# Patient Record
Sex: FEMALE | Race: WHITE | NOT HISPANIC OR LATINO | Employment: FULL TIME | URBAN - METROPOLITAN AREA
[De-identification: names, ages, dates, MRNs, and addresses within clinical notes are randomized per-mention and may not be internally consistent; named-entity substitution may affect disease eponyms.]

---

## 2017-04-10 ENCOUNTER — APPOINTMENT (OUTPATIENT)
Dept: CT IMAGING | Facility: HOSPITAL | Age: 57
End: 2017-04-10

## 2017-04-10 ENCOUNTER — APPOINTMENT (OUTPATIENT)
Dept: GENERAL RADIOLOGY | Facility: HOSPITAL | Age: 57
End: 2017-04-10

## 2017-04-10 ENCOUNTER — HOSPITAL ENCOUNTER (INPATIENT)
Facility: HOSPITAL | Age: 57
LOS: 4 days | Discharge: HOME | End: 2017-04-14
Attending: EMERGENCY MEDICINE | Admitting: FAMILY MEDICINE

## 2017-04-10 DIAGNOSIS — R06.02 SHORTNESS OF BREATH: ICD-10-CM

## 2017-04-10 DIAGNOSIS — J98.4 CAVITARY LESION OF LUNG: Primary | ICD-10-CM

## 2017-04-10 PROBLEM — E03.9 ACQUIRED HYPOTHYROIDISM: Status: ACTIVE | Noted: 2017-04-10

## 2017-04-10 PROBLEM — I10 ESSENTIAL HYPERTENSION: Status: ACTIVE | Noted: 2017-04-10

## 2017-04-10 PROBLEM — D72.829 LEUKOCYTOSIS: Status: ACTIVE | Noted: 2017-04-10

## 2017-04-10 PROBLEM — Z72.0 TOBACCO ABUSE: Status: ACTIVE | Noted: 2017-04-10

## 2017-04-10 LAB
ALBUMIN SERPL-MCNC: 4.4 G/DL (ref 3.2–4.8)
ALBUMIN/GLOB SERPL: 1.4 G/DL (ref 1.5–2.5)
ALP SERPL-CCNC: 108 U/L (ref 25–100)
ALT SERPL W P-5'-P-CCNC: 24 U/L (ref 7–40)
ANION GAP SERPL CALCULATED.3IONS-SCNC: 2 MMOL/L (ref 3–11)
APTT PPP: 24.5 SECONDS (ref 24–31)
AST SERPL-CCNC: 19 U/L (ref 0–33)
BASOPHILS # BLD AUTO: 0.03 10*3/MM3 (ref 0–0.2)
BASOPHILS NFR BLD AUTO: 0.3 % (ref 0–1)
BILIRUB SERPL-MCNC: 0.3 MG/DL (ref 0.3–1.2)
BNP SERPL-MCNC: 97 PG/ML (ref 0–100)
BUN BLD-MCNC: 21 MG/DL (ref 9–23)
BUN/CREAT SERPL: 30 (ref 7–25)
CALCIUM SPEC-SCNC: 9.6 MG/DL (ref 8.7–10.4)
CHLORIDE SERPL-SCNC: 103 MMOL/L (ref 99–109)
CO2 SERPL-SCNC: 33 MMOL/L (ref 20–31)
CREAT BLD-MCNC: 0.7 MG/DL (ref 0.6–1.3)
DEPRECATED RDW RBC AUTO: 46.8 FL (ref 37–54)
EOSINOPHIL # BLD AUTO: 0.04 10*3/MM3 (ref 0.1–0.3)
EOSINOPHIL NFR BLD AUTO: 0.3 % (ref 0–3)
ERYTHROCYTE [DISTWIDTH] IN BLOOD BY AUTOMATED COUNT: 11.9 % (ref 11.3–14.5)
GFR SERPL CREATININE-BSD FRML MDRD: 87 ML/MIN/1.73
GLOBULIN UR ELPH-MCNC: 3.1 GM/DL
GLUCOSE BLD-MCNC: 107 MG/DL (ref 70–100)
HCT VFR BLD AUTO: 47.8 % (ref 34.5–44)
HGB BLD-MCNC: 16.8 G/DL (ref 11.5–15.5)
HOLD SPECIMEN: NORMAL
HOLD SPECIMEN: NORMAL
IMM GRANULOCYTES # BLD: 0.12 10*3/MM3 (ref 0–0.03)
IMM GRANULOCYTES NFR BLD: 1 % (ref 0–0.6)
INR PPP: 0.93
LYMPHOCYTES # BLD AUTO: 1.57 10*3/MM3 (ref 0.6–4.8)
LYMPHOCYTES NFR BLD AUTO: 13.6 % (ref 24–44)
MCH RBC QN AUTO: 37.9 PG (ref 27–31)
MCHC RBC AUTO-ENTMCNC: 35.1 G/DL (ref 32–36)
MCV RBC AUTO: 107.9 FL (ref 80–99)
MONOCYTES # BLD AUTO: 1.05 10*3/MM3 (ref 0–1)
MONOCYTES NFR BLD AUTO: 9.1 % (ref 0–12)
NEUTROPHILS # BLD AUTO: 8.7 10*3/MM3 (ref 1.5–8.3)
NEUTROPHILS NFR BLD AUTO: 75.7 % (ref 41–71)
PLAT MORPH BLD: NORMAL
PLATELET # BLD AUTO: 229 10*3/MM3 (ref 150–450)
PMV BLD AUTO: 10.5 FL (ref 6–12)
POTASSIUM BLD-SCNC: 4.6 MMOL/L (ref 3.5–5.5)
PROT SERPL-MCNC: 7.5 G/DL (ref 5.7–8.2)
PROTHROMBIN TIME: 10.1 SECONDS (ref 9.6–11.5)
RBC # BLD AUTO: 4.43 10*6/MM3 (ref 3.89–5.14)
RBC MORPH BLD: NORMAL
SODIUM BLD-SCNC: 138 MMOL/L (ref 132–146)
TROPONIN I SERPL-MCNC: 0.01 NG/ML (ref 0–0.07)
WBC MORPH BLD: NORMAL
WBC NRBC COR # BLD: 11.51 10*3/MM3 (ref 3.5–10.8)
WHOLE BLOOD HOLD SPECIMEN: NORMAL
WHOLE BLOOD HOLD SPECIMEN: NORMAL

## 2017-04-10 PROCEDURE — 94799 UNLISTED PULMONARY SVC/PX: CPT

## 2017-04-10 PROCEDURE — 94640 AIRWAY INHALATION TREATMENT: CPT

## 2017-04-10 PROCEDURE — 71260 CT THORAX DX C+: CPT

## 2017-04-10 PROCEDURE — 93005 ELECTROCARDIOGRAM TRACING: CPT

## 2017-04-10 PROCEDURE — 99285 EMERGENCY DEPT VISIT HI MDM: CPT

## 2017-04-10 PROCEDURE — 85610 PROTHROMBIN TIME: CPT | Performed by: INTERNAL MEDICINE

## 2017-04-10 PROCEDURE — G0378 HOSPITAL OBSERVATION PER HR: HCPCS

## 2017-04-10 PROCEDURE — 25010000002 METHYLPREDNISOLONE PER 125 MG: Performed by: EMERGENCY MEDICINE

## 2017-04-10 PROCEDURE — 99222 1ST HOSP IP/OBS MODERATE 55: CPT | Performed by: INTERNAL MEDICINE

## 2017-04-10 PROCEDURE — 83880 ASSAY OF NATRIURETIC PEPTIDE: CPT | Performed by: EMERGENCY MEDICINE

## 2017-04-10 PROCEDURE — 25010000002 LEVOFLOXACIN PER 250 MG: Performed by: EMERGENCY MEDICINE

## 2017-04-10 PROCEDURE — 87040 BLOOD CULTURE FOR BACTERIA: CPT | Performed by: EMERGENCY MEDICINE

## 2017-04-10 PROCEDURE — 85730 THROMBOPLASTIN TIME PARTIAL: CPT | Performed by: INTERNAL MEDICINE

## 2017-04-10 PROCEDURE — 85007 BL SMEAR W/DIFF WBC COUNT: CPT | Performed by: EMERGENCY MEDICINE

## 2017-04-10 PROCEDURE — 85025 COMPLETE CBC W/AUTO DIFF WBC: CPT | Performed by: EMERGENCY MEDICINE

## 2017-04-10 PROCEDURE — 0 IOPAMIDOL 61 % SOLUTION: Performed by: EMERGENCY MEDICINE

## 2017-04-10 PROCEDURE — 84484 ASSAY OF TROPONIN QUANT: CPT

## 2017-04-10 PROCEDURE — 71020 HC CHEST PA AND LATERAL: CPT

## 2017-04-10 PROCEDURE — 80053 COMPREHEN METABOLIC PANEL: CPT | Performed by: EMERGENCY MEDICINE

## 2017-04-10 PROCEDURE — 25010000003 CEFTRIAXONE PER 250 MG: Performed by: PHYSICIAN ASSISTANT

## 2017-04-10 RX ORDER — SODIUM CHLORIDE 0.9 % (FLUSH) 0.9 %
1-10 SYRINGE (ML) INJECTION AS NEEDED
Status: DISCONTINUED | OUTPATIENT
Start: 2017-04-10 | End: 2017-04-14 | Stop reason: HOSPADM

## 2017-04-10 RX ORDER — CEFTRIAXONE SODIUM 1 G/50ML
1 INJECTION, SOLUTION INTRAVENOUS EVERY 24 HOURS
Status: DISCONTINUED | OUTPATIENT
Start: 2017-04-10 | End: 2017-04-13

## 2017-04-10 RX ORDER — METOPROLOL SUCCINATE 25 MG/1
25 TABLET, EXTENDED RELEASE ORAL DAILY
Status: DISCONTINUED | OUTPATIENT
Start: 2017-04-11 | End: 2017-04-14 | Stop reason: HOSPADM

## 2017-04-10 RX ORDER — DOCUSATE SODIUM 100 MG/1
100 CAPSULE, LIQUID FILLED ORAL 2 TIMES DAILY
Status: DISCONTINUED | OUTPATIENT
Start: 2017-04-10 | End: 2017-04-14 | Stop reason: HOSPADM

## 2017-04-10 RX ORDER — LEVOTHYROXINE SODIUM 0.15 MG/1
150 TABLET ORAL
Status: DISCONTINUED | OUTPATIENT
Start: 2017-04-11 | End: 2017-04-14 | Stop reason: HOSPADM

## 2017-04-10 RX ORDER — LEVOTHYROXINE SODIUM 175 UG/1
175 TABLET ORAL DAILY
COMMUNITY

## 2017-04-10 RX ORDER — SODIUM CHLORIDE 9 MG/ML
100 INJECTION, SOLUTION INTRAVENOUS CONTINUOUS
Status: DISCONTINUED | OUTPATIENT
Start: 2017-04-10 | End: 2017-04-12

## 2017-04-10 RX ORDER — SODIUM CHLORIDE 9 MG/ML
100 INJECTION, SOLUTION INTRAVENOUS CONTINUOUS
Status: DISCONTINUED | OUTPATIENT
Start: 2017-04-10 | End: 2017-04-10

## 2017-04-10 RX ORDER — ACETAMINOPHEN 325 MG/1
650 TABLET ORAL EVERY 4 HOURS PRN
Status: DISCONTINUED | OUTPATIENT
Start: 2017-04-10 | End: 2017-04-14 | Stop reason: HOSPADM

## 2017-04-10 RX ORDER — FAMOTIDINE 20 MG/1
20 TABLET, FILM COATED ORAL 2 TIMES DAILY
Status: DISCONTINUED | OUTPATIENT
Start: 2017-04-10 | End: 2017-04-14 | Stop reason: HOSPADM

## 2017-04-10 RX ORDER — DOXYCYCLINE HYCLATE 100 MG/1
100 CAPSULE ORAL 2 TIMES DAILY
Status: ON HOLD | COMMUNITY
Start: 2017-04-04 | End: 2017-04-10

## 2017-04-10 RX ORDER — IPRATROPIUM BROMIDE AND ALBUTEROL SULFATE 2.5; .5 MG/3ML; MG/3ML
3 SOLUTION RESPIRATORY (INHALATION) ONCE
Status: COMPLETED | OUTPATIENT
Start: 2017-04-10 | End: 2017-04-10

## 2017-04-10 RX ORDER — LEVOFLOXACIN 5 MG/ML
750 INJECTION, SOLUTION INTRAVENOUS ONCE
Status: COMPLETED | OUTPATIENT
Start: 2017-04-10 | End: 2017-04-10

## 2017-04-10 RX ORDER — METHYLPREDNISOLONE SODIUM SUCCINATE 125 MG/2ML
125 INJECTION, POWDER, LYOPHILIZED, FOR SOLUTION INTRAMUSCULAR; INTRAVENOUS ONCE
Status: COMPLETED | OUTPATIENT
Start: 2017-04-10 | End: 2017-04-10

## 2017-04-10 RX ORDER — ONDANSETRON 4 MG/1
4 TABLET, FILM COATED ORAL EVERY 6 HOURS PRN
Status: DISCONTINUED | OUTPATIENT
Start: 2017-04-10 | End: 2017-04-14 | Stop reason: HOSPADM

## 2017-04-10 RX ORDER — SODIUM CHLORIDE 0.9 % (FLUSH) 0.9 %
10 SYRINGE (ML) INJECTION AS NEEDED
Status: DISCONTINUED | OUTPATIENT
Start: 2017-04-10 | End: 2017-04-14 | Stop reason: HOSPADM

## 2017-04-10 RX ORDER — METOPROLOL SUCCINATE 25 MG/1
25 TABLET, EXTENDED RELEASE ORAL DAILY
COMMUNITY
End: 2018-04-03 | Stop reason: HOSPADM

## 2017-04-10 RX ADMIN — LEVOFLOXACIN 750 MG: 5 INJECTION, SOLUTION INTRAVENOUS at 15:08

## 2017-04-10 RX ADMIN — FAMOTIDINE 20 MG: 20 TABLET ORAL at 18:28

## 2017-04-10 RX ADMIN — DOCUSATE SODIUM 100 MG: 100 CAPSULE, LIQUID FILLED ORAL at 18:28

## 2017-04-10 RX ADMIN — IPRATROPIUM BROMIDE AND ALBUTEROL SULFATE 3 ML: .5; 3 SOLUTION RESPIRATORY (INHALATION) at 12:29

## 2017-04-10 RX ADMIN — SODIUM CHLORIDE 100 ML/HR: 9 INJECTION, SOLUTION INTRAVENOUS at 16:37

## 2017-04-10 RX ADMIN — CEFTRIAXONE SODIUM 1 G: 1 INJECTION, SOLUTION INTRAVENOUS at 18:51

## 2017-04-10 RX ADMIN — IOPAMIDOL 95 ML: 612 INJECTION, SOLUTION INTRAVENOUS at 12:50

## 2017-04-10 RX ADMIN — METHYLPREDNISOLONE SODIUM SUCCINATE 125 MG: 125 INJECTION, POWDER, FOR SOLUTION INTRAMUSCULAR; INTRAVENOUS at 12:26

## 2017-04-10 RX ADMIN — ACETAMINOPHEN 650 MG: 325 TABLET, FILM COATED ORAL at 20:48

## 2017-04-10 NOTE — H&P
"    [facility] Hospital Medicine Services  HISTORY AND PHYSICAL    Primary Care Physician: YANETH Olivera    Subjective     Chief Complaint: SOA    History of Present Illness:   Ms. Miller is a 57yo female with a history of HTN, acquired hypothyroidism s/p thyroidectomy for Graves' disease and ongoing tobacco abuse. She presented to [facility] ED on 4/10/17 with a 10 day history of fatigue/malaise, productive cough, congestion, dyspnea, subjective fevers and diaphoresis. She initially presented to her PCP's office 6 days ago and was prescribed Doxycycline 100mg PO BID (two pills left) and oral steroids. Flu swab was negative at that time. She was instructed to present to the hospital if she did not improve.     Upon further questioning, she does report a several month history of fatigue and diaphoresis, though she has related this to what she thought was menopause. No appetite change, no unintended weight loss.     ED evaluation revealed WBC count of 11.5, 75.7% neutrophils, creatinine 0.7 and BUN/Cr ratio 30.0. BP elevated in the ED. She was not hypoxic. XR revealed a mid-lung nodular density in the L lung and a CT chest showed a 2.5x3.1cm upper lobe cavitary mass and small L hilar lymph node. She was given IV fluids, solumedrol 125mg IV and Levaquin IV in the ED. She will be admitted to the hospitalist service for further evaluation.     Review of Systems   Constitutional: Positive for diaphoresis, fatigue and fever. Negative for activity change, appetite change, chills and unexpected weight change.        Subjective fevers, \"felt warm\"   HENT: Positive for congestion.    Respiratory: Positive for cough and shortness of breath. Negative for wheezing and stridor.    Cardiovascular: Positive for leg swelling. Negative for chest pain and palpitations.        Chronic BLE edema    Gastrointestinal: Negative for abdominal pain, constipation, diarrhea, nausea and vomiting. "   Genitourinary: Negative.    Musculoskeletal: Negative.    Skin: Negative.    Neurological: Negative for tremors, weakness and headaches.   Psychiatric/Behavioral: Negative for confusion.      Otherwise complete ROS performed and negative except as mentioned in the HPI.    Past Medical History:   Diagnosis Date   • Abscess    • Disease of thyroid gland    • Hypertension    • MRSA (methicillin resistant staph aureus) culture positive      Past Surgical History:   Procedure Laterality Date   • THYROID SURGERY       History reviewed. No pertinent family history.    Social History     Social History   • Marital status:      Spouse name: N/A   • Number of children: N/A   • Years of education: N/A     Occupational History   • Not on file.     Social History Main Topics   • Smoking status: Current Every Day Smoker     Packs/day: 0.75     Years: 40.00     Types: Cigarettes   • Smokeless tobacco: Not on file   • Alcohol use No   • Drug use: No   • Sexual activity: Defer     Other Topics Concern   • Not on file     Social History Narrative   • No narrative on file     Medication Sig   • doxycycline (VIBRAMYCIN) 100 MG capsule Take 100 mg by mouth 2 (Two) Times a Day. 7 day course beginning 4/4/17   • levothyroxine (SYNTHROID, LEVOTHROID) 150 MCG tablet Take 150 mcg by mouth Daily.   • metoprolol succinate XL (TOPROL-XL) 25 MG 24 hr tablet Take 25 mg by mouth Daily.     No Known Allergies    Objective     Physical Exam   Temp:  [97.5 °F (36.4 °C)-98.2 °F (36.8 °C)] 97.5 °F (36.4 °C)  Heart Rate:  [62-76] 74  Resp:  [18-20] 18  BP: (128-184)/(61-88) 139/72     Constitutional: no acute distress, awake, alert, sitting upright in bed  Eyes: PERRLA, sclerae anicteric, no conjunctival injection  Neck: supple, no thyromegaly, trachea midline  Respiratory: Non-labored respirations, rhonchi with cough, trace L basilar rales and prolonged L expiratory phase heard best at L upper and middle lobe region  Cardiovascular: RRR, no  murmurs, rubs, or gallops, palpable pedal pulses bilaterally  Gastrointestinal: Positive bowel sounds, soft, nontender, nondistended  Musculoskeletal: 1+ non-pitting edema, no clubbing or cyanosis to bilateral lower extremities  Psychiatric: oriented x 3, appropriate affect, cooperative  Neurologic: Strength symmetric in all extremities, Cranial Nerves grossly intact to confrontation     Results Reviewed:  Lab Results (all)     Procedure Component Value Units Date/Time    POC Troponin, Rapid [[id number]]  (Normal) Collected:  04/10/17 1133    Specimen:  Blood Updated:  04/10/17 1154     Troponin I 0.01 ng/mL       Serial Number: [id number]    : [id number]       Comprehensive Metabolic Panel [[id number]]  (Abnormal) Collected:  04/10/17 1128    Specimen:  Blood Updated:  04/10/17 1209     Glucose 107 (H) mg/dL      BUN 21 mg/dL      Creatinine 0.70 mg/dL      Sodium 138 mmol/L      Potassium 4.6 mmol/L      Chloride 103 mmol/L      CO2 33.0 (H) mmol/L      Calcium 9.6 mg/dL      Total Protein 7.5 g/dL      Albumin 4.40 g/dL      ALT (SGPT) 24 U/L      AST (SGOT) 19 U/L      Alkaline Phosphatase 108 (H) U/L      Total Bilirubin 0.3 mg/dL      eGFR Non African Amer 87 mL/min/1.73      Globulin 3.1 gm/dL      A/G Ratio 1.4 (L) g/dL      BUN/Creatinine Ratio 30.0 (H)     Anion Gap 2.0 (L) mmol/L       BNP [[id number]]  (Normal) Collected:  04/10/17 1128    Specimen:  Blood Updated:  04/10/17 1220     BNP 97.0 pg/mL       CBC Auto Differential [[id number]]  (Abnormal) Collected:  04/10/17 1128    Specimen:  Blood Updated:  04/10/17 1236     WBC 11.51 (H) 10*3/mm3      RBC 4.43 10*6/mm3      Hemoglobin 16.8 (H) g/dL      Hematocrit 47.8 (H) %      .9 (H) fL      MCH 37.9 (H) pg      MCHC 35.1 g/dL      RDW 11.9 %      RDW-SD 46.8 fl      MPV 10.5 fL      Platelets 229 10*3/mm3      Neutrophil % 75.7 (H) %      Lymphocyte % 13.6 (L) %      Monocyte % 9.1 %      Eosinophil % 0.3 %      Basophil % 0.3 %      Immature Grans %  1.0 (H) %      Neutrophils, Absolute 8.70 (H) 10*3/mm3      Lymphocytes, Absolute 1.57 10*3/mm3      Monocytes, Absolute 1.05 (H) 10*3/mm3      Eosinophils, Absolute 0.04 (L) 10*3/mm3      Basophils, Absolute 0.03 10*3/mm3      Immature Grans, Absolute 0.12 (H) 10*3/mm3       Scan Slide [[id number]]  (Normal) Collected:  04/10/17 1128    Specimen:  Blood Updated:  04/10/17 1236     RBC Morphology Normal     WBC Morphology Normal     Platelet Morphology Normal      aPTT [[id number]]  (Normal) Collected:  04/10/17 1128    Specimen:  Blood Updated:  04/10/17 1434     PTT 24.5 seconds     Narrative:       PTT = The equivalent PTT values for the therapeutic range of heparin levels at 0.3 to 0.5 U/ml are 45 to 60 seconds.      Protime-INR [[id number]] Collected:  04/10/17 1128    Specimen:  Blood Updated:  04/10/17 1434     Protime 10.1 Seconds      INR 0.93    Narrative:       Therapeutic Ranges for INR: 2.0-3.0 (PT 20-30)                              2.5-3.5 (PT 25-34)        Imaging Results (all)     Procedure Component Value Units Date/Time    XR Chest 2 View [[id number]] Collected:  04/10/17 1306     Updated:  04/10/17 1601    Narrative:       EXAMINATION: XR CHEST 2 VW-      INDICATION: Shortness of air triage protocol.      COMPARISON: None.     FINDINGS: Two-view chest reveals masslike density seen within the  periphery of the left midlung. CT scan of the chest is recommended for  further evaluation. The right lung is clear. Cardiac and mediastinal  silhouettes are within normal limits. Pulmonary vascularity is within  normal limits. No pleural effusion or pneumothorax.           Impression:       Nodular masslike density seen within the left midlung.  Findings concerning for an underlying process or malignancy in which CT  scan of the chest is recommended for further evaluation.     D:  04/10/2017  E:  04/10/2017     This report was finalized on 4/10/2017 3:59 PM by Dr. Sola Gonzalez MD.       CT Chest With Contrast  [[id number]] Collected:  04/10/17 1334     Updated:  04/10/17 1602    Narrative:       EXAMINATION: CT CHEST WITH CONTRAST-04/10/2017:      INDICATION: Left lung mass, dyspnea, wheezing, cough, difficulty  breathing.     TECHNIQUE: Multiple axial CT imaging was obtained of the chest from the  thoracic inlet through the adrenal glands following the administration  of intravenous contrast.     The radiation dose reduction device was turned on for each scan per the  ALARA (As Low as Reasonably Achievable) protocol.     COMPARISON: Chest x-ray dated 04/10/2017.      FINDINGS: There is a cavitary mass identified within the periphery of  the left upper lobe. Findings concerning for an underlying mass or focal  infiltrate. This area measures 2.5 x 3.1 cm. The remainder of the lung  parenchyma is grossly clear. There is nodular density seen posteriorly  within the right upper lobe. No evidence of bronchiectatic change.  Within the mediastinum there is no evidence of underlying mass. Small  lymph nodes identified in the right hilar region. There is no  pericardial effusion. Mild enlargement of the left atrium. Calcification  seen within the right hilar region. No mediastinal mass. No bulky hilar  or axillary lymphadenopathy. Degenerative changes seen within the spine.  The visualized portions of the upper abdomen are grossly unremarkable in  appearance.       Impression:       Cavitary mass identified correlating to the abnormality seen  on the prior chest x-ray in the left upper lobe. Findings concerning for  an underlying lesion or neoplasm. Focal infectious process as well as in  the differential. There is no mediastinal adenopathy or mass. Follow-up  imaging is recommended as indicated. Small lymph node seen in the right  hilar region with no bulky mediastinal mass or adenopathy identified.  Mild dilatation of the left atrium.     D:  04/10/2017  E:  04/10/2017     This report was finalized on 4/10/2017 3:59 PM by   Sola Gonzalez MD        I have personally reviewed and interpreted available lab data, radiology studies and ECG obtained at time of admission.     Assessment / Plan     Assessment/Problem List:      Patient Active Problem List   Diagnosis   • Leukocytosis   • Essential hypertension   • Acquired hypothyroidism   • Cavitating mass in left upper lung lobe   • Tobacco abuse     Cavitating mass in L upper lung   - With leukocytosis and history of illness, possibly is related to infectious process and will cover with appropriate antibiotics  - Given long smoking history (40 years x 3/4 PPD), high index of suspicion for malignancy and will work up with CT-guided biopsy    Leukocytosis  - As above, will treat for infectious process until pathology returns or has completed full course of therapy  - Rocephin and Azithromycin IV   - O2 PRN  - Duonebs scheduled     Tobacco abuse  - Nicotine patch  - Encourage tobacco cessation throughout hospitalization     HTN  - Resume home meds    Hypothyroidism    DVT prophylaxis: Mechanical for now, may start pharmacological prophylaxis following biopsy   Code Status: FULL   Admission Status: Patient will be admitted to YANETH Rodriguez 04/10/17 4:08 PM    Brief Attending Note   I have seen and examined the patient, performing an independent face-to-face diagnostic evaluation with plan of care reviewed and developed with the advanced practice clinician (APC).  Brief Summary Statement/HPI:   57 yo with several weeks of worsening shortness of breath and cough that has not responded to outpatient treatment.  She was found to have a round infiltrate on her CXR.  She denies any hemoptysis, fever, night sweats or chills.  Attending Physical Exam:  Patient is alert and talkative in no distress at rest lying in bed on RA  Neck is without mass or JVD  Heart is Reg wo murmur  Lungs have good breath sounds and a prolonged expiratory phase  Abd is soft without HSM or  mass  MAEW  Skin is without rash  Neurologic exam in nonfocal   Mood is appropriate  Data:  Labs and films reviewed.  Brief Assessment/Plan :  57 yo with persistent pneumonia and a questionable mass on CT of her chest.  Will cont with IV antibiotics, steroids were given in the ER but not currently continued. Will cont nebs.  Have orderd a CT biopsy in the morning, which maybe optimistic  Will use oxygen as needed.    See above for further detailed assessment and plan developed with APC which I have reviewed and/or edited.    I believe this patient meets obs criteria for now, though that could change.    Lizzy Brunson MD 04/10/17 11:53 PM

## 2017-04-10 NOTE — ED PROVIDER NOTES
Subjective   HPI Comments: Jill Miller is a 56 y.o. female presenting to the ED with c/o shortness of breath. Mrs. Milelr reports that she has been feeling increasingly short of breath over the past week. She also reports having a productive cough, congestion, fevers, and sweats during this time. She presented to her primary care physician six days ago, who prescribed her antibiotics and steroids after a flu swab came back negative. Mrs. Miller states that they have not helped, prompting her presentation to the ED. She is a smoker. She denies being diagnosed with COPD or emphysema in the past. Denies any chest pain. No other complaints at this time.    Patient is a 56 y.o. female presenting with shortness of breath.   History provided by:  Patient  Shortness of Breath   Severity:  Moderate  Onset quality:  Gradual  Duration:  1 week  Timing:  Constant  Progression:  Worsening  Chronicity:  New  Relieved by:  Nothing  Worsened by:  Nothing  Ineffective treatments:  None tried  Associated symptoms: cough, diaphoresis, fever and sputum production    Associated symptoms: no abdominal pain, no chest pain, no neck pain and no vomiting        Review of Systems   Constitutional: Positive for diaphoresis and fever. Negative for chills.   HENT: Positive for congestion. Negative for rhinorrhea.    Respiratory: Positive for cough, sputum production and shortness of breath.    Cardiovascular: Negative for chest pain and leg swelling.   Gastrointestinal: Negative for abdominal pain, diarrhea, nausea and vomiting.   Genitourinary: Negative for dysuria.   Musculoskeletal: Negative for back pain, myalgias and neck pain.   Skin: Negative for color change.   Neurological: Negative for dizziness, syncope, weakness and light-headedness.   All other systems reviewed and are negative.      Past Medical History:   Diagnosis Date   • Abscess    • Disease of thyroid gland    • Hypertension    • MRSA (methicillin resistant staph aureus)  culture positive    • Tobacco abuse 4/10/2017       No Known Allergies    Past Surgical History:   Procedure Laterality Date   • THYROID SURGERY         History reviewed. No pertinent family history.    Social History     Social History   • Marital status:      Spouse name: N/A   • Number of children: N/A   • Years of education: N/A     Social History Main Topics   • Smoking status: Current Every Day Smoker     Packs/day: 0.75     Years: 40.00     Types: Cigarettes   • Smokeless tobacco: None   • Alcohol use No   • Drug use: No   • Sexual activity: Defer     Other Topics Concern   • None     Social History Narrative   • None         Objective   Physical Exam   Constitutional: She is oriented to person, place, and time. She appears well-developed and well-nourished. No distress.   HENT:   Head: Normocephalic and atraumatic.   Eyes: Conjunctivae and EOM are normal.   Neck: Normal range of motion. Neck supple.   Cardiovascular: Normal rate, regular rhythm, normal heart sounds and intact distal pulses.    Pulmonary/Chest: Effort normal. No respiratory distress. She has rhonchi (mild diffuse rhonchi).   Abdominal: Soft. There is no tenderness.   Musculoskeletal: Normal range of motion. She exhibits no edema or tenderness.   Neurological: She is alert and oriented to person, place, and time.   Skin: Skin is warm and dry. No erythema.   Psychiatric: She has a normal mood and affect. Her behavior is normal.   Nursing note and vitals reviewed.      Procedures         ED Course  ED Course     EKG NSR.  CXR shows ALBINA mass.  CT shows cavitary lung lesion, infectious vs neoplastic.  Neb given, pt feels better.  Patient stable on serial rechecks.  Discussed exam findings, test results so far and concerns in detail at the bedside.  Discussed need for admission for further evaluation and treatment.                  MDM  Number of Diagnoses or Management Options  Cavitary lesion of lung:   Shortness of breath:      Amount  and/or Complexity of Data Reviewed  Clinical lab tests: reviewed and ordered  Tests in the radiology section of CPT®: ordered and reviewed  Independent visualization of images, tracings, or specimens: yes        Final diagnoses:   Cavitary lesion of lung   Shortness of breath       Documentation assistance provided by elisabeth Castro.  Information recorded by the scribe was done at my direction and has been verified and validated by me.     Bob Castro  04/10/17 1203       Bob Castro  04/10/17 1214       Bob Castro  04/10/17 1230       Brian Wadsworth MD  04/10/17 2022

## 2017-04-10 NOTE — PLAN OF CARE
Problem: Respiratory Insufficiency (Adult)  Goal: Identify Related Risk Factors and Signs and Symptoms  Outcome: Ongoing (interventions implemented as appropriate)

## 2017-04-10 NOTE — PROGRESS NOTES
Discharge Planning Assessment  [facility]     Patient Name: Jill Miller  MRN: [id number]  Today's Date: 4/10/2017    Admit Date: 4/10/2017          Discharge Needs Assessment       04/10/17 1422    Living Environment    Lives With spouse    Living Arrangements apartment    Provides Primary Care For spouse    Primary Care Provided By spouse/significant other    Quality Of Family Relationships supportive    Able to Return to Prior Living Arrangements yes    Discharge Needs Assessment    Concerns To Be Addressed denies needs/concerns at this time    Readmission Within The Last 30 Days no previous admission in last 30 days    Equipment Currently Used at Home none    Equipment Needed After Discharge none    Transportation Available family or friend will provide    Discharge Disposition still a patient    Discharge Contact Information if Applicable [phone]            Discharge Plan       04/10/17 1423    Case Management/Social Work Plan    Plan Home    Patient/Family In Agreement With Plan yes    Additional Comments Spoke with pt. at . Pt lives with spouse in [facility]. Pt. is independent with ADL's and mobility. Pt has no DME or HH. Her PCP is Marisa Lynch and medications are covered by insurance. Plan for discharge is to return home. Pt to transport home with family, when medically ready. Case Management will follow and assist with any discharge planning needs.        Discharge Placement     No information found                Demographic Summary       04/10/17 1419    Referral Information    Arrived From home or self-care    Reason For Consult discharge planning    Contact Information    Permission Granted to Share Information With family/designee    Comments Zen Miller (spouse) [phone]    Primary Care Physician Information    Name Marisa Lynch            Functional Status       04/10/17 1421    Functional Status Prior    Ambulation 0-->independent    Transferring 0-->independent    Toileting  0-->independent    Bathing 0-->independent    Dressing 0-->independent    Eating 0-->independent    Communication 0-->understands/communicates without difficulty    Swallowing 0-->swallows foods/liquids without difficulty    IADL    Medications independent    Meal Preparation independent    Housekeeping independent    Laundry independent    Shopping independent    Oral Care independent    Activity Tolerance    Current Activity Limitations none    Usual Activity Tolerance good    Current Activity Tolerance fair    Employment/Financial    Employment/Finance Comments Healthcare coverage: [employer]; Medication coverage: [employer]            Psychosocial     None            Abuse/Neglect     None            Legal     None            Substance Abuse     None            Patient Forms     None          Marsha Swanson RN

## 2017-04-11 ENCOUNTER — APPOINTMENT (OUTPATIENT)
Dept: CT IMAGING | Facility: HOSPITAL | Age: 57
End: 2017-04-11

## 2017-04-11 LAB
ANION GAP SERPL CALCULATED.3IONS-SCNC: 4 MMOL/L (ref 3–11)
BASOPHILS # BLD AUTO: 0 10*3/MM3 (ref 0–0.2)
BASOPHILS NFR BLD AUTO: 0 % (ref 0–1)
BUN BLD-MCNC: 13 MG/DL (ref 9–23)
BUN/CREAT SERPL: 21.7 (ref 7–25)
CALCIUM SPEC-SCNC: 9.1 MG/DL (ref 8.7–10.4)
CHLORIDE SERPL-SCNC: 108 MMOL/L (ref 99–109)
CO2 SERPL-SCNC: 25 MMOL/L (ref 20–31)
CREAT BLD-MCNC: 0.6 MG/DL (ref 0.6–1.3)
DEPRECATED RDW RBC AUTO: 46.9 FL (ref 37–54)
EOSINOPHIL # BLD AUTO: 0 10*3/MM3 (ref 0.1–0.3)
EOSINOPHIL NFR BLD AUTO: 0 % (ref 0–3)
ERYTHROCYTE [DISTWIDTH] IN BLOOD BY AUTOMATED COUNT: 11.8 % (ref 11.3–14.5)
GFR SERPL CREATININE-BSD FRML MDRD: 104 ML/MIN/1.73
GLUCOSE BLD-MCNC: 192 MG/DL (ref 70–100)
HCT VFR BLD AUTO: 44.2 % (ref 34.5–44)
HGB BLD-MCNC: 15.5 G/DL (ref 11.5–15.5)
IMM GRANULOCYTES # BLD: 0.1 10*3/MM3 (ref 0–0.03)
IMM GRANULOCYTES NFR BLD: 0.8 % (ref 0–0.6)
LYMPHOCYTES # BLD AUTO: 0.76 10*3/MM3 (ref 0.6–4.8)
LYMPHOCYTES NFR BLD AUTO: 6.3 % (ref 24–44)
MCH RBC QN AUTO: 37.9 PG (ref 27–31)
MCHC RBC AUTO-ENTMCNC: 35.1 G/DL (ref 32–36)
MCV RBC AUTO: 108.1 FL (ref 80–99)
MONOCYTES # BLD AUTO: 0.51 10*3/MM3 (ref 0–1)
MONOCYTES NFR BLD AUTO: 4.2 % (ref 0–12)
NEUTROPHILS # BLD AUTO: 10.64 10*3/MM3 (ref 1.5–8.3)
NEUTROPHILS NFR BLD AUTO: 88.7 % (ref 41–71)
PLATELET # BLD AUTO: 211 10*3/MM3 (ref 150–450)
PMV BLD AUTO: 10.7 FL (ref 6–12)
POTASSIUM BLD-SCNC: 4.3 MMOL/L (ref 3.5–5.5)
RBC # BLD AUTO: 4.09 10*6/MM3 (ref 3.89–5.14)
SODIUM BLD-SCNC: 137 MMOL/L (ref 132–146)
TSH SERPL DL<=0.05 MIU/L-ACNC: 0.55 MIU/ML (ref 0.35–5.35)
WBC NRBC COR # BLD: 12.01 10*3/MM3 (ref 3.5–10.8)

## 2017-04-11 PROCEDURE — 88305 TISSUE EXAM BY PATHOLOGIST: CPT | Performed by: INTERNAL MEDICINE

## 2017-04-11 PROCEDURE — 99232 SBSQ HOSP IP/OBS MODERATE 35: CPT | Performed by: NURSE PRACTITIONER

## 2017-04-11 PROCEDURE — 88173 CYTOPATH EVAL FNA REPORT: CPT | Performed by: INTERNAL MEDICINE

## 2017-04-11 PROCEDURE — 94799 UNLISTED PULMONARY SVC/PX: CPT

## 2017-04-11 PROCEDURE — 80048 BASIC METABOLIC PNL TOTAL CA: CPT | Performed by: PHYSICIAN ASSISTANT

## 2017-04-11 PROCEDURE — 25010000002 AZITHROMYCIN: Performed by: PHYSICIAN ASSISTANT

## 2017-04-11 PROCEDURE — 88172 CYTP DX EVAL FNA 1ST EA SITE: CPT | Performed by: INTERNAL MEDICINE

## 2017-04-11 PROCEDURE — 85025 COMPLETE CBC W/AUTO DIFF WBC: CPT | Performed by: PHYSICIAN ASSISTANT

## 2017-04-11 PROCEDURE — 25010000003 CEFTRIAXONE PER 250 MG: Performed by: PHYSICIAN ASSISTANT

## 2017-04-11 PROCEDURE — 25010000002 MORPHINE SULFATE (PF) 2 MG/ML SOLUTION: Performed by: NURSE PRACTITIONER

## 2017-04-11 PROCEDURE — G0378 HOSPITAL OBSERVATION PER HR: HCPCS

## 2017-04-11 PROCEDURE — 77012 CT SCAN FOR NEEDLE BIOPSY: CPT

## 2017-04-11 PROCEDURE — 0B9G3ZX DRAINAGE OF LEFT UPPER LUNG LOBE, PERCUTANEOUS APPROACH, DIAGNOSTIC: ICD-10-PCS | Performed by: RADIOLOGY

## 2017-04-11 PROCEDURE — 84443 ASSAY THYROID STIM HORMONE: CPT | Performed by: PHYSICIAN ASSISTANT

## 2017-04-11 RX ORDER — OXYCODONE HYDROCHLORIDE AND ACETAMINOPHEN 5; 325 MG/1; MG/1
1 TABLET ORAL EVERY 4 HOURS PRN
Status: DISCONTINUED | OUTPATIENT
Start: 2017-04-11 | End: 2017-04-14 | Stop reason: HOSPADM

## 2017-04-11 RX ORDER — MORPHINE SULFATE 2 MG/ML
2 INJECTION, SOLUTION INTRAMUSCULAR; INTRAVENOUS
Status: DISCONTINUED | OUTPATIENT
Start: 2017-04-11 | End: 2017-04-14 | Stop reason: HOSPADM

## 2017-04-11 RX ORDER — LIDOCAINE HYDROCHLORIDE 10 MG/ML
20 INJECTION, SOLUTION INFILTRATION; PERINEURAL ONCE
Status: COMPLETED | OUTPATIENT
Start: 2017-04-11 | End: 2017-04-11

## 2017-04-11 RX ADMIN — LIDOCAINE HYDROCHLORIDE 20 ML: 10 INJECTION, SOLUTION INFILTRATION; PERINEURAL at 10:15

## 2017-04-11 RX ADMIN — FAMOTIDINE 20 MG: 20 TABLET ORAL at 10:52

## 2017-04-11 RX ADMIN — MORPHINE SULFATE 2 MG: 2 INJECTION, SOLUTION INTRAMUSCULAR; INTRAVENOUS at 19:53

## 2017-04-11 RX ADMIN — METOPROLOL SUCCINATE 25 MG: 25 TABLET, EXTENDED RELEASE ORAL at 10:51

## 2017-04-11 RX ADMIN — LEVOTHYROXINE SODIUM 150 MCG: 150 TABLET ORAL at 06:28

## 2017-04-11 RX ADMIN — ONDANSETRON 4 MG: 4 TABLET, FILM COATED ORAL at 14:56

## 2017-04-11 RX ADMIN — AZITHROMYCIN 500 MG: 500 INJECTION, POWDER, LYOPHILIZED, FOR SOLUTION INTRAVENOUS at 14:32

## 2017-04-11 RX ADMIN — SODIUM CHLORIDE 100 ML/HR: 9 INJECTION, SOLUTION INTRAVENOUS at 12:21

## 2017-04-11 RX ADMIN — FAMOTIDINE 20 MG: 20 TABLET ORAL at 17:51

## 2017-04-11 RX ADMIN — DOCUSATE SODIUM 100 MG: 100 CAPSULE, LIQUID FILLED ORAL at 17:51

## 2017-04-11 RX ADMIN — CEFTRIAXONE SODIUM 1 G: 1 INJECTION, SOLUTION INTRAVENOUS at 17:51

## 2017-04-11 RX ADMIN — MORPHINE SULFATE 2 MG: 2 INJECTION, SOLUTION INTRAMUSCULAR; INTRAVENOUS at 11:40

## 2017-04-11 NOTE — CONSULTS
"Jill Miller  1960  [id number]  4/11/2017      Referring Provider: Hospital medicine service  Reason for Consultation: Cavitary left apical lung mass.      Subjective   History of present illness:  56-year-old  female who resides in [place].  50-pack-year tobacco history.  Chronic obstructive pulmonary disease.  2 years out from diagnosis of Graves' disease with surgical thyroidectomy on replacement therapy.  10 day history of cough, subjective fever, malaise, diaphoresis, and chilling.  Purulent sputum production.  No hemoptysis.  Mild left-sided pleuritic chest discomfort.  Seen by primary care physician and started on oral doxycycline with corticosteroids.  Influenza A and B rapid antigens allegedly negative.  Because of symptomatic worsening, presented to the [facility] emergency Department.  Peripheral leukocytosis of 11,500.  Abnormal chest x-ray with \"round pneumonia\" in left apex.  Chest CT w/ 3.1 cm cavitary mass peripherally in the left apex.  The patient denies international travel.  She was never deployed in active duty  service.  She denies outdoor activities of hunting camping fishing or gardening.  She does have periodontal disease with missing and carious teeth along the mandibular alveolar ridge.  The maxillary ridge is edentulous.  She traveled to [facility] in 2008 which is an area in the [place] which is hyperendemic for cocci.  She is unaware of a pulmonary illness following that trip.  She has recently traveled to [place].  She states that she was PPD positive a number of years ago but never received a 6-9 month course of isoniazid and vitamin B6.  She is unaware first-degree relatives as a child with active pulmonary tuberculosis who [facility] [place] [place]  The patient is status post CT-guided needle biopsy of a peripheral lesion today.  At the present time, I have no grams stain, fungal " "prep, acid-fast smears, or cytology.    Past Medical History:   Diagnosis Date   • Abscess    • Disease of thyroid gland    • Hypertension    • MRSA (methicillin resistant staph aureus) culture positive    • Tobacco abuse 4/10/2017       Past Surgical History:   Procedure Laterality Date   • THYROID SURGERY         Pediatric History   Patient Guardian Status   • Not on file.     Other Topics Concern   • Not on file     Social History Narrative   • No narrative on file       family history is not on file.    No Known Allergies    Medication: MAR reviewed.  Antibiotics: See below.  IV Anti-Infectives     Ordered     Dose/Rate Route Frequency Start Stop    04/10/17 1630  AZITHROMYCIN 500 MG/250 ML 0.9% NS IVPB (MBP)     Ordering Provider:  YANETH Mcnair    500 mg  over 60 Minutes Intravenous Every 24 Hours 04/11/17 1500      04/10/17 1630  cefTRIAXone (ROCEPHIN) IVPB 1 g     Ordering Provider:  YANETH Mcnair    1 g  over 30 Minutes Intravenous Every 24 Hours 04/10/17 1800      04/10/17 1339  levoFLOXacin (LEVAQUIN) 750 mg/150 mL D5W (premix) (LEVAQUIN) 750 mg     Ordering Provider:  Brian Wadsworth MD    750 mg Intravenous Once 04/10/17 1341 04/10/17 1640          Please refer to the medical record for a full medication list    Review of Systems  Pertinent items are noted in HPI, all other systems reviewed and negative    Objective     Physical Exam: See below   Vital Signs   Temp:  [97.3 °F (36.3 °C)-98 °F (36.7 °C)] 97.9 °F (36.6 °C)  Heart Rate:  [63-86] 66  Resp:  [16-20] 16  BP: (119-147)/(59-87) 130/77    Blood pressure 130/77, pulse 66, temperature 97.9 °F (36.6 °C), resp. rate 16, height 62\" (157.5 cm), weight 192 lb (87.1 kg), SpO2 99 %.  GENERAL: Awake and alert, in no acute distress.  Moderately obese  female.  HEENT: Oropharynx without thrush. Sinuses nontender. Dentition in poor repair.  The maxillary alveolar ridge is edentulous.  There are a few remaining carious teeth " in the mandibular midline.  No cervical adenopathy. No carotid bruits/ jugular venous distention.   EYES: PERRL. No conjunctival injection. No icterus. EOMI.  LYMPHATICS: No lymphadenopathy of the neck or axillary or inguinal regions.   HEART: No murmur, gallop, or pericardial friction rub.   LUNGS: Clear to auscultation anteriorly. No percussion dullness.  Diffuse expiratory wheezes.  Expanded AP diameter.   ABDOMEN: Soft, nontender, nondistended. No appreciable HSM.  No peritoneal findings of rebound or guarding.   SKIN: Warm and dry without cutaneous eruptions. No embolic stigmata.   PSYCHIATRIC: Mental status lucid. Cranial nerve function intact.       Results Review:   I reviewed the patient's new clinical results.    Lab Results   Component Value Date    WBC 12.01 (H) 04/11/2017    HGB 15.5 04/11/2017    HCT 44.2 (H) 04/11/2017    .1 (H) 04/11/2017     04/11/2017       Lab Results   Component Value Date    GLUCOSE 192 (H) 04/11/2017    BUN 13 04/11/2017    CREATININE 0.60 04/11/2017    EGFRIFNONA 104 04/11/2017    BCR 21.7 04/11/2017    CO2 25.0 04/11/2017    CALCIUM 9.1 04/11/2017    ALBUMIN 4.40 04/10/2017    LABIL2 1.4 (L) 04/10/2017    AST 19 04/10/2017    ALT 24 04/10/2017       Estimated Creatinine Clearance: 109.8 mL/min (by C-G formula based on Cr of 0.6).      Microbiology: Multiple smears and cultures for aerobic, anaerobic, acid-fast bacilli, and fungi pending, in addition to cytology to rule out neoplastic disease.      Radiology:  Imaging Results (last 72 hours)     Procedure Component Value Units Date/Time    XR Chest 2 View [[id number]] Collected:  04/10/17 1306     Updated:  04/10/17 1601    Narrative:       EXAMINATION: XR CHEST 2 VW-      INDICATION: Shortness of air triage protocol.      COMPARISON: None.     FINDINGS: Two-view chest reveals masslike density seen within the  periphery of the left midlung. CT scan of the chest is recommended for  further evaluation. The right lung  is clear. Cardiac and mediastinal  silhouettes are within normal limits. Pulmonary vascularity is within  normal limits. No pleural effusion or pneumothorax.           Impression:       Nodular masslike density seen within the left midlung.  Findings concerning for an underlying process or malignancy in which CT  scan of the chest is recommended for further evaluation.     D:  04/10/2017  E:  04/10/2017     This report was finalized on 4/10/2017 3:59 PM by Dr. Sola Gonzalez MD.       CT Chest With Contrast [[id number]] Collected:  04/10/17 1334     Updated:  04/10/17 1602    Narrative:       EXAMINATION: CT CHEST WITH CONTRAST-04/10/2017:      INDICATION: Left lung mass, dyspnea, wheezing, cough, difficulty  breathing.     TECHNIQUE: Multiple axial CT imaging was obtained of the chest from the  thoracic inlet through the adrenal glands following the administration  of intravenous contrast.     The radiation dose reduction device was turned on for each scan per the  ALARA (As Low as Reasonably Achievable) protocol.     COMPARISON: Chest x-ray dated 04/10/2017.      FINDINGS: There is a cavitary mass identified within the periphery of  the left upper lobe. Findings concerning for an underlying mass or focal  infiltrate. This area measures 2.5 x 3.1 cm. The remainder of the lung  parenchyma is grossly clear. There is nodular density seen posteriorly  within the right upper lobe. No evidence of bronchiectatic change.  Within the mediastinum there is no evidence of underlying mass. Small  lymph nodes identified in the right hilar region. There is no  pericardial effusion. Mild enlargement of the left atrium. Calcification  seen within the right hilar region. No mediastinal mass. No bulky hilar  or axillary lymphadenopathy. Degenerative changes seen within the spine.  The visualized portions of the upper abdomen are grossly unremarkable in  appearance.       Impression:       Cavitary mass identified correlating to  the abnormality seen  on the prior chest x-ray in the left upper lobe. Findings concerning for  an underlying lesion or neoplasm. Focal infectious process as well as in  the differential. There is no mediastinal adenopathy or mass. Follow-up  imaging is recommended as indicated. Small lymph node seen in the right  hilar region with no bulky mediastinal mass or adenopathy identified.  Mild dilatation of the left atrium.     D:  04/10/2017  E:  04/10/2017           This report was finalized on 4/10/2017 3:59 PM by Dr. Sola Gonzalez MD.       CT Biopsy Lung Mediastinum [[id number]] Collected:  04/11/17 1605     Updated:  04/11/17 1610    Narrative:       EXAMINATION: CT GUIDED BIOPSY, LUNG/MEDIASTINUM-04/11/2017:      INDICATION: Lung mass; J98.4-Other disorders of lung; R06.02-Shortness  of breath, biopsy for diagnosis.     TECHNIQUE:      The patient has no known allergies.     The clotting profile is within normal limits. PTT is 25, PT 10.1, INR  0.93, platelets 211,000. The patient is currently on  no anticoagulants.     The radiation dose reduction device was turned on for each scan per the  ALARA (As Low as Reasonably Achievable) protocol.     COMPARISON: NONE.     FINDINGS:   1. The procedure, risks, complications and side effects of percutaneous  needle aspiration biopsy of the left peripheral mid to upper chest  lesion was discussed and reviewed in detail with the patient including  possible risks and complications which include bleeding, infection,  pneumothorax, air embolization, pleural injury, laceration to the  intercostal artery with massive bleeding, and other possible risks and  complications. The patient understood and consented.  2. With the patient in the supine position, under sterile technique,  local anesthesia and meticulous CT guidance, from the left anterior  axillary line approach, a 25 and 22-gauge needle was placed intrinsic to  the cavitary peripheral left mid lung lesion. Aspiration  biopsy was  performed vigorously and tolerated well.  3. Aspirated material was prepared on touch prep slides and clot was  placed in formalin for permanent section processing.  4. Postbiopsy images reveal no bleeding, pneumothorax or complication.  No immediate lung injury was noted.       Impression:       1.  Needle aspiration biopsy has been performed successfully on the  cavitary peripheral lung lesion in the left mid chest.  2.  Immediate complication, bleeding or pneumothorax was not identified.  3.  The patient tolerated the procedure well. She was taken back to her  room in satisfactory and stable condition.     D:  04/11/2017  E:  04/11/2017           This report was finalized on 4/11/2017 4:07 PM by Dr. Renny Elder MD.             ASSESSMENT AND PLAN: Round pneumonia in left lung apex.  3.1 cm cavitary mass by chest CT.  Long-standing tobacco abuse with neoplastic risk.  Questionable positive PPD in the past/no prior isoniazid prophylaxis.  Periodontal disease.  The maximum temperature over 24 hours 97.9°.  The patient is currently on ceftriaxone, azithromycin and levofloxacin.  She does not appear to be clinically toxic.  She is status post CT-guided lung biopsy earlier today.  Currently, breath sounds are symmetric in both chests.  There is no subcutaneous emphysema in palpation of the left chest wall.  There is a broad differential diagnosis to include neoplastic disease, fungal etiologies such as histoplasma, Blastomyces, cryptococci, Aspergillus, and Coccidioides in light of the patient's travel to [place].  Additional organisms that might be associated with severe periodontal disease and possible aspiration include alpha-streptococci, Fusobacterium and Bacteroides species.  Nocardia and actinomyces may appear in this fashion radiographically.  Certainly tuberculosis, both typical and atypical, are considered in the differential diagnosis.  The patient has a vague history of  "positive PPD a number of years ago but never received isoniazid prophylaxis.  Before embarking on an extensive serologic and urinary antigen assay workup, we will await the primary smears, cultures, and cytology from today's CT-guided biopsy.  I'm not entirely convinced that this is a \"cavitary lung mass\".  This actually appears to me to be a round pneumonia with small air bronchograms.  If the Gram stain of the aspirate reveals a mixed population of organisms that would suggest oropharyngeal mark, it would be worthwhile to add an agent with anaerobic coverage.  Complex medical decision making.       I discussed the patients findings and my recommendations with patient    Thank you for this consult.  Our group would be pleased to follow this patient over the course of their hospitalization and assist with outpatient antimicrobial therapy, as indicated.     Timbo Salazar MD  4/11/2017          "

## 2017-04-11 NOTE — PLAN OF CARE
Problem: Patient Care Overview (Adult)  Goal: Plan of Care Review  Outcome: Ongoing (interventions implemented as appropriate)    04/11/17 1612   Coping/Psychosocial Response Interventions   Plan Of Care Reviewed With patient;spouse   Patient Care Overview   Progress progress towards functional goals is fair         Problem: Respiratory Insufficiency (Adult)  Goal: Identify Related Risk Factors and Signs and Symptoms  Outcome: Outcome(s) achieved Date Met:  04/11/17 04/11/17 1612   Respiratory Insufficiency   Related Risk Factors (Respiratory Insufficiency) pain;second-hand smoke exposure;smoking

## 2017-04-11 NOTE — NURSING NOTE
Patient for CT guided Left Lung Biopsy. Discussed procedure. Verbalized. Patient verbalized ' I'm scared I never had anything done like this before'. Reassured patient, radiologist will tell her everything his going to do prior to doing it. Verbalized understanding. Patient worried about coughing during procedure.  Reassured patient ok to cough.

## 2017-04-11 NOTE — PROGRESS NOTES
[facility] Medicine Services  INPATIENT PROGRESS NOTE    Date of Admission: 4/10/2017  Length of Stay: 0  Primary Care Physician: YANETH Olivera    Subjective     CC: f/u SOA    HPI:  She is seen at 10:40 AM resting upright in bed having just gotten back from procedure.  /significant other at bedside.  She is awake, alert, and oriented ×4.  No acute distress.  Patient just back from having Ace CT-guided biopsy of the left lung.  This was done in interventional radiology.  No increased shortness of air.  Denies nausea, vomiting, chest pain, abdominal pain, headache, dizziness.  Has remained NPO for this procedure and is anxiously awaiting food.  Shortness of air is reported at her baseline and no new issues expressed.      Review of Systems  Constitutional: Positive for diaphoresis, fatigue and subjective fever. Negative for activity change, appetite change, chills and unexpected weight change.   HENT: Positive for congestion.   Respiratory: Positive for cough and shortness of breath. Negative for wheezing and stridor.   Cardiovascular: Negative for chest pain and palpitations.   Chronic BLE edema    Gastrointestinal: Negative for abdominal pain, constipation, diarrhea, nausea and vomiting.   Genitourinary: Negative.   Musculoskeletal: Negative.   Skin: Negative.   Neurological: Negative for tremors, weakness and headaches.   Psychiatric/Behavioral: Negative for confusion. Positive for chronic anxiety/depression issues    Objective      Temp:  [97.3 °F (36.3 °C)-98 °F (36.7 °C)] 97.9 °F (36.6 °C)  Heart Rate:  [63-86] 66  Resp:  [16-20] 16  BP: (119-147)/(59-87) 130/77     Physical Exam  Constitutional: no acute distress, awake, alert, sitting upright in bed just back from procedure as noted.  Significant other at bedside.  Eyes: PERRLA, sclerae anicteric, no conjunctival injection  Neck: supple, trachea midline  Respiratory: Non-labored respirations, rhonchi with cough,  inspiratory and expiratory wheezes heard bilaterally with prolonged inspiratory wheeze noted.  Faint crackles to left lower lobe.  Cardiovascular: RRR, no murmurs, rubs, or gallops, palpable pedal pulses bilaterally  Gastrointestinal: Positive bowel sounds, soft, nontender, nondistended obese abdomen  Musculoskeletal: 1+ non-pitting edema, no clubbing or cyanosis to bilateral lower extremities  Psychiatric: oriented x 3, appropriate affect, cooperative, calm  Neurologic: Strength symmetric in all extremities, no focal neuro deficits    Results Review:    I have reviewed the labs, radiology results and diagnostic studies.      Results from last 7 days  Lab Units 04/11/17  0350   WBC 10*3/mm3 12.01*   HEMOGLOBIN g/dL 15.5   PLATELETS 10*3/mm3 211       Results from last 7 days  Lab Units 04/11/17  0350   SODIUM mmol/L 137   POTASSIUM mmol/L 4.3   CHLORIDE mmol/L 108   TOTAL CO2 mmol/L 25.0   BUN mg/dL 13   CREATININE mg/dL 0.60   GLUCOSE mg/dL 192*   CALCIUM mg/dL 9.1       Culture Data: Cultures:    Blood Culture   Date Value Ref Range Status   04/10/2017 No growth at 24 hours  Preliminary   04/10/2017 No growth at 24 hours  Preliminary       Radiology Data:   Imaging Results (last 24 hours)     Procedure Component Value Units Date/Time    XR Chest 2 View [[id number]] Collected:  04/10/17 1306     Updated:  04/10/17 1601    Narrative:       EXAMINATION: XR CHEST 2 VW-      INDICATION: Shortness of air triage protocol.      COMPARISON: None.     FINDINGS: Two-view chest reveals masslike density seen within the  periphery of the left midlung. CT scan of the chest is recommended for  further evaluation. The right lung is clear. Cardiac and mediastinal  silhouettes are within normal limits. Pulmonary vascularity is within  normal limits. No pleural effusion or pneumothorax.           Impression:       Nodular masslike density seen within the left midlung.  Findings concerning for an underlying process or malignancy in  which CT  scan of the chest is recommended for further evaluation.     D:  04/10/2017  E:  04/10/2017     This report was finalized on 4/10/2017 3:59 PM by Dr. Sola Gonzalez MD.       CT Chest With Contrast [[id number]] Collected:  04/10/17 1334     Updated:  04/10/17 1602    Narrative:       EXAMINATION: CT CHEST WITH CONTRAST-04/10/2017:      INDICATION: Left lung mass, dyspnea, wheezing, cough, difficulty  breathing.     TECHNIQUE: Multiple axial CT imaging was obtained of the chest from the  thoracic inlet through the adrenal glands following the administration  of intravenous contrast.     The radiation dose reduction device was turned on for each scan per the  ALARA (As Low as Reasonably Achievable) protocol.     COMPARISON: Chest x-ray dated 04/10/2017.      FINDINGS: There is a cavitary mass identified within the periphery of  the left upper lobe. Findings concerning for an underlying mass or focal  infiltrate. This area measures 2.5 x 3.1 cm. The remainder of the lung  parenchyma is grossly clear. There is nodular density seen posteriorly  within the right upper lobe. No evidence of bronchiectatic change.  Within the mediastinum there is no evidence of underlying mass. Small  lymph nodes identified in the right hilar region. There is no  pericardial effusion. Mild enlargement of the left atrium. Calcification  seen within the right hilar region. No mediastinal mass. No bulky hilar  or axillary lymphadenopathy. Degenerative changes seen within the spine.  The visualized portions of the upper abdomen are grossly unremarkable in  appearance.       Impression:       Cavitary mass identified correlating to the abnormality seen  on the prior chest x-ray in the left upper lobe. Findings concerning for  an underlying lesion or neoplasm. Focal infectious process as well as in  the differential. There is no mediastinal adenopathy or mass. Follow-up  imaging is recommended as indicated. Small lymph node seen in  the right  hilar region with no bulky mediastinal mass or adenopathy identified.  Mild dilatation of the left atrium.     D:  04/10/2017  E:  04/10/2017           This report was finalized on 4/10/2017 3:59 PM by Dr. Sola Gonzalez MD.       CT Biopsy Lung Mediastinum [[id number]] Resulted:  04/11/17 1552     Updated:  04/11/17 1032            I have reviewed the medications.  Scheduled Meds:  azithromycin 500 mg Intravenous Q24H   ceftriaxone 1 g Intravenous Q24H   docusate sodium 100 mg Oral BID   famotidine 20 mg Oral BID   levothyroxine 150 mcg Oral Q AM   metoprolol succinate XL 25 mg Oral Daily     Continuous Infusions:  sodium chloride 100 mL/hr Last Rate: 100 mL/hr (04/11/17 1426)     PRN Meds:.•  acetaminophen  •  Morphine  •  ondansetron  •  oxyCODONE-acetaminophen  •  sodium chloride  •  sodium chloride      Assessment/Plan     Problem List  Hospital Problem List     * (Principal)Cavitating mass in left upper lung lobe    Leukocytosis    Essential hypertension    Acquired hypothyroidism    Tobacco abuse    Cavitary lesion of lung               Assessment/Plan:  Cavitating mass in Lt upper lung   - With leukocytosis and history of illness, possibly is related to infectious process and will cover with appropriate antibiotics  - Given long smoking history (40 years x 3/4 PPD), high index of suspicion for malignancy   -patient is/P CT-guided lung biopsy today, results pending     Leukocytosis  - As above, will treat for infectious process until pathology returns or has completed full course of therapy  - Continue Rocephin and Azithromycin IV   - O2 PRN  - Duonebs scheduled   -Will consult infectious disease     Tobacco abuse  - Nicotine patch  - Encourage tobacco cessation throughout hospitalization      HTN  - Resume home meds     Hypothyroidism    PLAN:  -consultinfectious disease  -A.m. Labs  -Consider heme/onc result pending results of biopsy       DVT prophylaxis: Mechanical for now, may start  pharmacological prophylaxis following biopsy     Code Status: FULL         DVT prophylaxis:  Discharge Planning: I expect patient to be discharged to home in a couple of days in pending final diagnosis and treatment plan.    Winifred Trevino, APRN   04/11/17   3:45 PM    Please note that portions of this note may have been completed with a voice recognition program. Efforts were made to edit the dictations, but occasionally words are mistranscribed.

## 2017-04-11 NOTE — PLAN OF CARE
Problem: Respiratory Insufficiency (Adult)  Goal: Identify Related Risk Factors and Signs and Symptoms  Outcome: Ongoing (interventions implemented as appropriate)    04/11/17 0411   Respiratory Insufficiency   Related Risk Factors (Respiratory Insufficiency) activity intolerance   Signs and Symptoms (Respiratory Insufficiency) abnormal breath sounds;cough (productive/ineffective);dyspnea       Goal: Acid/Base Balance  Outcome: Ongoing (interventions implemented as appropriate)    04/11/17 0411   Respiratory Insufficiency (Adult)   Acid/Base Balance making progress toward outcome       Goal: Effective Ventilation  Outcome: Ongoing (interventions implemented as appropriate)    04/11/17 0411   Respiratory Insufficiency (Adult)   Effective Ventilation making progress toward outcome

## 2017-04-12 ENCOUNTER — APPOINTMENT (OUTPATIENT)
Dept: GENERAL RADIOLOGY | Facility: HOSPITAL | Age: 57
End: 2017-04-12

## 2017-04-12 DIAGNOSIS — C34.12 CANCER OF UPPER LOBE OF LEFT LUNG (HCC): Primary | ICD-10-CM

## 2017-04-12 PROBLEM — C34.92 MALIGNANT NEOPLASM OF LEFT LUNG (HCC): Status: ACTIVE | Noted: 2017-04-10

## 2017-04-12 LAB
ANION GAP SERPL CALCULATED.3IONS-SCNC: 2 MMOL/L (ref 3–11)
BASOPHILS # BLD AUTO: 0.01 10*3/MM3 (ref 0–0.2)
BASOPHILS NFR BLD AUTO: 0.1 % (ref 0–1)
BUN BLD-MCNC: 18 MG/DL (ref 9–23)
BUN/CREAT SERPL: 20 (ref 7–25)
CALCIUM SPEC-SCNC: 8.9 MG/DL (ref 8.7–10.4)
CHLORIDE SERPL-SCNC: 105 MMOL/L (ref 99–109)
CO2 SERPL-SCNC: 30 MMOL/L (ref 20–31)
CREAT BLD-MCNC: 0.9 MG/DL (ref 0.6–1.3)
DEPRECATED RDW RBC AUTO: 50 FL (ref 37–54)
EOSINOPHIL # BLD AUTO: 0.01 10*3/MM3 (ref 0.1–0.3)
EOSINOPHIL NFR BLD AUTO: 0.1 % (ref 0–3)
ERYTHROCYTE [DISTWIDTH] IN BLOOD BY AUTOMATED COUNT: 12.1 % (ref 11.3–14.5)
GFR SERPL CREATININE-BSD FRML MDRD: 65 ML/MIN/1.73
GLUCOSE BLD-MCNC: 123 MG/DL (ref 70–100)
HCT VFR BLD AUTO: 45.6 % (ref 34.5–44)
HGB BLD-MCNC: 15.1 G/DL (ref 11.5–15.5)
IMM GRANULOCYTES # BLD: 0.09 10*3/MM3 (ref 0–0.03)
IMM GRANULOCYTES NFR BLD: 0.7 % (ref 0–0.6)
LYMPHOCYTES # BLD AUTO: 1.97 10*3/MM3 (ref 0.6–4.8)
LYMPHOCYTES NFR BLD AUTO: 16.1 % (ref 24–44)
MCH RBC QN AUTO: 37.3 PG (ref 27–31)
MCHC RBC AUTO-ENTMCNC: 33.1 G/DL (ref 32–36)
MCV RBC AUTO: 112.6 FL (ref 80–99)
MONOCYTES # BLD AUTO: 0.94 10*3/MM3 (ref 0–1)
MONOCYTES NFR BLD AUTO: 7.7 % (ref 0–12)
NEUTROPHILS # BLD AUTO: 9.21 10*3/MM3 (ref 1.5–8.3)
NEUTROPHILS NFR BLD AUTO: 75.3 % (ref 41–71)
PLATELET # BLD AUTO: 172 10*3/MM3 (ref 150–450)
PMV BLD AUTO: 10.7 FL (ref 6–12)
POTASSIUM BLD-SCNC: 4.3 MMOL/L (ref 3.5–5.5)
RBC # BLD AUTO: 4.05 10*6/MM3 (ref 3.89–5.14)
SODIUM BLD-SCNC: 137 MMOL/L (ref 132–146)
WBC NRBC COR # BLD: 12.23 10*3/MM3 (ref 3.5–10.8)

## 2017-04-12 PROCEDURE — 80048 BASIC METABOLIC PNL TOTAL CA: CPT | Performed by: NURSE PRACTITIONER

## 2017-04-12 PROCEDURE — 99232 SBSQ HOSP IP/OBS MODERATE 35: CPT | Performed by: NURSE PRACTITIONER

## 2017-04-12 PROCEDURE — 25010000003 CEFTRIAXONE PER 250 MG: Performed by: PHYSICIAN ASSISTANT

## 2017-04-12 PROCEDURE — 25010000002 AZITHROMYCIN: Performed by: PHYSICIAN ASSISTANT

## 2017-04-12 PROCEDURE — 25010000002 MORPHINE SULFATE (PF) 2 MG/ML SOLUTION: Performed by: NURSE PRACTITIONER

## 2017-04-12 PROCEDURE — 25010000002 HEPARIN (PORCINE) PER 1000 UNITS: Performed by: NURSE PRACTITIONER

## 2017-04-12 PROCEDURE — 71010 HC CHEST PA OR AP: CPT

## 2017-04-12 PROCEDURE — 99253 IP/OBS CNSLTJ NEW/EST LOW 45: CPT | Performed by: INTERNAL MEDICINE

## 2017-04-12 PROCEDURE — 85025 COMPLETE CBC W/AUTO DIFF WBC: CPT | Performed by: NURSE PRACTITIONER

## 2017-04-12 RX ORDER — HEPARIN SODIUM 5000 [USP'U]/ML
5000 INJECTION, SOLUTION INTRAVENOUS; SUBCUTANEOUS EVERY 8 HOURS SCHEDULED
Status: DISCONTINUED | OUTPATIENT
Start: 2017-04-12 | End: 2017-04-14 | Stop reason: HOSPADM

## 2017-04-12 RX ADMIN — MORPHINE SULFATE 2 MG: 2 INJECTION, SOLUTION INTRAMUSCULAR; INTRAVENOUS at 00:25

## 2017-04-12 RX ADMIN — AZITHROMYCIN 500 MG: 500 INJECTION, POWDER, LYOPHILIZED, FOR SOLUTION INTRAVENOUS at 14:42

## 2017-04-12 RX ADMIN — FAMOTIDINE 20 MG: 20 TABLET ORAL at 18:13

## 2017-04-12 RX ADMIN — FAMOTIDINE 20 MG: 20 TABLET ORAL at 08:40

## 2017-04-12 RX ADMIN — OXYCODONE AND ACETAMINOPHEN 1 TABLET: 5; 325 TABLET ORAL at 00:22

## 2017-04-12 RX ADMIN — SODIUM CHLORIDE 100 ML/HR: 9 INJECTION, SOLUTION INTRAVENOUS at 11:15

## 2017-04-12 RX ADMIN — OXYCODONE AND ACETAMINOPHEN 1 TABLET: 5; 325 TABLET ORAL at 14:46

## 2017-04-12 RX ADMIN — OXYCODONE AND ACETAMINOPHEN 1 TABLET: 5; 325 TABLET ORAL at 08:52

## 2017-04-12 RX ADMIN — CEFTRIAXONE SODIUM 1 G: 1 INJECTION, SOLUTION INTRAVENOUS at 18:13

## 2017-04-12 RX ADMIN — MORPHINE SULFATE 2 MG: 2 INJECTION, SOLUTION INTRAMUSCULAR; INTRAVENOUS at 05:54

## 2017-04-12 RX ADMIN — ONDANSETRON 4 MG: 4 TABLET, FILM COATED ORAL at 00:25

## 2017-04-12 RX ADMIN — METOPROLOL SUCCINATE 25 MG: 25 TABLET, EXTENDED RELEASE ORAL at 08:40

## 2017-04-12 RX ADMIN — MORPHINE SULFATE 2 MG: 2 INJECTION, SOLUTION INTRAMUSCULAR; INTRAVENOUS at 02:37

## 2017-04-12 RX ADMIN — LEVOTHYROXINE SODIUM 150 MCG: 150 TABLET ORAL at 05:53

## 2017-04-12 RX ADMIN — HEPARIN SODIUM 5000 UNITS: 5000 INJECTION, SOLUTION INTRAVENOUS; SUBCUTANEOUS at 23:00

## 2017-04-12 RX ADMIN — OXYCODONE AND ACETAMINOPHEN 1 TABLET: 5; 325 TABLET ORAL at 18:57

## 2017-04-12 RX ADMIN — DOCUSATE SODIUM 100 MG: 100 CAPSULE, LIQUID FILLED ORAL at 08:40

## 2017-04-12 RX ADMIN — DOCUSATE SODIUM 100 MG: 100 CAPSULE, LIQUID FILLED ORAL at 18:13

## 2017-04-12 RX ADMIN — OXYCODONE AND ACETAMINOPHEN 1 TABLET: 5; 325 TABLET ORAL at 23:07

## 2017-04-12 NOTE — CONSULTS
Subjective     PROBLEM LIST:  1. 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  CXR and CT chest on 4/10/17 showed a 2.5 x 3.1 cm ALBINA lesion.  NO evidence of adenopathy.  CT guided biopsy was performed on 4/11/17.  2. Hypertension  3. Hypothyroidism      CHIEF COMPLAINT: newly diagnosed lung cancer    HISTORY OF PRESENT ILLNESS:  The patient is a 56 y.o. year old female, referred  for evaluation of newly diagnosed lung cancer.  She reports 2 weeks of cough and feeling ill without fever.  She had no improvement on antibiotics, and so presented to the ED for further workup.  A CXR showed a lesion in the left lung, and CT chest was done with the results as mentioned above.  She had a cT guided lung biopsy done yesterday.  Since then she has been experiencing pain in her chest which is mainly around the left upper chest/shoulder.  She denies shortness of breath.  She feels fairly close to her baseline other than this discomfort.      REVIEW OF SYSTEMS:  A 14 point review of systems was performed and is negative except as noted above.    Past Medical History:   Diagnosis Date   • Abscess    • Disease of thyroid gland    • Hypertension    • MRSA (methicillin resistant staph aureus) culture positive    • Tobacco abuse 4/10/2017       No current facility-administered medications on file prior to encounter.      No current outpatient prescriptions on file prior to encounter.       No Known Allergies    Past Surgical History:   Procedure Laterality Date   • THYROID SURGERY             Social History     Social History   • Marital status:      Spouse name: N/A   • Number of children: N/A   • Years of education: N/A     Social History Main Topics   • Smoking status: Current Every Day Smoker     Packs/day: 0.75     Years: 40.00     Types: Cigarettes   • Smokeless tobacco: None   • Alcohol use No   • Drug use: No   • Sexual activity: Defer     Other Topics Concern    • None     Social History Narrative   • None   , lives with .  2 grown children.  Works full time.    History reviewed. No pertinent family history.    Objective     Vitals:    04/12/17 0740 04/12/17 0840 04/12/17 1235 04/12/17 1620   BP: 110/60 110/60 117/64 106/57   BP Location: Right arm  Right arm Left arm   Patient Position: Lying  Lying Sitting   Pulse: 74 73 70 64   Resp: 20  20 20   Temp: 98 °F (36.7 °C)  98.2 °F (36.8 °C) 98 °F (36.7 °C)   TempSrc: Oral  Oral Oral   SpO2: 93%  93% 92%   Weight:       Height:           Performance Status: 0  General: well appearing female in no acute distress  Neuro: alert and oriented  HEENT: sclera anicteric, oropharynx clear  Lymphatics: no cervical, supraclavicular, or axillary adenopathy  Cardiovascular: regular rate and rhythm, no murmurs  Lungs: clear to auscultation bilaterally  Abdomen: soft, nontender, nondistended.  No palpable organomegaly  Extremeties: no lower extremity edema  Skin: no rashes, lesions, bruising, or petechiae  Psych: mood and affect appropriate      Labs: wbc 12.23, hgb 15.1, plt 172.   BMP unremarkable.    Imaging: i personally reviewed the ct images of the chest with results as detailed above.  No evidence of pretty involvement or metastatic disease.        Assessment/Plan     Jill Miller is a 56 y.o. year old female with a lung mass with a prelim diagnosis of squamous cell carcinoma.  Based on the available information this may be at stage I or stage II malignancy.  We briefly reviewed the stages of lung cancer, and potential treatments for each stage.  At this point, once she is able to go home, I would recommend an outpatient PET scan for staging workup.  If there is no distant or significant pretty disease she may be a candidate for surgical resection of the tumor.    She was having more discomfort than expected after a lung biopsy so i ordered a CXR.  This does show a small apical pneumothorax.  i have ordered a repeat CXR  for the morning to monitor this.    I will arrange for outpatient PET and follow up with me next week.         Bess Simons MD    4/12/2017

## 2017-04-12 NOTE — PROGRESS NOTES
[facility] Medicine Services  INPATIENT PROGRESS NOTE    Date of Admission: 4/10/2017  Length of Stay: 0  Primary Care Physician: YANETH Olivera    Subjective   CC: f/u SOA    HPI:  Patient is seen at 11:25 AM resting upright in bed in no acute distress.  No family currently at bedside.  She is feeling much better today.  Denies nausea, vomiting, diarrhea, chest pain, headache.  Reports her shortness of air is slightly improved.  Complaining only of slight left chest pressure mostly at biopsy site.  No new complaints.    Review of Systems  Constitutional: Positive for fatigue and subjective.   HENT: Positive for congestion.   Respiratory: Positive for cough and shortness of breath. Negative for wheezing and stridor.   Cardiovascular: Negative for chest pain and palpitations.   Chronic trace BLE edema    Gastrointestinal: Negative for abdominal pain, constipation, diarrhea, nausea and vomiting.   Genitourinary: Negative.   Musculoskeletal: Negative.   Skin: Negative.   Neurological: Negative for tremors, weakness and headaches.   Psychiatric/Behavioral: Negative for confusion. Positive for chronic anxiety/depression issues       Objective      Temp:  [97.2 °F (36.2 °C)-98.2 °F (36.8 °C)] 98.2 °F (36.8 °C)  Heart Rate:  [60-81] 70  Resp:  [18-20] 20  BP: (110-151)/(54-85) 117/64     Physical Exam  Constitutional: no acute distress, awake, alert, sitting upright in bed.   Eyes: PERRLA, sclerae anicteric, no conjunctival injection  Neck: supple, trachea midline  Respiratory: Non-labored respirations, improved inspiratory and expiratory wheezes.  2 yesterday and heard minimally bilaterally.    Cardiovascular: RRR, no murmurs, rubs, or gallops, palpable pedal pulses bilaterally  Gastrointestinal: Positive bowel sounds, soft, nontender, nondistended obese abdomen  Musculoskeletal: Trace non-pitting edema, no clubbing or cyanosis to bilateral lower extremities  Psychiatric: oriented x 4,  appropriate affect, cooperative, calm  Neurologic: Strength symmetric in all extremities, no focal neuro deficits.  Obeys all commands  Skin: Lt lateral CW lung bx site c/d/i with dry 2x2 gauze in place.  No creptius.  No hematoma/bruising    Results Review:    I have reviewed the labs, radiology results and diagnostic studies.      Results from last 7 days  Lab Units 04/12/17  0419   WBC 10*3/mm3 12.23*   HEMOGLOBIN g/dL 15.1   PLATELETS 10*3/mm3 172       Results from last 7 days  Lab Units 04/12/17  0419   SODIUM mmol/L 137   POTASSIUM mmol/L 4.3   CHLORIDE mmol/L 105   TOTAL CO2 mmol/L 30.0   BUN mg/dL 18   CREATININE mg/dL 0.90   GLUCOSE mg/dL 123*   CALCIUM mg/dL 8.9       Culture Data: Cultures:    Blood Culture   Date Value Ref Range Status   04/10/2017 No growth at 2 days  Preliminary   04/10/2017 No growth at 2 days  Preliminary       Radiology Data:   Imaging Results (last 24 hours)     Procedure Component Value Units Date/Time    CT Biopsy Lung Mediastinum [[id number]] Collected:  04/11/17 1605     Updated:  04/11/17 1610    Narrative:       EXAMINATION: CT GUIDED BIOPSY, LUNG/MEDIASTINUM-04/11/2017:      INDICATION: Lung mass; J98.4-Other disorders of lung; R06.02-Shortness  of breath, biopsy for diagnosis.     TECHNIQUE:      The patient has no known allergies.     The clotting profile is within normal limits. PTT is 25, PT 10.1, INR  0.93, platelets 211,000. The patient is currently on  no anticoagulants.     The radiation dose reduction device was turned on for each scan per the  ALARA (As Low as Reasonably Achievable) protocol.     COMPARISON: NONE.     FINDINGS:   1. The procedure, risks, complications and side effects of percutaneous  needle aspiration biopsy of the left peripheral mid to upper chest  lesion was discussed and reviewed in detail with the patient including  possible risks and complications which include bleeding, infection,  pneumothorax, air embolization, pleural injury, laceration  to the  intercostal artery with massive bleeding, and other possible risks and  complications. The patient understood and consented.  2. With the patient in the supine position, under sterile technique,  local anesthesia and meticulous CT guidance, from the left anterior  axillary line approach, a 25 and 22-gauge needle was placed intrinsic to  the cavitary peripheral left mid lung lesion. Aspiration biopsy was  performed vigorously and tolerated well.  3. Aspirated material was prepared on touch prep slides and clot was  placed in formalin for permanent section processing.  4. Postbiopsy images reveal no bleeding, pneumothorax or complication.  No immediate lung injury was noted.       Impression:       1.  Needle aspiration biopsy has been performed successfully on the  cavitary peripheral lung lesion in the left mid chest.  2.  Immediate complication, bleeding or pneumothorax was not identified.  3.  The patient tolerated the procedure well. She was taken back to her  room in satisfactory and stable condition.     D:  04/11/2017  E:  04/11/2017           This report was finalized on 4/11/2017 4:07 PM by Dr. Renny Elder MD.               I have reviewed the medications.  Scheduled Meds:  azithromycin 500 mg Intravenous Q24H   ceftriaxone 1 g Intravenous Q24H   docusate sodium 100 mg Oral BID   famotidine 20 mg Oral BID   levothyroxine 150 mcg Oral Q AM   metoprolol succinate XL 25 mg Oral Daily     Continuous Infusions:   PRN Meds:.•  acetaminophen  •  Morphine  •  ondansetron  •  oxyCODONE-acetaminophen  •  sodium chloride  •  sodium chloride      Assessment/Plan     Problem List  Hospital Problem List     * (Principal)Cavitating mass in left upper lung lobe    Leukocytosis    Essential hypertension    Acquired hypothyroidism    Tobacco abuse    Cavitary lesion of lung        Assessment/Plan:  Cavitating mass in Lt upper lung (infectious versus cancerous)  - With leukocytosis and history of illness, possibly  "is related to infectious process. Cover with appropriate antibiotics.  -Continue Rocephin and azithromycin  - Given long smoking history (40 years x 3/4 PPD), high index of suspicion for malignancy   -patient is/P CT-guided lung biopsy yesterday, computer official results pending, however Dr. Alvarado received call from pathologist with report of biopsy positive for squamous cell lung CA.  Had a long discussion with patient regarding her new diagnosis and answered all posed questions.  I explained that oncology has been consulted and will see her in consult today.  States that his new information called her \"slightly off guard\" however that she really felt like it was a strong possibility due to her history.  Received information obviously well at this time.  Urged her to notify for any further questions or anxieties.  Awaiting oncology consult and recommendations     Leukocytosis  - As above, will treat for infectious process until pathology returns or has completed full course of therapy  - Continue Rocephin and Azithromycin IV   - O2 PRN  - Duonebs scheduled   - Infectious disease Dr Salazar following      Tobacco abuse  - Nicotine patch  - Encourage tobacco cessation throughout hospitalization       HTN  - Resume home meds      Hypothyroidism     PLAN:  -Continue antibiotics as per infectious disease recommendations  -Await oncology consult today  -A.m. Labs         DVT prophylaxis: Was on mechanical prophylaxis only pre lung bx which was yesterday.  Will add in hepairn 5000 u sq vte prophylaxis today due to >24 hours out of procedure and now also with new dx of lung cancer.     Code Status: FULL         DVT prophylaxis:  Discharge Planning: I expect patient to be discharged to home in a couple of days in pending final diagnosis and treatment plan.       Winifred Trevino, APRN   04/12/17   3:57 PM    Please note that portions of this note may have been completed with a voice recognition program. Efforts " were made to edit the dictations, but occasionally words are mistranscribed.

## 2017-04-12 NOTE — PROGRESS NOTES
Jill Miller  1960  [id number]  4/12/2017    CC: Cavitary lung mass left upper lobe.    Jill Miller is a 56 y.o. female here for cough, purulent sputum production, fever, and shortness of breath.  Greater than 50-pack-year tobacco history.  Status post CT-guided lung biopsy.         Past medical history:  Past Medical History:   Diagnosis Date   • Abscess    • Disease of thyroid gland    • Hypertension    • MRSA (methicillin resistant staph aureus) culture positive    • Tobacco abuse 4/10/2017       Medications:   Current Facility-Administered Medications:   •  acetaminophen (TYLENOL) tablet 650 mg, 650 mg, Oral, Q4H PRN, YANETH Mcnair, 650 mg at 04/10/17 2048  •  AZITHROMYCIN 500 MG/250 ML 0.9% NS IVPB (MBP), 500 mg, Intravenous, Q24H, YANETH Mcnair, Stopped at 04/11/17 1540  •  cefTRIAXone (ROCEPHIN) IVPB 1 g, 1 g, Intravenous, Q24H, YANETH Mcnair, Stopped at 04/11/17 2019  •  docusate sodium (COLACE) capsule 100 mg, 100 mg, Oral, BID, YANETH Mcnair, 100 mg at 04/11/17 1751  •  famotidine (PEPCID) tablet 20 mg, 20 mg, Oral, BID, YANETH Mcnair, 20 mg at 04/11/17 1751  •  levothyroxine (SYNTHROID, LEVOTHROID) tablet 150 mcg, 150 mcg, Oral, Q AM, YANETH Mcnair, 150 mcg at 04/12/17 0553  •  metoprolol succinate XL (TOPROL-XL) 24 hr tablet 25 mg, 25 mg, Oral, Daily, YANETH Mcnair, 25 mg at 04/11/17 1051  •  Morphine sulfate (PF) injection 2 mg, 2 mg, Intravenous, Q2H PRN, Winifred Trevino APRN, 2 mg at 04/12/17 0554  •  ondansetron (ZOFRAN) tablet 4 mg, 4 mg, Oral, Q6H PRN, YANETH Mcnair, 4 mg at 04/12/17 0025  •  oxyCODONE-acetaminophen (PERCOCET) 5-325 MG per tablet 1 tablet, 1 tablet, Oral, Q4H PRN, Winifred Trevino, APRN, 1 tablet at 04/12/17 0022  •  sodium chloride 0.9 % flush 1-10 mL, 1-10 mL, Intravenous, PRN, YANETH Mcnair  •  sodium chloride 0.9 % flush 10 mL, 10 mL, Intravenous, PRN,  "Brian Wadsworth MD  •  sodium chloride 0.9 % infusion, 100 mL/hr, Intravenous, Continuous, Lizzy Brunson MD, Last Rate: 100 mL/hr at 04/11/17 1900, 100 mL/hr at 04/11/17 1900  Antibiotics:  IV Anti-Infectives     Ordered     Dose/Rate Route Frequency Start Stop    04/10/17 1630  AZITHROMYCIN 500 MG/250 ML 0.9% NS IVPB (MBP)     Ordering Provider:  YANETH Mcnair    500 mg  over 60 Minutes Intravenous Every 24 Hours 04/11/17 1500      04/10/17 1630  cefTRIAXone (ROCEPHIN) IVPB 1 g     Ordering Provider:  YANETH Mcnair    1 g  over 30 Minutes Intravenous Every 24 Hours 04/10/17 1800      04/10/17 1339  levoFLOXacin (LEVAQUIN) 750 mg/150 mL D5W (premix) (LEVAQUIN) 750 mg     Ordering Provider:  Brian Wadsworth MD    750 mg Intravenous Once 04/10/17 1341 04/10/17 1640          Allergies:  has No Known Allergies.    Review of Systems: All other reviewed and negative except as per HPI    Blood pressure 119/54, pulse 74, temperature 98.1 °F (36.7 °C), temperature source Oral, resp. rate 18, height 62\" (157.5 cm), weight 192 lb (87.1 kg), SpO2 (!) 88 %.  GENERAL: Sleeping soundly.  Male significant other in bedside recliner, also asleep.   HEENT: Oropharynx without thrush. Sinuses nontender. Dentition in poor repair.  Maxillary alveolar ridge is edentulous.  There are a few remaining carious mandibular teeth.  No cervical adenopathy. No carotid bruits/ jugular venous distention.   EYES: PERRL. No conjunctival injection. No icterus. EOMI.  LYMPHATICS: No lymphadenopathy of the neck or axillary or inguinal regions.   HEART: No murmur, gallop, or pericardial friction rub.   LUNGS: Diffuse anterior chest wheezing and rhonchi.  ABDOMEN: Soft, nontender, nondistended. No appreciable HSM.    SKIN: Warm and dry without cutaneous eruptions. No embolic stigmata.   PSYCHIATRIC: Mental status lucid. Cranial nerve function intact.       DIAGNOSTICS:  Lab Results   Component Value Date    WBC 12.23 (H) " 04/12/2017    HGB 15.1 04/12/2017    HCT 45.6 (H) 04/12/2017     04/12/2017     No results found for: CRP  No results found for: SEDRATE  Lab Results   Component Value Date    GLUCOSE 123 (H) 04/12/2017    BUN 18 04/12/2017    CREATININE 0.90 04/12/2017    EGFRIFNONA 65 04/12/2017    BCR 20.0 04/12/2017    CO2 30.0 04/12/2017    CALCIUM 8.9 04/12/2017    ALBUMIN 4.40 04/10/2017    LABIL2 1.4 (L) 04/10/2017    AST 19 04/10/2017    ALT 24 04/10/2017       Microbiology: Blood cultures ×2 are negative.  At the present time there is no microbiologic data on yesterday's CT-guided lung biopsy.      RADIOLOGY:  Imaging Results (last 72 hours)     Procedure Component Value Units Date/Time    XR Chest 2 View [[id number]] Collected:  04/10/17 1306     Updated:  04/10/17 1601    Narrative:       EXAMINATION: XR CHEST 2 VW-      INDICATION: Shortness of air triage protocol.      COMPARISON: None.     FINDINGS: Two-view chest reveals masslike density seen within the  periphery of the left midlung. CT scan of the chest is recommended for  further evaluation. The right lung is clear. Cardiac and mediastinal  silhouettes are within normal limits. Pulmonary vascularity is within  normal limits. No pleural effusion or pneumothorax.           Impression:       Nodular masslike density seen within the left midlung.  Findings concerning for an underlying process or malignancy in which CT  scan of the chest is recommended for further evaluation.     D:  04/10/2017  E:  04/10/2017     This report was finalized on 4/10/2017 3:59 PM by Dr. Sola Gonzalez MD.       CT Chest With Contrast [[id number]] Collected:  04/10/17 1334     Updated:  04/10/17 1602    Narrative:       EXAMINATION: CT CHEST WITH CONTRAST-04/10/2017:      INDICATION: Left lung mass, dyspnea, wheezing, cough, difficulty  breathing.     TECHNIQUE: Multiple axial CT imaging was obtained of the chest from the  thoracic inlet through the adrenal glands following the  administration  of intravenous contrast.     The radiation dose reduction device was turned on for each scan per the  ALARA (As Low as Reasonably Achievable) protocol.     COMPARISON: Chest x-ray dated 04/10/2017.      FINDINGS: There is a cavitary mass identified within the periphery of  the left upper lobe. Findings concerning for an underlying mass or focal  infiltrate. This area measures 2.5 x 3.1 cm. The remainder of the lung  parenchyma is grossly clear. There is nodular density seen posteriorly  within the right upper lobe. No evidence of bronchiectatic change.  Within the mediastinum there is no evidence of underlying mass. Small  lymph nodes identified in the right hilar region. There is no  pericardial effusion. Mild enlargement of the left atrium. Calcification  seen within the right hilar region. No mediastinal mass. No bulky hilar  or axillary lymphadenopathy. Degenerative changes seen within the spine.  The visualized portions of the upper abdomen are grossly unremarkable in  appearance.       Impression:       Cavitary mass identified correlating to the abnormality seen  on the prior chest x-ray in the left upper lobe. Findings concerning for  an underlying lesion or neoplasm. Focal infectious process as well as in  the differential. There is no mediastinal adenopathy or mass. Follow-up  imaging is recommended as indicated. Small lymph node seen in the right  hilar region with no bulky mediastinal mass or adenopathy identified.  Mild dilatation of the left atrium.     D:  04/10/2017  E:  04/10/2017           This report was finalized on 4/10/2017 3:59 PM by Dr. Sola Gonzalez MD.       CT Biopsy Lung Mediastinum [[id number]] Collected:  04/11/17 1605     Updated:  04/11/17 1610    Narrative:       EXAMINATION: CT GUIDED BIOPSY, LUNG/MEDIASTINUM-04/11/2017:      INDICATION: Lung mass; J98.4-Other disorders of lung; R06.02-Shortness  of breath, biopsy for diagnosis.     TECHNIQUE:      The patient  has no known allergies.     The clotting profile is within normal limits. PTT is 25, PT 10.1, INR  0.93, platelets 211,000. The patient is currently on  no anticoagulants.     The radiation dose reduction device was turned on for each scan per the  ALARA (As Low as Reasonably Achievable) protocol.     COMPARISON: NONE.     FINDINGS:   1. The procedure, risks, complications and side effects of percutaneous  needle aspiration biopsy of the left peripheral mid to upper chest  lesion was discussed and reviewed in detail with the patient including  possible risks and complications which include bleeding, infection,  pneumothorax, air embolization, pleural injury, laceration to the  intercostal artery with massive bleeding, and other possible risks and  complications. The patient understood and consented.  2. With the patient in the supine position, under sterile technique,  local anesthesia and meticulous CT guidance, from the left anterior  axillary line approach, a 25 and 22-gauge needle was placed intrinsic to  the cavitary peripheral left mid lung lesion. Aspiration biopsy was  performed vigorously and tolerated well.  3. Aspirated material was prepared on touch prep slides and clot was  placed in formalin for permanent section processing.  4. Postbiopsy images reveal no bleeding, pneumothorax or complication.  No immediate lung injury was noted.       Impression:       1.  Needle aspiration biopsy has been performed successfully on the  cavitary peripheral lung lesion in the left mid chest.  2.  Immediate complication, bleeding or pneumothorax was not identified.  3.  The patient tolerated the procedure well. She was taken back to her  room in satisfactory and stable condition.     D:  04/11/2017  E:  04/11/2017           This report was finalized on 4/11/2017 4:07 PM by Dr. Renny Elder MD.             Assessment and Plan: Fever.  Cough with purulent sputum production.  Leukocytosis.  Round pneumonia in left  upper lobe.  Status post CT-guided lung biopsy.  50-pack-year tobacco history with neoplastic risk.  Travel to [place] endemic area/remote.  Questionable positive PPD historically.  Maximum temperature over 24 hours 98.1°.  Peripheral leukocyte count is 12,200.  Plasma creatinine is 0.9.  Blood cultures ×2 remain negative.  There are no stains or surgical pathology back on yesterday's CT-guided lung biopsy.  We are in somewhat of a holding pattern until these results are available with regards to directing antimicrobial therapy or potential staging for neoplastic disease.  Acceptable to continue ceftriaxone and azithromycin pending histologic studies.      Timbo Salazar MD  4/12/2017

## 2017-04-12 NOTE — PLAN OF CARE
Problem: Respiratory Insufficiency (Adult)  Goal: Acid/Base Balance  Outcome: Ongoing (interventions implemented as appropriate)    04/12/17 0306   Respiratory Insufficiency (Adult)   Acid/Base Balance making progress toward outcome       Goal: Effective Ventilation  Outcome: Ongoing (interventions implemented as appropriate)    04/12/17 0306   Respiratory Insufficiency (Adult)   Effective Ventilation making progress toward outcome

## 2017-04-12 NOTE — PLAN OF CARE
Problem: Patient Care Overview (Adult)  Goal: Adult Individualization and Mutuality  Outcome: Ongoing (interventions implemented as appropriate)    Problem: Respiratory Insufficiency (Adult)  Goal: Acid/Base Balance  Outcome: Ongoing (interventions implemented as appropriate)    04/12/17 1830   Respiratory Insufficiency (Adult)   Acid/Base Balance making progress toward outcome       Goal: Effective Ventilation  Outcome: Ongoing (interventions implemented as appropriate)    04/12/17 1830   Respiratory Insufficiency (Adult)   Effective Ventilation making progress toward outcome

## 2017-04-13 ENCOUNTER — APPOINTMENT (OUTPATIENT)
Dept: GENERAL RADIOLOGY | Facility: HOSPITAL | Age: 57
End: 2017-04-13

## 2017-04-13 ENCOUNTER — DOCUMENTATION (OUTPATIENT)
Dept: ONCOLOGY | Facility: CLINIC | Age: 57
End: 2017-04-13

## 2017-04-13 ENCOUNTER — APPOINTMENT (OUTPATIENT)
Dept: CT IMAGING | Facility: HOSPITAL | Age: 57
End: 2017-04-13

## 2017-04-13 LAB
CYTO UR: NORMAL
LAB AP CASE REPORT: NORMAL
LAB AP CLINICAL INFORMATION: NORMAL
LAB AP DIAGNOSIS COMMENT: NORMAL
Lab: NORMAL
Lab: NORMAL
PATH REPORT.FINAL DX SPEC: NORMAL
PATH REPORT.GROSS SPEC: NORMAL

## 2017-04-13 PROCEDURE — 25010000002 AZITHROMYCIN: Performed by: PHYSICIAN ASSISTANT

## 2017-04-13 PROCEDURE — 94799 UNLISTED PULMONARY SVC/PX: CPT

## 2017-04-13 PROCEDURE — 25010000002 MORPHINE SULFATE (PF) 2 MG/ML SOLUTION: Performed by: NURSE PRACTITIONER

## 2017-04-13 PROCEDURE — 25010000002 HEPARIN (PORCINE) PER 1000 UNITS: Performed by: NURSE PRACTITIONER

## 2017-04-13 PROCEDURE — 71250 CT THORAX DX C-: CPT

## 2017-04-13 PROCEDURE — 71010 HC CHEST PA OR AP: CPT

## 2017-04-13 PROCEDURE — 99232 SBSQ HOSP IP/OBS MODERATE 35: CPT | Performed by: FAMILY MEDICINE

## 2017-04-13 RX ADMIN — DOCUSATE SODIUM 100 MG: 100 CAPSULE, LIQUID FILLED ORAL at 18:07

## 2017-04-13 RX ADMIN — HEPARIN SODIUM 5000 UNITS: 5000 INJECTION, SOLUTION INTRAVENOUS; SUBCUTANEOUS at 21:47

## 2017-04-13 RX ADMIN — AZITHROMYCIN 500 MG: 500 INJECTION, POWDER, LYOPHILIZED, FOR SOLUTION INTRAVENOUS at 15:21

## 2017-04-13 RX ADMIN — OXYCODONE AND ACETAMINOPHEN 1 TABLET: 5; 325 TABLET ORAL at 12:04

## 2017-04-13 RX ADMIN — MORPHINE SULFATE 2 MG: 2 INJECTION, SOLUTION INTRAMUSCULAR; INTRAVENOUS at 04:48

## 2017-04-13 RX ADMIN — DOCUSATE SODIUM 100 MG: 100 CAPSULE, LIQUID FILLED ORAL at 08:02

## 2017-04-13 RX ADMIN — LEVOTHYROXINE SODIUM 150 MCG: 150 TABLET ORAL at 06:37

## 2017-04-13 RX ADMIN — METOPROLOL SUCCINATE 25 MG: 25 TABLET, EXTENDED RELEASE ORAL at 08:02

## 2017-04-13 RX ADMIN — FAMOTIDINE 20 MG: 20 TABLET ORAL at 18:07

## 2017-04-13 RX ADMIN — MORPHINE SULFATE 2 MG: 2 INJECTION, SOLUTION INTRAMUSCULAR; INTRAVENOUS at 20:15

## 2017-04-13 RX ADMIN — HEPARIN SODIUM 5000 UNITS: 5000 INJECTION, SOLUTION INTRAVENOUS; SUBCUTANEOUS at 06:37

## 2017-04-13 RX ADMIN — OXYCODONE AND ACETAMINOPHEN 1 TABLET: 5; 325 TABLET ORAL at 21:51

## 2017-04-13 RX ADMIN — FAMOTIDINE 20 MG: 20 TABLET ORAL at 08:01

## 2017-04-13 RX ADMIN — OXYCODONE AND ACETAMINOPHEN 1 TABLET: 5; 325 TABLET ORAL at 04:48

## 2017-04-13 NOTE — PROGRESS NOTES
Jill Miller  1960  [id number]  4/13/2017    CC: Fever, cough, and purulent sputum production.    Jill Miller is a 56 y.o. female here for round pneumonia versus cavitary lung mass in left upper lobe.         Past medical history:  Past Medical History:   Diagnosis Date   • Abscess    • Disease of thyroid gland    • Hypertension    • MRSA (methicillin resistant staph aureus) culture positive    • Tobacco abuse 4/10/2017       Medications:   Current Facility-Administered Medications:   •  acetaminophen (TYLENOL) tablet 650 mg, 650 mg, Oral, Q4H PRN, YANETH Mcnair, 650 mg at 04/10/17 2048  •  AZITHROMYCIN 500 MG/250 ML 0.9% NS IVPB (MBP), 500 mg, Intravenous, Q24H, YANETH Mcnair, Last Rate: 0 mL/hr at 04/11/17 1540, 500 mg at 04/12/17 1442  •  cefTRIAXone (ROCEPHIN) IVPB 1 g, 1 g, Intravenous, Q24H, YANETH Mcnair, Last Rate: 0 mL/hr at 04/11/17 2019, 1 g at 04/12/17 1813  •  docusate sodium (COLACE) capsule 100 mg, 100 mg, Oral, BID, YANETH Mcnair, 100 mg at 04/12/17 1813  •  famotidine (PEPCID) tablet 20 mg, 20 mg, Oral, BID, YANETH Mcnair, 20 mg at 04/12/17 1813  •  heparin (porcine) 5000 UNIT/ML injection 5,000 Units, 5,000 Units, Subcutaneous, Q8H, Winifred Trevino, APRN, 5,000 Units at 04/13/17 0637  •  levothyroxine (SYNTHROID, LEVOTHROID) tablet 150 mcg, 150 mcg, Oral, Q AM, YANETH Mcnair, 150 mcg at 04/13/17 0637  •  metoprolol succinate XL (TOPROL-XL) 24 hr tablet 25 mg, 25 mg, Oral, Daily, YANETH Mcnair, 25 mg at 04/12/17 0840  •  Morphine sulfate (PF) injection 2 mg, 2 mg, Intravenous, Q2H PRN, Winifred Trevino, GUCCI, 2 mg at 04/13/17 0448  •  ondansetron (ZOFRAN) tablet 4 mg, 4 mg, Oral, Q6H PRN, YANETH Mcnair, 4 mg at 04/12/17 0025  •  oxyCODONE-acetaminophen (PERCOCET) 5-325 MG per tablet 1 tablet, 1 tablet, Oral, Q4H PRN, GUCCI Wooten, 1 tablet at 04/13/17 0448  •  sodium  "chloride 0.9 % flush 1-10 mL, 1-10 mL, Intravenous, PRN, YANETH Mcnair  •  sodium chloride 0.9 % flush 10 mL, 10 mL, Intravenous, PRN, Brian Wadsworth MD  Antibiotics:  IV Anti-Infectives     Ordered     Dose/Rate Route Frequency Start Stop    04/10/17 1630  AZITHROMYCIN 500 MG/250 ML 0.9% NS IVPB (MBP)     Ordering Provider:  YANETH Mcnair    500 mg  over 60 Minutes Intravenous Every 24 Hours 04/11/17 1500      04/10/17 1630  cefTRIAXone (ROCEPHIN) IVPB 1 g     Ordering Provider:  YANETH Mcnair    1 g  over 30 Minutes Intravenous Every 24 Hours 04/10/17 1800      04/10/17 1339  levoFLOXacin (LEVAQUIN) 750 mg/150 mL D5W (premix) (LEVAQUIN) 750 mg     Ordering Provider:  Brian Wadsworth MD    750 mg Intravenous Once 04/10/17 1341 04/10/17 1640          Allergies:  has No Known Allergies.    Review of Systems: All other reviewed and negative except as per HPI    Blood pressure 106/66, pulse 53, temperature 98.9 °F (37.2 °C), temperature source Oral, resp. rate 16, height 62\" (157.5 cm), weight 192 lb (87.1 kg), SpO2 91 %.  GENERAL: Awake and alert, in no acute distress.  Nasal oxygen in place.  Sitting up in bed eating.  Able to craft.  Since.  No accessory muscle use.  HEENT: Oropharynx without thrush. Sinuses nontender. Dentition in good repair. No cervical adenopathy. No carotid bruits/ jugular venous distention.   EYES: PERRL. No conjunctival injection. No icterus. EOMI.  LYMPHATICS: No lymphadenopathy of the neck or axillary or inguinal regions.   HEART: No murmur, gallop, or pericardial friction rub.   LUNGS: Clear to auscultation anteriorly. No percussion dullness.  Some posterior basilar expiratory wheezes.  No dense alveolar consolidation.  ABDOMEN: Soft, nontender, nondistended. No appreciable HSM.    SKIN: Warm and dry without cutaneous eruptions. No embolic stigmata.   PSYCHIATRIC: Mental status lucid. Cranial nerve function intact.       DIAGNOSTICS:  Lab Results "   Component Value Date    WBC 12.23 (H) 04/12/2017    HGB 15.1 04/12/2017    HCT 45.6 (H) 04/12/2017     04/12/2017     No results found for: CRP  No results found for: SEDRATE  Lab Results   Component Value Date    GLUCOSE 123 (H) 04/12/2017    BUN 18 04/12/2017    CREATININE 0.90 04/12/2017    EGFRIFNONA 65 04/12/2017    BCR 20.0 04/12/2017    CO2 30.0 04/12/2017    CALCIUM 8.9 04/12/2017    ALBUMIN 4.40 04/10/2017    LABIL2 1.4 (L) 04/10/2017    AST 19 04/10/2017    ALT 24 04/10/2017       Microbiology: Blood cultures ×2 are negative.  To my dismay, no tissue from the CT-guided lung biopsy was submitted for any microbiologic studies.      RADIOLOGY:  Imaging Results (last 72 hours)     Procedure Component Value Units Date/Time    XR Chest 2 View [[id number]] Collected:  04/10/17 1306     Updated:  04/10/17 1601    Narrative:       EXAMINATION: XR CHEST 2 VW-      INDICATION: Shortness of air triage protocol.      COMPARISON: None.     FINDINGS: Two-view chest reveals masslike density seen within the  periphery of the left midlung. CT scan of the chest is recommended for  further evaluation. The right lung is clear. Cardiac and mediastinal  silhouettes are within normal limits. Pulmonary vascularity is within  normal limits. No pleural effusion or pneumothorax.           Impression:       Nodular masslike density seen within the left midlung.  Findings concerning for an underlying process or malignancy in which CT  scan of the chest is recommended for further evaluation.     D:  04/10/2017  E:  04/10/2017     This report was finalized on 4/10/2017 3:59 PM by Dr. Sola Gonzalez MD.       CT Chest With Contrast [[id number]] Collected:  04/10/17 1334     Updated:  04/10/17 1602    Narrative:       EXAMINATION: CT CHEST WITH CONTRAST-04/10/2017:      INDICATION: Left lung mass, dyspnea, wheezing, cough, difficulty  breathing.     TECHNIQUE: Multiple axial CT imaging was obtained of the chest from the  thoracic  inlet through the adrenal glands following the administration  of intravenous contrast.     The radiation dose reduction device was turned on for each scan per the  ALARA (As Low as Reasonably Achievable) protocol.     COMPARISON: Chest x-ray dated 04/10/2017.      FINDINGS: There is a cavitary mass identified within the periphery of  the left upper lobe. Findings concerning for an underlying mass or focal  infiltrate. This area measures 2.5 x 3.1 cm. The remainder of the lung  parenchyma is grossly clear. There is nodular density seen posteriorly  within the right upper lobe. No evidence of bronchiectatic change.  Within the mediastinum there is no evidence of underlying mass. Small  lymph nodes identified in the right hilar region. There is no  pericardial effusion. Mild enlargement of the left atrium. Calcification  seen within the right hilar region. No mediastinal mass. No bulky hilar  or axillary lymphadenopathy. Degenerative changes seen within the spine.  The visualized portions of the upper abdomen are grossly unremarkable in  appearance.       Impression:       Cavitary mass identified correlating to the abnormality seen  on the prior chest x-ray in the left upper lobe. Findings concerning for  an underlying lesion or neoplasm. Focal infectious process as well as in  the differential. There is no mediastinal adenopathy or mass. Follow-up  imaging is recommended as indicated. Small lymph node seen in the right  hilar region with no bulky mediastinal mass or adenopathy identified.  Mild dilatation of the left atrium.     D:  04/10/2017  E:  04/10/2017           This report was finalized on 4/10/2017 3:59 PM by Dr. Sola Gonzalez MD.       CT Biopsy Lung Mediastinum [[id number]] Collected:  04/11/17 1605     Updated:  04/11/17 1610    Narrative:       EXAMINATION: CT GUIDED BIOPSY, LUNG/MEDIASTINUM-04/11/2017:      INDICATION: Lung mass; J98.4-Other disorders of lung; R06.02-Shortness  of breath, biopsy  for diagnosis.     TECHNIQUE:      The patient has no known allergies.     The clotting profile is within normal limits. PTT is 25, PT 10.1, INR  0.93, platelets 211,000. The patient is currently on  no anticoagulants.     The radiation dose reduction device was turned on for each scan per the  ALARA (As Low as Reasonably Achievable) protocol.     COMPARISON: NONE.     FINDINGS:   1. The procedure, risks, complications and side effects of percutaneous  needle aspiration biopsy of the left peripheral mid to upper chest  lesion was discussed and reviewed in detail with the patient including  possible risks and complications which include bleeding, infection,  pneumothorax, air embolization, pleural injury, laceration to the  intercostal artery with massive bleeding, and other possible risks and  complications. The patient understood and consented.  2. With the patient in the supine position, under sterile technique,  local anesthesia and meticulous CT guidance, from the left anterior  axillary line approach, a 25 and 22-gauge needle was placed intrinsic to  the cavitary peripheral left mid lung lesion. Aspiration biopsy was  performed vigorously and tolerated well.  3. Aspirated material was prepared on touch prep slides and clot was  placed in formalin for permanent section processing.  4. Postbiopsy images reveal no bleeding, pneumothorax or complication.  No immediate lung injury was noted.       Impression:       1.  Needle aspiration biopsy has been performed successfully on the  cavitary peripheral lung lesion in the left mid chest.  2.  Immediate complication, bleeding or pneumothorax was not identified.  3.  The patient tolerated the procedure well. She was taken back to her  room in satisfactory and stable condition.     D:  04/11/2017  E:  04/11/2017           This report was finalized on 4/11/2017 4:07 PM by Dr. Renny Elder MD.       XR Chest 1 View [[id number]]  (Abnormal) Collected:  04/12/17 1827      Updated:  04/12/17 2123    Addenda:        IMPRESSION:       1.  Small left apical pneumothorax.    2.  Small mass again visualized projecting over the left midlung.  3.  Small left pleural effusion.    THIS REPORT CONTAINS FINDINGS THAT MAY BE CRITICAL TO PATIENT CARE. The   findings were verbally communicated via telephone conference with ARIE Brown at 9:23 PM EDT on 4/12/2017. The findings were acknowledged and   understood. She stated she will contact the patient's physician.     THIS DOCUMENT HAS BEEN ELECTRONICALLY SIGNED BY FELIPA OLIVIER MD  Signed:  04/12/17 2123 by Felipa Olivier MD    Narrative:         EXAM:    XR Chest, 1 View    CLINICAL HISTORY:    56 years, female; Other disorders of lung; Shortness of breath; Pain; Chest   pain; Left-sided chest pain; Additional info: Chest pain after needle biopsy    TECHNIQUE:    Frontal view of the chest.    COMPARISON:    CR - XR CHEST 2 VW 4/10/2017 11:46:52 AM    FINDINGS:    Lungs:  A small rounded mass is again visualized projecting over the left   midlung.  This measures approximately 3 cm.  Probable minimal atelectasis in   the lung bases.    Pleural space:  Small left apical pneumothorax.  There is slight blunting of   the left costophrenic angle, suggesting a small pleural effusion.    Heart:  Unremarkable.  No cardiomegaly.    Mediastinum:  Unremarkable.    Bones/joints:  No acute osseous findings.      Impression:       1.  Small left apical pneumothorax.    2.  Small mass again visualized projecting over the left midlung.  3.  Small left pleural effusion.    THIS DOCUMENT HAS BEEN ELECTRONICALLY SIGNED BY FELIPA OLIVIER MD    XR Chest 1 View [[id number]] Updated:  04/13/17 0425          Assessment and Plan: Fever.  Cough.  Purulent sputum production.  Leukocytosis.  3.1 cm pleural-based lesion in left upper lobe.  Status post CT-guided biopsy.  Still no histopathology.  No tissue was submitted for microbiologic studies.  Consequently, we will have no  information regarding acid-fast, fungal, or bacterial cultures.  We will be forced to pursue an extensive serologic workup to include QuantiFERON gold, Histoplasma and Blastomyces urinary antigens, Coccidioides serologies, a serum cryptococcal antigen,  antinuclear antibodies, an ANCA profile, and rheumatoid factor that might be suggestive of a pulmonary vasculitic syndrome.  Difficult to know how to de-escalate the antimicrobial regimen in the absence of any helpful culture data.  I will speak with Dr. Dodd in radiology regarding the mishandling of this biopsy specimen.      Timbo Salazar MD  4/13/2017

## 2017-04-13 NOTE — PLAN OF CARE
Problem: Pain, Acute (Adult)  Goal: Identify Related Risk Factors and Signs and Symptoms  Outcome: Ongoing (interventions implemented as appropriate)    04/13/17 4562   Pain, Acute   Related Risk Factors (Acute Pain) disease process;persistent pain

## 2017-04-13 NOTE — PLAN OF CARE
Problem: Patient Care Overview (Adult)  Goal: Plan of Care Review  Outcome: Ongoing (interventions implemented as appropriate)    04/13/17 1545   Coping/Psychosocial Response Interventions   Plan Of Care Reviewed With patient;spouse   Patient Care Overview   Progress improving         Problem: Respiratory Insufficiency (Adult)  Goal: Acid/Base Balance  Outcome: Ongoing (interventions implemented as appropriate)    04/13/17 1545   Respiratory Insufficiency (Adult)   Acid/Base Balance making progress toward outcome

## 2017-04-13 NOTE — PROGRESS NOTES
Hospitalist Daily Progress Note    Date of Admission: 4/10/2017   LOS: 1 day   PCP: YANETH Olivera    Chief Complaint: SOA    Subjective   History of Present Illness  Pt has had pleuritic CP with deep inspiration on left chest wall    Review of Systems  General: no fevers, chills  Respiratory: no cough  Cardiovascular: no palpitations  Abdomen: No abdominal pain, nausea, vomiting, or diarrhea  Neurologic: No focal weakness  All other systems reviewed and negative.    Objective   Physical Exam:  I have reviewed the vital signs.  Temp:  [98 °F (36.7 °C)-98.9 °F (37.2 °C)] 98.3 °F (36.8 °C)  Heart Rate:  [53-78] 71  Resp:  [16-20] 16  BP: (106-124)/(54-66) 124/57    Objective  General Appearance:    Alert, cooperative, no distress  Head:    Normocephalic, atraumatic  Eyes:    Sclerae anicteric  Neck:   Supple, no mass  Lungs: diminished BS LLL o/w Clear to auscultation bilaterally, respirations unlabored  Heart: Regular rate and rhythm, S1 and S2 normal, no murmur, rub or gallop  Abdomen:  Soft, non-tender, bowel sounds active, nondistended  Extremities: No clubbing, cyanosis, or edema to lower extremities  Pulses:  2+ and symmetric in distal lower extremities  Skin: No rashes   Neurologic: Oriented x3, Normal strength to extremities    Results Review:    I have reviewed the labs, radiology results and diagnostic studies.      Results from last 7 days  Lab Units 04/12/17  0419   WBC 10*3/mm3 12.23*   HEMOGLOBIN g/dL 15.1   PLATELETS 10*3/mm3 172       Results from last 7 days  Lab Units 04/12/17  0419   SODIUM mmol/L 137   POTASSIUM mmol/L 4.3   TOTAL CO2 mmol/L 30.0   CREATININE mg/dL 0.90   GLUCOSE mg/dL 123*       I have reviewed the medications.    ---------------------------------------------------------------------------------------------  Assessment/Plan   Assessment & Plan  Assessment/Problem List    Principal Problem:    Cavitating mass in left upper lung lobe  Active Problems:    Leukocytosis     Essential hypertension    Acquired hypothyroidism    Tobacco abuse    Malignant neoplasm of left lung       Plan  Left apical PTX and pleural effusion  -pt had pleuritic CP after lung bx  -CXR 4/12 showed Small left apical pneumothorax. Small mass again visualized projecting over the left midlung.  Small left pleural effusion.  -CXR 4/13 showed slightly increasing airspace disease in the left lower lobe, worrisome for atelectasis related to hypoventilation secondary to pain and/or possible bleeding related to biopsy.  -check CT Chest and monitor overnight.    Cavitating mass in Lt upper lung c/w SCC  -patient is S/P CT-guided lung biopsy, pathologist with report of biopsy positive for squamous cell lung CA.   -oncology consulted, will need PET scan as outpatient on Monday, if there is no distant or significant pretty disease she may be a candidate for surgical resection of the tumor.  -Blood Cx NGTD, d/c Rocephin and azithromycin    Leukocytosis  - likely reactive  - d/c Rocephin and Azithromycin IV   - I notified Infectious disease, Dr Salazar, of new finding of SCC on lung bx  - agreed to d/c abx, no further f/u.        Tobacco abuse  - Nicotine patch  - Encourage tobacco cessation throughout hospitalization       HTN- Resume home meds      Hypothyroidism-continue levothyroxine      DVT prophylaxis:  heparin  Discharge Planning: I expect patient to be discharged home tomorrow if CT Chest negative.    Garrett Alvarado MD 04/13/17 3:56 PM

## 2017-04-13 NOTE — PROGRESS NOTES
Spoke with Lakeshia on 2G. Patient's primary nurse said she was responsible for scheduling apt for discharge. I told her the patient has an apt with Dr. Simons Tubrendaday 4/18 and needs to have a PET scan done before that apt once she is discharged from the hospital.

## 2017-04-13 NOTE — PROGRESS NOTES
Subjective     PROBLEM LIST:  1. 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 CXR and CT chest on 4/10/17 showed a 2.5 x 3.1 cm ALBINA lesion. NO evidence of adenopathy. CT guided biopsy was performed on 4/11/17.  2. Hypertension  3. Hypothyroidism    INTERVAL HISTORY: cxr last night showed small apical pneumothorax. Per nursing, continues to have pain with cough and deep breathing.    Patient was not in room and staff unable to locate when i came by today.    REVIEW OF SYSTEMS:  Unable to obtain      Objective     Vitals:    04/12/17 1620 04/13/17 0410 04/13/17 0800 04/13/17 1237   BP: 106/57 106/66 111/54 124/57   BP Location: Left arm      Patient Position: Sitting      Pulse: 64 53 78 71   Resp: 20 16     Temp: 98 °F (36.7 °C) 98.9 °F (37.2 °C) 98.3 °F (36.8 °C) 98.3 °F (36.8 °C)   TempSrc: Oral Oral Oral Oral   SpO2: 92% 91% 94% 94%   Weight:       Height:                 CXR 4/13 - ptx is no worse, maybe decreased.  New/wosening left lung base opacity and effusion.      Assessment/Plan     Jill Miller is a 56 y.o. year old female with a lung mass, prelim diagnosis squamous cell carcinoma.      Plan for outpatient PET after discharge.    She continues to have chest pain with inspiration, and cxr shows worsening opacity at the base.  Possibly atelectasis related to hypoventilation secondary to pain.  Effusion is new - ? Possible bleeding related to biopsy.    Discussed with Dr. Alvarado - he will try to locate patient later this afternoon.           Bess Simons MD  4/13/2017

## 2017-04-14 VITALS
BODY MASS INDEX: 35.33 KG/M2 | SYSTOLIC BLOOD PRESSURE: 126 MMHG | TEMPERATURE: 98.2 F | DIASTOLIC BLOOD PRESSURE: 58 MMHG | HEIGHT: 62 IN | RESPIRATION RATE: 18 BRPM | WEIGHT: 192 LBS | HEART RATE: 68 BPM | OXYGEN SATURATION: 93 %

## 2017-04-14 PROCEDURE — 99239 HOSP IP/OBS DSCHRG MGMT >30: CPT | Performed by: FAMILY MEDICINE

## 2017-04-14 PROCEDURE — 25010000002 MORPHINE SULFATE (PF) 2 MG/ML SOLUTION: Performed by: NURSE PRACTITIONER

## 2017-04-14 PROCEDURE — 25010000002 HEPARIN (PORCINE) PER 1000 UNITS: Performed by: NURSE PRACTITIONER

## 2017-04-14 RX ADMIN — OXYCODONE AND ACETAMINOPHEN 1 TABLET: 5; 325 TABLET ORAL at 03:45

## 2017-04-14 RX ADMIN — METOPROLOL SUCCINATE 25 MG: 25 TABLET, EXTENDED RELEASE ORAL at 09:51

## 2017-04-14 RX ADMIN — HEPARIN SODIUM 5000 UNITS: 5000 INJECTION, SOLUTION INTRAVENOUS; SUBCUTANEOUS at 05:14

## 2017-04-14 RX ADMIN — LEVOTHYROXINE SODIUM 150 MCG: 150 TABLET ORAL at 05:14

## 2017-04-14 RX ADMIN — FAMOTIDINE 20 MG: 20 TABLET ORAL at 09:51

## 2017-04-14 RX ADMIN — MORPHINE SULFATE 2 MG: 2 INJECTION, SOLUTION INTRAMUSCULAR; INTRAVENOUS at 03:45

## 2017-04-14 RX ADMIN — DOCUSATE SODIUM 100 MG: 100 CAPSULE, LIQUID FILLED ORAL at 09:51

## 2017-04-14 NOTE — PLAN OF CARE
Problem: Patient Care Overview (Adult)  Goal: Plan of Care Review  Outcome: Ongoing (interventions implemented as appropriate)    04/14/17 1434   Coping/Psychosocial Response Interventions   Plan Of Care Reviewed With patient;spouse   Patient Care Overview   Progress improving         Problem: Respiratory Insufficiency (Adult)  Goal: Acid/Base Balance  Outcome: Ongoing (interventions implemented as appropriate)    04/14/17 1434   Respiratory Insufficiency (Adult)   Acid/Base Balance making progress toward outcome         Problem: Pain, Acute (Adult)  Goal: Identify Related Risk Factors and Signs and Symptoms  Outcome: Ongoing (interventions implemented as appropriate)    04/14/17 1434   Pain, Acute   Related Risk Factors (Acute Pain) persistent pain;disease process;patient perception       Goal: Acceptable Pain Control/Comfort Level  Outcome: Ongoing (interventions implemented as appropriate)    04/14/17 1434   Pain, Acute (Adult)   Acceptable Pain Control/Comfort Level making progress toward outcome

## 2017-04-14 NOTE — PLAN OF CARE
Problem: Patient Care Overview (Adult)  Goal: Plan of Care Review  Outcome: Ongoing (interventions implemented as appropriate)    04/14/17 0322   Coping/Psychosocial Response Interventions   Plan Of Care Reviewed With patient   Patient Care Overview   Progress improving         Problem: Respiratory Insufficiency (Adult)  Goal: Acid/Base Balance  Outcome: Ongoing (interventions implemented as appropriate)    04/14/17 0322   Respiratory Insufficiency (Adult)   Acid/Base Balance making progress toward outcome       Goal: Effective Ventilation  Outcome: Ongoing (interventions implemented as appropriate)    04/14/17 0322   Respiratory Insufficiency (Adult)   Effective Ventilation making progress toward outcome         Problem: Pain, Acute (Adult)  Goal: Identify Related Risk Factors and Signs and Symptoms  Outcome: Ongoing (interventions implemented as appropriate)    04/14/17 0322   Pain, Acute   Related Risk Factors (Acute Pain) patient perception   Signs and Symptoms (Acute Pain) verbalization of pain descriptors       Goal: Acceptable Pain Control/Comfort Level  Outcome: Ongoing (interventions implemented as appropriate)    04/14/17 0322   Pain, Acute (Adult)   Acceptable Pain Control/Comfort Level making progress toward outcome

## 2017-04-14 NOTE — DISCHARGE SUMMARY
[facility] Hospital Medicine Services  DISCHARGE SUMMARY       Date of Admission: 4/10/2017  Date of Discharge:  4/14/2017  Primary Care Physician: YANETH Olivera  Consulting Physician(s)     Provider Relationship    Bess Simons MD Consulting Physician    Timbo Salazar MD Consulting Physician          Discharge Diagnoses:  Active Hospital Problems (** Indicates Principal Problem)    Diagnosis Date Noted   • **Cavitating mass in left upper lung lobe [J98.4] 04/10/2017   • Leukocytosis [D72.829] 04/10/2017   • Essential hypertension [I10] 04/10/2017   • Acquired hypothyroidism [E03.9] 04/10/2017   • Tobacco abuse [Z72.0] 04/10/2017   • Malignant neoplasm of left lung [C34.92] 04/10/2017      Resolved Hospital Problems    Diagnosis Date Noted Date Resolved   No resolved problems to display.       Presenting Problem/History of Present Illness  Cavitary lesion of lung [J98.4]  Cavitary lesion of lung [J98.4]     Hospital Course  Patient is a 56 y.o. female with a history of HTN, acquired hypothyroidism s/p thyroidectomy for Graves' disease and ongoing tobacco abuse, presented to [facility] ED on 4/10/17 with a 10 day history of fatigue/malaise, productive cough, congestion, dyspnea, subjective fevers and diaphoresis. She initially presented to her PCP's office 6 days prior and was prescribed Doxycycline 100mg PO BID (two pills left) and oral steroids. Flu swab was negative at that time. She was instructed to present to the hospital if she did not improve. She reported a several month history of fatigue and diaphoresis, though she has related this to what she thought was menopause. No appetite change, no unintended weight loss.    ED evaluation revealed WBC count of 11.5, 75.7% neutrophils, creatinine 0.7 and BUN/Cr ratio 30.0. BP elevated in the ED. She was not hypoxic. XR revealed a mid-lung nodular density in the L lung and a CT chest showed a 2.5x3.1cm upper lobe cavitary  mass and small L hilar lymph node. She was given IV fluids, solumedrol 125mg IV and Levaquin IV in the ED. She was admitted and problem based hospital course is as follows:    Cavitating mass in Lt upper lung c/w SCC  -patient S/P CT-guided lung biopsy, pathologist with report of biopsy positive for squamous cell lung CA.   -oncology consulted, RN scheduled PET scan as outpatient on Monday 4/17/17, if there is no distant or significant pretty disease she may be a candidate for surgical resection of the tumor.  -I.D. initially consulted as pt was thought to have an infectious process and was on on Rocephin and azithromycin which were later d/c'd with new cancer dx.  Blood Cx NGTD.     Left apical PTX and pleural effusion  -pt had pleuritic CP after lung bx  -CXR 4/12 showed Small left apical pneumothorax. Small mass again visualized projecting over the left midlung. Small left pleural effusion.  -CXR 4/13 showed slightly increasing airspace disease in the left lower lobe, worrisome for atelectasis related to hypoventilation secondary to pain and/or possible bleeding related to biopsy.  -CT Chest 4/13 small pneumothorax in the left hemithorax likely measuring 5% or less of total lung volume. Small left basilar pleural fluid collection.  Mild atelectasis throughout the left lower lobe.  5. 2 mm noncalcified nodule in the right upper lobe, stable.  -pt denied pleuritic CP and was having good sats on RA at time of d/c.     Leukocytosis  - likely reactive  - d/c Rocephin and Azithromycin IV   - I notified Infectious disease, Dr Salazar, of new finding of SCC on lung bx  - agreed to d/c abx, no further f/u.       Tobacco abuse  - Nicotine patch  - Encourage tobacco cessation throughout hospitalization       HTN- Resume home meds      Hypothyroidism-continue levothyroxine    Procedures Performed  Lung Bx       Consults:   Consults     Date and Time Order Name Status Description    4/12/2017 1036 Inpatient Consult to  "Hematology & Oncology Completed     4/11/2017 1148 Inpatient Consult to Infectious Diseases Completed           Pertinent Test Results:  As per above.    Condition on Discharge:  Stable.    Physical Exam on Discharge:/58 (BP Location: Right arm, Patient Position: Lying)  Pulse 68  Temp 98.2 °F (36.8 °C) (Oral)   Resp 18  Ht 62\" (157.5 cm)  Wt 192 lb (87.1 kg)  SpO2 93%  BMI 35.12 kg/m2  Physical Exam  General Appearance:   Alert, cooperative, no distress  Head:   Normocephalic, atraumatic  Eyes:   Sclerae anicteric  Neck:  Supple, no mass  Lungs: Clear to auscultation bilaterally, respirations unlabored  Heart: Regular rate and rhythm, S1 and S2 normal, no murmur, rub or gallop  Abdomen: Soft, non-tender, bowel sounds active, nondistended  Extremities: No clubbing, cyanosis, or edema to lower extremities  Pulses: 2+ and symmetric in distal lower extremities  Skin: No rashes   Neurologic: Oriented x3, Normal strength to extremities    Discharge Disposition  Home or Self Care    Discharge Medications   Jill Miller   Home Medication Instructions PURNIMA:[id number]    Printed on:04/14/17 1428   Medication Information                      levothyroxine (SYNTHROID, LEVOTHROID) 150 MCG tablet  Take 150 mcg by mouth Daily.             metoprolol succinate XL (TOPROL-XL) 25 MG 24 hr tablet  Take 25 mg by mouth Daily.                 Discharge Diet:   Diet Instructions     Diet: Regular, Cardiac; Thin Liquids, No Restrictions       Discharge Diet:   Regular  Cardiac      Fluid Consistency:  Thin Liquids, No Restrictions                 Discharge Care Plan / Instructions:    Activity at Discharge:   Activity Instructions     Activity as Tolerated                     Follow-up Appointments  Future Appointments  Date Time Provider Department Center   4/18/2017 1:00 PM Bess Simons MD [facility]     Additional Instructions for the Follow-ups that You Need to Schedule     Discharge Follow-up with PCP    As " directed    Follow Up Details:  f/u with PCP in 1 week       Discharge Follow-up with Specialty    As directed    Specialty:  Dr. Simons, Oncology   Follow Up:  1 Week                 Test Results Pending at Discharge   Order Current Status    Non-gynecologic Cytology Collected (04/11/17 1016)    Blood Culture Preliminary result    Blood Culture Preliminary result           Garrett Alvarado MD 04/14/17 2:28 PM    Time: Discharge 35 min    Please note that portions of this note may have been completed with a voice recognition program. Efforts were made to edit the dictations, but occasionally words are mistranscribed.

## 2017-04-14 NOTE — DISCHARGE INSTR - APPOINTMENTS
No food after mn until 6am then may have 2 eggs or 2 pieces of pereira  Drink 8-12 oz water at 6am  No caffeine, strenuous activity, chewing gum, or tobacco products 24 hours prior to test.

## 2017-04-15 LAB
BACTERIA SPEC AEROBE CULT: NORMAL
BACTERIA SPEC AEROBE CULT: NORMAL

## 2017-04-17 ENCOUNTER — HOSPITAL ENCOUNTER (OUTPATIENT)
Dept: PET IMAGING | Facility: HOSPITAL | Age: 57
Discharge: HOME | End: 2017-04-17
Attending: INTERNAL MEDICINE

## 2017-04-17 ENCOUNTER — HOSPITAL ENCOUNTER (OUTPATIENT)
Dept: PET IMAGING | Facility: HOSPITAL | Age: 57
Discharge: HOME | End: 2017-04-17
Attending: INTERNAL MEDICINE | Admitting: INTERNAL MEDICINE

## 2017-04-17 DIAGNOSIS — C34.12 CANCER OF UPPER LOBE OF LEFT LUNG (HCC): ICD-10-CM

## 2017-04-17 LAB — GLUCOSE BLDC GLUCOMTR-MCNC: 122 MG/DL (ref 70–130)

## 2017-04-17 PROCEDURE — 78815 PET IMAGE W/CT SKULL-THIGH: CPT

## 2017-04-17 PROCEDURE — 0 FLUDEOXYGLUCOSE F18 SOLUTION: Performed by: INTERNAL MEDICINE

## 2017-04-17 PROCEDURE — 82962 GLUCOSE BLOOD TEST: CPT

## 2017-04-17 PROCEDURE — A9552 F18 FDG: HCPCS | Performed by: INTERNAL MEDICINE

## 2017-04-17 RX ADMIN — FLUDEOXYGLUCOSE F18 1 DOSE: 300 INJECTION INTRAVENOUS at 08:24

## 2017-04-18 ENCOUNTER — CONSULT (OUTPATIENT)
Dept: ONCOLOGY | Facility: CLINIC | Age: 57
End: 2017-04-18

## 2017-04-18 VITALS
HEART RATE: 72 BPM | HEIGHT: 63 IN | SYSTOLIC BLOOD PRESSURE: 148 MMHG | RESPIRATION RATE: 16 BRPM | BODY MASS INDEX: 34.38 KG/M2 | TEMPERATURE: 97.6 F | WEIGHT: 194 LBS | DIASTOLIC BLOOD PRESSURE: 90 MMHG

## 2017-04-18 DIAGNOSIS — C34.12 MALIGNANT NEOPLASM OF UPPER LOBE OF LEFT LUNG (HCC): Primary | ICD-10-CM

## 2017-04-18 PROCEDURE — 99214 OFFICE O/P EST MOD 30 MIN: CPT | Performed by: INTERNAL MEDICINE

## 2017-04-18 RX ORDER — NICOTINE 21 MG/24HR
1 PATCH, TRANSDERMAL 24 HOURS TRANSDERMAL EVERY 24 HOURS
Qty: 28 PATCH | Refills: 5 | Status: SHIPPED | OUTPATIENT
Start: 2017-04-18 | End: 2017-05-11

## 2017-04-18 RX ORDER — POLYETHYLENE GLYCOL 3350 17 G
4 POWDER IN PACKET (EA) ORAL AS NEEDED
Qty: 169 EACH | Refills: 5 | Status: SHIPPED | OUTPATIENT
Start: 2017-04-18 | End: 2017-05-11

## 2017-04-18 NOTE — PROGRESS NOTES
"      PROBLEM LIST:  1. 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 CXR and CT chest on 4/10/17 showed a 2.5 x 3.1 cm ALBINA lesion. No evidence of adenopathy. CT guided biopsy was performed on 4/11/17.  PET/CT on 4/17/17 showed hypermetabolic mass in the left lung and low level activity in the neck bilaterally.  2. Hypertension  3. Hypothyroidism    Subjective     HISTORY OF PRESENT ILLNESS:   Jill Miller returns for follow-up after her recent hospital stay.  She still has some pain in her left chest but she is stable to take a full breath now.  She reports that she is planning on trying to quit smoking and has applied for some nicotine patches through her work.  She has been feeling anxious about her recent diagnosis and PET scan results.    Past Medical History, Past Surgical History, Social History, Family History have been reviewed and are without significant changes except as mentioned.    Review of Systems   A comprehensive 14 point review of systems was performed and was negative except as mentioned.    Medications:  The current medication list was reviewed in the EMR    ALLERGIES:  No Known Allergies    Objective      /90  Pulse 72  Temp 97.6 °F (36.4 °C) (Temporal Artery )   Resp 16  Ht 63\" (160 cm)  Wt 194 lb (88 kg)  BMI 34.37 kg/m2     Performance Status: 0    General: well appearing female in no acute distress  Neuro: alert and oriented  HEENT: sclera anicteric, oropharynx clear  Lymphatics: no cervical, supraclavicular, or axillary adenopathy  Cardiovascular: regular rate and rhythm, no murmurs  Lungs: clear to auscultation bilaterally  Abdomen: soft, nontender, nondistended.  No palpable organomegaly  Extremeties: no lower extremity edema  Skin: no rashes, lesions, bruising, or petechiae  Psych: mood and affect appropriate      Imaging: I personally reviewed the PET/CT images.  She has a hypermetabolic left lung mass.  There is no " mediastinal adenopathy.  She does have some slightly hypermetabolic bilateral cervical nodes.      Assessment/Plan   Jill Miller is a 56 y.o. year old female with a newly diagnosed clinical stage T2 N0 M0 squamous cell carcinoma of the lung.  I reviewed the PET scan results with her today.  We discussed the staging system for lung cancer.  I told her that for stage I or stage II cancers the first line treatment is surgery with or without chemotherapy.  The lymph nodes in the neck are of uncertain significance.  I do not feel anything on exam today that correlates to these findings.  I called and discussed this patient with Dr. Ajay Anaya.  I will send her to see him, and we'll also plan to present her at our lung tumor conference next week.  I will get worse set up for pulmonary function tests as well.  The lymph nodes of the neck will need follow-up.  If there is anything palpable, we could consider an FNA.    We discussed smoking cessation.  I strongly recommend this.  She has tried Chantix previously.  I sent prescriptions for nicotine patch and lozenges to her pharmacy.    I will plan to see her back again in 2 weeks.                  Visit time was 25 minutes, greater than 50% spent in counseling      Bess Simons MD  [facility] Hematology and Oncology    4/18/2017          CC:

## 2017-04-19 ENCOUNTER — DOCUMENTATION (OUTPATIENT)
Dept: ONCOLOGY | Facility: CLINIC | Age: 57
End: 2017-04-19

## 2017-04-19 DIAGNOSIS — J98.4 CAVITATING MASS IN LEFT UPPER LUNG LOBE: ICD-10-CM

## 2017-04-19 DIAGNOSIS — C34.12 MALIGNANT NEOPLASM OF UPPER LOBE OF LEFT LUNG (HCC): Primary | ICD-10-CM

## 2017-04-19 NOTE — PROGRESS NOTES
4/19/2017  Rec'd [facility] Disability pw from [place].    Contacted pt via phone, rec'd verbal JULISSA.  Notified pt that I will need a signed JULISSA.    Pt verbalized understanding.    Mailed JULISSA/$15 fee requesting letter with office JULISSA form and [facility] JULISSA form.    Completed form and put in MD box.    4/20/2017  Rec'd pw with MD signature.  Faxed to [employer] @ fax # [phone]    5/2/2017  Spoke with pt while rooming at  regarding $15 fee.  She asked for $15 fee to be billed to her.  Spoke with Vaishali and bill will be sent.  JULISSA-5/2/2017

## 2017-04-26 ENCOUNTER — HOSPITAL ENCOUNTER (OUTPATIENT)
Dept: PULMONOLOGY | Facility: HOSPITAL | Age: 57
Discharge: HOME | End: 2017-04-26
Attending: INTERNAL MEDICINE | Admitting: INTERNAL MEDICINE

## 2017-04-26 DIAGNOSIS — J98.4 CAVITATING MASS IN LEFT UPPER LUNG LOBE: ICD-10-CM

## 2017-04-26 DIAGNOSIS — C34.12 MALIGNANT NEOPLASM OF UPPER LOBE OF LEFT LUNG (HCC): ICD-10-CM

## 2017-04-26 PROCEDURE — 94729 DIFFUSING CAPACITY: CPT

## 2017-04-26 PROCEDURE — 94010 BREATHING CAPACITY TEST: CPT

## 2017-04-26 PROCEDURE — 94010 BREATHING CAPACITY TEST: CPT | Performed by: INTERNAL MEDICINE

## 2017-04-26 PROCEDURE — 94727 GAS DIL/WSHOT DETER LNG VOL: CPT

## 2017-04-26 PROCEDURE — 94727 GAS DIL/WSHOT DETER LNG VOL: CPT | Performed by: INTERNAL MEDICINE

## 2017-04-26 PROCEDURE — 94729 DIFFUSING CAPACITY: CPT | Performed by: INTERNAL MEDICINE

## 2017-05-02 ENCOUNTER — OFFICE VISIT (OUTPATIENT)
Dept: ONCOLOGY | Facility: CLINIC | Age: 57
End: 2017-05-02

## 2017-05-02 ENCOUNTER — OFFICE VISIT (OUTPATIENT)
Dept: CARDIAC SURGERY | Facility: CLINIC | Age: 57
End: 2017-05-02

## 2017-05-02 VITALS
HEART RATE: 100 BPM | OXYGEN SATURATION: 95 % | DIASTOLIC BLOOD PRESSURE: 90 MMHG | BODY MASS INDEX: 36.44 KG/M2 | WEIGHT: 198 LBS | SYSTOLIC BLOOD PRESSURE: 160 MMHG | HEIGHT: 62 IN

## 2017-05-02 VITALS
RESPIRATION RATE: 20 BRPM | HEART RATE: 86 BPM | WEIGHT: 200 LBS | BODY MASS INDEX: 35.44 KG/M2 | HEIGHT: 63 IN | TEMPERATURE: 97.2 F | SYSTOLIC BLOOD PRESSURE: 162 MMHG | DIASTOLIC BLOOD PRESSURE: 74 MMHG

## 2017-05-02 DIAGNOSIS — C34.12 MALIGNANT NEOPLASM OF UPPER LOBE OF LEFT LUNG (HCC): Primary | ICD-10-CM

## 2017-05-02 PROCEDURE — 99204 OFFICE O/P NEW MOD 45 MIN: CPT | Performed by: THORACIC SURGERY (CARDIOTHORACIC VASCULAR SURGERY)

## 2017-05-02 PROCEDURE — 99213 OFFICE O/P EST LOW 20 MIN: CPT | Performed by: INTERNAL MEDICINE

## 2017-05-02 PROCEDURE — 99406 BEHAV CHNG SMOKING 3-10 MIN: CPT | Performed by: THORACIC SURGERY (CARDIOTHORACIC VASCULAR SURGERY)

## 2017-05-05 ENCOUNTER — PREP FOR SURGERY (OUTPATIENT)
Dept: CARDIAC SURGERY | Facility: CLINIC | Age: 57
End: 2017-05-05

## 2017-05-05 DIAGNOSIS — C34.92 MALIGNANT NEOPLASM OF LEFT LUNG, UNSPECIFIED PART OF LUNG (HCC): Primary | ICD-10-CM

## 2017-05-05 RX ORDER — CHLORHEXIDINE GLUCONATE 500 MG/1
1 CLOTH TOPICAL EVERY 12 HOURS PRN
Status: CANCELLED | OUTPATIENT
Start: 2017-05-05

## 2017-05-08 ENCOUNTER — TELEPHONE (OUTPATIENT)
Dept: ONCOLOGY | Facility: CLINIC | Age: 57
End: 2017-05-08

## 2017-05-10 ENCOUNTER — ANESTHESIA EVENT (OUTPATIENT)
Dept: PERIOP | Facility: HOSPITAL | Age: 57
End: 2017-05-10

## 2017-05-11 ENCOUNTER — APPOINTMENT (OUTPATIENT)
Dept: PREADMISSION TESTING | Facility: HOSPITAL | Age: 57
End: 2017-05-11

## 2017-05-11 ENCOUNTER — HOSPITAL ENCOUNTER (OUTPATIENT)
Dept: GENERAL RADIOLOGY | Facility: HOSPITAL | Age: 57
Discharge: HOME | End: 2017-05-11
Admitting: PHYSICIAN ASSISTANT

## 2017-05-11 VITALS — BODY MASS INDEX: 36.63 KG/M2 | HEIGHT: 62 IN | WEIGHT: 199.08 LBS | OXYGEN SATURATION: 97 %

## 2017-05-11 DIAGNOSIS — C34.92 MALIGNANT NEOPLASM OF LEFT LUNG, UNSPECIFIED PART OF LUNG (HCC): ICD-10-CM

## 2017-05-11 LAB
ABO GROUP BLD: NORMAL
ALBUMIN SERPL-MCNC: 4.4 G/DL (ref 3.2–4.8)
ALP SERPL-CCNC: 84 U/L (ref 25–100)
ALT SERPL W P-5'-P-CCNC: 23 U/L (ref 7–40)
ANION GAP SERPL CALCULATED.3IONS-SCNC: 3 MMOL/L (ref 3–11)
AST SERPL-CCNC: 21 U/L (ref 0–33)
BASOPHILS # BLD AUTO: 0.02 10*3/MM3 (ref 0–0.2)
BASOPHILS NFR BLD AUTO: 0.3 % (ref 0–1)
BILIRUB CONJ SERPL-MCNC: 0.1 MG/DL (ref 0–0.2)
BILIRUB INDIRECT SERPL-MCNC: 0.3 MG/DL (ref 0.1–1.1)
BILIRUB SERPL-MCNC: 0.4 MG/DL (ref 0.3–1.2)
BLD GP AB SCN SERPL QL: NEGATIVE
BUN BLD-MCNC: 13 MG/DL (ref 9–23)
BUN/CREAT SERPL: 18.6 (ref 7–25)
CALCIUM SPEC-SCNC: 9.5 MG/DL (ref 8.7–10.4)
CHLORIDE SERPL-SCNC: 105 MMOL/L (ref 99–109)
CO2 SERPL-SCNC: 30 MMOL/L (ref 20–31)
CREAT BLD-MCNC: 0.7 MG/DL (ref 0.6–1.3)
DEPRECATED RDW RBC AUTO: 49 FL (ref 37–54)
EOSINOPHIL # BLD AUTO: 0.16 10*3/MM3 (ref 0.1–0.3)
EOSINOPHIL NFR BLD AUTO: 2.3 % (ref 0–3)
ERYTHROCYTE [DISTWIDTH] IN BLOOD BY AUTOMATED COUNT: 12.3 % (ref 11.3–14.5)
GFR SERPL CREATININE-BSD FRML MDRD: 87 ML/MIN/1.73
GLUCOSE BLD-MCNC: 111 MG/DL (ref 70–100)
HBA1C MFR BLD: 5.8 % (ref 4.8–5.6)
HCT VFR BLD AUTO: 43.9 % (ref 34.5–44)
HGB BLD-MCNC: 14.8 G/DL (ref 11.5–15.5)
IMM GRANULOCYTES # BLD: 0.02 10*3/MM3 (ref 0–0.03)
IMM GRANULOCYTES NFR BLD: 0.3 % (ref 0–0.6)
INR PPP: 0.95
LYMPHOCYTES # BLD AUTO: 1.31 10*3/MM3 (ref 0.6–4.8)
LYMPHOCYTES NFR BLD AUTO: 18.7 % (ref 24–44)
MCH RBC QN AUTO: 37.1 PG (ref 27–31)
MCHC RBC AUTO-ENTMCNC: 33.7 G/DL (ref 32–36)
MCV RBC AUTO: 110 FL (ref 80–99)
MONOCYTES # BLD AUTO: 0.41 10*3/MM3 (ref 0–1)
MONOCYTES NFR BLD AUTO: 5.9 % (ref 0–12)
NEUTROPHILS # BLD AUTO: 5.07 10*3/MM3 (ref 1.5–8.3)
NEUTROPHILS NFR BLD AUTO: 72.5 % (ref 41–71)
PLATELET # BLD AUTO: 197 10*3/MM3 (ref 150–450)
PMV BLD AUTO: 10.2 FL (ref 6–12)
POTASSIUM BLD-SCNC: 4.4 MMOL/L (ref 3.5–5.5)
PROT SERPL-MCNC: 7.2 G/DL (ref 5.7–8.2)
PROTHROMBIN TIME: 10.3 SECONDS (ref 9.6–11.5)
RBC # BLD AUTO: 3.99 10*6/MM3 (ref 3.89–5.14)
RH BLD: POSITIVE
SODIUM BLD-SCNC: 138 MMOL/L (ref 132–146)
WBC NRBC COR # BLD: 6.99 10*3/MM3 (ref 3.5–10.8)

## 2017-05-11 PROCEDURE — 86901 BLOOD TYPING SEROLOGIC RH(D): CPT | Performed by: PHYSICIAN ASSISTANT

## 2017-05-11 PROCEDURE — 83036 HEMOGLOBIN GLYCOSYLATED A1C: CPT | Performed by: PHYSICIAN ASSISTANT

## 2017-05-11 PROCEDURE — 85610 PROTHROMBIN TIME: CPT | Performed by: PHYSICIAN ASSISTANT

## 2017-05-11 PROCEDURE — 80048 BASIC METABOLIC PNL TOTAL CA: CPT | Performed by: PHYSICIAN ASSISTANT

## 2017-05-11 PROCEDURE — 71020 HC CHEST PA AND LATERAL: CPT

## 2017-05-11 PROCEDURE — 86920 COMPATIBILITY TEST SPIN: CPT

## 2017-05-11 PROCEDURE — 86900 BLOOD TYPING SEROLOGIC ABO: CPT | Performed by: PHYSICIAN ASSISTANT

## 2017-05-11 PROCEDURE — 36415 COLL VENOUS BLD VENIPUNCTURE: CPT

## 2017-05-11 PROCEDURE — 80076 HEPATIC FUNCTION PANEL: CPT | Performed by: PHYSICIAN ASSISTANT

## 2017-05-11 PROCEDURE — 86850 RBC ANTIBODY SCREEN: CPT | Performed by: PHYSICIAN ASSISTANT

## 2017-05-11 PROCEDURE — 85025 COMPLETE CBC W/AUTO DIFF WBC: CPT | Performed by: PHYSICIAN ASSISTANT

## 2017-05-11 RX ORDER — CHLORHEXIDINE GLUCONATE 500 MG/1
1 CLOTH TOPICAL EVERY 12 HOURS PRN
Status: DISCONTINUED | OUTPATIENT
Start: 2017-05-11 | End: 2018-05-17

## 2017-05-11 RX ORDER — VANCOMYCIN HYDROCHLORIDE
15 ONCE
Status: DISCONTINUED | OUTPATIENT
Start: 2017-05-11 | End: 2017-05-11

## 2017-05-11 RX ORDER — VANCOMYCIN HYDROCHLORIDE
15 ONCE
Status: DISCONTINUED | OUTPATIENT
Start: 2017-05-12 | End: 2017-05-11

## 2017-05-11 RX ORDER — VANCOMYCIN HYDROCHLORIDE
15 ONCE
Status: CANCELLED | OUTPATIENT
Start: 2017-05-12

## 2017-05-12 ENCOUNTER — APPOINTMENT (OUTPATIENT)
Dept: GENERAL RADIOLOGY | Facility: HOSPITAL | Age: 57
End: 2017-05-12

## 2017-05-12 ENCOUNTER — ANESTHESIA (OUTPATIENT)
Dept: PERIOP | Facility: HOSPITAL | Age: 57
End: 2017-05-12

## 2017-05-12 ENCOUNTER — HOSPITAL ENCOUNTER (INPATIENT)
Facility: HOSPITAL | Age: 57
LOS: 9 days | Discharge: HOME | End: 2017-05-21
Attending: THORACIC SURGERY (CARDIOTHORACIC VASCULAR SURGERY) | Admitting: THORACIC SURGERY (CARDIOTHORACIC VASCULAR SURGERY)

## 2017-05-12 DIAGNOSIS — I48.19 PERSISTENT ATRIAL FIBRILLATION (HCC): Primary | ICD-10-CM

## 2017-05-12 DIAGNOSIS — C34.92 MALIGNANT NEOPLASM OF LEFT LUNG, UNSPECIFIED PART OF LUNG (HCC): ICD-10-CM

## 2017-05-12 DIAGNOSIS — C34.12 MALIGNANT NEOPLASM OF UPPER LOBE OF LEFT LUNG (HCC): ICD-10-CM

## 2017-05-12 DIAGNOSIS — R05.9 COUGH: ICD-10-CM

## 2017-05-12 PROBLEM — I10 HYPERTENSION: Status: ACTIVE | Noted: 2017-05-12

## 2017-05-12 PROBLEM — E05.00 GRAVES DISEASE: Status: ACTIVE | Noted: 2017-05-12

## 2017-05-12 PROBLEM — C34.90 LUNG CANCER (HCC): Status: ACTIVE | Noted: 2017-05-12

## 2017-05-12 LAB
ABO GROUP BLD: NORMAL
B-HCG UR QL: NEGATIVE
BILIRUB UR QL STRIP: NEGATIVE
CLARITY UR: ABNORMAL
COLOR UR: YELLOW
GLUCOSE UR STRIP-MCNC: ABNORMAL MG/DL
HGB UR QL STRIP.AUTO: ABNORMAL
INTERNAL NEGATIVE CONTROL: NORMAL
INTERNAL POSITIVE CONTROL: REACTIVE
KETONES UR QL STRIP: NEGATIVE
LEUKOCYTE ESTERASE UR QL STRIP.AUTO: NEGATIVE
Lab: NORMAL
NITRITE UR QL STRIP: NEGATIVE
PH UR STRIP.AUTO: <=5 [PH] (ref 5–8)
PROT UR QL STRIP: ABNORMAL
RH BLD: POSITIVE
SP GR UR STRIP: >=1.03 (ref 1–1.03)
UROBILINOGEN UR QL STRIP: ABNORMAL

## 2017-05-12 PROCEDURE — 88305 TISSUE EXAM BY PATHOLOGIST: CPT | Performed by: THORACIC SURGERY (CARDIOTHORACIC VASCULAR SURGERY)

## 2017-05-12 PROCEDURE — 0BJ08ZZ INSPECTION OF TRACHEOBRONCHIAL TREE, VIA NATURAL OR ARTIFICIAL OPENING ENDOSCOPIC: ICD-10-PCS | Performed by: THORACIC SURGERY (CARDIOTHORACIC VASCULAR SURGERY)

## 2017-05-12 PROCEDURE — 88331 PATH CONSLTJ SURG 1 BLK 1SPC: CPT | Performed by: THORACIC SURGERY (CARDIOTHORACIC VASCULAR SURGERY)

## 2017-05-12 PROCEDURE — 88307 TISSUE EXAM BY PATHOLOGIST: CPT | Performed by: THORACIC SURGERY (CARDIOTHORACIC VASCULAR SURGERY)

## 2017-05-12 PROCEDURE — 25010000002 VANCOMYCIN: Performed by: PHYSICIAN ASSISTANT

## 2017-05-12 PROCEDURE — 31622 DX BRONCHOSCOPE/WASH: CPT | Performed by: THORACIC SURGERY (CARDIOTHORACIC VASCULAR SURGERY)

## 2017-05-12 PROCEDURE — 25010000002 MIDAZOLAM PER 1 MG: Performed by: NURSE ANESTHETIST, CERTIFIED REGISTERED

## 2017-05-12 PROCEDURE — 32480 PARTIAL REMOVAL OF LUNG: CPT | Performed by: THORACIC SURGERY (CARDIOTHORACIC VASCULAR SURGERY)

## 2017-05-12 PROCEDURE — 25010000002 BUPRENORPHINE PER 0.1 MG: Performed by: NURSE ANESTHETIST, CERTIFIED REGISTERED

## 2017-05-12 PROCEDURE — 86901 BLOOD TYPING SEROLOGIC RH(D): CPT

## 2017-05-12 PROCEDURE — 07B70ZZ EXCISION OF THORAX LYMPHATIC, OPEN APPROACH: ICD-10-PCS | Performed by: THORACIC SURGERY (CARDIOTHORACIC VASCULAR SURGERY)

## 2017-05-12 PROCEDURE — 25010000002 ONDANSETRON PER 1 MG: Performed by: NURSE ANESTHETIST, CERTIFIED REGISTERED

## 2017-05-12 PROCEDURE — 71010 HC CHEST PA OR AP: CPT

## 2017-05-12 PROCEDURE — 81001 URINALYSIS AUTO W/SCOPE: CPT | Performed by: NURSE PRACTITIONER

## 2017-05-12 PROCEDURE — 86900 BLOOD TYPING SEROLOGIC ABO: CPT

## 2017-05-12 PROCEDURE — 0BTG0ZZ RESECTION OF LEFT UPPER LUNG LOBE, OPEN APPROACH: ICD-10-PCS | Performed by: THORACIC SURGERY (CARDIOTHORACIC VASCULAR SURGERY)

## 2017-05-12 PROCEDURE — 25010000002 MORPHINE PER 10 MG: Performed by: THORACIC SURGERY (CARDIOTHORACIC VASCULAR SURGERY)

## 2017-05-12 PROCEDURE — 25010000002 CEFUROXIME: Performed by: PHYSICIAN ASSISTANT

## 2017-05-12 PROCEDURE — 99233 SBSQ HOSP IP/OBS HIGH 50: CPT | Performed by: INTERNAL MEDICINE

## 2017-05-12 PROCEDURE — 87086 URINE CULTURE/COLONY COUNT: CPT | Performed by: NURSE PRACTITIONER

## 2017-05-12 PROCEDURE — 25010000002 ROPIVACAINE PER 1 MG: Performed by: NURSE ANESTHETIST, CERTIFIED REGISTERED

## 2017-05-12 PROCEDURE — 25010000002 FENTANYL CITRATE (PF) 100 MCG/2ML SOLUTION: Performed by: NURSE ANESTHETIST, CERTIFIED REGISTERED

## 2017-05-12 PROCEDURE — 0BJL4ZZ INSPECTION OF LEFT LUNG, PERCUTANEOUS ENDOSCOPIC APPROACH: ICD-10-PCS | Performed by: THORACIC SURGERY (CARDIOTHORACIC VASCULAR SURGERY)

## 2017-05-12 PROCEDURE — 25010000002 PROPOFOL 10 MG/ML EMULSION: Performed by: NURSE ANESTHETIST, CERTIFIED REGISTERED

## 2017-05-12 PROCEDURE — 25010000002 DEXAMETHASONE PER 1 MG: Performed by: NURSE ANESTHETIST, CERTIFIED REGISTERED

## 2017-05-12 PROCEDURE — 38746 REMOVE THORACIC LYMPH NODES: CPT | Performed by: THORACIC SURGERY (CARDIOTHORACIC VASCULAR SURGERY)

## 2017-05-12 RX ORDER — SODIUM CHLORIDE 0.9 % (FLUSH) 0.9 %
1-10 SYRINGE (ML) INJECTION AS NEEDED
Status: DISCONTINUED | OUTPATIENT
Start: 2017-05-12 | End: 2017-05-12 | Stop reason: HOSPADM

## 2017-05-12 RX ORDER — MIDAZOLAM HYDROCHLORIDE 1 MG/ML
INJECTION INTRAMUSCULAR; INTRAVENOUS AS NEEDED
Status: DISCONTINUED | OUTPATIENT
Start: 2017-05-12 | End: 2017-05-12 | Stop reason: SURG

## 2017-05-12 RX ORDER — FAMOTIDINE 10 MG/ML
20 INJECTION, SOLUTION INTRAVENOUS ONCE
Status: DISCONTINUED | OUTPATIENT
Start: 2017-05-12 | End: 2017-05-12 | Stop reason: HOSPADM

## 2017-05-12 RX ORDER — PROPOFOL 10 MG/ML
VIAL (ML) INTRAVENOUS AS NEEDED
Status: DISCONTINUED | OUTPATIENT
Start: 2017-05-12 | End: 2017-05-12 | Stop reason: SURG

## 2017-05-12 RX ORDER — CELECOXIB 200 MG/1
200 CAPSULE ORAL EVERY 12 HOURS SCHEDULED
Status: COMPLETED | OUTPATIENT
Start: 2017-05-12 | End: 2017-05-15

## 2017-05-12 RX ORDER — HYDROCODONE BITARTRATE AND ACETAMINOPHEN 7.5; 325 MG/1; MG/1
1 TABLET ORAL EVERY 4 HOURS PRN
Status: DISCONTINUED | OUTPATIENT
Start: 2017-05-12 | End: 2017-05-21 | Stop reason: HOSPADM

## 2017-05-12 RX ORDER — ATRACURIUM BESYLATE 10 MG/ML
INJECTION, SOLUTION INTRAVENOUS AS NEEDED
Status: DISCONTINUED | OUTPATIENT
Start: 2017-05-12 | End: 2017-05-12 | Stop reason: SURG

## 2017-05-12 RX ORDER — SODIUM CHLORIDE, SODIUM LACTATE, POTASSIUM CHLORIDE, CALCIUM CHLORIDE 600; 310; 30; 20 MG/100ML; MG/100ML; MG/100ML; MG/100ML
INJECTION, SOLUTION INTRAVENOUS CONTINUOUS PRN
Status: DISCONTINUED | OUTPATIENT
Start: 2017-05-12 | End: 2017-05-12 | Stop reason: SURG

## 2017-05-12 RX ORDER — BUPRENORPHINE HYDROCHLORIDE 0.32 MG/ML
INJECTION INTRAMUSCULAR; INTRAVENOUS AS NEEDED
Status: DISCONTINUED | OUTPATIENT
Start: 2017-05-12 | End: 2017-05-12 | Stop reason: SURG

## 2017-05-12 RX ORDER — ACETAMINOPHEN 500 MG
1000 TABLET ORAL ONCE
Status: COMPLETED | OUTPATIENT
Start: 2017-05-12 | End: 2017-05-12

## 2017-05-12 RX ORDER — SODIUM CHLORIDE, SODIUM LACTATE, POTASSIUM CHLORIDE, CALCIUM CHLORIDE 600; 310; 30; 20 MG/100ML; MG/100ML; MG/100ML; MG/100ML
9 INJECTION, SOLUTION INTRAVENOUS CONTINUOUS
Status: DISCONTINUED | OUTPATIENT
Start: 2017-05-12 | End: 2017-05-12 | Stop reason: SDUPTHER

## 2017-05-12 RX ORDER — LIDOCAINE HYDROCHLORIDE 10 MG/ML
0.5 INJECTION, SOLUTION EPIDURAL; INFILTRATION; INTRACAUDAL; PERINEURAL ONCE AS NEEDED
Status: COMPLETED | OUTPATIENT
Start: 2017-05-12 | End: 2017-05-12

## 2017-05-12 RX ORDER — FENTANYL CITRATE 50 UG/ML
50 INJECTION, SOLUTION INTRAMUSCULAR; INTRAVENOUS
Status: DISCONTINUED | OUTPATIENT
Start: 2017-05-12 | End: 2017-05-12 | Stop reason: HOSPADM

## 2017-05-12 RX ORDER — FAMOTIDINE 20 MG/1
20 TABLET, FILM COATED ORAL ONCE
Status: COMPLETED | OUTPATIENT
Start: 2017-05-12 | End: 2017-05-12

## 2017-05-12 RX ORDER — ONDANSETRON 2 MG/ML
4 INJECTION INTRAMUSCULAR; INTRAVENOUS EVERY 6 HOURS PRN
Status: DISCONTINUED | OUTPATIENT
Start: 2017-05-12 | End: 2017-05-21 | Stop reason: HOSPADM

## 2017-05-12 RX ORDER — MAGNESIUM HYDROXIDE 1200 MG/15ML
LIQUID ORAL AS NEEDED
Status: DISCONTINUED | OUTPATIENT
Start: 2017-05-12 | End: 2017-05-12 | Stop reason: HOSPADM

## 2017-05-12 RX ORDER — MORPHINE SULFATE 4 MG/ML
4 INJECTION, SOLUTION INTRAMUSCULAR; INTRAVENOUS EVERY 4 HOURS PRN
Status: DISCONTINUED | OUTPATIENT
Start: 2017-05-12 | End: 2017-05-18

## 2017-05-12 RX ORDER — SODIUM CHLORIDE 9 MG/ML
30 INJECTION, SOLUTION INTRAVENOUS CONTINUOUS
Status: DISCONTINUED | OUTPATIENT
Start: 2017-05-12 | End: 2017-05-14

## 2017-05-12 RX ORDER — METOPROLOL SUCCINATE 25 MG/1
25 TABLET, EXTENDED RELEASE ORAL DAILY
Status: DISCONTINUED | OUTPATIENT
Start: 2017-05-13 | End: 2017-05-15

## 2017-05-12 RX ORDER — IPRATROPIUM BROMIDE AND ALBUTEROL SULFATE 2.5; .5 MG/3ML; MG/3ML
3 SOLUTION RESPIRATORY (INHALATION) EVERY 4 HOURS PRN
Status: DISCONTINUED | OUTPATIENT
Start: 2017-05-12 | End: 2017-05-21 | Stop reason: HOSPADM

## 2017-05-12 RX ORDER — LIDOCAINE HYDROCHLORIDE 10 MG/ML
INJECTION, SOLUTION INFILTRATION; PERINEURAL AS NEEDED
Status: DISCONTINUED | OUTPATIENT
Start: 2017-05-12 | End: 2017-05-12 | Stop reason: SURG

## 2017-05-12 RX ORDER — HEPARIN SODIUM 5000 [USP'U]/ML
5000 INJECTION, SOLUTION INTRAVENOUS; SUBCUTANEOUS EVERY 8 HOURS SCHEDULED
Status: DISCONTINUED | OUTPATIENT
Start: 2017-05-13 | End: 2017-05-18

## 2017-05-12 RX ORDER — LEVOTHYROXINE SODIUM 0.15 MG/1
150 TABLET ORAL
Status: DISCONTINUED | OUTPATIENT
Start: 2017-05-13 | End: 2017-05-21 | Stop reason: HOSPADM

## 2017-05-12 RX ORDER — PREGABALIN 75 MG/1
75 CAPSULE ORAL ONCE
Status: COMPLETED | OUTPATIENT
Start: 2017-05-12 | End: 2017-05-12

## 2017-05-12 RX ORDER — NALOXONE HCL 0.4 MG/ML
0.4 VIAL (ML) INJECTION
Status: DISCONTINUED | OUTPATIENT
Start: 2017-05-12 | End: 2017-05-18

## 2017-05-12 RX ORDER — ROPIVACAINE HYDROCHLORIDE 2 MG/ML
12 INJECTION, SOLUTION EPIDURAL; INFILTRATION CONTINUOUS
Status: DISPENSED | OUTPATIENT
Start: 2017-05-12 | End: 2017-05-17

## 2017-05-12 RX ORDER — ONDANSETRON 4 MG/1
4 TABLET, FILM COATED ORAL EVERY 6 HOURS PRN
Status: DISCONTINUED | OUTPATIENT
Start: 2017-05-12 | End: 2017-05-21 | Stop reason: HOSPADM

## 2017-05-12 RX ORDER — ONDANSETRON 2 MG/ML
INJECTION INTRAMUSCULAR; INTRAVENOUS AS NEEDED
Status: DISCONTINUED | OUTPATIENT
Start: 2017-05-12 | End: 2017-05-12 | Stop reason: SURG

## 2017-05-12 RX ORDER — LEVOTHYROXINE SODIUM 0.15 MG/1
150 TABLET ORAL DAILY
Status: DISCONTINUED | OUTPATIENT
Start: 2017-05-13 | End: 2017-05-12 | Stop reason: SDUPTHER

## 2017-05-12 RX ORDER — DEXAMETHASONE SODIUM PHOSPHATE 4 MG/ML
INJECTION, SOLUTION INTRA-ARTICULAR; INTRALESIONAL; INTRAMUSCULAR; INTRAVENOUS; SOFT TISSUE AS NEEDED
Status: DISCONTINUED | OUTPATIENT
Start: 2017-05-12 | End: 2017-05-12 | Stop reason: SURG

## 2017-05-12 RX ORDER — CELECOXIB 200 MG/1
200 CAPSULE ORAL ONCE
Status: COMPLETED | OUTPATIENT
Start: 2017-05-12 | End: 2017-05-12

## 2017-05-12 RX ORDER — FENTANYL CITRATE 50 UG/ML
INJECTION, SOLUTION INTRAMUSCULAR; INTRAVENOUS AS NEEDED
Status: DISCONTINUED | OUTPATIENT
Start: 2017-05-12 | End: 2017-05-12 | Stop reason: SURG

## 2017-05-12 RX ORDER — BUPIVACAINE HYDROCHLORIDE 2.5 MG/ML
INJECTION, SOLUTION EPIDURAL; INFILTRATION; INTRACAUDAL AS NEEDED
Status: DISCONTINUED | OUTPATIENT
Start: 2017-05-12 | End: 2017-05-12 | Stop reason: SURG

## 2017-05-12 RX ORDER — ACETAMINOPHEN 325 MG/1
650 TABLET ORAL EVERY 6 HOURS SCHEDULED
Status: DISCONTINUED | OUTPATIENT
Start: 2017-05-13 | End: 2017-05-21 | Stop reason: HOSPADM

## 2017-05-12 RX ADMIN — VANCOMYCIN HYDROCHLORIDE 1250 MG: 1 INJECTION, POWDER, LYOPHILIZED, FOR SOLUTION INTRAVENOUS at 14:52

## 2017-05-12 RX ADMIN — ONDANSETRON 4 MG: 2 INJECTION INTRAMUSCULAR; INTRAVENOUS at 18:18

## 2017-05-12 RX ADMIN — BUPRENORPHINE HYDROCHLORIDE 150 MCG: 0.3 INJECTION INTRAMUSCULAR; INTRAVENOUS at 12:46

## 2017-05-12 RX ADMIN — FENTANYL CITRATE 50 MCG: 50 INJECTION, SOLUTION INTRAMUSCULAR; INTRAVENOUS at 12:40

## 2017-05-12 RX ADMIN — ATRACURIUM BESYLATE 20 MG: 10 INJECTION, SOLUTION INTRAVENOUS at 15:55

## 2017-05-12 RX ADMIN — DEXAMETHASONE SODIUM PHOSPHATE 8 MG: 4 INJECTION, SOLUTION INTRAMUSCULAR; INTRAVENOUS at 15:30

## 2017-05-12 RX ADMIN — FAMOTIDINE 20 MG: 20 TABLET ORAL at 12:26

## 2017-05-12 RX ADMIN — CEFUROXIME 1.5 G: 1.5 INJECTION, POWDER, FOR SOLUTION INTRAVENOUS at 21:55

## 2017-05-12 RX ADMIN — MIDAZOLAM HYDROCHLORIDE 2 MG: 1 INJECTION, SOLUTION INTRAMUSCULAR; INTRAVENOUS at 12:36

## 2017-05-12 RX ADMIN — ATRACURIUM BESYLATE 50 MG: 10 INJECTION, SOLUTION INTRAVENOUS at 15:04

## 2017-05-12 RX ADMIN — FENTANYL CITRATE 50 MCG: 50 INJECTION INTRAMUSCULAR; INTRAVENOUS at 19:04

## 2017-05-12 RX ADMIN — SODIUM CHLORIDE 30 ML/HR: 9 INJECTION, SOLUTION INTRAVENOUS at 20:48

## 2017-05-12 RX ADMIN — FENTANYL CITRATE 50 MCG: 50 INJECTION, SOLUTION INTRAMUSCULAR; INTRAVENOUS at 17:30

## 2017-05-12 RX ADMIN — FENTANYL CITRATE 50 MCG: 50 INJECTION, SOLUTION INTRAMUSCULAR; INTRAVENOUS at 18:10

## 2017-05-12 RX ADMIN — ACETAMINOPHEN 1000 MG: 500 TABLET ORAL at 12:55

## 2017-05-12 RX ADMIN — SODIUM CHLORIDE, POTASSIUM CHLORIDE, SODIUM LACTATE AND CALCIUM CHLORIDE: 600; 310; 30; 20 INJECTION, SOLUTION INTRAVENOUS at 15:02

## 2017-05-12 RX ADMIN — BUPIVACAINE HYDROCHLORIDE 10 ML: 2.5 INJECTION, SOLUTION EPIDURAL; INFILTRATION; INTRACAUDAL; PERINEURAL at 15:06

## 2017-05-12 RX ADMIN — LIDOCAINE HYDROCHLORIDE 50 MG: 10 INJECTION, SOLUTION INFILTRATION; PERINEURAL at 15:04

## 2017-05-12 RX ADMIN — ROPIVACAINE HYDROCHLORIDE 8 ML/HR: 2 INJECTION, SOLUTION EPIDURAL; INFILTRATION at 18:57

## 2017-05-12 RX ADMIN — CELECOXIB 200 MG: 200 CAPSULE ORAL at 12:55

## 2017-05-12 RX ADMIN — BUPIVACAINE HYDROCHLORIDE 15 ML: 2.5 INJECTION, SOLUTION EPIDURAL; INFILTRATION; INTRACAUDAL; PERINEURAL at 12:46

## 2017-05-12 RX ADMIN — ATRACURIUM BESYLATE 10 MG: 10 INJECTION, SOLUTION INTRAVENOUS at 16:55

## 2017-05-12 RX ADMIN — LIDOCAINE HYDROCHLORIDE 0.5 ML: 10 INJECTION, SOLUTION EPIDURAL; INFILTRATION; INTRACAUDAL; PERINEURAL at 12:16

## 2017-05-12 RX ADMIN — MORPHINE SULFATE 4 MG: 4 INJECTION, SOLUTION INTRAMUSCULAR; INTRAVENOUS at 20:55

## 2017-05-12 RX ADMIN — PROPOFOL 200 MG: 10 INJECTION, EMULSION INTRAVENOUS at 15:04

## 2017-05-12 RX ADMIN — SODIUM CHLORIDE, POTASSIUM CHLORIDE, SODIUM LACTATE AND CALCIUM CHLORIDE 9 ML/HR: 600; 310; 30; 20 INJECTION, SOLUTION INTRAVENOUS at 12:17

## 2017-05-12 RX ADMIN — PREGABALIN 75 MG: 75 CAPSULE ORAL at 12:55

## 2017-05-12 RX ADMIN — CELECOXIB 200 MG: 200 CAPSULE ORAL at 20:55

## 2017-05-12 RX ADMIN — FENTANYL CITRATE 50 MCG: 50 INJECTION INTRAMUSCULAR; INTRAVENOUS at 19:15

## 2017-05-13 ENCOUNTER — APPOINTMENT (OUTPATIENT)
Dept: GENERAL RADIOLOGY | Facility: HOSPITAL | Age: 57
End: 2017-05-13

## 2017-05-13 LAB
ANION GAP SERPL CALCULATED.3IONS-SCNC: 6 MMOL/L (ref 3–11)
BACTERIA UR QL AUTO: ABNORMAL /HPF
BASOPHILS # BLD AUTO: 0 10*3/MM3 (ref 0–0.2)
BASOPHILS NFR BLD AUTO: 0 % (ref 0–1)
BUN BLD-MCNC: 18 MG/DL (ref 9–23)
BUN/CREAT SERPL: 22.5 (ref 7–25)
CALCIUM SPEC-SCNC: 9.1 MG/DL (ref 8.7–10.4)
CHLORIDE SERPL-SCNC: 106 MMOL/L (ref 99–109)
CO2 SERPL-SCNC: 24 MMOL/L (ref 20–31)
CREAT BLD-MCNC: 0.8 MG/DL (ref 0.6–1.3)
DEPRECATED RDW RBC AUTO: 50.7 FL (ref 37–54)
EOSINOPHIL # BLD AUTO: 0 10*3/MM3 (ref 0.1–0.3)
EOSINOPHIL NFR BLD AUTO: 0 % (ref 0–3)
ERYTHROCYTE [DISTWIDTH] IN BLOOD BY AUTOMATED COUNT: 12.4 % (ref 11.3–14.5)
GFR SERPL CREATININE-BSD FRML MDRD: 74 ML/MIN/1.73
GLUCOSE BLD-MCNC: 180 MG/DL (ref 70–100)
HCT VFR BLD AUTO: 44 % (ref 34.5–44)
HGB BLD-MCNC: 14.6 G/DL (ref 11.5–15.5)
HYALINE CASTS UR QL AUTO: ABNORMAL /LPF
IMM GRANULOCYTES # BLD: 0.04 10*3/MM3 (ref 0–0.03)
IMM GRANULOCYTES NFR BLD: 0.3 % (ref 0–0.6)
LYMPHOCYTES # BLD AUTO: 0.34 10*3/MM3 (ref 0.6–4.8)
LYMPHOCYTES NFR BLD AUTO: 2.7 % (ref 24–44)
MCH RBC QN AUTO: 37.2 PG (ref 27–31)
MCHC RBC AUTO-ENTMCNC: 33.2 G/DL (ref 32–36)
MCV RBC AUTO: 112.2 FL (ref 80–99)
MONOCYTES # BLD AUTO: 0.65 10*3/MM3 (ref 0–1)
MONOCYTES NFR BLD AUTO: 5.2 % (ref 0–12)
NEUTROPHILS # BLD AUTO: 11.42 10*3/MM3 (ref 1.5–8.3)
NEUTROPHILS NFR BLD AUTO: 91.8 % (ref 41–71)
PLATELET # BLD AUTO: 159 10*3/MM3 (ref 150–450)
PMV BLD AUTO: 10.5 FL (ref 6–12)
POTASSIUM BLD-SCNC: 5.3 MMOL/L (ref 3.5–5.5)
RBC # BLD AUTO: 3.92 10*6/MM3 (ref 3.89–5.14)
RBC # UR: ABNORMAL /HPF
REF LAB TEST METHOD: ABNORMAL
SODIUM BLD-SCNC: 136 MMOL/L (ref 132–146)
SQUAMOUS #/AREA URNS HPF: ABNORMAL /HPF
WBC NRBC COR # BLD: 12.45 10*3/MM3 (ref 3.5–10.8)
WBC UR QL AUTO: ABNORMAL /HPF

## 2017-05-13 PROCEDURE — 25010000002 ROPIVACAINE PER 1 MG: Performed by: ANESTHESIOLOGY

## 2017-05-13 PROCEDURE — 25010000002 MORPHINE PER 10 MG: Performed by: THORACIC SURGERY (CARDIOTHORACIC VASCULAR SURGERY)

## 2017-05-13 PROCEDURE — 99024 POSTOP FOLLOW-UP VISIT: CPT | Performed by: THORACIC SURGERY (CARDIOTHORACIC VASCULAR SURGERY)

## 2017-05-13 PROCEDURE — 80048 BASIC METABOLIC PNL TOTAL CA: CPT | Performed by: PHYSICIAN ASSISTANT

## 2017-05-13 PROCEDURE — 94799 UNLISTED PULMONARY SVC/PX: CPT

## 2017-05-13 PROCEDURE — 25010000002 ONDANSETRON PER 1 MG: Performed by: PHYSICIAN ASSISTANT

## 2017-05-13 PROCEDURE — 71010 HC CHEST PA OR AP: CPT

## 2017-05-13 PROCEDURE — 99232 SBSQ HOSP IP/OBS MODERATE 35: CPT | Performed by: INTERNAL MEDICINE

## 2017-05-13 PROCEDURE — 85025 COMPLETE CBC W/AUTO DIFF WBC: CPT | Performed by: PHYSICIAN ASSISTANT

## 2017-05-13 PROCEDURE — 25010000002 HEPARIN (PORCINE) PER 1000 UNITS: Performed by: PHYSICIAN ASSISTANT

## 2017-05-13 PROCEDURE — 25010000002 CEFUROXIME: Performed by: PHYSICIAN ASSISTANT

## 2017-05-13 RX ORDER — NICOTINE 21 MG/24HR
1 PATCH, TRANSDERMAL 24 HOURS TRANSDERMAL
Status: DISCONTINUED | OUTPATIENT
Start: 2017-05-13 | End: 2017-05-15

## 2017-05-13 RX ADMIN — NICOTINE 1 PATCH: 14 PATCH TRANSDERMAL at 08:12

## 2017-05-13 RX ADMIN — MORPHINE SULFATE 4 MG: 4 INJECTION, SOLUTION INTRAMUSCULAR; INTRAVENOUS at 13:20

## 2017-05-13 RX ADMIN — ONDANSETRON 4 MG: 2 INJECTION INTRAMUSCULAR; INTRAVENOUS at 23:56

## 2017-05-13 RX ADMIN — CELECOXIB 200 MG: 200 CAPSULE ORAL at 21:40

## 2017-05-13 RX ADMIN — HYDROCODONE BITARTRATE AND ACETAMINOPHEN 1 TABLET: 7.5; 325 TABLET ORAL at 23:56

## 2017-05-13 RX ADMIN — ACETAMINOPHEN 650 MG: 325 TABLET, FILM COATED ORAL at 13:20

## 2017-05-13 RX ADMIN — METOPROLOL SUCCINATE 25 MG: 25 TABLET, EXTENDED RELEASE ORAL at 10:15

## 2017-05-13 RX ADMIN — CEFUROXIME 1.5 G: 1.5 INJECTION, POWDER, FOR SOLUTION INTRAVENOUS at 06:01

## 2017-05-13 RX ADMIN — HEPARIN SODIUM 5000 UNITS: 5000 INJECTION, SOLUTION INTRAVENOUS; SUBCUTANEOUS at 05:27

## 2017-05-13 RX ADMIN — ACETAMINOPHEN 650 MG: 325 TABLET, FILM COATED ORAL at 17:01

## 2017-05-13 RX ADMIN — LEVOTHYROXINE SODIUM 150 MCG: 150 TABLET ORAL at 05:28

## 2017-05-13 RX ADMIN — CELECOXIB 200 MG: 200 CAPSULE ORAL at 08:12

## 2017-05-13 RX ADMIN — MORPHINE SULFATE 4 MG: 4 INJECTION, SOLUTION INTRAMUSCULAR; INTRAVENOUS at 08:13

## 2017-05-13 RX ADMIN — MORPHINE SULFATE 4 MG: 4 INJECTION, SOLUTION INTRAMUSCULAR; INTRAVENOUS at 20:12

## 2017-05-13 RX ADMIN — MORPHINE SULFATE 4 MG: 4 INJECTION, SOLUTION INTRAMUSCULAR; INTRAVENOUS at 23:51

## 2017-05-13 RX ADMIN — HEPARIN SODIUM 5000 UNITS: 5000 INJECTION, SOLUTION INTRAVENOUS; SUBCUTANEOUS at 13:21

## 2017-05-13 RX ADMIN — ROPIVACAINE HYDROCHLORIDE 12 ML/HR: 2 INJECTION, SOLUTION EPIDURAL; INFILTRATION at 20:09

## 2017-05-13 RX ADMIN — HYDROCODONE BITARTRATE AND ACETAMINOPHEN 1 TABLET: 7.5; 325 TABLET ORAL at 05:28

## 2017-05-13 RX ADMIN — HEPARIN SODIUM 5000 UNITS: 5000 INJECTION, SOLUTION INTRAVENOUS; SUBCUTANEOUS at 21:40

## 2017-05-13 RX ADMIN — ACETAMINOPHEN 650 MG: 325 TABLET, FILM COATED ORAL at 00:04

## 2017-05-14 ENCOUNTER — APPOINTMENT (OUTPATIENT)
Dept: GENERAL RADIOLOGY | Facility: HOSPITAL | Age: 57
End: 2017-05-14

## 2017-05-14 PROCEDURE — 99232 SBSQ HOSP IP/OBS MODERATE 35: CPT | Performed by: INTERNAL MEDICINE

## 2017-05-14 PROCEDURE — 94640 AIRWAY INHALATION TREATMENT: CPT

## 2017-05-14 PROCEDURE — 94799 UNLISTED PULMONARY SVC/PX: CPT

## 2017-05-14 PROCEDURE — 25010000002 HEPARIN (PORCINE) PER 1000 UNITS: Performed by: PHYSICIAN ASSISTANT

## 2017-05-14 PROCEDURE — 25010000002 KETOROLAC TROMETHAMINE PER 15 MG: Performed by: NURSE PRACTITIONER

## 2017-05-14 PROCEDURE — 25010000002 ROPIVACAINE PER 1 MG: Performed by: ANESTHESIOLOGY

## 2017-05-14 PROCEDURE — 94760 N-INVAS EAR/PLS OXIMETRY 1: CPT

## 2017-05-14 PROCEDURE — 25010000002 MORPHINE PER 10 MG: Performed by: THORACIC SURGERY (CARDIOTHORACIC VASCULAR SURGERY)

## 2017-05-14 PROCEDURE — 71010 HC CHEST PA OR AP: CPT

## 2017-05-14 RX ORDER — KETOROLAC TROMETHAMINE 30 MG/ML
30 INJECTION, SOLUTION INTRAMUSCULAR; INTRAVENOUS ONCE
Status: COMPLETED | OUTPATIENT
Start: 2017-05-14 | End: 2017-05-14

## 2017-05-14 RX ORDER — OXYCODONE HYDROCHLORIDE AND ACETAMINOPHEN 5; 325 MG/1; MG/1
1 TABLET ORAL EVERY 4 HOURS PRN
Status: DISCONTINUED | OUTPATIENT
Start: 2017-05-14 | End: 2017-05-21 | Stop reason: HOSPADM

## 2017-05-14 RX ORDER — ACETYLCYSTEINE 200 MG/ML
600 SOLUTION ORAL; RESPIRATORY (INHALATION) EVERY 4 HOURS
Status: DISCONTINUED | OUTPATIENT
Start: 2017-05-14 | End: 2017-05-15

## 2017-05-14 RX ORDER — IPRATROPIUM BROMIDE AND ALBUTEROL SULFATE 2.5; .5 MG/3ML; MG/3ML
3 SOLUTION RESPIRATORY (INHALATION)
Status: DISCONTINUED | OUTPATIENT
Start: 2017-05-14 | End: 2017-05-16

## 2017-05-14 RX ADMIN — HEPARIN SODIUM 5000 UNITS: 5000 INJECTION, SOLUTION INTRAVENOUS; SUBCUTANEOUS at 05:56

## 2017-05-14 RX ADMIN — CELECOXIB 200 MG: 200 CAPSULE ORAL at 08:39

## 2017-05-14 RX ADMIN — IPRATROPIUM BROMIDE AND ALBUTEROL SULFATE 3 ML: .5; 3 SOLUTION RESPIRATORY (INHALATION) at 21:08

## 2017-05-14 RX ADMIN — OXYCODONE AND ACETAMINOPHEN 1 TABLET: 5; 325 TABLET ORAL at 20:56

## 2017-05-14 RX ADMIN — MORPHINE SULFATE 4 MG: 4 INJECTION, SOLUTION INTRAMUSCULAR; INTRAVENOUS at 17:57

## 2017-05-14 RX ADMIN — KETOROLAC TROMETHAMINE 30 MG: 30 INJECTION, SOLUTION INTRAMUSCULAR at 03:05

## 2017-05-14 RX ADMIN — ACETYLCYSTEINE 600 MG: 200 SOLUTION ORAL; RESPIRATORY (INHALATION) at 21:09

## 2017-05-14 RX ADMIN — OXYCODONE AND ACETAMINOPHEN 1 TABLET: 5; 325 TABLET ORAL at 12:33

## 2017-05-14 RX ADMIN — HEPARIN SODIUM 5000 UNITS: 5000 INJECTION, SOLUTION INTRAVENOUS; SUBCUTANEOUS at 14:07

## 2017-05-14 RX ADMIN — ROPIVACAINE HYDROCHLORIDE 12 ML/HR: 2 INJECTION, SOLUTION EPIDURAL; INFILTRATION at 12:33

## 2017-05-14 RX ADMIN — ACETYLCYSTEINE 600 MG: 200 SOLUTION ORAL; RESPIRATORY (INHALATION) at 17:30

## 2017-05-14 RX ADMIN — OXYCODONE AND ACETAMINOPHEN 1 TABLET: 5; 325 TABLET ORAL at 16:35

## 2017-05-14 RX ADMIN — CELECOXIB 200 MG: 200 CAPSULE ORAL at 20:57

## 2017-05-14 RX ADMIN — LEVOTHYROXINE SODIUM 150 MCG: 150 TABLET ORAL at 05:56

## 2017-05-14 RX ADMIN — OXYCODONE AND ACETAMINOPHEN 1 TABLET: 5; 325 TABLET ORAL at 03:05

## 2017-05-14 RX ADMIN — IPRATROPIUM BROMIDE AND ALBUTEROL SULFATE 3 ML: .5; 3 SOLUTION RESPIRATORY (INHALATION) at 17:29

## 2017-05-14 RX ADMIN — OXYCODONE AND ACETAMINOPHEN 1 TABLET: 5; 325 TABLET ORAL at 08:39

## 2017-05-14 RX ADMIN — HEPARIN SODIUM 5000 UNITS: 5000 INJECTION, SOLUTION INTRAVENOUS; SUBCUTANEOUS at 21:01

## 2017-05-14 RX ADMIN — NICOTINE 1 PATCH: 14 PATCH TRANSDERMAL at 08:39

## 2017-05-15 ENCOUNTER — APPOINTMENT (OUTPATIENT)
Dept: GENERAL RADIOLOGY | Facility: HOSPITAL | Age: 57
End: 2017-05-15

## 2017-05-15 ENCOUNTER — APPOINTMENT (OUTPATIENT)
Dept: CARDIOLOGY | Facility: HOSPITAL | Age: 57
End: 2017-05-15
Attending: INTERNAL MEDICINE

## 2017-05-15 PROBLEM — J98.4 CAVITATING MASS IN LEFT UPPER LUNG LOBE: Status: RESOLVED | Noted: 2017-04-10 | Resolved: 2017-05-15

## 2017-05-15 PROBLEM — I48.19 PERSISTENT ATRIAL FIBRILLATION: Status: ACTIVE | Noted: 2017-05-15

## 2017-05-15 LAB
ABO + RH BLD: NORMAL
ABO + RH BLD: NORMAL
ANION GAP SERPL CALCULATED.3IONS-SCNC: 5 MMOL/L (ref 3–11)
BACTERIA SPEC AEROBE CULT: NORMAL
BH BB BLOOD EXPIRATION DATE: NORMAL
BH BB BLOOD EXPIRATION DATE: NORMAL
BH BB BLOOD TYPE BARCODE: 5100
BH BB BLOOD TYPE BARCODE: 5100
BH BB DISPENSE STATUS: NORMAL
BH BB DISPENSE STATUS: NORMAL
BH BB PRODUCT CODE: NORMAL
BH BB PRODUCT CODE: NORMAL
BH BB UNIT NUMBER: NORMAL
BH BB UNIT NUMBER: NORMAL
BUN BLD-MCNC: 9 MG/DL (ref 9–23)
BUN/CREAT SERPL: 18 (ref 7–25)
CA-I SERPL ISE-MCNC: 1.17 MMOL/L (ref 1.12–1.32)
CALCIUM SPEC-SCNC: 8.5 MG/DL (ref 8.7–10.4)
CHLORIDE SERPL-SCNC: 105 MMOL/L (ref 99–109)
CO2 SERPL-SCNC: 28 MMOL/L (ref 20–31)
CREAT BLD-MCNC: 0.5 MG/DL (ref 0.6–1.3)
CROSSMATCH INTERPRETATION: NORMAL
CROSSMATCH INTERPRETATION: NORMAL
CYTO UR: NORMAL
GFR SERPL CREATININE-BSD FRML MDRD: 128 ML/MIN/1.73
GLUCOSE BLD-MCNC: 182 MG/DL (ref 70–100)
LAB AP CASE REPORT: NORMAL
LAB AP CLINICAL INFORMATION: NORMAL
Lab: NORMAL
MAGNESIUM SERPL-MCNC: 1.8 MG/DL (ref 1.3–2.7)
PATH REPORT.FINAL DX SPEC: NORMAL
PATH REPORT.GROSS SPEC: NORMAL
POTASSIUM BLD-SCNC: 4.5 MMOL/L (ref 3.5–5.5)
SODIUM BLD-SCNC: 138 MMOL/L (ref 132–146)
UNIT  ABO: NORMAL
UNIT  ABO: NORMAL
UNIT  RH: NORMAL
UNIT  RH: NORMAL

## 2017-05-15 PROCEDURE — 99233 SBSQ HOSP IP/OBS HIGH 50: CPT | Performed by: INTERNAL MEDICINE

## 2017-05-15 PROCEDURE — 25010000002 AMIODARONE IN DEXTROSE 5% 150-4.21 MG/100ML-% SOLUTION

## 2017-05-15 PROCEDURE — 25010000002 ROPIVACAINE PER 1 MG: Performed by: ANESTHESIOLOGY

## 2017-05-15 PROCEDURE — 82330 ASSAY OF CALCIUM: CPT | Performed by: THORACIC SURGERY (CARDIOTHORACIC VASCULAR SURGERY)

## 2017-05-15 PROCEDURE — 71010 HC CHEST PA OR AP: CPT

## 2017-05-15 PROCEDURE — 99253 IP/OBS CNSLTJ NEW/EST LOW 45: CPT | Performed by: INTERNAL MEDICINE

## 2017-05-15 PROCEDURE — 94799 UNLISTED PULMONARY SVC/PX: CPT

## 2017-05-15 PROCEDURE — 25010000002 AMIODARONE IN DEXTROSE 5% 360-4.14 MG/200ML-% SOLUTION

## 2017-05-15 PROCEDURE — 93010 ELECTROCARDIOGRAM REPORT: CPT | Performed by: INTERNAL MEDICINE

## 2017-05-15 PROCEDURE — 83735 ASSAY OF MAGNESIUM: CPT | Performed by: THORACIC SURGERY (CARDIOTHORACIC VASCULAR SURGERY)

## 2017-05-15 PROCEDURE — 80048 BASIC METABOLIC PNL TOTAL CA: CPT | Performed by: THORACIC SURGERY (CARDIOTHORACIC VASCULAR SURGERY)

## 2017-05-15 PROCEDURE — 25010000002 AMIODARONE IN DEXTROSE 5% 360-4.14 MG/200ML-% SOLUTION: Performed by: NURSE PRACTITIONER

## 2017-05-15 PROCEDURE — 93005 ELECTROCARDIOGRAM TRACING: CPT | Performed by: NURSE PRACTITIONER

## 2017-05-15 PROCEDURE — 25010000002 HEPARIN (PORCINE) PER 1000 UNITS: Performed by: PHYSICIAN ASSISTANT

## 2017-05-15 PROCEDURE — 25010000002 DIGOXIN PER 500 MCG: Performed by: NURSE PRACTITIONER

## 2017-05-15 PROCEDURE — 94760 N-INVAS EAR/PLS OXIMETRY 1: CPT

## 2017-05-15 PROCEDURE — 25010000002 MORPHINE PER 10 MG: Performed by: THORACIC SURGERY (CARDIOTHORACIC VASCULAR SURGERY)

## 2017-05-15 RX ORDER — PHENYLEPHRINE HCL IN 0.9% NACL 0.5 MG/5ML
.5-3 SYRINGE (ML) INTRAVENOUS
Status: DISCONTINUED | OUTPATIENT
Start: 2017-05-15 | End: 2017-05-18

## 2017-05-15 RX ORDER — DIGOXIN 0.25 MG/ML
125 INJECTION INTRAMUSCULAR; INTRAVENOUS ONCE
Status: COMPLETED | OUTPATIENT
Start: 2017-05-15 | End: 2017-05-15

## 2017-05-15 RX ORDER — DIGOXIN 0.25 MG/ML
500 INJECTION INTRAMUSCULAR; INTRAVENOUS ONCE
Status: COMPLETED | OUTPATIENT
Start: 2017-05-15 | End: 2017-05-15

## 2017-05-15 RX ORDER — DIGOXIN 250 MCG
250 TABLET ORAL
Status: DISCONTINUED | OUTPATIENT
Start: 2017-05-16 | End: 2017-05-19

## 2017-05-15 RX ORDER — MAGNESIUM SULFATE HEPTAHYDRATE 40 MG/ML
2 INJECTION, SOLUTION INTRAVENOUS AS NEEDED
Status: DISCONTINUED | OUTPATIENT
Start: 2017-05-15 | End: 2017-05-21 | Stop reason: HOSPADM

## 2017-05-15 RX ORDER — MAGNESIUM SULFATE HEPTAHYDRATE 40 MG/ML
4 INJECTION, SOLUTION INTRAVENOUS AS NEEDED
Status: DISCONTINUED | OUTPATIENT
Start: 2017-05-15 | End: 2017-05-21 | Stop reason: HOSPADM

## 2017-05-15 RX ORDER — PHENYLEPHRINE HCL IN 0.9% NACL 0.5 MG/5ML
SYRINGE (ML) INTRAVENOUS
Status: COMPLETED
Start: 2017-05-15 | End: 2017-05-15

## 2017-05-15 RX ADMIN — AMIODARONE HYDROCHLORIDE 0.5 MG/MIN: 1.8 INJECTION, SOLUTION INTRAVENOUS at 14:31

## 2017-05-15 RX ADMIN — OXYCODONE AND ACETAMINOPHEN 1 TABLET: 5; 325 TABLET ORAL at 07:53

## 2017-05-15 RX ADMIN — LEVOTHYROXINE SODIUM 150 MCG: 150 TABLET ORAL at 05:52

## 2017-05-15 RX ADMIN — CELECOXIB 200 MG: 200 CAPSULE ORAL at 07:54

## 2017-05-15 RX ADMIN — HEPARIN SODIUM 5000 UNITS: 5000 INJECTION, SOLUTION INTRAVENOUS; SUBCUTANEOUS at 21:17

## 2017-05-15 RX ADMIN — OXYCODONE AND ACETAMINOPHEN 1 TABLET: 5; 325 TABLET ORAL at 16:58

## 2017-05-15 RX ADMIN — DIGOXIN 500 MCG: 0.25 INJECTION INTRAMUSCULAR; INTRAVENOUS at 14:28

## 2017-05-15 RX ADMIN — ROPIVACAINE HYDROCHLORIDE 12 ML/HR: 2 INJECTION, SOLUTION EPIDURAL; INFILTRATION at 03:50

## 2017-05-15 RX ADMIN — HEPARIN SODIUM 5000 UNITS: 5000 INJECTION, SOLUTION INTRAVENOUS; SUBCUTANEOUS at 05:52

## 2017-05-15 RX ADMIN — OXYCODONE AND ACETAMINOPHEN 1 TABLET: 5; 325 TABLET ORAL at 13:15

## 2017-05-15 RX ADMIN — Medication 0.5 MCG/KG/MIN: at 07:20

## 2017-05-15 RX ADMIN — OXYCODONE AND ACETAMINOPHEN 1 TABLET: 5; 325 TABLET ORAL at 02:53

## 2017-05-15 RX ADMIN — HYDROCODONE BITARTRATE AND ACETAMINOPHEN 1 TABLET: 7.5; 325 TABLET ORAL at 20:29

## 2017-05-15 RX ADMIN — NICOTINE 1 PATCH: 14 PATCH TRANSDERMAL at 07:54

## 2017-05-15 RX ADMIN — MAGNESIUM SULFATE HEPTAHYDRATE 4 G: 40 INJECTION, SOLUTION INTRAVENOUS at 11:28

## 2017-05-15 RX ADMIN — ROPIVACAINE HYDROCHLORIDE 12 ML/HR: 2 INJECTION, SOLUTION EPIDURAL; INFILTRATION at 20:29

## 2017-05-15 RX ADMIN — HEPARIN SODIUM 5000 UNITS: 5000 INJECTION, SOLUTION INTRAVENOUS; SUBCUTANEOUS at 13:14

## 2017-05-15 RX ADMIN — IPRATROPIUM BROMIDE AND ALBUTEROL SULFATE 3 ML: .5; 3 SOLUTION RESPIRATORY (INHALATION) at 19:10

## 2017-05-15 RX ADMIN — IPRATROPIUM BROMIDE AND ALBUTEROL SULFATE 3 ML: .5; 3 SOLUTION RESPIRATORY (INHALATION) at 16:13

## 2017-05-15 RX ADMIN — MORPHINE SULFATE 4 MG: 4 INJECTION, SOLUTION INTRAMUSCULAR; INTRAVENOUS at 15:48

## 2017-05-15 RX ADMIN — DIGOXIN 125 MCG: 0.25 INJECTION INTRAMUSCULAR; INTRAVENOUS at 13:14

## 2017-05-15 RX ADMIN — MORPHINE SULFATE 4 MG: 4 INJECTION, SOLUTION INTRAMUSCULAR; INTRAVENOUS at 07:53

## 2017-05-15 RX ADMIN — Medication 1.2 MCG/KG/MIN: at 16:49

## 2017-05-15 RX ADMIN — DIGOXIN 125 MCG: 0.25 INJECTION INTRAMUSCULAR; INTRAVENOUS at 13:22

## 2017-05-15 RX ADMIN — ACETYLCYSTEINE 600 MG: 200 SOLUTION ORAL; RESPIRATORY (INHALATION) at 02:32

## 2017-05-15 RX ADMIN — IPRATROPIUM BROMIDE AND ALBUTEROL SULFATE 3 ML: .5; 3 SOLUTION RESPIRATORY (INHALATION) at 02:32

## 2017-05-15 RX ADMIN — AMIODARONE HYDROCHLORIDE 150 MG: 1.5 INJECTION, SOLUTION INTRAVENOUS at 06:57

## 2017-05-15 RX ADMIN — AMIODARONE HYDROCHLORIDE 1 MG/MIN: 1.8 INJECTION, SOLUTION INTRAVENOUS at 07:06

## 2017-05-15 RX ADMIN — METOPROLOL TARTRATE 12.5 MG: 25 TABLET, FILM COATED ORAL at 17:32

## 2017-05-16 ENCOUNTER — APPOINTMENT (OUTPATIENT)
Dept: GENERAL RADIOLOGY | Facility: HOSPITAL | Age: 57
End: 2017-05-16

## 2017-05-16 ENCOUNTER — APPOINTMENT (OUTPATIENT)
Dept: CARDIOLOGY | Facility: HOSPITAL | Age: 57
End: 2017-05-16
Attending: INTERNAL MEDICINE

## 2017-05-16 LAB
ANION GAP SERPL CALCULATED.3IONS-SCNC: 2 MMOL/L (ref 3–11)
BH CV ECHO MEAS - AO ROOT AREA (BSA CORRECTED): 1.7
BH CV ECHO MEAS - AO ROOT AREA: 7.6 CM^2
BH CV ECHO MEAS - AO ROOT DIAM: 3.1 CM
BH CV ECHO MEAS - BSA(HAYCOCK): 2 M^2
BH CV ECHO MEAS - BSA: 1.9 M^2
BH CV ECHO MEAS - BZI_BMI: 35.1 KILOGRAMS/M^2
BH CV ECHO MEAS - BZI_METRIC_HEIGHT: 157.5 CM
BH CV ECHO MEAS - BZI_METRIC_WEIGHT: 87.1 KG
BH CV ECHO MEAS - CONTRAST EF 4CH: 55.6 ML/M^2
BH CV ECHO MEAS - EDV(CUBED): 84.8 ML
BH CV ECHO MEAS - EDV(MOD-SP4): 99 ML
BH CV ECHO MEAS - EDV(TEICH): 87.4 ML
BH CV ECHO MEAS - EF(CUBED): 80.6 %
BH CV ECHO MEAS - EF(MOD-SP4): 55.6 %
BH CV ECHO MEAS - EF(TEICH): 73.4 %
BH CV ECHO MEAS - ESV(CUBED): 16.4 ML
BH CV ECHO MEAS - ESV(MOD-SP4): 44 ML
BH CV ECHO MEAS - ESV(TEICH): 23.3 ML
BH CV ECHO MEAS - FS: 42.2 %
BH CV ECHO MEAS - IVS/LVPW: 0.98
BH CV ECHO MEAS - IVSD: 1.2 CM
BH CV ECHO MEAS - LA DIMENSION: 4.8 CM
BH CV ECHO MEAS - LA/AO: 1.5
BH CV ECHO MEAS - LAT PEAK E' VEL: 5 CM/SEC
BH CV ECHO MEAS - LV DIASTOLIC VOL/BSA (35-75): 52.7 ML/M^2
BH CV ECHO MEAS - LV MASS(C)D: 197.4 GRAMS
BH CV ECHO MEAS - LV MASS(C)DI: 105.1 GRAMS/M^2
BH CV ECHO MEAS - LV MAX PG: 8.4 MMHG
BH CV ECHO MEAS - LV MEAN PG: 3.6 MMHG
BH CV ECHO MEAS - LV SYSTOLIC VOL/BSA (12-30): 23.4 ML/M^2
BH CV ECHO MEAS - LV V1 MAX: 145.1 CM/SEC
BH CV ECHO MEAS - LV V1 MEAN: 82.8 CM/SEC
BH CV ECHO MEAS - LV V1 VTI: 29.5 CM
BH CV ECHO MEAS - LVIDD: 4.4 CM
BH CV ECHO MEAS - LVIDS: 3 CM
BH CV ECHO MEAS - LVLD AP4: 7 CM
BH CV ECHO MEAS - LVLS AP4: 6.3 CM
BH CV ECHO MEAS - LVOT AREA (M): 3.1 CM^2
BH CV ECHO MEAS - LVOT AREA: 3.2 CM^2
BH CV ECHO MEAS - LVOT DIAM: 2 CM
BH CV ECHO MEAS - LVPWD: 1.2 CM
BH CV ECHO MEAS - MED PEAK E' VEL: 5.26 CM/SEC
BH CV ECHO MEAS - MV A MAX VEL: 118 CM/SEC
BH CV ECHO MEAS - MV DEC SLOPE: 301.8 CM/SEC^2
BH CV ECHO MEAS - MV E MAX VEL: 104.6 CM/SEC
BH CV ECHO MEAS - MV E/A: 0.89
BH CV ECHO MEAS - MV MAX PG: 5.4 MMHG
BH CV ECHO MEAS - MV MEAN PG: 3.2 MMHG
BH CV ECHO MEAS - MV P1/2T MAX VEL: 171.6 CM/SEC
BH CV ECHO MEAS - MV P1/2T: 166.5 MSEC
BH CV ECHO MEAS - MV V2 MAX: 116 CM/SEC
BH CV ECHO MEAS - MV V2 MEAN: 84.2 CM/SEC
BH CV ECHO MEAS - MV V2 VTI: 46.4 CM
BH CV ECHO MEAS - MVA P1/2T LCG: 1.3 CM^2
BH CV ECHO MEAS - MVA(P1/2T): 1.3 CM^2
BH CV ECHO MEAS - MVA(VTI): 2 CM^2
BH CV ECHO MEAS - PA ACC SLOPE: 789.2 CM/SEC^2
BH CV ECHO MEAS - PA ACC TIME: 0.1 SEC
BH CV ECHO MEAS - PA PR(ACCEL): 36.2 MMHG
BH CV ECHO MEAS - PI END-D VEL: 103.2 CM/SEC
BH CV ECHO MEAS - RAP SYSTOLE: 8 MMHG
BH CV ECHO MEAS - RVDD: 2.7 CM
BH CV ECHO MEAS - RVSP: 48.5 MMHG
BH CV ECHO MEAS - SI(CUBED): 36.4 ML/M^2
BH CV ECHO MEAS - SI(LVOT): 49.8 ML/M^2
BH CV ECHO MEAS - SI(MOD-SP4): 29.3 ML/M^2
BH CV ECHO MEAS - SI(TEICH): 34.1 ML/M^2
BH CV ECHO MEAS - SV(CUBED): 68.4 ML
BH CV ECHO MEAS - SV(LVOT): 93.6 ML
BH CV ECHO MEAS - SV(MOD-SP4): 55 ML
BH CV ECHO MEAS - SV(TEICH): 64.1 ML
BH CV ECHO MEAS - TAPSE (>1.6): 1.9 CM2
BH CV ECHO MEAS - TR MAX VEL: 317.2 CM/SEC
BH CV XLRA - RV BASE: 3.7 CM
BH CV XLRA - RV LENGTH: 5.6 CM
BH CV XLRA - RV MID: 3 CM
BH CV XLRA - TDI S': 15.1 CM/SEC
BUN BLD-MCNC: 8 MG/DL (ref 9–23)
BUN/CREAT SERPL: 16 (ref 7–25)
CALCIUM SPEC-SCNC: 8.8 MG/DL (ref 8.7–10.4)
CHLORIDE SERPL-SCNC: 103 MMOL/L (ref 99–109)
CK SERPL-CCNC: 209 U/L (ref 26–174)
CO2 SERPL-SCNC: 30 MMOL/L (ref 20–31)
CREAT BLD-MCNC: 0.5 MG/DL (ref 0.6–1.3)
DEPRECATED RDW RBC AUTO: 56.2 FL (ref 37–54)
E/E' RATIO: 20
ERYTHROCYTE [DISTWIDTH] IN BLOOD BY AUTOMATED COUNT: 13.4 % (ref 11.3–14.5)
GFR SERPL CREATININE-BSD FRML MDRD: 128 ML/MIN/1.73
GLUCOSE BLD-MCNC: 156 MG/DL (ref 70–100)
HCT VFR BLD AUTO: 37.5 % (ref 34.5–44)
HGB BLD-MCNC: 12.4 G/DL (ref 11.5–15.5)
LAB AP CASE REPORT: NORMAL
LV EF 2D ECHO EST: 64 %
Lab: NORMAL
MAGNESIUM SERPL-MCNC: 2.5 MG/DL (ref 1.3–2.7)
MCH RBC QN AUTO: 38.2 PG (ref 27–31)
MCHC RBC AUTO-ENTMCNC: 33.1 G/DL (ref 32–36)
MCV RBC AUTO: 115.4 FL (ref 80–99)
PATH REPORT.FINAL DX SPEC: NORMAL
PHOSPHATE SERPL-MCNC: 2.3 MG/DL (ref 2.4–5.1)
PLATELET # BLD AUTO: 167 10*3/MM3 (ref 150–450)
PMV BLD AUTO: 10.6 FL (ref 6–12)
POTASSIUM BLD-SCNC: 4.9 MMOL/L (ref 3.5–5.5)
RBC # BLD AUTO: 3.25 10*6/MM3 (ref 3.89–5.14)
SODIUM BLD-SCNC: 135 MMOL/L (ref 132–146)
WBC NRBC COR # BLD: 10.42 10*3/MM3 (ref 3.5–10.8)

## 2017-05-16 PROCEDURE — 93306 TTE W/DOPPLER COMPLETE: CPT | Performed by: INTERNAL MEDICINE

## 2017-05-16 PROCEDURE — 85027 COMPLETE CBC AUTOMATED: CPT | Performed by: INTERNAL MEDICINE

## 2017-05-16 PROCEDURE — 99233 SBSQ HOSP IP/OBS HIGH 50: CPT | Performed by: INTERNAL MEDICINE

## 2017-05-16 PROCEDURE — 25010000002 HEPARIN (PORCINE) PER 1000 UNITS: Performed by: PHYSICIAN ASSISTANT

## 2017-05-16 PROCEDURE — 94640 AIRWAY INHALATION TREATMENT: CPT

## 2017-05-16 PROCEDURE — 94799 UNLISTED PULMONARY SVC/PX: CPT

## 2017-05-16 PROCEDURE — 25010000002 MORPHINE PER 10 MG: Performed by: THORACIC SURGERY (CARDIOTHORACIC VASCULAR SURGERY)

## 2017-05-16 PROCEDURE — 71010 HC CHEST PA OR AP: CPT

## 2017-05-16 PROCEDURE — 93306 TTE W/DOPPLER COMPLETE: CPT

## 2017-05-16 PROCEDURE — 82550 ASSAY OF CK (CPK): CPT | Performed by: INTERNAL MEDICINE

## 2017-05-16 PROCEDURE — 99232 SBSQ HOSP IP/OBS MODERATE 35: CPT | Performed by: INTERNAL MEDICINE

## 2017-05-16 PROCEDURE — 80048 BASIC METABOLIC PNL TOTAL CA: CPT | Performed by: INTERNAL MEDICINE

## 2017-05-16 PROCEDURE — 83735 ASSAY OF MAGNESIUM: CPT | Performed by: INTERNAL MEDICINE

## 2017-05-16 PROCEDURE — 84100 ASSAY OF PHOSPHORUS: CPT | Performed by: INTERNAL MEDICINE

## 2017-05-16 RX ORDER — NICOTINE 21 MG/24HR
1 PATCH, TRANSDERMAL 24 HOURS TRANSDERMAL EVERY 24 HOURS
Status: DISCONTINUED | OUTPATIENT
Start: 2017-05-16 | End: 2017-05-21 | Stop reason: HOSPADM

## 2017-05-16 RX ADMIN — OXYCODONE AND ACETAMINOPHEN 1 TABLET: 5; 325 TABLET ORAL at 19:51

## 2017-05-16 RX ADMIN — NICOTINE 1 PATCH: 14 PATCH TRANSDERMAL at 10:10

## 2017-05-16 RX ADMIN — HEPARIN SODIUM 5000 UNITS: 5000 INJECTION, SOLUTION INTRAVENOUS; SUBCUTANEOUS at 05:46

## 2017-05-16 RX ADMIN — ACETAMINOPHEN 650 MG: 325 TABLET, FILM COATED ORAL at 00:14

## 2017-05-16 RX ADMIN — IPRATROPIUM BROMIDE AND ALBUTEROL SULFATE 3 ML: .5; 3 SOLUTION RESPIRATORY (INHALATION) at 11:33

## 2017-05-16 RX ADMIN — ACETAMINOPHEN 650 MG: 325 TABLET, FILM COATED ORAL at 05:46

## 2017-05-16 RX ADMIN — LEVOTHYROXINE SODIUM 150 MCG: 150 TABLET ORAL at 05:46

## 2017-05-16 RX ADMIN — METOPROLOL TARTRATE 12.5 MG: 25 TABLET, FILM COATED ORAL at 12:30

## 2017-05-16 RX ADMIN — OXYCODONE AND ACETAMINOPHEN 1 TABLET: 5; 325 TABLET ORAL at 08:21

## 2017-05-16 RX ADMIN — MORPHINE SULFATE 4 MG: 4 INJECTION, SOLUTION INTRAMUSCULAR; INTRAVENOUS at 16:51

## 2017-05-16 RX ADMIN — OXYCODONE AND ACETAMINOPHEN 1 TABLET: 5; 325 TABLET ORAL at 12:30

## 2017-05-16 RX ADMIN — HEPARIN SODIUM 5000 UNITS: 5000 INJECTION, SOLUTION INTRAVENOUS; SUBCUTANEOUS at 21:46

## 2017-05-16 RX ADMIN — HEPARIN SODIUM 5000 UNITS: 5000 INJECTION, SOLUTION INTRAVENOUS; SUBCUTANEOUS at 15:12

## 2017-05-16 RX ADMIN — Medication 1.6 MCG/KG/MIN: at 00:14

## 2017-05-16 RX ADMIN — MORPHINE SULFATE 4 MG: 4 INJECTION, SOLUTION INTRAMUSCULAR; INTRAVENOUS at 08:50

## 2017-05-16 RX ADMIN — OXYCODONE AND ACETAMINOPHEN 1 TABLET: 5; 325 TABLET ORAL at 16:15

## 2017-05-16 RX ADMIN — IPRATROPIUM BROMIDE AND ALBUTEROL SULFATE 3 ML: .5; 3 SOLUTION RESPIRATORY (INHALATION) at 08:04

## 2017-05-16 RX ADMIN — Medication 0.8 MCG/KG/MIN: at 18:18

## 2017-05-16 RX ADMIN — MAGNESIUM SULFATE HEPTAHYDRATE 4 G: 40 INJECTION, SOLUTION INTRAVENOUS at 05:47

## 2017-05-16 RX ADMIN — DIGOXIN 250 MCG: 250 TABLET ORAL at 12:30

## 2017-05-16 RX ADMIN — HYDROCODONE BITARTRATE AND ACETAMINOPHEN 1 TABLET: 7.5; 325 TABLET ORAL at 03:50

## 2017-05-16 RX ADMIN — Medication 1 MCG/KG/MIN: at 08:22

## 2017-05-16 RX ADMIN — METOPROLOL TARTRATE 12.5 MG: 25 TABLET, FILM COATED ORAL at 18:18

## 2017-05-17 ENCOUNTER — APPOINTMENT (OUTPATIENT)
Dept: GENERAL RADIOLOGY | Facility: HOSPITAL | Age: 57
End: 2017-05-17

## 2017-05-17 LAB
ANION GAP SERPL CALCULATED.3IONS-SCNC: 5 MMOL/L (ref 3–11)
BUN BLD-MCNC: 8 MG/DL (ref 9–23)
BUN/CREAT SERPL: 16 (ref 7–25)
CALCIUM SPEC-SCNC: 9.2 MG/DL (ref 8.7–10.4)
CHLORIDE SERPL-SCNC: 105 MMOL/L (ref 99–109)
CO2 SERPL-SCNC: 28 MMOL/L (ref 20–31)
CREAT BLD-MCNC: 0.5 MG/DL (ref 0.6–1.3)
DEPRECATED RDW RBC AUTO: 56.5 FL (ref 37–54)
ERYTHROCYTE [DISTWIDTH] IN BLOOD BY AUTOMATED COUNT: 13.3 % (ref 11.3–14.5)
GFR SERPL CREATININE-BSD FRML MDRD: 128 ML/MIN/1.73
GLUCOSE BLD-MCNC: 100 MG/DL (ref 70–100)
HCT VFR BLD AUTO: 38.3 % (ref 34.5–44)
HGB BLD-MCNC: 12.2 G/DL (ref 11.5–15.5)
MAGNESIUM SERPL-MCNC: 2.2 MG/DL (ref 1.3–2.7)
MCH RBC QN AUTO: 37.2 PG (ref 27–31)
MCHC RBC AUTO-ENTMCNC: 31.9 G/DL (ref 32–36)
MCV RBC AUTO: 116.8 FL (ref 80–99)
PHOSPHATE SERPL-MCNC: 3.3 MG/DL (ref 2.4–5.1)
PLATELET # BLD AUTO: 151 10*3/MM3 (ref 150–450)
PMV BLD AUTO: 9.9 FL (ref 6–12)
POTASSIUM BLD-SCNC: 4.2 MMOL/L (ref 3.5–5.5)
RBC # BLD AUTO: 3.28 10*6/MM3 (ref 3.89–5.14)
SODIUM BLD-SCNC: 138 MMOL/L (ref 132–146)
WBC NRBC COR # BLD: 6.48 10*3/MM3 (ref 3.5–10.8)

## 2017-05-17 PROCEDURE — 99291 CRITICAL CARE FIRST HOUR: CPT | Performed by: NURSE PRACTITIONER

## 2017-05-17 PROCEDURE — 93010 ELECTROCARDIOGRAM REPORT: CPT | Performed by: INTERNAL MEDICINE

## 2017-05-17 PROCEDURE — 94799 UNLISTED PULMONARY SVC/PX: CPT

## 2017-05-17 PROCEDURE — 25010000002 DIGOXIN PER 500 MCG: Performed by: INTERNAL MEDICINE

## 2017-05-17 PROCEDURE — 83735 ASSAY OF MAGNESIUM: CPT | Performed by: INTERNAL MEDICINE

## 2017-05-17 PROCEDURE — 25010000002 HEPARIN (PORCINE) PER 1000 UNITS: Performed by: PHYSICIAN ASSISTANT

## 2017-05-17 PROCEDURE — 80048 BASIC METABOLIC PNL TOTAL CA: CPT | Performed by: INTERNAL MEDICINE

## 2017-05-17 PROCEDURE — 94669 MECHANICAL CHEST WALL OSCILL: CPT

## 2017-05-17 PROCEDURE — 25010000002 MORPHINE PER 10 MG: Performed by: THORACIC SURGERY (CARDIOTHORACIC VASCULAR SURGERY)

## 2017-05-17 PROCEDURE — 85027 COMPLETE CBC AUTOMATED: CPT | Performed by: INTERNAL MEDICINE

## 2017-05-17 PROCEDURE — 25010000002 AMIODARONE IN DEXTROSE 5% 360-4.14 MG/200ML-% SOLUTION: Performed by: INTERNAL MEDICINE

## 2017-05-17 PROCEDURE — 94667 MNPJ CHEST WALL 1ST: CPT

## 2017-05-17 PROCEDURE — 93005 ELECTROCARDIOGRAM TRACING: CPT | Performed by: NURSE PRACTITIONER

## 2017-05-17 PROCEDURE — 94760 N-INVAS EAR/PLS OXIMETRY 1: CPT

## 2017-05-17 PROCEDURE — 84100 ASSAY OF PHOSPHORUS: CPT | Performed by: INTERNAL MEDICINE

## 2017-05-17 PROCEDURE — 71010 HC CHEST PA OR AP: CPT

## 2017-05-17 RX ORDER — HYDROCODONE BITARTRATE AND ACETAMINOPHEN 7.5; 325 MG/1; MG/1
1 TABLET ORAL EVERY 6 HOURS PRN
Refills: 0
Start: 2017-05-17 | End: 2017-05-17

## 2017-05-17 RX ORDER — HYDROCODONE BITARTRATE AND ACETAMINOPHEN 7.5; 325 MG/1; MG/1
1 TABLET ORAL EVERY 6 HOURS PRN
Qty: 30 TABLET | Refills: 0 | Status: SHIPPED | OUTPATIENT
Start: 2017-05-17 | End: 2017-05-21 | Stop reason: HOSPADM

## 2017-05-17 RX ORDER — POLYETHYLENE GLYCOL 3350 17 G/17G
17 POWDER, FOR SOLUTION ORAL DAILY
Status: DISCONTINUED | OUTPATIENT
Start: 2017-05-17 | End: 2017-05-21 | Stop reason: HOSPADM

## 2017-05-17 RX ORDER — DIGOXIN 0.25 MG/ML
500 INJECTION INTRAMUSCULAR; INTRAVENOUS ONCE
Status: COMPLETED | OUTPATIENT
Start: 2017-05-17 | End: 2017-05-17

## 2017-05-17 RX ORDER — SENNA AND DOCUSATE SODIUM 50; 8.6 MG/1; MG/1
2 TABLET, FILM COATED ORAL 2 TIMES DAILY
Status: DISCONTINUED | OUTPATIENT
Start: 2017-05-17 | End: 2017-05-21 | Stop reason: HOSPADM

## 2017-05-17 RX ORDER — ALBUMIN (HUMAN) 12.5 G/50ML
25 SOLUTION INTRAVENOUS AS NEEDED
Status: DISCONTINUED | OUTPATIENT
Start: 2017-05-17 | End: 2017-05-21 | Stop reason: HOSPADM

## 2017-05-17 RX ORDER — BISACODYL 10 MG
10 SUPPOSITORY, RECTAL RECTAL ONCE
Status: DISCONTINUED | OUTPATIENT
Start: 2017-05-17 | End: 2017-05-21 | Stop reason: HOSPADM

## 2017-05-17 RX ADMIN — DIGOXIN 250 MCG: 250 TABLET ORAL at 11:38

## 2017-05-17 RX ADMIN — AMIODARONE HYDROCHLORIDE 1 MG/MIN: 1.8 INJECTION, SOLUTION INTRAVENOUS at 20:10

## 2017-05-17 RX ADMIN — Medication 2 TABLET: at 20:10

## 2017-05-17 RX ADMIN — Medication 2 TABLET: at 10:10

## 2017-05-17 RX ADMIN — OXYCODONE AND ACETAMINOPHEN 1 TABLET: 5; 325 TABLET ORAL at 14:07

## 2017-05-17 RX ADMIN — OXYCODONE AND ACETAMINOPHEN 1 TABLET: 5; 325 TABLET ORAL at 05:15

## 2017-05-17 RX ADMIN — OXYCODONE AND ACETAMINOPHEN 1 TABLET: 5; 325 TABLET ORAL at 21:44

## 2017-05-17 RX ADMIN — NICOTINE 1 PATCH: 14 PATCH TRANSDERMAL at 09:59

## 2017-05-17 RX ADMIN — MORPHINE SULFATE 2 MG: 4 INJECTION, SOLUTION INTRAMUSCULAR; INTRAVENOUS at 20:48

## 2017-05-17 RX ADMIN — HEPARIN SODIUM 5000 UNITS: 5000 INJECTION, SOLUTION INTRAVENOUS; SUBCUTANEOUS at 05:15

## 2017-05-17 RX ADMIN — METOPROLOL TARTRATE 12.5 MG: 25 TABLET, FILM COATED ORAL at 23:36

## 2017-05-17 RX ADMIN — OXYCODONE AND ACETAMINOPHEN 1 TABLET: 5; 325 TABLET ORAL at 00:04

## 2017-05-17 RX ADMIN — HEPARIN SODIUM 5000 UNITS: 5000 INJECTION, SOLUTION INTRAVENOUS; SUBCUTANEOUS at 14:07

## 2017-05-17 RX ADMIN — DIGOXIN 500 MCG: 0.25 INJECTION INTRAMUSCULAR; INTRAVENOUS at 17:14

## 2017-05-17 RX ADMIN — OXYCODONE AND ACETAMINOPHEN 1 TABLET: 5; 325 TABLET ORAL at 17:49

## 2017-05-17 RX ADMIN — OXYCODONE AND ACETAMINOPHEN 1 TABLET: 5; 325 TABLET ORAL at 10:00

## 2017-05-17 RX ADMIN — ACETAMINOPHEN 650 MG: 325 TABLET, FILM COATED ORAL at 11:41

## 2017-05-17 RX ADMIN — HEPARIN SODIUM 5000 UNITS: 5000 INJECTION, SOLUTION INTRAVENOUS; SUBCUTANEOUS at 21:44

## 2017-05-17 RX ADMIN — LEVOTHYROXINE SODIUM 150 MCG: 150 TABLET ORAL at 05:15

## 2017-05-18 ENCOUNTER — APPOINTMENT (OUTPATIENT)
Dept: GENERAL RADIOLOGY | Facility: HOSPITAL | Age: 57
End: 2017-05-18

## 2017-05-18 LAB
ALBUMIN SERPL-MCNC: 3.4 G/DL (ref 3.2–4.8)
ALBUMIN/GLOB SERPL: 1.3 G/DL (ref 1.5–2.5)
ALP SERPL-CCNC: 65 U/L (ref 25–100)
ALT SERPL W P-5'-P-CCNC: 16 U/L (ref 7–40)
ANION GAP SERPL CALCULATED.3IONS-SCNC: 4 MMOL/L (ref 3–11)
AST SERPL-CCNC: 22 U/L (ref 0–33)
BASOPHILS # BLD AUTO: 0.04 10*3/MM3 (ref 0–0.2)
BASOPHILS NFR BLD AUTO: 0.6 % (ref 0–1)
BILIRUB SERPL-MCNC: 0.3 MG/DL (ref 0.3–1.2)
BUN BLD-MCNC: 12 MG/DL (ref 9–23)
BUN/CREAT SERPL: 24 (ref 7–25)
CA-I SERPL ISE-MCNC: 1.12 MMOL/L (ref 1.12–1.32)
CALCIUM SPEC-SCNC: 9 MG/DL (ref 8.7–10.4)
CHLORIDE SERPL-SCNC: 102 MMOL/L (ref 99–109)
CO2 SERPL-SCNC: 30 MMOL/L (ref 20–31)
CREAT BLD-MCNC: 0.5 MG/DL (ref 0.6–1.3)
DEPRECATED RDW RBC AUTO: 53.1 FL (ref 37–54)
EOSINOPHIL # BLD AUTO: 0.18 10*3/MM3 (ref 0.1–0.3)
EOSINOPHIL NFR BLD AUTO: 2.6 % (ref 0–3)
ERYTHROCYTE [DISTWIDTH] IN BLOOD BY AUTOMATED COUNT: 13 % (ref 11.3–14.5)
GFR SERPL CREATININE-BSD FRML MDRD: 128 ML/MIN/1.73
GLOBULIN UR ELPH-MCNC: 2.6 GM/DL
GLUCOSE BLD-MCNC: 121 MG/DL (ref 70–100)
HCT VFR BLD AUTO: 37.6 % (ref 34.5–44)
HGB BLD-MCNC: 12.5 G/DL (ref 11.5–15.5)
IMM GRANULOCYTES # BLD: 0.09 10*3/MM3 (ref 0–0.03)
IMM GRANULOCYTES NFR BLD: 1.3 % (ref 0–0.6)
LYMPHOCYTES # BLD AUTO: 1.23 10*3/MM3 (ref 0.6–4.8)
LYMPHOCYTES NFR BLD AUTO: 17.5 % (ref 24–44)
MAGNESIUM SERPL-MCNC: 1.8 MG/DL (ref 1.3–2.7)
MCH RBC QN AUTO: 37.5 PG (ref 27–31)
MCHC RBC AUTO-ENTMCNC: 33.2 G/DL (ref 32–36)
MCV RBC AUTO: 112.9 FL (ref 80–99)
MONOCYTES # BLD AUTO: 0.66 10*3/MM3 (ref 0–1)
MONOCYTES NFR BLD AUTO: 9.4 % (ref 0–12)
NEUTROPHILS # BLD AUTO: 4.83 10*3/MM3 (ref 1.5–8.3)
NEUTROPHILS NFR BLD AUTO: 68.6 % (ref 41–71)
PHOSPHATE SERPL-MCNC: 4.2 MG/DL (ref 2.4–5.1)
PLAT MORPH BLD: NORMAL
PLATELET # BLD AUTO: 177 10*3/MM3 (ref 150–450)
PMV BLD AUTO: 10.8 FL (ref 6–12)
POTASSIUM BLD-SCNC: 4 MMOL/L (ref 3.5–5.5)
PROT SERPL-MCNC: 6 G/DL (ref 5.7–8.2)
RBC # BLD AUTO: 3.33 10*6/MM3 (ref 3.89–5.14)
RBC MORPH BLD: NORMAL
SODIUM BLD-SCNC: 136 MMOL/L (ref 132–146)
WBC MORPH BLD: NORMAL
WBC NRBC COR # BLD: 7.03 10*3/MM3 (ref 3.5–10.8)

## 2017-05-18 PROCEDURE — 94799 UNLISTED PULMONARY SVC/PX: CPT

## 2017-05-18 PROCEDURE — 85025 COMPLETE CBC W/AUTO DIFF WBC: CPT | Performed by: NURSE PRACTITIONER

## 2017-05-18 PROCEDURE — 25010000002 HEPARIN (PORCINE) PER 1000 UNITS: Performed by: PHYSICIAN ASSISTANT

## 2017-05-18 PROCEDURE — 25010000002 MIDAZOLAM PER 1 MG

## 2017-05-18 PROCEDURE — 99233 SBSQ HOSP IP/OBS HIGH 50: CPT | Performed by: INTERNAL MEDICINE

## 2017-05-18 PROCEDURE — 71010 HC CHEST PA OR AP: CPT

## 2017-05-18 PROCEDURE — 25010000002 AMIODARONE IN DEXTROSE 5% 360-4.14 MG/200ML-% SOLUTION: Performed by: INTERNAL MEDICINE

## 2017-05-18 PROCEDURE — 25010000002 FENTANYL CITRATE (PF) 100 MCG/2ML SOLUTION

## 2017-05-18 PROCEDURE — 87070 CULTURE OTHR SPECIMN AEROBIC: CPT | Performed by: THORACIC SURGERY (CARDIOTHORACIC VASCULAR SURGERY)

## 2017-05-18 PROCEDURE — 87205 SMEAR GRAM STAIN: CPT | Performed by: THORACIC SURGERY (CARDIOTHORACIC VASCULAR SURGERY)

## 2017-05-18 PROCEDURE — 83735 ASSAY OF MAGNESIUM: CPT | Performed by: NURSE PRACTITIONER

## 2017-05-18 PROCEDURE — 84100 ASSAY OF PHOSPHORUS: CPT | Performed by: NURSE PRACTITIONER

## 2017-05-18 PROCEDURE — 85007 BL SMEAR W/DIFF WBC COUNT: CPT | Performed by: NURSE PRACTITIONER

## 2017-05-18 PROCEDURE — 0B9J8ZX DRAINAGE OF LEFT LOWER LUNG LOBE, VIA NATURAL OR ARTIFICIAL OPENING ENDOSCOPIC, DIAGNOSTIC: ICD-10-PCS | Performed by: THORACIC SURGERY (CARDIOTHORACIC VASCULAR SURGERY)

## 2017-05-18 PROCEDURE — 99232 SBSQ HOSP IP/OBS MODERATE 35: CPT | Performed by: NURSE PRACTITIONER

## 2017-05-18 PROCEDURE — 87102 FUNGUS ISOLATION CULTURE: CPT | Performed by: THORACIC SURGERY (CARDIOTHORACIC VASCULAR SURGERY)

## 2017-05-18 PROCEDURE — 31624 DX BRONCHOSCOPE/LAVAGE: CPT | Performed by: THORACIC SURGERY (CARDIOTHORACIC VASCULAR SURGERY)

## 2017-05-18 PROCEDURE — 80053 COMPREHEN METABOLIC PANEL: CPT | Performed by: NURSE PRACTITIONER

## 2017-05-18 PROCEDURE — 25010000002 MORPHINE PER 10 MG: Performed by: THORACIC SURGERY (CARDIOTHORACIC VASCULAR SURGERY)

## 2017-05-18 PROCEDURE — 94669 MECHANICAL CHEST WALL OSCILL: CPT

## 2017-05-18 PROCEDURE — 82330 ASSAY OF CALCIUM: CPT | Performed by: NURSE PRACTITIONER

## 2017-05-18 RX ORDER — HEPARIN SODIUM 1000 [USP'U]/ML
30 INJECTION, SOLUTION INTRAVENOUS; SUBCUTANEOUS AS NEEDED
Status: DISCONTINUED | OUTPATIENT
Start: 2017-05-18 | End: 2017-05-18

## 2017-05-18 RX ORDER — MIDAZOLAM HYDROCHLORIDE 1 MG/ML
INJECTION INTRAMUSCULAR; INTRAVENOUS
Status: COMPLETED
Start: 2017-05-18 | End: 2017-05-18

## 2017-05-18 RX ORDER — FENTANYL CITRATE 50 UG/ML
INJECTION, SOLUTION INTRAMUSCULAR; INTRAVENOUS
Status: COMPLETED
Start: 2017-05-18 | End: 2017-05-18

## 2017-05-18 RX ORDER — HEPARIN SODIUM 1000 [USP'U]/ML
60 INJECTION, SOLUTION INTRAVENOUS; SUBCUTANEOUS AS NEEDED
Status: DISCONTINUED | OUTPATIENT
Start: 2017-05-18 | End: 2017-05-18

## 2017-05-18 RX ORDER — BUMETANIDE 0.25 MG/ML
2 INJECTION INTRAMUSCULAR; INTRAVENOUS ONCE
Status: COMPLETED | OUTPATIENT
Start: 2017-05-18 | End: 2017-05-18

## 2017-05-18 RX ORDER — HEPARIN SODIUM 1000 [USP'U]/ML
4000 INJECTION, SOLUTION INTRAVENOUS; SUBCUTANEOUS ONCE
Status: DISCONTINUED | OUTPATIENT
Start: 2017-05-18 | End: 2017-05-18

## 2017-05-18 RX ADMIN — OXYCODONE AND ACETAMINOPHEN 1 TABLET: 5; 325 TABLET ORAL at 04:26

## 2017-05-18 RX ADMIN — MIDAZOLAM HYDROCHLORIDE 2 MG: 1 INJECTION, SOLUTION INTRAMUSCULAR; INTRAVENOUS at 11:51

## 2017-05-18 RX ADMIN — METOPROLOL TARTRATE 12.5 MG: 25 TABLET, FILM COATED ORAL at 05:37

## 2017-05-18 RX ADMIN — LEVOTHYROXINE SODIUM 150 MCG: 150 TABLET ORAL at 05:37

## 2017-05-18 RX ADMIN — OXYCODONE AND ACETAMINOPHEN 1 TABLET: 5; 325 TABLET ORAL at 21:18

## 2017-05-18 RX ADMIN — Medication 2 TABLET: at 08:11

## 2017-05-18 RX ADMIN — DIGOXIN 250 MCG: 250 TABLET ORAL at 13:12

## 2017-05-18 RX ADMIN — AMIODARONE HYDROCHLORIDE 1 MG/MIN: 1.8 INJECTION, SOLUTION INTRAVENOUS at 02:03

## 2017-05-18 RX ADMIN — BUMETANIDE 2 MG: 0.25 INJECTION, SOLUTION INTRAMUSCULAR; INTRAVENOUS at 14:12

## 2017-05-18 RX ADMIN — Medication 2 TABLET: at 17:56

## 2017-05-18 RX ADMIN — APIXABAN 5 MG: 5 TABLET, FILM COATED ORAL at 21:18

## 2017-05-18 RX ADMIN — METOPROLOL TARTRATE 12.5 MG: 25 TABLET, FILM COATED ORAL at 23:49

## 2017-05-18 RX ADMIN — OXYCODONE AND ACETAMINOPHEN 1 TABLET: 5; 325 TABLET ORAL at 17:14

## 2017-05-18 RX ADMIN — METOPROLOL TARTRATE 12.5 MG: 25 TABLET, FILM COATED ORAL at 17:55

## 2017-05-18 RX ADMIN — ACETAMINOPHEN 650 MG: 325 TABLET, FILM COATED ORAL at 23:49

## 2017-05-18 RX ADMIN — Medication 10 ML: at 13:13

## 2017-05-18 RX ADMIN — FENTANYL CITRATE 50 MCG: 50 INJECTION INTRAMUSCULAR; INTRAVENOUS at 11:51

## 2017-05-18 RX ADMIN — OXYCODONE AND ACETAMINOPHEN 1 TABLET: 5; 325 TABLET ORAL at 08:12

## 2017-05-18 RX ADMIN — NICOTINE 1 PATCH: 14 PATCH TRANSDERMAL at 11:47

## 2017-05-18 RX ADMIN — HEPARIN SODIUM 5000 UNITS: 5000 INJECTION, SOLUTION INTRAVENOUS; SUBCUTANEOUS at 05:37

## 2017-05-18 RX ADMIN — MAGNESIUM SULFATE HEPTAHYDRATE 4 G: 40 INJECTION, SOLUTION INTRAVENOUS at 05:38

## 2017-05-18 RX ADMIN — AMIODARONE HYDROCHLORIDE 0.5 MG/MIN: 1.8 INJECTION, SOLUTION INTRAVENOUS at 14:18

## 2017-05-18 RX ADMIN — APIXABAN 5 MG: 5 TABLET, FILM COATED ORAL at 13:12

## 2017-05-18 RX ADMIN — MORPHINE SULFATE 2 MG: 4 INJECTION, SOLUTION INTRAMUSCULAR; INTRAVENOUS at 00:41

## 2017-05-18 RX ADMIN — METOPROLOL TARTRATE 12.5 MG: 25 TABLET, FILM COATED ORAL at 13:11

## 2017-05-18 RX ADMIN — OXYCODONE AND ACETAMINOPHEN 1 TABLET: 5; 325 TABLET ORAL at 13:11

## 2017-05-19 ENCOUNTER — APPOINTMENT (OUTPATIENT)
Dept: CARDIOLOGY | Facility: HOSPITAL | Age: 57
End: 2017-05-19
Attending: INTERNAL MEDICINE

## 2017-05-19 LAB — MAGNESIUM SERPL-MCNC: 2.2 MG/DL (ref 1.3–2.7)

## 2017-05-19 PROCEDURE — 25010000002 FENTANYL CITRATE (PF) 100 MCG/2ML SOLUTION: Performed by: INTERNAL MEDICINE

## 2017-05-19 PROCEDURE — 83735 ASSAY OF MAGNESIUM: CPT | Performed by: NURSE PRACTITIONER

## 2017-05-19 PROCEDURE — 99232 SBSQ HOSP IP/OBS MODERATE 35: CPT | Performed by: INTERNAL MEDICINE

## 2017-05-19 PROCEDURE — 5A2204Z RESTORATION OF CARDIAC RHYTHM, SINGLE: ICD-10-PCS | Performed by: INTERNAL MEDICINE

## 2017-05-19 PROCEDURE — 92960 CARDIOVERSION ELECTRIC EXT: CPT

## 2017-05-19 PROCEDURE — 99233 SBSQ HOSP IP/OBS HIGH 50: CPT | Performed by: INTERNAL MEDICINE

## 2017-05-19 PROCEDURE — 92960 CARDIOVERSION ELECTRIC EXT: CPT | Performed by: INTERNAL MEDICINE

## 2017-05-19 PROCEDURE — 93005 ELECTROCARDIOGRAM TRACING: CPT | Performed by: THORACIC SURGERY (CARDIOTHORACIC VASCULAR SURGERY)

## 2017-05-19 PROCEDURE — 25010000002 MIDAZOLAM PER 1 MG: Performed by: INTERNAL MEDICINE

## 2017-05-19 PROCEDURE — 25010000002 AMIODARONE IN DEXTROSE 5% 360-4.14 MG/200ML-% SOLUTION: Performed by: INTERNAL MEDICINE

## 2017-05-19 RX ORDER — NALOXONE HYDROCHLORIDE 0.4 MG/ML
INJECTION, SOLUTION INTRAMUSCULAR; INTRAVENOUS; SUBCUTANEOUS
Status: DISCONTINUED
Start: 2017-05-19 | End: 2017-05-19 | Stop reason: WASHOUT

## 2017-05-19 RX ORDER — MIDAZOLAM HYDROCHLORIDE 1 MG/ML
INJECTION INTRAMUSCULAR; INTRAVENOUS
Status: DISCONTINUED
Start: 2017-05-19 | End: 2017-05-21 | Stop reason: HOSPADM

## 2017-05-19 RX ORDER — FENTANYL CITRATE 50 UG/ML
INJECTION, SOLUTION INTRAMUSCULAR; INTRAVENOUS
Status: COMPLETED | OUTPATIENT
Start: 2017-05-19 | End: 2017-05-19

## 2017-05-19 RX ORDER — MIDAZOLAM HYDROCHLORIDE 1 MG/ML
INJECTION INTRAMUSCULAR; INTRAVENOUS
Status: COMPLETED | OUTPATIENT
Start: 2017-05-19 | End: 2017-05-19

## 2017-05-19 RX ORDER — FENTANYL CITRATE 50 UG/ML
INJECTION, SOLUTION INTRAMUSCULAR; INTRAVENOUS
Status: DISCONTINUED
Start: 2017-05-19 | End: 2017-05-21 | Stop reason: HOSPADM

## 2017-05-19 RX ORDER — FLUMAZENIL 0.1 MG/ML
INJECTION INTRAVENOUS
Status: DISCONTINUED
Start: 2017-05-19 | End: 2017-05-19 | Stop reason: WASHOUT

## 2017-05-19 RX ADMIN — METHOHEXITAL SODIUM 20 MG: 500 INJECTION, POWDER, LYOPHILIZED, FOR SOLUTION INTRAMUSCULAR; INTRAVENOUS; RECTAL at 13:12

## 2017-05-19 RX ADMIN — AMIODARONE HYDROCHLORIDE 0.5 MG/MIN: 1.8 INJECTION, SOLUTION INTRAVENOUS at 02:41

## 2017-05-19 RX ADMIN — METOPROLOL TARTRATE 25 MG: 25 TABLET ORAL at 15:43

## 2017-05-19 RX ADMIN — ACETAMINOPHEN 650 MG: 325 TABLET, FILM COATED ORAL at 05:09

## 2017-05-19 RX ADMIN — Medication 2 TABLET: at 09:26

## 2017-05-19 RX ADMIN — FENTANYL CITRATE 25 MCG: 50 INJECTION, SOLUTION INTRAMUSCULAR; INTRAVENOUS at 13:11

## 2017-05-19 RX ADMIN — APIXABAN 5 MG: 5 TABLET, FILM COATED ORAL at 15:43

## 2017-05-19 RX ADMIN — APIXABAN 5 MG: 5 TABLET, FILM COATED ORAL at 21:17

## 2017-05-19 RX ADMIN — METHOHEXITAL SODIUM 30 MG: 500 INJECTION, POWDER, LYOPHILIZED, FOR SOLUTION INTRAMUSCULAR; INTRAVENOUS; RECTAL at 13:08

## 2017-05-19 RX ADMIN — NICOTINE 1 PATCH: 14 PATCH TRANSDERMAL at 15:43

## 2017-05-19 RX ADMIN — OXYCODONE AND ACETAMINOPHEN 1 TABLET: 5; 325 TABLET ORAL at 00:55

## 2017-05-19 RX ADMIN — OXYCODONE AND ACETAMINOPHEN 1 TABLET: 5; 325 TABLET ORAL at 05:09

## 2017-05-19 RX ADMIN — METHOHEXITAL SODIUM 30 MG: 500 INJECTION, POWDER, LYOPHILIZED, FOR SOLUTION INTRAMUSCULAR; INTRAVENOUS; RECTAL at 13:13

## 2017-05-19 RX ADMIN — OXYCODONE AND ACETAMINOPHEN 1 TABLET: 5; 325 TABLET ORAL at 17:02

## 2017-05-19 RX ADMIN — METOPROLOL TARTRATE 12.5 MG: 25 TABLET, FILM COATED ORAL at 05:09

## 2017-05-19 RX ADMIN — OXYCODONE AND ACETAMINOPHEN 1 TABLET: 5; 325 TABLET ORAL at 21:17

## 2017-05-19 RX ADMIN — HYDROCODONE BITARTRATE AND ACETAMINOPHEN 1 TABLET: 7.5; 325 TABLET ORAL at 12:54

## 2017-05-19 RX ADMIN — LEVOTHYROXINE SODIUM 150 MCG: 150 TABLET ORAL at 05:09

## 2017-05-19 RX ADMIN — Medication 2 TABLET: at 15:45

## 2017-05-19 RX ADMIN — MIDAZOLAM HYDROCHLORIDE 1 MG: 1 INJECTION, SOLUTION INTRAMUSCULAR; INTRAVENOUS at 13:11

## 2017-05-20 LAB
ANION GAP SERPL CALCULATED.3IONS-SCNC: 5 MMOL/L (ref 3–11)
BACTERIA SPEC RESP CULT: NORMAL
BUN BLD-MCNC: 10 MG/DL (ref 9–23)
BUN/CREAT SERPL: 20 (ref 7–25)
CALCIUM SPEC-SCNC: 9.3 MG/DL (ref 8.7–10.4)
CHLORIDE SERPL-SCNC: 98 MMOL/L (ref 99–109)
CO2 SERPL-SCNC: 33 MMOL/L (ref 20–31)
CREAT BLD-MCNC: 0.5 MG/DL (ref 0.6–1.3)
DEPRECATED RDW RBC AUTO: 53 FL (ref 37–54)
ERYTHROCYTE [DISTWIDTH] IN BLOOD BY AUTOMATED COUNT: 13.1 % (ref 11.3–14.5)
GFR SERPL CREATININE-BSD FRML MDRD: 128 ML/MIN/1.73
GLUCOSE BLD-MCNC: 139 MG/DL (ref 70–100)
GRAM STN SPEC: NORMAL
HCT VFR BLD AUTO: 36.1 % (ref 34.5–44)
HGB BLD-MCNC: 11.9 G/DL (ref 11.5–15.5)
MCH RBC QN AUTO: 36.7 PG (ref 27–31)
MCHC RBC AUTO-ENTMCNC: 33 G/DL (ref 32–36)
MCV RBC AUTO: 111.4 FL (ref 80–99)
PLATELET # BLD AUTO: 201 10*3/MM3 (ref 150–450)
PMV BLD AUTO: 10.1 FL (ref 6–12)
POTASSIUM BLD-SCNC: 3.6 MMOL/L (ref 3.5–5.5)
RBC # BLD AUTO: 3.24 10*6/MM3 (ref 3.89–5.14)
SODIUM BLD-SCNC: 136 MMOL/L (ref 132–146)
WBC NRBC COR # BLD: 6.44 10*3/MM3 (ref 3.5–10.8)

## 2017-05-20 PROCEDURE — 99232 SBSQ HOSP IP/OBS MODERATE 35: CPT | Performed by: INTERNAL MEDICINE

## 2017-05-20 PROCEDURE — 85027 COMPLETE CBC AUTOMATED: CPT | Performed by: INTERNAL MEDICINE

## 2017-05-20 PROCEDURE — 80048 BASIC METABOLIC PNL TOTAL CA: CPT | Performed by: INTERNAL MEDICINE

## 2017-05-20 RX ADMIN — METOPROLOL TARTRATE 25 MG: 25 TABLET ORAL at 17:01

## 2017-05-20 RX ADMIN — NICOTINE 1 PATCH: 14 PATCH TRANSDERMAL at 08:31

## 2017-05-20 RX ADMIN — OXYCODONE AND ACETAMINOPHEN 1 TABLET: 5; 325 TABLET ORAL at 17:01

## 2017-05-20 RX ADMIN — APIXABAN 5 MG: 5 TABLET, FILM COATED ORAL at 20:41

## 2017-05-20 RX ADMIN — METOPROLOL TARTRATE 25 MG: 25 TABLET ORAL at 04:32

## 2017-05-20 RX ADMIN — METOPROLOL TARTRATE 25 MG: 25 TABLET ORAL at 12:59

## 2017-05-20 RX ADMIN — OXYCODONE AND ACETAMINOPHEN 1 TABLET: 5; 325 TABLET ORAL at 00:34

## 2017-05-20 RX ADMIN — OXYCODONE AND ACETAMINOPHEN 1 TABLET: 5; 325 TABLET ORAL at 21:04

## 2017-05-20 RX ADMIN — METOPROLOL TARTRATE 25 MG: 25 TABLET ORAL at 00:34

## 2017-05-20 RX ADMIN — OXYCODONE AND ACETAMINOPHEN 1 TABLET: 5; 325 TABLET ORAL at 08:31

## 2017-05-20 RX ADMIN — ACETAMINOPHEN 650 MG: 325 TABLET, FILM COATED ORAL at 13:00

## 2017-05-20 RX ADMIN — ACETAMINOPHEN 650 MG: 325 TABLET, FILM COATED ORAL at 04:32

## 2017-05-20 RX ADMIN — APIXABAN 5 MG: 5 TABLET, FILM COATED ORAL at 08:30

## 2017-05-20 RX ADMIN — LEVOTHYROXINE SODIUM 150 MCG: 150 TABLET ORAL at 04:35

## 2017-05-20 RX ADMIN — OXYCODONE AND ACETAMINOPHEN 1 TABLET: 5; 325 TABLET ORAL at 04:32

## 2017-05-20 RX ADMIN — OXYCODONE AND ACETAMINOPHEN 1 TABLET: 5; 325 TABLET ORAL at 13:00

## 2017-05-20 RX ADMIN — ACETAMINOPHEN 650 MG: 325 TABLET, FILM COATED ORAL at 00:34

## 2017-05-21 ENCOUNTER — APPOINTMENT (OUTPATIENT)
Dept: GENERAL RADIOLOGY | Facility: HOSPITAL | Age: 57
End: 2017-05-21

## 2017-05-21 VITALS
DIASTOLIC BLOOD PRESSURE: 56 MMHG | RESPIRATION RATE: 12 BRPM | SYSTOLIC BLOOD PRESSURE: 104 MMHG | HEART RATE: 60 BPM | BODY MASS INDEX: 36.73 KG/M2 | WEIGHT: 199.6 LBS | HEIGHT: 62 IN | TEMPERATURE: 97.3 F | OXYGEN SATURATION: 97 %

## 2017-05-21 PROCEDURE — 71010 HC CHEST PA OR AP: CPT

## 2017-05-21 PROCEDURE — 99024 POSTOP FOLLOW-UP VISIT: CPT | Performed by: THORACIC SURGERY (CARDIOTHORACIC VASCULAR SURGERY)

## 2017-05-21 PROCEDURE — 99231 SBSQ HOSP IP/OBS SF/LOW 25: CPT | Performed by: INTERNAL MEDICINE

## 2017-05-21 RX ORDER — OXYCODONE HYDROCHLORIDE AND ACETAMINOPHEN 5; 325 MG/1; MG/1
1 TABLET ORAL EVERY 4 HOURS PRN
Qty: 30 TABLET | Refills: 0 | Status: SHIPPED | OUTPATIENT
Start: 2017-05-21 | End: 2017-05-24

## 2017-05-21 RX ORDER — LEVOFLOXACIN 750 MG/1
750 TABLET ORAL DAILY
Qty: 14 TABLET | Refills: 0 | Status: SHIPPED | OUTPATIENT
Start: 2017-05-21 | End: 2017-06-04

## 2017-05-21 RX ADMIN — NICOTINE 1 PATCH: 14 PATCH TRANSDERMAL at 08:21

## 2017-05-21 RX ADMIN — OXYCODONE AND ACETAMINOPHEN 1 TABLET: 5; 325 TABLET ORAL at 01:46

## 2017-05-21 RX ADMIN — Medication 2 TABLET: at 08:21

## 2017-05-21 RX ADMIN — LEVOTHYROXINE SODIUM 150 MCG: 150 TABLET ORAL at 06:03

## 2017-05-21 RX ADMIN — OXYCODONE AND ACETAMINOPHEN 1 TABLET: 5; 325 TABLET ORAL at 14:30

## 2017-05-21 RX ADMIN — OXYCODONE AND ACETAMINOPHEN 1 TABLET: 5; 325 TABLET ORAL at 06:02

## 2017-05-21 RX ADMIN — HYDROCODONE BITARTRATE AND ACETAMINOPHEN 1 TABLET: 7.5; 325 TABLET ORAL at 10:21

## 2017-05-21 RX ADMIN — APIXABAN 5 MG: 5 TABLET, FILM COATED ORAL at 08:21

## 2017-05-21 RX ADMIN — METOPROLOL TARTRATE 25 MG: 25 TABLET ORAL at 06:03

## 2017-05-30 ENCOUNTER — OFFICE VISIT (OUTPATIENT)
Dept: CARDIAC SURGERY | Facility: CLINIC | Age: 57
End: 2017-05-30

## 2017-05-30 VITALS
TEMPERATURE: 97.2 F | SYSTOLIC BLOOD PRESSURE: 110 MMHG | WEIGHT: 199 LBS | BODY MASS INDEX: 36.62 KG/M2 | DIASTOLIC BLOOD PRESSURE: 70 MMHG | HEIGHT: 62 IN | OXYGEN SATURATION: 96 % | HEART RATE: 75 BPM

## 2017-05-30 DIAGNOSIS — C34.92 MALIGNANT NEOPLASM OF LEFT LUNG, UNSPECIFIED PART OF LUNG (HCC): Primary | ICD-10-CM

## 2017-05-30 PROCEDURE — 99024 POSTOP FOLLOW-UP VISIT: CPT | Performed by: THORACIC SURGERY (CARDIOTHORACIC VASCULAR SURGERY)

## 2017-05-30 PROCEDURE — 71020 CHG CHEST X-RAY 2 VW: CPT | Performed by: THORACIC SURGERY (CARDIOTHORACIC VASCULAR SURGERY)

## 2017-05-30 RX ORDER — HYDROCODONE BITARTRATE AND ACETAMINOPHEN 7.5; 325 MG/1; MG/1
1 TABLET ORAL EVERY 6 HOURS PRN
COMMUNITY
End: 2017-06-27

## 2017-06-20 ENCOUNTER — OFFICE VISIT (OUTPATIENT)
Dept: ONCOLOGY | Facility: CLINIC | Age: 57
End: 2017-06-20

## 2017-06-20 VITALS
DIASTOLIC BLOOD PRESSURE: 70 MMHG | OXYGEN SATURATION: 98 % | BODY MASS INDEX: 36.25 KG/M2 | HEIGHT: 62 IN | HEART RATE: 80 BPM | SYSTOLIC BLOOD PRESSURE: 110 MMHG | TEMPERATURE: 97.4 F | RESPIRATION RATE: 24 BRPM | WEIGHT: 197 LBS

## 2017-06-20 DIAGNOSIS — C34.12 MALIGNANT NEOPLASM OF UPPER LOBE OF LEFT LUNG (HCC): Primary | ICD-10-CM

## 2017-06-20 PROCEDURE — 99214 OFFICE O/P EST MOD 30 MIN: CPT | Performed by: INTERNAL MEDICINE

## 2017-06-20 RX ORDER — POTASSIUM CHLORIDE 1500 MG/1
TABLET, FILM COATED, EXTENDED RELEASE ORAL AS NEEDED
COMMUNITY
Start: 2017-05-31 | End: 2017-08-25

## 2017-06-20 RX ORDER — GABAPENTIN 300 MG/1
300 CAPSULE ORAL 3 TIMES DAILY
Qty: 90 CAPSULE | Refills: 3 | Status: ON HOLD | OUTPATIENT
Start: 2017-06-20 | End: 2017-09-26

## 2017-06-20 RX ORDER — FUROSEMIDE 40 MG/1
TABLET ORAL AS NEEDED
COMMUNITY
Start: 2017-05-31 | End: 2017-08-25

## 2017-06-20 RX ORDER — OXYCODONE HYDROCHLORIDE AND ACETAMINOPHEN 5; 325 MG/1; MG/1
TABLET ORAL
COMMUNITY
Start: 2017-05-31 | End: 2017-06-27

## 2017-06-20 NOTE — PROGRESS NOTES
"      PROBLEM LIST:  1. 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 CXR and CT chest on 4/10/17 showed a 2.5 x 3.1 cm ALBINA lesion. No evidence of adenopathy. CT guided biopsy was performed on 4/11/17.  PET/CT on 4/17/17 showed hypermetabolic mass in the left lung and low level activity in the neck bilaterally.  B) patient underwent left upper lobectomy on 5/12/17.  Pathology showed a 2.5 cm moderately to poorly differentiated squamous cell carcinoma, no visceral pleural invasion.  0/7 lymph nodes involved.  pT1N0 (Stage 1A)  2. Hypertension  3. Hypothyroidism  4. Postoperative atrial filbrillation with RVR after lobectomy    Subjective     HISTORY OF PRESENT ILLNESS:   Jill Miller returns for follow-up.   She is about a month out from her surgery.  She says  That she is having a lot of pain and sensitivity in her chest wall.  She is unable to wear a bra.  She has been trying to walk but gets short of breath.  She is out of narcotics and says she has not had relief from ibuprofen.  She is having trouble sleeping at night.    Past Medical History, Past Surgical History, Social History, Family History have been reviewed and are without significant changes except as mentioned.    Review of Systems   A comprehensive 14 point review of systems was performed and was negative except as mentioned.    Medications:  The current medication list was reviewed in the EMR    ALLERGIES:  No Known Allergies    Objective      /70 Comment: LUE  Pulse 80  Temp 97.4 °F (36.3 °C) (Temporal Artery )   Resp 24  Ht 62\" (157.5 cm)  Wt 197 lb (89.4 kg)  BMI 36.03 kg/m2     Performance Status: 0    General: well appearing female in no acute distress  Neuro: alert and oriented  HEENT: sclera anicteric, oropharynx clear  Lymphatics: no cervical, supraclavicular, or axillary adenopathy  Cardiovascular: regular rate and rhythm, no murmurs  Lungs: clear to " "auscultation bilaterally  Chest: left lateral scar, healing, no erythema or induration  Abdomen: soft, nontender, nondistended.  No palpable organomegaly  Extremeties: no lower extremity edema  Skin: no rashes, lesions, bruising, or petechiae  Psych: mood and affect appropriate          Assessment/Plan   Jill Miller is a 57 y.o. year old female with stage IA squamous cell carcinoma of the lung, s/p left upper lobectomy.  Her surgical pathology showed no lymph node involvement.  She is slowly recovering from the procedure.  We discussed that for her lung cancer the standard follow up is a CT scan and office visit every 6 months for up to 5 years, and then yearly afterwards.  She is already set up to see Dr. Cheatham with a scan in 6 months.    We discussed her postoperative pain.  I told her it is quite common to have pain and increased sensitivity after a thoracotomy and that it can take several months to improve. Some patients will have permanent increased sensitivity in this area.  I wrote her an Rx for gabapentin to see if this would help with her pain, and also recommended ibuprofen 800 mg tid.  We discussed the risk of developing narcotic addiction with prolonged use.      We discussed the importance of smoking cessation.  She reports that she has \"slipped up\" a few times and blames this on the pain she is experiencing.  I encouraged her and her partner to make sure that there are not any accessible cigarettes in the home.    I will see her back in 3 months.                    Visit time was 25 minutes, greater than 50% spent in counseling      Bess Simons MD  [facility] Hematology and Oncology    6/20/2017          CC:          "

## 2017-06-27 ENCOUNTER — OFFICE VISIT (OUTPATIENT)
Dept: CARDIOLOGY | Facility: CLINIC | Age: 57
End: 2017-06-27

## 2017-06-27 VITALS
HEART RATE: 85 BPM | WEIGHT: 196 LBS | DIASTOLIC BLOOD PRESSURE: 82 MMHG | SYSTOLIC BLOOD PRESSURE: 130 MMHG | HEIGHT: 62 IN | BODY MASS INDEX: 36.07 KG/M2

## 2017-06-27 DIAGNOSIS — Z72.0 TOBACCO ABUSE: ICD-10-CM

## 2017-06-27 DIAGNOSIS — I48.0 PAROXYSMAL ATRIAL FIBRILLATION (HCC): ICD-10-CM

## 2017-06-27 DIAGNOSIS — I10 ESSENTIAL HYPERTENSION: Primary | ICD-10-CM

## 2017-06-27 DIAGNOSIS — C34.12 MALIGNANT NEOPLASM OF UPPER LOBE OF LEFT LUNG (HCC): ICD-10-CM

## 2017-06-27 LAB — FUNGUS WND CULT: ABNORMAL

## 2017-06-27 PROCEDURE — 99214 OFFICE O/P EST MOD 30 MIN: CPT | Performed by: PHYSICIAN ASSISTANT

## 2017-06-27 PROCEDURE — 99406 BEHAV CHNG SMOKING 3-10 MIN: CPT | Performed by: PHYSICIAN ASSISTANT

## 2017-06-27 PROCEDURE — 93000 ELECTROCARDIOGRAM COMPLETE: CPT | Performed by: PHYSICIAN ASSISTANT

## 2017-06-27 NOTE — PROGRESS NOTES
[facility] - Office Note  Jill Miller         [address]  [address] [place] [address]  1960   [phone] (home)      LOCATION:  [facility] office.  Visit Type: Follow Up.    PCP:  YANETH Olivera    06/27/17   Jill Miller is a 57 y.o.  female  retired.      Chief Complaint: Hospital Follow up on HTN, AFib    PROBLEM LIST:    1.  Essential Hypertension  2.  Paroxysmal Atrial Fibrillation   A.  Post Operative AFib with RVR s/p ROMARIO Lobectomy 5/15/17   B.  CHADS2 Vasc = 2.   C.  Hypotension with IV Amiodarone.     D.  Echo 5/19/17: EF 64%, mod MR/MS, mod TR   E.  S/P ECV 5/19/17 with conversion to NSR  3.  Principal Lung Cancer   A. 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   patient presented with cough and fatigue. CXR and CT chest on 4/10/17 showed a 2.5 x 3.1 cm ALBINA lesion. No evidence of adenopathy. CT guided biopsy was performed on 4/11/17. PET/CT on 4/17/17 showed hypermetabolic mass in the left lung and low                       level activity in the neck bilaterally.   C.  patient underwent left upper lobectomy on 5/12/17. Pathology showed a 2.5 cm moderately to poorly differentiated squamous cell carcinoma, no visceral pleural invasion. 0/7 lymph nodes involved. pT1N0 (Stage 1A)  4.  Acquired Hypothyroidism  5.  Tobacco Abuse  6. H/O MRSA   A.  Under left breast   B.  S/P I&D, wound packing, abx          No Known Allergies      Current Outpatient Prescriptions:   •  apixaban (ELIQUIS) 5 MG tablet tablet, Take 1 tablet by mouth Every 12 (Twelve) Hours for 180 days., Disp: 180 tablet, Rfl: 1  •  furosemide (LASIX) 40 MG tablet, As Needed., Disp: , Rfl:   •  gabapentin (NEURONTIN) 300 MG capsule, Take 1 capsule by mouth 3 (Three) Times a Day., Disp: 90 capsule, Rfl: 3  •  levothyroxine (SYNTHROID, LEVOTHROID) 150 MCG tablet, Take 150 mcg by mouth Daily., Disp: , Rfl:   •  metoprolol succinate XL (TOPROL-XL) 25 MG 24 hr  "tablet, Take 25 mg by mouth Daily., Disp: , Rfl:   •  potassium chloride ER (K-TAB) 20 MEQ tablet controlled-release ER tablet, As Needed., Disp: , Rfl:   No current facility-administered medications for this visit.     Facility-Administered Medications Ordered in Other Visits:   •  Chlorhexidine Gluconate Cloth 2 % pads 1 application, 1 application, Topical, Q12H PRN, YANETH Miller    HPI  Mrs. Miller is here today for her first hospital follow up after being seen in consultation for post op AFib s/p ALBINA Lobectomy for lung cancer.  At that time, she was tried on IV Amiodarone and developed hypotension.  Then she was given Digoxin and lopressor for rate control before undergoing external cardioversion.  She did convert to NSR.  She has done quite well since discharge from a cardiac standpoint.  She has no complaints of chest pain or progressive dyspnea.  She has no mention of palpitations or lightheadedness.  She is taking the Toprol and the Eliquis without any problem.  She did follow up with Dr. Simons and has been given a prescription for Neurontin for pain control.  Her next appointment is on September 12.  She is now down to 5 cigarettes a day from one pack per day at time of hospitalization.  She is interested in smoking cessation aids.    The following portions of the patient's history were reviewed in the chart and updated as appropriate: allergies, current medications, past family history, past medical history, past social history, past surgical history and problem list.    Review of Systems   Constitution: Negative for chills, fever, weakness, malaise/fatigue and weight gain.   Cardiovascular: Positive for irregular heartbeat. Negative for chest pain, dyspnea on exertion and palpitations.   Respiratory: Negative for cough and shortness of breath.    Hematologic/Lymphatic: Does not bruise/bleed easily.             height is 62\" (157.5 cm) and weight is 196 lb (88.9 kg). Her blood pressure is 130/82 " and her pulse is 85.   Physical Exam   Constitutional: Vital signs are normal. She appears well-developed and well-nourished.   Cardiovascular: Normal rate, regular rhythm, S1 normal, S2 normal, normal heart sounds, intact distal pulses and normal pulses.    Pulmonary/Chest: Effort normal and breath sounds normal. She has no wheezes. She has no rhonchi. She has no rales.   Abdominal: Soft. Normal appearance and bowel sounds are normal. There is no hepatosplenomegaly.   Neurological: She is alert.           ECG 12 Lead  Performed by: MARISA RODRIGUEZ  Authorized by: MARISA RODRIGUEZ                Assessment/ Plan   Essential hypertension:  Well controlled.  Continue current medications.      Paroxysmal atrial fibrillation:  Maintaining NSR p ECV.  Rate controlled on Toprol XL.  Continue Eliquis.  Return on September 12 which is the same day as her appointment with Dr. Simons.  If repeat EKG confirms normal sinus rhythm at that time, we could consider stopping the Eliquis and even Toprol if blood pressures controlled.    Tobacco abuse:  I spent 5 minutes in duration discussing smoking cessation.  I advised the patient that effective Thursday, June 29, Chantix will be free for all patients.  She is interested in a prescription.  I will take care of this after Thursday in order to assure affordability.  Her goal is to be tobacco free by time of follow-up visit.    Malignant neoplasm of upper lobe of left lung:  Per Onc, Pulm.  Will continue to follow along.      Marisa Rodriguez PA-C  6/27/2017 9:11 AM      EMR Dragon/Transcription disclaimer:   Much of this encounter note is an electronic transcription/translation of spoken language to printed text. The electronic translation of spoken language may permit erroneous, or at times, nonsensical words or phrases to be inadvertently transcribed; Although I have reviewed the note for such errors, some may still exist.

## 2017-06-29 ENCOUNTER — TELEPHONE (OUTPATIENT)
Dept: ONCOLOGY | Facility: CLINIC | Age: 57
End: 2017-06-29

## 2017-06-29 NOTE — TELEPHONE ENCOUNTER
----- Message from Nora Burnham sent at 6/29/2017 11:34 AM EDT -----  Regarding: JOEY-MEDICATION  Contact: [phone]  Patient called and she said Dr. Simons could adjust the dosage Gabapentin 300 mg she wants to see if this can be increased really not helping that much? Please call.

## 2017-06-29 NOTE — TELEPHONE ENCOUNTER
Returned the phone call to patient. Told her Dr Simons is out today. I will check with her in the morning and give her a call back.

## 2017-06-30 RX ORDER — GABAPENTIN 600 MG/1
600 TABLET ORAL 3 TIMES DAILY
Qty: 90 TABLET | Refills: 3 | OUTPATIENT
Start: 2017-06-30 | End: 2017-12-05 | Stop reason: SDUPTHER

## 2017-06-30 NOTE — TELEPHONE ENCOUNTER
Called to let patient know Dr Simons will increase Rx for gabapentin 600mg TID.    HPI:         Here for follow up of ADHD with weight loss, likely due to medication.  Has gained 3.5 pounds in the last 6 weeks.  Doing well on Ritalin 10 mg BID.  Sometimes he does not want to take his medication, although he knows that it helps his behavior.      Physical Exam:    Visit Vitals   • /70 (BP Location: [facility], Patient Position: Sitting, Cuff Size: Pediatric)   • Ht 4' 0.75\" (1.238 m)   • Wt 24 kg  Comment: 53lbs   • BMI 15.68 kg/m2       General: Well developed, well nourished in no apparent distress      Assessment and Plan:    Follow up ADHD with poor weight gain, improved:  Continue [place] daily, explained to him that it is important for him to take his medication, he agreed to this.  Follow up in 3 months

## 2017-08-25 ENCOUNTER — APPOINTMENT (OUTPATIENT)
Dept: GENERAL RADIOLOGY | Facility: HOSPITAL | Age: 57
End: 2017-08-25

## 2017-08-25 ENCOUNTER — APPOINTMENT (OUTPATIENT)
Dept: CARDIOLOGY | Facility: HOSPITAL | Age: 57
End: 2017-08-25
Attending: EMERGENCY MEDICINE

## 2017-08-25 ENCOUNTER — HOSPITAL ENCOUNTER (EMERGENCY)
Facility: HOSPITAL | Age: 57
Discharge: HOME | End: 2017-08-25
Attending: EMERGENCY MEDICINE | Admitting: EMERGENCY MEDICINE

## 2017-08-25 VITALS
DIASTOLIC BLOOD PRESSURE: 83 MMHG | HEART RATE: 98 BPM | SYSTOLIC BLOOD PRESSURE: 122 MMHG | OXYGEN SATURATION: 98 % | BODY MASS INDEX: 35.88 KG/M2 | HEIGHT: 62 IN | WEIGHT: 195 LBS | RESPIRATION RATE: 20 BRPM | TEMPERATURE: 98.3 F

## 2017-08-25 DIAGNOSIS — I48.91 ATRIAL FIBRILLATION WITH RAPID VENTRICULAR RESPONSE (HCC): ICD-10-CM

## 2017-08-25 DIAGNOSIS — I48.0 PAROXYSMAL ATRIAL FIBRILLATION (HCC): Primary | ICD-10-CM

## 2017-08-25 DIAGNOSIS — I50.9 ACUTE CONGESTIVE HEART FAILURE, UNSPECIFIED CONGESTIVE HEART FAILURE TYPE: ICD-10-CM

## 2017-08-25 LAB
ALBUMIN SERPL-MCNC: 4.3 G/DL (ref 3.2–4.8)
ALBUMIN/GLOB SERPL: 1.5 G/DL (ref 1.5–2.5)
ALP SERPL-CCNC: 92 U/L (ref 25–100)
ALT SERPL W P-5'-P-CCNC: 138 U/L (ref 7–40)
ANION GAP SERPL CALCULATED.3IONS-SCNC: 5 MMOL/L (ref 3–11)
ARTERIAL PATENCY WRIST A: ABNORMAL
AST SERPL-CCNC: 141 U/L (ref 0–33)
ATMOSPHERIC PRESS: ABNORMAL MMHG
BACTERIA UR QL AUTO: ABNORMAL /HPF
BASE EXCESS BLDA CALC-SCNC: -2.1 MMOL/L (ref 0–2)
BASOPHILS # BLD AUTO: 0.02 10*3/MM3 (ref 0–0.2)
BASOPHILS NFR BLD AUTO: 0.2 % (ref 0–1)
BDY SITE: ABNORMAL
BILIRUB SERPL-MCNC: 0.5 MG/DL (ref 0.3–1.2)
BILIRUB UR QL STRIP: NEGATIVE
BNP SERPL-MCNC: 1107 PG/ML (ref 0–100)
BUN BLD-MCNC: 15 MG/DL (ref 9–23)
BUN/CREAT SERPL: 25 (ref 7–25)
CALCIUM SPEC-SCNC: 8.7 MG/DL (ref 8.7–10.4)
CHLORIDE SERPL-SCNC: 109 MMOL/L (ref 99–109)
CLARITY UR: ABNORMAL
CO2 BLDA-SCNC: 26.7 MMOL/L (ref 22–33)
CO2 SERPL-SCNC: 23 MMOL/L (ref 20–31)
COHGB MFR BLD: 1.2 % (ref 0–2)
COLOR UR: YELLOW
CREAT BLD-MCNC: 0.6 MG/DL (ref 0.6–1.3)
DEPRECATED RDW RBC AUTO: 46.2 FL (ref 37–54)
EOSINOPHIL # BLD AUTO: 0.06 10*3/MM3 (ref 0–0.3)
EOSINOPHIL NFR BLD AUTO: 0.6 % (ref 0–3)
ERYTHROCYTE [DISTWIDTH] IN BLOOD BY AUTOMATED COUNT: 11.9 % (ref 11.3–14.5)
GFR SERPL CREATININE-BSD FRML MDRD: 104 ML/MIN/1.73
GLOBULIN UR ELPH-MCNC: 2.9 GM/DL
GLUCOSE BLD-MCNC: 159 MG/DL (ref 70–100)
GLUCOSE UR STRIP-MCNC: NEGATIVE MG/DL
HCO3 BLDA-SCNC: 25.1 MMOL/L (ref 20–26)
HCT VFR BLD AUTO: 41.5 % (ref 34.5–44)
HCT VFR BLD CALC: 46.1 %
HGB BLD-MCNC: 14.5 G/DL (ref 11.5–15.5)
HGB BLDA-MCNC: 15 G/DL (ref 14–18)
HGB UR QL STRIP.AUTO: ABNORMAL
HOROWITZ INDEX BLD+IHG-RTO: 100 %
HYALINE CASTS UR QL AUTO: ABNORMAL /LPF
IMM GRANULOCYTES # BLD: 0.04 10*3/MM3 (ref 0–0.03)
IMM GRANULOCYTES NFR BLD: 0.4 % (ref 0–0.6)
KETONES UR QL STRIP: NEGATIVE
LEUKOCYTE ESTERASE UR QL STRIP.AUTO: NEGATIVE
LYMPHOCYTES # BLD AUTO: 1.71 10*3/MM3 (ref 0.6–4.8)
LYMPHOCYTES NFR BLD AUTO: 17.7 % (ref 24–44)
MCH RBC QN AUTO: 36.8 PG (ref 27–31)
MCHC RBC AUTO-ENTMCNC: 34.9 G/DL (ref 32–36)
MCV RBC AUTO: 105.3 FL (ref 80–99)
METHGB BLD QL: 0.8 % (ref 0–1.5)
MODALITY: ABNORMAL
MONOCYTES # BLD AUTO: 0.71 10*3/MM3 (ref 0–1)
MONOCYTES NFR BLD AUTO: 7.3 % (ref 0–12)
NEUTROPHILS # BLD AUTO: 7.14 10*3/MM3 (ref 1.5–8.3)
NEUTROPHILS NFR BLD AUTO: 73.8 % (ref 41–71)
NITRITE UR QL STRIP: NEGATIVE
OXYHGB MFR BLDV: 97.5 % (ref 94–99)
PCO2 BLDA: 51.6 MM HG (ref 35–45)
PH BLDA: 7.3 PH UNITS (ref 7.35–7.45)
PH UR STRIP.AUTO: <=5 [PH] (ref 5–8)
PLATELET # BLD AUTO: 223 10*3/MM3 (ref 150–450)
PMV BLD AUTO: 10.6 FL (ref 6–12)
PO2 BLDA: 156 MM HG (ref 83–108)
POTASSIUM BLD-SCNC: 4.5 MMOL/L (ref 3.5–5.5)
PROT SERPL-MCNC: 7.2 G/DL (ref 5.7–8.2)
PROT UR QL STRIP: ABNORMAL
RBC # BLD AUTO: 3.94 10*6/MM3 (ref 3.89–5.14)
RBC # UR: ABNORMAL /HPF
REF LAB TEST METHOD: ABNORMAL
SODIUM BLD-SCNC: 137 MMOL/L (ref 132–146)
SP GR UR STRIP: 1.02 (ref 1–1.03)
SQUAMOUS #/AREA URNS HPF: ABNORMAL /HPF
TROPONIN I SERPL-MCNC: 0.02 NG/ML
TROPONIN I SERPL-MCNC: 0.02 NG/ML (ref 0–0.07)
UROBILINOGEN UR QL STRIP: ABNORMAL
WBC NRBC COR # BLD: 9.68 10*3/MM3 (ref 3.5–10.8)
WBC UR QL AUTO: ABNORMAL /HPF

## 2017-08-25 PROCEDURE — 82805 BLOOD GASES W/O2 SATURATION: CPT | Performed by: EMERGENCY MEDICINE

## 2017-08-25 PROCEDURE — 71010 HC CHEST PA OR AP: CPT

## 2017-08-25 PROCEDURE — 84484 ASSAY OF TROPONIN QUANT: CPT

## 2017-08-25 PROCEDURE — 25010000002 FUROSEMIDE PER 20 MG: Performed by: EMERGENCY MEDICINE

## 2017-08-25 PROCEDURE — 36600 WITHDRAWAL OF ARTERIAL BLOOD: CPT | Performed by: EMERGENCY MEDICINE

## 2017-08-25 PROCEDURE — 92960 CARDIOVERSION ELECTRIC EXT: CPT | Performed by: EMERGENCY MEDICINE

## 2017-08-25 PROCEDURE — 25010000002 MIDAZOLAM PER 1 MG: Performed by: EMERGENCY MEDICINE

## 2017-08-25 PROCEDURE — 80053 COMPREHEN METABOLIC PANEL: CPT | Performed by: EMERGENCY MEDICINE

## 2017-08-25 PROCEDURE — 99284 EMERGENCY DEPT VISIT MOD MDM: CPT | Performed by: INTERNAL MEDICINE

## 2017-08-25 PROCEDURE — 93306 TTE W/DOPPLER COMPLETE: CPT

## 2017-08-25 PROCEDURE — 81001 URINALYSIS AUTO W/SCOPE: CPT | Performed by: EMERGENCY MEDICINE

## 2017-08-25 PROCEDURE — 51702 INSERT TEMP BLADDER CATH: CPT

## 2017-08-25 PROCEDURE — 92960 CARDIOVERSION ELECTRIC EXT: CPT

## 2017-08-25 PROCEDURE — 94660 CPAP INITIATION&MGMT: CPT

## 2017-08-25 PROCEDURE — 85025 COMPLETE CBC W/AUTO DIFF WBC: CPT | Performed by: EMERGENCY MEDICINE

## 2017-08-25 PROCEDURE — 93005 ELECTROCARDIOGRAM TRACING: CPT | Performed by: EMERGENCY MEDICINE

## 2017-08-25 PROCEDURE — 83880 ASSAY OF NATRIURETIC PEPTIDE: CPT | Performed by: EMERGENCY MEDICINE

## 2017-08-25 PROCEDURE — 84484 ASSAY OF TROPONIN QUANT: CPT | Performed by: EMERGENCY MEDICINE

## 2017-08-25 PROCEDURE — 99285 EMERGENCY DEPT VISIT HI MDM: CPT

## 2017-08-25 PROCEDURE — 96375 TX/PRO/DX INJ NEW DRUG ADDON: CPT

## 2017-08-25 PROCEDURE — 96374 THER/PROPH/DIAG INJ IV PUSH: CPT

## 2017-08-25 PROCEDURE — 93306 TTE W/DOPPLER COMPLETE: CPT | Performed by: INTERNAL MEDICINE

## 2017-08-25 RX ORDER — SOTALOL HYDROCHLORIDE 80 MG/1
80 TABLET ORAL 2 TIMES DAILY
Qty: 60 TABLET | Refills: 11 | Status: SHIPPED | OUTPATIENT
Start: 2017-08-25 | End: 2018-04-27 | Stop reason: SDUPTHER

## 2017-08-25 RX ORDER — MIDAZOLAM HYDROCHLORIDE 1 MG/ML
2 INJECTION INTRAMUSCULAR; INTRAVENOUS ONCE
Status: COMPLETED | OUTPATIENT
Start: 2017-08-25 | End: 2017-08-25

## 2017-08-25 RX ORDER — FUROSEMIDE 40 MG/1
40 TABLET ORAL AS NEEDED
Qty: 20 TABLET | Refills: 11 | Status: SHIPPED | OUTPATIENT
Start: 2017-08-25 | End: 2017-09-29 | Stop reason: HOSPADM

## 2017-08-25 RX ORDER — DILTIAZEM HYDROCHLORIDE 5 MG/ML
10 INJECTION INTRAVENOUS ONCE
Status: COMPLETED | OUTPATIENT
Start: 2017-08-25 | End: 2017-08-25

## 2017-08-25 RX ORDER — FUROSEMIDE 40 MG/1
40 TABLET ORAL AS NEEDED
Qty: 20 TABLET | Refills: 11 | Status: SHIPPED | OUTPATIENT
Start: 2017-08-25 | End: 2017-08-25

## 2017-08-25 RX ORDER — SOTALOL HYDROCHLORIDE 80 MG/1
80 TABLET ORAL 2 TIMES DAILY
Qty: 60 TABLET | Refills: 11 | Status: SHIPPED | OUTPATIENT
Start: 2017-08-25 | End: 2017-08-25

## 2017-08-25 RX ORDER — FUROSEMIDE 10 MG/ML
40 INJECTION INTRAMUSCULAR; INTRAVENOUS ONCE
Status: COMPLETED | OUTPATIENT
Start: 2017-08-25 | End: 2017-08-25

## 2017-08-25 RX ORDER — SOTALOL HYDROCHLORIDE 80 MG/1
80 TABLET ORAL EVERY 12 HOURS SCHEDULED
Status: DISCONTINUED | OUTPATIENT
Start: 2017-08-25 | End: 2017-08-25 | Stop reason: HOSPADM

## 2017-08-25 RX ORDER — POTASSIUM CHLORIDE 1500 MG/1
20 TABLET, FILM COATED, EXTENDED RELEASE ORAL AS NEEDED
Qty: 20 TABLET | Refills: 11 | Status: SHIPPED | OUTPATIENT
Start: 2017-08-25 | End: 2017-09-29 | Stop reason: HOSPADM

## 2017-08-25 RX ADMIN — MIDAZOLAM HYDROCHLORIDE 1.5 MG: 1 INJECTION, SOLUTION INTRAMUSCULAR; INTRAVENOUS at 13:55

## 2017-08-25 RX ADMIN — DILTIAZEM HYDROCHLORIDE 10 MG: 5 INJECTION INTRAVENOUS at 11:32

## 2017-08-25 RX ADMIN — Medication 50 MG: at 13:55

## 2017-08-25 RX ADMIN — FUROSEMIDE 40 MG: 10 INJECTION, SOLUTION INTRAMUSCULAR; INTRAVENOUS at 13:58

## 2017-08-25 NOTE — CONSULTS
[facility] at [facility]   Consult Note    Referring Provider: Dr. Ren Ordoñez    Reason for Consultation: atrial fibrillation    Patient Care Team:  YANETH Olivera as PCP - General (Family Medicine)     PROBLEM LIST:    1.  Essential Hypertension  2.  Paroxysmal Atrial Fibrillation                        A.  Post Operative AFib with RVR s/p ROMARIO Lobectomy 5/15/17                        B.  CHADS2 Vasc = 2.                        C.  Hypotension with IV Amiodarone.                          D.  Echo 5/19/17: EF 64%, mod MR/MS, mod TR                        E.  S/P ECV 5/19/17 with conversion to NSR    F. August 2017 hospital presentation atrial fibrillation with rapid ventricular response.    Status post cardioversion in the ER by Dr. Ordoñez  3.  Principal Lung Cancer                        A. 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   patient presented with cough and fatigue. CXR and CT chest on 4/10/17 showed a 2.5   x 3.1 cm ALBINA lesion. No evidence of adenopathy. CT guided biopsy was performed on   4/11/17. PET/CT on 4/17/17 showed hypermetabolic mass in the left lung and low                                  level activity in the neck bilaterally.                        C.  patient underwent left upper lobectomy on 5/12/17. Pathology showed a 2.5 cm   moderately to poorly differentiated squamous cell carcinoma, no visceral pleural invasion.   0/7 lymph nodes involved. pT1N0 (Stage 1A)  4.  Acquired Hypothyroidism  5.  Tobacco Abuse resolved one month ago  6. H/O MRSA  7. Diastolic heart failure   A. In setting of atrial fibrillation with RVR.                        A.  Under left breast                        B.  S/P I&D, wound packing, abx    C. Total thyroidectomy      No Known Allergies      No current facility-administered medications for this encounter.     Current Outpatient Prescriptions:   •  apixaban (ELIQUIS) 5 MG tablet tablet,  Take 1 tablet by mouth Every 12 (Twelve) Hours for 180 days., Disp: 180 tablet, Rfl: 1  •  furosemide (LASIX) 40 MG tablet, As Needed., Disp: , Rfl:   •  gabapentin (NEURONTIN) 600 MG tablet, Take 1 tablet by mouth 3 (Three) Times a Day., Disp: 90 tablet, Rfl: 3  •  levothyroxine (SYNTHROID, LEVOTHROID) 150 MCG tablet, Take 150 mcg by mouth Daily., Disp: , Rfl:   •  metoprolol succinate XL (TOPROL-XL) 25 MG 24 hr tablet, Take 25 mg by mouth Daily., Disp: , Rfl:   •  potassium chloride ER (K-TAB) 20 MEQ tablet controlled-release ER tablet, As Needed., Disp: , Rfl:   •  varenicline (CHANTIX STARTING MONTH ASHLEE) 0.5 MG X 11 & 1 MG X 42 tablet, Take 0.5 mg one daily on days 1-2 and 0.5 mg twice daily on days 4-7. Then 1 mg twice daily for a total of 12 weeks., Disp: 52 tablet, Rfl: 0  •  gabapentin (NEURONTIN) 300 MG capsule, Take 1 capsule by mouth 3 (Three) Times a Day., Disp: 90 capsule, Rfl: 3    Facility-Administered Medications Ordered in Other Encounters:   •  Chlorhexidine Gluconate Cloth 2 % pads 1 application, 1 application, Topical, Q12H PRN, YANETH Miller      No current facility-administered medications for this encounter.       (Not in a hospital admission)      Subjective .   History of present illness:      Patient is a 57-year-old  female who we are asked to see today for further evaluation of heart failure and atrial fibrillation.  She underwent surgical intervention for lung cancer earlier this year.  Postoperatively she had atrial fibrillation that required cardioversion.  Since that time she has been maintained on Eliquis therapy.  As well as beta blocker.  She notes that 2 days ago she began to experience tachycardia palpitations.  She then noticed that she became progressively more short of breath and presented to the emergency department for further evaluation.  Upon arrival to the emergency department she was noted to be in atrial fibrillation with rapid ventricular response.   Chest x-ray was performed suggested some heart failure.  She then continued to decline and have progressive heart failure.  She subsequently underwent external cardioversion with the emergency department physician.  She is treated with IV Lasix.  Currently she notes that her dyspnea is much improved and she feels back to her baseline.  She very much wants to go home.  Notes that currently at home she is only been taking Lasix on an as-needed basis.  Echocardiogram that was performed today revealed normal LVEF.      Social History     Social History   • Marital status:      Spouse name: N/A   • Number of children: 3   • Years of education: N/A     Occupational History   • [employer]      Short Term Disabiltiy     Social History Main Topics   • Smoking status: Former Smoker     Packs/day: 0.25     Years: 40.00     Types: Cigarettes   • Smokeless tobacco: Never Used   • Alcohol use No   • Drug use: No   • Sexual activity: Defer     Other Topics Concern   • Not on file     Social History Narrative    Lives with her      Family History   Problem Relation Age of Onset   • Breast cancer Mother    • Diabetes Father          Review of Systems:  Review of Systems   Constitution: Positive for malaise/fatigue. Negative for fever and weakness.   HENT: Negative for headaches and nosebleeds.    Eyes: Negative for redness and visual disturbance.   Cardiovascular: Positive for dyspnea on exertion, leg swelling and orthopnea. Negative for palpitations and paroxysmal nocturnal dyspnea.   Respiratory: Positive for cough, shortness of breath and wheezing. Negative for snoring and sputum production.    Hematologic/Lymphatic: Negative for bleeding problem.   Skin: Negative for flushing, itching and rash.   Musculoskeletal: Negative for falls, joint pain and muscle cramps.   Gastrointestinal: Negative for abdominal pain, diarrhea, heartburn, nausea and vomiting.   Genitourinary: Negative for hematuria.  "  Neurological: Negative for excessive daytime sleepiness, dizziness and tremors.   Psychiatric/Behavioral: Negative for substance abuse. The patient is nervous/anxious.               Objective   Vitals:  Blood pressure 148/88, pulse 92, temperature 98.3 °F (36.8 °C), temperature source Oral, resp. rate 25, height 62\" (157.5 cm), weight 195 lb (88.5 kg), SpO2 100 %.     Intake/Output Summary (Last 24 hours) at 08/25/17 1533  Last data filed at 08/25/17 1528   Gross per 24 hour   Intake                0 ml   Output             1325 ml   Net            -1325 ml       Physical Exam   Constitutional: She is oriented to person, place, and time. She appears well-developed and well-nourished. No distress.   Laying flat.   HENT:   Head: Normocephalic and atraumatic.   Eyes: Right eye exhibits no discharge. Left eye exhibits no discharge.   Neck: No JVD present. No tracheal deviation present.   No bruit auscultated bilaterally   Cardiovascular: Normal rate, regular rhythm, normal heart sounds and intact distal pulses.  Exam reveals no friction rub.    No murmur heard.  Pulmonary/Chest: Effort normal. No respiratory distress.   Mild right basilar rales.  Decreased left lower lobe.   Abdominal: Soft. Bowel sounds are normal. There is no tenderness.   Musculoskeletal: She exhibits no edema or deformity.   Neurological: She is alert and oriented to person, place, and time.   Skin: Skin is warm and dry.   Psychiatric: Her behavior is normal. Thought content normal.            Results Review:  I reviewed the patient's new clinical results.    Results from last 7 days  Lab Units 08/25/17  1130   WBC 10*3/mm3 9.68   HEMOGLOBIN g/dL 14.5   HEMATOCRIT % 41.5   PLATELETS 10*3/mm3 223       Results from last 7 days  Lab Units 08/25/17  1130   SODIUM mmol/L 137   POTASSIUM mmol/L 4.5   CHLORIDE mmol/L 109   CO2 mmol/L 23.0   BUN mg/dL 15   CREATININE mg/dL 0.60   CALCIUM mg/dL 8.7   BILIRUBIN mg/dL 0.5   ALK PHOS U/L 92   ALT (SGPT) U/L " 138*   AST (SGOT) U/L 141*   GLUCOSE mg/dL 159*       Results from last 7 days  Lab Units 08/25/17  1130   SODIUM mmol/L 137   POTASSIUM mmol/L 4.5   CHLORIDE mmol/L 109   CO2 mmol/L 23.0   BUN mg/dL 15   CREATININE mg/dL 0.60   GLUCOSE mg/dL 159*   CALCIUM mg/dL 8.7           Lab Results  Lab Value Date/Time   TROPONINI 0.023 08/25/2017 1130                       Tele:  Sinus rhythm    EKG: Initial EKG revealed atrial fibrillation with rapid ventricular response.      Assessment/Plan     1. Paroxysmal atrial fibrillation.Showed episode, second in 3 months.  Required cardioversion due to symptomatic state and heart failure  2. Heart failure, rapid ventricular response.  Normal left ventricular systolic function (echo reviewed by myself).  3. Hypertension  4. Moderate mitral stenosis by echo.  5. History of lung cancer status post lobectomy May 2017  6. Tobacco abuse with reported cessation.      Plan:    1. Patient does appear to be volume overloaded.  From cardiac standpoint is stable to be discharged home.  Instructed patient to take her Lasix daily for 3 days then as needed.  Given her repeat episode of atrial fibrillation now 3 months post surgical intervention will initiate antiarrhythmic therapy with sotalol 80 mg twice daily.  We'll have her get an EKG on Monday at our office.  We'll have her keep her scheduled follow-up with Dr. Lay on 9/26/17.      GUCCI Mandel obtained past medical, family history, social history, review of systems and functioned as a scribe for the remainder of the dictation for Dr. Blancas.      GUCCI Mandel  08/25/17  3:33 PM  ICem MD, personally performed the services described in this documentation as scribed by the above individual in my presence, and it is both accurate and complete    Dictated utilizing Dragon dictation

## 2017-08-25 NOTE — ED PROVIDER NOTES
Subjective   HPI Comments: Jill Miller is a 57 y.o.female with a hx of lung cancer who presents to the ED with c/o fatigue. For the past two days, she has felt fatigued, and developed constant heart palpitations. She describes them as her heart racing. Since onset, she has been intermittently diaphoretic with a cough. Due to continued symptoms, she presents to the ED.    At the ED she states that her lung cancer has been resolved, and she is not taking chemotherapy. At baseline she does not wear O2. She denies rhinorrhea, sore throat, vomiting, diarrhea, hematochezia or any other acute sx at this time.    She had a similar episode one month ago, which was eventually relieved with no intervention.      Patient is a 57 y.o. female presenting with palpitations.   History provided by:  Patient  Palpitations   Palpitations quality:  Irregular  Onset quality:  Unable to specify  Duration:  2 days  Timing:  Constant  Progression:  Unchanged  Relieved by:  None tried  Worsened by:  Nothing  Ineffective treatments:  None tried  Associated symptoms: cough and diaphoresis    Associated symptoms: no chest pain, no dizziness and no vomiting    Associated symptoms comment:  Fatigue      Review of Systems   Constitutional: Positive for diaphoresis and fatigue.   HENT: Negative for rhinorrhea and sore throat.    Respiratory: Positive for cough.    Cardiovascular: Positive for palpitations. Negative for chest pain.   Gastrointestinal: Negative for blood in stool, diarrhea and vomiting.   Neurological: Negative for dizziness.   All other systems reviewed and are negative.       06/27/17   Jill Miller is a 57 y.o.  female  retired.        Chief Complaint: Hospital Follow up on HTN, AFib     PROBLEM LIST:    1.  Essential Hypertension  2.  Paroxysmal Atrial Fibrillation                        A.  Post Operative AFib with RVR s/p ROMARIO Lobectomy 5/15/17                        B.  CHADS2 Vasc = 2.                        C.   Hypotension with IV Amiodarone.                          D.  Echo 5/19/17: EF 64%, mod MR/MS, mod TR                        E.  S/P ECV 5/19/17 with conversion to NSR  3.  Principal Lung Cancer                        A. 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   patient presented with cough and fatigue. CXR and CT chest on 4/10/17 showed a 2.5 x 3.1 cm ALBINA lesion. No evidence of adenopathy. CT guided biopsy was performed on 4/11/17. PET/CT on 4/17/17 showed hypermetabolic mass in the left lung and low                                  level activity in the neck bilaterally.                        C.  patient underwent left upper lobectomy on 5/12/17. Pathology showed a 2.5 cm moderately to poorly differentiated squamous cell carcinoma, no visceral pleural invasion. 0/7 lymph nodes involved. pT1N0 (Stage 1A)  4.  Acquired Hypothyroidism  5.  Tobacco Abuse  6. H/O MRSA                        A.  Under left breast                        B.  S/P I&D, wound packing, abx     Past Medical History:   Diagnosis Date   • Abscess     under left breast, mrsa    • Atrial fibrillation and flutter    • Disease of thyroid gland    • Graves disease    • Hypertension    • Hyperthyroidism    • Lung cancer    • MRSA (methicillin resistant staph aureus) culture positive 2014    under left breast, incision and debridement, treated with wound packing and po abx    • Tobacco abuse 4/10/2017       No Known Allergies    Past Surgical History:   Procedure Laterality Date   • ABSCESS DRAINAGE      under left breast d/t mrsa    • BRONCHOSCOPY Left 5/12/2017    Procedure: BRONCHOSCOPY;  Surgeon: Ajay Cheatham MD;  Location:  [facility];  Service:    • BRONCHOSCOPY N/A 5/18/2017    Procedure: BRONCHOSCOPY BIOPSY AT BEDSIDE;  Surgeon: Ajay Cheatham MD;  Location:  [facility];  Service:    • LUNG BIOPSY  04/2017   • LYMPH NODE DISSECTION Left 5/12/2017    Procedure: MEDIASTINAL LYMPH NODE  DISSECTION;  Surgeon: Ajay Cheatham MD;  Location:  [facility];  Service:    • THORACOSCOPY VIDEO ASSISTED WITH LOBECTOMY Left 5/12/2017    Procedure: THORACOSCOPY VIDEO ASSISTED WITH OPEN LOBECTOMY;  Surgeon: Ajay Cheatham MD;  Location:  [facility];  Service:    • TOTAL THYROIDECTOMY  approx 2012       Family History   Problem Relation Age of Onset   • Breast cancer Mother    • Diabetes Father        Social History     Social History   • Marital status:      Spouse name: N/A   • Number of children: 3   • Years of education: N/A     Occupational History   • [employer]      Short Term Disabiltiy     Social History Main Topics   • Smoking status: Former Smoker     Packs/day: 0.25     Years: 40.00     Types: Cigarettes   • Smokeless tobacco: Never Used   • Alcohol use No   • Drug use: No   • Sexual activity: Defer     Other Topics Concern   • None     Social History Narrative    Lives with her            Objective   Physical Exam   Constitutional: She is oriented to person, place, and time. She appears well-developed and well-nourished. No distress.   HENT:   Head: Normocephalic and atraumatic.   Mouth/Throat: No oropharyngeal exudate.   Poor dentition.   Eyes: Conjunctivae are normal. No scleral icterus.   Neck: Normal range of motion. Neck supple. No JVD present.   Cardiovascular: Normal rate.  An irregular rhythm present. Exam reveals no gallop and no friction rub.    No murmur heard.  Heart sounds irregular and distant.   Pulmonary/Chest: Effort normal and breath sounds normal. No respiratory distress. She has no wheezes. She has no rales.   Abdominal: Soft. Bowel sounds are normal. She exhibits no distension. There is no tenderness. There is no rebound and no guarding.   Mildly obese.   Musculoskeletal: Normal range of motion. She exhibits no edema.   Neurological: She is alert and oriented to person, place, and time. She has normal reflexes. She displays normal reflexes. No cranial nerve  "deficit.   Skin: Skin is warm and dry. She is not diaphoretic.   Psychiatric: She has a normal mood and affect. Her behavior is normal.   Nursing note and vitals reviewed.      Electrical Cardioversion  Date/Time: 8/25/2017 1:52 PM  Performed by: GONZALEZ SPEAR  Authorized by: GONZALEZ SPEAR   Consent: Verbal consent obtained.  Risks and benefits: risks, benefits and alternatives were discussed  Consent given by: patient  Patient understanding: patient states understanding of the procedure being performed  Patient consent: the patient's understanding of the procedure matches consent given  Procedure consent: procedure consent matches procedure scheduled  Imaging studies: imaging studies available  Patient identity confirmed: verbally with patient and arm band  Time out: Immediately prior to procedure a \"time out\" was called to verify the correct patient, procedure, equipment, support staff and site/side marked as required.    Sedation:  Patient sedated: yes  Sedation type: anxiolysis  Sedatives: methohexital and midazolam (1.5 mg midazolam, 50 mg methohexital)  Sedation start date/time: 8/25/2017 1:53 PM  Sedation end date/time: 8/25/2017 1:55 PM  Vitals: Vital signs were monitored during sedation.  Cardioversion basis: elective  Indications: failure of anti-arrhythmic medications  Pre-procedure rhythm: atrial fibrillation  Patient position: patient was placed in a supine position  Electrodes: pads  Electrodes placed: anterior-posterior  Number of attempts: 1  Attempt 1 mode: synchronous  Attempt 1 waveform: biphasic  Attempt 1 shock (in Joules): 200  Attempt 1 outcome: conversion to normal sinus rhythm  Post-procedure rhythm: normal sinus rhythm    Critical Care  Performed by: GONZALEZ SPEAR  Authorized by: GONZALEZ SPEAR   Total critical care time: 40 minutes  Critical care was necessary to treat or prevent imminent or life-threatening deterioration of the following conditions: cardiac failure.  Critical " care was time spent personally by me on the following activities: obtaining history from patient or surrogate, pulse oximetry, development of treatment plan with patient or surrogate, ordering and performing treatments and interventions, re-evaluation of patient's condition, review of old charts, ordering and review of laboratory studies, evaluation of patient's response to treatment, examination of patient, ordering and review of radiographic studies and discussions with consultants.               ED Course  ED Course   Comment By Time   Dr. Ordoñez spoke with the pt, and discussed all findings. She states that she has been SoA recently, which appears to be secondary to CHF seen on her CXR. She is more severely SoA that she was upon original presentation. [facility] 08/25 1352   Following elective electro cardioversion, the pt had flash pulmonary edema. She was closely watched, and O2 sats gradually increased. Upon waking up she states that she needed to urinate, and does not c/o any other symptoms. At this point she is back to baseline and in a normal rhythm. [facility] 08/25 1429      Recent Results (from the past 24 hour(s))   Comprehensive Metabolic Panel    Collection Time: 08/25/17 11:30 AM   Result Value Ref Range    Glucose 159 (H) 70 - 100 mg/dL    BUN 15 9 - 23 mg/dL    Creatinine 0.60 0.60 - 1.30 mg/dL    Sodium 137 132 - 146 mmol/L    Potassium 4.5 3.5 - 5.5 mmol/L    Chloride 109 99 - 109 mmol/L    CO2 23.0 20.0 - 31.0 mmol/L    Calcium 8.7 8.7 - 10.4 mg/dL    Total Protein 7.2 5.7 - 8.2 g/dL    Albumin 4.30 3.20 - 4.80 g/dL    ALT (SGPT) 138 (H) 7 - 40 U/L    AST (SGOT) 141 (H) 0 - 33 U/L    Alkaline Phosphatase 92 25 - 100 U/L    Total Bilirubin 0.5 0.3 - 1.2 mg/dL    eGFR Non African Amer 104 >60 mL/min/1.73    Globulin 2.9 gm/dL    A/G Ratio 1.5 1.5 - 2.5 g/dL    BUN/Creatinine Ratio 25.0 7.0 - 25.0    Anion Gap 5.0 3.0 - 11.0 mmol/L   BNP    Collection Time: 08/25/17 11:30 AM    Result Value Ref Range    BNP 1107.0 (H) 0.0 - 100.0 pg/mL   CBC Auto Differential    Collection Time: 08/25/17 11:30 AM   Result Value Ref Range    WBC 9.68 3.50 - 10.80 10*3/mm3    RBC 3.94 3.89 - 5.14 10*6/mm3    Hemoglobin 14.5 11.5 - 15.5 g/dL    Hematocrit 41.5 34.5 - 44.0 %    .3 (H) 80.0 - 99.0 fL    MCH 36.8 (H) 27.0 - 31.0 pg    MCHC 34.9 32.0 - 36.0 g/dL    RDW 11.9 11.3 - 14.5 %    RDW-SD 46.2 37.0 - 54.0 fl    MPV 10.6 6.0 - 12.0 fL    Platelets 223 150 - 450 10*3/mm3    Neutrophil % 73.8 (H) 41.0 - 71.0 %    Lymphocyte % 17.7 (L) 24.0 - 44.0 %    Monocyte % 7.3 0.0 - 12.0 %    Eosinophil % 0.6 0.0 - 3.0 %    Basophil % 0.2 0.0 - 1.0 %    Immature Grans % 0.4 0.0 - 0.6 %    Neutrophils, Absolute 7.14 1.50 - 8.30 10*3/mm3    Lymphocytes, Absolute 1.71 0.60 - 4.80 10*3/mm3    Monocytes, Absolute 0.71 0.00 - 1.00 10*3/mm3    Eosinophils, Absolute 0.06 0.00 - 0.30 10*3/mm3    Basophils, Absolute 0.02 0.00 - 0.20 10*3/mm3    Immature Grans, Absolute 0.04 (H) 0.00 - 0.03 10*3/mm3   Troponin    Collection Time: 08/25/17 11:30 AM   Result Value Ref Range    Troponin I 0.023 <=0.039 ng/mL   Urinalysis With / Culture If Indicated    Collection Time: 08/25/17 11:43 AM   Result Value Ref Range    Color, UA Yellow Yellow, Straw    Appearance, UA Cloudy (A) Clear    pH, UA <=5.0 5.0 - 8.0    Specific Gravity, UA 1.020 1.001 - 1.030    Glucose, UA Negative Negative    Ketones, UA Negative Negative    Bilirubin, UA Negative Negative    Blood, UA Large (3+) (A) Negative    Protein, UA Trace (A) Negative    Leuk Esterase, UA Negative Negative    Nitrite, UA Negative Negative    Urobilinogen, UA 0.2 E.U./dL 0.2 - 1.0 E.U./dL   Urinalysis, Microscopic Only    Collection Time: 08/25/17 11:43 AM   Result Value Ref Range    RBC, UA 3-6 (A) None Seen, 0-2 /HPF    WBC, UA 3-5 (A) None Seen /HPF    Bacteria, UA None Seen None Seen, Trace /HPF    Squamous Epithelial Cells, UA 13-20 (A) None Seen, 0-2 /HPF    Hyaline  Casts, UA 0-6 0 - 6 /LPF    Methodology Automated Microscopy    Blood Gas, Arterial    Collection Time: 08/25/17  2:33 PM   Result Value Ref Range    Site Arterial: left radial     Zen's Test N/A     pH, Arterial 7.296 (L) 7.350 - 7.450 pH units    pCO2, Arterial 51.6 (H) 35.0 - 45.0 mm Hg    pO2, Arterial 156.0 (H) 83.0 - 108.0 mm Hg    HCO3, Arterial 25.1 20.0 - 26.0 mmol/L    Base Excess, Arterial -2.1 (L) 0.0 - 2.0 mmol/L    Hemoglobin, Blood Gas 15.0 14 - 18 g/dL    Hematocrit, Blood Gas 46.1 %    Oxyhemoglobin 97.5 94 - 99 %    Methemoglobin 0.80 0.00 - 1.50 %    Carboxyhemoglobin 1.2 0 - 2 %    CO2 Content 26.7 22 - 33    Barometric Pressure for Blood Gas  mmHg    Modality non re-breather     FIO2 100 %   POC Troponin, Rapid    Collection Time: 08/25/17  2:37 PM   Result Value Ref Range    Troponin I 0.02 0.00 - 0.07 ng/mL   Adult Transthoracic Echo Complete    Collection Time: 08/25/17  3:32 PM   Result Value Ref Range    BSA 1.9 m^2    BH CV ECHO ANKUR - RVDD 2.7 cm    IVSd 1.2 cm    LVIDd 4.0 cm    LVIDs 2.8 cm    LVPWd 0.92 cm    IVS/LVPW 1.3     FS 29.0 %    EDV(Teich) 70.1 ml    ESV(Teich) 30.7 ml    EF(Teich) 56.3 %    EDV(cubed) 64.1 ml    ESV(cubed) 23.0 ml    EF(cubed) 64.2 %    LV mass(C)d 135.9 grams    LV mass(C)dI 71.9 grams/m^2    SV(Teich) 39.5 ml    SI(Teich) 20.9 ml/m^2    SV(cubed) 41.2 ml    SI(cubed) 21.8 ml/m^2    Ao root diam 3.0 cm    Ao root area 7.3 cm^2    LA dimension 4.5 cm    LA/Ao 1.5     LVOT diam 1.9 cm    LVOT area 2.8 cm^2    LVOT area(traced) 2.8 cm^2    LVLd ap4 6.1 cm    EDV(MOD-sp4) 83.0 ml    LVLs ap4 5.4 cm    ESV(MOD-sp4) 30.0 ml    EF(MOD-sp4) 63.9 %    LVLd ap2 7.0 cm    EDV(MOD-sp2) 78.0 ml    LVLs ap2 6.3 cm    ESV(MOD-sp2) 41.0 ml    EF(MOD-sp2) 47.4 %    SV(MOD-sp4) 53.0 ml    SI(MOD-sp4) 28.0 ml/m^2    SV(MOD-sp2) 37.0 ml    SI(MOD-sp2) 19.6 ml/m^2    Ao root area (BSA corrected) 1.6     Ao root area (BSA corrected) 47.4 ml/m^2    CONTRAST EF 4CH 63.9 ml/m^2     LV Diastolic corrected for BSA 43.9 ml/m^2    LV Systolic corrected for BSA 15.9 ml/m^2    MV E max tony 169.2 cm/sec    MV A max tony 141.0 cm/sec    MV E/A 1.2     MV V2 max 230.2 cm/sec    MV max PG 21.2 mmHg    MV V2 mean 142.6 cm/sec    MV mean PG 9.3 mmHg    MV V2 VTI 58.7 cm    MVA(VTI) 0.89 cm^2    LV V1 max PG 6.0 mmHg    LV V1 mean PG 2.8 mmHg    LV V1 max 122.5 cm/sec    LV V1 mean 76.4 cm/sec    LV V1 VTI 18.4 cm    SV(LVOT) 52.2 ml    SI(LVOT) 27.6 ml/m^2    PA acc slope 208.5 cm/sec^2    PA acc time 0.21 sec    TR max tony 299.9 cm/sec    RVSP(TR) 44.2 mmHg    RAP systole 8.0 mmHg    PA pr(Accel) -13.7 mmHg     CV ECHO ANKUR - BZI_BMI 35.7 kilograms/m^2     CV ECHO ANKUR - BSA(HAYCOCK) 2.0 m^2     CV ECHO ANKUR - BZI_METRIC_WEIGHT 88.5 kg     CV ECHO ANKUR - BZI_METRIC_HEIGHT 157.5 cm     CV VAS BP RIGHT /84 mmHg    TDI S' 13.10 cm/sec    RV Base 3.91 cm    RV Length 5.50 cm    RV Mid 2.90 cm    E/E' ratio 39.0     Lat Peak E' Tony 4.3 cm/sec    Med Peak E' Tony 3.91 cm/sec    TAPSE (>1.6) 1.70 cm2     Note: In addition to lab results from this visit, the labs listed above may include labs taken at another facility or during a different encounter within the last 24 hours. Please correlate lab times with ED admission and discharge times for further clarification of the services performed during this visit.    XR Chest 1 View   Final Result   No significant interval change in pulmonary findings with   increased central pulmonary vascularity and near total opacification of   the left hemithorax.        D:  08/25/2017   E:  08/25/2017       This report was finalized on 8/25/2017 5:32 PM by Dr. Zachary Meek.          XR Chest 1 View   Final Result   Redemonstration of opacifications involving the left   hemithorax with decreased confluency and density compared to prior,   however, may represent layering effusion as left costophrenic sulcus has   blunting.        D:  08/25/2017   E:  08/25/2017        This report was finalized on 8/25/2017 1:54 PM by Dr. Zachary Meek.            Vitals:    08/25/17 1600 08/25/17 1615 08/25/17 1630 08/25/17 1719   BP: 122/74 122/71 124/86 122/83   BP Location:       Patient Position:       Pulse: 97 99 95 98   Resp:    20   Temp:       TempSrc:       SpO2: 91% 92% 94% 98%   Weight:       Height:         Medications   Pharmacy Meds to Bed Consult (not administered)   sotalol (BETAPACE) tablet 80 mg (not administered)   diltiaZEM (CARDIZEM) injection 10 mg (10 mg Intravenous Given 8/25/17 1132)   methohexital (BREVITAL) injection solution prefilled syringe 100 mg (50 mg Intravenous Given by Other 8/25/17 1355)   midazolam (VERSED) injection 2 mg (1.5 mg Intravenous Given by Other 8/25/17 1355)   furosemide (LASIX) injection 40 mg (40 mg Intravenous Given 8/25/17 1358)     ECG/EMG Results (last 24 hours)     Procedure Component Value Units Date/Time    ECG 12 Lead [[id number]] Collected:  08/25/17 1039     Updated:  08/25/17 1041                    MDM  Number of Diagnoses or Management Options  Acute congestive heart failure, unspecified congestive heart failure type:   Atrial fibrillation with rapid ventricular response:   Diagnosis management comments:       I reviewed all available studies the bedside with the patient and her .    It appears the patient has atrial fibrillation with rapid ventricular response.    I talked her about her options at that the most reasonable was to proceed with electrical cardioversion with procedural sedation.  She is had some water approximately 2 hour prior to arrival felt we needed to wait a little bit he has status.    I went back to his reexamination the patient prior to procedure she had increased work of breathing and was looking in somewhat distress.  Thought she probably had some acute pulmonary edema is review of her labs showed her BNP was elevated.    At this point the cardioversion became an emergent procedure and she was placed  on 100% closed face mask and was given 1.5 mg of Versed IV and 50 mg of Brevital IV with a good level of sedation had been achieved she was given 200 J synchronized cardioversion which put her back into sinus rhythm.    Maintaining her airway jaw thrust she did desaturate to 88% for a about 60 seconds then her oxygen saturation came back up into the 90s.  She was given 40 mg of IV Lasix and a Erwin catheter was placed temporarily in order so that patient can urinate and sedated state.  She had good urine output BiPAP was temporarily started.  On reexamination the patient was feeling 100% better.    I ordered a stat bedside echo and chest x-ray area    I consult to Dr. Blancas, cardiology, and he is seen and examined the patient and reviewed her echo.    On reexamination the patient felt backed her baseline and wanted to go home.  Dr. Blancas discharge to home on sotalol and Lasix the patient will follow-up with him in his office on Monday and will return to the ER if worse in any way.    All are agreeable with plan       Amount and/or Complexity of Data Reviewed  Clinical lab tests: reviewed  Tests in the radiology section of CPT®: reviewed  Tests in the medicine section of CPT®: reviewed  Decide to obtain previous medical records or to obtain history from someone other than the patient: yes        Final diagnoses:   Atrial fibrillation with rapid ventricular response   Acute congestive heart failure, unspecified congestive heart failure type            Manjinder Jorge  08/25/17 1134       Manjinder Jorge  08/25/17 1411       Ren Ordoñez MD  08/25/17 3391

## 2017-08-28 ENCOUNTER — CLINICAL SUPPORT (OUTPATIENT)
Dept: CARDIOLOGY | Facility: CLINIC | Age: 57
End: 2017-08-28

## 2017-08-28 DIAGNOSIS — I48.0 PAROXYSMAL ATRIAL FIBRILLATION (HCC): Primary | ICD-10-CM

## 2017-08-28 LAB
BH CV ECHO MEAS - AO ROOT AREA (BSA CORRECTED): 1.6
BH CV ECHO MEAS - AO ROOT AREA: 7.3 CM^2
BH CV ECHO MEAS - AO ROOT DIAM: 3 CM
BH CV ECHO MEAS - BSA(HAYCOCK): 2 M^2
BH CV ECHO MEAS - BSA: 1.9 M^2
BH CV ECHO MEAS - BZI_BMI: 35.7 KILOGRAMS/M^2
BH CV ECHO MEAS - BZI_METRIC_HEIGHT: 157.5 CM
BH CV ECHO MEAS - BZI_METRIC_WEIGHT: 88.5 KG
BH CV ECHO MEAS - CONTRAST EF (2CH): 47.4 ML/M^2
BH CV ECHO MEAS - CONTRAST EF 4CH: 63.9 ML/M^2
BH CV ECHO MEAS - EDV(CUBED): 64.1 ML
BH CV ECHO MEAS - EDV(MOD-SP2): 78 ML
BH CV ECHO MEAS - EDV(MOD-SP4): 83 ML
BH CV ECHO MEAS - EDV(TEICH): 70.1 ML
BH CV ECHO MEAS - EF(CUBED): 64.2 %
BH CV ECHO MEAS - EF(MOD-SP2): 47.4 %
BH CV ECHO MEAS - EF(MOD-SP4): 63.9 %
BH CV ECHO MEAS - EF(TEICH): 56.3 %
BH CV ECHO MEAS - ESV(CUBED): 23 ML
BH CV ECHO MEAS - ESV(MOD-SP2): 41 ML
BH CV ECHO MEAS - ESV(MOD-SP4): 30 ML
BH CV ECHO MEAS - ESV(TEICH): 30.7 ML
BH CV ECHO MEAS - FS: 29 %
BH CV ECHO MEAS - IVS/LVPW: 1.3
BH CV ECHO MEAS - IVSD: 1.2 CM
BH CV ECHO MEAS - LA DIMENSION: 4.5 CM
BH CV ECHO MEAS - LA/AO: 1.5
BH CV ECHO MEAS - LAT PEAK E' VEL: 4.3 CM/SEC
BH CV ECHO MEAS - LV DIASTOLIC VOL/BSA (35-75): 43.9 ML/M^2
BH CV ECHO MEAS - LV MASS(C)D: 135.9 GRAMS
BH CV ECHO MEAS - LV MASS(C)DI: 71.9 GRAMS/M^2
BH CV ECHO MEAS - LV MAX PG: 6 MMHG
BH CV ECHO MEAS - LV MEAN PG: 2.8 MMHG
BH CV ECHO MEAS - LV SYSTOLIC VOL/BSA (12-30): 15.9 ML/M^2
BH CV ECHO MEAS - LV V1 MAX: 122.5 CM/SEC
BH CV ECHO MEAS - LV V1 MEAN: 76.4 CM/SEC
BH CV ECHO MEAS - LV V1 VTI: 18.4 CM
BH CV ECHO MEAS - LVIDD: 4 CM
BH CV ECHO MEAS - LVIDS: 2.8 CM
BH CV ECHO MEAS - LVLD AP2: 7 CM
BH CV ECHO MEAS - LVLD AP4: 6.1 CM
BH CV ECHO MEAS - LVLS AP2: 6.3 CM
BH CV ECHO MEAS - LVLS AP4: 5.4 CM
BH CV ECHO MEAS - LVOT AREA (M): 2.8 CM^2
BH CV ECHO MEAS - LVOT AREA: 2.8 CM^2
BH CV ECHO MEAS - LVOT DIAM: 1.9 CM
BH CV ECHO MEAS - LVPWD: 0.92 CM
BH CV ECHO MEAS - MED PEAK E' VEL: 3.91 CM/SEC
BH CV ECHO MEAS - MV A MAX VEL: 141 CM/SEC
BH CV ECHO MEAS - MV E MAX VEL: 169.2 CM/SEC
BH CV ECHO MEAS - MV E/A: 1.2
BH CV ECHO MEAS - MV MAX PG: 21.2 MMHG
BH CV ECHO MEAS - MV MEAN PG: 9.3 MMHG
BH CV ECHO MEAS - MV V2 MAX: 230.2 CM/SEC
BH CV ECHO MEAS - MV V2 MEAN: 142.6 CM/SEC
BH CV ECHO MEAS - MV V2 VTI: 58.7 CM
BH CV ECHO MEAS - MVA(VTI): 0.89 CM^2
BH CV ECHO MEAS - PA ACC SLOPE: 208.5 CM/SEC^2
BH CV ECHO MEAS - PA ACC TIME: 0.21 SEC
BH CV ECHO MEAS - PA PR(ACCEL): -13.7 MMHG
BH CV ECHO MEAS - RAP SYSTOLE: 8 MMHG
BH CV ECHO MEAS - RVDD: 2.7 CM
BH CV ECHO MEAS - RVSP: 50 MMHG
BH CV ECHO MEAS - SI(CUBED): 21.8 ML/M^2
BH CV ECHO MEAS - SI(LVOT): 27.6 ML/M^2
BH CV ECHO MEAS - SI(MOD-SP2): 19.6 ML/M^2
BH CV ECHO MEAS - SI(MOD-SP4): 28 ML/M^2
BH CV ECHO MEAS - SI(TEICH): 20.9 ML/M^2
BH CV ECHO MEAS - SV(CUBED): 41.2 ML
BH CV ECHO MEAS - SV(LVOT): 52.2 ML
BH CV ECHO MEAS - SV(MOD-SP2): 37 ML
BH CV ECHO MEAS - SV(MOD-SP4): 53 ML
BH CV ECHO MEAS - SV(TEICH): 39.5 ML
BH CV ECHO MEAS - TAPSE (>1.6): 1.7 CM2
BH CV ECHO MEAS - TR MAX VEL: 299.9 CM/SEC
BH CV VAS BP RIGHT ARM: NORMAL MMHG
BH CV XLRA - RV BASE: 3.91 CM
BH CV XLRA - RV LENGTH: 5.5 CM
BH CV XLRA - RV MID: 2.9 CM
BH CV XLRA - TDI S': 13.1 CM/SEC
E/E' RATIO: 39
LV EF 2D ECHO EST: 60 %

## 2017-08-28 PROCEDURE — 93000 ELECTROCARDIOGRAM COMPLETE: CPT | Performed by: INTERNAL MEDICINE

## 2017-09-24 ENCOUNTER — APPOINTMENT (OUTPATIENT)
Dept: GENERAL RADIOLOGY | Facility: HOSPITAL | Age: 57
End: 2017-09-24

## 2017-09-24 ENCOUNTER — HOSPITAL ENCOUNTER (EMERGENCY)
Facility: HOSPITAL | Age: 57
Discharge: HOME | End: 2017-09-24
Attending: EMERGENCY MEDICINE | Admitting: EMERGENCY MEDICINE

## 2017-09-24 VITALS
DIASTOLIC BLOOD PRESSURE: 73 MMHG | RESPIRATION RATE: 24 BRPM | TEMPERATURE: 97.8 F | HEART RATE: 70 BPM | OXYGEN SATURATION: 94 % | WEIGHT: 194 LBS | BODY MASS INDEX: 35.7 KG/M2 | HEIGHT: 62 IN | SYSTOLIC BLOOD PRESSURE: 104 MMHG

## 2017-09-24 DIAGNOSIS — I27.20 PULMONARY HYPERTENSION (HCC): ICD-10-CM

## 2017-09-24 DIAGNOSIS — I48.0 PAROXYSMAL ATRIAL FIBRILLATION (HCC): Primary | ICD-10-CM

## 2017-09-24 LAB
ALBUMIN SERPL-MCNC: 4 G/DL (ref 3.2–4.8)
ALBUMIN/GLOB SERPL: 1.3 G/DL (ref 1.5–2.5)
ALP SERPL-CCNC: 94 U/L (ref 25–100)
ALT SERPL W P-5'-P-CCNC: 33 U/L (ref 7–40)
ANION GAP SERPL CALCULATED.3IONS-SCNC: 5 MMOL/L (ref 3–11)
AST SERPL-CCNC: 35 U/L (ref 0–33)
BASOPHILS # BLD AUTO: 0.03 10*3/MM3 (ref 0–0.2)
BASOPHILS NFR BLD AUTO: 0.5 % (ref 0–1)
BILIRUB SERPL-MCNC: 0.4 MG/DL (ref 0.3–1.2)
BNP SERPL-MCNC: 700 PG/ML (ref 0–100)
BUN BLD-MCNC: 19 MG/DL (ref 9–23)
BUN/CREAT SERPL: 27.1 (ref 7–25)
CALCIUM SPEC-SCNC: 9.3 MG/DL (ref 8.7–10.4)
CHLORIDE SERPL-SCNC: 107 MMOL/L (ref 99–109)
CO2 SERPL-SCNC: 25 MMOL/L (ref 20–31)
CREAT BLD-MCNC: 0.7 MG/DL (ref 0.6–1.3)
DEPRECATED RDW RBC AUTO: 46.6 FL (ref 37–54)
EOSINOPHIL # BLD AUTO: 0.07 10*3/MM3 (ref 0–0.3)
EOSINOPHIL NFR BLD AUTO: 1.1 % (ref 0–3)
ERYTHROCYTE [DISTWIDTH] IN BLOOD BY AUTOMATED COUNT: 12.2 % (ref 11.3–14.5)
GFR SERPL CREATININE-BSD FRML MDRD: 87 ML/MIN/1.73
GLOBULIN UR ELPH-MCNC: 3.1 GM/DL
GLUCOSE BLD-MCNC: 130 MG/DL (ref 70–100)
HCT VFR BLD AUTO: 42.2 % (ref 34.5–44)
HGB BLD-MCNC: 14.4 G/DL (ref 11.5–15.5)
HOLD SPECIMEN: NORMAL
HOLD SPECIMEN: NORMAL
IMM GRANULOCYTES # BLD: 0.01 10*3/MM3 (ref 0–0.03)
IMM GRANULOCYTES NFR BLD: 0.2 % (ref 0–0.6)
LIPASE SERPL-CCNC: 71 U/L (ref 6–51)
LYMPHOCYTES # BLD AUTO: 1.57 10*3/MM3 (ref 0.6–4.8)
LYMPHOCYTES NFR BLD AUTO: 24 % (ref 24–44)
MCH RBC QN AUTO: 36.1 PG (ref 27–31)
MCHC RBC AUTO-ENTMCNC: 34.1 G/DL (ref 32–36)
MCV RBC AUTO: 105.8 FL (ref 80–99)
MONOCYTES # BLD AUTO: 0.44 10*3/MM3 (ref 0–1)
MONOCYTES NFR BLD AUTO: 6.7 % (ref 0–12)
NEUTROPHILS # BLD AUTO: 4.42 10*3/MM3 (ref 1.5–8.3)
NEUTROPHILS NFR BLD AUTO: 67.5 % (ref 41–71)
PLATELET # BLD AUTO: 202 10*3/MM3 (ref 150–450)
PMV BLD AUTO: 10.6 FL (ref 6–12)
POTASSIUM BLD-SCNC: 4.3 MMOL/L (ref 3.5–5.5)
PROT SERPL-MCNC: 7.1 G/DL (ref 5.7–8.2)
RBC # BLD AUTO: 3.99 10*6/MM3 (ref 3.89–5.14)
SODIUM BLD-SCNC: 137 MMOL/L (ref 132–146)
TROPONIN I SERPL-MCNC: 0.01 NG/ML (ref 0–0.07)
WBC NRBC COR # BLD: 6.54 10*3/MM3 (ref 3.5–10.8)
WHOLE BLOOD HOLD SPECIMEN: NORMAL

## 2017-09-24 PROCEDURE — 83880 ASSAY OF NATRIURETIC PEPTIDE: CPT | Performed by: EMERGENCY MEDICINE

## 2017-09-24 PROCEDURE — 99285 EMERGENCY DEPT VISIT HI MDM: CPT

## 2017-09-24 PROCEDURE — 71010 HC CHEST PA OR AP: CPT

## 2017-09-24 PROCEDURE — 92960 CARDIOVERSION ELECTRIC EXT: CPT | Performed by: EMERGENCY MEDICINE

## 2017-09-24 PROCEDURE — 25010000002 MIDAZOLAM PER 1 MG: Performed by: EMERGENCY MEDICINE

## 2017-09-24 PROCEDURE — 80053 COMPREHEN METABOLIC PANEL: CPT | Performed by: EMERGENCY MEDICINE

## 2017-09-24 PROCEDURE — 85025 COMPLETE CBC W/AUTO DIFF WBC: CPT | Performed by: EMERGENCY MEDICINE

## 2017-09-24 PROCEDURE — 83690 ASSAY OF LIPASE: CPT | Performed by: EMERGENCY MEDICINE

## 2017-09-24 PROCEDURE — 93005 ELECTROCARDIOGRAM TRACING: CPT

## 2017-09-24 PROCEDURE — 84484 ASSAY OF TROPONIN QUANT: CPT

## 2017-09-24 RX ORDER — MIDAZOLAM HYDROCHLORIDE 1 MG/ML
2 INJECTION INTRAMUSCULAR; INTRAVENOUS ONCE
Status: COMPLETED | OUTPATIENT
Start: 2017-09-24 | End: 2017-09-24

## 2017-09-24 RX ORDER — ASPIRIN 81 MG/1
324 TABLET, CHEWABLE ORAL ONCE
Status: DISCONTINUED | OUTPATIENT
Start: 2017-09-24 | End: 2017-09-24

## 2017-09-24 RX ORDER — SODIUM CHLORIDE 0.9 % (FLUSH) 0.9 %
10 SYRINGE (ML) INJECTION AS NEEDED
Status: DISCONTINUED | OUTPATIENT
Start: 2017-09-24 | End: 2017-09-24 | Stop reason: HOSPADM

## 2017-09-24 RX ADMIN — MIDAZOLAM HYDROCHLORIDE 1 MG: 1 INJECTION, SOLUTION INTRAMUSCULAR; INTRAVENOUS at 14:07

## 2017-09-24 RX ADMIN — Medication 50 MG: at 14:08

## 2017-09-24 NOTE — ED PROVIDER NOTES
Subjective   HPI Comments: Jill Miller is a 57 y.o. female with a fib who presents to the ED with c/o palpitations. The patient reports that she has been in a fib for about a week now and also notes of associated chest pain, intermittent episodes of diaphoresis, and swelling in her lower extremities. She denies any fever, rhinorrhea, cough, problems with her bowel movements, urinary sx, or any other complaints at this time. The patient is currently taking Eliquis.     Patient is a 57 y.o. female presenting with palpitations.   History provided by:  Patient  Palpitations   Palpitations quality:  Irregular  Onset quality:  Gradual  Duration:  1 week  Timing:  Constant  Progression:  Worsening  Chronicity:  New  Relieved by:  None tried  Worsened by:  Nothing  Ineffective treatments:  None tried  Associated symptoms: chest pain, diaphoresis and lower extremity edema    Associated symptoms: no cough    Risk factors: hx of atrial fibrillation        Review of Systems   Constitutional: Positive for diaphoresis. Negative for chills and fever.   HENT: Negative for rhinorrhea.    Respiratory: Negative for cough.    Cardiovascular: Positive for chest pain and palpitations.   All other systems reviewed and are negative.      Past Medical History:   Diagnosis Date   • Abscess     under left breast, mrsa    • Atrial fibrillation and flutter    • Disease of thyroid gland    • Graves disease    • Hypertension    • Hyperthyroidism    • Lung cancer    • MRSA (methicillin resistant staph aureus) culture positive 2014    under left breast, incision and debridement, treated with wound packing and po abx    • Tobacco abuse 4/10/2017       No Known Allergies    Past Surgical History:   Procedure Laterality Date   • ABSCESS DRAINAGE      under left breast d/t mrsa    • BRONCHOSCOPY Left 5/12/2017    Procedure: BRONCHOSCOPY;  Surgeon: Ajay Cheatham MD;  Location: [facility];  Service:    • BRONCHOSCOPY N/A 5/18/2017    Procedure:  BRONCHOSCOPY BIOPSY AT BEDSIDE;  Surgeon: Ajay Cheatham MD;  Location:  [facility];  Service:    • LUNG BIOPSY  04/2017   • LYMPH NODE DISSECTION Left 5/12/2017    Procedure: MEDIASTINAL LYMPH NODE DISSECTION;  Surgeon: Ajay Cheatham MD;  Location:  [facility];  Service:    • THORACOSCOPY VIDEO ASSISTED WITH LOBECTOMY Left 5/12/2017    Procedure: THORACOSCOPY VIDEO ASSISTED WITH OPEN LOBECTOMY;  Surgeon: Ajay Cheatham MD;  Location:  [facility];  Service:    • TOTAL THYROIDECTOMY  approx 2012       Family History   Problem Relation Age of Onset   • Breast cancer Mother    • Diabetes Father        Social History     Social History   • Marital status:      Spouse name: N/A   • Number of children: 3   • Years of education: N/A     Occupational History   • [employer]      Short Term Disabiltiy     Social History Main Topics   • Smoking status: Former Smoker     Packs/day: 0.25     Years: 40.00     Types: Cigarettes   • Smokeless tobacco: Never Used   • Alcohol use No   • Drug use: No   • Sexual activity: Defer     Other Topics Concern   • None     Social History Narrative    Lives with her      Consults  Date of Service: 8/25/2017 3:33 PM  Cem Blancas MD   Cardiology       PROBLEM LIST:    1.  Essential Hypertension  2.  Paroxysmal Atrial Fibrillation                        A.  Post Operative AFib with RVR s/p ROMARIO Lobectomy 5/15/17                        B.  CHADS2 Vasc = 2.                        C.  Hypotension with IV Amiodarone.                          D.  Echo 5/19/17: EF 64%, mod MR/MS, mod TR                        E.  S/P ECV 5/19/17 with conversion to NSR                                              F. August 2017 hospital presentation atrial fibrillation with rapid ventricular response.                                       Status post cardioversion in the ER by Dr. Ordoñez  3.  Principal Lung Cancer                        A. 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   patient presented with cough and fatigue. CXR and CT chest on 4/10/17 showed a 2.5                                      x 3.1 cm ALBINA lesion. No evidence of adenopathy. CT guided biopsy was performed on                                  4/11/17. PET/CT on 4/17/17 showed hypermetabolic mass in the left lung and low                                  level activity in the neck bilaterally.                        C.  patient underwent left upper lobectomy on 5/12/17. Pathology showed a 2.5 cm                                           moderately to poorly differentiated squamous cell carcinoma, no visceral pleural invasion.                                      0/7 lymph nodes involved. pT1N0 (Stage 1A)  4.  Acquired Hypothyroidism  5.  Tobacco Abuse resolved one month ago  6. H/O MRSA  7. Diastolic heart failure                        A. In setting of atrial fibrillation with RVR.                        A.  Under left breast                        B.  S/P I&D, wound packing, abx                                              C. Total thyroidectomy               Prescriptions Prior to Admission       (Not in a hospital admission)      Echocardiogram  Reading physician: Marco Lay MD  Ordering physician: Ren Ordoñez MD Study date: 8/25/17     Interpretation Summary   · Left ventricular systolic function is normal. Estimated EF = 60%.  · Moderate aortic valve regurgitation is present.  · The mitral valve leaflets exhibit rheumatic deformity. There is moderate to severe mitral stenosis and moderate mitral regurgitation.  · Calculated right ventricular systolic pressure from tricuspid regurgitation is 50 mmHg.         Objective   Physical Exam   Constitutional: She is oriented to person, place, and time. She appears well-developed and well-nourished. No distress.   HENT:   Head: Normocephalic and atraumatic.   Nose: Nose normal.   Eyes: Conjunctivae are normal. No  scleral icterus.   Neck: Normal range of motion. Neck supple.   Cardiovascular: Normal heart sounds and intact distal pulses.  An irregular rhythm present. Tachycardia present.    No murmur heard.  Pulmonary/Chest: Effort normal. No respiratory distress. She has decreased breath sounds (On the left side).   Abdominal: Soft. There is no tenderness.   Mildly obese   Musculoskeletal: Normal range of motion. She exhibits edema.   Traces of edema on extremities    Lymphadenopathy:     She has no cervical adenopathy.   Neurological: She is alert and oriented to person, place, and time. She has normal strength and normal reflexes. No sensory deficit.   Face symmetric, voice strong, tongue midline; Vision, hearing and speech all preserved.   Skin: Skin is warm and dry.   Psychiatric: She has a normal mood and affect. Her behavior is normal.   Nursing note and vitals reviewed.      Electrical Cardioversion  Date/Time: 9/24/2017 2:05 PM  Performed by: GONZALEZ SPEAR  Authorized by: GONZALEZ SPEAR   Consent: Verbal consent obtained.  Risks and benefits: risks, benefits and alternatives were discussed  Consent given by: patient  Patient understanding: patient states understanding of the procedure being performed  Patient consent: the patient's understanding of the procedure matches consent given  Procedure consent: procedure consent matches procedure scheduled  Relevant documents: relevant documents present and verified  Test results: test results available and properly labeled  Patient identity confirmed: verbally with patient    Sedation:  Patient sedated: yes  Sedation type: moderate (conscious) sedation  Sedatives: methohexital and midazolam  Sedation start date/time: 9/24/2017 2:08 PM  Sedation end date/time: 9/24/2017 2:13 PM  Vitals: Vital signs were monitored during sedation.  Cardioversion basis: emergent  Pre-procedure rhythm: atrial fibrillation  Patient position: patient was placed in a supine  position  Electrodes: pads  Electrodes placed: anterior-lateral  Number of attempts: 1  Attempt 1 mode: synchronous  Attempt 1 waveform: biphasic  Attempt 1 shock (in Joules): 200  Attempt 1 outcome: conversion to normal sinus rhythm  Post-procedure rhythm: normal sinus rhythm  Complications: no complications  Patient tolerance: Patient tolerated the procedure well with no immediate complications               ED Course  ED Course     Recent Results (from the past 24 hour(s))   POC Troponin, Rapid    Collection Time: 09/25/17  7:45 PM   Result Value Ref Range    Troponin I 0.02 0.00 - 0.07 ng/mL   Comprehensive Metabolic Panel    Collection Time: 09/25/17  7:46 PM   Result Value Ref Range    Glucose 228 (H) 70 - 100 mg/dL    BUN 14 9 - 23 mg/dL    Creatinine 1.10 0.60 - 1.30 mg/dL    Sodium 136 132 - 146 mmol/L    Potassium 4.9 3.5 - 5.5 mmol/L    Chloride 108 99 - 109 mmol/L    CO2 22.0 20.0 - 31.0 mmol/L    Calcium 9.1 8.7 - 10.4 mg/dL    Total Protein 7.6 5.7 - 8.2 g/dL    Albumin 4.20 3.20 - 4.80 g/dL    ALT (SGPT) 64 (H) 7 - 40 U/L    AST (SGOT) 85 (H) 0 - 33 U/L    Alkaline Phosphatase 122 (H) 25 - 100 U/L    Total Bilirubin 0.5 0.3 - 1.2 mg/dL    eGFR Non African Amer 52 (L) >60 mL/min/1.73    Globulin 3.4 gm/dL    A/G Ratio 1.2 (L) 1.5 - 2.5 g/dL    BUN/Creatinine Ratio 12.7 7.0 - 25.0    Anion Gap 6.0 3.0 - 11.0 mmol/L   BNP    Collection Time: 09/25/17  7:46 PM   Result Value Ref Range    .0 (H) 0.0 - 100.0 pg/mL   Light Blue Top    Collection Time: 09/25/17  7:46 PM   Result Value Ref Range    Extra Tube hold for add-on    Green Top (Gel)    Collection Time: 09/25/17  7:46 PM   Result Value Ref Range    Extra Tube Hold for add-ons.    Lavender Top    Collection Time: 09/25/17  7:46 PM   Result Value Ref Range    Extra Tube hold for add-on    Gold Top - SST    Collection Time: 09/25/17  7:46 PM   Result Value Ref Range    Extra Tube Hold for add-ons.    CBC Auto Differential    Collection Time:  09/25/17  7:46 PM   Result Value Ref Range    WBC 12.14 (H) 3.50 - 10.80 10*3/mm3    RBC 3.95 3.89 - 5.14 10*6/mm3    Hemoglobin 14.5 11.5 - 15.5 g/dL    Hematocrit 42.4 34.5 - 44.0 %    .3 (H) 80.0 - 99.0 fL    MCH 36.7 (H) 27.0 - 31.0 pg    MCHC 34.2 32.0 - 36.0 g/dL    RDW 12.3 11.3 - 14.5 %    RDW-SD 47.2 37.0 - 54.0 fl    MPV 10.6 6.0 - 12.0 fL    Platelets 218 150 - 450 10*3/mm3    Neutrophil % 71.7 (H) 41.0 - 71.0 %    Lymphocyte % 19.2 (L) 24.0 - 44.0 %    Monocyte % 7.0 0.0 - 12.0 %    Eosinophil % 1.2 0.0 - 3.0 %    Basophil % 0.4 0.0 - 1.0 %    Immature Grans % 0.5 0.0 - 0.6 %    Neutrophils, Absolute 8.70 (H) 1.50 - 8.30 10*3/mm3    Lymphocytes, Absolute 2.33 0.60 - 4.80 10*3/mm3    Monocytes, Absolute 0.85 0.00 - 1.00 10*3/mm3    Eosinophils, Absolute 0.15 0.00 - 0.30 10*3/mm3    Basophils, Absolute 0.05 0.00 - 0.20 10*3/mm3    Immature Grans, Absolute 0.06 (H) 0.00 - 0.03 10*3/mm3   Blood Gas, Arterial    Collection Time: 09/25/17  8:05 PM   Result Value Ref Range    Site Arterial: left radial     Zen's Test N/A     pH, Arterial 7.276 (L) 7.350 - 7.450 pH units    pCO2, Arterial 56.3 (H) 35.0 - 45.0 mm Hg    pO2, Arterial 47.3 (L) 83.0 - 108.0 mm Hg    HCO3, Arterial 26.2 (H) 20.0 - 26.0 mmol/L    Base Excess, Arterial -1.7 (L) 0.0 - 2.0 mmol/L    Hemoglobin, Blood Gas 14.6 14 - 18 g/dL    Hematocrit, Blood Gas 44.6 %    Oxyhemoglobin 75.4 (L) 94 - 99 %    Methemoglobin 0.40 0.00 - 1.50 %    Carboxyhemoglobin 2.0 0 - 2 %    CO2 Content 28.0 22 - 33    Barometric Pressure for Blood Gas  mmHg    Modality Mask - Nonbreather     FIO2 60 %   Blood Culture    Collection Time: 09/25/17  9:15 PM   Result Value Ref Range    Blood Culture No growth at less than 24 hours    Blood Culture    Collection Time: 09/25/17  9:20 PM   Result Value Ref Range    Blood Culture No growth at less than 24 hours    POC Troponin, Rapid    Collection Time: 09/25/17  9:56 PM   Result Value Ref Range    Troponin I 0.14 (H)  0.00 - 0.07 ng/mL   Urine Drug Screen    Collection Time: 09/25/17 10:27 PM   Result Value Ref Range    THC, Screen, Urine Negative Negative    Phencyclidine (PCP), Urine Negative Negative    Cocaine Screen, Urine Negative Negative    Methamphetamine, Urine Negative Negative    Opiate Screen Negative Negative    Amphetamine Screen, Urine Negative Negative    Benzodiazepine Screen, Urine Negative Negative    Tricyclic Antidepressants Screen Negative Negative    Methadone Screen, Urine Negative Negative    Barbiturates Screen, Urine Negative Negative    Oxycodone Screen, Urine Negative Negative    Propoxyphene Screen Negative Negative    Buprenorphine, Screen, Urine Negative Negative   POC Glucose Fingerstick    Collection Time: 09/25/17 11:04 PM   Result Value Ref Range    Glucose 194 (H) 70 - 130 mg/dL   Potassium    Collection Time: 09/26/17  1:47 AM   Result Value Ref Range    Potassium 4.3 3.5 - 5.5 mmol/L   Calcium, Ionized    Collection Time: 09/26/17  1:47 AM   Result Value Ref Range    Ionized Calcium 1.11 (L) 1.12 - 1.32 mmol/L   Calcium, Ionized    Collection Time: 09/26/17  3:27 AM   Result Value Ref Range    Ionized Calcium 1.12 1.12 - 1.32 mmol/L   Comprehensive Metabolic Panel    Collection Time: 09/26/17  3:27 AM   Result Value Ref Range    Glucose 148 (H) 70 - 100 mg/dL    BUN 20 9 - 23 mg/dL    Creatinine 0.90 0.60 - 1.30 mg/dL    Sodium 137 132 - 146 mmol/L    Potassium 3.7 3.5 - 5.5 mmol/L    Chloride 102 99 - 109 mmol/L    CO2 28.0 20.0 - 31.0 mmol/L    Calcium 8.6 (L) 8.7 - 10.4 mg/dL    Total Protein 7.0 5.7 - 8.2 g/dL    Albumin 4.10 3.20 - 4.80 g/dL    ALT (SGPT) 73 (H) 7 - 40 U/L    AST (SGOT) 71 (H) 0 - 33 U/L    Alkaline Phosphatase 117 (H) 25 - 100 U/L    Total Bilirubin 0.6 0.3 - 1.2 mg/dL    eGFR Non African Amer 65 >60 mL/min/1.73    Globulin 2.9 gm/dL    A/G Ratio 1.4 (L) 1.5 - 2.5 g/dL    BUN/Creatinine Ratio 22.2 7.0 - 25.0    Anion Gap 7.0 3.0 - 11.0 mmol/L   Magnesium    Collection  Time: 09/26/17  3:27 AM   Result Value Ref Range    Magnesium 1.9 1.3 - 2.7 mg/dL   Phosphorus    Collection Time: 09/26/17  3:27 AM   Result Value Ref Range    Phosphorus 3.1 2.4 - 5.1 mg/dL   CBC (No Diff)    Collection Time: 09/26/17  3:38 AM   Result Value Ref Range    WBC 11.04 (H) 3.50 - 10.80 10*3/mm3    RBC 3.77 (L) 3.89 - 5.14 10*6/mm3    Hemoglobin 13.5 11.5 - 15.5 g/dL    Hematocrit 40.6 34.5 - 44.0 %    .7 (H) 80.0 - 99.0 fL    MCH 35.8 (H) 27.0 - 31.0 pg    MCHC 33.3 32.0 - 36.0 g/dL    RDW 12.5 11.3 - 14.5 %    RDW-SD 48.3 37.0 - 54.0 fl    MPV 10.8 6.0 - 12.0 fL    Platelets 175 150 - 450 10*3/mm3   Blood Gas, Arterial    Collection Time: 09/26/17  3:51 AM   Result Value Ref Range    Site Arterial: left brachial     Zen's Test N/A     pH, Arterial 7.421 7.350 - 7.450 pH units    pCO2, Arterial 45.3 (H) 35.0 - 45.0 mm Hg    pO2, Arterial 119.0 (H) 83.0 - 108.0 mm Hg    HCO3, Arterial 29.4 (H) 20.0 - 26.0 mmol/L    Base Excess, Arterial 4.2 (H) 0.0 - 2.0 mmol/L    O2 Saturation, Arterial 96.7 %    Hemoglobin, Blood Gas 13.8 (L) 14 - 18 g/dL    Hematocrit, Blood Gas 42.2 %    Oxyhemoglobin 96.7 94 - 99 %    Methemoglobin 0.70 0.00 - 1.50 %    Carboxyhemoglobin 2.1 (H) 0 - 2 %    CO2 Content 30.8 22 - 33    Barometric Pressure for Blood Gas  mmHg    Modality BiPap     FIO2 30 %   POC Glucose Fingerstick    Collection Time: 09/26/17  5:02 AM   Result Value Ref Range    Glucose 153 (H) 70 - 130 mg/dL   POC Glucose Fingerstick    Collection Time: 09/26/17 10:27 AM   Result Value Ref Range    Glucose 133 (H) 70 - 130 mg/dL     Note: In addition to lab results from this visit, the labs listed above may include labs taken at another facility or during a different encounter within the last 24 hours. Please correlate lab times with ED admission and discharge times for further clarification of the services performed during this visit.    XR Chest 1 View   Final Result   Persistent hazy opacity of the left  chest, actually slightly   improved from prior exam. No clearly new chest disease is seen.       DICTATED:     09/24/2017   EDITED:         09/24/2017       This report was finalized on 9/24/2017 11:55 PM by DR. Brayden Dodd MD.            Vitals:    09/24/17 1515 09/24/17 1520 09/24/17 1525 09/24/17 1530   BP: 100/68   104/73   BP Location:    Left arm   Patient Position:    Sitting   Pulse: 70 70 69 70   Resp:    24   Temp:       TempSrc:       SpO2: 94% 93% 93% 94%   Weight:       Height:         Medications   midazolam (VERSED) injection 2 mg (1 mg Intravenous Given 9/24/17 1407)   methohexital (BREVITAL) injection solution prefilled syringe 100 mg (50 mg Intravenous Given 9/24/17 1408)     ECG/EMG Results (last 24 hours)     Procedure Component Value Units Date/Time    ECG 12 Lead [[id number]] Collected:  09/24/17 1131     Updated:  09/24/17 1131                        MDM  Number of Diagnoses or Management Options  Paroxysmal atrial fibrillation:   Pulmonary hypertension:   Diagnosis management comments:   I viewed all available studies at the bedside with the patient and her .  They are reassuring.  Her BNP is improved compared with previous.    I talked to her about the risk and benefit of procedural sedation with electrical cardioversion of her paroxysmal atrial fibrillation.  I informed her that the fact that she was back in A. fib while on sotalol, but probably for a week, that her success would be less than optimal but thankfully with 1 200 J jolt of electricity we placed her back in sinus rhythm and she has been persistently in sinus rhythm.    I reviewed her previous echo and she certainly has a component of pulmonary artery hypertension and I think the would benefit from evaluation for possible sleep apnea as it would perhaps decrease her risk of future episodes.    I'm also referring her back to the A. fib center. If she has persistent issues she may need a change in her antiarrhythmic dose or  she might want to consider ablation of the A. fib.    I discussed all this in detail the patient and her  and she'll return to the ER if worse in any way.  She'll continue her current sotalol and Eliquis.    All are agreeable with the plan       Amount and/or Complexity of Data Reviewed  Clinical lab tests: reviewed  Tests in the radiology section of CPT®: reviewed  Tests in the medicine section of CPT®: reviewed  Decide to obtain previous medical records or to obtain history from someone other than the patient: yes        Final diagnoses:   Paroxysmal atrial fibrillation   Pulmonary hypertension     EMR Dragon/Transcription disclaimer:  Much of this encounter note is an electronic transcription/translation of spoken language to printed text. The electronic translation of spoken language may permit erroneous, or at times, nonsensical words or phrases to be inadvertently transcribed; Although I have reviewed the note for such errors, some may still exist.    Documentation assistance provided by elisabeth Cortez.  Information recorded by the scribe was done at my direction and has been verified and validated by me.     Zach Cortez  09/24/17 1322       Zach Cortez  09/24/17 1414       Zach Cortez  09/24/17 1418       Zach Cortez  09/24/17 1455       Zach Cortez  09/24/17 1516       Ren Ordoñez MD  09/26/17 1058

## 2017-09-25 ENCOUNTER — HOSPITAL ENCOUNTER (INPATIENT)
Facility: HOSPITAL | Age: 57
LOS: 4 days | Discharge: HOME | End: 2017-09-29
Attending: EMERGENCY MEDICINE | Admitting: INTERNAL MEDICINE

## 2017-09-25 ENCOUNTER — APPOINTMENT (OUTPATIENT)
Dept: GENERAL RADIOLOGY | Facility: HOSPITAL | Age: 57
End: 2017-09-25

## 2017-09-25 DIAGNOSIS — J96.01 ACUTE RESPIRATORY FAILURE WITH HYPOXIA AND HYPERCAPNIA (HCC): Primary | ICD-10-CM

## 2017-09-25 DIAGNOSIS — J96.02 ACUTE RESPIRATORY FAILURE WITH HYPOXIA AND HYPERCAPNIA (HCC): Primary | ICD-10-CM

## 2017-09-25 DIAGNOSIS — I08.0 RHEUMATIC MITRAL VALVE STENOSIS AND AORTIC VALVE INSUFFICIENCY: ICD-10-CM

## 2017-09-25 DIAGNOSIS — J98.01 ACUTE BRONCHOSPASM: ICD-10-CM

## 2017-09-25 DIAGNOSIS — I50.21 ACUTE SYSTOLIC CONGESTIVE HEART FAILURE (HCC): ICD-10-CM

## 2017-09-25 DIAGNOSIS — R91.8 PULMONARY INFILTRATE: ICD-10-CM

## 2017-09-25 PROBLEM — R93.89 ABNORMAL CXR: Status: ACTIVE | Noted: 2017-09-25

## 2017-09-25 LAB
ALBUMIN SERPL-MCNC: 4.2 G/DL (ref 3.2–4.8)
ALBUMIN/GLOB SERPL: 1.2 G/DL (ref 1.5–2.5)
ALP SERPL-CCNC: 122 U/L (ref 25–100)
ALT SERPL W P-5'-P-CCNC: 64 U/L (ref 7–40)
AMPHET+METHAMPHET UR QL: NEGATIVE
AMPHETAMINES UR QL: NEGATIVE
ANION GAP SERPL CALCULATED.3IONS-SCNC: 6 MMOL/L (ref 3–11)
ARTERIAL PATENCY WRIST A: ABNORMAL
AST SERPL-CCNC: 85 U/L (ref 0–33)
ATMOSPHERIC PRESS: ABNORMAL MMHG
BARBITURATES UR QL SCN: NEGATIVE
BASE EXCESS BLDA CALC-SCNC: -1.7 MMOL/L (ref 0–2)
BASOPHILS # BLD AUTO: 0.05 10*3/MM3 (ref 0–0.2)
BASOPHILS NFR BLD AUTO: 0.4 % (ref 0–1)
BDY SITE: ABNORMAL
BENZODIAZ UR QL SCN: NEGATIVE
BILIRUB SERPL-MCNC: 0.5 MG/DL (ref 0.3–1.2)
BNP SERPL-MCNC: 501 PG/ML (ref 0–100)
BUN BLD-MCNC: 14 MG/DL (ref 9–23)
BUN/CREAT SERPL: 12.7 (ref 7–25)
BUPRENORPHINE SERPL-MCNC: NEGATIVE NG/ML
CALCIUM SPEC-SCNC: 9.1 MG/DL (ref 8.7–10.4)
CANNABINOIDS SERPL QL: NEGATIVE
CHLORIDE SERPL-SCNC: 108 MMOL/L (ref 99–109)
CO2 BLDA-SCNC: 28 MMOL/L (ref 22–33)
CO2 SERPL-SCNC: 22 MMOL/L (ref 20–31)
COCAINE UR QL: NEGATIVE
COHGB MFR BLD: 2 % (ref 0–2)
CREAT BLD-MCNC: 1.1 MG/DL (ref 0.6–1.3)
DEPRECATED RDW RBC AUTO: 47.2 FL (ref 37–54)
EOSINOPHIL # BLD AUTO: 0.15 10*3/MM3 (ref 0–0.3)
EOSINOPHIL NFR BLD AUTO: 1.2 % (ref 0–3)
ERYTHROCYTE [DISTWIDTH] IN BLOOD BY AUTOMATED COUNT: 12.3 % (ref 11.3–14.5)
GFR SERPL CREATININE-BSD FRML MDRD: 52 ML/MIN/1.73
GLOBULIN UR ELPH-MCNC: 3.4 GM/DL
GLUCOSE BLD-MCNC: 228 MG/DL (ref 70–100)
GLUCOSE BLDC GLUCOMTR-MCNC: 194 MG/DL (ref 70–130)
HCO3 BLDA-SCNC: 26.2 MMOL/L (ref 20–26)
HCT VFR BLD AUTO: 42.4 % (ref 34.5–44)
HCT VFR BLD CALC: 44.6 %
HGB BLD-MCNC: 14.5 G/DL (ref 11.5–15.5)
HGB BLDA-MCNC: 14.6 G/DL (ref 14–18)
HOLD SPECIMEN: NORMAL
HOLD SPECIMEN: NORMAL
HOROWITZ INDEX BLD+IHG-RTO: 60 %
IMM GRANULOCYTES # BLD: 0.06 10*3/MM3 (ref 0–0.03)
IMM GRANULOCYTES NFR BLD: 0.5 % (ref 0–0.6)
LYMPHOCYTES # BLD AUTO: 2.33 10*3/MM3 (ref 0.6–4.8)
LYMPHOCYTES NFR BLD AUTO: 19.2 % (ref 24–44)
MCH RBC QN AUTO: 36.7 PG (ref 27–31)
MCHC RBC AUTO-ENTMCNC: 34.2 G/DL (ref 32–36)
MCV RBC AUTO: 107.3 FL (ref 80–99)
METHADONE UR QL SCN: NEGATIVE
METHGB BLD QL: 0.4 % (ref 0–1.5)
MODALITY: ABNORMAL
MONOCYTES # BLD AUTO: 0.85 10*3/MM3 (ref 0–1)
MONOCYTES NFR BLD AUTO: 7 % (ref 0–12)
NEUTROPHILS # BLD AUTO: 8.7 10*3/MM3 (ref 1.5–8.3)
NEUTROPHILS NFR BLD AUTO: 71.7 % (ref 41–71)
OPIATES UR QL: NEGATIVE
OXYCODONE UR QL SCN: NEGATIVE
OXYHGB MFR BLDV: 75.4 % (ref 94–99)
PCO2 BLDA: 56.3 MM HG (ref 35–45)
PCP UR QL SCN: NEGATIVE
PH BLDA: 7.28 PH UNITS (ref 7.35–7.45)
PLATELET # BLD AUTO: 218 10*3/MM3 (ref 150–450)
PMV BLD AUTO: 10.6 FL (ref 6–12)
PO2 BLDA: 47.3 MM HG (ref 83–108)
POTASSIUM BLD-SCNC: 4.9 MMOL/L (ref 3.5–5.5)
PROPOXYPH UR QL: NEGATIVE
PROT SERPL-MCNC: 7.6 G/DL (ref 5.7–8.2)
RBC # BLD AUTO: 3.95 10*6/MM3 (ref 3.89–5.14)
SODIUM BLD-SCNC: 136 MMOL/L (ref 132–146)
TRICYCLICS UR QL SCN: NEGATIVE
TROPONIN I SERPL-MCNC: 0.02 NG/ML (ref 0–0.07)
TROPONIN I SERPL-MCNC: 0.14 NG/ML (ref 0–0.07)
WBC NRBC COR # BLD: 12.14 10*3/MM3 (ref 3.5–10.8)
WHOLE BLOOD HOLD SPECIMEN: NORMAL
WHOLE BLOOD HOLD SPECIMEN: NORMAL

## 2017-09-25 PROCEDURE — 71010 HC CHEST PA OR AP: CPT

## 2017-09-25 PROCEDURE — 93005 ELECTROCARDIOGRAM TRACING: CPT

## 2017-09-25 PROCEDURE — 80053 COMPREHEN METABOLIC PANEL: CPT | Performed by: EMERGENCY MEDICINE

## 2017-09-25 PROCEDURE — 84484 ASSAY OF TROPONIN QUANT: CPT

## 2017-09-25 PROCEDURE — 82962 GLUCOSE BLOOD TEST: CPT

## 2017-09-25 PROCEDURE — 94760 N-INVAS EAR/PLS OXIMETRY 1: CPT

## 2017-09-25 PROCEDURE — 99291 CRITICAL CARE FIRST HOUR: CPT | Performed by: INTERNAL MEDICINE

## 2017-09-25 PROCEDURE — 85025 COMPLETE CBC W/AUTO DIFF WBC: CPT | Performed by: EMERGENCY MEDICINE

## 2017-09-25 PROCEDURE — 25010000002 FUROSEMIDE PER 20 MG: Performed by: EMERGENCY MEDICINE

## 2017-09-25 PROCEDURE — 94799 UNLISTED PULMONARY SVC/PX: CPT

## 2017-09-25 PROCEDURE — 93005 ELECTROCARDIOGRAM TRACING: CPT | Performed by: EMERGENCY MEDICINE

## 2017-09-25 PROCEDURE — 36600 WITHDRAWAL OF ARTERIAL BLOOD: CPT | Performed by: EMERGENCY MEDICINE

## 2017-09-25 PROCEDURE — 82805 BLOOD GASES W/O2 SATURATION: CPT | Performed by: EMERGENCY MEDICINE

## 2017-09-25 PROCEDURE — 25010000002 CEFTRIAXONE PER 250 MG: Performed by: EMERGENCY MEDICINE

## 2017-09-25 PROCEDURE — 25010000002 AZITHROMYCIN: Performed by: EMERGENCY MEDICINE

## 2017-09-25 PROCEDURE — 63710000001 INSULIN LISPRO (HUMAN) PER 5 UNITS: Performed by: INTERNAL MEDICINE

## 2017-09-25 PROCEDURE — 99285 EMERGENCY DEPT VISIT HI MDM: CPT

## 2017-09-25 PROCEDURE — 94640 AIRWAY INHALATION TREATMENT: CPT

## 2017-09-25 PROCEDURE — 87040 BLOOD CULTURE FOR BACTERIA: CPT | Performed by: EMERGENCY MEDICINE

## 2017-09-25 PROCEDURE — 80306 DRUG TEST PRSMV INSTRMNT: CPT | Performed by: INTERNAL MEDICINE

## 2017-09-25 PROCEDURE — 83880 ASSAY OF NATRIURETIC PEPTIDE: CPT | Performed by: EMERGENCY MEDICINE

## 2017-09-25 RX ORDER — MAGNESIUM SULFATE HEPTAHYDRATE 40 MG/ML
4 INJECTION, SOLUTION INTRAVENOUS AS NEEDED
Status: DISCONTINUED | OUTPATIENT
Start: 2017-09-25 | End: 2017-09-29 | Stop reason: HOSPADM

## 2017-09-25 RX ORDER — POTASSIUM CHLORIDE 7.45 MG/ML
10 INJECTION INTRAVENOUS
Status: DISCONTINUED | OUTPATIENT
Start: 2017-09-25 | End: 2017-09-29 | Stop reason: HOSPADM

## 2017-09-25 RX ORDER — NICOTINE POLACRILEX 4 MG
15 LOZENGE BUCCAL
Status: DISCONTINUED | OUTPATIENT
Start: 2017-09-25 | End: 2017-09-27

## 2017-09-25 RX ORDER — SOTALOL HYDROCHLORIDE 80 MG/1
80 TABLET ORAL 2 TIMES DAILY
Status: DISCONTINUED | OUTPATIENT
Start: 2017-09-25 | End: 2017-09-27

## 2017-09-25 RX ORDER — FUROSEMIDE 10 MG/ML
40 INJECTION INTRAMUSCULAR; INTRAVENOUS EVERY 12 HOURS SCHEDULED
Status: DISPENSED | OUTPATIENT
Start: 2017-09-25 | End: 2017-09-27

## 2017-09-25 RX ORDER — LEVOTHYROXINE SODIUM 0.15 MG/1
150 TABLET ORAL DAILY
Status: DISCONTINUED | OUTPATIENT
Start: 2017-09-26 | End: 2017-09-29 | Stop reason: HOSPADM

## 2017-09-25 RX ORDER — ALBUTEROL SULFATE 2.5 MG/3ML
2.5 SOLUTION RESPIRATORY (INHALATION) ONCE
Status: COMPLETED | OUTPATIENT
Start: 2017-09-25 | End: 2017-09-25

## 2017-09-25 RX ORDER — CEFTRIAXONE SODIUM 1 G/50ML
1 INJECTION, SOLUTION INTRAVENOUS ONCE
Status: COMPLETED | OUTPATIENT
Start: 2017-09-25 | End: 2017-09-25

## 2017-09-25 RX ORDER — METOPROLOL SUCCINATE 25 MG/1
25 TABLET, EXTENDED RELEASE ORAL DAILY
Status: DISCONTINUED | OUTPATIENT
Start: 2017-09-26 | End: 2017-09-26

## 2017-09-25 RX ORDER — MAGNESIUM SULFATE HEPTAHYDRATE 40 MG/ML
2 INJECTION, SOLUTION INTRAVENOUS AS NEEDED
Status: DISCONTINUED | OUTPATIENT
Start: 2017-09-25 | End: 2017-09-29 | Stop reason: HOSPADM

## 2017-09-25 RX ORDER — POTASSIUM CHLORIDE 1.5 G/1.77G
40 POWDER, FOR SOLUTION ORAL AS NEEDED
Status: DISCONTINUED | OUTPATIENT
Start: 2017-09-25 | End: 2017-09-27

## 2017-09-25 RX ORDER — GABAPENTIN 300 MG/1
300 CAPSULE ORAL EVERY 8 HOURS SCHEDULED
Status: DISCONTINUED | OUTPATIENT
Start: 2017-09-25 | End: 2017-09-29 | Stop reason: HOSPADM

## 2017-09-25 RX ORDER — POTASSIUM CHLORIDE 750 MG/1
40 CAPSULE, EXTENDED RELEASE ORAL AS NEEDED
Status: DISCONTINUED | OUTPATIENT
Start: 2017-09-25 | End: 2017-09-29 | Stop reason: HOSPADM

## 2017-09-25 RX ORDER — SODIUM CHLORIDE 0.9 % (FLUSH) 0.9 %
10 SYRINGE (ML) INJECTION AS NEEDED
Status: DISCONTINUED | OUTPATIENT
Start: 2017-09-25 | End: 2017-09-29 | Stop reason: HOSPADM

## 2017-09-25 RX ORDER — FUROSEMIDE 10 MG/ML
80 INJECTION INTRAMUSCULAR; INTRAVENOUS ONCE
Status: COMPLETED | OUTPATIENT
Start: 2017-09-25 | End: 2017-09-25

## 2017-09-25 RX ORDER — CEFTRIAXONE SODIUM 1 G/50ML
1 INJECTION, SOLUTION INTRAVENOUS EVERY 24 HOURS
Status: DISCONTINUED | OUTPATIENT
Start: 2017-09-26 | End: 2017-09-29 | Stop reason: HOSPADM

## 2017-09-25 RX ORDER — DEXTROSE MONOHYDRATE 25 G/50ML
25 INJECTION, SOLUTION INTRAVENOUS
Status: DISCONTINUED | OUTPATIENT
Start: 2017-09-25 | End: 2017-09-29 | Stop reason: HOSPADM

## 2017-09-25 RX ORDER — FAMOTIDINE 20 MG/1
20 TABLET, FILM COATED ORAL 2 TIMES DAILY
Status: DISCONTINUED | OUTPATIENT
Start: 2017-09-25 | End: 2017-09-29 | Stop reason: HOSPADM

## 2017-09-25 RX ADMIN — CEFTRIAXONE SODIUM 1 G: 1 INJECTION, SOLUTION INTRAVENOUS at 21:35

## 2017-09-25 RX ADMIN — INSULIN LISPRO 2 UNITS: 100 INJECTION, SOLUTION INTRAVENOUS; SUBCUTANEOUS at 23:11

## 2017-09-25 RX ADMIN — SOTALOL HYDROCHLORIDE 80 MG: 80 TABLET ORAL at 23:10

## 2017-09-25 RX ADMIN — ALBUTEROL SULFATE 2.5 MG: 2.5 SOLUTION RESPIRATORY (INHALATION) at 21:46

## 2017-09-25 RX ADMIN — AZITHROMYCIN 500 MG: 500 INJECTION, POWDER, LYOPHILIZED, FOR SOLUTION INTRAVENOUS at 22:24

## 2017-09-25 RX ADMIN — APIXABAN 5 MG: 5 TABLET, FILM COATED ORAL at 23:11

## 2017-09-25 RX ADMIN — GABAPENTIN 300 MG: 300 CAPSULE ORAL at 23:10

## 2017-09-25 RX ADMIN — FUROSEMIDE 80 MG: 10 INJECTION, SOLUTION INTRAMUSCULAR; INTRAVENOUS at 21:30

## 2017-09-25 RX ADMIN — FAMOTIDINE 20 MG: 20 TABLET ORAL at 23:10

## 2017-09-25 RX ADMIN — Medication 10 ML: at 22:10

## 2017-09-26 ENCOUNTER — APPOINTMENT (OUTPATIENT)
Dept: GENERAL RADIOLOGY | Facility: HOSPITAL | Age: 57
End: 2017-09-26

## 2017-09-26 LAB
ALBUMIN SERPL-MCNC: 4.1 G/DL (ref 3.2–4.8)
ALBUMIN/GLOB SERPL: 1.4 G/DL (ref 1.5–2.5)
ALP SERPL-CCNC: 117 U/L (ref 25–100)
ALT SERPL W P-5'-P-CCNC: 73 U/L (ref 7–40)
ANION GAP SERPL CALCULATED.3IONS-SCNC: 7 MMOL/L (ref 3–11)
ARTERIAL PATENCY WRIST A: ABNORMAL
AST SERPL-CCNC: 71 U/L (ref 0–33)
ATMOSPHERIC PRESS: ABNORMAL MMHG
BASE EXCESS BLDA CALC-SCNC: 4.2 MMOL/L (ref 0–2)
BDY SITE: ABNORMAL
BILIRUB SERPL-MCNC: 0.6 MG/DL (ref 0.3–1.2)
BUN BLD-MCNC: 20 MG/DL (ref 9–23)
BUN/CREAT SERPL: 22.2 (ref 7–25)
CA-I SERPL ISE-MCNC: 1.11 MMOL/L (ref 1.12–1.32)
CA-I SERPL ISE-MCNC: 1.12 MMOL/L (ref 1.12–1.32)
CALCIUM SPEC-SCNC: 8.6 MG/DL (ref 8.7–10.4)
CHLORIDE SERPL-SCNC: 102 MMOL/L (ref 99–109)
CO2 BLDA-SCNC: 30.8 MMOL/L (ref 22–33)
CO2 SERPL-SCNC: 28 MMOL/L (ref 20–31)
COHGB MFR BLD: 2.1 % (ref 0–2)
CREAT BLD-MCNC: 0.9 MG/DL (ref 0.6–1.3)
DEPRECATED RDW RBC AUTO: 48.3 FL (ref 37–54)
ERYTHROCYTE [DISTWIDTH] IN BLOOD BY AUTOMATED COUNT: 12.5 % (ref 11.3–14.5)
GFR SERPL CREATININE-BSD FRML MDRD: 65 ML/MIN/1.73
GLOBULIN UR ELPH-MCNC: 2.9 GM/DL
GLUCOSE BLD-MCNC: 148 MG/DL (ref 70–100)
GLUCOSE BLDC GLUCOMTR-MCNC: 120 MG/DL (ref 70–130)
GLUCOSE BLDC GLUCOMTR-MCNC: 133 MG/DL (ref 70–130)
GLUCOSE BLDC GLUCOMTR-MCNC: 148 MG/DL (ref 70–130)
GLUCOSE BLDC GLUCOMTR-MCNC: 153 MG/DL (ref 70–130)
GLUCOSE BLDC GLUCOMTR-MCNC: 179 MG/DL (ref 70–130)
HCO3 BLDA-SCNC: 29.4 MMOL/L (ref 20–26)
HCT VFR BLD AUTO: 40.6 % (ref 34.5–44)
HCT VFR BLD CALC: 42.2 %
HGB BLD-MCNC: 13.5 G/DL (ref 11.5–15.5)
HGB BLDA-MCNC: 13.8 G/DL (ref 14–18)
HOROWITZ INDEX BLD+IHG-RTO: 30 %
MAGNESIUM SERPL-MCNC: 1.9 MG/DL (ref 1.3–2.7)
MCH RBC QN AUTO: 35.8 PG (ref 27–31)
MCHC RBC AUTO-ENTMCNC: 33.3 G/DL (ref 32–36)
MCV RBC AUTO: 107.7 FL (ref 80–99)
METHGB BLD QL: 0.7 % (ref 0–1.5)
MODALITY: ABNORMAL
OXYHGB MFR BLDV: 96.7 % (ref 94–99)
PCO2 BLDA: 45.3 MM HG (ref 35–45)
PH BLDA: 7.42 PH UNITS (ref 7.35–7.45)
PHOSPHATE SERPL-MCNC: 3.1 MG/DL (ref 2.4–5.1)
PLATELET # BLD AUTO: 175 10*3/MM3 (ref 150–450)
PMV BLD AUTO: 10.8 FL (ref 6–12)
PO2 BLDA: 119 MM HG (ref 83–108)
POTASSIUM BLD-SCNC: 3.7 MMOL/L (ref 3.5–5.5)
POTASSIUM BLD-SCNC: 4.3 MMOL/L (ref 3.5–5.5)
PROCALCITONIN SERPL-MCNC: 0.3 NG/ML
PROT SERPL-MCNC: 7 G/DL (ref 5.7–8.2)
RBC # BLD AUTO: 3.77 10*6/MM3 (ref 3.89–5.14)
SAO2 % BLDCOA: 96.7 %
SODIUM BLD-SCNC: 137 MMOL/L (ref 132–146)
WBC NRBC COR # BLD: 11.04 10*3/MM3 (ref 3.5–10.8)

## 2017-09-26 PROCEDURE — 84132 ASSAY OF SERUM POTASSIUM: CPT | Performed by: NURSE PRACTITIONER

## 2017-09-26 PROCEDURE — 83735 ASSAY OF MAGNESIUM: CPT | Performed by: INTERNAL MEDICINE

## 2017-09-26 PROCEDURE — 71010 HC CHEST PA OR AP: CPT

## 2017-09-26 PROCEDURE — 99254 IP/OBS CNSLTJ NEW/EST MOD 60: CPT | Performed by: INTERNAL MEDICINE

## 2017-09-26 PROCEDURE — 94799 UNLISTED PULMONARY SVC/PX: CPT

## 2017-09-26 PROCEDURE — 85027 COMPLETE CBC AUTOMATED: CPT | Performed by: INTERNAL MEDICINE

## 2017-09-26 PROCEDURE — 36600 WITHDRAWAL OF ARTERIAL BLOOD: CPT | Performed by: INTERNAL MEDICINE

## 2017-09-26 PROCEDURE — 82805 BLOOD GASES W/O2 SATURATION: CPT | Performed by: INTERNAL MEDICINE

## 2017-09-26 PROCEDURE — 25010000002 FUROSEMIDE PER 20 MG: Performed by: INTERNAL MEDICINE

## 2017-09-26 PROCEDURE — 82330 ASSAY OF CALCIUM: CPT | Performed by: INTERNAL MEDICINE

## 2017-09-26 PROCEDURE — 82962 GLUCOSE BLOOD TEST: CPT

## 2017-09-26 PROCEDURE — 80053 COMPREHEN METABOLIC PANEL: CPT | Performed by: INTERNAL MEDICINE

## 2017-09-26 PROCEDURE — 25010000002 CEFTRIAXONE PER 250 MG: Performed by: INTERNAL MEDICINE

## 2017-09-26 PROCEDURE — 25010000002 AZITHROMYCIN: Performed by: INTERNAL MEDICINE

## 2017-09-26 PROCEDURE — 99233 SBSQ HOSP IP/OBS HIGH 50: CPT | Performed by: INTERNAL MEDICINE

## 2017-09-26 PROCEDURE — 87040 BLOOD CULTURE FOR BACTERIA: CPT | Performed by: INTERNAL MEDICINE

## 2017-09-26 PROCEDURE — 84100 ASSAY OF PHOSPHORUS: CPT | Performed by: INTERNAL MEDICINE

## 2017-09-26 PROCEDURE — 82330 ASSAY OF CALCIUM: CPT | Performed by: NURSE PRACTITIONER

## 2017-09-26 PROCEDURE — 84145 PROCALCITONIN (PCT): CPT | Performed by: INTERNAL MEDICINE

## 2017-09-26 RX ORDER — CYCLOBENZAPRINE HCL 10 MG
10 TABLET ORAL 3 TIMES DAILY PRN
Status: DISCONTINUED | OUTPATIENT
Start: 2017-09-26 | End: 2017-09-29 | Stop reason: HOSPADM

## 2017-09-26 RX ORDER — POTASSIUM CHLORIDE 750 MG/1
40 CAPSULE, EXTENDED RELEASE ORAL ONCE
Status: COMPLETED | OUTPATIENT
Start: 2017-09-26 | End: 2017-09-26

## 2017-09-26 RX ORDER — ACETAMINOPHEN 325 MG/1
650 TABLET ORAL EVERY 6 HOURS PRN
Status: DISCONTINUED | OUTPATIENT
Start: 2017-09-26 | End: 2017-09-29 | Stop reason: HOSPADM

## 2017-09-26 RX ORDER — ACETAMINOPHEN 325 MG/1
325 TABLET ORAL DAILY PRN
COMMUNITY
End: 2020-11-10

## 2017-09-26 RX ORDER — IBUPROFEN 200 MG
800 TABLET ORAL DAILY PRN
COMMUNITY
End: 2018-05-17

## 2017-09-26 RX ADMIN — FUROSEMIDE 40 MG: 10 INJECTION, SOLUTION INTRAMUSCULAR; INTRAVENOUS at 10:26

## 2017-09-26 RX ADMIN — MAGNESIUM SULFATE HEPTAHYDRATE 4 G: 40 INJECTION, SOLUTION INTRAVENOUS at 07:12

## 2017-09-26 RX ADMIN — FAMOTIDINE 20 MG: 20 TABLET ORAL at 20:04

## 2017-09-26 RX ADMIN — ACETAMINOPHEN 650 MG: 325 TABLET, FILM COATED ORAL at 22:13

## 2017-09-26 RX ADMIN — SOTALOL HYDROCHLORIDE 80 MG: 80 TABLET ORAL at 12:40

## 2017-09-26 RX ADMIN — APIXABAN 5 MG: 5 TABLET, FILM COATED ORAL at 20:04

## 2017-09-26 RX ADMIN — ACETAMINOPHEN 650 MG: 325 TABLET, FILM COATED ORAL at 01:19

## 2017-09-26 RX ADMIN — CYCLOBENZAPRINE HYDROCHLORIDE 10 MG: 10 TABLET, FILM COATED ORAL at 01:56

## 2017-09-26 RX ADMIN — GABAPENTIN 300 MG: 300 CAPSULE ORAL at 22:13

## 2017-09-26 RX ADMIN — FUROSEMIDE 40 MG: 10 INJECTION, SOLUTION INTRAMUSCULAR; INTRAVENOUS at 20:08

## 2017-09-26 RX ADMIN — CYCLOBENZAPRINE HYDROCHLORIDE 10 MG: 10 TABLET, FILM COATED ORAL at 20:04

## 2017-09-26 RX ADMIN — APIXABAN 5 MG: 5 TABLET, FILM COATED ORAL at 08:19

## 2017-09-26 RX ADMIN — FAMOTIDINE 20 MG: 20 TABLET ORAL at 08:19

## 2017-09-26 RX ADMIN — POTASSIUM CHLORIDE 40 MEQ: 750 CAPSULE, EXTENDED RELEASE ORAL at 10:26

## 2017-09-26 RX ADMIN — GABAPENTIN 300 MG: 300 CAPSULE ORAL at 05:50

## 2017-09-26 RX ADMIN — AZITHROMYCIN 500 MG: 500 INJECTION, POWDER, LYOPHILIZED, FOR SOLUTION INTRAVENOUS at 20:04

## 2017-09-26 RX ADMIN — CEFTRIAXONE SODIUM 1 G: 1 INJECTION, SOLUTION INTRAVENOUS at 20:04

## 2017-09-26 RX ADMIN — LEVOTHYROXINE SODIUM 150 MCG: 150 TABLET ORAL at 05:50

## 2017-09-27 PROBLEM — I08.0 RHEUMATIC MITRAL VALVE STENOSIS AND AORTIC VALVE INSUFFICIENCY: Status: ACTIVE | Noted: 2017-09-27

## 2017-09-27 LAB
GLUCOSE BLDC GLUCOMTR-MCNC: 108 MG/DL (ref 70–130)
GLUCOSE BLDC GLUCOMTR-MCNC: 109 MG/DL (ref 70–130)
GLUCOSE BLDC GLUCOMTR-MCNC: 135 MG/DL (ref 70–130)
GLUCOSE BLDC GLUCOMTR-MCNC: 136 MG/DL (ref 70–130)
MAGNESIUM SERPL-MCNC: 2.8 MG/DL (ref 1.3–2.7)
POTASSIUM BLD-SCNC: 3.5 MMOL/L (ref 3.5–5.5)
POTASSIUM BLD-SCNC: 4.1 MMOL/L (ref 3.5–5.5)

## 2017-09-27 PROCEDURE — 99232 SBSQ HOSP IP/OBS MODERATE 35: CPT | Performed by: INTERNAL MEDICINE

## 2017-09-27 PROCEDURE — 25010000002 AZITHROMYCIN: Performed by: INTERNAL MEDICINE

## 2017-09-27 PROCEDURE — 25010000002 FUROSEMIDE PER 20 MG: Performed by: INTERNAL MEDICINE

## 2017-09-27 PROCEDURE — 84132 ASSAY OF SERUM POTASSIUM: CPT | Performed by: INTERNAL MEDICINE

## 2017-09-27 PROCEDURE — 82962 GLUCOSE BLOOD TEST: CPT

## 2017-09-27 PROCEDURE — 83735 ASSAY OF MAGNESIUM: CPT | Performed by: INTERNAL MEDICINE

## 2017-09-27 PROCEDURE — 99233 SBSQ HOSP IP/OBS HIGH 50: CPT | Performed by: INTERNAL MEDICINE

## 2017-09-27 PROCEDURE — 25010000002 CEFTRIAXONE PER 250 MG: Performed by: INTERNAL MEDICINE

## 2017-09-27 PROCEDURE — 25010000002 ENOXAPARIN PER 10 MG: Performed by: INTERNAL MEDICINE

## 2017-09-27 RX ORDER — SOTALOL HYDROCHLORIDE 80 MG/1
120 TABLET ORAL 2 TIMES DAILY
Status: DISCONTINUED | OUTPATIENT
Start: 2017-09-27 | End: 2017-09-27

## 2017-09-27 RX ORDER — POTASSIUM CHLORIDE 750 MG/1
40 CAPSULE, EXTENDED RELEASE ORAL ONCE
Status: COMPLETED | OUTPATIENT
Start: 2017-09-27 | End: 2017-09-27

## 2017-09-27 RX ORDER — SOTALOL HYDROCHLORIDE 80 MG/1
120 TABLET ORAL EVERY 12 HOURS SCHEDULED
Status: DISCONTINUED | OUTPATIENT
Start: 2017-09-27 | End: 2017-09-28

## 2017-09-27 RX ORDER — SOTALOL HYDROCHLORIDE 80 MG/1
40 TABLET ORAL ONCE
Status: COMPLETED | OUTPATIENT
Start: 2017-09-27 | End: 2017-09-27

## 2017-09-27 RX ORDER — SOTALOL HYDROCHLORIDE 80 MG/1
80 TABLET ORAL 2 TIMES DAILY
Status: DISCONTINUED | OUTPATIENT
Start: 2017-09-27 | End: 2017-09-27

## 2017-09-27 RX ADMIN — SOTALOL HYDROCHLORIDE 80 MG: 80 TABLET ORAL at 08:04

## 2017-09-27 RX ADMIN — ENOXAPARIN SODIUM 90 MG: 100 INJECTION SUBCUTANEOUS at 20:04

## 2017-09-27 RX ADMIN — LEVOTHYROXINE SODIUM 150 MCG: 150 TABLET ORAL at 06:26

## 2017-09-27 RX ADMIN — GABAPENTIN 300 MG: 300 CAPSULE ORAL at 13:20

## 2017-09-27 RX ADMIN — SOTALOL HYDROCHLORIDE 40 MG: 80 TABLET ORAL at 09:48

## 2017-09-27 RX ADMIN — POTASSIUM CHLORIDE 40 MEQ: 750 CAPSULE, EXTENDED RELEASE ORAL at 06:27

## 2017-09-27 RX ADMIN — FAMOTIDINE 20 MG: 20 TABLET ORAL at 20:04

## 2017-09-27 RX ADMIN — AZITHROMYCIN 500 MG: 500 INJECTION, POWDER, LYOPHILIZED, FOR SOLUTION INTRAVENOUS at 20:04

## 2017-09-27 RX ADMIN — FUROSEMIDE 40 MG: 10 INJECTION, SOLUTION INTRAMUSCULAR; INTRAVENOUS at 08:01

## 2017-09-27 RX ADMIN — POTASSIUM CHLORIDE 40 MEQ: 750 CAPSULE, EXTENDED RELEASE ORAL at 09:57

## 2017-09-27 RX ADMIN — CEFTRIAXONE SODIUM 1 G: 1 INJECTION, SOLUTION INTRAVENOUS at 20:04

## 2017-09-27 RX ADMIN — FAMOTIDINE 20 MG: 20 TABLET ORAL at 08:01

## 2017-09-27 RX ADMIN — SOTALOL HYDROCHLORIDE 120 MG: 80 TABLET ORAL at 20:03

## 2017-09-27 RX ADMIN — CYCLOBENZAPRINE HYDROCHLORIDE 10 MG: 10 TABLET, FILM COATED ORAL at 20:04

## 2017-09-27 RX ADMIN — GABAPENTIN 300 MG: 300 CAPSULE ORAL at 08:01

## 2017-09-27 RX ADMIN — APIXABAN 5 MG: 5 TABLET, FILM COATED ORAL at 08:01

## 2017-09-27 RX ADMIN — POTASSIUM CHLORIDE 40 MEQ: 750 CAPSULE, EXTENDED RELEASE ORAL at 15:00

## 2017-09-27 RX ADMIN — GABAPENTIN 300 MG: 300 CAPSULE ORAL at 20:04

## 2017-09-28 ENCOUNTER — APPOINTMENT (OUTPATIENT)
Dept: CARDIOLOGY | Facility: HOSPITAL | Age: 57
End: 2017-09-28
Attending: INTERNAL MEDICINE

## 2017-09-28 LAB
ANION GAP SERPL CALCULATED.3IONS-SCNC: 6 MMOL/L (ref 3–11)
BH CV ECHO MEAS - BSA(HAYCOCK): 2 M^2
BH CV ECHO MEAS - BSA: 1.9 M^2
BH CV ECHO MEAS - BZI_BMI: 35.3 KILOGRAMS/M^2
BH CV ECHO MEAS - BZI_METRIC_HEIGHT: 157.5 CM
BH CV ECHO MEAS - BZI_METRIC_WEIGHT: 87.5 KG
BH CV ECHO MEAS - LA DIMENSION: 6.3 CM
BH CV ECHO MEAS - MV MAX PG: 19.3 MMHG
BH CV ECHO MEAS - MV MEAN PG: 7.1 MMHG
BH CV ECHO MEAS - MV V2 MAX: 219.2 CM/SEC
BH CV ECHO MEAS - MV V2 MEAN: 120.7 CM/SEC
BH CV ECHO MEAS - MV V2 VTI: 65.4 CM
BH CV ECHO MEAS - TR MAX VEL: 307.6 CM/SEC
BH CV VAS BP RIGHT ARM: NORMAL MMHG
BUN BLD-MCNC: 21 MG/DL (ref 9–23)
BUN/CREAT SERPL: 26.3 (ref 7–25)
CALCIUM SPEC-SCNC: 9.1 MG/DL (ref 8.7–10.4)
CHLORIDE SERPL-SCNC: 104 MMOL/L (ref 99–109)
CO2 SERPL-SCNC: 28 MMOL/L (ref 20–31)
CREAT BLD-MCNC: 0.8 MG/DL (ref 0.6–1.3)
GFR SERPL CREATININE-BSD FRML MDRD: 74 ML/MIN/1.73
GLUCOSE BLD-MCNC: 112 MG/DL (ref 70–100)
GLUCOSE BLDC GLUCOMTR-MCNC: 100 MG/DL (ref 70–130)
GLUCOSE BLDC GLUCOMTR-MCNC: 131 MG/DL (ref 70–130)
GLUCOSE BLDC GLUCOMTR-MCNC: 158 MG/DL (ref 70–130)
GLUCOSE BLDC GLUCOMTR-MCNC: 78 MG/DL (ref 70–130)
POTASSIUM BLD-SCNC: 4.5 MMOL/L (ref 3.5–5.5)
SODIUM BLD-SCNC: 138 MMOL/L (ref 132–146)

## 2017-09-28 PROCEDURE — B2151ZZ FLUOROSCOPY OF LEFT HEART USING LOW OSMOLAR CONTRAST: ICD-10-PCS | Performed by: INTERNAL MEDICINE

## 2017-09-28 PROCEDURE — 25010000002 CEFTRIAXONE PER 250 MG: Performed by: INTERNAL MEDICINE

## 2017-09-28 PROCEDURE — 93460 R&L HRT ART/VENTRICLE ANGIO: CPT | Performed by: INTERNAL MEDICINE

## 2017-09-28 PROCEDURE — 93325 DOPPLER ECHO COLOR FLOW MAPG: CPT

## 2017-09-28 PROCEDURE — 93312 ECHO TRANSESOPHAGEAL: CPT

## 2017-09-28 PROCEDURE — 82962 GLUCOSE BLOOD TEST: CPT

## 2017-09-28 PROCEDURE — 25010000002 FENTANYL CITRATE (PF) 100 MCG/2ML SOLUTION: Performed by: INTERNAL MEDICINE

## 2017-09-28 PROCEDURE — 93325 DOPPLER ECHO COLOR FLOW MAPG: CPT | Performed by: INTERNAL MEDICINE

## 2017-09-28 PROCEDURE — C1769 GUIDE WIRE: HCPCS | Performed by: INTERNAL MEDICINE

## 2017-09-28 PROCEDURE — 93005 ELECTROCARDIOGRAM TRACING: CPT | Performed by: INTERNAL MEDICINE

## 2017-09-28 PROCEDURE — 99233 SBSQ HOSP IP/OBS HIGH 50: CPT | Performed by: INTERNAL MEDICINE

## 2017-09-28 PROCEDURE — B2111ZZ FLUOROSCOPY OF MULTIPLE CORONARY ARTERIES USING LOW OSMOLAR CONTRAST: ICD-10-PCS | Performed by: INTERNAL MEDICINE

## 2017-09-28 PROCEDURE — 93321 DOPPLER ECHO F-UP/LMTD STD: CPT | Performed by: INTERNAL MEDICINE

## 2017-09-28 PROCEDURE — B24BZZ4 ULTRASONOGRAPHY OF HEART WITH AORTA, TRANSESOPHAGEAL: ICD-10-PCS | Performed by: INTERNAL MEDICINE

## 2017-09-28 PROCEDURE — 99232 SBSQ HOSP IP/OBS MODERATE 35: CPT | Performed by: INTERNAL MEDICINE

## 2017-09-28 PROCEDURE — 80048 BASIC METABOLIC PNL TOTAL CA: CPT | Performed by: INTERNAL MEDICINE

## 2017-09-28 PROCEDURE — C1894 INTRO/SHEATH, NON-LASER: HCPCS | Performed by: INTERNAL MEDICINE

## 2017-09-28 PROCEDURE — 25010000002 PHENYLEPHRINE PER 1 ML

## 2017-09-28 PROCEDURE — 4A023N8 MEASUREMENT OF CARDIAC SAMPLING AND PRESSURE, BILATERAL, PERCUTANEOUS APPROACH: ICD-10-PCS | Performed by: INTERNAL MEDICINE

## 2017-09-28 PROCEDURE — 93321 DOPPLER ECHO F-UP/LMTD STD: CPT

## 2017-09-28 PROCEDURE — 0 IOPAMIDOL PER 1 ML: Performed by: INTERNAL MEDICINE

## 2017-09-28 PROCEDURE — 93312 ECHO TRANSESOPHAGEAL: CPT | Performed by: INTERNAL MEDICINE

## 2017-09-28 PROCEDURE — 25010000002 PROPOFOL 10 MG/ML EMULSION

## 2017-09-28 RX ORDER — DOXYCYCLINE HYCLATE 100 MG/1
100 CAPSULE ORAL EVERY 12 HOURS SCHEDULED
Status: DISCONTINUED | OUTPATIENT
Start: 2017-09-28 | End: 2017-09-29 | Stop reason: HOSPADM

## 2017-09-28 RX ORDER — SODIUM CHLORIDE 9 MG/ML
1-3 INJECTION, SOLUTION INTRAVENOUS CONTINUOUS
Status: CANCELLED | OUTPATIENT
Start: 2017-09-28

## 2017-09-28 RX ORDER — FENTANYL CITRATE 50 UG/ML
INJECTION, SOLUTION INTRAMUSCULAR; INTRAVENOUS AS NEEDED
Status: DISCONTINUED | OUTPATIENT
Start: 2017-09-28 | End: 2017-09-28 | Stop reason: HOSPADM

## 2017-09-28 RX ORDER — LIDOCAINE HYDROCHLORIDE 10 MG/ML
INJECTION, SOLUTION EPIDURAL; INFILTRATION; INTRACAUDAL; PERINEURAL AS NEEDED
Status: DISCONTINUED | OUTPATIENT
Start: 2017-09-28 | End: 2017-09-28 | Stop reason: HOSPADM

## 2017-09-28 RX ORDER — SOTALOL HYDROCHLORIDE 80 MG/1
80 TABLET ORAL EVERY 12 HOURS SCHEDULED
Status: DISCONTINUED | OUTPATIENT
Start: 2017-09-28 | End: 2017-09-29 | Stop reason: HOSPADM

## 2017-09-28 RX ADMIN — FAMOTIDINE 20 MG: 20 TABLET ORAL at 08:51

## 2017-09-28 RX ADMIN — DOXYCYCLINE HYCLATE 100 MG: 100 CAPSULE ORAL at 20:25

## 2017-09-28 RX ADMIN — SOTALOL HYDROCHLORIDE 120 MG: 80 TABLET ORAL at 08:51

## 2017-09-28 RX ADMIN — ACETAMINOPHEN 650 MG: 325 TABLET, FILM COATED ORAL at 17:34

## 2017-09-28 RX ADMIN — GABAPENTIN 300 MG: 300 CAPSULE ORAL at 13:07

## 2017-09-28 RX ADMIN — CEFTRIAXONE SODIUM 1 G: 1 INJECTION, SOLUTION INTRAVENOUS at 20:02

## 2017-09-28 RX ADMIN — FAMOTIDINE 20 MG: 20 TABLET ORAL at 20:00

## 2017-09-28 RX ADMIN — CYCLOBENZAPRINE HYDROCHLORIDE 10 MG: 10 TABLET, FILM COATED ORAL at 20:00

## 2017-09-28 RX ADMIN — GABAPENTIN 300 MG: 300 CAPSULE ORAL at 20:02

## 2017-09-28 RX ADMIN — GABAPENTIN 300 MG: 300 CAPSULE ORAL at 05:01

## 2017-09-28 RX ADMIN — LEVOTHYROXINE SODIUM 150 MCG: 150 TABLET ORAL at 05:02

## 2017-09-29 VITALS
SYSTOLIC BLOOD PRESSURE: 108 MMHG | BODY MASS INDEX: 35.61 KG/M2 | WEIGHT: 193.5 LBS | OXYGEN SATURATION: 96 % | HEIGHT: 62 IN | RESPIRATION RATE: 18 BRPM | TEMPERATURE: 98 F | HEART RATE: 55 BPM | DIASTOLIC BLOOD PRESSURE: 68 MMHG

## 2017-09-29 PROBLEM — J96.02 ACUTE RESPIRATORY FAILURE WITH HYPOXIA AND HYPERCAPNIA (HCC): Status: RESOLVED | Noted: 2017-09-25 | Resolved: 2017-09-29

## 2017-09-29 PROBLEM — E78.5 HYPERLIPIDEMIA LDL GOAL <70: Status: ACTIVE | Noted: 2017-09-29

## 2017-09-29 PROBLEM — C34.92 MALIGNANT NEOPLASM OF LEFT LUNG: Status: RESOLVED | Noted: 2017-04-10 | Resolved: 2017-09-29

## 2017-09-29 PROBLEM — Z72.0 TOBACCO ABUSE: Status: RESOLVED | Noted: 2017-04-10 | Resolved: 2017-09-29

## 2017-09-29 PROBLEM — J96.01 ACUTE RESPIRATORY FAILURE WITH HYPOXIA AND HYPERCAPNIA: Status: RESOLVED | Noted: 2017-09-25 | Resolved: 2017-09-29

## 2017-09-29 LAB
ALBUMIN SERPL-MCNC: 3.8 G/DL (ref 3.2–4.8)
ALBUMIN/GLOB SERPL: 1.3 G/DL (ref 1.5–2.5)
ALP SERPL-CCNC: 92 U/L (ref 25–100)
ALT SERPL W P-5'-P-CCNC: 34 U/L (ref 7–40)
ANION GAP SERPL CALCULATED.3IONS-SCNC: 10 MMOL/L (ref 3–11)
AST SERPL-CCNC: 28 U/L (ref 0–33)
BASOPHILS # BLD AUTO: 0.02 10*3/MM3 (ref 0–0.2)
BASOPHILS NFR BLD AUTO: 0.4 % (ref 0–1)
BILIRUB SERPL-MCNC: 0.3 MG/DL (ref 0.3–1.2)
BUN BLD-MCNC: 19 MG/DL (ref 9–23)
BUN/CREAT SERPL: 23.8 (ref 7–25)
CALCIUM SPEC-SCNC: 9.1 MG/DL (ref 8.7–10.4)
CHLORIDE SERPL-SCNC: 105 MMOL/L (ref 99–109)
CO2 SERPL-SCNC: 27 MMOL/L (ref 20–31)
CREAT BLD-MCNC: 0.8 MG/DL (ref 0.6–1.3)
DEPRECATED RDW RBC AUTO: 49.6 FL (ref 37–54)
EOSINOPHIL # BLD AUTO: 0.12 10*3/MM3 (ref 0–0.3)
EOSINOPHIL NFR BLD AUTO: 2.3 % (ref 0–3)
ERYTHROCYTE [DISTWIDTH] IN BLOOD BY AUTOMATED COUNT: 12.8 % (ref 11.3–14.5)
GFR SERPL CREATININE-BSD FRML MDRD: 74 ML/MIN/1.73
GLOBULIN UR ELPH-MCNC: 3 GM/DL
GLUCOSE BLD-MCNC: 111 MG/DL (ref 70–100)
GLUCOSE BLDC GLUCOMTR-MCNC: 145 MG/DL (ref 70–130)
HCT VFR BLD AUTO: 40.7 % (ref 34.5–44)
HGB BLD-MCNC: 13.3 G/DL (ref 11.5–15.5)
IMM GRANULOCYTES # BLD: 0.01 10*3/MM3 (ref 0–0.03)
IMM GRANULOCYTES NFR BLD: 0.2 % (ref 0–0.6)
LYMPHOCYTES # BLD AUTO: 1.43 10*3/MM3 (ref 0.6–4.8)
LYMPHOCYTES NFR BLD AUTO: 27.6 % (ref 24–44)
MCH RBC QN AUTO: 35.8 PG (ref 27–31)
MCHC RBC AUTO-ENTMCNC: 32.7 G/DL (ref 32–36)
MCV RBC AUTO: 109.4 FL (ref 80–99)
MONOCYTES # BLD AUTO: 0.5 10*3/MM3 (ref 0–1)
MONOCYTES NFR BLD AUTO: 9.7 % (ref 0–12)
NEUTROPHILS # BLD AUTO: 3.1 10*3/MM3 (ref 1.5–8.3)
NEUTROPHILS NFR BLD AUTO: 59.8 % (ref 41–71)
PLATELET # BLD AUTO: 180 10*3/MM3 (ref 150–450)
PMV BLD AUTO: 10.5 FL (ref 6–12)
POTASSIUM BLD-SCNC: 4.3 MMOL/L (ref 3.5–5.5)
PROT SERPL-MCNC: 6.8 G/DL (ref 5.7–8.2)
RBC # BLD AUTO: 3.72 10*6/MM3 (ref 3.89–5.14)
SODIUM BLD-SCNC: 142 MMOL/L (ref 132–146)
WBC NRBC COR # BLD: 5.18 10*3/MM3 (ref 3.5–10.8)

## 2017-09-29 PROCEDURE — 85025 COMPLETE CBC W/AUTO DIFF WBC: CPT | Performed by: INTERNAL MEDICINE

## 2017-09-29 PROCEDURE — 99255 IP/OBS CONSLTJ NEW/EST HI 80: CPT | Performed by: THORACIC SURGERY (CARDIOTHORACIC VASCULAR SURGERY)

## 2017-09-29 PROCEDURE — 99232 SBSQ HOSP IP/OBS MODERATE 35: CPT | Performed by: INTERNAL MEDICINE

## 2017-09-29 PROCEDURE — 80053 COMPREHEN METABOLIC PANEL: CPT | Performed by: INTERNAL MEDICINE

## 2017-09-29 PROCEDURE — 93010 ELECTROCARDIOGRAM REPORT: CPT | Performed by: INTERNAL MEDICINE

## 2017-09-29 PROCEDURE — 82962 GLUCOSE BLOOD TEST: CPT

## 2017-09-29 PROCEDURE — 93005 ELECTROCARDIOGRAM TRACING: CPT | Performed by: INTERNAL MEDICINE

## 2017-09-29 RX ORDER — FUROSEMIDE 20 MG/1
20 TABLET ORAL DAILY
Status: DISCONTINUED | OUTPATIENT
Start: 2017-09-29 | End: 2017-09-29 | Stop reason: HOSPADM

## 2017-09-29 RX ORDER — ATORVASTATIN CALCIUM 40 MG/1
40 TABLET, FILM COATED ORAL NIGHTLY
Qty: 30 TABLET | Refills: 2 | Status: SHIPPED | OUTPATIENT
Start: 2017-09-29 | End: 2017-12-28

## 2017-09-29 RX ORDER — FUROSEMIDE 20 MG/1
20 TABLET ORAL DAILY
Qty: 30 TABLET | Refills: 2 | OUTPATIENT
Start: 2017-09-29 | End: 2022-04-20 | Stop reason: HOSPADM

## 2017-09-29 RX ORDER — DOXYCYCLINE HYCLATE 100 MG/1
100 CAPSULE ORAL EVERY 12 HOURS SCHEDULED
Qty: 12 CAPSULE | Refills: 0 | Status: SHIPPED | OUTPATIENT
Start: 2017-09-29 | End: 2017-10-05

## 2017-09-29 RX ORDER — ATORVASTATIN CALCIUM 40 MG/1
40 TABLET, FILM COATED ORAL NIGHTLY
Status: DISCONTINUED | OUTPATIENT
Start: 2017-09-29 | End: 2017-09-29 | Stop reason: HOSPADM

## 2017-09-29 RX ORDER — CEFUROXIME AXETIL 500 MG/1
500 TABLET ORAL 2 TIMES DAILY
Qty: 6 TABLET | Refills: 0 | Status: SHIPPED | OUTPATIENT
Start: 2017-09-29 | End: 2018-04-03 | Stop reason: HOSPADM

## 2017-09-29 RX ORDER — POTASSIUM CHLORIDE 750 MG/1
10 CAPSULE, EXTENDED RELEASE ORAL DAILY
Status: DISCONTINUED | OUTPATIENT
Start: 2017-09-29 | End: 2017-09-29 | Stop reason: HOSPADM

## 2017-09-29 RX ORDER — POTASSIUM CHLORIDE 750 MG/1
10 CAPSULE, EXTENDED RELEASE ORAL DAILY
Qty: 30 CAPSULE | Refills: 2 | Status: SHIPPED | OUTPATIENT
Start: 2017-09-29 | End: 2017-12-28

## 2017-09-29 RX ADMIN — GABAPENTIN 300 MG: 300 CAPSULE ORAL at 13:57

## 2017-09-29 RX ADMIN — SOTALOL HYDROCHLORIDE 80 MG: 80 TABLET ORAL at 09:15

## 2017-09-29 RX ADMIN — FUROSEMIDE 20 MG: 20 TABLET ORAL at 09:16

## 2017-09-29 RX ADMIN — APIXABAN 5 MG: 5 TABLET, FILM COATED ORAL at 09:15

## 2017-09-29 RX ADMIN — POTASSIUM CHLORIDE 10 MEQ: 750 CAPSULE, EXTENDED RELEASE ORAL at 09:16

## 2017-09-29 RX ADMIN — DOXYCYCLINE HYCLATE 100 MG: 100 CAPSULE ORAL at 09:15

## 2017-09-29 RX ADMIN — GABAPENTIN 300 MG: 300 CAPSULE ORAL at 05:17

## 2017-09-29 RX ADMIN — FAMOTIDINE 20 MG: 20 TABLET ORAL at 09:16

## 2017-09-29 RX ADMIN — LEVOTHYROXINE SODIUM 150 MCG: 150 TABLET ORAL at 05:17

## 2017-09-30 LAB
BACTERIA SPEC AEROBE CULT: NORMAL
BACTERIA SPEC AEROBE CULT: NORMAL

## 2017-10-01 LAB — BACTERIA SPEC AEROBE CULT: NORMAL

## 2017-10-02 NOTE — PAYOR COMM NOTE
"Jill Miller (57 y.o. Female)   Auth # [id number]  Rani Keller RN, BSN  Phone # [phone]  Fax # [phone]    Date of Birth Social Security Number Address Home Phone MRN    1960  [address]  [place] [address] [phone] [id number]    Jewish Marital Status          None        Admission Date Admission Type Admitting Provider Attending Provider Department, Room/Bed    9/25/17 Emergency Brayan Jacobsen MD  [facility] 6B, N638/1    Discharge Date Discharge Disposition Discharge Destination        9/29/2017 Home or Self Care Home            Attending Provider: (none)    Allergies:  No Known Allergies    Isolation:  None   Infection:  None   Code Status:  Prior    Ht:  62\" (157.5 cm)   Wt:  193 lb 8 oz (87.8 kg)    Admission Cmt:  None   Principal Problem:  Acute mixed respiratory failure [J96.01,J96.02]                 Active Insurance as of 9/25/2017     Primary Coverage     Payor Plan Insurance Group Employer/Plan Group    [id number] [id number]     Payor Plan Address Payor Plan Phone Number Effective From Effective To    [address] [phone] 1/1/2017     [place] [address]       Subscriber Name Subscriber Birth Date Member ID       JILL MILLER 1960 [id number]                 Emergency Contacts      (Rel.) Home Phone Work Phone Mobile Phone    Zen Miller (Spouse) [phone] -- [phone]            Physician Progress Notes (last 72 hours) (Notes from 9/29/2017 11:02 AM through 10/2/2017 11:02 AM)     No notes of this type exist for this encounter.        Consult Notes (last 72 hours) (Notes from 9/29/2017 11:02 AM through 10/2/2017 11:02 AM)     No notes of this type exist for this encounter.           Discharge Summary      Brayan Jacobsen MD at 9/29/2017  4:09 PM              [facility] Medicine Services  DISCHARGE SUMMARY       Date of Admission: " 9/25/2017  Date of Discharge:  9/29/2017  Primary Care Physician: YANETH Olivera  Consulting Physician(s)     Provider Relationship Specialty    Marco Lay MD Consulting Physician Cardiology    Ajay Cheatham MD Consulting Physician Cardiothoracic Surgery          Discharge Diagnoses:  Active Hospital Problems (** Indicates Principal Problem)    Diagnosis Date Noted   • Hyperlipidemia LDL goal <70 [E78.5] 09/29/2017     · High intensity statin indicated given the presence of diabetes and mild CAD     • 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  Moderate to severe mitral stenosis (mean gradient 9 mmHg) due to rheumatic disease.  Moderate aortic insufficiency.  RVSP 50 mmHg  · TIFFANIE (9/27/2017):  Moderate-to-severe MS (mean gradient 9 mmHg).  Moderate-to-severe MR.  Moderate AI.  · R/LHC (9/29/2017):  Mild CAD.  Normal LVEF.  Severe MS (mean 11 mmHg).  PA 50 mmhg     • Chronically Abnormal CXR (Left pleural disease post op) [R93.8] 09/25/2017   • Pulmonary infiltrate (R/O CHF > Pneumonia) [R91.8] 09/25/2017   • Paroxysmal atrial fibrillation [I48.0] 05/15/2017     · Postoperative atrial fibrillation with RVR in the setting of left upper lobectomy, 5/15/2017  · Echo (8/25/17): LVEF 60%.  Moderate to severe mitral stenosis (mean gradient 9 mmHg).  Moderate aortic insufficiency.  RVSP 50 mmHg  · Chads Vasc= 2     • Graves disease [E05.00] 05/12/2017      Resolved Hospital Problems    Diagnosis Date Noted Date Resolved   • **Acute mixed respiratory failure [J96.01, J96.02] 09/25/2017 09/29/2017   • Lung cancer; S/P Bronch/Thor w/ ALBINA lobectomy/mediastinal lymph node resec (5/12/17, Dr. Cheatham) [C34.92] 04/10/2017 09/29/2017   • Tobacco abuse, resolved [Z72.0] 04/10/2017 09/29/2017   • H/O MRSA (methicillin resistant staph aureus) culture positive  [Z22.322] 01/01/2014 09/29/2017     under left breast, incision and debridement,  "treated with wound packing and po abx          Presenting Problem/History of Present Illness  Acute respiratory failure with hypoxia and hypercapnia [J96.01, J96.02]     Chief Complaint on Day of Discharge: MV stenosis    History of Present Illness on Day of Discharge: NO current dyspnea, denies chest pain, eager to go home.    Hospital Course  58 yo with h/o nsclc s/p ALBINA lobectomey in May of this year and atrial fibrillation on eliquis presented with dyspnea. The patient had been seen in the ED for Afib RVR and was successfully cardioverted and discharge home. On return to the ED she was noted to be in acute mixed hypoxic and hypercapnic respiratory failure with admission to the ICU.    Mitral valve stenosis and aortic insufficiency  - patient seen by CT surgery Dr Cheatham and MV replacement recommended. The patient will need tooth extractions prior to surgery and referral has been made to [facility] Dr Rucker.    PAF  - lovenox/coumadin recommended in setting of valvular afib, but patient does not think she can comply with home injections, therefore she will be maintained on eliquis post discharge  - sotalol  - continue low dose lasix/potassium post discharge.    Probable community acquired pneumonia  - CXR difficult to interpret due to prior lung resection, but clinical improvement noted during hospitalization on empiric antibiotics  - Will continue cephalosporin to complete 7 days, doxy to complete 10 days total    Procedures Performed  Procedure(s):  Left Heart Cath  Right Heart Cath       Consults:   Consults     Date and Time Order Name Status Description    9/28/2017 1202 Inpatient Consult to Cardiothoracic Surgery      9/25/2017 2158 Inpatient Consult to Cardiology Completed           Pertinent Test Results:    Condition on Discharge:  fair    Physical Exam on Discharge:/68  Pulse 55  Temp 98 °F (36.7 °C) (Oral)   Resp 18  Ht 62\" (157.5 cm)  Wt 193 lb 8 oz (87.8 kg)  SpO2 96%  BMI 35.39 " kg/m2  Physical Exam  Constitutional -no acute distress, non toxic, up in chair  HEENT-NCAT, mucous membranes moist  CV-RRR, S1 S2 normal, no m/r/g  Resp-diminished bilaterally, L>R, no wheezes or rhonchi  Abd-soft, non-tender, non-distended, normo active bowel sounds; overweight  Ext-No lower extremity cyanosis, clubbing or edema bilaterally  Neuro-alert and oriented x 3, speech clear, moves all extremities   Psych-normal affect   Skin- No rash on exposed UE or LE bilaterally      Discharge Disposition  Home or Self Care    Discharge Medications   Jill Miller   Home Medication Instructions PURNIMA:[id number]    Printed on:09/29/17 9367   Medication Information                      acetaminophen (TYLENOL) 325 MG tablet  Take 325 mg by mouth Daily As Needed for Mild Pain  or Headache.             apixaban (ELIQUIS) 5 MG tablet tablet  Take 1 tablet by mouth Every 12 (Twelve) Hours for 180 days.             atorvastatin (LIPITOR) 40 MG tablet  Take 1 tablet by mouth Every Night for 90 days.             cefuroxime (CEFTIN) 500 MG tablet  Take 1 tablet by mouth 2 (Two) Times a Day.             doxycycline (VIBRAMYCIN) 100 MG capsule  Take 1 capsule by mouth Every 12 (Twelve) Hours for 12 doses. Indications: Upper Respiratory Tract Infection             furosemide (LASIX) 20 MG tablet  Take 1 tablet by mouth Daily for 90 days.             gabapentin (NEURONTIN) 600 MG tablet  Take 1 tablet by mouth 3 (Three) Times a Day.             ibuprofen (ADVIL,MOTRIN) 200 MG tablet  Take 800 mg by mouth Daily As Needed for Mild Pain  or Headache.             levothyroxine (SYNTHROID, LEVOTHROID) 150 MCG tablet  Take 150 mcg by mouth Daily.             metoprolol succinate XL (TOPROL-XL) 25 MG 24 hr tablet  Take 25 mg by mouth Daily.             potassium chloride (MICRO-K) 10 MEQ CR capsule  Take 1 capsule by mouth Daily for 90 days.             sotalol (BETAPACE) 80 MG tablet  Take 1 tablet by mouth 2 (Two) Times a Day.                  Discharge Diet:     Discharge Care Plan / Instructions:    Activity at Discharge:     Follow-up Appointments  No future appointments.  Additional Instructions for the Follow-ups that You Need to Schedule     Discharge Follow-up with PCP    As directed    Follow Up Details:  follow up PCP one week       Discharge Follow-up with Specialty    As directed    Specialty:  Lore Suarez   Follow Up:  3 Weeks   Follow Up Details:  Hosptial followup for atrial fibrillation.       Discharge Follow-up with Specialty    As directed    Specialty:  [facility] (Dr Rucker?) for tooth extractions in anticipation of mitral valve replacement   Follow Up:  1 Week       Discharge Follow-up with Specialty    As directed    Specialty:  CT surgery Dr Cheatham follow up for planned mitral valve replacement   Follow Up:  2 Weeks                 Test Results Pending at Discharge   Order Current Status    Blood Culture Preliminary result    Blood Culture Preliminary result    Blood Culture Preliminary result           Brayan Jacobsen MD 09/29/17 4:09 PM    Time: Discharge 40 min    Please note that portions of this note may have been completed with a voice recognition program. Efforts were made to edit the dictations, but occasionally words are mistranscribed.       Electronically signed by Brayan Jacobsen MD at 9/29/2017  4:20 PM        Discharge Order     Start     Ordered    09/29/17 1608  Discharge patient  Once     Expected Discharge Date:  09/29/17    Discharge Disposition:  Home or Self Care        09/29/17 1608

## 2017-10-11 ENCOUNTER — TELEPHONE (OUTPATIENT)
Dept: CARDIOLOGY | Facility: CLINIC | Age: 57
End: 2017-10-11

## 2017-10-11 NOTE — TELEPHONE ENCOUNTER
Jackelin with Dr. Weaver's office called to let us know know that the patient no showed to her first scheduled appt and cancelled her second.

## 2017-10-19 ENCOUNTER — DOCUMENTATION (OUTPATIENT)
Dept: OTHER | Facility: HOSPITAL | Age: 57
End: 2017-10-19

## 2017-10-19 NOTE — PROGRESS NOTES
Survivorship care plan created in [facility] and mailed to patient. SCP sent to PCP per [facility].

## 2017-11-06 ENCOUNTER — TELEPHONE (OUTPATIENT)
Dept: CARDIAC SURGERY | Facility: CLINIC | Age: 57
End: 2017-11-06

## 2017-11-11 DIAGNOSIS — I48.19 PERSISTENT ATRIAL FIBRILLATION (HCC): ICD-10-CM

## 2017-11-13 RX ORDER — APIXABAN 5 MG/1
TABLET, FILM COATED ORAL
Qty: 180 TABLET | Refills: 1 | Status: SHIPPED | OUTPATIENT
Start: 2017-11-13 | End: 2018-05-17 | Stop reason: SDUPTHER

## 2017-12-04 ENCOUNTER — TELEPHONE (OUTPATIENT)
Dept: ONCOLOGY | Facility: CLINIC | Age: 57
End: 2017-12-04

## 2017-12-04 NOTE — TELEPHONE ENCOUNTER
Patient called and reported that due to job she will not be able to schedule follow up visit until after the new year.  Will talk to Dr Simons about getting gabapentin refilled and I will call patient back.

## 2017-12-04 NOTE — TELEPHONE ENCOUNTER
----- Message from Vaishali Delatorre sent at 12/4/2017 11:51 AM EST -----  Regarding: JOEY - RETURNED PHONE CALL    Contact: [phone]  PATIENT WAS RETURNING A CALL. CALL AFTER 3:30PM

## 2017-12-05 RX ORDER — GABAPENTIN 600 MG/1
600 TABLET ORAL 3 TIMES DAILY
Qty: 90 TABLET | Refills: 1 | Status: ON HOLD | OUTPATIENT
Start: 2017-12-05 | End: 2018-07-19

## 2017-12-05 NOTE — TELEPHONE ENCOUNTER
Dr Simons ok with patient getting a refill gabapentin with 12 refill, patient called and educated that a follow up visit will need to be a follow up visit to have gabapentin refilled.  Patient verbalized understanding.

## 2017-12-28 DIAGNOSIS — C34.92 PRIMARY LUNG CANCER, LEFT (HCC): Primary | ICD-10-CM

## 2018-04-02 ENCOUNTER — HOSPITAL ENCOUNTER (INPATIENT)
Facility: HOSPITAL | Age: 58
LOS: 1 days | Discharge: HOME | End: 2018-04-03
Attending: EMERGENCY MEDICINE | Admitting: HOSPITALIST

## 2018-04-02 ENCOUNTER — APPOINTMENT (OUTPATIENT)
Dept: GENERAL RADIOLOGY | Facility: HOSPITAL | Age: 58
End: 2018-04-02

## 2018-04-02 DIAGNOSIS — R09.02 HYPOXIA: ICD-10-CM

## 2018-04-02 DIAGNOSIS — I48.92 ATRIAL FLUTTER, UNSPECIFIED TYPE (HCC): Primary | ICD-10-CM

## 2018-04-02 DIAGNOSIS — R06.00 DYSPNEA, UNSPECIFIED TYPE: ICD-10-CM

## 2018-04-02 DIAGNOSIS — I50.33 ACUTE ON CHRONIC DIASTOLIC CHF (CONGESTIVE HEART FAILURE) (HCC): ICD-10-CM

## 2018-04-02 PROBLEM — J96.01 ACUTE HYPOXEMIC RESPIRATORY FAILURE (HCC): Status: ACTIVE | Noted: 2018-04-02

## 2018-04-02 PROBLEM — I50.31 ACUTE DIASTOLIC CONGESTIVE HEART FAILURE: Status: ACTIVE | Noted: 2018-04-02

## 2018-04-02 PROBLEM — R91.8 PULMONARY INFILTRATE: Status: RESOLVED | Noted: 2017-09-25 | Resolved: 2018-04-02

## 2018-04-02 LAB
ALBUMIN SERPL-MCNC: 4.3 G/DL (ref 3.2–4.8)
ALBUMIN/GLOB SERPL: 1.5 G/DL (ref 1.5–2.5)
ALP SERPL-CCNC: 114 U/L (ref 25–100)
ALT SERPL W P-5'-P-CCNC: 29 U/L (ref 7–40)
ANION GAP SERPL CALCULATED.3IONS-SCNC: 13 MMOL/L (ref 3–11)
ANION GAP SERPL CALCULATED.3IONS-SCNC: 8 MMOL/L (ref 3–11)
AST SERPL-CCNC: 25 U/L (ref 0–33)
BASOPHILS # BLD AUTO: 0.03 10*3/MM3 (ref 0–0.2)
BASOPHILS NFR BLD AUTO: 0.4 % (ref 0–1)
BILIRUB SERPL-MCNC: 1.1 MG/DL (ref 0.3–1.2)
BNP SERPL-MCNC: 654 PG/ML (ref 0–100)
BUN BLD-MCNC: 19 MG/DL (ref 9–23)
BUN BLD-MCNC: 19 MG/DL (ref 9–23)
BUN/CREAT SERPL: 21.1 (ref 7–25)
BUN/CREAT SERPL: 23.8 (ref 7–25)
CALCIUM SPEC-SCNC: 8.9 MG/DL (ref 8.7–10.4)
CALCIUM SPEC-SCNC: 9.4 MG/DL (ref 8.7–10.4)
CHLORIDE SERPL-SCNC: 106 MMOL/L (ref 99–109)
CHLORIDE SERPL-SCNC: 99 MMOL/L (ref 99–109)
CO2 SERPL-SCNC: 28 MMOL/L (ref 20–31)
CO2 SERPL-SCNC: 28 MMOL/L (ref 20–31)
CREAT BLD-MCNC: 0.8 MG/DL (ref 0.6–1.3)
CREAT BLD-MCNC: 0.9 MG/DL (ref 0.6–1.3)
DEPRECATED RDW RBC AUTO: 48.3 FL (ref 37–54)
EOSINOPHIL # BLD AUTO: 0.04 10*3/MM3 (ref 0–0.3)
EOSINOPHIL NFR BLD AUTO: 0.5 % (ref 0–3)
ERYTHROCYTE [DISTWIDTH] IN BLOOD BY AUTOMATED COUNT: 12.3 % (ref 11.3–14.5)
GFR SERPL CREATININE-BSD FRML MDRD: 65 ML/MIN/1.73
GFR SERPL CREATININE-BSD FRML MDRD: 74 ML/MIN/1.73
GLOBULIN UR ELPH-MCNC: 2.9 GM/DL
GLUCOSE BLD-MCNC: 166 MG/DL (ref 70–100)
GLUCOSE BLD-MCNC: 203 MG/DL (ref 70–100)
HCT VFR BLD AUTO: 41.6 % (ref 34.5–44)
HGB BLD-MCNC: 14.5 G/DL (ref 11.5–15.5)
IMM GRANULOCYTES # BLD: 0.02 10*3/MM3 (ref 0–0.03)
IMM GRANULOCYTES NFR BLD: 0.2 % (ref 0–0.6)
LYMPHOCYTES # BLD AUTO: 1.98 10*3/MM3 (ref 0.6–4.8)
LYMPHOCYTES NFR BLD AUTO: 23.9 % (ref 24–44)
MCH RBC QN AUTO: 38 PG (ref 27–31)
MCHC RBC AUTO-ENTMCNC: 34.9 G/DL (ref 32–36)
MCV RBC AUTO: 108.9 FL (ref 80–99)
MONOCYTES # BLD AUTO: 0.52 10*3/MM3 (ref 0–1)
MONOCYTES NFR BLD AUTO: 6.3 % (ref 0–12)
NEUTROPHILS # BLD AUTO: 5.7 10*3/MM3 (ref 1.5–8.3)
NEUTROPHILS NFR BLD AUTO: 68.7 % (ref 41–71)
PLAT MORPH BLD: NORMAL
PLATELET # BLD AUTO: 222 10*3/MM3 (ref 150–450)
PMV BLD AUTO: 11 FL (ref 6–12)
POTASSIUM BLD-SCNC: 4.3 MMOL/L (ref 3.5–5.5)
POTASSIUM BLD-SCNC: 4.5 MMOL/L (ref 3.5–5.5)
PROT SERPL-MCNC: 7.2 G/DL (ref 5.7–8.2)
RBC # BLD AUTO: 3.82 10*6/MM3 (ref 3.89–5.14)
RBC MORPH BLD: NORMAL
SODIUM BLD-SCNC: 140 MMOL/L (ref 132–146)
SODIUM BLD-SCNC: 142 MMOL/L (ref 132–146)
TROPONIN I SERPL-MCNC: <0.006 NG/ML
WBC MORPH BLD: NORMAL
WBC NRBC COR # BLD: 8.29 10*3/MM3 (ref 3.5–10.8)

## 2018-04-02 PROCEDURE — 99152 MOD SED SAME PHYS/QHP 5/>YRS: CPT

## 2018-04-02 PROCEDURE — 99285 EMERGENCY DEPT VISIT HI MDM: CPT

## 2018-04-02 PROCEDURE — 93005 ELECTROCARDIOGRAM TRACING: CPT | Performed by: EMERGENCY MEDICINE

## 2018-04-02 PROCEDURE — 94640 AIRWAY INHALATION TREATMENT: CPT

## 2018-04-02 PROCEDURE — 94799 UNLISTED PULMONARY SVC/PX: CPT

## 2018-04-02 PROCEDURE — 25010000002 FUROSEMIDE PER 20 MG: Performed by: EMERGENCY MEDICINE

## 2018-04-02 PROCEDURE — 25010000002 METHYLPREDNISOLONE PER 125 MG: Performed by: EMERGENCY MEDICINE

## 2018-04-02 PROCEDURE — 25010000002 FUROSEMIDE PER 20 MG: Performed by: INTERNAL MEDICINE

## 2018-04-02 PROCEDURE — 84484 ASSAY OF TROPONIN QUANT: CPT | Performed by: EMERGENCY MEDICINE

## 2018-04-02 PROCEDURE — 5A09357 ASSISTANCE WITH RESPIRATORY VENTILATION, LESS THAN 24 CONSECUTIVE HOURS, CONTINUOUS POSITIVE AIRWAY PRESSURE: ICD-10-PCS | Performed by: INTERNAL MEDICINE

## 2018-04-02 PROCEDURE — 99253 IP/OBS CNSLTJ NEW/EST LOW 45: CPT | Performed by: INTERNAL MEDICINE

## 2018-04-02 PROCEDURE — 99220 PR INITIAL OBSERVATION CARE/DAY 70 MINUTES: CPT | Performed by: INTERNAL MEDICINE

## 2018-04-02 PROCEDURE — 25010000002 PROPOFOL 10 MG/ML EMULSION: Performed by: EMERGENCY MEDICINE

## 2018-04-02 PROCEDURE — 94660 CPAP INITIATION&MGMT: CPT

## 2018-04-02 PROCEDURE — 83880 ASSAY OF NATRIURETIC PEPTIDE: CPT | Performed by: EMERGENCY MEDICINE

## 2018-04-02 PROCEDURE — 80053 COMPREHEN METABOLIC PANEL: CPT | Performed by: EMERGENCY MEDICINE

## 2018-04-02 PROCEDURE — 5A2204Z RESTORATION OF CARDIAC RHYTHM, SINGLE: ICD-10-PCS | Performed by: EMERGENCY MEDICINE

## 2018-04-02 PROCEDURE — 85007 BL SMEAR W/DIFF WBC COUNT: CPT | Performed by: EMERGENCY MEDICINE

## 2018-04-02 PROCEDURE — 71045 X-RAY EXAM CHEST 1 VIEW: CPT

## 2018-04-02 PROCEDURE — 85025 COMPLETE CBC W/AUTO DIFF WBC: CPT | Performed by: EMERGENCY MEDICINE

## 2018-04-02 PROCEDURE — 80048 BASIC METABOLIC PNL TOTAL CA: CPT | Performed by: INTERNAL MEDICINE

## 2018-04-02 PROCEDURE — 92960 CARDIOVERSION ELECTRIC EXT: CPT

## 2018-04-02 RX ORDER — FUROSEMIDE 10 MG/ML
40 INJECTION INTRAMUSCULAR; INTRAVENOUS ONCE
Status: COMPLETED | OUTPATIENT
Start: 2018-04-02 | End: 2018-04-02

## 2018-04-02 RX ORDER — GABAPENTIN 300 MG/1
600 CAPSULE ORAL EVERY MORNING
Status: DISCONTINUED | OUTPATIENT
Start: 2018-04-03 | End: 2018-04-03 | Stop reason: HOSPADM

## 2018-04-02 RX ORDER — PROPOFOL 10 MG/ML
VIAL (ML) INTRAVENOUS
Status: COMPLETED | OUTPATIENT
Start: 2018-04-02 | End: 2018-04-02

## 2018-04-02 RX ORDER — SODIUM CHLORIDE 0.9 % (FLUSH) 0.9 %
10 SYRINGE (ML) INJECTION AS NEEDED
Status: DISCONTINUED | OUTPATIENT
Start: 2018-04-02 | End: 2018-04-03 | Stop reason: HOSPADM

## 2018-04-02 RX ORDER — NITROGLYCERIN 20 MG/100ML
5-200 INJECTION INTRAVENOUS
Status: DISCONTINUED | OUTPATIENT
Start: 2018-04-02 | End: 2018-04-03 | Stop reason: HOSPADM

## 2018-04-02 RX ORDER — POTASSIUM CHLORIDE 750 MG/1
40 CAPSULE, EXTENDED RELEASE ORAL AS NEEDED
Status: DISCONTINUED | OUTPATIENT
Start: 2018-04-02 | End: 2018-04-03 | Stop reason: HOSPADM

## 2018-04-02 RX ORDER — MAGNESIUM SULFATE HEPTAHYDRATE 40 MG/ML
4 INJECTION, SOLUTION INTRAVENOUS AS NEEDED
Status: DISCONTINUED | OUTPATIENT
Start: 2018-04-02 | End: 2018-04-03 | Stop reason: HOSPADM

## 2018-04-02 RX ORDER — MAGNESIUM SULFATE HEPTAHYDRATE 40 MG/ML
2 INJECTION, SOLUTION INTRAVENOUS AS NEEDED
Status: DISCONTINUED | OUTPATIENT
Start: 2018-04-02 | End: 2018-04-03 | Stop reason: HOSPADM

## 2018-04-02 RX ORDER — POTASSIUM CHLORIDE 7.45 MG/ML
10 INJECTION INTRAVENOUS
Status: DISCONTINUED | OUTPATIENT
Start: 2018-04-02 | End: 2018-04-03 | Stop reason: HOSPADM

## 2018-04-02 RX ORDER — SODIUM CHLORIDE 0.9 % (FLUSH) 0.9 %
1-10 SYRINGE (ML) INJECTION AS NEEDED
Status: DISCONTINUED | OUTPATIENT
Start: 2018-04-02 | End: 2018-04-03 | Stop reason: HOSPADM

## 2018-04-02 RX ORDER — DILTIAZEM HYDROCHLORIDE 5 MG/ML
20 INJECTION INTRAVENOUS ONCE
Status: DISCONTINUED | OUTPATIENT
Start: 2018-04-02 | End: 2018-04-02 | Stop reason: RX

## 2018-04-02 RX ORDER — ACETAMINOPHEN 325 MG/1
325 TABLET ORAL DAILY PRN
Status: DISCONTINUED | OUTPATIENT
Start: 2018-04-02 | End: 2018-04-03 | Stop reason: HOSPADM

## 2018-04-02 RX ORDER — POTASSIUM CHLORIDE 1.5 G/1.77G
40 POWDER, FOR SOLUTION ORAL AS NEEDED
Status: DISCONTINUED | OUTPATIENT
Start: 2018-04-02 | End: 2018-04-03 | Stop reason: HOSPADM

## 2018-04-02 RX ORDER — PROPOFOL 10 MG/ML
200 VIAL (ML) INTRAVENOUS ONCE
Status: DISCONTINUED | OUTPATIENT
Start: 2018-04-02 | End: 2018-04-02

## 2018-04-02 RX ORDER — SOTALOL HYDROCHLORIDE 80 MG/1
80 TABLET ORAL EVERY 12 HOURS SCHEDULED
Status: DISCONTINUED | OUTPATIENT
Start: 2018-04-02 | End: 2018-04-03 | Stop reason: HOSPADM

## 2018-04-02 RX ORDER — ATORVASTATIN CALCIUM 40 MG/1
40 TABLET, FILM COATED ORAL NIGHTLY
Status: DISCONTINUED | OUTPATIENT
Start: 2018-04-02 | End: 2018-04-03 | Stop reason: HOSPADM

## 2018-04-02 RX ORDER — ACETAMINOPHEN 325 MG/1
650 TABLET ORAL EVERY 6 HOURS PRN
Status: DISCONTINUED | OUTPATIENT
Start: 2018-04-02 | End: 2018-04-03 | Stop reason: HOSPADM

## 2018-04-02 RX ORDER — LEVOTHYROXINE SODIUM 0.15 MG/1
150 TABLET ORAL
Status: DISCONTINUED | OUTPATIENT
Start: 2018-04-03 | End: 2018-04-03 | Stop reason: HOSPADM

## 2018-04-02 RX ORDER — METHYLPREDNISOLONE SODIUM SUCCINATE 125 MG/2ML
125 INJECTION, POWDER, LYOPHILIZED, FOR SOLUTION INTRAMUSCULAR; INTRAVENOUS ONCE
Status: COMPLETED | OUTPATIENT
Start: 2018-04-02 | End: 2018-04-02

## 2018-04-02 RX ORDER — IPRATROPIUM BROMIDE AND ALBUTEROL SULFATE 2.5; .5 MG/3ML; MG/3ML
3 SOLUTION RESPIRATORY (INHALATION) ONCE
Status: COMPLETED | OUTPATIENT
Start: 2018-04-02 | End: 2018-04-02

## 2018-04-02 RX ADMIN — SODIUM CHLORIDE 1000 ML: 9 INJECTION, SOLUTION INTRAVENOUS at 10:36

## 2018-04-02 RX ADMIN — ATORVASTATIN CALCIUM 40 MG: 40 TABLET, FILM COATED ORAL at 20:11

## 2018-04-02 RX ADMIN — IPRATROPIUM BROMIDE AND ALBUTEROL SULFATE 3 ML: 2.5; .5 SOLUTION RESPIRATORY (INHALATION) at 13:28

## 2018-04-02 RX ADMIN — PROPOFOL 100 MG: 10 INJECTION, EMULSION INTRAVENOUS at 13:06

## 2018-04-02 RX ADMIN — METOPROLOL TARTRATE 25 MG: 25 TABLET ORAL at 16:31

## 2018-04-02 RX ADMIN — SOTALOL HYDROCHLORIDE 80 MG: 80 TABLET ORAL at 20:11

## 2018-04-02 RX ADMIN — FUROSEMIDE 40 MG: 10 INJECTION, SOLUTION INTRAMUSCULAR; INTRAVENOUS at 15:49

## 2018-04-02 RX ADMIN — APIXABAN 5 MG: 5 TABLET, FILM COATED ORAL at 20:11

## 2018-04-02 RX ADMIN — SODIUM CHLORIDE 1000 ML: 9 INJECTION, SOLUTION INTRAVENOUS at 12:40

## 2018-04-02 RX ADMIN — FUROSEMIDE 40 MG: 10 INJECTION, SOLUTION INTRAMUSCULAR; INTRAVENOUS at 16:06

## 2018-04-02 RX ADMIN — NITROGLYCERIN 5 MCG/MIN: 20 INJECTION INTRAVENOUS at 15:49

## 2018-04-02 RX ADMIN — METHYLPREDNISOLONE SODIUM SUCCINATE 125 MG: 125 INJECTION, POWDER, FOR SOLUTION INTRAMUSCULAR; INTRAVENOUS at 14:13

## 2018-04-02 NOTE — ED PROVIDER NOTES
Subjective   Jill Miller is a 57 y.o.female who presents to the emergency department with complaints of an irregular heart beat. The patient states that for the last eight days she has felt a persistently irregular heart beat that feels exactly like previous episodes of atrial fibrillation. She has been monitoring her heart rate using an barbara on her phone and states that is has been elevated. Additionally, the patient reports that she has felt increasingly tired doing normal daily activities. Her fatigue has not improved with lying down or resting. She is anticoagulated on Eliquis but denies melena or hematochezia. There are no other acute complaints at this time.        History provided by:  Patient  Palpitations   Palpitations quality:  Irregular  Onset quality:  Unable to specify  Duration:  8 days  Timing:  Constant  Progression:  Unchanged  Chronicity:  Recurrent  Relieved by:  None tried  Worsened by:  Nothing  Ineffective treatments:  None tried  Associated symptoms: malaise/fatigue    Associated symptoms: no back pain, no chest pain, no cough, no dizziness, no nausea, no shortness of breath, no vomiting and no weakness    Risk factors: hx of atrial fibrillation, hx of thyroid disease and hyperthyroidism        Review of Systems   Constitutional: Positive for malaise/fatigue. Negative for chills and fever.   HENT: Negative for congestion, rhinorrhea, sore throat and trouble swallowing.    Respiratory: Negative for cough and shortness of breath.    Cardiovascular: Positive for palpitations. Negative for chest pain.   Gastrointestinal: Negative for abdominal pain, diarrhea, nausea and vomiting.   Genitourinary: Negative for difficulty urinating, dysuria, frequency, hematuria and urgency.   Musculoskeletal: Negative for back pain and neck pain.   Neurological: Negative for dizziness, weakness and headaches.   All other systems reviewed and are negative.      Past Medical History:   Diagnosis Date   • Abscess      under left breast, mrsa    • Atrial fibrillation and flutter    • Disease of thyroid gland    • Graves disease    • Hypertension    • Hyperthyroidism    • Lung cancer    • MRSA (methicillin resistant staph aureus) culture positive 2014    under left breast, incision and debridement, treated with wound packing and po abx    • Tobacco abuse 4/10/2017       No Known Allergies    Past Surgical History:   Procedure Laterality Date   • ABSCESS DRAINAGE      under left breast d/t mrsa    • BRONCHOSCOPY Left 5/12/2017    Procedure: BRONCHOSCOPY;  Surgeon: Ajay Cheatham MD;  Location:  [facility];  Service:    • BRONCHOSCOPY N/A 5/18/2017    Procedure: BRONCHOSCOPY BIOPSY AT BEDSIDE;  Surgeon: Ajay Cheatham MD;  Location:  [facility];  Service:    • CARDIAC CATHETERIZATION N/A 9/28/2017    Procedure: Left Heart Cath;  Surgeon: Marco Lay MD;  Location:  [facility];  Service:    • CARDIAC CATHETERIZATION N/A 9/28/2017    Procedure: Right Heart Cath;  Surgeon: Marco Lay MD;  Location:  [facility];  Service:    • LUNG BIOPSY  04/2017   • LYMPH NODE DISSECTION Left 5/12/2017    Procedure: MEDIASTINAL LYMPH NODE DISSECTION;  Surgeon: Ajay Cheatham MD;  Location:  [facility];  Service:    • THORACOSCOPY VIDEO ASSISTED WITH LOBECTOMY Left 5/12/2017    Procedure: THORACOSCOPY VIDEO ASSISTED WITH OPEN LOBECTOMY;  Surgeon: Ajay Cheatham MD;  Location:  [facility];  Service:    • TOTAL THYROIDECTOMY  approx 2012       Family History   Problem Relation Age of Onset   • Breast cancer Mother    • Diabetes Father        Social History     Social History   • Marital status:    • Number of children: 3     Occupational History   • [employer]      Short Term Disabiltiy     Social History Main Topics   • Smoking status: Former Smoker     Packs/day: 0.25     Years: 40.00     Types: Cigarettes   • Smokeless tobacco: Never Used   • Alcohol use No   • Drug  use: No   • Sexual activity: Defer     Other Topics Concern   • Not on file     Social History Narrative    Lives with her          Objective   Physical Exam   Constitutional: She is oriented to person, place, and time. She appears well-developed and well-nourished. No distress.   HENT:   Head: Normocephalic and atraumatic.   Nose: Nose normal.   Mouth/Throat: Oropharynx is clear and moist.   Eyes: Conjunctivae are normal. No scleral icterus.   Neck: Normal range of motion. Neck supple.   Cardiovascular: Normal rate and normal heart sounds.  An irregularly irregular rhythm present.   No murmur heard.  Pulmonary/Chest: Effort normal and breath sounds normal. No respiratory distress.   Abdominal: Soft. Bowel sounds are normal. She exhibits no mass. There is no tenderness. There is no rebound and no guarding.   Musculoskeletal: She exhibits no edema.   Neurological: She is alert and oriented to person, place, and time.   Skin: Skin is warm and dry. No erythema.   Psychiatric: She has a normal mood and affect. Her behavior is normal.   Nursing note and vitals reviewed.      Electrical Cardioversion  Date/Time: 4/2/2018 12:24 PM  Performed by: JADEN CLEVELAND  Authorized by: JADEN CLEVELAND     Consent:     Consent obtained:  Verbal    Consent given by:  Patient    Risks discussed:  Death  Pre-procedure details:     Cardioversion basis:  Elective    Rhythm:  Atrial flutter    Electrode placement:  Anterior-posterior  Attempt one:     Cardioversion mode:  Synchronous    Waveform:  Biphasic    Shock (Joules):  200    Shock outcome:  Conversion to normal sinus rhythm  Post-procedure details:     Patient status:  Alert    Patient tolerance of procedure:  Tolerated well, no immediate complications  Procedural Sedation  Date/Time: 4/2/2018 12:25 PM  Performed by: JADEN CLEVELAND  Authorized by: JADEN CLEVELAND     Consent:     Consent obtained:  Verbal and written    Consent given by:  Patient     Risks discussed:  Respiratory compromise necessitating ventilatory assistance and intubation  Indications:     Procedure performed:  Cardioversion    Procedure necessitating sedation performed by:  Physician performing sedation    Intended level of sedation:  Deep  Pre-sedation assessment:     Pre-sedation assessments completed and reviewed: airway patency, cardiovascular function, mental status and respiratory function      Pre-sedation assessment completed:  4/2/2018 1:07 PM  Immediate pre-procedure details:     Reassessment: Patient reassessed immediately prior to procedure      Reviewed: vital signs      Verified: bag valve mask available, emergency equipment available, intubation equipment available, IV patency confirmed and oxygen available    Procedure details (see MAR for exact dosages):     Sedation start time:  4/2/2018 1:07 PM    Preoxygenation:  Nasal cannula and nonrebreather mask    Sedation:  Propofol (100 mg)    Intra-procedure monitoring:  Blood pressure monitoring, cardiac monitor, continuous pulse oximetry, frequent LOC assessments and frequent vital sign checks    Intra-procedure events: none      Intra-procedure management: jaw thrust maneuver.    Sedation end time:  4/2/2018 1:22 PM    Total sedation time (minutes):  15  Post-procedure details:     Post-sedation assessment completed:  4/2/2018 1:25 PM    Attendance: Constant attendance by certified staff until patient recovered      Recovery: Patient returned to pre-procedure baseline      Complications:  Oxygen saturation dropped to 87% but recovered    Post-sedation assessments completed and reviewed: airway patency, cardiovascular function, mental status and respiratory function      Patient is stable for discharge or admission: yes      Patient tolerance:  Tolerated well, no immediate complications             ED Course  ED Course   Comment By Time   Dr. Scanlon is at the bedside reevaluating the patient. She says she was a candidate for  ablation for a leaking mitral valve but her cardiologist apparently wanted to perform open heart surgery. She got scared and did not follow up for this. She was told she could see Dr. Borrero for an ablation. Apparently she was also recommended to start Coumadin and Lovenox. She states she does not want to give herself an injection and has continued Eliquis.   Discussed risks and benefits of electrical cardioversion. All are agreeable. Propofol ordered. Eve Cooper 04/02 1214   Dr. Scanlon spoke with Dr. Sandra.  Eve Cooper 04/02 1231   Dr. Scanlon is at the bedside reevaluating the patient. Given low oxygen saturations currently and history of hospitalization for respiratory distress following a previous cardioversion, will page the hospitalist for admission. Eve Cooper 04/02 1518   Dr. Scanlon spoke with Dr. Kenny who will admit the patient. Eve Cooper 04/02 5517     Recent Results (from the past 24 hour(s))   Comprehensive Metabolic Panel    Collection Time: 04/02/18 10:37 AM   Result Value Ref Range    Glucose 166 (H) 70 - 100 mg/dL    BUN 19 9 - 23 mg/dL    Creatinine 0.80 0.60 - 1.30 mg/dL    Sodium 142 132 - 146 mmol/L    Potassium 4.5 3.5 - 5.5 mmol/L    Chloride 106 99 - 109 mmol/L    CO2 28.0 20.0 - 31.0 mmol/L    Calcium 9.4 8.7 - 10.4 mg/dL    Total Protein 7.2 5.7 - 8.2 g/dL    Albumin 4.30 3.20 - 4.80 g/dL    ALT (SGPT) 29 7 - 40 U/L    AST (SGOT) 25 0 - 33 U/L    Alkaline Phosphatase 114 (H) 25 - 100 U/L    Total Bilirubin 1.1 0.3 - 1.2 mg/dL    eGFR Non African Amer 74 >60 mL/min/1.73    Globulin 2.9 gm/dL    A/G Ratio 1.5 1.5 - 2.5 g/dL    BUN/Creatinine Ratio 23.8 7.0 - 25.0    Anion Gap 8.0 3.0 - 11.0 mmol/L   BNP    Collection Time: 04/02/18 10:37 AM   Result Value Ref Range    .0 (H) 0.0 - 100.0 pg/mL   CBC Auto Differential    Collection Time: 04/02/18 10:37 AM   Result Value Ref Range    WBC 8.29 3.50 - 10.80 10*3/mm3    RBC 3.82 (L) 3.89 - 5.14 10*6/mm3    Hemoglobin  14.5 11.5 - 15.5 g/dL    Hematocrit 41.6 34.5 - 44.0 %    .9 (H) 80.0 - 99.0 fL    MCH 38.0 (H) 27.0 - 31.0 pg    MCHC 34.9 32.0 - 36.0 g/dL    RDW 12.3 11.3 - 14.5 %    RDW-SD 48.3 37.0 - 54.0 fl    MPV 11.0 6.0 - 12.0 fL    Platelets 222 150 - 450 10*3/mm3    Neutrophil % 68.7 41.0 - 71.0 %    Lymphocyte % 23.9 (L) 24.0 - 44.0 %    Monocyte % 6.3 0.0 - 12.0 %    Eosinophil % 0.5 0.0 - 3.0 %    Basophil % 0.4 0.0 - 1.0 %    Immature Grans % 0.2 0.0 - 0.6 %    Neutrophils, Absolute 5.70 1.50 - 8.30 10*3/mm3    Lymphocytes, Absolute 1.98 0.60 - 4.80 10*3/mm3    Monocytes, Absolute 0.52 0.00 - 1.00 10*3/mm3    Eosinophils, Absolute 0.04 0.00 - 0.30 10*3/mm3    Basophils, Absolute 0.03 0.00 - 0.20 10*3/mm3    Immature Grans, Absolute 0.02 0.00 - 0.03 10*3/mm3   Troponin    Collection Time: 04/02/18 10:37 AM   Result Value Ref Range    Troponin I <0.006 <=0.039 ng/mL   Scan Slide    Collection Time: 04/02/18 10:37 AM   Result Value Ref Range    RBC Morphology Normal Normal    WBC Morphology Normal Normal    Platelet Morphology Normal Normal     Note: In addition to lab results from this visit, the labs listed above may include labs taken at another facility or during a different encounter within the last 24 hours. Please correlate lab times with ED admission and discharge times for further clarification of the services performed during this visit.    XR Chest 1 View   Final Result   Small left pleural effusion unchanged in the interval. No   new focal parenchymal opacification is present.       D:  04/02/2018   E:  04/02/2018       This report was finalized on 4/2/2018 11:19 AM by Dr. Sola Gonzalez MD.            Vitals:    04/02/18 1504 04/02/18 1505 04/02/18 1516 04/02/18 1520   BP:  103/58  125/83   BP Location:       Patient Position:       Pulse:       Resp:       Temp:       TempSrc:       SpO2: 91%  (!) 88%    Weight:       Height:         Medications   sodium chloride 0.9 % flush 10 mL (not  administered)   Propofol (DIPRIVAN) injection 200 mg (not administered)   sodium chloride 0.9 % bolus 1,000 mL (0 mL Intravenous Stopped 4/2/18 1115)   sodium chloride 0.9 % bolus 1,000 mL (0 mL Intravenous Stopped 4/2/18 1350)   Propofol (DIPRIVAN) injection (100 mg Intravenous Given 4/2/18 1306)   ipratropium-albuterol (DUO-NEB) nebulizer solution 3 mL (3 mL Nebulization Given 4/2/18 1328)   methylPREDNISolone sodium succinate (SOLU-Medrol) injection 125 mg (125 mg Intravenous Given 4/2/18 1413)     ECG/EMG Results (last 24 hours)     ** No results found for the last 24 hours. **                       MDM  Number of Diagnoses or Management Options  Atrial flutter, unspecified type: new and requires workup  Dyspnea, unspecified type: new and requires workup  Hypoxia: new and requires workup  Diagnosis management comments: Patient was initially identified be in a flutter.    No significantly different or new lab abnormalities.  No acute chest x-ray abnormalities.    I discussed the patient with the current illness, Dr. blank, who suggest electrical cardioversion.    Electrocardioversion was accomplished successfully the patient's rhythm returned to sinus rhythm at a rate of approximately 80.    Despite this the patient progressively became more short of breath, was requiring 4 L nasal cannula to maintain 88% oxygen saturations.  She typically does not wear any oxygen at home.    This is consistent with previous respiratory failure that she developed secondary to her previous a fib cardioversion.     I discussed the patient with the hospitalist, Dr. Kenny, who will admit.       Amount and/or Complexity of Data Reviewed  Clinical lab tests: ordered and reviewed  Tests in the radiology section of CPT®: ordered and reviewed  Decide to obtain previous medical records or to obtain history from someone other than the patient: yes  Obtain history from someone other than the patient: yes  Review and summarize past medical  records: yes  Discuss the patient with other providers: yes  Independent visualization of images, tracings, or specimens: yes    Patient Progress  Patient progress: stable      Final diagnoses:   Atrial flutter, unspecified type   Hypoxia   Dyspnea, unspecified type       Documentation assistance provided by elisabeth Cooper.  Information recorded by the scribe was done at my direction and has been verified and validated by me.     Eve Cooper  04/02/18 [id number]       Allen Scanlon MD  04/02/18 3784

## 2018-04-02 NOTE — PLAN OF CARE
Problem: Respiratory Distress Syndrome (,NICU)  Goal: Signs and Symptoms of Listed Potential Problems Will be Absent, Minimized or Managed (Respiratory Distress Syndrome)  Outcome: Ongoing (interventions implemented as appropriate)

## 2018-04-02 NOTE — H&P
[facility] Medicine Services  HISTORY AND PHYSICAL    Patient Name: Jill Miller  : 1960  MRN: [id number]  Primary Care Physician: YANETH Olivera    Subjective   Subjective     Chief Complaint:  Palpitations    HPI:  Jill Miller is a 57 y.o. female with mitral stenosis, history of non small cell lung cancer status post left upper lobectomy, atrial fibrillation who presented to the ED today due to palpitations.  She reports that she began having palpitations approximately one week ago that she noticed upon awaking from sleep in the morning and have been persistent since then, associated with dyspnea and orthopnea.  She was found to be in atrial flutter with variable AV block and rates in the 130s in the ED and elective cardioversion was performed with restoration of sinus rhythm.  She received 3L IV in the ED and following cardioversion became hypoxemic requiring BIPAP.   Repeat chest X ray showed worsening pulmonary vascular congestion.  She was started on nitroglycerin drip and given IV lasix.  Hospitalist service consulted for admission.    Review of Systems   Gen- No fevers, chills  CV- No chest pain, + palpitations as above  Resp- No cough, + dyspnea  GI- No N/V/D, abd pain    Otherwise 10-system ROS reviewed and is negative except as mentioned in the HPI.    Personal History     Past Medical History:   Diagnosis Date   • Abscess     under left breast, mrsa    • Atrial fibrillation and flutter    • Disease of thyroid gland    • Graves disease    • Hypertension    • Hyperthyroidism    • Lung cancer    • MRSA (methicillin resistant staph aureus) culture positive     under left breast, incision and debridement, treated with wound packing and po abx    • Tobacco abuse 4/10/2017       Past Surgical History:   Procedure Laterality Date   • ABSCESS DRAINAGE      under left breast d/t mrsa    • BRONCHOSCOPY Left 2017    Procedure: BRONCHOSCOPY;  Surgeon: Ajay DODSON  MD Chaparrita;  Location:  [facility];  Service:    • BRONCHOSCOPY N/A 5/18/2017    Procedure: BRONCHOSCOPY BIOPSY AT BEDSIDE;  Surgeon: Ajay Cheatham MD;  Location:  [facility];  Service:    • CARDIAC CATHETERIZATION N/A 9/28/2017    Procedure: Left Heart Cath;  Surgeon: Marco Lay MD;  Location:  [facility];  Service:    • CARDIAC CATHETERIZATION N/A 9/28/2017    Procedure: Right Heart Cath;  Surgeon: Marco Lay MD;  Location:  [facility];  Service:    • LUNG BIOPSY  04/2017   • LYMPH NODE DISSECTION Left 5/12/2017    Procedure: MEDIASTINAL LYMPH NODE DISSECTION;  Surgeon: Ajay Cheatham MD;  Location:  [facility];  Service:    • THORACOSCOPY VIDEO ASSISTED WITH LOBECTOMY Left 5/12/2017    Procedure: THORACOSCOPY VIDEO ASSISTED WITH OPEN LOBECTOMY;  Surgeon: Ajay Cheatham MD;  Location:  [facility];  Service:    • TOTAL THYROIDECTOMY  approx 2012       Family History: family history includes Breast cancer in her mother; Diabetes in her father.     Social History:  reports that she has quit smoking. Her smoking use included Cigarettes. She has a 10.00 pack-year smoking history. She has never used smokeless tobacco. She reports that she does not drink alcohol or use drugs.  Social History     Social History Narrative    Lives with her        Medications:  Prescriptions Prior to Admission   Medication Sig Dispense Refill Last Dose   • acetaminophen (TYLENOL) 325 MG tablet Take 325 mg by mouth Daily As Needed for Mild Pain  or Headache.      • cefuroxime (CEFTIN) 500 MG tablet Take 1 tablet by mouth 2 (Two) Times a Day. 6 tablet 0    • ELIQUIS 5 MG tablet tablet TAKE 1 TABLET EVERY 12 HOURS 180 tablet 1    • gabapentin (NEURONTIN) 600 MG tablet Take 1 tablet (600 mg total) by mouth 3 (Three) Times a Day 90 tablet 1    • ibuprofen (ADVIL,MOTRIN) 200 MG tablet Take 800 mg by mouth Daily As Needed for Mild Pain  or Headache.      • levothyroxine  (SYNTHROID, LEVOTHROID) 150 MCG tablet Take 150 mcg by mouth Daily.   Taking   • metoprolol succinate XL (TOPROL-XL) 25 MG 24 hr tablet Take 25 mg by mouth Daily.   Taking   • sotalol (BETAPACE) 80 MG tablet Take 1 tablet by mouth 2 (Two) Times a Day. 60 tablet 11        No Known Allergies    Objective   Objective     Vital Signs:   Temp:  [96.9 °F (36.1 °C)-98.4 °F (36.9 °C)] 96.9 °F (36.1 °C)  Heart Rate:  [] 78  Resp:  [18-28] 22  BP: ()/(40-99) 98/68  FiO2 (%):  [60 %-65 %] 60 %        Physical Exam   Constitutional: No acute distress, awake, alert, sitting up in bed on BIPAP  Eyes: PERRLA, sclerae anicteric, no conjunctival injection  HENT: NCAT, mucous membranes moist  Neck: Supple, trachea midline  Respiratory: Clear to auscultation bilaterally, decreased breath sounds at left lung base, nonlabored respirations   Cardiovascular: RRR, no murmurs, rubs, or gallops  Gastrointestinal: Positive bowel sounds, soft, nontender, nondistended  Musculoskeletal: No bilateral ankle edema, no clubbing or cyanosis to extremities  Psychiatric: Appropriate affect, cooperative  Neurologic: Cranial Nerves grossly intact to confrontation, speech clear  Skin: No rashes    Results Reviewed:  I have personally reviewed current lab, radiology, and data and agree.    CXR personally reviewed and agree with official interpretation.      Results from last 7 days  Lab Units 04/02/18  1037   WBC 10*3/mm3 8.29   HEMOGLOBIN g/dL 14.5   HEMATOCRIT % 41.6   PLATELETS 10*3/mm3 222       Results from last 7 days  Lab Units 04/02/18  1037   SODIUM mmol/L 142   POTASSIUM mmol/L 4.5   CHLORIDE mmol/L 106   CO2 mmol/L 28.0   BUN mg/dL 19   CREATININE mg/dL 0.80   GLUCOSE mg/dL 166*   CALCIUM mg/dL 9.4   ALT (SGPT) U/L 29   AST (SGOT) U/L 25   TROPONIN I ng/mL <0.006     Estimated Creatinine Clearance: 80.9 mL/min (by C-G formula based on SCr of 0.8 mg/dL).  Brief Urine Lab Results  (Last result in the past 365 days)      Color   Clarity    Blood   Leuk Est   Nitrite   Protein   CREAT   Urine HCG        08/25/17 1143 Yellow Cloudy(A) Large (3+)(A) Negative Negative Trace(A)             BNP   Date Value Ref Range Status   04/02/2018 654.0 (H) 0.0 - 100.0 pg/mL Final     Comment:     Results may be falsely decreased if patient taking Biotin.     No results found for: PHART  Imaging Results (last 24 hours)     Procedure Component Value Units Date/Time    XR Chest 1 View [[id number]] Collected:  04/02/18 1632     Updated:  04/02/18 1632    Narrative:       EXAMINATION: XR CHEST 1 VW-04/02/2018:      INDICATION: Diff breathing; I48.92-Unspecified atrial flutter;  R09.02-Hypoxemia; R06.00-Dyspnea, unspecified.      COMPARISON: 04/02/2018.     FINDINGS: Portable chest reveals increased markings seen within the left  lung base with small-to-moderate sized left pleural effusion. Increased  pulmonary vascularity seen within the lung fields slightly worsened in  the interval.           Impression:       Slight worsening of the pulmonary vascularity in the  interval. Pleural effusion again seen on the left unchanged with  increased markings at the left lung base.     D:  04/02/2018  E:  04/02/2018             XR Chest 1 View [[id number]] Collected:  04/02/18 1114     Updated:  04/02/18 1121    Narrative:       EXAMINATION: XR CHEST 1 VW-      INDICATION: Palpitations.      COMPARISON: 09/26/2017.     FINDINGS: Portable chest reveals small left pleural effusion. Heart is  borderline enlarged. Right lung is grossly clear. Bony structures are  unremarkable.           Impression:       Small left pleural effusion unchanged in the interval. No  new focal parenchymal opacification is present.     D:  04/02/2018  E:  04/02/2018     This report was finalized on 4/2/2018 11:19 AM by Dr. Sola Gonzalez MD.           Results for orders placed during the hospital encounter of 09/25/17   Adult Transesophageal Echo (TIFFANIE) W/ Cont if Necessary Per Protocol    Narrative ·  Left ventricular systolic function is normal.  · Left atrial cavity size is severely dilated. There is spontaneous   echocontrast present. No LA or ANGELA thrombus identified.  · The mitral valve leaflets are rheumatic. There is moderate-severe mitral   stenosis (mean gradient 9 mmHg) and moderate-severe mitral regurgitation.   Aretha score = 9  · Moderate aortic valve regurgitation is present.          Assessment/Plan   Assessment / Plan     Hospital Problem List     * (Principal)Acute on chronic diastolic CHF (congestive heart failure)    Acquired hypothyroidism    Graves disease    Paroxysmal atrial fibrillation    Overview Addendum 4/2/2018  4:46 PM by Marco Lay IV, MD     · Postoperative atrial fibrillation with RVR in the setting of left upper lobectomy, 5/15/2017  · Echo (8/25/17): LVEF 60%.  Moderate to severe mitral stenosis (mean gradient 9 mmHg).  Moderate aortic insufficiency.  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  Moderate to severe mitral stenosis (mean gradient 9 mmHg) due to rheumatic disease.  Moderate aortic insufficiency.  RVSP 50 mmHg  · TIFFANIE (9/27/2017):  Moderate-to-severe MS (mean gradient 9 mmHg).  Moderate-to-severe MR.  Moderate AI.  · R/LHC (9/29/2017):  Mild CAD.  Normal LVEF.  Severe MS (mean 11 mmHg).  PA 50 mmhg         Hyperlipidemia LDL goal <70    Overview Signed 9/29/2017  8:55 AM by Marco Lay IV, MD     · High intensity statin indicated given the presence of diabetes and mild CAD         Acute hypoxemic respiratory failure            Assessment & Plan:    Acute on chronic diastolic CHF  Acute hypoxemic respiratory failure  -S/P IV lasix x 2 in ED, re-evaluate in am.  Will need to verify home lasix dose with pharmacy  -Continue NTG drip but will decrease rate due to  hypotension  -Continue BIPAP  -Daily weights, strict I/O, 1000mL daily fluid restriction    Atrial flutter with RVR  Mitral stenosis and aortic valve insufficiency  -S/P Cardioversion with restoration of NSR  -Continue metoprolol 25mg TID  -Continue home sotalol for now but defer to cardiology tomorrow if this should be continued  -Repeat echocardiogram  -Continue Eliquis; may need to be transitioned to warfarin as this was previously recommended     Acquired hypothyroidism/Grave's disease  -Check TSH in am  -Continue levothyroxine    HLD  -Continue atorvastatin    DVT prophylaxis: Eliquis    CODE STATUS:  Prior    Admission Status:  I believe this patient meets INPATIENT status due to the need for care which can only be reasonably provided in an hospital setting such as aggressive/expedited ancillary services and/or consultation services, the necessity for IV medications, close physician monitoring and/or the possible need for procedures.  In such, I feel patient’s risk for adverse outcomes and need for care warrant INPATIENT evaluation and predict the patient’s care encounter to likely last beyond 2 midnights.      Electronically signed by Laura Fraser MD, 04/02/18, 6:04 PM.

## 2018-04-02 NOTE — CONSULTS
[facility] at [facility]  Cardiology Progress Note      Chief Complaint/Reason for service:    · Atrial flutter  · Mitral stenosis  · Acute CHF         57-year-old female with a history of rheumatic heart disease including mitral stenosis and moderate aortic insufficiency as well as a history of atrial fibrillation/flutter. The patient evidently has been experiencing heart palpitations for the last several days. She presented to [facility] emergency room and was found to be in atrial flutter. She underwent cardioversion in the ER with successful return of sinus rhythm. Shortly after cardioversion, however, the patient developed acute respiratory failure and is presently requiring BiPAP ventilation.    Last year, the patient underwent right left heart catheterization and preparation for surgical valve replacement. Prior to the operation, the patient needed to have full dental extraction. The patient did not follow through with this and has not had her valve replaced of yet.    Past medical, surgical, social and family history reviewed in the patient's electronic medical record.    Review of Systems:   Review of systems could not be obtained due to  poor respiratory status       Vital Sign Min/Max for last 24 hours  Temp  Min: 98.4 °F (36.9 °C)  Max: 98.4 °F (36.9 °C)   BP  Min: 88/48  Max: 153/96   Pulse  Min: 80  Max: 129   Resp  Min: 18  Max: 22   SpO2  Min: 85 %  Max: 100 %   Flow (L/min)  Min: 3  Max: 15      Intake/Output Summary (Last 24 hours) at 04/02/18 1640  Last data filed at 04/02/18 1350   Gross per 24 hour   Intake             3000 ml   Output                0 ml   Net             3000 ml           Physical Exam   Constitutional: She is oriented to person, place, and time. She appears well-developed and well-nourished.   HENT:   Head: Normocephalic and atraumatic.   Neck: JVD present.   Cardiovascular: Normal rate and regular rhythm.    Murmur heard.   Diastolic murmur is  present with a grade of 2/6   Pulmonary/Chest: Tachypnea noted. She has rales.   On BiPAP ventilation   Abdominal: Soft.   Neurological: She is alert and oriented to person, place, and time.   Psychiatric: She has a normal mood and affect.       Results Review:   I reviewed the patient's recent labs in the electronic medical record.      Chest x-ray (4/2/18): Increased pulmonary vascularity      Results from last 7 days  Lab Units 04/02/18  1037   SODIUM mmol/L 142   POTASSIUM mmol/L 4.5   CHLORIDE mmol/L 106   BUN mg/dL 19   CREATININE mg/dL 0.80       Results from last 7 days  Lab Units 04/02/18  1037   TROPONIN I ng/mL <0.006       Results from last 7 days  Lab Units 04/02/18  1037   WBC 10*3/mm3 8.29   HEMOGLOBIN g/dL 14.5   HEMATOCRIT % 41.6   PLATELETS 10*3/mm3 222       Lab Results   Component Value Date    HGBA1C 5.80 (H) 05/11/2017       Lab Results   Component Value Date    AST 25 04/02/2018    ALT 29 04/02/2018       Tele:  [facility] Problem List     Acute diastolic congestive heart failure    Paroxysmal atrial fibrillation    Overview Addendum 9/27/2017  8:24 AM by Marco Lay IV, MD     · Postoperative atrial fibrillation with RVR in the setting of left upper lobectomy, 5/15/2017  · Echo (8/25/17): LVEF 60%.  Moderate to severe mitral stenosis (mean gradient 9 mmHg).  Moderate aortic insufficiency.  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  Moderate to severe mitral stenosis (mean gradient 9 mmHg) due to rheumatic disease.  Moderate aortic insufficiency.  RVSP 50 mmHg  · TIFFANIE (9/27/2017):  Moderate-to-severe MS (mean gradient 9 mmHg).  Moderate-to-severe MR.  Moderate AI.  · R/LHC (9/29/2017):  Mild CAD.  Normal LVEF.  Severe MS (mean 11 mmHg).  PA 50 mmhg              Patient clearly in heart failure present  and may require ICU admission. I suspect heart failure exacerbation related to 2 L normal saline bolus in the emergency room. She's gotten one dose of IV Lasix.  She needs more diuresis, more rate control, and afterload reducing agents.         · Give another Lasix 40 mg IV   · Start metoprolol tartrate 25 mg tid  · Continue IV nitroglycerin and titrate for SBP <150 mmHg  · Patient will require admission--either ICU or telemetry  · Will follow      Marco Lay IV, MD  4/2/2018

## 2018-04-03 ENCOUNTER — APPOINTMENT (OUTPATIENT)
Dept: CARDIOLOGY | Facility: HOSPITAL | Age: 58
End: 2018-04-03
Attending: INTERNAL MEDICINE

## 2018-04-03 VITALS
WEIGHT: 198.8 LBS | RESPIRATION RATE: 20 BRPM | HEART RATE: 72 BPM | DIASTOLIC BLOOD PRESSURE: 47 MMHG | SYSTOLIC BLOOD PRESSURE: 92 MMHG | TEMPERATURE: 97.5 F | BODY MASS INDEX: 36.58 KG/M2 | HEIGHT: 62 IN | OXYGEN SATURATION: 90 %

## 2018-04-03 PROBLEM — I50.31 ACUTE DIASTOLIC CONGESTIVE HEART FAILURE (HCC): Status: RESOLVED | Noted: 2018-04-02 | Resolved: 2018-04-03

## 2018-04-03 PROBLEM — J96.02 ACUTE RESPIRATORY FAILURE WITH HYPOXIA AND HYPERCAPNIA: Status: RESOLVED | Noted: 2017-09-25 | Resolved: 2018-04-03

## 2018-04-03 PROBLEM — I10 HYPERTENSION: Status: RESOLVED | Noted: 2017-05-12 | Resolved: 2018-04-03

## 2018-04-03 PROBLEM — J96.01 ACUTE RESPIRATORY FAILURE WITH HYPOXIA AND HYPERCAPNIA: Status: RESOLVED | Noted: 2017-09-25 | Resolved: 2018-04-03

## 2018-04-03 PROBLEM — I05.0 RHEUMATIC MITRAL STENOSIS: Status: ACTIVE | Noted: 2017-09-27

## 2018-04-03 PROBLEM — C34.90 LUNG CANCER (HCC): Status: RESOLVED | Noted: 2017-05-12 | Resolved: 2018-04-03

## 2018-04-03 PROBLEM — J96.01 ACUTE HYPOXEMIC RESPIRATORY FAILURE (HCC): Status: RESOLVED | Noted: 2018-04-02 | Resolved: 2018-04-03

## 2018-04-03 LAB
ANION GAP SERPL CALCULATED.3IONS-SCNC: 13 MMOL/L (ref 3–11)
BH CV ECHO MEAS - AI DEC SLOPE: 281.9 CM/SEC^2
BH CV ECHO MEAS - AI MAX PG: 37.1 MMHG
BH CV ECHO MEAS - AI MAX VEL: 304.5 CM/SEC
BH CV ECHO MEAS - AI P1/2T: 316.3 MSEC
BH CV ECHO MEAS - AO ROOT AREA (BSA CORRECTED): 1.6
BH CV ECHO MEAS - AO ROOT AREA: 7 CM^2
BH CV ECHO MEAS - AO ROOT DIAM: 3 CM
BH CV ECHO MEAS - BSA(HAYCOCK): 2 M^2
BH CV ECHO MEAS - BSA: 1.9 M^2
BH CV ECHO MEAS - BZI_BMI: 36.2 KILOGRAMS/M^2
BH CV ECHO MEAS - BZI_METRIC_HEIGHT: 157.5 CM
BH CV ECHO MEAS - BZI_METRIC_WEIGHT: 89.8 KG
BH CV ECHO MEAS - EDV(CUBED): 114.4 ML
BH CV ECHO MEAS - EDV(TEICH): 110.4 ML
BH CV ECHO MEAS - EF(CUBED): 68.5 %
BH CV ECHO MEAS - EF(TEICH): 60 %
BH CV ECHO MEAS - ESV(CUBED): 36 ML
BH CV ECHO MEAS - ESV(TEICH): 44.2 ML
BH CV ECHO MEAS - FS: 32 %
BH CV ECHO MEAS - IVS/LVPW: 1
BH CV ECHO MEAS - IVSD: 1.1 CM
BH CV ECHO MEAS - LA DIMENSION: 4.8 CM
BH CV ECHO MEAS - LA/AO: 1.6
BH CV ECHO MEAS - LV MASS(C)D: 205.7 GRAMS
BH CV ECHO MEAS - LV MASS(C)DI: 108.1 GRAMS/M^2
BH CV ECHO MEAS - LV MAX PG: 5 MMHG
BH CV ECHO MEAS - LV MEAN PG: 2.3 MMHG
BH CV ECHO MEAS - LV V1 MAX: 111.7 CM/SEC
BH CV ECHO MEAS - LV V1 MEAN: 71.8 CM/SEC
BH CV ECHO MEAS - LV V1 VTI: 23.8 CM
BH CV ECHO MEAS - LVIDD: 4.9 CM
BH CV ECHO MEAS - LVIDS: 3.3 CM
BH CV ECHO MEAS - LVOT AREA (M): 3.1 CM^2
BH CV ECHO MEAS - LVOT AREA: 3.3 CM^2
BH CV ECHO MEAS - LVOT DIAM: 2 CM
BH CV ECHO MEAS - LVPWD: 1.1 CM
BH CV ECHO MEAS - MR ALIAS VEL: 40.8 CM/SEC
BH CV ECHO MEAS - MR ERO: 0.46 CM^2
BH CV ECHO MEAS - MR FLOW RATE: 202.5 CM^3/SEC
BH CV ECHO MEAS - MR MAX PG: 80.1 MMHG
BH CV ECHO MEAS - MR MAX VEL: 444.2 CM/SEC
BH CV ECHO MEAS - MR MEAN PG: 58.4 MMHG
BH CV ECHO MEAS - MR MEAN VEL: 366.4 CM/SEC
BH CV ECHO MEAS - MR PISA RADIUS: 0.89 CM
BH CV ECHO MEAS - MR PISA: 5 CM^2
BH CV ECHO MEAS - MR VOLUME: 68.7 ML
BH CV ECHO MEAS - MR VTI: 150.8 CM
BH CV ECHO MEAS - MV A MAX VEL: 137 CM/SEC
BH CV ECHO MEAS - MV AREA (1 DIAM): 12.4 CM^2
BH CV ECHO MEAS - MV DEC SLOPE: 802 CM/SEC^2
BH CV ECHO MEAS - MV DEC TIME: 0.56 SEC
BH CV ECHO MEAS - MV DIAM: 4 CM
BH CV ECHO MEAS - MV E MAX VEL: 218.4 CM/SEC
BH CV ECHO MEAS - MV E/A: 1.6
BH CV ECHO MEAS - MV FLOW AREA(1DIAM): 12.4 CM^2
BH CV ECHO MEAS - MV MAX PG: 39.7 MMHG
BH CV ECHO MEAS - MV MEAN PG: 18.1 MMHG
BH CV ECHO MEAS - MV P1/2T MAX VEL: 296.2 CM/SEC
BH CV ECHO MEAS - MV P1/2T: 108.2 MSEC
BH CV ECHO MEAS - MV V2 MAX: 314.9 CM/SEC
BH CV ECHO MEAS - MV V2 MEAN: 200.8 CM/SEC
BH CV ECHO MEAS - MV V2 VTI: 86.1 CM
BH CV ECHO MEAS - MVA P1/2T LCG: 0.74 CM^2
BH CV ECHO MEAS - MVA(P1/2T): 2 CM^2
BH CV ECHO MEAS - MVA(VTI): 0.91 CM^2
BH CV ECHO MEAS - PA ACC SLOPE: 685.3 CM/SEC^2
BH CV ECHO MEAS - PA ACC TIME: 0.1 SEC
BH CV ECHO MEAS - PA PR(ACCEL): 36.2 MMHG
BH CV ECHO MEAS - PI END-D VEL: 137.2 CM/SEC
BH CV ECHO MEAS - RAP SYSTOLE: 3 MMHG
BH CV ECHO MEAS - RF(MV,LVOT)(1DIAM): 0.93
BH CV ECHO MEAS - RV MAX PG: 0.74 MMHG
BH CV ECHO MEAS - RV V1 MAX: 42.9 CM/SEC
BH CV ECHO MEAS - RVSP: 56 MMHG
BH CV ECHO MEAS - SI(CUBED): 41.2 ML/M^2
BH CV ECHO MEAS - SI(LVOT): 41.1 ML/M^2
BH CV ECHO MEAS - SI(MV 1 DIAM): 559.3 ML/M^2
BH CV ECHO MEAS - SI(TEICH): 34.8 ML/M^2
BH CV ECHO MEAS - SV(CUBED): 78.4 ML
BH CV ECHO MEAS - SV(LVOT): 78.3 ML
BH CV ECHO MEAS - SV(MV 1 DIAM): 1065 ML
BH CV ECHO MEAS - SV(TEICH): 66.2 ML
BH CV ECHO MEAS - TAPSE (>1.6): 2.2 CM2
BH CV ECHO MEAS - TR MAX VEL: 349 CM/SEC
BH CV XLRA - RV BASE: 4 CM
BH CV XLRA - RV LENGTH: 6.3 CM
BH CV XLRA - RV MID: 3.1 CM
BH CV XLRA - TDI S': 12.9 CM/SEC
BUN BLD-MCNC: 22 MG/DL (ref 9–23)
BUN/CREAT SERPL: 24.4 (ref 7–25)
CALCIUM SPEC-SCNC: 8.9 MG/DL (ref 8.7–10.4)
CHLORIDE SERPL-SCNC: 99 MMOL/L (ref 99–109)
CO2 SERPL-SCNC: 26 MMOL/L (ref 20–31)
CREAT BLD-MCNC: 0.9 MG/DL (ref 0.6–1.3)
DEPRECATED RDW RBC AUTO: 49.1 FL (ref 37–54)
ERYTHROCYTE [DISTWIDTH] IN BLOOD BY AUTOMATED COUNT: 12.3 % (ref 11.3–14.5)
GFR SERPL CREATININE-BSD FRML MDRD: 65 ML/MIN/1.73
GLUCOSE BLD-MCNC: 251 MG/DL (ref 70–100)
HCT VFR BLD AUTO: 40.1 % (ref 34.5–44)
HGB BLD-MCNC: 13.6 G/DL (ref 11.5–15.5)
LEFT ATRIUM VOLUME INDEX: 52.6 ML/M2
LEFT ATRIUM VOLUME: 100 CM3
LV EF 2D ECHO EST: 70 %
MAXIMAL PREDICTED HEART RATE: 165 BPM
MCH RBC QN AUTO: 37.3 PG (ref 27–31)
MCHC RBC AUTO-ENTMCNC: 33.9 G/DL (ref 32–36)
MCV RBC AUTO: 109.9 FL (ref 80–99)
PLATELET # BLD AUTO: 183 10*3/MM3 (ref 150–450)
PMV BLD AUTO: 11.6 FL (ref 6–12)
POTASSIUM BLD-SCNC: 4.2 MMOL/L (ref 3.5–5.5)
RBC # BLD AUTO: 3.65 10*6/MM3 (ref 3.89–5.14)
SODIUM BLD-SCNC: 138 MMOL/L (ref 132–146)
STRESS TARGET HR: 140 BPM
TSH SERPL DL<=0.05 MIU/L-ACNC: 0.42 MIU/ML (ref 0.35–5.35)
WBC NRBC COR # BLD: 9.68 10*3/MM3 (ref 3.5–10.8)

## 2018-04-03 PROCEDURE — 93306 TTE W/DOPPLER COMPLETE: CPT

## 2018-04-03 PROCEDURE — 85027 COMPLETE CBC AUTOMATED: CPT | Performed by: INTERNAL MEDICINE

## 2018-04-03 PROCEDURE — 93306 TTE W/DOPPLER COMPLETE: CPT | Performed by: INTERNAL MEDICINE

## 2018-04-03 PROCEDURE — 84443 ASSAY THYROID STIM HORMONE: CPT | Performed by: INTERNAL MEDICINE

## 2018-04-03 PROCEDURE — 80048 BASIC METABOLIC PNL TOTAL CA: CPT | Performed by: INTERNAL MEDICINE

## 2018-04-03 PROCEDURE — 99232 SBSQ HOSP IP/OBS MODERATE 35: CPT | Performed by: INTERNAL MEDICINE

## 2018-04-03 PROCEDURE — 99217 PR OBSERVATION CARE DISCHARGE MANAGEMENT: CPT | Performed by: HOSPITALIST

## 2018-04-03 RX ORDER — FUROSEMIDE 20 MG/1
20 TABLET ORAL DAILY
COMMUNITY
Start: 2017-09-01 | End: 2018-07-26 | Stop reason: HOSPADM

## 2018-04-03 RX ORDER — ATORVASTATIN CALCIUM 10 MG/1
10 TABLET, FILM COATED ORAL DAILY
Qty: 90 TABLET | Refills: 3 | Status: ON HOLD | OUTPATIENT
Start: 2018-04-03 | End: 2018-07-18

## 2018-04-03 RX ORDER — POTASSIUM CHLORIDE 750 MG/1
10 CAPSULE, EXTENDED RELEASE ORAL DAILY
Status: DISCONTINUED | OUTPATIENT
Start: 2018-04-03 | End: 2018-04-03 | Stop reason: HOSPADM

## 2018-04-03 RX ORDER — POTASSIUM CHLORIDE 750 MG/1
10 TABLET, EXTENDED RELEASE ORAL DAILY
COMMUNITY
Start: 2017-09-01 | End: 2018-07-26 | Stop reason: HOSPADM

## 2018-04-03 RX ORDER — FUROSEMIDE 20 MG/1
20 TABLET ORAL DAILY
Status: DISCONTINUED | OUTPATIENT
Start: 2018-04-03 | End: 2018-04-03 | Stop reason: HOSPADM

## 2018-04-03 RX ADMIN — METOPROLOL TARTRATE 25 MG: 25 TABLET ORAL at 08:18

## 2018-04-03 RX ADMIN — LEVOTHYROXINE SODIUM 150 MCG: 150 TABLET ORAL at 05:51

## 2018-04-03 RX ADMIN — GABAPENTIN 600 MG: 300 CAPSULE ORAL at 05:50

## 2018-04-03 RX ADMIN — FUROSEMIDE 20 MG: 20 TABLET ORAL at 08:11

## 2018-04-03 RX ADMIN — APIXABAN 5 MG: 5 TABLET, FILM COATED ORAL at 08:11

## 2018-04-03 RX ADMIN — SOTALOL HYDROCHLORIDE 80 MG: 80 TABLET ORAL at 08:18

## 2018-04-03 RX ADMIN — METOPROLOL TARTRATE 25 MG: 25 TABLET ORAL at 00:16

## 2018-04-03 RX ADMIN — ACETAMINOPHEN 650 MG: 325 TABLET ORAL at 05:51

## 2018-04-03 RX ADMIN — POTASSIUM CHLORIDE 10 MEQ: 750 CAPSULE, EXTENDED RELEASE ORAL at 08:13

## 2018-04-03 NOTE — PROGRESS NOTES
"[facility]  Cardiology Progress Note      Chief Complaint/Reason for service:    · Mitral stenosis  · Acute diastolic heart failure  · Atrial flutter         Patient is doing much better this morning. She is maintaining sinus rhythm. Her breathing is unlabored. She states that she has not been taking metoprolol as indicated on the home med reconciliation. She has just been taking sotalol. She admits that she \"ran away\"after she was told she needed surgery but now understands the need to proceed.    Past medical, surgical, social and family history reviewed in the patient's electronic medical record.       Vital Sign Min/Max for last 24 hours  Temp  Min: 96.9 °F (36.1 °C)  Max: 98.4 °F (36.9 °C)   BP  Min: 88/55  Max: 153/96   Pulse  Min: 68  Max: 129   Resp  Min: 18  Max: 28   SpO2  Min: 85 %  Max: 100 %   Flow (L/min)  Min: 3  Max: 15      Intake/Output Summary (Last 24 hours) at 04/03/18 0736  Last data filed at 04/03/18 0553   Gross per 24 hour   Intake             3537 ml   Output             1001 ml   Net             2536 ml           Physical Exam   Constitutional: She is oriented to person, place, and time. She appears well-developed and well-nourished.   HENT:   Head: Normocephalic and atraumatic.   Cardiovascular: Normal rate and regular rhythm.    Murmur heard.   Diastolic murmur is present with a grade of 2/6   Abdominal: Soft.   Neurological: She is alert and oriented to person, place, and time.   Skin: Skin is warm.       Results Review:   I reviewed the patient's recent labs in the electronic medical record.        Results from last 7 days  Lab Units 04/02/18  1927 04/02/18  1037   SODIUM mmol/L 140 142   POTASSIUM mmol/L 4.3 4.5   CHLORIDE mmol/L 99 106   BUN mg/dL 19 19   CREATININE mg/dL 0.90 0.80       Results from last 7 days  Lab Units 04/02/18  1037   TROPONIN I ng/mL <0.006       Results from last 7 days  Lab Units 04/03/18  0451 04/02/18  1037   WBC " 10*3/mm3 9.68 8.29   HEMOGLOBIN g/dL 13.6 14.5   HEMATOCRIT % 40.1 41.6   PLATELETS 10*3/mm3 183 222       Lab Results   Component Value Date    HGBA1C 5.80 (H) 05/11/2017       Lab Results   Component Value Date    AST 25 04/02/2018    ALT 29 04/02/2018         Tele:  [facility] Problem List     * (Principal)Acute on chronic diastolic CHF (congestive heart failure)    Acute hypoxemic respiratory failure    Paroxysmal atrial fibrillation    Overview Addendum 4/2/2018  4:46 PM by Marco Lay IV, MD     · Postoperative atrial fibrillation with RVR in the setting of left upper lobectomy, 5/15/2017  · Echo (8/25/17): LVEF 60%.  Moderate to severe mitral stenosis (mean gradient 9 mmHg).  Moderate aortic insufficiency.  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  Moderate to severe mitral stenosis (mean gradient 9 mmHg) due to rheumatic disease.  Moderate aortic insufficiency.  RVSP 50 mmHg  · TIFFANIE (9/27/2017):  Moderate-to-severe MS (mean gradient 9 mmHg).  Moderate-to-severe MR.  Moderate AI.  · R/LHC (9/29/2017):  Mild CAD.  Normal LVEF.  Severe MS (mean 11 mmHg).  PA 50 mmhg         Acquired hypothyroidism    Hyperlipidemia LDL goal <70    Overview Signed 9/29/2017  8:55 AM by Marco Lay IV, MD     · High intensity statin indicated given the presence of diabetes and mild CAD                      · Okay for discharge home on home medications  · Patient instructed to take an extra sotalol if atrial fibrillation recurs  · Patient encouraged to obtain dental care to have full mouth extraction so she can move forward with valve surgery  · No ACE inhibitor/ARB due to hypotension  · Follow-up with Lore Suarez in 4 weeks    Marco Lay IV, MD  4/3/2018

## 2018-04-03 NOTE — DISCHARGE PLACEMENT REQUEST
"Argenis Brady RN  Case Management  P: [phone]    Orders for home oxygen. Patient is being discharged today. Please deliver portable tank to room. Thank you.       Jill Miller (57 y.o. Female)     Date of Birth Social Security Number Address Home Phone MRN    1960  [address]  [place] [address] [phone] [id number]    Druze Marital Status          None        Admission Date Admission Type Admitting Provider Attending Provider Department, Room/Bed    18 Emergency Xenia Pederson MD Krill, Kaleigh, MD [facility] 3E, S331/1    Discharge Date Discharge Disposition Discharge Destination         Home or Self Care              Attending Provider:  Xenia Pederson MD    Allergies:  No Known Allergies    Isolation:  None   Infection:  None   Code Status:  FULL    Ht:  157.5 cm (62\")   Wt:  90.2 kg (198 lb 12.8 oz)    Admission Cmt:  None   Principal Problem:  Acute on chronic diastolic CHF (congestive heart failure) [I50.33]                 Active Insurance as of 2018     Primary Coverage     Payor Plan Insurance Group Employer/Plan Group    [employer] BLUE [employer]OSS AN[employer] BLUE [employer]OSS BLUE [employer]IELD PPO [id number]     Payor Plan Address Payor Plan Phone Number Effective From Effective To    [address] [phone] 2017     [place] [address]       Subscriber Name Subscriber Birth Date Member ID       JILL MILLER 1960 [id number]                 Emergency Contacts      (Rel.) Home Phone Work Phone Mobile Phone    Zen Miller (Spouse) [phone] -- [phone]          18 1330  --  --  --  --  room air  at rest   85% oxygen saturation              History & Physical      Laura Fraser MD at 2018  5:45 PM              [facility] Medicine Services  HISTORY AND PHYSICAL    Patient Name: Jill Miller  : 1960  MRN: [id number]  Primary Care Physician: YANETH Olivera    Subjective "   Subjective     Chief Complaint:  Palpitations    HPI:  Jill Miller is a 57 y.o. female with mitral stenosis, history of non small cell lung cancer status post left upper lobectomy, atrial fibrillation who presented to the ED today due to palpitations.  She reports that she began having palpitations approximately one week ago that she noticed upon awaking from sleep in the morning and have been persistent since then, associated with dyspnea and orthopnea.  She was found to be in atrial flutter with variable AV block and rates in the 130s in the ED and elective cardioversion was performed with restoration of sinus rhythm.  She received 3L IV in the ED and following cardioversion became hypoxemic requiring BIPAP.   Repeat chest X ray showed worsening pulmonary vascular congestion.  She was started on nitroglycerin drip and given IV lasix.  Hospitalist service consulted for admission.    Review of Systems   Gen- No fevers, chills  CV- No chest pain, + palpitations as above  Resp- No cough, + dyspnea  GI- No N/V/D, abd pain    Otherwise 10-system ROS reviewed and is negative except as mentioned in the HPI.    Personal History     Past Medical History:   Diagnosis Date   • Abscess     under left breast, mrsa    • Atrial fibrillation and flutter    • Disease of thyroid gland    • Graves disease    • Hypertension    • Hyperthyroidism    • Lung cancer    • MRSA (methicillin resistant staph aureus) culture positive 2014    under left breast, incision and debridement, treated with wound packing and po abx    • Tobacco abuse 4/10/2017       Past Surgical History:   Procedure Laterality Date   • ABSCESS DRAINAGE      under left breast d/t mrsa    • BRONCHOSCOPY Left 5/12/2017    Procedure: BRONCHOSCOPY;  Surgeon: Ajay Cheatham MD;  Location:  [facility] OR;  Service:    • BRONCHOSCOPY N/A 5/18/2017    Procedure: BRONCHOSCOPY BIOPSY AT BEDSIDE;  Surgeon: Ajay Cheatham MD;  Location:  [facility] ENDOSCOPY;  Service:    • CARDIAC  CATHETERIZATION N/A 9/28/2017    Procedure: Left Heart Cath;  Surgeon: Marco Lay MD;  Location:  [facility];  Service:    • CARDIAC CATHETERIZATION N/A 9/28/2017    Procedure: Right Heart Cath;  Surgeon: Marco Lay MD;  Location:  [facility];  Service:    • LUNG BIOPSY  04/2017   • LYMPH NODE DISSECTION Left 5/12/2017    Procedure: MEDIASTINAL LYMPH NODE DISSECTION;  Surgeon: Ajay Cheatham MD;  Location:  [facility] OR;  Service:    • THORACOSCOPY VIDEO ASSISTED WITH LOBECTOMY Left 5/12/2017    Procedure: THORACOSCOPY VIDEO ASSISTED WITH OPEN LOBECTOMY;  Surgeon: Ajay Cheatham MD;  Location:  [facility] OR;  Service:    • TOTAL THYROIDECTOMY  approx 2012       Family History: family history includes Breast cancer in her mother; Diabetes in her father.     Social History:  reports that she has quit smoking. Her smoking use included Cigarettes. She has a 10.00 pack-year smoking history. She has never used smokeless tobacco. She reports that she does not drink alcohol or use drugs.  Social History     Social History Narrative    Lives with her        Medications:  Prescriptions Prior to Admission   Medication Sig Dispense Refill Last Dose   • acetaminophen (TYLENOL) 325 MG tablet Take 325 mg by mouth Daily As Needed for Mild Pain  or Headache.      • cefuroxime (CEFTIN) 500 MG tablet Take 1 tablet by mouth 2 (Two) Times a Day. 6 tablet 0    • ELIQUIS 5 MG tablet tablet TAKE 1 TABLET EVERY 12 HOURS 180 tablet 1    • gabapentin (NEURONTIN) 600 MG tablet Take 1 tablet (600 mg total) by mouth 3 (Three) Times a Day 90 tablet 1    • ibuprofen (ADVIL,MOTRIN) 200 MG tablet Take 800 mg by mouth Daily As Needed for Mild Pain  or Headache.      • levothyroxine (SYNTHROID, LEVOTHROID) 150 MCG tablet Take 150 mcg by mouth Daily.   Taking   • metoprolol succinate XL (TOPROL-XL) 25 MG 24 hr tablet Take 25 mg by mouth Daily.   Taking   • sotalol (BETAPACE) 80 MG tablet  Take 1 tablet by mouth 2 (Two) Times a Day. 60 tablet 11        No Known Allergies    Objective   Objective     Vital Signs:   Temp:  [96.9 °F (36.1 °C)-98.4 °F (36.9 °C)] 96.9 °F (36.1 °C)  Heart Rate:  [] 78  Resp:  [18-28] 22  BP: ()/(40-99) 98/68  FiO2 (%):  [60 %-65 %] 60 %        Physical Exam   Constitutional: No acute distress, awake, alert, sitting up in bed on BIPAP  Eyes: PERRLA, sclerae anicteric, no conjunctival injection  HENT: NCAT, mucous membranes moist  Neck: Supple, trachea midline  Respiratory: Clear to auscultation bilaterally, decreased breath sounds at left lung base, nonlabored respirations   Cardiovascular: RRR, no murmurs, rubs, or gallops  Gastrointestinal: Positive bowel sounds, soft, nontender, nondistended  Musculoskeletal: No bilateral ankle edema, no clubbing or cyanosis to extremities  Psychiatric: Appropriate affect, cooperative  Neurologic: Cranial Nerves grossly intact to confrontation, speech clear  Skin: No rashes    Results Reviewed:  I have personally reviewed current lab, radiology, and data and agree.    CXR personally reviewed and agree with official interpretation.      Results from last 7 days  Lab Units 04/02/18  1037   WBC 10*3/mm3 8.29   HEMOGLOBIN g/dL 14.5   HEMATOCRIT % 41.6   PLATELETS 10*3/mm3 222       Results from last 7 days  Lab Units 04/02/18  1037   SODIUM mmol/L 142   POTASSIUM mmol/L 4.5   CHLORIDE mmol/L 106   CO2 mmol/L 28.0   BUN mg/dL 19   CREATININE mg/dL 0.80   GLUCOSE mg/dL 166*   CALCIUM mg/dL 9.4   ALT (SGPT) U/L 29   AST (SGOT) U/L 25   TROPONIN I ng/mL <0.006     Estimated Creatinine Clearance: 80.9 mL/min (by C-G formula based on SCr of 0.8 mg/dL).  Brief Urine Lab Results  (Last result in the past 365 days)      Color   Clarity   Blood   Leuk Est   Nitrite   Protein   CREAT   Urine HCG        08/25/17 1143 Yellow Cloudy(A) Large (3+)(A) Negative Negative Trace(A)             BNP   Date Value Ref Range Status   04/02/2018 654.0 (H)  0.0 - 100.0 pg/mL Final     Comment:     Results may be falsely decreased if patient taking Biotin.     No results found for: PHART  Imaging Results (last 24 hours)     Procedure Component Value Units Date/Time    XR Chest 1 View [[id number]] Collected:  04/02/18 1632     Updated:  04/02/18 1632    Narrative:       EXAMINATION: XR CHEST 1 VW-04/02/2018:      INDICATION: Diff breathing; I48.92-Unspecified atrial flutter;  R09.02-Hypoxemia; R06.00-Dyspnea, unspecified.      COMPARISON: 04/02/2018.     FINDINGS: Portable chest reveals increased markings seen within the left  lung base with small-to-moderate sized left pleural effusion. Increased  pulmonary vascularity seen within the lung fields slightly worsened in  the interval.           Impression:       Slight worsening of the pulmonary vascularity in the  interval. Pleural effusion again seen on the left unchanged with  increased markings at the left lung base.     D:  04/02/2018  E:  04/02/2018             XR Chest 1 View [[id number]] Collected:  04/02/18 1114     Updated:  04/02/18 1121    Narrative:       EXAMINATION: XR CHEST 1 VW-      INDICATION: Palpitations.      COMPARISON: 09/26/2017.     FINDINGS: Portable chest reveals small left pleural effusion. Heart is  borderline enlarged. Right lung is grossly clear. Bony structures are  unremarkable.           Impression:       Small left pleural effusion unchanged in the interval. No  new focal parenchymal opacification is present.     D:  04/02/2018  E:  04/02/2018     This report was finalized on 4/2/2018 11:19 AM by Dr. Sola Gonzalez MD.           Results for orders placed during the hospital encounter of 09/25/17   Adult Transesophageal Echo (TIFFANIE) W/ Cont if Necessary Per Protocol    Narrative · Left ventricular systolic function is normal.  · Left atrial cavity size is severely dilated. There is spontaneous   echocontrast present. No LA or ANGELA thrombus identified.  · The mitral valve leaflets are  rheumatic. There is moderate-severe mitral   stenosis (mean gradient 9 mmHg) and moderate-severe mitral regurgitation.   Aretha score = 9  · Moderate aortic valve regurgitation is present.          Assessment/Plan   Assessment / Plan     Hospital Problem List     * (Principal)Acute on chronic diastolic CHF (congestive heart failure)    Acquired hypothyroidism    Graves disease    Paroxysmal atrial fibrillation    Overview Addendum 4/2/2018  4:46 PM by Marco Lay IV, MD     · Postoperative atrial fibrillation with RVR in the setting of left upper lobectomy, 5/15/2017  · Echo (8/25/17): LVEF 60%.  Moderate to severe mitral stenosis (mean gradient 9 mmHg).  Moderate aortic insufficiency.  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  Moderate to severe mitral stenosis (mean gradient 9 mmHg) due to rheumatic disease.  Moderate aortic insufficiency.  RVSP 50 mmHg  · TIFFANIE (9/27/2017):  Moderate-to-severe MS (mean gradient 9 mmHg).  Moderate-to-severe MR.  Moderate AI.  · R/LHC (9/29/2017):  Mild CAD.  Normal LVEF.  Severe MS (mean 11 mmHg).  PA 50 mmhg         Hyperlipidemia LDL goal <70    Overview Signed 9/29/2017  8:55 AM by Marco Lay IV, MD     · High intensity statin indicated given the presence of diabetes and mild CAD         Acute hypoxemic respiratory failure            Assessment & Plan:    Acute on chronic diastolic CHF  Acute hypoxemic respiratory failure  -S/P IV lasix x 2 in ED, re-evaluate in am.  Will need to verify home lasix dose with pharmacy  -Continue NTG drip but will decrease rate due to hypotension  -Continue BIPAP  -Daily weights, strict I/O, 1000mL daily fluid restriction    Atrial flutter with RVR  Mitral stenosis and aortic valve insufficiency  -S/P Cardioversion with restoration of  "NSR  -Continue metoprolol 25mg TID  -Continue home sotalol for now but defer to cardiology tomorrow if this should be continued  -Repeat echocardiogram  -Continue Eliquis; may need to be transitioned to warfarin as this was previously recommended     Acquired hypothyroidism/Grave's disease  -Check TSH in am  -Continue levothyroxine    HLD  -Continue atorvastatin    DVT prophylaxis: Eliquis    CODE STATUS:  Prior    Admission Status:  I believe this patient meets INPATIENT status due to the need for care which can only be reasonably provided in an hospital setting such as aggressive/expedited ancillary services and/or consultation services, the necessity for IV medications, close physician monitoring and/or the possible need for procedures.  In such, I feel patient’s risk for adverse outcomes and need for care warrant INPATIENT evaluation and predict the patient’s care encounter to likely last beyond 2 midnights.      Electronically signed by Laura Fraser MD, 18, 6:04 PM.        Electronically signed by Laura Fraser MD at 2018  6:24 PM         [place] [id number]  Phone:  [phone]  Fax:  [phone] Date Ordered: Apr 3, 2018         Patient:  Jill Miller MRN:  [id number]   [address]  [place] [address] :  1960  SSN:    Phone: [phone] Sex:  F     Weight: 90.2 kg (198 lb 12.8 oz)         Ht Readings from Last 1 Encounters:   18 157.5 cm (62\")         Oxygen Therapy         (Order ID: [id number])    Diagnosis:  Dyspnea, unspecified type (R06.00 [ICD-10-CM] 786.09 [ICD-9-CM])  Acute on chronic diastolic CHF (congestive heart failure) (I50.33 [ICD-10-CM] 428.33,428.0 [ICD-9-CM])   Quantity:  1     Delivery Modality: Nasal Cannula  Liters Per Minute: 3  Duration: Continuous  Equipment:  Oxygen Concentrator &  &  Portable Gaseous Oxygen System & Portable Oxygen Contents Gaseous & "  Conserving Regulator  Length of Need (99 Months = Lifetime): 99 Months = Lifetime            Verbal Order Mode: Telephone with readback   Authorizing Provider: Xenia Pederson MD  Authorizing Provider's NPI: [id number]     Order Entered By: Argenis Brady RN 4/3/2018  2:36 PM

## 2018-04-03 NOTE — PLAN OF CARE
Problem: Patient Care Overview  Goal: Plan of Care Review  Outcome: Ongoing (interventions implemented as appropriate)   04/03/18 1235   Coping/Psychosocial   Plan of Care Reviewed With patient   Plan of Care Review   Progress improving   OTHER   Outcome Summary Patient without complaints at this time. NSR on telemetry and 3L NC at this time. Patient able to ambulate without difficulty.     Goal: Individualization and Mutuality  Outcome: Ongoing (interventions implemented as appropriate)   04/03/18 1235   Individualization   Patient Specific Goals (Include Timeframe) Patient would like to go home this afternoon.     Goal: Discharge Needs Assessment  Outcome: Ongoing (interventions implemented as appropriate)   04/02/18 1818 04/02/18 1823 04/03/18 1235   Discharge Needs Assessment   Readmission Within the Last 30 Days --  --  no previous admission in last 30 days   Concerns to be Addressed --  --  no discharge needs identified   Patient/Family Anticipates Transition to --  home --    Patient/Family Anticipated Services at Transition --  none --    Transportation Anticipated --  family or friend will provide --    Disability   Equipment Currently Used at Home none --  --

## 2018-04-03 NOTE — PLAN OF CARE
Problem: Cardiac: Heart Failure (Adult)  Goal: Signs and Symptoms of Listed Potential Problems Will be Absent, Minimized or Managed (Cardiac: Heart Failure)   04/03/18 1309   Goal/Outcome Evaluation   Problems Assessed (Heart Failure) all   Problems Present (Heart Failure) dysrhythmia/arrhythmia;respiratory compromise

## 2018-04-03 NOTE — PROGRESS NOTES
Continued Stay Note  [facility]     Patient Name: Jill Miller  MRN: [id number]  Today's Date: 4/3/2018    Admit Date: 4/2/2018          Discharge Plan     Row Name 04/03/18 1442       Plan    Plan Home oxygen    Patient/Family in Agreement with Plan yes    Plan Comments Received call from bedside RN requesting I set up home oxygen for patient. Confirmed with Dr. Pederson and she authorized home oxygen order. Discussed with patient and she does not have a preference for oxygen company. Referral called/faxed to Mary with [facility] (P: [phone]; F: [phone]). Portable oxygen tank will be delivered to patient's room prior to discharge today. Argenis Brady RN [phone]    Row Name 04/03/18 1326       Plan    Final Discharge Disposition Code 01 - home or self-care              Discharge Codes    No documentation.       Expected Discharge Date and Time     Expected Discharge Date Expected Discharge Time    Apr 3, 2018             Argenis Brady RN

## 2018-04-03 NOTE — PLAN OF CARE
Problem: Patient Care Overview  Goal: Plan of Care Review   18 531   Coping/Psychosocial   Plan of Care Reviewed With patient   Plan of Care Review   Progress improving   OTHER   Outcome Summary pt has worn bipap mask since  last pm. started at 60% and weaned to 40%, alternating with 4L NC. NTG gtt off, tolerating Lopressor po, remains in NSR. Bibas crackles heard last pm, improved and only in right base now.        Problem: Respiratory Distress Syndrome (,NICU)  Goal: Signs and Symptoms of Listed Potential Problems Will be Absent, Minimized or Managed (Respiratory Distress Syndrome)   18 3848   Goal/Outcome Evaluation   Problems Assessed (Respiratory Distress Syndrome) all   Problems Present (RDS) other (see comments)  (bi basilar crackles)

## 2018-04-04 NOTE — DISCHARGE SUMMARY
[facility] Medicine Services  DISCHARGE SUMMARY    Patient Name: Jill Miller  : 1960  MRN: [id number]    Date of Admission: 2018  Date of Discharge:  4/3/2018  Length of Stay: 1  Primary Care Physician: YANETH Olivera    Consults     Date and Time Order Name Status Description    2018 1615 Inpatient Cardiology Consult Completed         Hospital Course     Presenting Problem:   Acute on chronic diastolic CHF (congestive heart failure) [I50.33]    Active Hospital Problems (** Indicates Principal Problem)    Diagnosis Date Noted   • **Acute on chronic diastolic CHF (congestive heart failure) [I50.33] 2018   • Hyperlipidemia LDL goal <70 [E78.5] 2017   • Rheumatic mitral stenosis [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 female with mitral stenosis, history of non small cell lung cancer status post left upper lobectomy, atrial fibrillation who presented to the ED today due to palpitations. She was found to be in atrial flutter with variable AV block and rates in the 130s in the ED and elective cardioversion was performed with restoration of sinus rhythm.  She received 3L IV in the ED and following cardioversion became hypoxemic requiring BIPAP. Pt was diuresed. Cardiology was consulted and recommended for patient to take an extra sotalol if a. Fib recurs. She is also to have full mouth extraction prior to mitral valve surgery. FU with Lore Suarez in 4 weeks. Discharged home on O2 with instructions to take an extra lasix for dyspnea and weight gain of >3lbs in 2 days.       Day of Discharge     HPI:   Reports  feeling well. No dyspnea, no CP. No complaints. Family at bedside.    Review of Systems  Gen- No fevers or chills  CV- No chest pain or palpitations  Resp- No cough or dyspnea  GI- No N/V/D or abd pain    Otherwise ROS is negative except as mentioned in the HPI.    Vital Signs:   Temp:  [97.5 °F (36.4 °C)] 97.5 °F (36.4 °C)  Heart Rate:  [72] 72  Resp:  [20] 20  BP: (92)/(47) 92/47     Physical Exam:  Constitutional: No acute distress, awake, alert   Eyes: PERRLA, sclerae anicteric, no conjunctival injection  HENT: NCAT, mucous membranes moist  Neck: Supple, no thyromegaly, no lymphadenopathy, trachea midline  Respiratory: Clear to auscultation bilaterally, nonlabored respirations   Cardiovascular: RRR, no murmurs, rubs, or gallops, palpable pedal pulses bilaterally  Gastrointestinal: Positive bowel sounds, soft, nontender, nondistended  Musculoskeletal: No bilateral ankle edema, no clubbing or cyanosis to extremities  Psychiatric: Appropriate affect, cooperative  Neurologic: Oriented x 3, strength symmetric in all extremities, Cranial Nerves grossly intact to confrontation, speech clear  Skin: No rashes    Pertinent  and/or Most Recent Results         Results from last 7 days  Lab Units 04/03/18  0803 04/03/18  0451 04/02/18  1927 04/02/18  1037   WBC 10*3/mm3  --  9.68  --  8.29   HEMOGLOBIN g/dL  --  13.6  --  14.5   HEMATOCRIT %  --  40.1  --  41.6   PLATELETS 10*3/mm3  --  183  --  222   SODIUM mmol/L 138  --  140 142   POTASSIUM mmol/L 4.2  --  4.3 4.5   CHLORIDE mmol/L 99  --  99 106   CO2 mmol/L 26.0  --  28.0 28.0   BUN mg/dL 22  --  19 19   CREATININE mg/dL 0.90  --  0.90 0.80   GLUCOSE mg/dL 251*  --  203* 166*   CALCIUM mg/dL 8.9  --  8.9 9.4       Results from last 7 days  Lab Units 04/02/18  1037   BILIRUBIN mg/dL 1.1   ALK PHOS U/L 114*   ALT (SGPT) U/L 29   AST (SGOT) U/L 25           Invalid input(s): TG, LDLCALC, LDLREALC    Results from last 7 days  Lab Units 04/03/18  0451 04/02/18  1037   TSH  mIU/mL 0.421  --    BNP pg/mL  --  654.0*   TROPONIN I ng/mL  --  <0.006     Brief Urine Lab Results  (Last result in the past 365 days)      Color   Clarity   Blood   Leuk Est   Nitrite   Protein   CREAT   Urine HCG        08/25/17 1143 Yellow Cloudy(A) Large (3+)(A) Negative Negative Trace(A)               Microbiology Results Abnormal     None          Imaging Results (all)     Procedure Component Value Units Date/Time    XR Chest 1 View [[id number]] Collected:  04/02/18 1632     Updated:  04/03/18 1003    Narrative:       EXAMINATION: XR CHEST 1 VW-04/02/2018:      INDICATION: Diff breathing; I48.92-Unspecified atrial flutter;  R09.02-Hypoxemia; R06.00-Dyspnea, unspecified.      COMPARISON: 04/02/2018.     FINDINGS: Portable chest reveals increased markings seen within the left  lung base with small-to-moderate sized left pleural effusion. Increased  pulmonary vascularity seen within the lung fields slightly worsened in  the interval.           Impression:       Slight worsening of the pulmonary vascularity in the  interval. Pleural effusion again seen on the left unchanged with  increased markings at the left lung base.     D:  04/02/2018  E:  04/02/2018     This report was finalized on 4/3/2018 10:01 AM by Dr. Sola Gonzalez MD.       XR Chest 1 View [[id number]] Collected:  04/02/18 1114     Updated:  04/02/18 1121    Narrative:       EXAMINATION: XR CHEST 1 VW-      INDICATION: Palpitations.      COMPARISON: 09/26/2017.     FINDINGS: Portable chest reveals small left pleural effusion. Heart is  borderline enlarged. Right lung is grossly clear. Bony structures are  unremarkable.           Impression:       Small left pleural effusion unchanged in the interval. No  new focal parenchymal opacification is present.     D:  04/02/2018  E:  04/02/2018     This report was finalized on 4/2/2018 11:19 AM by Dr. Sola Gonzalez MD.             Results for orders placed during the hospital encounter of 04/02/18    Adult Transthoracic Echo Complete W/ Cont if Necessary Per Protocol    Narrative · Left ventricular systolic function is normal. Estimated EF = 70%.  · Left atrial cavity size is severely dilated.  · Moderate aortic valve regurgitation is present.  · The mitral valve exhibits rheumatic thickening. There is severe mitral   stenosis (mean gradient 18 mmHg @ HR 75). There is severe mitral   regurgitation.  · Calculated right ventricular systolic pressure from tricuspid   regurgitation is 56 mmHg.  · Estimated right ventricular systolic pressure from tricuspid   regurgitation is markedly elevated (>55 mmHg            Discharge Details      Jill Miller   Home Medication Instructions PURNIMA:[id number]    Printed on:04/04/18 113   Medication Information                      acetaminophen (TYLENOL) 325 MG tablet  Take 325 mg by mouth Daily As Needed for Mild Pain  or Headache.             atorvastatin (LIPITOR) 10 MG tablet  Take 1 tablet by mouth Daily.             ELIQUIS 5 MG tablet tablet  TAKE 1 TABLET EVERY 12 HOURS             furosemide (LASIX) 20 MG tablet  Take 20 mg by mouth Daily.             gabapentin (NEURONTIN) 600 MG tablet  Take 1 tablet (600 mg total) by mouth 3 (Three) Times a Day             ibuprofen (ADVIL,MOTRIN) 200 MG tablet  Take 800 mg by mouth Daily As Needed for Mild Pain  or Headache.             levothyroxine (SYNTHROID, LEVOTHROID) 150 MCG tablet  Take 150 mcg by mouth Daily.             potassium chloride (K-DUR,KLOR-CON) 10 MEQ CR tablet  Take 10 mEq by mouth Daily.             sotalol (BETAPACE) 80 MG tablet  Take 1 tablet by mouth 2 (Two) Times a Day.                   Discharge Disposition:  Home or Self Care    Discharge Diet:  Diet Instructions     Please resume diet as tolerated.                 Discharge Activity:  Activity Instructions     Please resume activity as tolerated.                 Future Appointments  Date Time Provider Department Center   5/15/2018 1:15 PM  [facility] None       Additional Instructions for the Follow-ups that You Need to Schedule     Discharge Follow-up with PCP    As directed      Follow Up Details:  1-2 weeks         Discharge Follow-up with Specialty: Lore Suarez; 1 Month    As directed      Specialty:  Lore Suarez    Follow Up:  1 Month    Follow Up Details:  Hospital FU for atrial flutter, mitral stenosis               Time Spent on Discharge:  >35min    Xenia Pederson MD  04/04/18  11:30 AM

## 2018-04-12 ENCOUNTER — TELEPHONE (OUTPATIENT)
Dept: CARDIAC SURGERY | Facility: CLINIC | Age: 58
End: 2018-04-12

## 2018-04-12 NOTE — TELEPHONE ENCOUNTER
"S/w pt who said \"she was tired of running\" and wanted to go forward with her surgery. Told her I would set her up with an oral surgeon and see if Dr. Cheatham wants to see her back in office before scheduling   "

## 2018-04-25 ENCOUNTER — TELEPHONE (OUTPATIENT)
Dept: CARDIAC SURGERY | Facility: CLINIC | Age: 58
End: 2018-04-25

## 2018-04-25 ENCOUNTER — TELEPHONE (OUTPATIENT)
Dept: CARDIOLOGY | Facility: CLINIC | Age: 58
End: 2018-04-25

## 2018-04-25 NOTE — TELEPHONE ENCOUNTER
The patient called regarding holding apixaban for full mouth extraction.  Clearance for this was given yesterday. She states she has been atrial fibrillation for 3 days now, and wanted to make sure that either it was still ok for her to hold anticoagulation, or see if any changes need to be made prior.  She states she is unable to come in for ECG until Friday.  Currently extraction is not scheduled yet.  Please advise.

## 2018-04-25 NOTE — TELEPHONE ENCOUNTER
Advice of GUCCI reviewed with the patient.  She states she would still like to come in for an ECG tomorrow, but also verbalized agreement and understanding at this time.

## 2018-04-25 NOTE — TELEPHONE ENCOUNTER
S/w pt and she is to see the Dentist tomorrow , told her to contact me when its done. She fears she in AFIB I told her to contact Dr. Lay, pt said she has contacted his nurse.

## 2018-04-25 NOTE — TELEPHONE ENCOUNTER
Well being in afib places her at risk of stroke but we cant cardiovert because she would not be able to stop her Eliquis for at least 3 months so no extraction for 3 months.  Tell her to proceed she is at moderate risk.  Stop Eliquis 2 days prior to extraction

## 2018-04-26 ENCOUNTER — CLINICAL SUPPORT (OUTPATIENT)
Dept: CARDIOLOGY | Facility: CLINIC | Age: 58
End: 2018-04-26

## 2018-04-26 DIAGNOSIS — I48.0 PAROXYSMAL ATRIAL FIBRILLATION (HCC): Primary | ICD-10-CM

## 2018-04-26 PROCEDURE — 93000 ELECTROCARDIOGRAM COMPLETE: CPT | Performed by: INTERNAL MEDICINE

## 2018-04-27 ENCOUNTER — TELEPHONE (OUTPATIENT)
Dept: CARDIAC SURGERY | Facility: CLINIC | Age: 58
End: 2018-04-27

## 2018-04-27 ENCOUNTER — TELEPHONE (OUTPATIENT)
Dept: CARDIOLOGY | Facility: CLINIC | Age: 58
End: 2018-04-27

## 2018-04-27 RX ORDER — SOTALOL HYDROCHLORIDE 120 MG/1
120 TABLET ORAL 2 TIMES DAILY
Qty: 180 TABLET | Refills: 1 | Status: SHIPPED | OUTPATIENT
Start: 2018-04-27 | End: 2018-05-17

## 2018-04-27 RX ORDER — SOTALOL HYDROCHLORIDE 80 MG/1
TABLET ORAL
Qty: 60 TABLET | Refills: 11 | Status: SHIPPED | OUTPATIENT
Start: 2018-04-27 | End: 2018-04-27 | Stop reason: SDUPTHER

## 2018-04-27 NOTE — TELEPHONE ENCOUNTER
Patient in office for EKG prior to dental appt requesting information regarding having extractions performed prior to heart surgery, reviewed note from 4/26/18 and advised patient of those recommendations in regards to holding Eliquis, she verbalized understanding.

## 2018-04-27 NOTE — TELEPHONE ENCOUNTER
Patient came in for an EKG yesterday, afib with RVR, . /75 today. Per Lore Suarez, GUCCI and HTR increase sotalol to 120 mg BID. Repeat EKG on Monday. The patient verbalized understanding.

## 2018-04-30 ENCOUNTER — CLINICAL SUPPORT (OUTPATIENT)
Dept: CARDIOLOGY | Facility: CLINIC | Age: 58
End: 2018-04-30

## 2018-04-30 ENCOUNTER — TELEPHONE (OUTPATIENT)
Dept: CARDIOLOGY | Facility: CLINIC | Age: 58
End: 2018-04-30

## 2018-04-30 DIAGNOSIS — I48.19 PERSISTENT ATRIAL FIBRILLATION (HCC): Primary | ICD-10-CM

## 2018-04-30 PROCEDURE — 93000 ELECTROCARDIOGRAM COMPLETE: CPT | Performed by: INTERNAL MEDICINE

## 2018-04-30 NOTE — TELEPHONE ENCOUNTER
Patient came in the office today for an EKG. Still afib with RVR. . Per HTR, start metoprolol 25 mg BID. The patient will call me later in the week with her HR and how she is feeling.

## 2018-05-03 ENCOUNTER — TELEPHONE (OUTPATIENT)
Dept: CARDIOLOGY | Facility: CLINIC | Age: 58
End: 2018-05-03

## 2018-05-03 DIAGNOSIS — I48.0 PAROXYSMAL ATRIAL FIBRILLATION (HCC): Primary | ICD-10-CM

## 2018-05-03 NOTE — TELEPHONE ENCOUNTER
"Patient called c/o fatigue, shortness of breath and chest \"heaviness\".  She knows she is still in afib. She reports that she is feeling worse than she has before. /79, HR's have been ranging . On sotalol 120 mg BID and metoprolol 25 mg BID, started 4/30/2018.   "

## 2018-05-04 ENCOUNTER — TELEPHONE (OUTPATIENT)
Dept: CARDIOLOGY | Facility: CLINIC | Age: 58
End: 2018-05-04

## 2018-05-04 ENCOUNTER — CLINICAL SUPPORT (OUTPATIENT)
Dept: CARDIOLOGY | Facility: CLINIC | Age: 58
End: 2018-05-04

## 2018-05-04 DIAGNOSIS — I48.0 PAROXYSMAL ATRIAL FIBRILLATION (HCC): Primary | ICD-10-CM

## 2018-05-04 PROCEDURE — 93000 ELECTROCARDIOGRAM COMPLETE: CPT | Performed by: INTERNAL MEDICINE

## 2018-05-04 NOTE — TELEPHONE ENCOUNTER
Patient called reporting that she believes she is back in rhythm. HR 78. She will come in for an EKG.

## 2018-05-07 ENCOUNTER — TELEPHONE (OUTPATIENT)
Dept: CARDIOLOGY | Facility: CLINIC | Age: 58
End: 2018-05-07

## 2018-05-07 NOTE — TELEPHONE ENCOUNTER
PT calls to follow up to see if we have spoke with Dr. Torres (her dentist) regarding her being back in NSR-   I called their office ([phone]) and spoke with Lore. Relayed that at the moment PT is back in NSR and is good to go for the teeth extraction- they are currently waiting on approval from her medical insurance- they are aware that she is cleared and may hold Eliquis 2 days prior to procedure-

## 2018-05-14 ENCOUNTER — TELEPHONE (OUTPATIENT)
Dept: CARDIOLOGY | Facility: CLINIC | Age: 58
End: 2018-05-14

## 2018-05-14 NOTE — TELEPHONE ENCOUNTER
Patient called to let us know that her dental extraction has not been scheduled yet d/t her insurance denial. She believes she is back in afib after a coughing spell but does not feel bad. She can not make it to her appt tomorrow. I gave her the number to the [facility] as well. She will all us to reschedule once she gets her extraction date.

## 2018-05-17 ENCOUNTER — OFFICE VISIT (OUTPATIENT)
Dept: CARDIOLOGY | Facility: HOSPITAL | Age: 58
End: 2018-05-17

## 2018-05-17 ENCOUNTER — HOSPITAL ENCOUNTER (OUTPATIENT)
Dept: CARDIOLOGY | Facility: HOSPITAL | Age: 58
Discharge: HOME | End: 2018-05-17
Admitting: NURSE PRACTITIONER

## 2018-05-17 ENCOUNTER — TELEPHONE (OUTPATIENT)
Dept: CARDIOLOGY | Facility: HOSPITAL | Age: 58
End: 2018-05-17

## 2018-05-17 VITALS
OXYGEN SATURATION: 96 % | RESPIRATION RATE: 20 BRPM | SYSTOLIC BLOOD PRESSURE: 104 MMHG | WEIGHT: 199.6 LBS | TEMPERATURE: 97.8 F | HEART RATE: 94 BPM | DIASTOLIC BLOOD PRESSURE: 63 MMHG | HEIGHT: 62 IN | BODY MASS INDEX: 36.73 KG/M2

## 2018-05-17 DIAGNOSIS — Z72.0 TOBACCO ABUSE: ICD-10-CM

## 2018-05-17 DIAGNOSIS — I05.0 RHEUMATIC MITRAL STENOSIS: ICD-10-CM

## 2018-05-17 DIAGNOSIS — I48.0 PAROXYSMAL ATRIAL FIBRILLATION (HCC): ICD-10-CM

## 2018-05-17 DIAGNOSIS — I48.4 ATYPICAL ATRIAL FLUTTER (HCC): ICD-10-CM

## 2018-05-17 DIAGNOSIS — I48.19 PERSISTENT ATRIAL FIBRILLATION (HCC): Primary | ICD-10-CM

## 2018-05-17 DIAGNOSIS — I50.32 CHRONIC DIASTOLIC CHF (CONGESTIVE HEART FAILURE) (HCC): ICD-10-CM

## 2018-05-17 DIAGNOSIS — I10 ESSENTIAL HYPERTENSION: ICD-10-CM

## 2018-05-17 PROCEDURE — 93010 ELECTROCARDIOGRAM REPORT: CPT | Performed by: INTERNAL MEDICINE

## 2018-05-17 PROCEDURE — 99406 BEHAV CHNG SMOKING 3-10 MIN: CPT | Performed by: NURSE PRACTITIONER

## 2018-05-17 PROCEDURE — 93005 ELECTROCARDIOGRAM TRACING: CPT | Performed by: NURSE PRACTITIONER

## 2018-05-17 PROCEDURE — 99214 OFFICE O/P EST MOD 30 MIN: CPT | Performed by: NURSE PRACTITIONER

## 2018-05-17 RX ORDER — VARENICLINE TARTRATE 0.5 MG/1
0.5 TABLET, FILM COATED ORAL DAILY
Qty: 3 TABLET | Refills: 0 | Status: SHIPPED | OUTPATIENT
Start: 2018-05-18 | End: 2018-05-21

## 2018-05-17 RX ORDER — VARENICLINE TARTRATE 1 MG/1
1 TABLET, FILM COATED ORAL 2 TIMES DAILY
Qty: 60 TABLET | Refills: 3 | Status: SHIPPED | OUTPATIENT
Start: 2018-05-17 | End: 2018-08-14

## 2018-05-17 RX ORDER — METOPROLOL TARTRATE 50 MG/1
50 TABLET, FILM COATED ORAL 2 TIMES DAILY
Qty: 60 TABLET | Refills: 3 | Status: SHIPPED | OUTPATIENT
Start: 2018-05-17 | End: 2018-08-14 | Stop reason: ALTCHOICE

## 2018-05-17 RX ORDER — VARENICLINE TARTRATE 0.5 MG/1
0.5 TABLET, FILM COATED ORAL 2 TIMES DAILY
Qty: 8 TABLET | Refills: 0 | Status: SHIPPED | OUTPATIENT
Start: 2018-05-21 | End: 2018-05-25

## 2018-05-17 NOTE — PROGRESS NOTES
[facility]  Heart and Valve [facility]      Encounter Date:05/17/2018     Jill Miller  [address] [facility] [address]  [phone]    1960    YANETH Olivera    Jill Miller is a 58 y.o. female.      Subjective:     Chief Complaint:  Establish Care (Afib/flutter and DHF)       HPI     Pt admitted to [facility] 04/02/18 with a/c DHF.  57-year-old female with history of mitral stenosis, history of non-small cell CA status post left upper lobectomy, paroxysmal atrial fibrillation.  She was found to be in atrial flutter with variable A-V rates during hospitalization.  Cardioversion performed with restoration of sinus rhythm.  She was diuresed.  Cardiology evaluated during hospital stay and was told to take an extra sotalol for A. fib a flutter recurrence.  Patient also plans to have full mouth extraction prior to mitral valve surgery.  Discharged home on O2 and to take extra Lasix for dyspnea and weight gain of 3 pounds in 2 days.  Post discharge she is found to be in A. fib on 4/30/18.  The Toprol was started 25 mg twice a day along with sotalol.  Symptoms did not improve cardioversion was recommended.  Patient then called back stating her heart rate was in the 70s with improved symptoms felt that she was back in rhythm.  Patient still awaiting dental extraction.  She's been cleared to hold Eliquis 2 days prior to procedure.  Patient called on 5/14/18 reporting felt she was back in A. fib.  Refer to the heart and valve Center for evaluation    Pt reports being in Afib for 5 days.  Reports mild chest discomfort.  Denies worsening SOB.  Pt has not needed extra lasix this week.  Denies Edema, dizziness, syncope.  Denies blood in urine or stool or unusual bruising.  HR has been 's.        Patient Active Problem List    Diagnosis   • Acute on chronic diastolic CHF (congestive heart failure) [I50.33]   • Hyperlipidemia LDL goal <70 [E78.5]     Overview Note:     · High intensity  statin indicated given the presence of diabetes and mild CAD     • Rheumatic mitral stenosis [I05.0]     Overview Note:     · Echo (8/25/17): LVEF normal.  Moderate to severe mitral stenosis (mean gradient 9 mmHg) due to rheumatic disease.  Moderate aortic insufficiency.  RVSP 50 mmHg  · TIFFANIE (9/27/2017):  Moderate-to-severe MS (mean gradient 9 mmHg).  Moderate-to-severe MR.  Moderate AI.  · R/LHC (9/29/2017):  Mild CAD.  Normal LVEF.  Severe MS (mean 11 mmHg).  PA 50 mmhg  · Echo (4/3/2018): LVEF 70%. Rheumatic mitral valve. Severe mitral stenosis menses mean gradient 18 mmHg). Severe mitral regurgitation. Moderate aortic insufficiency.     • Chronically Abnormal CXR (Left pleural disease post op) [R93.8]   • Paroxysmal atrial fibrillation [I48.0]     Overview Note:     · Postoperative atrial fibrillation with RVR in the setting of left upper lobectomy, 5/15/2017  · Echo (8/25/17): LVEF 60%.  Moderate to severe mitral stenosis (mean gradient 9 mmHg).  Moderate aortic insufficiency.  RVSP 50 mmHg  · Chads Vasc = 2 (female, HTN)     • Graves disease [E05.00]   • Leukocytosis [D72.829]   • Essential hypertension [I10]   • Acquired hypothyroidism [E03.9]           Past Surgical History:   Procedure Laterality Date   • ABSCESS DRAINAGE      under left breast d/t mrsa    • BRONCHOSCOPY Left 5/12/2017    Procedure: BRONCHOSCOPY;  Surgeon: Ajay Cheatham MD;  Location:  [facility];  Service:    • BRONCHOSCOPY N/A 5/18/2017    Procedure: BRONCHOSCOPY BIOPSY AT BEDSIDE;  Surgeon: Ajay Cheatham MD;  Location:  [facility];  Service:    • CARDIAC CATHETERIZATION N/A 9/28/2017    Procedure: Left Heart Cath;  Surgeon: Marco Lay MD;  Location:  [facility];  Service:    • CARDIAC CATHETERIZATION N/A 9/28/2017    Procedure: Right Heart Cath;  Surgeon: Marco Lay MD;  Location:  [facility];  Service:    • LUNG BIOPSY  04/2017   • LYMPH NODE DISSECTION Left  5/12/2017    Procedure: MEDIASTINAL LYMPH NODE DISSECTION;  Surgeon: Ajay Cheatham MD;  Location:  [facility];  Service:    • THORACOSCOPY VIDEO ASSISTED WITH LOBECTOMY Left 5/12/2017    Procedure: THORACOSCOPY VIDEO ASSISTED WITH OPEN LOBECTOMY;  Surgeon: Ajay Cheatham MD;  Location:  [facility];  Service:    • TOTAL THYROIDECTOMY  approx 2012       No Known Allergies      Current Outpatient Prescriptions:   •  acetaminophen (TYLENOL) 325 MG tablet, Take 325 mg by mouth Daily As Needed for Mild Pain  or Headache., Disp: , Rfl:   •  apixaban (ELIQUIS) 5 MG tablet tablet, Take 1 tablet by mouth Every 12 (Twelve) Hours., Disp: 180 tablet, Rfl: 1  •  atorvastatin (LIPITOR) 10 MG tablet, Take 1 tablet by mouth Daily., Disp: 90 tablet, Rfl: 3  •  furosemide (LASIX) 20 MG tablet, Take 20 mg by mouth Daily., Disp: , Rfl:   •  gabapentin (NEURONTIN) 600 MG tablet, Take 1 tablet (600 mg total) by mouth 3 (Three) Times a Day, Disp: 90 tablet, Rfl: 1  •  levothyroxine (SYNTHROID, LEVOTHROID) 150 MCG tablet, Take 150 mcg by mouth Daily., Disp: , Rfl:   •  metoprolol tartrate (LOPRESSOR) 25 MG tablet, Take 1 tablet by mouth 2 (Two) Times a Day., Disp: 60 tablet, Rfl: 1  •  potassium chloride (K-DUR,KLOR-CON) 10 MEQ CR tablet, Take 10 mEq by mouth Daily., Disp: , Rfl:   •  sotalol (BETAPACE) 120 MG tablet, Take 1 tablet by mouth 2 (Two) Times a Day., Disp: 180 tablet, Rfl: 1  •  varenicline (CHANTIX CONTINUING MONTH ASHLEE) 1 MG tablet, Take 1 tablet by mouth 2 (Two) Times a Day., Disp: 60 tablet, Rfl: 3  •  [START ON 5/18/2018] varenicline (CHANTIX) 0.5 MG tablet, Take 1 tablet by mouth Daily for 3 days., Disp: 3 tablet, Rfl: 0  •  [START ON 5/21/2018] varenicline (CHANTIX) 0.5 MG tablet, Take 1 tablet by mouth 2 (Two) Times a Day for 4 days., Disp: 8 tablet, Rfl: 0  No current facility-administered medications for this visit.     The following portions of the patient's history were reviewed and updated as appropriate: allergies,  current medications, past family history, past medical history, past social history, past surgical history and problem list.    Review of Systems   Constitution: Positive for malaise/fatigue. Negative for chills, decreased appetite, diaphoresis, fever, weakness, night sweats, weight gain and weight loss.   HENT: Negative for congestion and nosebleeds.    Eyes: Negative for blurred vision, visual disturbance and visual halos.   Cardiovascular: Positive for dyspnea on exertion and palpitations. Negative for chest pain, claudication, cyanosis, irregular heartbeat, leg swelling, near-syncope, orthopnea, paroxysmal nocturnal dyspnea and syncope.   Respiratory: Positive for shortness of breath. Negative for cough, hemoptysis, sleep disturbances due to breathing, snoring, sputum production and wheezing.    Endocrine: Negative for cold intolerance, heat intolerance, polydipsia, polyphagia and polyuria.   Hematologic/Lymphatic: Does not bruise/bleed easily.   Skin: Negative for dry skin, itching and rash.   Musculoskeletal: Negative for falls, joint pain, joint swelling, muscle weakness and myalgias.   Gastrointestinal: Negative for bloating, abdominal pain, constipation, diarrhea, dysphagia, heartburn, melena, nausea and vomiting.   Genitourinary: Negative for dysuria, flank pain, hematuria and nocturia.   Neurological: Negative for difficulty with concentration, excessive daytime sleepiness, dizziness, headaches and loss of balance.   Psychiatric/Behavioral: Negative for altered mental status and depression. The patient is not nervous/anxious.    Allergic/Immunologic: Negative for environmental allergies.       Objective:     Vitals:    05/17/18 0920 05/17/18 0923 05/17/18 0924   BP: 113/88 (!) 85/62 104/63   BP Location: Right arm Left arm Left arm   Patient Position: Sitting Sitting Standing   Pulse: 116 94 94   Resp: 20     Temp: 97.8 °F (36.6 °C)     TempSrc: Temporal Artery      SpO2: 96%     Weight: 90.5 kg (199 lb  "9.6 oz)     Height: 157.5 cm (62\")           Physical Exam   Constitutional: She is oriented to person, place, and time. She appears well-developed and well-nourished. No distress.   HENT:   Head: Normocephalic and atraumatic.   Mouth/Throat: Oropharynx is clear and moist.   Eyes: Conjunctivae are normal. Pupils are equal, round, and reactive to light. No scleral icterus.   Neck: No hepatojugular reflux and no JVD present. Carotid bruit is not present. No tracheal deviation present. No thyromegaly present.   Cardiovascular: Intact distal pulses.  An irregularly irregular rhythm present. Tachycardia present.  Exam reveals no friction rub.    Murmur heard.  Pulmonary/Chest: Effort normal and breath sounds normal.   Abdominal: Soft. Bowel sounds are normal. She exhibits no distension. There is no tenderness.   Musculoskeletal: She exhibits no edema.   Lymphadenopathy:     She has no cervical adenopathy.   Neurological: She is alert and oriented to person, place, and time.   Skin: Skin is warm, dry and intact. No rash noted. No cyanosis or erythema. No pallor.   Psychiatric: She has a normal mood and affect. Her behavior is normal. Thought content normal.   Vitals reviewed.      Lab and Diagnostic Review:  Admission on 04/02/2018, Discharged on 04/03/2018   Component Date Value Ref Range Status   • Glucose 04/02/2018 166* 70 - 100 mg/dL Final   • BUN 04/02/2018 19  9 - 23 mg/dL Final   • Creatinine 04/02/2018 0.80  0.60 - 1.30 mg/dL Final   • Sodium 04/02/2018 142  132 - 146 mmol/L Final   • Potassium 04/02/2018 4.5  3.5 - 5.5 mmol/L Final   • Chloride 04/02/2018 106  99 - 109 mmol/L Final   • CO2 04/02/2018 28.0  20.0 - 31.0 mmol/L Final   • Calcium 04/02/2018 9.4  8.7 - 10.4 mg/dL Final   • Total Protein 04/02/2018 7.2  5.7 - 8.2 g/dL Final   • Albumin 04/02/2018 4.30  3.20 - 4.80 g/dL Final   • ALT (SGPT) 04/02/2018 29  7 - 40 U/L Final   • AST (SGOT) 04/02/2018 25  0 - 33 U/L Final   • Alkaline Phosphatase " 04/02/2018 114* 25 - 100 U/L Final   • Total Bilirubin 04/02/2018 1.1  0.3 - 1.2 mg/dL Final   • eGFR Non African Amer 04/02/2018 74  >60 mL/min/1.73 Final   • Globulin 04/02/2018 2.9  gm/dL Final   • A/G Ratio 04/02/2018 1.5  1.5 - 2.5 g/dL Final   • BUN/Creatinine Ratio 04/02/2018 23.8  7.0 - 25.0 Final   • Anion Gap 04/02/2018 8.0  3.0 - 11.0 mmol/L Final   • BNP 04/02/2018 654.0* 0.0 - 100.0 pg/mL Final    Results may be falsely decreased if patient taking Biotin.   • WBC 04/02/2018 8.29  3.50 - 10.80 10*3/mm3 Final   • RBC 04/02/2018 3.82* 3.89 - 5.14 10*6/mm3 Final   • Hemoglobin 04/02/2018 14.5  11.5 - 15.5 g/dL Final   • Hematocrit 04/02/2018 41.6  34.5 - 44.0 % Final   • MCV 04/02/2018 108.9* 80.0 - 99.0 fL Final   • MCH 04/02/2018 38.0* 27.0 - 31.0 pg Final   • MCHC 04/02/2018 34.9  32.0 - 36.0 g/dL Final   • RDW 04/02/2018 12.3  11.3 - 14.5 % Final   • RDW-SD 04/02/2018 48.3  37.0 - 54.0 fl Final   • MPV 04/02/2018 11.0  6.0 - 12.0 fL Final   • Platelets 04/02/2018 222  150 - 450 10*3/mm3 Final   • Neutrophil % 04/02/2018 68.7  41.0 - 71.0 % Final   • Lymphocyte % 04/02/2018 23.9* 24.0 - 44.0 % Final   • Monocyte % 04/02/2018 6.3  0.0 - 12.0 % Final   • Eosinophil % 04/02/2018 0.5  0.0 - 3.0 % Final   • Basophil % 04/02/2018 0.4  0.0 - 1.0 % Final   • Immature Grans % 04/02/2018 0.2  0.0 - 0.6 % Final   • Neutrophils, Absolute 04/02/2018 5.70  1.50 - 8.30 10*3/mm3 Final   • Lymphocytes, Absolute 04/02/2018 1.98  0.60 - 4.80 10*3/mm3 Final   • Monocytes, Absolute 04/02/2018 0.52  0.00 - 1.00 10*3/mm3 Final   • Eosinophils, Absolute 04/02/2018 0.04  0.00 - 0.30 10*3/mm3 Final   • Basophils, Absolute 04/02/2018 0.03  0.00 - 0.20 10*3/mm3 Final   • Immature Grans, Absolute 04/02/2018 0.02  0.00 - 0.03 10*3/mm3 Final   • Troponin I 04/02/2018 <0.006  <=0.039 ng/mL Final    Results may be falsely decreased if patient taking Biotin.   • RBC Morphology 04/02/2018 Normal  Normal Final   • WBC Morphology 04/02/2018  Normal  Normal Final   • Platelet Morphology 04/02/2018 Normal  Normal Final   • Glucose 04/02/2018 203* 70 - 100 mg/dL Final   • BUN 04/02/2018 19  9 - 23 mg/dL Final   • Creatinine 04/02/2018 0.90  0.60 - 1.30 mg/dL Final   • Sodium 04/02/2018 140  132 - 146 mmol/L Final   • Potassium 04/02/2018 4.3  3.5 - 5.5 mmol/L Final   • Chloride 04/02/2018 99  99 - 109 mmol/L Final   • CO2 04/02/2018 28.0  20.0 - 31.0 mmol/L Final   • Calcium 04/02/2018 8.9  8.7 - 10.4 mg/dL Final   • eGFR Non African Amer 04/02/2018 65  >60 mL/min/1.73 Final   • BUN/Creatinine Ratio 04/02/2018 21.1  7.0 - 25.0 Final   • Anion Gap 04/02/2018 13.0* 3.0 - 11.0 mmol/L Final   • BSA 04/03/2018 1.9  m^2 Final   • IVSd 04/03/2018 1.1  cm Final   • LVIDd 04/03/2018 4.9  cm Final   • LVIDs 04/03/2018 3.3  cm Final   • LVPWd 04/03/2018 1.1  cm Final   • IVS/LVPW 04/03/2018 1.0   Final   • FS 04/03/2018 32.0  % Final   • EDV(Teich) 04/03/2018 110.4  ml Final   • ESV(Teich) 04/03/2018 44.2  ml Final   • EF(Teich) 04/03/2018 60.0  % Final   • EDV(cubed) 04/03/2018 114.4  ml Final   • ESV(cubed) 04/03/2018 36.0  ml Final   • EF(cubed) 04/03/2018 68.5  % Final   • LV mass(C)d 04/03/2018 205.7  grams Final   • LV mass(C)dI 04/03/2018 108.1  grams/m^2 Final   • SV(Teich) 04/03/2018 66.2  ml Final   • SI(Teich) 04/03/2018 34.8  ml/m^2 Final   • SV(cubed) 04/03/2018 78.4  ml Final   • SI(cubed) 04/03/2018 41.2  ml/m^2 Final   • MV Diam 04/03/2018 4.0  cm Final   • Ao root diam 04/03/2018 3.0  cm Final   • Ao root area 04/03/2018 7.0  cm^2 Final   • LA dimension 04/03/2018 4.8  cm Final   • LA/Ao 04/03/2018 1.6   Final   • LVOT diam 04/03/2018 2.0  cm Final   • LVOT area 04/03/2018 3.3  cm^2 Final   • LVOT area(traced) 04/03/2018 3.1  cm^2 Final   • Ao root area (BSA corrected) 04/03/2018 1.6   Final   • MV E max marian 04/03/2018 218.4  cm/sec Final   • MV A max marian 04/03/2018 137.0  cm/sec Final   • MV E/A 04/03/2018 1.6   Final   • MV V2 max 04/03/2018 314.9   cm/sec Final   • MV max PG 04/03/2018 39.7  mmHg Final   • MV V2 mean 04/03/2018 200.8  cm/sec Final   • MV mean PG 04/03/2018 18.1  mmHg Final   • MV V2 VTI 04/03/2018 86.1  cm Final   • MV area (1 diam) 04/03/2018 12.4  cm^2 Final   • MVA(VTI) 04/03/2018 0.91  cm^2 Final   • MV Flow area(1diam) 04/03/2018 12.4  cm^2 Final   • MV P1/2t max marian 04/03/2018 296.2  cm/sec Final   • MV P1/2t 04/03/2018 108.2  msec Final   • MVA(P1/2t) 04/03/2018 2.0  cm^2 Final   • MV dec slope 04/03/2018 802.0  cm/sec^2 Final   • MV dec time 04/03/2018 0.56  sec Final   • AI max marian 04/03/2018 304.5  cm/sec Final   • AI max PG 04/03/2018 37.1  mmHg Final   • AI dec slope 04/03/2018 281.9  cm/sec^2 Final   • AI P1/2t 04/03/2018 316.3  msec Final   • LV V1 max PG 04/03/2018 5.0  mmHg Final   • LV V1 mean PG 04/03/2018 2.3  mmHg Final   • LV V1 max 04/03/2018 111.7  cm/sec Final   • LV V1 mean 04/03/2018 71.8  cm/sec Final   • LV V1 VTI 04/03/2018 23.8  cm Final   • MR max marian 04/03/2018 444.2  cm/sec Final   • MR max PG 04/03/2018 80.1  mmHg Final   • MR mean marian 04/03/2018 366.4  cm/sec Final   • MR mean PG 04/03/2018 58.4  mmHg Final   • MR VTI 04/03/2018 150.8  cm Final   • MR PISA 04/03/2018 5.0  cm^2 Final   • MR flow rate 04/03/2018 202.5  cm^3/sec Final   • MR ERO 04/03/2018 0.46  cm^2 Final   • MR volume 04/03/2018 68.7  ml Final   • MR PISA radius 04/03/2018 0.89  cm Final   • MR alias marian 04/03/2018 40.8  cm/sec Final   • SV(MV 1 diam) 04/03/2018 1065  ml Final   • SI(MV 1 diam) 04/03/2018 559.3  ml/m^2 Final   • SV(LVOT) 04/03/2018 78.3  ml Final   • SI(LVOT) 04/03/2018 41.1  ml/m^2 Final   • PA acc slope 04/03/2018 685.3  cm/sec^2 Final   • PA acc time 04/03/2018 0.1  sec Final   • PI end-d marian 04/03/2018 137.2  cm/sec Final   • RV V1 max PG 04/03/2018 0.74  mmHg Final   • RV V1 max 04/03/2018 42.9  cm/sec Final   • TR max marian 04/03/2018 349  cm/sec Final   • RVSP(TR) 04/03/2018 56  mmHg Final   • RAP systole 04/03/2018 3.0   mmHg Final   • PA pr(Accel) 04/03/2018 36.2  mmHg Final   • RF(MV,LVOT)(1diam) 04/03/2018 0.93   Final   • MVA P1/2T LCG 04/03/2018 0.74  cm^2 Final   • BH CV ECHO ANKUR - BZI_BMI 04/03/2018 36.2  kilograms/m^2 Final   •  CV ECHO ANKUR - BSA(HAYCOCK) 04/03/2018 2.0  m^2 Final   •  CV ECHO ANKUR - BZI_METRIC_WEIGHT 04/03/2018 89.8  kg Final   •  CV ECHO ANKUR - BZI_METRIC_HEIGHT 04/03/2018 157.5  cm Final   • Target HR (85%) 04/03/2018 140  bpm Final   • Max. Pred. HR (100%) 04/03/2018 165  bpm Final   • TDI S' 04/03/2018 12.90  cm/sec Final   • RV Base 04/03/2018 4.00  cm Final   • RV Length 04/03/2018 6.30  cm Final   • RV Mid 04/03/2018 3.10  cm Final   • LA volume 04/03/2018 100.0  cm3 Final   • LA Volume Index 04/03/2018 52.6  mL/m2 Final   • TAPSE (>1.6) 04/03/2018 2.20  cm2 Final   • Echo EF Estimated 04/03/2018 70  % Final   • Glucose 04/03/2018 251* 70 - 100 mg/dL Final   • BUN 04/03/2018 22  9 - 23 mg/dL Final   • Creatinine 04/03/2018 0.90  0.60 - 1.30 mg/dL Final   • Sodium 04/03/2018 138  132 - 146 mmol/L Final   • Potassium 04/03/2018 4.2  3.5 - 5.5 mmol/L Final   • Chloride 04/03/2018 99  99 - 109 mmol/L Final   • CO2 04/03/2018 26.0  20.0 - 31.0 mmol/L Final   • Calcium 04/03/2018 8.9  8.7 - 10.4 mg/dL Final   • eGFR Non African Amer 04/03/2018 65  >60 mL/min/1.73 Final   • BUN/Creatinine Ratio 04/03/2018 24.4  7.0 - 25.0 Final   • Anion Gap 04/03/2018 13.0* 3.0 - 11.0 mmol/L Final   • WBC 04/03/2018 9.68  3.50 - 10.80 10*3/mm3 Final   • RBC 04/03/2018 3.65* 3.89 - 5.14 10*6/mm3 Final   • Hemoglobin 04/03/2018 13.6  11.5 - 15.5 g/dL Final   • Hematocrit 04/03/2018 40.1  34.5 - 44.0 % Final   • MCV 04/03/2018 109.9* 80.0 - 99.0 fL Final   • MCH 04/03/2018 37.3* 27.0 - 31.0 pg Final   • MCHC 04/03/2018 33.9  32.0 - 36.0 g/dL Final   • RDW 04/03/2018 12.3  11.3 - 14.5 % Final   • RDW-SD 04/03/2018 49.1  37.0 - 54.0 fl Final   • MPV 04/03/2018 11.6  6.0 - 12.0 fL Final   • Platelets 04/03/2018 183  150  - 450 10*3/mm3 Final   • TSH 04/03/2018 0.421  0.350 - 5.350 mIU/mL Final     04/03/18 echo  · Left ventricular systolic function is normal. Estimated EF = 70%.  · Left atrial cavity size is severely dilated.  · Moderate aortic valve regurgitation is present.  · The mitral valve exhibits rheumatic thickening. There is severe mitral stenosis (mean gradient 18 mmHg @ HR 75). There is severe mitral regurgitation.  · Calculated right ventricular systolic pressure from tricuspid regurgitation is 56 mmHg.  · Estimated right ventricular systolic pressure from tricuspid regurgitation is markedly elevated (>55 mmHg  Assessment and Plan:         1. Persistent atrial fibrillation/atrial flutter  - ECG 12 Lead; atrial flutter 103 bpm     Reviewed POC with Dr. Lay.  We will stop Sotalol at this time and rate controll.  Pt will continue with Plan for dental extraction for valve surgery with possible maze and atrial clip with Dr. Cheatham.    Pt will continue Eliquis, but may hold 2 days prior to extraction.    Pt will call back with HR >110 and will titrate meds for rate controll  Increase metoprolol 50 mg BID    refill  - apixaban (ELIQUIS) 5 MG tablet tablet; Take 1 tablet by mouth Every 12 (Twelve) Hours.  Dispense: 180 tablet; Refill: 1    A. fib education completed: What is atrial fibrillation, causes, triggers, signs and symptoms, medication management (rate control versus rhythm control) and stroke prevention, procedural management and indications, and the role of the atrial fibrillation center and when to call.  2. Rheumatic mitral stenosis  Plans for surgery    3. Chronic diastolic CHF (congestive heart failure)  Reviewed s/s HF worsening and when to call  Continue Lasix 20 mg daily with extra PRN for dyspnea, edema, or 3lb weight gain.  S/s do not improve after 3 doses then call to be evaluated.    Heart failure education discussed: What is heart failure, causes, signs and symptoms, medication management, daily  weight monitoring, low-sodium diet of less than 2 g per day, daily exercise, role the heart failure center.  4. Essential hypertension  Continue to monitor    5. Tobacco abuse  Tobacco cessation discussed.  Risks factors.  Cessation aids.  Importance of cessation.  Patient has done well in the past on Chantix.  We'll restart Chantix.  Patient understands that she should pick a stop date after starting Chantix. (Education >3 min)      F/u 3 weeks.      *Please note that portions of this note were completed with a voice recognition program. Efforts were made to edit the dictations, but occasionally words are mistranscribed.

## 2018-05-17 NOTE — TELEPHONE ENCOUNTER
----- Message from Marco Lay IV, MD sent at 5/17/2018 12:40 PM EDT -----  Stop sotalol, rate control with diltiazem and digoxin (if needed), get teeth extracted, and hopefully get her surgery done with MAZE and ANGELA ligation.    TR    ----- Message -----  From: GUCCI Rodriguez  Sent: 5/17/2018  10:07 AM  To: Marco Lay IV, MD    Pt Afib/flutter 's.  On Sotalol 120 BID and Metoprolol 25 mg BID  She is waiting to have too extraction completed for MV sx.    Should we stop sotalol and rate control her until surgery instead of repeating ECV?  Or would amio be an option before surgery.      She is stable no HF s/s at present.

## 2018-05-21 ENCOUNTER — TELEPHONE (OUTPATIENT)
Dept: CARDIAC SURGERY | Facility: CLINIC | Age: 58
End: 2018-05-21

## 2018-05-21 NOTE — TELEPHONE ENCOUNTER
S/w pt to get an update on her teeth extraction, she is having a hard time with insurance getting it approved. Told patient I will keep Dr Cheatham in the loop .

## 2018-05-29 ENCOUNTER — TELEPHONE (OUTPATIENT)
Dept: CARDIOLOGY | Facility: CLINIC | Age: 58
End: 2018-05-29

## 2018-05-29 RX ORDER — DIGOXIN 125 MCG
125 TABLET ORAL DAILY
Qty: 30 TABLET | Refills: 5 | Status: SHIPPED | OUTPATIENT
Start: 2018-05-29 | End: 2018-10-31 | Stop reason: SDUPTHER

## 2018-05-29 NOTE — TELEPHONE ENCOUNTER
Is she on Digoxin?  If not start 125 mcg daily.  Ok for tooth extraction.  Needs to get it done asap so she can have valve surgery.  We are just rying to rate control for now.  With surgery they will do a MAZE/ left atrial appendage ligation

## 2018-05-29 NOTE — TELEPHONE ENCOUNTER
"Patient states she has been approved for tooth extraction, but date has not been set.  States her HR ranges from  since stopping sotalol and changing to Metorpolol 50mg BID for \"rate control\".  States BP \"normally\" in the 109/70 range.  Occaisionally higher.  Denies CP, states SOA \"a little worse\".     Would you like to make any changes before she has extractions?  Please advise.  "

## 2018-05-29 NOTE — TELEPHONE ENCOUNTER
Patient not taking digoxin.  RX sent in and patient notified.  She is currently waiting to hear from her dentist.

## 2018-06-05 ENCOUNTER — TELEPHONE (OUTPATIENT)
Dept: CARDIOLOGY | Facility: CLINIC | Age: 58
End: 2018-06-05

## 2018-06-05 NOTE — TELEPHONE ENCOUNTER
Lore with Dr. Eduard Torres's office called to let us know that her teeth extraction surgery was moved up to 6/8/2018.

## 2018-07-03 ENCOUNTER — OFFICE VISIT (OUTPATIENT)
Dept: CARDIAC SURGERY | Facility: CLINIC | Age: 58
End: 2018-07-03

## 2018-07-03 VITALS
SYSTOLIC BLOOD PRESSURE: 146 MMHG | HEIGHT: 62 IN | HEART RATE: 96 BPM | OXYGEN SATURATION: 96 % | DIASTOLIC BLOOD PRESSURE: 80 MMHG | TEMPERATURE: 96.9 F | BODY MASS INDEX: 35.74 KG/M2 | WEIGHT: 194.2 LBS

## 2018-07-03 DIAGNOSIS — I05.0 RHEUMATIC MITRAL STENOSIS: Primary | ICD-10-CM

## 2018-07-03 PROCEDURE — 99214 OFFICE O/P EST MOD 30 MIN: CPT | Performed by: PHYSICIAN ASSISTANT

## 2018-07-03 NOTE — PROGRESS NOTES
07/03/2018  Patient Information  Jill Miller                                                                                          [address]   [address]  [facility] [address]   1960  'PCP/Referring Physician'  YANETH Olivera  [phone]  No ref. provider found    Chief Complaint   Patient presents with   • Shortness of Breath     Follow-up to discuss possible AVR/MVR and ANGELA ligaton   • Atrial Fibrillation       History of Present Illness:  Patient is a 58-year-old  female with history of hypertension, hypothyroidism, atrial fibrillation on Eliquis, hyperlipidemia, congestive heart failure and rheumatic mitral valve stenosis who presents to office today to discuss possible mitral valve surgery.  The patient was last seen on 9/15/17 and we discussed possible mitral valve replacement at that time, however, the patient was apprehensive and required her teeth to be extracted to prevent possible endocarditis.  Since the patient was last seen, she complains of interval shortness of breath with palpitations, dizziness and chest heaviness with and without activity.  She has since had her teeth removed and is currently on Chantix to help with her tobacco cessation.  The patient was seen by Dr. Lay, with cardiology, on 4/12/18 and he had ordered  an echocardiogram on 4/3/18, which shows severe mitral valve regurgitation and moderate aortic valve regurgitation.  The patient's most recent heart catheterization on 9/29/17 shows mild coronary artery disease.  The patient is otherwise doing well.    Patient Active Problem List   Diagnosis   • Leukocytosis   • Essential hypertension   • Acquired hypothyroidism   • Graves disease   • Paroxysmal atrial fibrillation (CMS/HCC)   • Chronically Abnormal CXR (Left pleural disease post op)   • Rheumatic mitral stenosis   • Hyperlipidemia LDL goal <70   • Acute on chronic diastolic CHF (congestive heart failure) (CMS/HCC)     Past Medical History:    Diagnosis Date   • Abscess     under left breast, mrsa    • Atrial fibrillation and flutter (CMS/HCC)    • Disease of thyroid gland    • Graves disease    • Hypertension    • Hyperthyroidism    • Lung cancer (CMS/HCC)    • MRSA (methicillin resistant staph aureus) culture positive 2014    under left breast, incision and debridement, treated with wound packing and po abx    • Tobacco abuse 4/10/2017     Past Surgical History:   Procedure Laterality Date   • ABSCESS DRAINAGE      under left breast d/t mrsa    • BRONCHOSCOPY Left 5/12/2017    Procedure: BRONCHOSCOPY;  Surgeon: Ajay Cheatham MD;  Location:  [facility];  Service:    • BRONCHOSCOPY N/A 5/18/2017    Procedure: BRONCHOSCOPY BIOPSY AT BEDSIDE;  Surgeon: Ajay Cheatham MD;  Location:  [facility];  Service:    • CARDIAC CATHETERIZATION N/A 9/28/2017    Procedure: Left Heart Cath;  Surgeon: Marco Lay MD;  Location:  [facility];  Service:    • CARDIAC CATHETERIZATION N/A 9/28/2017    Procedure: Right Heart Cath;  Surgeon: Marco Lay MD;  [facility]:  [facility];  Service:    • LUNG BIOPSY  04/2017   • LYMPH NODE DISSECTION Left 5/12/2017    Procedure: MEDIASTINAL LYMPH NODE DISSECTION;  Surgeon: Ajay Cheatham MD;  Location:  [facility];  Service:    • THORACOSCOPY VIDEO ASSISTED WITH LOBECTOMY Left 5/12/2017    Procedure: THORACOSCOPY VIDEO ASSISTED WITH OPEN LOBECTOMY;  Surgeon: Ajay Cheatham MD;  Location:  [facility];  Service:    • TOTAL THYROIDECTOMY  approx 2012       Current Outpatient Prescriptions:   •  acetaminophen (TYLENOL) 325 MG tablet, Take 325 mg by mouth Daily As Needed for Mild Pain  or Headache., Disp: , Rfl:   •  apixaban (ELIQUIS) 5 MG tablet tablet, Take 1 tablet by mouth Every 12 (Twelve) Hours., Disp: 180 tablet, Rfl: 1  •  atorvastatin (LIPITOR) 10 MG tablet, Take 1 tablet by mouth Daily., Disp: 90 tablet, Rfl: 3  •  digoxin (LANOXIN) 125 MCG tablet, Take 1 tablet by  mouth Daily., Disp: 30 tablet, Rfl: 5  •  furosemide (LASIX) 20 MG tablet, Take 20 mg by mouth Daily., Disp: , Rfl:   •  gabapentin (NEURONTIN) 600 MG tablet, Take 1 tablet (600 mg total) by mouth 3 (Three) Times a Day, Disp: 90 tablet, Rfl: 1  •  levothyroxine (SYNTHROID, LEVOTHROID) 150 MCG tablet, Take 150 mcg by mouth Daily., Disp: , Rfl:   •  metoprolol tartrate (LOPRESSOR) 50 MG tablet, Take 1 tablet by mouth 2 (Two) Times a Day., Disp: 60 tablet, Rfl: 3  •  potassium chloride (K-DUR,KLOR-CON) 10 MEQ CR tablet, Take 10 mEq by mouth Daily., Disp: , Rfl:   •  varenicline (CHANTIX CONTINUING MONTH ASHLEE) 1 MG tablet, Take 1 tablet by mouth 2 (Two) Times a Day., Disp: 60 tablet, Rfl: 3  No Known Allergies  Social History     Social History   • Marital status:      Spouse name: N/A   • Number of children: 3   • Years of education: N/A     Occupational History   • [employer]      Short Term Disabilt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 Caffeine: 0-1 serving per day.         Family History   Problem Relation Age of Onset   • Breast cancer Mother    • Diabetes Father      Review of Systems   Constitution: Positive for diaphoresis and malaise/fatigue. Negative for chills, fever, night sweats and weight loss.   HENT: Negative for hearing loss, odynophagia and sore throat.    Cardiovascular: Positive for chest pain, dyspnea on exertion and palpitations. Negative for leg swelling and orthopnea.   Respiratory: Positive for shortness of breath. Negative for hemoptysis.    Endocrine: Negative for cold intolerance, heat intolerance, polydipsia, polyphagia and polyuria.   Hematologic/Lymphatic:  "Bruises/bleeds easily.   Skin: Negative for itching and rash.   Musculoskeletal: Positive for back pain. Negative for joint pain, joint swelling and myalgias.   Gastrointestinal: Negative for abdominal pain, constipation, diarrhea, hematemesis, hematochezia, melena, nausea and vomiting.   Genitourinary: Negative for dysuria, frequency and hematuria.   Neurological: Positive for dizziness. Negative for focal weakness, headaches, numbness and seizures.   Psychiatric/Behavioral: Negative for depression and suicidal ideas. The patient is not nervous/anxious.    All other systems reviewed and are negative.    Vitals:    18 1303   BP: 146/80   BP Location: Right arm   Patient Position: Sitting   Pulse: 96   Temp: 96.9 °F (36.1 °C)   SpO2: 96%   Weight: 88.1 kg (194 lb 3.2 oz)   Height: 157.5 cm (62\")      Physical Exam:  Gen - NAD, pleasant, cooperative  CV - Irregular/irregular, no murmur gallop or rub  Pulm - Lungs clear to auscultation without wheeze or rhonchi   GI - Soft, normoactive bowel sounds, non-tender  Ext - Without edema     Labs/Imagin/3/18 Echocardiogram  · Left ventricular systolic function is normal. Estimated EF = 70%.  · Left atrial cavity size is severely dilated.  · Moderate aortic valve regurgitation is present.  · The mitral valve exhibits rheumatic thickening. There is severe mitral stenosis (mean gradient 18 mmHg @ HR 75). There is severe mitral regurgitation.  · Calculated right ventricular systolic pressure from tricuspid regurgitation is 56 mmHg.  · Estimated right ventricular systolic pressure from tricuspid regurgitation is markedly elevated (>55 mmHg    Assessment/Plan:  Patient is a 58-year-old  female with history of hypertension, hypothyroidism, atrial fibrillation, hyperlipidemia, congestive heart failure and rheumatic mitral valve stenosis who presents to office today to discuss possible mitral valve surgery.  The patient's recent 4/3/18 echocardiogram not only shows " severe mitral stenosis, but also moderate aortic valve regurgitation.  The patient is serious about having this procedure performed, and has had all of her teeth removed for this potential valve surgery.  I discussed the operation as well as the postoperative expectations.  I discussed the risks of the procedure including pain, bleeding, infection, stroke, worsening kidney function, heart block, myocardial infarction and possibly death.  The patient appeared to understand and agrees to proceed.  We also discussed mechanical versus tissue valve, and the patient wishes to move forward with tissue valve.  I have answered all the patient's questions to her satisfaction.  The patient does have a history of cancer, for which I would like to get a CT scan of her chest prior to the operation.  Our office will schedule the patient for a mitral valve/aortic tissue valve replacement with maze procedure.  Her Eliquis will need to be stopped several days before the day of surgery.  If the patient has any questions or concerns prior to the day of operation, she may call our office at any time.      Patient Active Problem List   Diagnosis   • Leukocytosis   • Essential hypertension   • Acquired hypothyroidism   • Graves disease   • Paroxysmal atrial fibrillation (CMS/HCC)   • Chronically Abnormal CXR (Left pleural disease post op)   • Rheumatic mitral stenosis   • Hyperlipidemia LDL goal <70   • Acute on chronic diastolic CHF (congestive heart failure) (CMS/HCC)     Signed by:

## 2018-07-09 DIAGNOSIS — I48.19 PERSISTENT ATRIAL FIBRILLATION (HCC): Primary | ICD-10-CM

## 2018-07-09 DIAGNOSIS — R06.09 DYSPNEA ON EXERTION: ICD-10-CM

## 2018-07-09 DIAGNOSIS — Z01.810 PREOPERATIVE VASCULAR EXAMINATION: ICD-10-CM

## 2018-07-10 ENCOUNTER — HOSPITAL ENCOUNTER (OUTPATIENT)
Dept: CT IMAGING | Facility: HOSPITAL | Age: 58
Discharge: HOME | End: 2018-07-10
Admitting: PHYSICIAN ASSISTANT

## 2018-07-10 DIAGNOSIS — Z01.810 PREOPERATIVE VASCULAR EXAMINATION: ICD-10-CM

## 2018-07-10 DIAGNOSIS — I48.19 PERSISTENT ATRIAL FIBRILLATION (HCC): ICD-10-CM

## 2018-07-10 DIAGNOSIS — R06.09 DYSPNEA ON EXERTION: ICD-10-CM

## 2018-07-10 PROCEDURE — 71270 CT THORAX DX C-/C+: CPT

## 2018-07-10 PROCEDURE — 25010000002 IOPAMIDOL 61 % SOLUTION: Performed by: PHYSICIAN ASSISTANT

## 2018-07-10 RX ADMIN — IOPAMIDOL 95 ML: 612 INJECTION, SOLUTION INTRAVENOUS at 10:30

## 2018-07-11 ENCOUNTER — PREP FOR SURGERY (OUTPATIENT)
Dept: OTHER | Facility: HOSPITAL | Age: 58
End: 2018-07-11

## 2018-07-11 DIAGNOSIS — I08.0: Primary | ICD-10-CM

## 2018-07-11 DIAGNOSIS — I35.9 AORTIC VALVE DISORDER: Primary | ICD-10-CM

## 2018-07-11 DIAGNOSIS — I08.0 RHEUMATIC MITRAL VALVE AND AORTIC VALVE STENOSIS: Primary | ICD-10-CM

## 2018-07-11 RX ORDER — ASPIRIN 325 MG
325 TABLET ORAL NIGHTLY
Status: CANCELLED | OUTPATIENT
Start: 2018-07-11 | End: 2018-07-12

## 2018-07-11 RX ORDER — CHLORHEXIDINE GLUCONATE 500 MG/1
1 CLOTH TOPICAL EVERY 12 HOURS PRN
Status: CANCELLED | OUTPATIENT
Start: 2018-07-11

## 2018-07-11 RX ORDER — NITROGLYCERIN 0.4 MG/1
0.4 TABLET SUBLINGUAL
Status: CANCELLED | OUTPATIENT
Start: 2018-07-11

## 2018-07-11 RX ORDER — ACETAMINOPHEN 325 MG/1
650 TABLET ORAL EVERY 4 HOURS PRN
Status: CANCELLED | OUTPATIENT
Start: 2018-07-11

## 2018-07-11 RX ORDER — CHLORHEXIDINE GLUCONATE 0.12 MG/ML
15 RINSE ORAL ONCE
Status: CANCELLED | OUTPATIENT
Start: 2018-07-11 | End: 2018-07-11

## 2018-07-15 ENCOUNTER — ANESTHESIA EVENT (OUTPATIENT)
Dept: PERIOP | Facility: HOSPITAL | Age: 58
End: 2018-07-15

## 2018-07-15 ENCOUNTER — APPOINTMENT (OUTPATIENT)
Dept: PREADMISSION TESTING | Facility: HOSPITAL | Age: 58
End: 2018-07-15

## 2018-07-15 ENCOUNTER — HOSPITAL ENCOUNTER (OUTPATIENT)
Dept: PULMONOLOGY | Facility: HOSPITAL | Age: 58
Discharge: HOME | End: 2018-07-15

## 2018-07-15 ENCOUNTER — HOSPITAL ENCOUNTER (OUTPATIENT)
Dept: GENERAL RADIOLOGY | Facility: HOSPITAL | Age: 58
Discharge: HOME | End: 2018-07-15
Admitting: PHYSICIAN ASSISTANT

## 2018-07-15 VITALS — HEIGHT: 62 IN | OXYGEN SATURATION: 98 % | BODY MASS INDEX: 35.33 KG/M2 | WEIGHT: 192 LBS

## 2018-07-15 DIAGNOSIS — I35.9 AORTIC VALVE DISORDER: ICD-10-CM

## 2018-07-15 LAB
ABO GROUP BLD: NORMAL
ALBUMIN SERPL-MCNC: 4.08 G/DL (ref 3.2–4.8)
ALBUMIN/GLOB SERPL: 1.7 G/DL (ref 1.5–2.5)
ALP SERPL-CCNC: 102 U/L (ref 25–100)
ALT SERPL W P-5'-P-CCNC: 14 U/L (ref 7–40)
AMPHET+METHAMPHET UR QL: NEGATIVE
AMPHETAMINES UR QL: NEGATIVE
ANION GAP SERPL CALCULATED.3IONS-SCNC: 9 MMOL/L (ref 3–11)
APTT PPP: 29.9 SECONDS (ref 24–31)
AST SERPL-CCNC: 18 U/L (ref 0–33)
BARBITURATES UR QL SCN: NEGATIVE
BASOPHILS # BLD MANUAL: 0 10*3/MM3 (ref 0–0.2)
BASOPHILS NFR BLD AUTO: 0 % (ref 0–1)
BENZODIAZ UR QL SCN: NEGATIVE
BILIRUB SERPL-MCNC: 1.2 MG/DL (ref 0.3–1.2)
BLD GP AB SCN SERPL QL: NEGATIVE
BUN BLD-MCNC: 17 MG/DL (ref 9–23)
BUN/CREAT SERPL: 20.7 (ref 7–25)
BUPRENORPHINE SERPL-MCNC: NEGATIVE NG/ML
CALCIUM SPEC-SCNC: 8.6 MG/DL (ref 8.7–10.4)
CANNABINOIDS SERPL QL: NEGATIVE
CHLORIDE SERPL-SCNC: 106 MMOL/L (ref 99–109)
CO2 SERPL-SCNC: 25 MMOL/L (ref 20–31)
COCAINE UR QL: NEGATIVE
CREAT BLD-MCNC: 0.82 MG/DL (ref 0.6–1.3)
DEPRECATED RDW RBC AUTO: 52.6 FL (ref 37–54)
EOSINOPHIL # BLD MANUAL: 0 10*3/MM3 (ref 0.1–0.3)
EOSINOPHIL NFR BLD MANUAL: 0 % (ref 0–3)
ERYTHROCYTE [DISTWIDTH] IN BLOOD BY AUTOMATED COUNT: 13.5 % (ref 11.3–14.5)
GFR SERPL CREATININE-BSD FRML MDRD: 72 ML/MIN/1.73
GLOBULIN UR ELPH-MCNC: 2.4 GM/DL
GLUCOSE BLD-MCNC: 264 MG/DL (ref 70–100)
HBA1C MFR BLD: 7.8 % (ref 4.8–5.6)
HCT VFR BLD AUTO: 41.8 % (ref 34.5–44)
HGB BLD-MCNC: 14.4 G/DL (ref 11.5–15.5)
INR PPP: 1.02 (ref 0.91–1.09)
LYMPHOCYTES # BLD MANUAL: 1.78 10*3/MM3 (ref 0.6–4.8)
LYMPHOCYTES NFR BLD MANUAL: 24 % (ref 24–44)
LYMPHOCYTES NFR BLD MANUAL: 5 % (ref 0–12)
MACROCYTES BLD QL SMEAR: ABNORMAL
MAGNESIUM SERPL-MCNC: 1.8 MG/DL (ref 1.3–2.7)
MCH RBC QN AUTO: 37.1 PG (ref 27–31)
MCHC RBC AUTO-ENTMCNC: 34.4 G/DL (ref 32–36)
MCV RBC AUTO: 107.7 FL (ref 80–99)
METHADONE UR QL SCN: NEGATIVE
MONOCYTES # BLD AUTO: 0.37 10*3/MM3 (ref 0–1)
NEUTROPHILS # BLD AUTO: 5.26 10*3/MM3 (ref 1.5–8.3)
NEUTROPHILS NFR BLD MANUAL: 71 % (ref 41–71)
NRBC SPEC MANUAL: 3 /100 WBC (ref 0–0)
OPIATES UR QL: NEGATIVE
OXYCODONE UR QL SCN: NEGATIVE
PA ADP PRP-ACNC: 205 PRU
PCP UR QL SCN: NEGATIVE
PLAT MORPH BLD: NORMAL
PLATELET # BLD AUTO: 166 10*3/MM3 (ref 150–450)
PMV BLD AUTO: 11.3 FL (ref 6–12)
POTASSIUM BLD-SCNC: 4.2 MMOL/L (ref 3.5–5.5)
PROPOXYPH UR QL: NEGATIVE
PROT SERPL-MCNC: 6.5 G/DL (ref 5.7–8.2)
PROTHROMBIN TIME: 10.7 SECONDS (ref 9.6–11.5)
RBC # BLD AUTO: 3.88 10*6/MM3 (ref 3.89–5.14)
RH BLD: POSITIVE
SODIUM BLD-SCNC: 140 MMOL/L (ref 132–146)
T&S EXPIRATION DATE: NORMAL
TRICYCLICS UR QL SCN: NEGATIVE
WBC MORPH BLD: NORMAL
WBC NRBC COR # BLD: 7.41 10*3/MM3 (ref 3.5–10.8)

## 2018-07-15 PROCEDURE — 86923 COMPATIBILITY TEST ELECTRIC: CPT

## 2018-07-15 PROCEDURE — 86901 BLOOD TYPING SEROLOGIC RH(D): CPT | Performed by: PHYSICIAN ASSISTANT

## 2018-07-15 PROCEDURE — 85007 BL SMEAR W/DIFF WBC COUNT: CPT | Performed by: PHYSICIAN ASSISTANT

## 2018-07-15 PROCEDURE — 86900 BLOOD TYPING SEROLOGIC ABO: CPT | Performed by: PHYSICIAN ASSISTANT

## 2018-07-15 PROCEDURE — 85576 BLOOD PLATELET AGGREGATION: CPT | Performed by: PHYSICIAN ASSISTANT

## 2018-07-15 PROCEDURE — 36415 COLL VENOUS BLD VENIPUNCTURE: CPT

## 2018-07-15 PROCEDURE — 80053 COMPREHEN METABOLIC PANEL: CPT | Performed by: PHYSICIAN ASSISTANT

## 2018-07-15 PROCEDURE — 85730 THROMBOPLASTIN TIME PARTIAL: CPT | Performed by: PHYSICIAN ASSISTANT

## 2018-07-15 PROCEDURE — 83735 ASSAY OF MAGNESIUM: CPT | Performed by: PHYSICIAN ASSISTANT

## 2018-07-15 PROCEDURE — 86850 RBC ANTIBODY SCREEN: CPT | Performed by: PHYSICIAN ASSISTANT

## 2018-07-15 PROCEDURE — 71046 X-RAY EXAM CHEST 2 VIEWS: CPT

## 2018-07-15 PROCEDURE — 80306 DRUG TEST PRSMV INSTRMNT: CPT | Performed by: PHYSICIAN ASSISTANT

## 2018-07-15 PROCEDURE — 85610 PROTHROMBIN TIME: CPT | Performed by: PHYSICIAN ASSISTANT

## 2018-07-15 PROCEDURE — 83036 HEMOGLOBIN GLYCOSYLATED A1C: CPT | Performed by: PHYSICIAN ASSISTANT

## 2018-07-15 PROCEDURE — 94010 BREATHING CAPACITY TEST: CPT

## 2018-07-15 PROCEDURE — 94010 BREATHING CAPACITY TEST: CPT | Performed by: INTERNAL MEDICINE

## 2018-07-15 PROCEDURE — 85025 COMPLETE CBC W/AUTO DIFF WBC: CPT | Performed by: PHYSICIAN ASSISTANT

## 2018-07-15 RX ORDER — ASPIRIN 325 MG
325 TABLET ORAL NIGHTLY
Status: SHIPPED | OUTPATIENT
Start: 2018-07-15 | End: 2018-07-16

## 2018-07-15 RX ORDER — FAMOTIDINE 10 MG/ML
20 INJECTION, SOLUTION INTRAVENOUS ONCE
Status: CANCELLED | OUTPATIENT
Start: 2018-07-15 | End: 2018-07-15

## 2018-07-15 RX ORDER — CHLORHEXIDINE GLUCONATE 500 MG/1
1 CLOTH TOPICAL EVERY 12 HOURS PRN
Status: DISCONTINUED | OUTPATIENT
Start: 2018-07-15 | End: 2018-08-01

## 2018-07-16 ENCOUNTER — HOSPITAL ENCOUNTER (INPATIENT)
Facility: HOSPITAL | Age: 58
LOS: 10 days | Discharge: HOME | End: 2018-07-26
Attending: THORACIC SURGERY (CARDIOTHORACIC VASCULAR SURGERY) | Admitting: THORACIC SURGERY (CARDIOTHORACIC VASCULAR SURGERY)

## 2018-07-16 ENCOUNTER — ANCILLARY PROCEDURE (OUTPATIENT)
Dept: PERIOP | Facility: HOSPITAL | Age: 58
End: 2018-07-16

## 2018-07-16 ENCOUNTER — APPOINTMENT (OUTPATIENT)
Dept: GENERAL RADIOLOGY | Facility: HOSPITAL | Age: 58
End: 2018-07-16

## 2018-07-16 ENCOUNTER — ANESTHESIA (OUTPATIENT)
Dept: PERIOP | Facility: HOSPITAL | Age: 58
End: 2018-07-16

## 2018-07-16 DIAGNOSIS — R74.01 ELEVATED TRANSAMINASE LEVEL: ICD-10-CM

## 2018-07-16 DIAGNOSIS — I48.0 PAROXYSMAL ATRIAL FIBRILLATION (HCC): ICD-10-CM

## 2018-07-16 DIAGNOSIS — E80.6 HYPERBILIRUBINEMIA: ICD-10-CM

## 2018-07-16 DIAGNOSIS — I08.0 RHEUMATIC MITRAL VALVE AND AORTIC VALVE STENOSIS: ICD-10-CM

## 2018-07-16 DIAGNOSIS — I50.32 CHRONIC DIASTOLIC CONGESTIVE HEART FAILURE (HCC): ICD-10-CM

## 2018-07-16 DIAGNOSIS — Z74.09 IMPAIRED FUNCTIONAL MOBILITY, BALANCE, GAIT, AND ENDURANCE: Primary | ICD-10-CM

## 2018-07-16 DIAGNOSIS — I35.9 AORTIC VALVE DISORDER: ICD-10-CM

## 2018-07-16 LAB
ALBUMIN SERPL-MCNC: 2.82 G/DL (ref 3.2–4.8)
ALBUMIN SERPL-MCNC: 3.43 G/DL (ref 3.2–4.8)
ANION GAP SERPL CALCULATED.3IONS-SCNC: 11 MMOL/L (ref 3–11)
ANION GAP SERPL CALCULATED.3IONS-SCNC: 16 MMOL/L (ref 3–11)
APTT PPP: 27.4 SECONDS (ref 24–31)
ARTERIAL PATENCY WRIST A: ABNORMAL
ATMOSPHERIC PRESS: ABNORMAL MMHG
BASE EXCESS BLDA CALC-SCNC: -2.2 MMOL/L (ref 0–2)
BASE EXCESS BLDA CALC-SCNC: -4.1 MMOL/L (ref 0–2)
BASE EXCESS BLDA CALC-SCNC: -4.4 MMOL/L (ref 0–2)
BASE EXCESS BLDA CALC-SCNC: -4.8 MMOL/L (ref 0–2)
BASE EXCESS BLDA CALC-SCNC: -5.9 MMOL/L (ref 0–2)
BDY SITE: ABNORMAL
BUN BLD-MCNC: 15 MG/DL (ref 9–23)
BUN BLD-MCNC: 15 MG/DL (ref 9–23)
BUN/CREAT SERPL: 16.7 (ref 7–25)
BUN/CREAT SERPL: 21.1 (ref 7–25)
CA-I BLDA-SCNC: 0.95 MMOL/L (ref 1.12–1.3)
CA-I SERPL ISE-MCNC: 1.33 MMOL/L (ref 1.12–1.32)
CALCIUM SPEC-SCNC: 8.5 MG/DL (ref 8.7–10.4)
CALCIUM SPEC-SCNC: 9.2 MG/DL (ref 8.7–10.4)
CHLORIDE BLDA-SCNC: 108 MMOL/L (ref 98–105)
CHLORIDE SERPL-SCNC: 110 MMOL/L (ref 99–109)
CHLORIDE SERPL-SCNC: 111 MMOL/L (ref 99–109)
CO2 BLDA-SCNC: 22.3 MMOL/L (ref 22–33)
CO2 BLDA-SCNC: 23.3 MMOL/L (ref 22–33)
CO2 BLDA-SCNC: 23.6 MMOL/L (ref 22–33)
CO2 BLDA-SCNC: 23.6 MMOL/L (ref 22–33)
CO2 BLDA-SCNC: 31.9 MMOL/L (ref 23–27)
CO2 SERPL-SCNC: 21 MMOL/L (ref 20–31)
CO2 SERPL-SCNC: 24 MMOL/L (ref 20–31)
COHGB MFR BLD: 1.2 % (ref 0–2)
COHGB MFR BLD: 1.2 % (ref 0–2)
COHGB MFR BLD: 1.3 % (ref 0–2)
COHGB MFR BLD: 1.4 % (ref 0–2)
CREAT BLD-MCNC: 0.71 MG/DL (ref 0.6–1.3)
CREAT BLD-MCNC: 0.9 MG/DL (ref 0.6–1.3)
DEPRECATED RDW RBC AUTO: 65.8 FL (ref 37–54)
DEPRECATED RDW RBC AUTO: 67.6 FL (ref 37–54)
ERYTHROCYTE [DISTWIDTH] IN BLOOD BY AUTOMATED COUNT: 17.1 % (ref 11.3–14.5)
ERYTHROCYTE [DISTWIDTH] IN BLOOD BY AUTOMATED COUNT: 17.9 % (ref 11.3–14.5)
FIBRINOGEN PPP-MCNC: 331 MG/DL (ref 198–466)
FSP PPP LA-ACNC: NORMAL
GFR SERPL CREATININE-BSD FRML MDRD: 65 ML/MIN/1.73
GFR SERPL CREATININE-BSD FRML MDRD: 85 ML/MIN/1.73
GLUCOSE BLD-MCNC: 179 MG/DL (ref 70–100)
GLUCOSE BLD-MCNC: 185 MG/DL (ref 70–100)
GLUCOSE BLDA-MCNC: 173 MG/DL (ref 67–93)
GLUCOSE BLDC GLUCOMTR-MCNC: 183 MG/DL (ref 70–130)
GLUCOSE BLDC GLUCOMTR-MCNC: 185 MG/DL (ref 70–130)
GLUCOSE BLDC GLUCOMTR-MCNC: 187 MG/DL (ref 70–130)
GLUCOSE BLDC GLUCOMTR-MCNC: 191 MG/DL (ref 70–130)
GLUCOSE BLDC GLUCOMTR-MCNC: 193 MG/DL (ref 70–130)
GLUCOSE BLDC GLUCOMTR-MCNC: 194 MG/DL (ref 70–130)
GLUCOSE BLDC GLUCOMTR-MCNC: 194 MG/DL (ref 70–130)
GLUCOSE BLDC GLUCOMTR-MCNC: 197 MG/DL (ref 70–130)
GLUCOSE BLDC GLUCOMTR-MCNC: 201 MG/DL (ref 70–130)
HCO3 BLDA-SCNC: 21.1 MMOL/L (ref 20–26)
HCO3 BLDA-SCNC: 21.8 MMOL/L (ref 20–26)
HCO3 BLDA-SCNC: 22.1 MMOL/L (ref 20–26)
HCO3 BLDA-SCNC: 22.2 MMOL/L (ref 20–26)
HCO3 BLDA-SCNC: 28.9 MMOL/L (ref 20–26)
HCT VFR BLD AUTO: 27.1 % (ref 34.5–44)
HCT VFR BLD AUTO: 33.2 % (ref 34.5–44)
HCT VFR BLD CALC: 27.4 %
HCT VFR BLD CALC: 29.2 %
HCT VFR BLD CALC: 31.4 %
HCT VFR BLD CALC: 33.7 %
HCT VFR BLDA CALC: 29 % (ref 39–51)
HGB BLD-MCNC: 11.2 G/DL (ref 11.5–15.5)
HGB BLD-MCNC: 9.2 G/DL (ref 11.5–15.5)
HGB BLDA-MCNC: 10.3 G/DL (ref 14–18)
HGB BLDA-MCNC: 11 G/DL (ref 14–18)
HGB BLDA-MCNC: 8.9 G/DL (ref 14–18)
HGB BLDA-MCNC: 9.5 G/DL (ref 14–18)
HGB BLDA-MCNC: 9.8 G/DL (ref 10–16)
HOROWITZ INDEX BLD+IHG-RTO: 100 %
HOROWITZ INDEX BLD+IHG-RTO: 40 %
HOROWITZ INDEX BLD+IHG-RTO: 60 %
HOROWITZ INDEX BLD+IHG-RTO: 60 %
INR PPP: 1.09 (ref 0.91–1.09)
MAGNESIUM SERPL-MCNC: 3 MG/DL (ref 1.3–2.7)
MAGNESIUM SERPL-MCNC: 3.7 MG/DL (ref 1.3–2.7)
MCH RBC QN AUTO: 35.7 PG (ref 27–31)
MCH RBC QN AUTO: 35.8 PG (ref 27–31)
MCHC RBC AUTO-ENTMCNC: 33.7 G/DL (ref 32–36)
MCHC RBC AUTO-ENTMCNC: 33.9 G/DL (ref 32–36)
MCV RBC AUTO: 105 FL (ref 80–99)
MCV RBC AUTO: 106.1 FL (ref 80–99)
METHGB BLD QL: 1.3 % (ref 0–1.5)
METHGB BLD QL: 1.4 % (ref 0–1.5)
MODALITY: ABNORMAL
OXYHGB MFR BLDV: 93.8 % (ref 94–99)
OXYHGB MFR BLDV: 95.5 % (ref 94–99)
OXYHGB MFR BLDV: 95.8 % (ref 94–99)
OXYHGB MFR BLDV: 97.1 % (ref 94–99)
PCO2 BLDA: 38.1 MM HG (ref 35–45)
PCO2 BLDA: 46.9 MM HG (ref 35–45)
PCO2 BLDA: 48.2 MM HG (ref 35–45)
PCO2 BLDA: 51.7 MM HG (ref 35–45)
PCO2 BLDA: 96.7 MM HG (ref 34–46)
PH BLDA: 7.09 PH UNITS (ref 7.34–7.46)
PH BLDA: 7.23 PH UNITS (ref 7.35–7.45)
PH BLDA: 7.27 PH UNITS (ref 7.35–7.45)
PH BLDA: 7.28 PH UNITS (ref 7.35–7.45)
PH BLDA: 7.35 PH UNITS (ref 7.35–7.45)
PHOSPHATE SERPL-MCNC: 0.7 MG/DL (ref 2.4–5.1)
PHOSPHATE SERPL-MCNC: 2.1 MG/DL (ref 2.4–5.1)
PLATELET # BLD AUTO: 130 10*3/MM3 (ref 150–450)
PLATELET # BLD AUTO: 142 10*3/MM3 (ref 150–450)
PMV BLD AUTO: 9.4 FL (ref 6–12)
PMV BLD AUTO: 9.5 FL (ref 6–12)
PO2 BLDA: 115 MM HG (ref 83–108)
PO2 BLDA: 122 MM HG (ref 83–108)
PO2 BLDA: 236 MM HG (ref 83–108)
PO2 BLDA: 296.2 MMHG (ref 79–99)
PO2 BLDA: 81.3 MM HG (ref 83–108)
POTASSIUM BLD-SCNC: 3 MMOL/L (ref 3.5–5.5)
POTASSIUM BLD-SCNC: 3.4 MMOL/L (ref 3.5–5.5)
POTASSIUM BLDA-SCNC: 5.38 MMOL/L (ref 3.5–5.3)
PROTHROMBIN TIME: 11.4 SECONDS (ref 9.6–11.5)
RBC # BLD AUTO: 2.58 10*6/MM3 (ref 3.89–5.14)
RBC # BLD AUTO: 3.13 10*6/MM3 (ref 3.89–5.14)
SAO2 % BLD: 99.2 % (ref 92–98)
SODIUM BLD-SCNC: 145 MMOL/L (ref 132–146)
SODIUM BLD-SCNC: 148 MMOL/L (ref 132–146)
SODIUM BLDA-SCNC: 138.1 MMOL/L (ref 134–146)
WBC NRBC COR # BLD: 11.15 10*3/MM3 (ref 3.5–10.8)
WBC NRBC COR # BLD: 11.9 10*3/MM3 (ref 3.5–10.8)

## 2018-07-16 PROCEDURE — 84295 ASSAY OF SERUM SODIUM: CPT | Performed by: THORACIC SURGERY (CARDIOTHORACIC VASCULAR SURGERY)

## 2018-07-16 PROCEDURE — 25010000002 FENTANYL CITRATE (PF) 100 MCG/2ML SOLUTION: Performed by: THORACIC SURGERY (CARDIOTHORACIC VASCULAR SURGERY)

## 2018-07-16 PROCEDURE — 82330 ASSAY OF CALCIUM: CPT

## 2018-07-16 PROCEDURE — P9041 ALBUMIN (HUMAN),5%, 50ML: HCPCS | Performed by: PHYSICIAN ASSISTANT

## 2018-07-16 PROCEDURE — 88311 DECALCIFY TISSUE: CPT | Performed by: THORACIC SURGERY (CARDIOTHORACIC VASCULAR SURGERY)

## 2018-07-16 PROCEDURE — 25010000002 VANCOMYCIN 10 G RECONSTITUTED SOLUTION

## 2018-07-16 PROCEDURE — 02UJ08Z SUPPLEMENT TRICUSPID VALVE WITH ZOOPLASTIC TISSUE, OPEN APPROACH: ICD-10-PCS | Performed by: THORACIC SURGERY (CARDIOTHORACIC VASCULAR SURGERY)

## 2018-07-16 PROCEDURE — 82947 ASSAY GLUCOSE BLOOD QUANT: CPT

## 2018-07-16 PROCEDURE — 25010000002 VANCOMYCIN: Performed by: PHYSICIAN ASSISTANT

## 2018-07-16 PROCEDURE — 85362 FIBRIN DEGRADATION PRODUCTS: CPT | Performed by: THORACIC SURGERY (CARDIOTHORACIC VASCULAR SURGERY)

## 2018-07-16 PROCEDURE — 99252 IP/OBS CONSLTJ NEW/EST SF 35: CPT | Performed by: NURSE PRACTITIONER

## 2018-07-16 PROCEDURE — 02RF08Z REPLACEMENT OF AORTIC VALVE WITH ZOOPLASTIC TISSUE, OPEN APPROACH: ICD-10-PCS | Performed by: THORACIC SURGERY (CARDIOTHORACIC VASCULAR SURGERY)

## 2018-07-16 PROCEDURE — 94799 UNLISTED PULMONARY SVC/PX: CPT

## 2018-07-16 PROCEDURE — 33464 VALVULOPLASTY TRICUSPID: CPT | Performed by: THORACIC SURGERY (CARDIOTHORACIC VASCULAR SURGERY)

## 2018-07-16 PROCEDURE — 99291 CRITICAL CARE FIRST HOUR: CPT | Performed by: INTERNAL MEDICINE

## 2018-07-16 PROCEDURE — 85610 PROTHROMBIN TIME: CPT | Performed by: PHYSICIAN ASSISTANT

## 2018-07-16 PROCEDURE — 02580ZZ DESTRUCTION OF CONDUCTION MECHANISM, OPEN APPROACH: ICD-10-PCS | Performed by: THORACIC SURGERY (CARDIOTHORACIC VASCULAR SURGERY)

## 2018-07-16 PROCEDURE — C2618 PROBE/NEEDLE, CRYO: HCPCS | Performed by: THORACIC SURGERY (CARDIOTHORACIC VASCULAR SURGERY)

## 2018-07-16 PROCEDURE — 33257 ABLATE ATRIA LMTD ADD-ON: CPT | Performed by: THORACIC SURGERY (CARDIOTHORACIC VASCULAR SURGERY)

## 2018-07-16 PROCEDURE — 25010000002 MIDAZOLAM PER 1 MG: Performed by: ANESTHESIOLOGY

## 2018-07-16 PROCEDURE — 93010 ELECTROCARDIOGRAM REPORT: CPT | Performed by: INTERNAL MEDICINE

## 2018-07-16 PROCEDURE — 82805 BLOOD GASES W/O2 SATURATION: CPT | Performed by: PHYSICIAN ASSISTANT

## 2018-07-16 PROCEDURE — 33430 REPLACEMENT OF MITRAL VALVE: CPT | Performed by: THORACIC SURGERY (CARDIOTHORACIC VASCULAR SURGERY)

## 2018-07-16 PROCEDURE — 82330 ASSAY OF CALCIUM: CPT | Performed by: THORACIC SURGERY (CARDIOTHORACIC VASCULAR SURGERY)

## 2018-07-16 PROCEDURE — 86900 BLOOD TYPING SEROLOGIC ABO: CPT

## 2018-07-16 PROCEDURE — 82435 ASSAY OF BLOOD CHLORIDE: CPT | Performed by: THORACIC SURGERY (CARDIOTHORACIC VASCULAR SURGERY)

## 2018-07-16 PROCEDURE — 88305 TISSUE EXAM BY PATHOLOGIST: CPT | Performed by: THORACIC SURGERY (CARDIOTHORACIC VASCULAR SURGERY)

## 2018-07-16 PROCEDURE — 83735 ASSAY OF MAGNESIUM: CPT | Performed by: PHYSICIAN ASSISTANT

## 2018-07-16 PROCEDURE — 85027 COMPLETE CBC AUTOMATED: CPT | Performed by: PHYSICIAN ASSISTANT

## 2018-07-16 PROCEDURE — B24BZZ4 ULTRASONOGRAPHY OF HEART WITH AORTA, TRANSESOPHAGEAL: ICD-10-PCS | Performed by: ANESTHESIOLOGY

## 2018-07-16 PROCEDURE — 25010000002 PROTAMINE SULFATE PER 10 MG: Performed by: PHYSICIAN ASSISTANT

## 2018-07-16 PROCEDURE — 25810000003 DEXTROSE 5 % WITH KCL 20 MEQ 20-5 MEQ/L-% SOLUTION: Performed by: PHYSICIAN ASSISTANT

## 2018-07-16 PROCEDURE — 93318 ECHO TRANSESOPHAGEAL INTRAOP: CPT | Performed by: ANESTHESIOLOGY

## 2018-07-16 PROCEDURE — 25010000002 ALBUMIN HUMAN 5% PER 50 ML: Performed by: PHYSICIAN ASSISTANT

## 2018-07-16 PROCEDURE — 82803 BLOOD GASES ANY COMBINATION: CPT

## 2018-07-16 PROCEDURE — 93005 ELECTROCARDIOGRAM TRACING: CPT | Performed by: PHYSICIAN ASSISTANT

## 2018-07-16 PROCEDURE — 33405 REPLACEMENT AORTIC VALVE OPN: CPT | Performed by: THORACIC SURGERY (CARDIOTHORACIC VASCULAR SURGERY)

## 2018-07-16 PROCEDURE — 82962 GLUCOSE BLOOD TEST: CPT

## 2018-07-16 PROCEDURE — 5A1221Z PERFORMANCE OF CARDIAC OUTPUT, CONTINUOUS: ICD-10-PCS | Performed by: THORACIC SURGERY (CARDIOTHORACIC VASCULAR SURGERY)

## 2018-07-16 PROCEDURE — 25010000002 PROPOFOL 10 MG/ML EMULSION: Performed by: ANESTHESIOLOGY

## 2018-07-16 PROCEDURE — 71045 X-RAY EXAM CHEST 1 VIEW: CPT

## 2018-07-16 PROCEDURE — 25010000003 POTASSIUM CHLORIDE PER 2 MEQ: Performed by: PHYSICIAN ASSISTANT

## 2018-07-16 PROCEDURE — 85018 HEMOGLOBIN: CPT | Performed by: THORACIC SURGERY (CARDIOTHORACIC VASCULAR SURGERY)

## 2018-07-16 PROCEDURE — 63710000001 INSULIN REGULAR HUMAN PER 5 UNITS: Performed by: ANESTHESIOLOGY

## 2018-07-16 PROCEDURE — 84295 ASSAY OF SERUM SODIUM: CPT

## 2018-07-16 PROCEDURE — 84132 ASSAY OF SERUM POTASSIUM: CPT

## 2018-07-16 PROCEDURE — P9012 CRYOPRECIPITATE EACH UNIT: HCPCS

## 2018-07-16 PROCEDURE — 85384 FIBRINOGEN ACTIVITY: CPT | Performed by: THORACIC SURGERY (CARDIOTHORACIC VASCULAR SURGERY)

## 2018-07-16 PROCEDURE — 86927 PLASMA FRESH FROZEN: CPT

## 2018-07-16 PROCEDURE — 25010000002 PROTAMINE SULFATE PER 10 MG: Performed by: ANESTHESIOLOGY

## 2018-07-16 PROCEDURE — 82805 BLOOD GASES W/O2 SATURATION: CPT | Performed by: THORACIC SURGERY (CARDIOTHORACIC VASCULAR SURGERY)

## 2018-07-16 PROCEDURE — 02RG08Z REPLACEMENT OF MITRAL VALVE WITH ZOOPLASTIC TISSUE, OPEN APPROACH: ICD-10-PCS | Performed by: THORACIC SURGERY (CARDIOTHORACIC VASCULAR SURGERY)

## 2018-07-16 PROCEDURE — 25010000002 EPINEPHRINE PER 0.1 MG: Performed by: ANESTHESIOLOGY

## 2018-07-16 PROCEDURE — 85730 THROMBOPLASTIN TIME PARTIAL: CPT | Performed by: PHYSICIAN ASSISTANT

## 2018-07-16 PROCEDURE — 85347 COAGULATION TIME ACTIVATED: CPT

## 2018-07-16 PROCEDURE — P9016 RBC LEUKOCYTES REDUCED: HCPCS

## 2018-07-16 PROCEDURE — 82330 ASSAY OF CALCIUM: CPT | Performed by: PHYSICIAN ASSISTANT

## 2018-07-16 PROCEDURE — 94002 VENT MGMT INPAT INIT DAY: CPT

## 2018-07-16 PROCEDURE — 84132 ASSAY OF SERUM POTASSIUM: CPT | Performed by: THORACIC SURGERY (CARDIOTHORACIC VASCULAR SURGERY)

## 2018-07-16 PROCEDURE — C1751 CATH, INF, PER/CENT/MIDLINE: HCPCS | Performed by: ANESTHESIOLOGY

## 2018-07-16 PROCEDURE — P9037 PLATE PHERES LEUKOREDU IRRAD: HCPCS

## 2018-07-16 PROCEDURE — 85014 HEMATOCRIT: CPT

## 2018-07-16 PROCEDURE — 02L70CK OCCLUSION OF LEFT ATRIAL APPENDAGE WITH EXTRALUMINAL DEVICE, OPEN APPROACH: ICD-10-PCS | Performed by: THORACIC SURGERY (CARDIOTHORACIC VASCULAR SURGERY)

## 2018-07-16 PROCEDURE — 80069 RENAL FUNCTION PANEL: CPT | Performed by: PHYSICIAN ASSISTANT

## 2018-07-16 PROCEDURE — P9017 PLASMA 1 DONOR FRZ W/IN 8 HR: HCPCS

## 2018-07-16 PROCEDURE — 36430 TRANSFUSION BLD/BLD COMPNT: CPT

## 2018-07-16 DEVICE — VLV PERICARD PERIMOUNT MAGNA EASE 21: Type: IMPLANTABLE DEVICE | Site: HEART | Status: FUNCTIONAL

## 2018-07-16 DEVICE — IMPLANTABLE DEVICE: Type: IMPLANTABLE DEVICE | Site: HEART | Status: FUNCTIONAL

## 2018-07-16 DEVICE — VLV MITRAL PERICARD PERIMOUNT MAGNA 27: Type: IMPLANTABLE DEVICE | Site: HEART | Status: FUNCTIONAL

## 2018-07-16 DEVICE — APPL CLIP LAA ATRICLIP FLX 35MM: Type: IMPLANTABLE DEVICE | Site: HEART | Status: FUNCTIONAL

## 2018-07-16 RX ORDER — NITROGLYCERIN 20 MG/100ML
5-200 INJECTION INTRAVENOUS CONTINUOUS PRN
Status: DISCONTINUED | OUTPATIENT
Start: 2018-07-16 | End: 2018-07-20

## 2018-07-16 RX ORDER — MORPHINE SULFATE 1 MG/ML
2 INJECTION, SOLUTION EPIDURAL; INTRATHECAL; INTRAVENOUS
Status: DISCONTINUED | OUTPATIENT
Start: 2018-07-16 | End: 2018-07-18

## 2018-07-16 RX ORDER — FUROSEMIDE 40 MG/1
20 TABLET ORAL DAILY
Status: DISCONTINUED | OUTPATIENT
Start: 2018-07-17 | End: 2018-07-17

## 2018-07-16 RX ORDER — MIDAZOLAM HYDROCHLORIDE 1 MG/ML
INJECTION INTRAMUSCULAR; INTRAVENOUS AS NEEDED
Status: DISCONTINUED | OUTPATIENT
Start: 2018-07-16 | End: 2018-07-16 | Stop reason: SURG

## 2018-07-16 RX ORDER — CHLORHEXIDINE GLUCONATE 0.12 MG/ML
15 RINSE ORAL ONCE
Status: COMPLETED | OUTPATIENT
Start: 2018-07-16 | End: 2018-07-16

## 2018-07-16 RX ORDER — DOBUTAMINE HYDROCHLORIDE 100 MG/100ML
2-20 INJECTION INTRAVENOUS CONTINUOUS PRN
Status: DISCONTINUED | OUTPATIENT
Start: 2018-07-16 | End: 2018-07-19

## 2018-07-16 RX ORDER — METOPROLOL TARTRATE 5 MG/5ML
2.5 INJECTION INTRAVENOUS EVERY 6 HOURS SCHEDULED
Status: DISCONTINUED | OUTPATIENT
Start: 2018-07-16 | End: 2018-07-17 | Stop reason: ALTCHOICE

## 2018-07-16 RX ORDER — GLYCOPYRROLATE 0.2 MG/ML
INJECTION INTRAMUSCULAR; INTRAVENOUS AS NEEDED
Status: DISCONTINUED | OUTPATIENT
Start: 2018-07-16 | End: 2018-07-16 | Stop reason: SURG

## 2018-07-16 RX ORDER — MAGNESIUM HYDROXIDE 1200 MG/15ML
LIQUID ORAL AS NEEDED
Status: DISCONTINUED | OUTPATIENT
Start: 2018-07-16 | End: 2018-07-16 | Stop reason: HOSPADM

## 2018-07-16 RX ORDER — FENTANYL CITRATE 50 UG/ML
25 INJECTION, SOLUTION INTRAMUSCULAR; INTRAVENOUS
Status: DISCONTINUED | OUTPATIENT
Start: 2018-07-16 | End: 2018-07-18

## 2018-07-16 RX ORDER — DIGOXIN 125 MCG
125 TABLET ORAL
Status: DISCONTINUED | OUTPATIENT
Start: 2018-07-17 | End: 2018-07-26 | Stop reason: HOSPADM

## 2018-07-16 RX ORDER — SODIUM CHLORIDE 0.9 % (FLUSH) 0.9 %
1-10 SYRINGE (ML) INJECTION AS NEEDED
Status: DISCONTINUED | OUTPATIENT
Start: 2018-07-16 | End: 2018-07-16 | Stop reason: HOSPADM

## 2018-07-16 RX ORDER — POTASSIUM CHLORIDE 29.8 MG/ML
20 INJECTION INTRAVENOUS
Status: DISCONTINUED | OUTPATIENT
Start: 2018-07-16 | End: 2018-07-18

## 2018-07-16 RX ORDER — PROPOFOL 10 MG/ML
VIAL (ML) INTRAVENOUS CONTINUOUS PRN
Status: DISCONTINUED | OUTPATIENT
Start: 2018-07-16 | End: 2018-07-16 | Stop reason: SURG

## 2018-07-16 RX ORDER — CHLORHEXIDINE GLUCONATE 500 MG/1
1 CLOTH TOPICAL EVERY 12 HOURS PRN
Status: DISCONTINUED | OUTPATIENT
Start: 2018-07-16 | End: 2018-07-16 | Stop reason: HOSPADM

## 2018-07-16 RX ORDER — ATORVASTATIN CALCIUM 40 MG/1
40 TABLET, FILM COATED ORAL NIGHTLY
Status: DISCONTINUED | OUTPATIENT
Start: 2018-07-16 | End: 2018-07-20

## 2018-07-16 RX ORDER — ROCURONIUM BROMIDE 10 MG/ML
INJECTION, SOLUTION INTRAVENOUS AS NEEDED
Status: DISCONTINUED | OUTPATIENT
Start: 2018-07-16 | End: 2018-07-16 | Stop reason: SURG

## 2018-07-16 RX ORDER — SODIUM CHLORIDE 9 MG/ML
30 INJECTION, SOLUTION INTRAVENOUS CONTINUOUS PRN
Status: DISCONTINUED | OUTPATIENT
Start: 2018-07-16 | End: 2018-07-19

## 2018-07-16 RX ORDER — VARENICLINE TARTRATE 0.5 MG/1
1 TABLET, FILM COATED ORAL 2 TIMES DAILY
Status: DISCONTINUED | OUTPATIENT
Start: 2018-07-16 | End: 2018-07-17

## 2018-07-16 RX ORDER — POTASSIUM CHLORIDE 1.5 G/1.77G
40 POWDER, FOR SOLUTION ORAL AS NEEDED
Status: DISCONTINUED | OUTPATIENT
Start: 2018-07-16 | End: 2018-07-20

## 2018-07-16 RX ORDER — SODIUM CHLORIDE, SODIUM LACTATE, POTASSIUM CHLORIDE, CALCIUM CHLORIDE 600; 310; 30; 20 MG/100ML; MG/100ML; MG/100ML; MG/100ML
9 INJECTION, SOLUTION INTRAVENOUS CONTINUOUS
Status: DISCONTINUED | OUTPATIENT
Start: 2018-07-16 | End: 2018-07-16

## 2018-07-16 RX ORDER — CHLORHEXIDINE GLUCONATE 0.12 MG/ML
15 RINSE ORAL EVERY 12 HOURS SCHEDULED
Status: DISCONTINUED | OUTPATIENT
Start: 2018-07-16 | End: 2018-07-18

## 2018-07-16 RX ORDER — MILRINONE LACTATE 0.2 MG/ML
.25-.75 INJECTION, SOLUTION INTRAVENOUS
Status: DISCONTINUED | OUTPATIENT
Start: 2018-07-16 | End: 2018-07-18

## 2018-07-16 RX ORDER — DOCUSATE SODIUM 100 MG/1
100 CAPSULE, LIQUID FILLED ORAL 2 TIMES DAILY PRN
Status: DISCONTINUED | OUTPATIENT
Start: 2018-07-16 | End: 2018-07-26 | Stop reason: HOSPADM

## 2018-07-16 RX ORDER — POTASSIUM CHLORIDE 750 MG/1
40 CAPSULE, EXTENDED RELEASE ORAL AS NEEDED
Status: DISCONTINUED | OUTPATIENT
Start: 2018-07-16 | End: 2018-07-20

## 2018-07-16 RX ORDER — LIDOCAINE HYDROCHLORIDE 10 MG/ML
0.5 INJECTION, SOLUTION EPIDURAL; INFILTRATION; INTRACAUDAL; PERINEURAL ONCE AS NEEDED
Status: COMPLETED | OUTPATIENT
Start: 2018-07-16 | End: 2018-07-16

## 2018-07-16 RX ORDER — MILRINONE LACTATE 0.2 MG/ML
.25-.75 INJECTION, SOLUTION INTRAVENOUS ONCE
Status: DISCONTINUED | OUTPATIENT
Start: 2018-07-16 | End: 2018-07-18

## 2018-07-16 RX ORDER — MAGNESIUM SULFATE HEPTAHYDRATE 40 MG/ML
2 INJECTION, SOLUTION INTRAVENOUS AS NEEDED
Status: DISCONTINUED | OUTPATIENT
Start: 2018-07-16 | End: 2018-07-20

## 2018-07-16 RX ORDER — SENNA AND DOCUSATE SODIUM 50; 8.6 MG/1; MG/1
2 TABLET, FILM COATED ORAL 2 TIMES DAILY
Status: DISCONTINUED | OUTPATIENT
Start: 2018-07-16 | End: 2018-07-26 | Stop reason: HOSPADM

## 2018-07-16 RX ORDER — SUFENTANIL CITRATE 50 UG/ML
INJECTION EPIDURAL; INTRAVENOUS AS NEEDED
Status: DISCONTINUED | OUTPATIENT
Start: 2018-07-16 | End: 2018-07-16 | Stop reason: SURG

## 2018-07-16 RX ORDER — ETOMIDATE 2 MG/ML
INJECTION INTRAVENOUS AS NEEDED
Status: DISCONTINUED | OUTPATIENT
Start: 2018-07-16 | End: 2018-07-16 | Stop reason: SURG

## 2018-07-16 RX ORDER — DOPAMINE HYDROCHLORIDE 160 MG/100ML
2-20 INJECTION, SOLUTION INTRAVENOUS CONTINUOUS PRN
Status: DISCONTINUED | OUTPATIENT
Start: 2018-07-16 | End: 2018-07-18

## 2018-07-16 RX ORDER — PROTAMINE SULFATE 10 MG/ML
50 INJECTION, SOLUTION INTRAVENOUS ONCE
Status: COMPLETED | OUTPATIENT
Start: 2018-07-16 | End: 2018-07-16

## 2018-07-16 RX ORDER — FAMOTIDINE 20 MG/1
20 TABLET, FILM COATED ORAL ONCE
Status: COMPLETED | OUTPATIENT
Start: 2018-07-16 | End: 2018-07-16

## 2018-07-16 RX ORDER — NALOXONE HCL 0.4 MG/ML
0.4 VIAL (ML) INJECTION
Status: DISCONTINUED | OUTPATIENT
Start: 2018-07-16 | End: 2018-07-18

## 2018-07-16 RX ORDER — BISACODYL 10 MG
10 SUPPOSITORY, RECTAL RECTAL DAILY PRN
Status: DISCONTINUED | OUTPATIENT
Start: 2018-07-17 | End: 2018-07-20

## 2018-07-16 RX ORDER — MAGNESIUM SULFATE HEPTAHYDRATE 40 MG/ML
4 INJECTION, SOLUTION INTRAVENOUS AS NEEDED
Status: DISCONTINUED | OUTPATIENT
Start: 2018-07-16 | End: 2018-07-20

## 2018-07-16 RX ORDER — ALBUTEROL SULFATE 2.5 MG/3ML
2.5 SOLUTION RESPIRATORY (INHALATION) EVERY 4 HOURS PRN
Status: ACTIVE | OUTPATIENT
Start: 2018-07-16 | End: 2018-07-17

## 2018-07-16 RX ORDER — ASPIRIN 325 MG
325 TABLET, DELAYED RELEASE (ENTERIC COATED) ORAL DAILY
Status: DISCONTINUED | OUTPATIENT
Start: 2018-07-17 | End: 2018-07-22

## 2018-07-16 RX ORDER — ASPIRIN 325 MG
325 TABLET ORAL ONCE
Status: DISCONTINUED | OUTPATIENT
Start: 2018-07-16 | End: 2018-07-18

## 2018-07-16 RX ORDER — OXYCODONE HYDROCHLORIDE AND ACETAMINOPHEN 5; 325 MG/1; MG/1
2 TABLET ORAL EVERY 4 HOURS PRN
Status: DISPENSED | OUTPATIENT
Start: 2018-07-16 | End: 2018-07-26

## 2018-07-16 RX ORDER — NITROGLYCERIN 0.4 MG/1
0.4 TABLET SUBLINGUAL
Status: DISCONTINUED | OUTPATIENT
Start: 2018-07-16 | End: 2018-07-16 | Stop reason: HOSPADM

## 2018-07-16 RX ORDER — LEVOTHYROXINE SODIUM 0.15 MG/1
150 TABLET ORAL
Status: DISCONTINUED | OUTPATIENT
Start: 2018-07-17 | End: 2018-07-26 | Stop reason: HOSPADM

## 2018-07-16 RX ORDER — PHENYLEPHRINE HCL IN 0.9% NACL 0.5 MG/5ML
.5-3 SYRINGE (ML) INTRAVENOUS CONTINUOUS PRN
Status: DISCONTINUED | OUTPATIENT
Start: 2018-07-16 | End: 2018-07-20

## 2018-07-16 RX ORDER — ACETAMINOPHEN 325 MG/1
650 TABLET ORAL EVERY 4 HOURS PRN
Status: DISCONTINUED | OUTPATIENT
Start: 2018-07-16 | End: 2018-07-26 | Stop reason: HOSPADM

## 2018-07-16 RX ORDER — MEPERIDINE HYDROCHLORIDE 25 MG/ML
25 INJECTION INTRAMUSCULAR; INTRAVENOUS; SUBCUTANEOUS EVERY 4 HOURS PRN
Status: ACTIVE | OUTPATIENT
Start: 2018-07-16 | End: 2018-07-17

## 2018-07-16 RX ORDER — CALCIUM CHLORIDE 100 MG/ML
INJECTION INTRAVENOUS; INTRAVENTRICULAR AS NEEDED
Status: DISCONTINUED | OUTPATIENT
Start: 2018-07-16 | End: 2018-07-16 | Stop reason: SURG

## 2018-07-16 RX ORDER — NOREPINEPHRINE BITARTRATE 1 MG/ML
INJECTION, SOLUTION INTRAVENOUS CONTINUOUS PRN
Status: DISCONTINUED | OUTPATIENT
Start: 2018-07-16 | End: 2018-07-16 | Stop reason: SURG

## 2018-07-16 RX ORDER — POTASSIUM CHLORIDE, DEXTROSE MONOHYDRATE 150; 5 MG/100ML; G/100ML
30 INJECTION, SOLUTION INTRAVENOUS CONTINUOUS
Status: DISCONTINUED | OUTPATIENT
Start: 2018-07-16 | End: 2018-07-18

## 2018-07-16 RX ORDER — ACETAMINOPHEN 325 MG/1
650 TABLET ORAL EVERY 4 HOURS PRN
Status: DISCONTINUED | OUTPATIENT
Start: 2018-07-16 | End: 2018-07-16 | Stop reason: HOSPADM

## 2018-07-16 RX ORDER — ONDANSETRON 2 MG/ML
4 INJECTION INTRAMUSCULAR; INTRAVENOUS EVERY 6 HOURS PRN
Status: DISCONTINUED | OUTPATIENT
Start: 2018-07-16 | End: 2018-07-26 | Stop reason: HOSPADM

## 2018-07-16 RX ORDER — ALBUMIN, HUMAN INJ 5% 5 %
500 SOLUTION INTRAVENOUS AS NEEDED
Status: COMPLETED | OUTPATIENT
Start: 2018-07-16 | End: 2018-07-16

## 2018-07-16 RX ORDER — DEXMEDETOMIDINE HYDROCHLORIDE 4 UG/ML
.2-1.5 INJECTION, SOLUTION INTRAVENOUS CONTINUOUS PRN
Status: DISCONTINUED | OUTPATIENT
Start: 2018-07-16 | End: 2018-07-18

## 2018-07-16 RX ORDER — SODIUM CHLORIDE 0.9 % (FLUSH) 0.9 %
30 SYRINGE (ML) INJECTION ONCE AS NEEDED
Status: DISCONTINUED | OUTPATIENT
Start: 2018-07-16 | End: 2018-07-19

## 2018-07-16 RX ORDER — METOPROLOL TARTRATE 50 MG/1
50 TABLET, FILM COATED ORAL 2 TIMES DAILY
Status: DISCONTINUED | OUTPATIENT
Start: 2018-07-16 | End: 2018-07-17

## 2018-07-16 RX ORDER — SODIUM CHLORIDE 9 MG/ML
INJECTION, SOLUTION INTRAVENOUS AS NEEDED
Status: DISCONTINUED | OUTPATIENT
Start: 2018-07-16 | End: 2018-07-16 | Stop reason: HOSPADM

## 2018-07-16 RX ORDER — BISACODYL 5 MG/1
10 TABLET, DELAYED RELEASE ORAL DAILY PRN
Status: DISCONTINUED | OUTPATIENT
Start: 2018-07-16 | End: 2018-07-26 | Stop reason: HOSPADM

## 2018-07-16 RX ORDER — PROTAMINE SULFATE 10 MG/ML
INJECTION, SOLUTION INTRAVENOUS AS NEEDED
Status: DISCONTINUED | OUTPATIENT
Start: 2018-07-16 | End: 2018-07-16 | Stop reason: SURG

## 2018-07-16 RX ADMIN — NOREPINEPHRINE BITARTRATE 0.1 MCG/KG/MIN: 1 INJECTION, SOLUTION INTRAVENOUS at 13:20

## 2018-07-16 RX ADMIN — POTASSIUM CHLORIDE 20 MEQ: 400 INJECTION, SOLUTION INTRAVENOUS at 19:45

## 2018-07-16 RX ADMIN — MILRINONE LACTATE 0.5 MCG/KG/MIN: 1 INJECTION, SOLUTION INTRAVENOUS at 13:13

## 2018-07-16 RX ADMIN — SODIUM CHLORIDE, POTASSIUM CHLORIDE, SODIUM LACTATE AND CALCIUM CHLORIDE 9 ML/HR: 600; 310; 30; 20 INJECTION, SOLUTION INTRAVENOUS at 06:11

## 2018-07-16 RX ADMIN — VANCOMYCIN HYDROCHLORIDE 1250 MG: 10 INJECTION, POWDER, LYOPHILIZED, FOR SOLUTION INTRAVENOUS at 06:53

## 2018-07-16 RX ADMIN — NOREPINEPHRINE BITARTRATE 0.02 MCG/KG/MIN: 1 INJECTION, SOLUTION, CONCENTRATE INTRAVENOUS at 07:32

## 2018-07-16 RX ADMIN — VANCOMYCIN HYDROCHLORIDE 1250 MG: 10 INJECTION, POWDER, LYOPHILIZED, FOR SOLUTION INTRAVENOUS at 18:45

## 2018-07-16 RX ADMIN — ALBUMIN HUMAN 500 ML: 0.05 INJECTION, SOLUTION INTRAVENOUS at 17:58

## 2018-07-16 RX ADMIN — SODIUM CHLORIDE 2 UNITS/HR: 9 INJECTION, SOLUTION INTRAVENOUS at 07:32

## 2018-07-16 RX ADMIN — ROCURONIUM BROMIDE 30 MG: 10 SOLUTION INTRAVENOUS at 12:35

## 2018-07-16 RX ADMIN — FENTANYL CITRATE 25 MCG: 50 INJECTION, SOLUTION INTRAMUSCULAR; INTRAVENOUS at 20:46

## 2018-07-16 RX ADMIN — SUFENTANIL CITRATE 100 MCG: 50 INJECTION, SOLUTION EPIDURAL; INTRAVENOUS at 13:10

## 2018-07-16 RX ADMIN — POTASSIUM CHLORIDE 20 MEQ: 400 INJECTION, SOLUTION INTRAVENOUS at 18:25

## 2018-07-16 RX ADMIN — PROPOFOL 25 MCG/KG/MIN: 10 INJECTION, EMULSION INTRAVENOUS at 14:40

## 2018-07-16 RX ADMIN — CHLORHEXIDINE GLUCONATE 15 ML: 1.2 RINSE ORAL at 06:12

## 2018-07-16 RX ADMIN — MIDAZOLAM HYDROCHLORIDE 5 MG: 1 INJECTION, SOLUTION INTRAMUSCULAR; INTRAVENOUS at 12:35

## 2018-07-16 RX ADMIN — CALCIUM CHLORIDE 1 G: 100 INJECTION INTRAVENOUS; INTRAVENTRICULAR at 13:30

## 2018-07-16 RX ADMIN — SODIUM BICARBONATE 50 MEQ: 84 INJECTION INTRAVENOUS at 16:52

## 2018-07-16 RX ADMIN — MUPIROCIN 1 APPLICATION: 20 OINTMENT TOPICAL at 06:12

## 2018-07-16 RX ADMIN — POTASSIUM CHLORIDE 20 MEQ: 400 INJECTION, SOLUTION INTRAVENOUS at 17:05

## 2018-07-16 RX ADMIN — ALBUMIN HUMAN 500 ML: 0.05 INJECTION, SOLUTION INTRAVENOUS at 16:14

## 2018-07-16 RX ADMIN — SODIUM CHLORIDE, POTASSIUM CHLORIDE, SODIUM LACTATE AND CALCIUM CHLORIDE: 600; 310; 30; 20 INJECTION, SOLUTION INTRAVENOUS at 07:32

## 2018-07-16 RX ADMIN — SODIUM BICARBONATE 50 MEQ: 84 INJECTION INTRAVENOUS at 21:39

## 2018-07-16 RX ADMIN — OXYCODONE HYDROCHLORIDE AND ACETAMINOPHEN 2 TABLET: 5; 325 TABLET ORAL at 20:47

## 2018-07-16 RX ADMIN — SODIUM BICARBONATE 50 MEQ: 84 INJECTION INTRAVENOUS at 18:25

## 2018-07-16 RX ADMIN — ATORVASTATIN CALCIUM 40 MG: 40 TABLET, FILM COATED ORAL at 20:47

## 2018-07-16 RX ADMIN — EPINEPHRINE 0.05 MCG/KG/MIN: 1 INJECTION, SOLUTION INTRAMUSCULAR; SUBCUTANEOUS at 13:35

## 2018-07-16 RX ADMIN — PROTAMINE SULFATE 50 MG: 10 INJECTION, SOLUTION INTRAVENOUS at 16:22

## 2018-07-16 RX ADMIN — POTASSIUM PHOSPHATE, MONOBASIC AND POTASSIUM PHOSPHATE, DIBASIC 45 MMOL: 224; 236 INJECTION, SOLUTION INTRAVENOUS at 20:52

## 2018-07-16 RX ADMIN — FAMOTIDINE 20 MG: 20 TABLET ORAL at 06:12

## 2018-07-16 RX ADMIN — Medication 2 TABLET: at 20:47

## 2018-07-16 RX ADMIN — ROCURONIUM BROMIDE 70 MG: 10 SOLUTION INTRAVENOUS at 07:32

## 2018-07-16 RX ADMIN — PROTAMINE SULFATE 350 MG: 10 INJECTION, SOLUTION INTRAVENOUS at 13:30

## 2018-07-16 RX ADMIN — POTASSIUM CHLORIDE AND DEXTROSE MONOHYDRATE 30 ML/HR: 150; 5 INJECTION, SOLUTION INTRAVENOUS at 16:55

## 2018-07-16 RX ADMIN — CHLORHEXIDINE GLUCONATE 15 ML: 1.2 RINSE ORAL at 20:47

## 2018-07-16 RX ADMIN — MIDAZOLAM HYDROCHLORIDE 5 MG: 1 INJECTION, SOLUTION INTRAMUSCULAR; INTRAVENOUS at 07:32

## 2018-07-16 RX ADMIN — SUFENTANIL CITRATE 75 MCG: 50 INJECTION, SOLUTION EPIDURAL; INTRAVENOUS at 08:05

## 2018-07-16 RX ADMIN — GLYCOPYRROLATE 0.4 MG: 0.2 INJECTION, SOLUTION INTRAMUSCULAR; INTRAVENOUS at 13:20

## 2018-07-16 RX ADMIN — ETOMIDATE 17.42 MG: 2 INJECTION, SOLUTION INTRAVENOUS at 07:32

## 2018-07-16 RX ADMIN — CALCIUM CHLORIDE 1 G: 100 INJECTION INTRAVENOUS; INTRAVENTRICULAR at 15:01

## 2018-07-16 RX ADMIN — VARENICLINE TARTRATE 1 MG: 0.5 TABLET, FILM COATED ORAL at 20:47

## 2018-07-16 RX ADMIN — LIDOCAINE HYDROCHLORIDE 0.5 ML: 10 INJECTION, SOLUTION EPIDURAL; INFILTRATION; INTRACAUDAL; PERINEURAL at 06:12

## 2018-07-16 RX ADMIN — SUFENTANIL CITRATE 75 MCG: 50 INJECTION, SOLUTION EPIDURAL; INTRAVENOUS at 07:32

## 2018-07-16 NOTE — ANESTHESIA PREPROCEDURE EVALUATION
Anesthesia Evaluation     Patient summary reviewed and Nursing notes reviewed   NPO Solid Status: > 8 hours  NPO Liquid Status: > 8 hours           Airway   Mallampati: I  TM distance: >3 FB  Neck ROM: full  No difficulty expected  Dental    (+) edentulous    Pulmonary    (+) lung cancer, decreased breath sounds,   Cardiovascular     ECG reviewed  Rhythm: irregular  Rate: normal    (+) hypertension, valvular problems/murmurs AI, MR and MS, dysrhythmias Atrial Fib, CHF,       Neuro/Psych  GI/Hepatic/Renal/Endo    (+)   hypothyroidism,     Musculoskeletal     Abdominal    Substance History      OB/GYN          Other      history of cancer                    Anesthesia Plan    ASA 4     general   ([place], pac,ria)  intravenous induction   Anesthetic plan and risks discussed with patient and spouse/significant other.

## 2018-07-16 NOTE — ANESTHESIA PROCEDURE NOTES
Procedure Performed: Emergent/Open-Heart Anesthesia TIFFANIE     Start Time:        End Time:      Preanesthesia Checklist:  Patient identified, IV assessed, risks and benefits discussed, monitors and equipment assessed, procedure being performed at surgeon's request and anesthesia consent obtained.    General Procedure Information  Diagnostic Indications for Echo:  assessment of ascending aorta, assessment of surgical repair and hemodynamic monitoring  Physician Requesting Echo: WILBERTO DANG  Location performed:  OR  Intubated  Bite block not placed  Heart visualized  Probe Insertion:  Easy  Probe Type:  Multiplane  Modalities:  Color flow mapping, continuous wave Doppler and pulse wave Doppler    Echocardiographic and Doppler Measurements    Ventricles    Right Ventricle:  Cavity size normal.  Hypertrophy not present.  Thrombus not present.  Global function moderately impaired.    Left Ventricle:  Cavity size normal.  Thrombus not present.  Global Function moderately impaired.  Ejection Fraction 40%.          Valves    Aortic Valve:  Annulus normal.  Stenosis not present.  Area: 2.1 cm².  Regurgitation moderate.  Leaflets normal.  Leaflet motions normal.      Mitral Valve:  Annulus normal and calcified.  Stenosis moderate.  Area: 1.2 cm².  Mean Gradient: 19 mean 7 mmHg.  Vena Contracta Width: 0.7 cm.  Regurgitation severe.  Leaflets calcified and thickened.  Leaflet motions restricted.      Tricuspid Valve:  Annulus normal.  Regurgitation severe.  Leaflets normal.  Leaflet motions normal.    Pulmonic Valve:  Annulus normal.  Regurgitation trace.          Aorta    Ascending Aorta:  Size normal.  Dissection not present.  Plaque thickness less than 3 mm.  Mobile plaque not present.    Aortic Arch:  Size normal.  Dissection not present.  Plaque thickness less than 3 mm.  Mobile plaque not present.    Descending Aorta:  Size normal.  Dissection not present.  Plaque thickness less than 3 mm.  Mobile plaque not present.           Atria    Right Atrium:  Size dilated.  Spontaneous echo contrast not present.  Thrombus not present.  Tumor not present.  Device present.    Left Atrium:  Size dilated.  Spontaneous echo contrast not present.  Thrombus not present.  Tumor not present.  Device not present.    Left atrial appendage normal.  Other Atria Findings:       LA 7.9 X 4.7    Septa    Atrial Septum:  Intra-atrial septal morphology normal.      Other atrial septal defect findings:       Tenting R to L; negative bubble study    Ventricular Septum:  Intra-ventricular septum morphology normal.          Other Findings  Pericardium:  pericardial effusion  Pleural Effusion:  none  Pulmonary Arteries:  normal  Pulmonary Venous Flow:  normal    Anesthesia Information  Performed Personally  Anesthesiologist:  SONI MCLEOD      Echocardiogram Comments:       Informed consent was obtained preoperatively.  Pradeep probe passed without difficulty. S/p AVR, MVR, TVV, MAZE, ligation ANGELA:  Tissue prost valve MV: leaflets open and coapt well, trace central MR, no perivalvular leak seen, MV mean 2. Tissue prosthetic AV with no perivalvular leak, leaflets open and coapt well, unable to obtain AVG. TR downgraded to moderate aftwer annuloplasty.  NO flow seed prior ANGELA.

## 2018-07-16 NOTE — ANESTHESIA POSTPROCEDURE EVALUATION
Patient: Jill Miller    Procedure Summary     Date:  07/16/18 [facility]    Anesthesia Start:  0726 Anesthesia Stop:  1549    Procedures:       median sternotomy,AORTIC VALVE REPLACEMENT WITH TIFFANIE AND MAZE,tricuspid ring, left atrial occlusion (N/A Chest)      MITRAL VALVE REPLACEMENT (N/A Chest) Diagnosis:       Rheumatic mitral valve and aortic valve stenosis      (Rheumatic mitral valve and aortic valve stenosis [I08.0])    Surgeon:  Ajay Cheatham MD Provider:  Justino Sanders MD    Anesthesia Type:  general ASA Status:  4          Anesthesia Type: general  Last vitals  BP   107/88 (07/16/18 0606)   Temp   97.6 °F (36.4 °C) (07/16/18 0606)   Pulse   (!) 135 (07/16/18 0606)   Resp   18 (07/16/18 0606)     SpO2   95 % (07/16/18 0606)     Post Anesthesia Care and Evaluation    Patient location during evaluation: ICU  Patient participation: complete - patient cannot participate  Level of consciousness: obtunded/minimal responses  Airway patency: patent  Anesthetic complications: No anesthetic complications    Cardiovascular status: hemodynamically unstable  Respiratory status: ETT, intubated and ventilator  Hydration status: acceptable

## 2018-07-16 NOTE — ANESTHESIA PROCEDURE NOTES
Arterial Line    Patient location during procedure: pre-op   Line placed for hemodynamic monitoring.  Preanesthetic Checklist  Completed: patient identified, site marked, surgical consent, pre-op evaluation, timeout performed, IV checked, risks and benefits discussed and monitors and equipment checked  Arterial Line Prep   Sterile Tech: cap, gloves and sterile barriers  Prep: ChloraPrep  Patient monitoring: blood pressure monitoring, continuous pulse oximetry and EKG  Arterial Line Procedure   Laterality:right  Location:  brachial artery  Catheter size: 20 G   Guidance: ultrasound guided  PROCEDURE NOTE/ULTRASOUND INTERPRETATION.  Using ultrasound guidance the potential vascular sites for insertion of the catheter were visualized to determine the patency of the vessel to be used for vascular access.  After selecting the appropriate site for insertion, the needle was visualized under ultrasound being inserted into the brachial artery, followed by ultrasound confirmation of wire and catheter placement. There were no abnormalities seen on ultrasound; an image was taken; and the patient tolerated the procedure with no complications.   Number of attempts: 1  Successful placement: yes          Post Assessment   Dressing Type: line sutured, occlusive dressing applied, secured with tape and wrist guard applied.   Complications no  Circ/Move/Sens Assessment: normal and unchanged.   Patient Tolerance: patient tolerated the procedure well with no apparent complications

## 2018-07-16 NOTE — ANESTHESIA PROCEDURE NOTES
Central Line    Patient location during procedure: OR  Indications: vascular access  Preanesthetic Checklist  Completed: patient identified, site marked, surgical consent, pre-op evaluation, timeout performed, IV checked, risks and benefits discussed and monitors and equipment checked  Central Line Prep  Sterile Tech:cap, gloves, gown, mask and sterile barriers  Prep: chloraprep  Patient monitoring: blood pressure monitoring, continuous pulse oximetry and EKG  Central Line Procedure  Laterality:right  Location:internal jugular  Catheter Type:Cordis and Logan-Jesu  Catheter Size:9 Fr  Guidance:ultrasound guided  PROCEDURE NOTE/ULTRASOUND INTERPRETATION.  Using ultrasound guidance the potential vascular sites for insertion of the catheter were visualized to determine the patency of the vessel to be used for vascular access.  After selecting the appropriate site for insertion, the needle was visualized under ultrasound being inserted into the internal jugular vein, followed by ultrasound confirmation of wire and catheter placement. There were no abnormalities seen on ultrasound; an image was taken; and the patient tolerated the procedure with no complications.   Assessment  Post procedure:biopatch applied, line sutured, occlusive dressing applied and secured with tape  Assessement:blood return through all ports, free fluid flow and chest x-ray ordered  Complications:no  Patient Tolerance:patient tolerated the procedure well with no apparent complications

## 2018-07-16 NOTE — ANESTHESIA PROCEDURE NOTES
Airway  Urgency: elective    Airway not difficult    General Information and Staff    Patient location during procedure: OR    Indications and Patient Condition  Indications for airway management: airway protection    Preoxygenated: yes  MILS not maintained throughout  Mask difficulty assessment: 0 - not attempted    Final Airway Details  Final airway type: endotracheal airway      Successful airway: ETT  Cuffed: yes   Successful intubation technique: direct laryngoscopy  Endotracheal tube insertion site: oral  Blade: Filomena  Blade size: #3  ETT size: 7.5 mm  Cormack-Lehane Classification: grade I - full view of glottis  Placement verified by: chest auscultation and capnometry   Measured from: lips  ETT to lips (cm): 20  Number of attempts at approach: 1    Additional Comments  Negative epigastric sounds, Breath sound equal bilaterally with symmetric chest rise and fall

## 2018-07-17 ENCOUNTER — APPOINTMENT (OUTPATIENT)
Dept: GENERAL RADIOLOGY | Facility: HOSPITAL | Age: 58
End: 2018-07-17

## 2018-07-17 PROBLEM — I50.32 CHRONIC DIASTOLIC CONGESTIVE HEART FAILURE (HCC): Status: ACTIVE | Noted: 2018-04-02

## 2018-07-17 PROBLEM — D72.829 LEUKOCYTOSIS: Status: RESOLVED | Noted: 2017-04-10 | Resolved: 2018-07-17

## 2018-07-17 PROBLEM — I35.9 AORTIC VALVE DISORDER: Status: RESOLVED | Noted: 2018-07-16 | Resolved: 2018-07-17

## 2018-07-17 PROBLEM — I05.0 RHEUMATIC MITRAL STENOSIS: Status: RESOLVED | Noted: 2017-09-27 | Resolved: 2018-07-17

## 2018-07-17 PROBLEM — E11.9 TYPE 2 DIABETES MELLITUS: Status: ACTIVE | Noted: 2018-07-17

## 2018-07-17 LAB
ABO + RH BLD: NORMAL
ACT BLD: 120 SECONDS (ref 82–152)
ALBUMIN SERPL-MCNC: 3.62 G/DL (ref 3.2–4.8)
ANION GAP SERPL CALCULATED.3IONS-SCNC: 13 MMOL/L (ref 3–11)
ARTERIAL PATENCY WRIST A: ABNORMAL
ATMOSPHERIC PRESS: ABNORMAL MMHG
BASE EXCESS BLDA CALC-SCNC: -1 MMOL/L (ref -5–5)
BASE EXCESS BLDA CALC-SCNC: -2.1 MMOL/L (ref 0–2)
BASE EXCESS BLDA CALC-SCNC: -2.9 MMOL/L (ref 0–2)
BASE EXCESS BLDA CALC-SCNC: -3 MMOL/L (ref -5–5)
BASE EXCESS BLDA CALC-SCNC: -4.9 MMOL/L (ref 0–2)
BASE EXCESS BLDA CALC-SCNC: 0 MMOL/L (ref -5–5)
BASE EXCESS BLDA CALC-SCNC: 1 MMOL/L (ref -5–5)
BASE EXCESS BLDA CALC-SCNC: 18 MMOL/L (ref -5–5)
BASE EXCESS BLDA CALC-SCNC: 2 MMOL/L (ref -5–5)
BASE EXCESS BLDA CALC-SCNC: 3 MMOL/L (ref -5–5)
BASE EXCESS BLDA CALC-SCNC: 3 MMOL/L (ref -5–5)
BASE EXCESS BLDA CALC-SCNC: 4 MMOL/L (ref -5–5)
BASOPHILS # BLD AUTO: 0.01 10*3/MM3 (ref 0–0.2)
BASOPHILS NFR BLD AUTO: 0.1 % (ref 0–1)
BDY SITE: ABNORMAL
BH BB BLOOD EXPIRATION DATE: NORMAL
BH BB BLOOD TYPE BARCODE: 5100
BH BB BLOOD TYPE BARCODE: 6200
BH BB DISPENSE STATUS: NORMAL
BH BB PRODUCT CODE: NORMAL
BH BB UNIT NUMBER: NORMAL
BUN BLD-MCNC: 18 MG/DL (ref 9–23)
BUN/CREAT SERPL: 18.6 (ref 7–25)
CA-I BLDA-SCNC: 0.8 MMOL/L (ref 1.2–1.32)
CA-I BLDA-SCNC: 0.85 MMOL/L (ref 1.2–1.32)
CA-I BLDA-SCNC: 0.89 MMOL/L (ref 1.2–1.32)
CA-I BLDA-SCNC: 0.93 MMOL/L (ref 1.2–1.32)
CA-I BLDA-SCNC: 0.93 MMOL/L (ref 1.2–1.32)
CA-I BLDA-SCNC: 0.94 MMOL/L (ref 1.2–1.32)
CA-I BLDA-SCNC: 1 MMOL/L (ref 1.2–1.32)
CA-I BLDA-SCNC: 1.13 MMOL/L (ref 1.2–1.32)
CA-I BLDA-SCNC: 1.21 MMOL/L (ref 1.2–1.32)
CALCIUM SPEC-SCNC: 8.3 MG/DL (ref 8.7–10.4)
CHLORIDE SERPL-SCNC: 114 MMOL/L (ref 99–109)
CO2 BLDA-SCNC: 23 MMOL/L (ref 24–29)
CO2 BLDA-SCNC: 24.2 MMOL/L (ref 22–33)
CO2 BLDA-SCNC: 24.6 MMOL/L (ref 22–33)
CO2 BLDA-SCNC: 25.2 MMOL/L (ref 22–33)
CO2 BLDA-SCNC: 26 MMOL/L (ref 24–29)
CO2 BLDA-SCNC: 29 MMOL/L (ref 24–29)
CO2 BLDA-SCNC: 29 MMOL/L (ref 24–29)
CO2 BLDA-SCNC: 30 MMOL/L (ref 24–29)
CO2 BLDA-SCNC: 31 MMOL/L (ref 24–29)
CO2 BLDA-SCNC: 33 MMOL/L (ref 24–29)
CO2 BLDA-SCNC: 44 MMOL/L (ref 24–29)
CO2 SERPL-SCNC: 23 MMOL/L (ref 20–31)
COHGB MFR BLD: 1.2 % (ref 0–2)
COHGB MFR BLD: 1.2 % (ref 0–2)
COHGB MFR BLD: 1.5 % (ref 0–2)
CREAT BLD-MCNC: 0.97 MG/DL (ref 0.6–1.3)
DEPRECATED RDW RBC AUTO: 71 FL (ref 37–54)
EOSINOPHIL # BLD AUTO: 0 10*3/MM3 (ref 0–0.3)
EOSINOPHIL NFR BLD AUTO: 0 % (ref 0–3)
ERYTHROCYTE [DISTWIDTH] IN BLOOD BY AUTOMATED COUNT: 18.8 % (ref 11.3–14.5)
GFR SERPL CREATININE-BSD FRML MDRD: 59 ML/MIN/1.73
GLUCOSE BLD-MCNC: 169 MG/DL (ref 70–100)
GLUCOSE BLDC GLUCOMTR-MCNC: 102 MG/DL (ref 70–130)
GLUCOSE BLDC GLUCOMTR-MCNC: 110 MG/DL (ref 70–130)
GLUCOSE BLDC GLUCOMTR-MCNC: 115 MG/DL (ref 70–130)
GLUCOSE BLDC GLUCOMTR-MCNC: 116 MG/DL (ref 70–130)
GLUCOSE BLDC GLUCOMTR-MCNC: 122 MG/DL (ref 70–130)
GLUCOSE BLDC GLUCOMTR-MCNC: 133 MG/DL (ref 70–130)
GLUCOSE BLDC GLUCOMTR-MCNC: 135 MG/DL (ref 70–130)
GLUCOSE BLDC GLUCOMTR-MCNC: 136 MG/DL (ref 70–130)
GLUCOSE BLDC GLUCOMTR-MCNC: 138 MG/DL (ref 70–130)
GLUCOSE BLDC GLUCOMTR-MCNC: 139 MG/DL (ref 70–130)
GLUCOSE BLDC GLUCOMTR-MCNC: 145 MG/DL (ref 70–130)
GLUCOSE BLDC GLUCOMTR-MCNC: 145 MG/DL (ref 70–130)
GLUCOSE BLDC GLUCOMTR-MCNC: 151 MG/DL (ref 70–130)
GLUCOSE BLDC GLUCOMTR-MCNC: 163 MG/DL (ref 70–130)
GLUCOSE BLDC GLUCOMTR-MCNC: 165 MG/DL (ref 70–130)
GLUCOSE BLDC GLUCOMTR-MCNC: 174 MG/DL (ref 70–130)
GLUCOSE BLDC GLUCOMTR-MCNC: 177 MG/DL (ref 70–130)
GLUCOSE BLDC GLUCOMTR-MCNC: 178 MG/DL (ref 70–130)
GLUCOSE BLDC GLUCOMTR-MCNC: 180 MG/DL (ref 70–130)
HCO3 BLDA-SCNC: 21.8 MMOL/L (ref 22–26)
HCO3 BLDA-SCNC: 22.6 MMOL/L (ref 20–26)
HCO3 BLDA-SCNC: 23.2 MMOL/L (ref 20–26)
HCO3 BLDA-SCNC: 23.8 MMOL/L (ref 20–26)
HCO3 BLDA-SCNC: 24.4 MMOL/L (ref 22–26)
HCO3 BLDA-SCNC: 27.6 MMOL/L (ref 22–26)
HCO3 BLDA-SCNC: 27.6 MMOL/L (ref 22–26)
HCO3 BLDA-SCNC: 28.5 MMOL/L (ref 22–26)
HCO3 BLDA-SCNC: 29.1 MMOL/L (ref 22–26)
HCO3 BLDA-SCNC: 29.6 MMOL/L (ref 22–26)
HCO3 BLDA-SCNC: 29.7 MMOL/L (ref 22–26)
HCO3 BLDA-SCNC: 42.1 MMOL/L (ref 22–26)
HCT VFR BLD AUTO: 25.6 % (ref 34.5–44)
HCT VFR BLD AUTO: 25.8 % (ref 34.5–44)
HCT VFR BLD CALC: 25.9 %
HCT VFR BLD CALC: 27.4 %
HCT VFR BLD CALC: 28.2 %
HCT VFR BLDA CALC: 20 % (ref 38–51)
HCT VFR BLDA CALC: 21 % (ref 38–51)
HCT VFR BLDA CALC: 22 % (ref 38–51)
HCT VFR BLDA CALC: 22 % (ref 38–51)
HCT VFR BLDA CALC: 23 % (ref 38–51)
HCT VFR BLDA CALC: 24 % (ref 38–51)
HCT VFR BLDA CALC: 26 % (ref 38–51)
HCT VFR BLDA CALC: 33 % (ref 38–51)
HCT VFR BLDA CALC: 39 % (ref 38–51)
HGB BLD-MCNC: 8.6 G/DL (ref 11.5–15.5)
HGB BLD-MCNC: 8.9 G/DL (ref 11.5–15.5)
HGB BLDA-MCNC: 11.2 G/DL (ref 12–17)
HGB BLDA-MCNC: 13.3 G/DL (ref 12–17)
HGB BLDA-MCNC: 6.8 G/DL (ref 12–17)
HGB BLDA-MCNC: 7.1 G/DL (ref 12–17)
HGB BLDA-MCNC: 7.5 G/DL (ref 12–17)
HGB BLDA-MCNC: 7.5 G/DL (ref 12–17)
HGB BLDA-MCNC: 7.8 G/DL (ref 12–17)
HGB BLDA-MCNC: 8.2 G/DL (ref 12–17)
HGB BLDA-MCNC: 8.4 G/DL (ref 14–18)
HGB BLDA-MCNC: 8.8 G/DL (ref 12–17)
HGB BLDA-MCNC: 8.9 G/DL (ref 14–18)
HGB BLDA-MCNC: 9.2 G/DL (ref 14–18)
HOROWITZ INDEX BLD+IHG-RTO: 40 %
IMM GRANULOCYTES # BLD: 0.06 10*3/MM3 (ref 0–0.03)
IMM GRANULOCYTES NFR BLD: 0.5 % (ref 0–0.6)
INR PPP: 1.12 (ref 0.91–1.09)
LYMPHOCYTES # BLD AUTO: 1 10*3/MM3 (ref 0.6–4.8)
LYMPHOCYTES NFR BLD AUTO: 8.8 % (ref 24–44)
MAGNESIUM SERPL-MCNC: 2.4 MG/DL (ref 1.3–2.7)
MCH RBC QN AUTO: 35.5 PG (ref 27–31)
MCHC RBC AUTO-ENTMCNC: 33.6 G/DL (ref 32–36)
MCV RBC AUTO: 105.8 FL (ref 80–99)
METHGB BLD QL: 1.2 % (ref 0–1.5)
METHGB BLD QL: 1.3 % (ref 0–1.5)
METHGB BLD QL: 1.3 % (ref 0–1.5)
MODALITY: ABNORMAL
MONOCYTES # BLD AUTO: 1.11 10*3/MM3 (ref 0–1)
MONOCYTES NFR BLD AUTO: 9.8 % (ref 0–12)
NEUTROPHILS # BLD AUTO: 9.2 10*3/MM3 (ref 1.5–8.3)
NEUTROPHILS NFR BLD AUTO: 81.3 % (ref 41–71)
OXYHGB MFR BLDV: 90 % (ref 94–99)
OXYHGB MFR BLDV: 92.4 % (ref 94–99)
OXYHGB MFR BLDV: 92.7 % (ref 94–99)
PCO2 BLDA: 106.1 MM HG (ref 35–45)
PCO2 BLDA: 34.6 MM HG (ref 35–45)
PCO2 BLDA: 39.5 MM HG (ref 35–45)
PCO2 BLDA: 45.1 MM HG (ref 35–45)
PCO2 BLDA: 45.4 MM HG (ref 35–45)
PCO2 BLDA: 47.6 MM HG (ref 35–45)
PCO2 BLDA: 48.9 MM HG (ref 35–45)
PCO2 BLDA: 53.1 MM HG (ref 35–45)
PCO2 BLDA: 54.3 MM HG (ref 35–45)
PCO2 BLDA: 58 MM HG (ref 35–45)
PCO2 BLDA: 60.1 MM HG (ref 35–45)
PCO2 BLDA: 66.2 MM HG (ref 35–45)
PCO2 BLDA: 66.3 MM HG (ref 35–45)
PCO2 BLDA: 78.2 MM HG (ref 35–45)
PH BLDA: 7.06 PH UNITS (ref 7.35–7.6)
PH BLDA: 7.18 PH UNITS (ref 7.35–7.6)
PH BLDA: 7.24 PH UNITS (ref 7.35–7.45)
PH BLDA: 7.24 PH UNITS (ref 7.35–7.6)
PH BLDA: 7.3 PH UNITS (ref 7.35–7.6)
PH BLDA: 7.31 PH UNITS (ref 7.35–7.6)
PH BLDA: 7.32 PH UNITS (ref 7.35–7.45)
PH BLDA: 7.32 PH UNITS (ref 7.35–7.6)
PH BLDA: 7.33 PH UNITS (ref 7.35–7.45)
PH BLDA: 7.37 PH UNITS (ref 7.35–7.6)
PH BLDA: 7.38 PH UNITS (ref 7.35–7.6)
PH BLDA: 7.4 PH UNITS (ref 7.35–7.6)
PH BLDA: 7.41 PH UNITS (ref 7.35–7.6)
PH BLDA: 7.41 PH UNITS (ref 7.35–7.6)
PHOSPHATE SERPL-MCNC: 4.3 MG/DL (ref 2.4–5.1)
PLAT MORPH BLD: NORMAL
PLATELET # BLD AUTO: 117 10*3/MM3 (ref 150–450)
PMV BLD AUTO: 10.8 FL (ref 6–12)
PO2 BLDA: 171 MMHG (ref 80–105)
PO2 BLDA: 189 MMHG (ref 80–105)
PO2 BLDA: 195 MMHG (ref 80–105)
PO2 BLDA: 198 MMHG (ref 80–105)
PO2 BLDA: 206 MMHG (ref 80–105)
PO2 BLDA: 238 MMHG (ref 80–105)
PO2 BLDA: 278 MMHG (ref 80–105)
PO2 BLDA: 348 MMHG (ref 80–105)
PO2 BLDA: 452 MMHG (ref 80–105)
PO2 BLDA: 47 MMHG (ref 80–105)
PO2 BLDA: 55 MMHG (ref 80–105)
PO2 BLDA: 70.9 MM HG (ref 83–108)
PO2 BLDA: 71.1 MM HG (ref 83–108)
PO2 BLDA: 74.2 MM HG (ref 83–108)
POTASSIUM BLD-SCNC: 3.5 MMOL/L (ref 3.5–5.5)
POTASSIUM BLD-SCNC: 4.1 MMOL/L (ref 3.5–5.5)
POTASSIUM BLDA-SCNC: 4.1 MMOL/L (ref 3.5–4.9)
POTASSIUM BLDA-SCNC: 4.3 MMOL/L (ref 3.5–4.9)
POTASSIUM BLDA-SCNC: 5.4 MMOL/L (ref 3.5–4.9)
POTASSIUM BLDA-SCNC: 5.5 MMOL/L (ref 3.5–4.9)
POTASSIUM BLDA-SCNC: 5.5 MMOL/L (ref 3.5–4.9)
POTASSIUM BLDA-SCNC: 5.7 MMOL/L (ref 3.5–4.9)
POTASSIUM BLDA-SCNC: 5.8 MMOL/L (ref 3.5–4.9)
POTASSIUM BLDA-SCNC: 5.9 MMOL/L (ref 3.5–4.9)
POTASSIUM BLDA-SCNC: 5.9 MMOL/L (ref 3.5–4.9)
POTASSIUM BLDA-SCNC: 6.1 MMOL/L (ref 3.5–4.9)
POTASSIUM BLDA-SCNC: 6.4 MMOL/L (ref 3.5–4.9)
PROTHROMBIN TIME: 11.8 SECONDS (ref 9.6–11.5)
RBC # BLD AUTO: 2.42 10*6/MM3 (ref 3.89–5.14)
RBC MORPH BLD: NORMAL
SAO2 % BLDA: 100 % (ref 95–98)
SAO2 % BLDA: 81 % (ref 95–98)
SAO2 % BLDA: 89 % (ref 95–98)
SAO2 % BLDA: 99 % (ref 95–98)
SODIUM BLD-SCNC: 150 MMOL/L (ref 132–146)
SODIUM BLDA-SCNC: 137 MMOL/L (ref 138–146)
SODIUM BLDA-SCNC: 138 MMOL/L (ref 138–146)
SODIUM BLDA-SCNC: 140 MMOL/L (ref 138–146)
SODIUM BLDA-SCNC: 141 MMOL/L (ref 138–146)
SODIUM BLDA-SCNC: 141 MMOL/L (ref 138–146)
SODIUM BLDA-SCNC: 142 MMOL/L (ref 138–146)
SODIUM BLDA-SCNC: 143 MMOL/L (ref 138–146)
SODIUM BLDA-SCNC: 148 MMOL/L (ref 138–146)
UNIT  ABO: NORMAL
UNIT  RH: NORMAL
WBC MORPH BLD: NORMAL
WBC NRBC COR # BLD: 11.32 10*3/MM3 (ref 3.5–10.8)

## 2018-07-17 PROCEDURE — 85018 HEMOGLOBIN: CPT | Performed by: THORACIC SURGERY (CARDIOTHORACIC VASCULAR SURGERY)

## 2018-07-17 PROCEDURE — 94760 N-INVAS EAR/PLS OXIMETRY 1: CPT

## 2018-07-17 PROCEDURE — 85007 BL SMEAR W/DIFF WBC COUNT: CPT | Performed by: PHYSICIAN ASSISTANT

## 2018-07-17 PROCEDURE — 94003 VENT MGMT INPAT SUBQ DAY: CPT

## 2018-07-17 PROCEDURE — 85610 PROTHROMBIN TIME: CPT | Performed by: PHYSICIAN ASSISTANT

## 2018-07-17 PROCEDURE — 82805 BLOOD GASES W/O2 SATURATION: CPT | Performed by: THORACIC SURGERY (CARDIOTHORACIC VASCULAR SURGERY)

## 2018-07-17 PROCEDURE — 94799 UNLISTED PULMONARY SVC/PX: CPT

## 2018-07-17 PROCEDURE — 25010000002 MORPHINE PER 10 MG: Performed by: THORACIC SURGERY (CARDIOTHORACIC VASCULAR SURGERY)

## 2018-07-17 PROCEDURE — 93010 ELECTROCARDIOGRAM REPORT: CPT | Performed by: INTERNAL MEDICINE

## 2018-07-17 PROCEDURE — 94640 AIRWAY INHALATION TREATMENT: CPT

## 2018-07-17 PROCEDURE — 99291 CRITICAL CARE FIRST HOUR: CPT | Performed by: INTERNAL MEDICINE

## 2018-07-17 PROCEDURE — 99232 SBSQ HOSP IP/OBS MODERATE 35: CPT | Performed by: INTERNAL MEDICINE

## 2018-07-17 PROCEDURE — 83735 ASSAY OF MAGNESIUM: CPT | Performed by: PHYSICIAN ASSISTANT

## 2018-07-17 PROCEDURE — 82962 GLUCOSE BLOOD TEST: CPT

## 2018-07-17 PROCEDURE — 93005 ELECTROCARDIOGRAM TRACING: CPT | Performed by: PHYSICIAN ASSISTANT

## 2018-07-17 PROCEDURE — 25010000003 POTASSIUM CHLORIDE PER 2 MEQ: Performed by: PHYSICIAN ASSISTANT

## 2018-07-17 PROCEDURE — 99024 POSTOP FOLLOW-UP VISIT: CPT | Performed by: PHYSICIAN ASSISTANT

## 2018-07-17 PROCEDURE — 25010000002 VANCOMYCIN 10 G RECONSTITUTED SOLUTION

## 2018-07-17 PROCEDURE — 71045 X-RAY EXAM CHEST 1 VIEW: CPT

## 2018-07-17 PROCEDURE — 25010000002 ONDANSETRON PER 1 MG: Performed by: PHYSICIAN ASSISTANT

## 2018-07-17 PROCEDURE — 85014 HEMATOCRIT: CPT | Performed by: THORACIC SURGERY (CARDIOTHORACIC VASCULAR SURGERY)

## 2018-07-17 PROCEDURE — 25010000002 FENTANYL CITRATE (PF) 100 MCG/2ML SOLUTION: Performed by: THORACIC SURGERY (CARDIOTHORACIC VASCULAR SURGERY)

## 2018-07-17 PROCEDURE — 80069 RENAL FUNCTION PANEL: CPT | Performed by: PHYSICIAN ASSISTANT

## 2018-07-17 PROCEDURE — 84132 ASSAY OF SERUM POTASSIUM: CPT | Performed by: THORACIC SURGERY (CARDIOTHORACIC VASCULAR SURGERY)

## 2018-07-17 PROCEDURE — 25010000002 KETOROLAC TROMETHAMINE PER 15 MG: Performed by: INTERNAL MEDICINE

## 2018-07-17 PROCEDURE — 85025 COMPLETE CBC W/AUTO DIFF WBC: CPT | Performed by: PHYSICIAN ASSISTANT

## 2018-07-17 RX ORDER — IPRATROPIUM BROMIDE AND ALBUTEROL SULFATE 2.5; .5 MG/3ML; MG/3ML
3 SOLUTION RESPIRATORY (INHALATION)
Status: DISCONTINUED | OUTPATIENT
Start: 2018-07-17 | End: 2018-07-18

## 2018-07-17 RX ORDER — KETOROLAC TROMETHAMINE 30 MG/ML
30 INJECTION, SOLUTION INTRAMUSCULAR; INTRAVENOUS ONCE
Status: COMPLETED | OUTPATIENT
Start: 2018-07-17 | End: 2018-07-17

## 2018-07-17 RX ORDER — ATORVASTATIN CALCIUM 40 MG/1
40 TABLET, FILM COATED ORAL NIGHTLY
COMMUNITY
End: 2020-11-10

## 2018-07-17 RX ADMIN — IPRATROPIUM BROMIDE AND ALBUTEROL SULFATE 3 ML: 2.5; .5 SOLUTION RESPIRATORY (INHALATION) at 19:18

## 2018-07-17 RX ADMIN — CHLORHEXIDINE GLUCONATE 15 ML: 1.2 RINSE ORAL at 10:11

## 2018-07-17 RX ADMIN — NOREPINEPHRINE BITARTRATE 0.13 MCG/KG/MIN: 8 SOLUTION at 18:10

## 2018-07-17 RX ADMIN — NOREPINEPHRINE BITARTRATE 0.13 MCG/KG/MIN: 8 SOLUTION at 05:55

## 2018-07-17 RX ADMIN — MILRINONE LACTATE 0.2 MCG/KG/MIN: 200 INJECTION, SOLUTION INTRAVENOUS at 04:38

## 2018-07-17 RX ADMIN — OXYCODONE HYDROCHLORIDE AND ACETAMINOPHEN 2 TABLET: 5; 325 TABLET ORAL at 15:04

## 2018-07-17 RX ADMIN — IPRATROPIUM BROMIDE AND ALBUTEROL SULFATE 3 ML: 2.5; .5 SOLUTION RESPIRATORY (INHALATION) at 12:58

## 2018-07-17 RX ADMIN — OXYCODONE HYDROCHLORIDE AND ACETAMINOPHEN 2 TABLET: 5; 325 TABLET ORAL at 02:17

## 2018-07-17 RX ADMIN — VARENICLINE TARTRATE 1 MG: 0.5 TABLET, FILM COATED ORAL at 10:10

## 2018-07-17 RX ADMIN — SODIUM CHLORIDE 7.2 UNITS/HR: 9 INJECTION, SOLUTION INTRAVENOUS at 09:43

## 2018-07-17 RX ADMIN — ASPIRIN 325 MG: 325 TABLET, DELAYED RELEASE ORAL at 10:07

## 2018-07-17 RX ADMIN — KETOROLAC TROMETHAMINE 30 MG: 30 INJECTION, SOLUTION INTRAMUSCULAR at 10:05

## 2018-07-17 RX ADMIN — FENTANYL CITRATE 25 MCG: 50 INJECTION, SOLUTION INTRAMUSCULAR; INTRAVENOUS at 02:07

## 2018-07-17 RX ADMIN — DIGOXIN 125 MCG: 125 TABLET ORAL at 12:34

## 2018-07-17 RX ADMIN — Medication 2 MG: at 20:32

## 2018-07-17 RX ADMIN — ATORVASTATIN CALCIUM 40 MG: 40 TABLET, FILM COATED ORAL at 20:32

## 2018-07-17 RX ADMIN — OXYCODONE HYDROCHLORIDE AND ACETAMINOPHEN 2 TABLET: 5; 325 TABLET ORAL at 10:09

## 2018-07-17 RX ADMIN — VANCOMYCIN HYDROCHLORIDE 1250 MG: 10 INJECTION, POWDER, LYOPHILIZED, FOR SOLUTION INTRAVENOUS at 05:11

## 2018-07-17 RX ADMIN — Medication 2 TABLET: at 20:32

## 2018-07-17 RX ADMIN — VANCOMYCIN HYDROCHLORIDE 1250 MG: 10 INJECTION, POWDER, LYOPHILIZED, FOR SOLUTION INTRAVENOUS at 18:38

## 2018-07-17 RX ADMIN — FENTANYL CITRATE 25 MCG: 50 INJECTION, SOLUTION INTRAMUSCULAR; INTRAVENOUS at 05:21

## 2018-07-17 RX ADMIN — POTASSIUM CHLORIDE 20 MEQ: 400 INJECTION, SOLUTION INTRAVENOUS at 02:38

## 2018-07-17 RX ADMIN — LEVOTHYROXINE SODIUM 150 MCG: 100 TABLET ORAL at 06:55

## 2018-07-17 RX ADMIN — ONDANSETRON 4 MG: 2 INJECTION INTRAMUSCULAR; INTRAVENOUS at 10:03

## 2018-07-17 RX ADMIN — SODIUM CHLORIDE 12.6 UNITS/HR: 9 INJECTION, SOLUTION INTRAVENOUS at 00:15

## 2018-07-18 ENCOUNTER — APPOINTMENT (OUTPATIENT)
Dept: GENERAL RADIOLOGY | Facility: HOSPITAL | Age: 58
End: 2018-07-18

## 2018-07-18 LAB
ACT BLD: 109 SECONDS (ref 82–152)
ACT BLD: 114 SECONDS (ref 82–152)
ACT BLD: 373 SECONDS (ref 82–152)
ACT BLD: 527 SECONDS (ref 82–152)
ACT BLD: 532 SECONDS (ref 82–152)
ACT BLD: 560 SECONDS (ref 82–152)
ACT BLD: 593 SECONDS (ref 82–152)
ACT BLD: 599 SECONDS (ref 82–152)
ACT BLD: 654 SECONDS (ref 82–152)
ACT BLD: 665 SECONDS (ref 82–152)
ACT BLD: 703 SECONDS (ref 82–152)
ACT BLD: 852 SECONDS (ref 82–152)
ANION GAP SERPL CALCULATED.3IONS-SCNC: 6 MMOL/L (ref 3–11)
ANION GAP SERPL CALCULATED.3IONS-SCNC: 7 MMOL/L (ref 3–11)
BASE EXCESS BLDA CALC-SCNC: -1 MMOL/L (ref -5–5)
BASE EXCESS BLDA CALC-SCNC: -2 MMOL/L (ref -5–5)
BASE EXCESS BLDA CALC-SCNC: -2 MMOL/L (ref -5–5)
BASE EXCESS BLDA CALC-SCNC: 0 MMOL/L (ref -5–5)
BASE EXCESS BLDA CALC-SCNC: 1 MMOL/L (ref -5–5)
BUN BLD-MCNC: 19 MG/DL (ref 9–23)
BUN BLD-MCNC: 20 MG/DL (ref 9–23)
BUN/CREAT SERPL: 14.9 (ref 7–25)
BUN/CREAT SERPL: 16 (ref 7–25)
CA-I BLDA-SCNC: 0.86 MMOL/L (ref 1.2–1.32)
CA-I BLDA-SCNC: 0.93 MMOL/L (ref 1.2–1.32)
CA-I BLDA-SCNC: 0.96 MMOL/L (ref 1.2–1.32)
CA-I BLDA-SCNC: 1.09 MMOL/L (ref 1.2–1.32)
CA-I BLDA-SCNC: 1.18 MMOL/L (ref 1.2–1.32)
CALCIUM SPEC-SCNC: 7.4 MG/DL (ref 8.7–10.4)
CALCIUM SPEC-SCNC: 7.5 MG/DL (ref 8.7–10.4)
CHLORIDE SERPL-SCNC: 105 MMOL/L (ref 99–109)
CHLORIDE SERPL-SCNC: 106 MMOL/L (ref 99–109)
CO2 BLDA-SCNC: 26 MMOL/L (ref 24–29)
CO2 BLDA-SCNC: 26 MMOL/L (ref 24–29)
CO2 BLDA-SCNC: 27 MMOL/L (ref 24–29)
CO2 BLDA-SCNC: 31 MMOL/L (ref 24–29)
CO2 BLDA-SCNC: 33 MMOL/L (ref 24–29)
CO2 SERPL-SCNC: 24 MMOL/L (ref 20–31)
CO2 SERPL-SCNC: 25 MMOL/L (ref 20–31)
CREAT BLD-MCNC: 1.19 MG/DL (ref 0.6–1.3)
CREAT BLD-MCNC: 1.34 MG/DL (ref 0.6–1.3)
CYTO UR: NORMAL
DEPRECATED RDW RBC AUTO: 78.3 FL (ref 37–54)
ERYTHROCYTE [DISTWIDTH] IN BLOOD BY AUTOMATED COUNT: 19.5 % (ref 11.3–14.5)
GFR SERPL CREATININE-BSD FRML MDRD: 41 ML/MIN/1.73
GFR SERPL CREATININE-BSD FRML MDRD: 47 ML/MIN/1.73
GLUCOSE BLD-MCNC: 117 MG/DL (ref 70–100)
GLUCOSE BLD-MCNC: 124 MG/DL (ref 70–100)
GLUCOSE BLDC GLUCOMTR-MCNC: 119 MG/DL (ref 70–130)
GLUCOSE BLDC GLUCOMTR-MCNC: 121 MG/DL (ref 70–130)
GLUCOSE BLDC GLUCOMTR-MCNC: 124 MG/DL (ref 70–130)
GLUCOSE BLDC GLUCOMTR-MCNC: 125 MG/DL (ref 70–130)
GLUCOSE BLDC GLUCOMTR-MCNC: 128 MG/DL (ref 70–130)
GLUCOSE BLDC GLUCOMTR-MCNC: 140 MG/DL (ref 70–130)
GLUCOSE BLDC GLUCOMTR-MCNC: 141 MG/DL (ref 70–130)
GLUCOSE BLDC GLUCOMTR-MCNC: 174 MG/DL (ref 70–130)
GLUCOSE BLDC GLUCOMTR-MCNC: 176 MG/DL (ref 70–130)
GLUCOSE BLDC GLUCOMTR-MCNC: 183 MG/DL (ref 70–130)
GLUCOSE BLDC GLUCOMTR-MCNC: 192 MG/DL (ref 70–130)
GLUCOSE BLDC GLUCOMTR-MCNC: 199 MG/DL (ref 70–130)
GLUCOSE BLDC GLUCOMTR-MCNC: 218 MG/DL (ref 70–130)
GLUCOSE BLDC GLUCOMTR-MCNC: 81 MG/DL (ref 70–130)
GLUCOSE BLDC GLUCOMTR-MCNC: 92 MG/DL (ref 70–130)
HCO3 BLDA-SCNC: 24.3 MMOL/L (ref 22–26)
HCO3 BLDA-SCNC: 24.6 MMOL/L (ref 22–26)
HCO3 BLDA-SCNC: 25.4 MMOL/L (ref 22–26)
HCO3 BLDA-SCNC: 28 MMOL/L (ref 22–26)
HCO3 BLDA-SCNC: 30.1 MMOL/L (ref 22–26)
HCT VFR BLD AUTO: 26.3 % (ref 34.5–44)
HCT VFR BLDA CALC: 20 % (ref 38–51)
HCT VFR BLDA CALC: 20 % (ref 38–51)
HCT VFR BLDA CALC: 22 % (ref 38–51)
HCT VFR BLDA CALC: 24 % (ref 38–51)
HCT VFR BLDA CALC: 24 % (ref 38–51)
HGB BLD-MCNC: 8.5 G/DL (ref 11.5–15.5)
HGB BLDA-MCNC: 6.8 G/DL (ref 12–17)
HGB BLDA-MCNC: 6.8 G/DL (ref 12–17)
HGB BLDA-MCNC: 7.5 G/DL (ref 12–17)
HGB BLDA-MCNC: 8.2 G/DL (ref 12–17)
HGB BLDA-MCNC: 8.2 G/DL (ref 12–17)
LAB AP CASE REPORT: NORMAL
LAB AP CLINICAL INFORMATION: NORMAL
MCH RBC QN AUTO: 35.9 PG (ref 27–31)
MCHC RBC AUTO-ENTMCNC: 32.3 G/DL (ref 32–36)
MCV RBC AUTO: 111 FL (ref 80–99)
PATH REPORT.FINAL DX SPEC: NORMAL
PATH REPORT.GROSS SPEC: NORMAL
PCO2 BLDA: 106.6 MM HG (ref 35–45)
PCO2 BLDA: 37.8 MM HG (ref 35–45)
PCO2 BLDA: 45.7 MM HG (ref 35–45)
PCO2 BLDA: 47.3 MM HG (ref 35–45)
PCO2 BLDA: 89.3 MM HG (ref 35–45)
PH BLDA: 7.06 PH UNITS (ref 7.35–7.6)
PH BLDA: 7.1 PH UNITS (ref 7.35–7.6)
PH BLDA: 7.32 PH UNITS (ref 7.35–7.6)
PH BLDA: 7.34 PH UNITS (ref 7.35–7.6)
PH BLDA: 7.44 PH UNITS (ref 7.35–7.6)
PLATELET # BLD AUTO: 79 10*3/MM3 (ref 150–450)
PMV BLD AUTO: 11.1 FL (ref 6–12)
PO2 BLDA: 329 MMHG (ref 80–105)
PO2 BLDA: 360 MMHG (ref 80–105)
PO2 BLDA: 383 MMHG (ref 80–105)
PO2 BLDA: 390 MMHG (ref 80–105)
PO2 BLDA: 58 MMHG (ref 80–105)
POTASSIUM BLD-SCNC: 5.5 MMOL/L (ref 3.5–5.5)
POTASSIUM BLD-SCNC: 5.5 MMOL/L (ref 3.5–5.5)
POTASSIUM BLD-SCNC: 6.4 MMOL/L (ref 3.5–5.5)
POTASSIUM BLDA-SCNC: 3.2 MMOL/L (ref 3.5–4.9)
POTASSIUM BLDA-SCNC: 3.7 MMOL/L (ref 3.5–4.9)
POTASSIUM BLDA-SCNC: 5.2 MMOL/L (ref 3.5–4.9)
POTASSIUM BLDA-SCNC: 5.4 MMOL/L (ref 3.5–4.9)
POTASSIUM BLDA-SCNC: 5.5 MMOL/L (ref 3.5–4.9)
RBC # BLD AUTO: 2.37 10*6/MM3 (ref 3.89–5.14)
SAO2 % BLDA: 100 % (ref 95–98)
SAO2 % BLDA: 77 % (ref 95–98)
SODIUM BLD-SCNC: 136 MMOL/L (ref 132–146)
SODIUM BLD-SCNC: 137 MMOL/L (ref 132–146)
SODIUM BLDA-SCNC: 140 MMOL/L (ref 138–146)
SODIUM BLDA-SCNC: 142 MMOL/L (ref 138–146)
SODIUM BLDA-SCNC: 142 MMOL/L (ref 138–146)
SODIUM BLDA-SCNC: 143 MMOL/L (ref 138–146)
SODIUM BLDA-SCNC: 144 MMOL/L (ref 138–146)
WBC NRBC COR # BLD: 13.18 10*3/MM3 (ref 3.5–10.8)

## 2018-07-18 PROCEDURE — 99291 CRITICAL CARE FIRST HOUR: CPT | Performed by: INTERNAL MEDICINE

## 2018-07-18 PROCEDURE — 84132 ASSAY OF SERUM POTASSIUM: CPT | Performed by: THORACIC SURGERY (CARDIOTHORACIC VASCULAR SURGERY)

## 2018-07-18 PROCEDURE — 63710000001 INSULIN REGULAR HUMAN PER 5 UNITS: Performed by: NURSE PRACTITIONER

## 2018-07-18 PROCEDURE — 93010 ELECTROCARDIOGRAM REPORT: CPT | Performed by: INTERNAL MEDICINE

## 2018-07-18 PROCEDURE — 94799 UNLISTED PULMONARY SVC/PX: CPT

## 2018-07-18 PROCEDURE — 93005 ELECTROCARDIOGRAM TRACING: CPT | Performed by: NURSE PRACTITIONER

## 2018-07-18 PROCEDURE — 85027 COMPLETE CBC AUTOMATED: CPT | Performed by: PHYSICIAN ASSISTANT

## 2018-07-18 PROCEDURE — 71045 X-RAY EXAM CHEST 1 VIEW: CPT

## 2018-07-18 PROCEDURE — 99232 SBSQ HOSP IP/OBS MODERATE 35: CPT | Performed by: INTERNAL MEDICINE

## 2018-07-18 PROCEDURE — 82962 GLUCOSE BLOOD TEST: CPT

## 2018-07-18 PROCEDURE — 25010000002 MORPHINE PER 10 MG: Performed by: THORACIC SURGERY (CARDIOTHORACIC VASCULAR SURGERY)

## 2018-07-18 PROCEDURE — 25010000002 FUROSEMIDE PER 20 MG: Performed by: INTERNAL MEDICINE

## 2018-07-18 PROCEDURE — 80048 BASIC METABOLIC PNL TOTAL CA: CPT | Performed by: NURSE PRACTITIONER

## 2018-07-18 PROCEDURE — 93005 ELECTROCARDIOGRAM TRACING: CPT | Performed by: PHYSICIAN ASSISTANT

## 2018-07-18 PROCEDURE — 97162 PT EVAL MOD COMPLEX 30 MIN: CPT

## 2018-07-18 PROCEDURE — 94760 N-INVAS EAR/PLS OXIMETRY 1: CPT

## 2018-07-18 PROCEDURE — 80048 BASIC METABOLIC PNL TOTAL CA: CPT | Performed by: PHYSICIAN ASSISTANT

## 2018-07-18 PROCEDURE — 25010000002 CALCIUM GLUCONATE PER 10 ML: Performed by: NURSE PRACTITIONER

## 2018-07-18 PROCEDURE — 99024 POSTOP FOLLOW-UP VISIT: CPT | Performed by: PHYSICIAN ASSISTANT

## 2018-07-18 PROCEDURE — 94640 AIRWAY INHALATION TREATMENT: CPT

## 2018-07-18 RX ORDER — MORPHINE SULFATE 1 MG/ML
2 INJECTION, SOLUTION EPIDURAL; INTRATHECAL; INTRAVENOUS
Status: DISCONTINUED | OUTPATIENT
Start: 2018-07-18 | End: 2018-07-26 | Stop reason: HOSPADM

## 2018-07-18 RX ORDER — IPRATROPIUM BROMIDE AND ALBUTEROL SULFATE 2.5; .5 MG/3ML; MG/3ML
3 SOLUTION RESPIRATORY (INHALATION)
Status: DISCONTINUED | OUTPATIENT
Start: 2018-07-18 | End: 2018-07-26 | Stop reason: HOSPADM

## 2018-07-18 RX ORDER — ACETYLCYSTEINE 200 MG/ML
4 SOLUTION ORAL; RESPIRATORY (INHALATION)
Status: COMPLETED | OUTPATIENT
Start: 2018-07-18 | End: 2018-07-19

## 2018-07-18 RX ORDER — MIDODRINE HYDROCHLORIDE 5 MG/1
10 TABLET ORAL EVERY 8 HOURS
Status: DISCONTINUED | OUTPATIENT
Start: 2018-07-18 | End: 2018-07-20

## 2018-07-18 RX ORDER — FUROSEMIDE 10 MG/ML
80 INJECTION INTRAMUSCULAR; INTRAVENOUS ONCE
Status: COMPLETED | OUTPATIENT
Start: 2018-07-18 | End: 2018-07-18

## 2018-07-18 RX ORDER — SODIUM POLYSTYRENE SULFONATE 15 G/60ML
30 SUSPENSION ORAL; RECTAL ONCE
Status: COMPLETED | OUTPATIENT
Start: 2018-07-18 | End: 2018-07-18

## 2018-07-18 RX ORDER — DEXTROSE MONOHYDRATE 25 G/50ML
50 INJECTION, SOLUTION INTRAVENOUS ONCE
Status: COMPLETED | OUTPATIENT
Start: 2018-07-18 | End: 2018-07-18

## 2018-07-18 RX ORDER — MILRINONE LACTATE 0.2 MG/ML
.25-.75 INJECTION, SOLUTION INTRAVENOUS
Status: DISCONTINUED | OUTPATIENT
Start: 2018-07-18 | End: 2018-07-19

## 2018-07-18 RX ORDER — CALCIUM GLUCONATE 94 MG/ML
INJECTION, SOLUTION INTRAVENOUS
Status: DISCONTINUED
Start: 2018-07-18 | End: 2018-07-18 | Stop reason: WASHOUT

## 2018-07-18 RX ADMIN — ACETYLCYSTEINE 4 ML: 200 SOLUTION ORAL; RESPIRATORY (INHALATION) at 14:48

## 2018-07-18 RX ADMIN — INSULIN HUMAN 10 UNITS: 100 INJECTION, SOLUTION PARENTERAL at 06:47

## 2018-07-18 RX ADMIN — LEVOTHYROXINE SODIUM 150 MCG: 100 TABLET ORAL at 06:15

## 2018-07-18 RX ADMIN — IPRATROPIUM BROMIDE AND ALBUTEROL SULFATE 3 ML: 2.5; .5 SOLUTION RESPIRATORY (INHALATION) at 20:04

## 2018-07-18 RX ADMIN — MIDODRINE HYDROCHLORIDE 10 MG: 5 TABLET ORAL at 12:13

## 2018-07-18 RX ADMIN — IPRATROPIUM BROMIDE AND ALBUTEROL SULFATE 3 ML: 2.5; .5 SOLUTION RESPIRATORY (INHALATION) at 12:10

## 2018-07-18 RX ADMIN — Medication 2 MG: at 03:00

## 2018-07-18 RX ADMIN — MILRINONE LACTATE 0.15 MCG/KG/MIN: 200 INJECTION, SOLUTION INTRAVENOUS at 05:07

## 2018-07-18 RX ADMIN — NOREPINEPHRINE BITARTRATE 0.2 MCG/KG/MIN: 8 SOLUTION at 20:35

## 2018-07-18 RX ADMIN — NOREPINEPHRINE BITARTRATE 0.2 MCG/KG/MIN: 8 SOLUTION at 03:00

## 2018-07-18 RX ADMIN — DEXTROSE MONOHYDRATE 50 ML: 25 INJECTION, SOLUTION INTRAVENOUS at 06:47

## 2018-07-18 RX ADMIN — SODIUM CHLORIDE 5.6 UNITS/HR: 9 INJECTION, SOLUTION INTRAVENOUS at 01:30

## 2018-07-18 RX ADMIN — CALCIUM GLUCONATE 1 G: 98 INJECTION, SOLUTION INTRAVENOUS at 06:50

## 2018-07-18 RX ADMIN — ACETYLCYSTEINE 4 ML: 200 SOLUTION ORAL; RESPIRATORY (INHALATION) at 12:11

## 2018-07-18 RX ADMIN — ACETYLCYSTEINE 4 ML: 200 SOLUTION ORAL; RESPIRATORY (INHALATION) at 20:03

## 2018-07-18 RX ADMIN — Medication 2 TABLET: at 09:23

## 2018-07-18 RX ADMIN — SODIUM CHLORIDE 3.4 UNITS/HR: 9 INJECTION, SOLUTION INTRAVENOUS at 21:06

## 2018-07-18 RX ADMIN — OXYCODONE HYDROCHLORIDE AND ACETAMINOPHEN 2 TABLET: 5; 325 TABLET ORAL at 09:23

## 2018-07-18 RX ADMIN — FUROSEMIDE 80 MG: 10 INJECTION, SOLUTION INTRAMUSCULAR; INTRAVENOUS at 10:23

## 2018-07-18 RX ADMIN — IPRATROPIUM BROMIDE AND ALBUTEROL SULFATE 3 ML: 2.5; .5 SOLUTION RESPIRATORY (INHALATION) at 14:49

## 2018-07-18 RX ADMIN — OXYCODONE HYDROCHLORIDE AND ACETAMINOPHEN 2 TABLET: 5; 325 TABLET ORAL at 00:21

## 2018-07-18 RX ADMIN — OXYCODONE HYDROCHLORIDE AND ACETAMINOPHEN 2 TABLET: 5; 325 TABLET ORAL at 18:22

## 2018-07-18 RX ADMIN — CHLORHEXIDINE GLUCONATE 15 ML: 1.2 RINSE ORAL at 09:23

## 2018-07-18 RX ADMIN — ACETYLCYSTEINE 4 ML: 200 SOLUTION ORAL; RESPIRATORY (INHALATION) at 23:39

## 2018-07-18 RX ADMIN — SODIUM BICARBONATE 50 MEQ: 84 INJECTION, SOLUTION INTRAVENOUS at 06:47

## 2018-07-18 RX ADMIN — SODIUM POLYSTYRENE SULFONATE 30 G: 15 SUSPENSION ORAL; RECTAL at 06:50

## 2018-07-18 RX ADMIN — IPRATROPIUM BROMIDE AND ALBUTEROL SULFATE 3 ML: 2.5; .5 SOLUTION RESPIRATORY (INHALATION) at 07:00

## 2018-07-18 RX ADMIN — MIDODRINE HYDROCHLORIDE 10 MG: 5 TABLET ORAL at 20:35

## 2018-07-18 RX ADMIN — Medication 2 TABLET: at 20:34

## 2018-07-18 RX ADMIN — ATORVASTATIN CALCIUM 40 MG: 40 TABLET, FILM COATED ORAL at 20:35

## 2018-07-18 RX ADMIN — MILRINONE LACTATE IN DEXTROSE 0.15 MCG/KG/MIN: 200 INJECTION, SOLUTION INTRAVENOUS at 14:13

## 2018-07-18 RX ADMIN — ASPIRIN 325 MG: 325 TABLET, DELAYED RELEASE ORAL at 09:23

## 2018-07-18 RX ADMIN — IPRATROPIUM BROMIDE AND ALBUTEROL SULFATE 3 ML: 2.5; .5 SOLUTION RESPIRATORY (INHALATION) at 23:39

## 2018-07-18 RX ADMIN — OXYCODONE HYDROCHLORIDE AND ACETAMINOPHEN 2 TABLET: 5; 325 TABLET ORAL at 14:17

## 2018-07-19 ENCOUNTER — APPOINTMENT (OUTPATIENT)
Dept: ULTRASOUND IMAGING | Facility: HOSPITAL | Age: 58
End: 2018-07-19

## 2018-07-19 ENCOUNTER — APPOINTMENT (OUTPATIENT)
Dept: GENERAL RADIOLOGY | Facility: HOSPITAL | Age: 58
End: 2018-07-19

## 2018-07-19 LAB
ABO + RH BLD: NORMAL
ABO + RH BLD: NORMAL
ALBUMIN SERPL-MCNC: 3.47 G/DL (ref 3.2–4.8)
ALBUMIN/GLOB SERPL: 1.6 G/DL (ref 1.5–2.5)
ALP SERPL-CCNC: 70 U/L (ref 25–100)
ALT SERPL W P-5'-P-CCNC: 42 U/L (ref 7–40)
ANION GAP SERPL CALCULATED.3IONS-SCNC: 7 MMOL/L (ref 3–11)
AST SERPL-CCNC: 134 U/L (ref 0–33)
BASOPHILS # BLD AUTO: 0.01 10*3/MM3 (ref 0–0.2)
BASOPHILS NFR BLD AUTO: 0.1 % (ref 0–1)
BH BB BLOOD EXPIRATION DATE: NORMAL
BH BB BLOOD EXPIRATION DATE: NORMAL
BH BB BLOOD TYPE BARCODE: 5100
BH BB BLOOD TYPE BARCODE: 5100
BH BB DISPENSE STATUS: NORMAL
BH BB DISPENSE STATUS: NORMAL
BH BB PRODUCT CODE: NORMAL
BH BB PRODUCT CODE: NORMAL
BH BB UNIT NUMBER: NORMAL
BH BB UNIT NUMBER: NORMAL
BILIRUB SERPL-MCNC: 5.2 MG/DL (ref 0.3–1.2)
BUN BLD-MCNC: 22 MG/DL (ref 9–23)
BUN/CREAT SERPL: 19 (ref 7–25)
CALCIUM SPEC-SCNC: 7.7 MG/DL (ref 8.7–10.4)
CHLORIDE SERPL-SCNC: 103 MMOL/L (ref 99–109)
CO2 SERPL-SCNC: 26 MMOL/L (ref 20–31)
CREAT BLD-MCNC: 1.16 MG/DL (ref 0.6–1.3)
DEPRECATED RDW RBC AUTO: 73.2 FL (ref 37–54)
EOSINOPHIL # BLD AUTO: 0.01 10*3/MM3 (ref 0–0.3)
EOSINOPHIL NFR BLD AUTO: 0.1 % (ref 0–3)
ERYTHROCYTE [DISTWIDTH] IN BLOOD BY AUTOMATED COUNT: 18.5 % (ref 11.3–14.5)
GFR SERPL CREATININE-BSD FRML MDRD: 48 ML/MIN/1.73
GLOBULIN UR ELPH-MCNC: 2.2 GM/DL
GLUCOSE BLD-MCNC: 141 MG/DL (ref 70–100)
GLUCOSE BLDC GLUCOMTR-MCNC: 120 MG/DL (ref 70–130)
GLUCOSE BLDC GLUCOMTR-MCNC: 126 MG/DL (ref 70–130)
GLUCOSE BLDC GLUCOMTR-MCNC: 130 MG/DL (ref 70–130)
GLUCOSE BLDC GLUCOMTR-MCNC: 134 MG/DL (ref 70–130)
GLUCOSE BLDC GLUCOMTR-MCNC: 136 MG/DL (ref 70–130)
GLUCOSE BLDC GLUCOMTR-MCNC: 136 MG/DL (ref 70–130)
GLUCOSE BLDC GLUCOMTR-MCNC: 140 MG/DL (ref 70–130)
GLUCOSE BLDC GLUCOMTR-MCNC: 141 MG/DL (ref 70–130)
GLUCOSE BLDC GLUCOMTR-MCNC: 142 MG/DL (ref 70–130)
GLUCOSE BLDC GLUCOMTR-MCNC: 143 MG/DL (ref 70–130)
GLUCOSE BLDC GLUCOMTR-MCNC: 144 MG/DL (ref 70–130)
GLUCOSE BLDC GLUCOMTR-MCNC: 148 MG/DL (ref 70–130)
GLUCOSE BLDC GLUCOMTR-MCNC: 155 MG/DL (ref 70–130)
HCT VFR BLD AUTO: 26.6 % (ref 34.5–44)
HGB BLD-MCNC: 8.5 G/DL (ref 11.5–15.5)
IMM GRANULOCYTES # BLD: 0.06 10*3/MM3 (ref 0–0.03)
IMM GRANULOCYTES NFR BLD: 0.4 % (ref 0–0.6)
LYMPHOCYTES # BLD AUTO: 1.57 10*3/MM3 (ref 0.6–4.8)
LYMPHOCYTES NFR BLD AUTO: 11.7 % (ref 24–44)
MACROCYTES BLD QL SMEAR: NORMAL
MAGNESIUM SERPL-MCNC: 2.3 MG/DL (ref 1.3–2.7)
MCH RBC QN AUTO: 35.4 PG (ref 27–31)
MCHC RBC AUTO-ENTMCNC: 32 G/DL (ref 32–36)
MCV RBC AUTO: 110.8 FL (ref 80–99)
MONOCYTES # BLD AUTO: 1.16 10*3/MM3 (ref 0–1)
MONOCYTES NFR BLD AUTO: 8.6 % (ref 0–12)
NEUTROPHILS # BLD AUTO: 10.7 10*3/MM3 (ref 1.5–8.3)
NEUTROPHILS NFR BLD AUTO: 79.5 % (ref 41–71)
PHOSPHATE SERPL-MCNC: 4.6 MG/DL (ref 2.4–5.1)
PLATELET # BLD AUTO: 76 10*3/MM3 (ref 150–450)
PMV BLD AUTO: 11.7 FL (ref 6–12)
POTASSIUM BLD-SCNC: 4.7 MMOL/L (ref 3.5–5.5)
PROT SERPL-MCNC: 5.7 G/DL (ref 5.7–8.2)
RBC # BLD AUTO: 2.4 10*6/MM3 (ref 3.89–5.14)
SMALL PLATELETS BLD QL SMEAR: ADEQUATE
SODIUM BLD-SCNC: 136 MMOL/L (ref 132–146)
TSH SERPL DL<=0.05 MIU/L-ACNC: 2.79 MIU/ML (ref 0.35–5.35)
UNIT  ABO: NORMAL
UNIT  ABO: NORMAL
UNIT  RH: NORMAL
UNIT  RH: NORMAL
WBC MORPH BLD: NORMAL
WBC NRBC COR # BLD: 13.45 10*3/MM3 (ref 3.5–10.8)

## 2018-07-19 PROCEDURE — 94760 N-INVAS EAR/PLS OXIMETRY 1: CPT

## 2018-07-19 PROCEDURE — 76705 ECHO EXAM OF ABDOMEN: CPT

## 2018-07-19 PROCEDURE — 85025 COMPLETE CBC W/AUTO DIFF WBC: CPT | Performed by: INTERNAL MEDICINE

## 2018-07-19 PROCEDURE — 83735 ASSAY OF MAGNESIUM: CPT | Performed by: INTERNAL MEDICINE

## 2018-07-19 PROCEDURE — 94799 UNLISTED PULMONARY SVC/PX: CPT

## 2018-07-19 PROCEDURE — 84100 ASSAY OF PHOSPHORUS: CPT | Performed by: INTERNAL MEDICINE

## 2018-07-19 PROCEDURE — 94640 AIRWAY INHALATION TREATMENT: CPT

## 2018-07-19 PROCEDURE — 99024 POSTOP FOLLOW-UP VISIT: CPT | Performed by: PHYSICIAN ASSISTANT

## 2018-07-19 PROCEDURE — 85007 BL SMEAR W/DIFF WBC COUNT: CPT | Performed by: INTERNAL MEDICINE

## 2018-07-19 PROCEDURE — 97530 THERAPEUTIC ACTIVITIES: CPT

## 2018-07-19 PROCEDURE — 84443 ASSAY THYROID STIM HORMONE: CPT | Performed by: INTERNAL MEDICINE

## 2018-07-19 PROCEDURE — 99291 CRITICAL CARE FIRST HOUR: CPT | Performed by: INTERNAL MEDICINE

## 2018-07-19 PROCEDURE — 71045 X-RAY EXAM CHEST 1 VIEW: CPT

## 2018-07-19 PROCEDURE — 82962 GLUCOSE BLOOD TEST: CPT

## 2018-07-19 PROCEDURE — 99231 SBSQ HOSP IP/OBS SF/LOW 25: CPT | Performed by: NURSE PRACTITIONER

## 2018-07-19 PROCEDURE — 63710000001 INSULIN DETEMIR PER 5 UNITS: Performed by: INTERNAL MEDICINE

## 2018-07-19 PROCEDURE — 80053 COMPREHEN METABOLIC PANEL: CPT | Performed by: INTERNAL MEDICINE

## 2018-07-19 RX ORDER — SODIUM CHLORIDE, SODIUM LACTATE, POTASSIUM CHLORIDE, CALCIUM CHLORIDE 600; 310; 30; 20 MG/100ML; MG/100ML; MG/100ML; MG/100ML
200 INJECTION, SOLUTION INTRAVENOUS CONTINUOUS
Status: DISCONTINUED | OUTPATIENT
Start: 2018-07-19 | End: 2018-07-19

## 2018-07-19 RX ADMIN — IPRATROPIUM BROMIDE AND ALBUTEROL SULFATE 3 ML: 2.5; .5 SOLUTION RESPIRATORY (INHALATION) at 19:55

## 2018-07-19 RX ADMIN — Medication 2 TABLET: at 08:55

## 2018-07-19 RX ADMIN — ASPIRIN 325 MG: 325 TABLET, DELAYED RELEASE ORAL at 08:55

## 2018-07-19 RX ADMIN — ACETYLCYSTEINE 4 ML: 200 SOLUTION ORAL; RESPIRATORY (INHALATION) at 03:44

## 2018-07-19 RX ADMIN — OXYCODONE HYDROCHLORIDE AND ACETAMINOPHEN 2 TABLET: 5; 325 TABLET ORAL at 20:09

## 2018-07-19 RX ADMIN — LEVOTHYROXINE SODIUM 150 MCG: 100 TABLET ORAL at 05:25

## 2018-07-19 RX ADMIN — MIDODRINE HYDROCHLORIDE 10 MG: 5 TABLET ORAL at 12:57

## 2018-07-19 RX ADMIN — IPRATROPIUM BROMIDE AND ALBUTEROL SULFATE 3 ML: 2.5; .5 SOLUTION RESPIRATORY (INHALATION) at 07:25

## 2018-07-19 RX ADMIN — Medication 2 TABLET: at 20:09

## 2018-07-19 RX ADMIN — NOREPINEPHRINE BITARTRATE 0.14 MCG/KG/MIN: 8 SOLUTION at 06:04

## 2018-07-19 RX ADMIN — IPRATROPIUM BROMIDE AND ALBUTEROL SULFATE 3 ML: 2.5; .5 SOLUTION RESPIRATORY (INHALATION) at 11:58

## 2018-07-19 RX ADMIN — ATORVASTATIN CALCIUM 40 MG: 40 TABLET, FILM COATED ORAL at 20:08

## 2018-07-19 RX ADMIN — IPRATROPIUM BROMIDE AND ALBUTEROL SULFATE 3 ML: 2.5; .5 SOLUTION RESPIRATORY (INHALATION) at 03:44

## 2018-07-19 RX ADMIN — IPRATROPIUM BROMIDE AND ALBUTEROL SULFATE 3 ML: 2.5; .5 SOLUTION RESPIRATORY (INHALATION) at 16:09

## 2018-07-19 RX ADMIN — ACETYLCYSTEINE 4 ML: 200 SOLUTION ORAL; RESPIRATORY (INHALATION) at 07:25

## 2018-07-19 RX ADMIN — INSULIN DETEMIR 10 UNITS: 100 INJECTION, SOLUTION SUBCUTANEOUS at 12:58

## 2018-07-19 RX ADMIN — OXYCODONE HYDROCHLORIDE AND ACETAMINOPHEN 2 TABLET: 5; 325 TABLET ORAL at 04:05

## 2018-07-19 RX ADMIN — IPRATROPIUM BROMIDE AND ALBUTEROL SULFATE 3 ML: 2.5; .5 SOLUTION RESPIRATORY (INHALATION) at 23:03

## 2018-07-19 RX ADMIN — SODIUM CHLORIDE, POTASSIUM CHLORIDE, SODIUM LACTATE AND CALCIUM CHLORIDE 200 ML/HR: 600; 310; 30; 20 INJECTION, SOLUTION INTRAVENOUS at 09:43

## 2018-07-19 RX ADMIN — MIDODRINE HYDROCHLORIDE 10 MG: 5 TABLET ORAL at 04:04

## 2018-07-19 RX ADMIN — OXYCODONE HYDROCHLORIDE AND ACETAMINOPHEN 2 TABLET: 5; 325 TABLET ORAL at 08:55

## 2018-07-19 RX ADMIN — DIGOXIN 125 MCG: 125 TABLET ORAL at 12:57

## 2018-07-19 RX ADMIN — MIDODRINE HYDROCHLORIDE 10 MG: 5 TABLET ORAL at 20:08

## 2018-07-20 ENCOUNTER — APPOINTMENT (OUTPATIENT)
Dept: GENERAL RADIOLOGY | Facility: HOSPITAL | Age: 58
End: 2018-07-20

## 2018-07-20 LAB
ALBUMIN SERPL-MCNC: 3.22 G/DL (ref 3.2–4.8)
ALBUMIN/GLOB SERPL: 1.6 G/DL (ref 1.5–2.5)
ALP SERPL-CCNC: 88 U/L (ref 25–100)
ALT SERPL W P-5'-P-CCNC: 50 U/L (ref 7–40)
ANION GAP SERPL CALCULATED.3IONS-SCNC: 6 MMOL/L (ref 3–11)
AST SERPL-CCNC: 102 U/L (ref 0–33)
BASOPHILS # BLD MANUAL: 0 10*3/MM3 (ref 0–0.2)
BASOPHILS NFR BLD AUTO: 0 % (ref 0–1)
BILIRUB SERPL-MCNC: 6.3 MG/DL (ref 0.3–1.2)
BUN BLD-MCNC: 22 MG/DL (ref 9–23)
BUN/CREAT SERPL: 25.6 (ref 7–25)
CALCIUM SPEC-SCNC: 7.4 MG/DL (ref 8.7–10.4)
CHLORIDE SERPL-SCNC: 100 MMOL/L (ref 99–109)
CO2 SERPL-SCNC: 30 MMOL/L (ref 20–31)
CREAT BLD-MCNC: 0.86 MG/DL (ref 0.6–1.3)
DEPRECATED RDW RBC AUTO: 74.8 FL (ref 37–54)
EOSINOPHIL # BLD MANUAL: 0.19 10*3/MM3 (ref 0.1–0.3)
EOSINOPHIL NFR BLD MANUAL: 2 % (ref 0–3)
ERYTHROCYTE [DISTWIDTH] IN BLOOD BY AUTOMATED COUNT: 18.5 % (ref 11.3–14.5)
GFR SERPL CREATININE-BSD FRML MDRD: 68 ML/MIN/1.73
GLOBULIN UR ELPH-MCNC: 2 GM/DL
GLUCOSE BLD-MCNC: 130 MG/DL (ref 70–100)
GLUCOSE BLDC GLUCOMTR-MCNC: 106 MG/DL (ref 70–130)
GLUCOSE BLDC GLUCOMTR-MCNC: 128 MG/DL (ref 70–130)
GLUCOSE BLDC GLUCOMTR-MCNC: 132 MG/DL (ref 70–130)
GLUCOSE BLDC GLUCOMTR-MCNC: 144 MG/DL (ref 70–130)
HCT VFR BLD AUTO: 27.5 % (ref 34.5–44)
HGB BLD-MCNC: 8.7 G/DL (ref 11.5–15.5)
INR PPP: 1.16 (ref 0.91–1.09)
LYMPHOCYTES # BLD MANUAL: 0.47 10*3/MM3 (ref 0.6–4.8)
LYMPHOCYTES NFR BLD MANUAL: 11 % (ref 0–12)
LYMPHOCYTES NFR BLD MANUAL: 5 % (ref 24–44)
MACROCYTES BLD QL SMEAR: ABNORMAL
MAGNESIUM SERPL-MCNC: 2.4 MG/DL (ref 1.3–2.7)
MCH RBC QN AUTO: 35.8 PG (ref 27–31)
MCHC RBC AUTO-ENTMCNC: 31.6 G/DL (ref 32–36)
MCV RBC AUTO: 113.2 FL (ref 80–99)
MONOCYTES # BLD AUTO: 1.04 10*3/MM3 (ref 0–1)
NEUTROPHILS # BLD AUTO: 7.74 10*3/MM3 (ref 1.5–8.3)
NEUTROPHILS NFR BLD MANUAL: 81 % (ref 41–71)
NEUTS BAND NFR BLD MANUAL: 1 % (ref 0–5)
NRBC SPEC MANUAL: 2 /100 WBC (ref 0–0)
PHOSPHATE SERPL-MCNC: 3 MG/DL (ref 2.4–5.1)
PLAT MORPH BLD: NORMAL
PLATELET # BLD AUTO: 69 10*3/MM3 (ref 150–450)
PMV BLD AUTO: 11.7 FL (ref 6–12)
POLYCHROMASIA BLD QL SMEAR: ABNORMAL
POTASSIUM BLD-SCNC: 5 MMOL/L (ref 3.5–5.5)
PROT SERPL-MCNC: 5.2 G/DL (ref 5.7–8.2)
PROTHROMBIN TIME: 12.2 SECONDS (ref 9.6–11.5)
RBC # BLD AUTO: 2.43 10*6/MM3 (ref 3.89–5.14)
SODIUM BLD-SCNC: 136 MMOL/L (ref 132–146)
VIT B12 BLD-MCNC: >2000 PG/ML (ref 211–911)
WBC MORPH BLD: NORMAL
WBC NRBC COR # BLD: 9.44 10*3/MM3 (ref 3.5–10.8)

## 2018-07-20 PROCEDURE — 82747 ASSAY OF FOLIC ACID RBC: CPT | Performed by: INTERNAL MEDICINE

## 2018-07-20 PROCEDURE — 63710000001 INSULIN DETEMIR PER 5 UNITS: Performed by: INTERNAL MEDICINE

## 2018-07-20 PROCEDURE — 97530 THERAPEUTIC ACTIVITIES: CPT

## 2018-07-20 PROCEDURE — 85610 PROTHROMBIN TIME: CPT | Performed by: PHYSICIAN ASSISTANT

## 2018-07-20 PROCEDURE — 85007 BL SMEAR W/DIFF WBC COUNT: CPT | Performed by: INTERNAL MEDICINE

## 2018-07-20 PROCEDURE — 83735 ASSAY OF MAGNESIUM: CPT | Performed by: INTERNAL MEDICINE

## 2018-07-20 PROCEDURE — 99231 SBSQ HOSP IP/OBS SF/LOW 25: CPT | Performed by: NURSE PRACTITIONER

## 2018-07-20 PROCEDURE — 82607 VITAMIN B-12: CPT | Performed by: INTERNAL MEDICINE

## 2018-07-20 PROCEDURE — G0108 DIAB MANAGE TRN  PER INDIV: HCPCS

## 2018-07-20 PROCEDURE — 85014 HEMATOCRIT: CPT | Performed by: INTERNAL MEDICINE

## 2018-07-20 PROCEDURE — 84100 ASSAY OF PHOSPHORUS: CPT | Performed by: INTERNAL MEDICINE

## 2018-07-20 PROCEDURE — 71045 X-RAY EXAM CHEST 1 VIEW: CPT

## 2018-07-20 PROCEDURE — 94640 AIRWAY INHALATION TREATMENT: CPT

## 2018-07-20 PROCEDURE — 82962 GLUCOSE BLOOD TEST: CPT

## 2018-07-20 PROCEDURE — 94799 UNLISTED PULMONARY SVC/PX: CPT

## 2018-07-20 PROCEDURE — 97116 GAIT TRAINING THERAPY: CPT

## 2018-07-20 PROCEDURE — 99233 SBSQ HOSP IP/OBS HIGH 50: CPT | Performed by: INTERNAL MEDICINE

## 2018-07-20 PROCEDURE — 63710000001 INSULIN LISPRO (HUMAN) PER 5 UNITS: Performed by: INTERNAL MEDICINE

## 2018-07-20 PROCEDURE — 85025 COMPLETE CBC W/AUTO DIFF WBC: CPT | Performed by: INTERNAL MEDICINE

## 2018-07-20 PROCEDURE — 94760 N-INVAS EAR/PLS OXIMETRY 1: CPT

## 2018-07-20 PROCEDURE — 80053 COMPREHEN METABOLIC PANEL: CPT | Performed by: INTERNAL MEDICINE

## 2018-07-20 RX ORDER — WARFARIN SODIUM 5 MG/1
5 TABLET ORAL
Status: COMPLETED | OUTPATIENT
Start: 2018-07-20 | End: 2018-07-20

## 2018-07-20 RX ORDER — MIDODRINE HYDROCHLORIDE 5 MG/1
5 TABLET ORAL EVERY 8 HOURS
Status: COMPLETED | OUTPATIENT
Start: 2018-07-20 | End: 2018-07-21

## 2018-07-20 RX ORDER — OXYCODONE HYDROCHLORIDE AND ACETAMINOPHEN 5; 325 MG/1; MG/1
2 TABLET ORAL EVERY 4 HOURS PRN
Qty: 30 TABLET | Refills: 0 | Status: SHIPPED | OUTPATIENT
Start: 2018-07-20 | End: 2018-08-01

## 2018-07-20 RX ORDER — BUMETANIDE 0.25 MG/ML
1 INJECTION INTRAMUSCULAR; INTRAVENOUS ONCE
Status: COMPLETED | OUTPATIENT
Start: 2018-07-20 | End: 2018-07-20

## 2018-07-20 RX ORDER — WARFARIN SODIUM 5 MG/1
5 TABLET ORAL
Status: DISCONTINUED | OUTPATIENT
Start: 2018-07-21 | End: 2018-07-22

## 2018-07-20 RX ORDER — SODIUM CHLORIDE 0.9 % (FLUSH) 0.9 %
1-10 SYRINGE (ML) INJECTION EVERY 8 HOURS
Status: DISCONTINUED | OUTPATIENT
Start: 2018-07-20 | End: 2018-07-26 | Stop reason: HOSPADM

## 2018-07-20 RX ADMIN — DIGOXIN 125 MCG: 125 TABLET ORAL at 12:01

## 2018-07-20 RX ADMIN — Medication 2 TABLET: at 08:39

## 2018-07-20 RX ADMIN — IPRATROPIUM BROMIDE AND ALBUTEROL SULFATE 3 ML: 2.5; .5 SOLUTION RESPIRATORY (INHALATION) at 15:59

## 2018-07-20 RX ADMIN — IPRATROPIUM BROMIDE AND ALBUTEROL SULFATE 3 ML: 2.5; .5 SOLUTION RESPIRATORY (INHALATION) at 03:01

## 2018-07-20 RX ADMIN — WARFARIN SODIUM 5 MG: 5 TABLET ORAL at 17:25

## 2018-07-20 RX ADMIN — ASPIRIN 325 MG: 325 TABLET, DELAYED RELEASE ORAL at 08:39

## 2018-07-20 RX ADMIN — BUMETANIDE 1 MG: 0.25 INJECTION INTRAMUSCULAR; INTRAVENOUS at 08:45

## 2018-07-20 RX ADMIN — MIDODRINE HYDROCHLORIDE 5 MG: 5 TABLET ORAL at 12:31

## 2018-07-20 RX ADMIN — MIDODRINE HYDROCHLORIDE 10 MG: 5 TABLET ORAL at 05:48

## 2018-07-20 RX ADMIN — Medication 10 ML: at 22:14

## 2018-07-20 RX ADMIN — MIDODRINE HYDROCHLORIDE 5 MG: 5 TABLET ORAL at 22:10

## 2018-07-20 RX ADMIN — IPRATROPIUM BROMIDE AND ALBUTEROL SULFATE 3 ML: 2.5; .5 SOLUTION RESPIRATORY (INHALATION) at 23:17

## 2018-07-20 RX ADMIN — INSULIN DETEMIR 10 UNITS: 100 INJECTION, SOLUTION SUBCUTANEOUS at 08:39

## 2018-07-20 RX ADMIN — IPRATROPIUM BROMIDE AND ALBUTEROL SULFATE 3 ML: 2.5; .5 SOLUTION RESPIRATORY (INHALATION) at 13:13

## 2018-07-20 RX ADMIN — LEVOTHYROXINE SODIUM 150 MCG: 100 TABLET ORAL at 05:48

## 2018-07-20 RX ADMIN — Medication 2 TABLET: at 22:09

## 2018-07-20 RX ADMIN — OXYCODONE HYDROCHLORIDE AND ACETAMINOPHEN 2 TABLET: 5; 325 TABLET ORAL at 22:09

## 2018-07-20 RX ADMIN — IPRATROPIUM BROMIDE AND ALBUTEROL SULFATE 3 ML: 2.5; .5 SOLUTION RESPIRATORY (INHALATION) at 07:18

## 2018-07-20 RX ADMIN — OXYCODONE HYDROCHLORIDE AND ACETAMINOPHEN 2 TABLET: 5; 325 TABLET ORAL at 08:39

## 2018-07-20 RX ADMIN — IPRATROPIUM BROMIDE AND ALBUTEROL SULFATE 3 ML: 2.5; .5 SOLUTION RESPIRATORY (INHALATION) at 18:37

## 2018-07-20 RX ADMIN — OXYCODONE HYDROCHLORIDE AND ACETAMINOPHEN 2 TABLET: 5; 325 TABLET ORAL at 17:28

## 2018-07-21 ENCOUNTER — APPOINTMENT (OUTPATIENT)
Dept: GENERAL RADIOLOGY | Facility: HOSPITAL | Age: 58
End: 2018-07-21

## 2018-07-21 LAB
ANION GAP SERPL CALCULATED.3IONS-SCNC: 8 MMOL/L (ref 3–11)
BUN BLD-MCNC: 21 MG/DL (ref 9–23)
BUN/CREAT SERPL: 28.4 (ref 7–25)
CALCIUM SPEC-SCNC: 7.6 MG/DL (ref 8.7–10.4)
CHLORIDE SERPL-SCNC: 101 MMOL/L (ref 99–109)
CO2 SERPL-SCNC: 28 MMOL/L (ref 20–31)
CREAT BLD-MCNC: 0.74 MG/DL (ref 0.6–1.3)
GFR SERPL CREATININE-BSD FRML MDRD: 81 ML/MIN/1.73
GLUCOSE BLD-MCNC: 76 MG/DL (ref 70–100)
GLUCOSE BLDC GLUCOMTR-MCNC: 175 MG/DL (ref 70–130)
GLUCOSE BLDC GLUCOMTR-MCNC: 68 MG/DL (ref 70–130)
GLUCOSE BLDC GLUCOMTR-MCNC: 75 MG/DL (ref 70–130)
GLUCOSE BLDC GLUCOMTR-MCNC: 77 MG/DL (ref 70–130)
HCT VFR BLD AUTO: 28.4 % (ref 34.5–44)
HGB BLD-MCNC: 8.9 G/DL (ref 11.5–15.5)
INR PPP: 1.28 (ref 0.91–1.09)
POTASSIUM BLD-SCNC: 4.1 MMOL/L (ref 3.5–5.5)
PROTHROMBIN TIME: 13.4 SECONDS (ref 9.6–11.5)
SODIUM BLD-SCNC: 137 MMOL/L (ref 132–146)

## 2018-07-21 PROCEDURE — 94799 UNLISTED PULMONARY SVC/PX: CPT

## 2018-07-21 PROCEDURE — 99232 SBSQ HOSP IP/OBS MODERATE 35: CPT | Performed by: INTERNAL MEDICINE

## 2018-07-21 PROCEDURE — 99024 POSTOP FOLLOW-UP VISIT: CPT | Performed by: THORACIC SURGERY (CARDIOTHORACIC VASCULAR SURGERY)

## 2018-07-21 PROCEDURE — 85014 HEMATOCRIT: CPT | Performed by: PHYSICIAN ASSISTANT

## 2018-07-21 PROCEDURE — 85610 PROTHROMBIN TIME: CPT | Performed by: PHYSICIAN ASSISTANT

## 2018-07-21 PROCEDURE — 71045 X-RAY EXAM CHEST 1 VIEW: CPT

## 2018-07-21 PROCEDURE — 85018 HEMOGLOBIN: CPT | Performed by: PHYSICIAN ASSISTANT

## 2018-07-21 PROCEDURE — 99253 IP/OBS CNSLTJ NEW/EST LOW 45: CPT | Performed by: NURSE PRACTITIONER

## 2018-07-21 PROCEDURE — 82962 GLUCOSE BLOOD TEST: CPT

## 2018-07-21 PROCEDURE — 94760 N-INVAS EAR/PLS OXIMETRY 1: CPT

## 2018-07-21 PROCEDURE — 80048 BASIC METABOLIC PNL TOTAL CA: CPT | Performed by: PHYSICIAN ASSISTANT

## 2018-07-21 PROCEDURE — 94640 AIRWAY INHALATION TREATMENT: CPT

## 2018-07-21 PROCEDURE — 63710000001 INSULIN DETEMIR PER 5 UNITS: Performed by: INTERNAL MEDICINE

## 2018-07-21 RX ORDER — SPIRONOLACTONE 25 MG/1
25 TABLET ORAL DAILY
Status: DISCONTINUED | OUTPATIENT
Start: 2018-07-21 | End: 2018-07-26 | Stop reason: HOSPADM

## 2018-07-21 RX ORDER — BUMETANIDE 1 MG/1
0.5 TABLET ORAL DAILY
Status: DISCONTINUED | OUTPATIENT
Start: 2018-07-21 | End: 2018-07-25

## 2018-07-21 RX ORDER — GUAIFENESIN 600 MG/1
1200 TABLET, EXTENDED RELEASE ORAL EVERY 12 HOURS SCHEDULED
Status: DISCONTINUED | OUTPATIENT
Start: 2018-07-21 | End: 2018-07-26 | Stop reason: HOSPADM

## 2018-07-21 RX ADMIN — SPIRONOLACTONE 25 MG: 25 TABLET, FILM COATED ORAL at 13:29

## 2018-07-21 RX ADMIN — INSULIN DETEMIR 10 UNITS: 100 INJECTION, SOLUTION SUBCUTANEOUS at 08:29

## 2018-07-21 RX ADMIN — DOCUSATE SODIUM 100 MG: 100 CAPSULE, LIQUID FILLED ORAL at 21:14

## 2018-07-21 RX ADMIN — OXYCODONE HYDROCHLORIDE AND ACETAMINOPHEN 2 TABLET: 5; 325 TABLET ORAL at 17:22

## 2018-07-21 RX ADMIN — IPRATROPIUM BROMIDE AND ALBUTEROL SULFATE 3 ML: 2.5; .5 SOLUTION RESPIRATORY (INHALATION) at 19:35

## 2018-07-21 RX ADMIN — LEVOTHYROXINE SODIUM 150 MCG: 100 TABLET ORAL at 05:59

## 2018-07-21 RX ADMIN — IPRATROPIUM BROMIDE AND ALBUTEROL SULFATE 3 ML: 2.5; .5 SOLUTION RESPIRATORY (INHALATION) at 12:04

## 2018-07-21 RX ADMIN — Medication 10 ML: at 17:19

## 2018-07-21 RX ADMIN — GUAIFENESIN 1200 MG: 600 TABLET, EXTENDED RELEASE ORAL at 21:14

## 2018-07-21 RX ADMIN — IPRATROPIUM BROMIDE AND ALBUTEROL SULFATE 3 ML: 2.5; .5 SOLUTION RESPIRATORY (INHALATION) at 23:55

## 2018-07-21 RX ADMIN — OXYCODONE HYDROCHLORIDE AND ACETAMINOPHEN 2 TABLET: 5; 325 TABLET ORAL at 21:14

## 2018-07-21 RX ADMIN — Medication 2 TABLET: at 08:30

## 2018-07-21 RX ADMIN — WARFARIN SODIUM 5 MG: 5 TABLET ORAL at 17:20

## 2018-07-21 RX ADMIN — OXYCODONE HYDROCHLORIDE AND ACETAMINOPHEN 2 TABLET: 5; 325 TABLET ORAL at 05:59

## 2018-07-21 RX ADMIN — BUMETANIDE 0.5 MG: 1 TABLET ORAL at 13:29

## 2018-07-21 RX ADMIN — IPRATROPIUM BROMIDE AND ALBUTEROL SULFATE 3 ML: 2.5; .5 SOLUTION RESPIRATORY (INHALATION) at 16:46

## 2018-07-21 RX ADMIN — IPRATROPIUM BROMIDE AND ALBUTEROL SULFATE 3 ML: 2.5; .5 SOLUTION RESPIRATORY (INHALATION) at 07:26

## 2018-07-21 RX ADMIN — DIGOXIN 125 MCG: 125 TABLET ORAL at 13:29

## 2018-07-21 RX ADMIN — Medication 2 TABLET: at 21:14

## 2018-07-21 RX ADMIN — ASPIRIN 325 MG: 325 TABLET, DELAYED RELEASE ORAL at 08:30

## 2018-07-21 RX ADMIN — MIDODRINE HYDROCHLORIDE 5 MG: 5 TABLET ORAL at 05:58

## 2018-07-22 ENCOUNTER — APPOINTMENT (OUTPATIENT)
Dept: GENERAL RADIOLOGY | Facility: HOSPITAL | Age: 58
End: 2018-07-22

## 2018-07-22 LAB
ALT SERPL W P-5'-P-CCNC: 50 U/L (ref 7–40)
ANION GAP SERPL CALCULATED.3IONS-SCNC: 11 MMOL/L (ref 3–11)
AST SERPL-CCNC: 95 U/L (ref 0–33)
BILIRUB CONJ SERPL-MCNC: 4.7 MG/DL (ref 0–0.2)
BILIRUB SERPL-MCNC: 7.8 MG/DL (ref 0.3–1.2)
BUN BLD-MCNC: 18 MG/DL (ref 9–23)
BUN/CREAT SERPL: 29 (ref 7–25)
CALCIUM SPEC-SCNC: 7.5 MG/DL (ref 8.7–10.4)
CHLORIDE SERPL-SCNC: 98 MMOL/L (ref 99–109)
CO2 SERPL-SCNC: 26 MMOL/L (ref 20–31)
CREAT BLD-MCNC: 0.62 MG/DL (ref 0.6–1.3)
GFR SERPL CREATININE-BSD FRML MDRD: 100 ML/MIN/1.73
GLUCOSE BLD-MCNC: 55 MG/DL (ref 70–100)
GLUCOSE BLDC GLUCOMTR-MCNC: 120 MG/DL (ref 70–130)
GLUCOSE BLDC GLUCOMTR-MCNC: 65 MG/DL (ref 70–130)
GLUCOSE BLDC GLUCOMTR-MCNC: 75 MG/DL (ref 70–130)
GLUCOSE BLDC GLUCOMTR-MCNC: 92 MG/DL (ref 70–130)
HCT VFR BLD AUTO: 30.9 % (ref 34.5–44)
HGB BLD-MCNC: 9.7 G/DL (ref 11.5–15.5)
INR PPP: 3.44 (ref 0.91–1.09)
INR PPP: 3.9 (ref 0.91–1.09)
POTASSIUM BLD-SCNC: 3.5 MMOL/L (ref 3.5–5.5)
PROTHROMBIN TIME: 36.1 SECONDS (ref 9.6–11.5)
PROTHROMBIN TIME: 40.9 SECONDS (ref 9.6–11.5)
SODIUM BLD-SCNC: 135 MMOL/L (ref 132–146)

## 2018-07-22 PROCEDURE — 71045 X-RAY EXAM CHEST 1 VIEW: CPT

## 2018-07-22 PROCEDURE — 85610 PROTHROMBIN TIME: CPT | Performed by: PHYSICIAN ASSISTANT

## 2018-07-22 PROCEDURE — 94640 AIRWAY INHALATION TREATMENT: CPT

## 2018-07-22 PROCEDURE — 99232 SBSQ HOSP IP/OBS MODERATE 35: CPT | Performed by: INTERNAL MEDICINE

## 2018-07-22 PROCEDURE — 85014 HEMATOCRIT: CPT | Performed by: PHYSICIAN ASSISTANT

## 2018-07-22 PROCEDURE — 99024 POSTOP FOLLOW-UP VISIT: CPT | Performed by: THORACIC SURGERY (CARDIOTHORACIC VASCULAR SURGERY)

## 2018-07-22 PROCEDURE — 84450 TRANSFERASE (AST) (SGOT): CPT | Performed by: PHYSICIAN ASSISTANT

## 2018-07-22 PROCEDURE — 82248 BILIRUBIN DIRECT: CPT | Performed by: PHYSICIAN ASSISTANT

## 2018-07-22 PROCEDURE — 94799 UNLISTED PULMONARY SVC/PX: CPT

## 2018-07-22 PROCEDURE — 84460 ALANINE AMINO (ALT) (SGPT): CPT | Performed by: PHYSICIAN ASSISTANT

## 2018-07-22 PROCEDURE — 63710000001 INSULIN DETEMIR PER 5 UNITS: Performed by: INTERNAL MEDICINE

## 2018-07-22 PROCEDURE — 94760 N-INVAS EAR/PLS OXIMETRY 1: CPT

## 2018-07-22 PROCEDURE — 80048 BASIC METABOLIC PNL TOTAL CA: CPT | Performed by: PHYSICIAN ASSISTANT

## 2018-07-22 PROCEDURE — 82247 BILIRUBIN TOTAL: CPT | Performed by: PHYSICIAN ASSISTANT

## 2018-07-22 PROCEDURE — 82962 GLUCOSE BLOOD TEST: CPT

## 2018-07-22 PROCEDURE — 99232 SBSQ HOSP IP/OBS MODERATE 35: CPT | Performed by: PHYSICIAN ASSISTANT

## 2018-07-22 PROCEDURE — 85018 HEMOGLOBIN: CPT | Performed by: PHYSICIAN ASSISTANT

## 2018-07-22 PROCEDURE — 85610 PROTHROMBIN TIME: CPT

## 2018-07-22 RX ORDER — LACTULOSE 10 G/15ML
20 SOLUTION ORAL 3 TIMES DAILY PRN
Status: DISCONTINUED | OUTPATIENT
Start: 2018-07-22 | End: 2018-07-26 | Stop reason: HOSPADM

## 2018-07-22 RX ORDER — ASPIRIN 81 MG/1
81 TABLET ORAL DAILY
Status: DISCONTINUED | OUTPATIENT
Start: 2018-07-22 | End: 2018-07-22

## 2018-07-22 RX ORDER — ASPIRIN 81 MG/1
81 TABLET ORAL DAILY
Status: DISCONTINUED | OUTPATIENT
Start: 2018-07-23 | End: 2018-07-26 | Stop reason: HOSPADM

## 2018-07-22 RX ORDER — BUMETANIDE 0.25 MG/ML
1 INJECTION INTRAMUSCULAR; INTRAVENOUS ONCE
Status: COMPLETED | OUTPATIENT
Start: 2018-07-22 | End: 2018-07-22

## 2018-07-22 RX ADMIN — Medication 2 TABLET: at 08:55

## 2018-07-22 RX ADMIN — Medication 10 ML: at 16:30

## 2018-07-22 RX ADMIN — IPRATROPIUM BROMIDE AND ALBUTEROL SULFATE 3 ML: 2.5; .5 SOLUTION RESPIRATORY (INHALATION) at 19:08

## 2018-07-22 RX ADMIN — Medication 10 ML: at 08:57

## 2018-07-22 RX ADMIN — IPRATROPIUM BROMIDE AND ALBUTEROL SULFATE 3 ML: 2.5; .5 SOLUTION RESPIRATORY (INHALATION) at 16:06

## 2018-07-22 RX ADMIN — SPIRONOLACTONE 25 MG: 25 TABLET, FILM COATED ORAL at 08:57

## 2018-07-22 RX ADMIN — GUAIFENESIN 1200 MG: 600 TABLET, EXTENDED RELEASE ORAL at 08:56

## 2018-07-22 RX ADMIN — ASPIRIN 325 MG: 325 TABLET, DELAYED RELEASE ORAL at 08:56

## 2018-07-22 RX ADMIN — IPRATROPIUM BROMIDE AND ALBUTEROL SULFATE 3 ML: 2.5; .5 SOLUTION RESPIRATORY (INHALATION) at 22:32

## 2018-07-22 RX ADMIN — BUMETANIDE 1 MG: 0.25 INJECTION, SOLUTION INTRAMUSCULAR; INTRAVENOUS at 16:31

## 2018-07-22 RX ADMIN — BISACODYL 10 MG: 5 TABLET, COATED ORAL at 08:55

## 2018-07-22 RX ADMIN — OXYCODONE HYDROCHLORIDE AND ACETAMINOPHEN 2 TABLET: 5; 325 TABLET ORAL at 11:58

## 2018-07-22 RX ADMIN — IPRATROPIUM BROMIDE AND ALBUTEROL SULFATE 3 ML: 2.5; .5 SOLUTION RESPIRATORY (INHALATION) at 11:59

## 2018-07-22 RX ADMIN — IPRATROPIUM BROMIDE AND ALBUTEROL SULFATE 3 ML: 2.5; .5 SOLUTION RESPIRATORY (INHALATION) at 06:43

## 2018-07-22 RX ADMIN — DOCUSATE SODIUM 100 MG: 100 CAPSULE, LIQUID FILLED ORAL at 08:56

## 2018-07-22 RX ADMIN — POLYETHYLENE GLYCOL 3350 17 G: 17 POWDER, FOR SOLUTION ORAL at 17:27

## 2018-07-22 RX ADMIN — Medication 2 TABLET: at 21:48

## 2018-07-22 RX ADMIN — OXYCODONE HYDROCHLORIDE AND ACETAMINOPHEN 2 TABLET: 5; 325 TABLET ORAL at 21:49

## 2018-07-22 RX ADMIN — OXYCODONE HYDROCHLORIDE AND ACETAMINOPHEN 2 TABLET: 5; 325 TABLET ORAL at 02:31

## 2018-07-22 RX ADMIN — IPRATROPIUM BROMIDE AND ALBUTEROL SULFATE 3 ML: 2.5; .5 SOLUTION RESPIRATORY (INHALATION) at 03:06

## 2018-07-22 RX ADMIN — LEVOTHYROXINE SODIUM 150 MCG: 100 TABLET ORAL at 06:22

## 2018-07-22 RX ADMIN — BUMETANIDE 0.5 MG: 1 TABLET ORAL at 08:56

## 2018-07-22 RX ADMIN — MAGNESIUM HYDROXIDE 30 ML: 2400 SUSPENSION ORAL at 11:58

## 2018-07-22 RX ADMIN — METFORMIN HYDROCHLORIDE 500 MG: 500 TABLET, FILM COATED ORAL at 17:24

## 2018-07-22 RX ADMIN — DIGOXIN 125 MCG: 125 TABLET ORAL at 11:58

## 2018-07-22 RX ADMIN — OXYCODONE HYDROCHLORIDE AND ACETAMINOPHEN 2 TABLET: 5; 325 TABLET ORAL at 06:22

## 2018-07-22 RX ADMIN — INSULIN DETEMIR 10 UNITS: 100 INJECTION, SOLUTION SUBCUTANEOUS at 08:57

## 2018-07-22 RX ADMIN — GUAIFENESIN 1200 MG: 600 TABLET, EXTENDED RELEASE ORAL at 21:48

## 2018-07-23 ENCOUNTER — APPOINTMENT (OUTPATIENT)
Dept: GENERAL RADIOLOGY | Facility: HOSPITAL | Age: 58
End: 2018-07-23

## 2018-07-23 ENCOUNTER — APPOINTMENT (OUTPATIENT)
Dept: CT IMAGING | Facility: HOSPITAL | Age: 58
End: 2018-07-23

## 2018-07-23 PROBLEM — R74.01 ELEVATED TRANSAMINASE LEVEL: Status: ACTIVE | Noted: 2018-07-23

## 2018-07-23 PROBLEM — D69.6 THROMBOCYTOPENIA (HCC): Status: ACTIVE | Noted: 2018-07-23

## 2018-07-23 PROBLEM — E80.6 HYPERBILIRUBINEMIA: Status: ACTIVE | Noted: 2018-07-23

## 2018-07-23 LAB
ALBUMIN SERPL-MCNC: 3.17 G/DL (ref 3.2–4.8)
ALBUMIN/GLOB SERPL: 1.3 G/DL (ref 1.5–2.5)
ALP SERPL-CCNC: 111 U/L (ref 25–100)
ALT SERPL W P-5'-P-CCNC: 45 U/L (ref 7–40)
ANION GAP SERPL CALCULATED.3IONS-SCNC: 10 MMOL/L (ref 3–11)
AST SERPL-CCNC: 71 U/L (ref 0–33)
BILIRUB SERPL-MCNC: 7.2 MG/DL (ref 0.3–1.2)
BUN BLD-MCNC: 14 MG/DL (ref 9–23)
BUN/CREAT SERPL: 23.3 (ref 7–25)
CALCIUM SPEC-SCNC: 8.1 MG/DL (ref 8.7–10.4)
CHLORIDE SERPL-SCNC: 96 MMOL/L (ref 99–109)
CO2 SERPL-SCNC: 33 MMOL/L (ref 20–31)
CREAT BLD-MCNC: 0.6 MG/DL (ref 0.6–1.3)
DEPRECATED RDW RBC AUTO: 76.1 FL (ref 37–54)
ERYTHROCYTE [DISTWIDTH] IN BLOOD BY AUTOMATED COUNT: 19.5 % (ref 11.3–14.5)
FOLATE BLD-MCNC: 416.1 NG/ML
FOLATE RBC-MCNC: 1594 NG/ML
GFR SERPL CREATININE-BSD FRML MDRD: 103 ML/MIN/1.73
GLOBULIN UR ELPH-MCNC: 2.4 GM/DL
GLUCOSE BLD-MCNC: 69 MG/DL (ref 70–100)
GLUCOSE BLDC GLUCOMTR-MCNC: 117 MG/DL (ref 70–130)
GLUCOSE BLDC GLUCOMTR-MCNC: 75 MG/DL (ref 70–130)
GLUCOSE BLDC GLUCOMTR-MCNC: 85 MG/DL (ref 70–130)
HAV IGM SERPL QL IA: NORMAL
HBV CORE IGM SERPL QL IA: NORMAL
HBV SURFACE AG SERPL QL IA: NORMAL
HCT VFR BLD AUTO: 26.1 % (ref 34–46.6)
HCT VFR BLD AUTO: 28.3 % (ref 34.5–44)
HCV AB SER DONR QL: NORMAL
HGB BLD-MCNC: 9 G/DL (ref 11.5–15.5)
INR PPP: 5.65 (ref 0.91–1.09)
MCH RBC QN AUTO: 36.1 PG (ref 27–31)
MCHC RBC AUTO-ENTMCNC: 31.8 G/DL (ref 32–36)
MCV RBC AUTO: 113.7 FL (ref 80–99)
PLATELET # BLD AUTO: 107 10*3/MM3 (ref 150–450)
PMV BLD AUTO: 10.9 FL (ref 6–12)
POTASSIUM BLD-SCNC: 3.2 MMOL/L (ref 3.5–5.5)
PROT SERPL-MCNC: 5.6 G/DL (ref 5.7–8.2)
PROTHROMBIN TIME: 59.3 SECONDS (ref 9.6–11.5)
RBC # BLD AUTO: 2.49 10*6/MM3 (ref 3.89–5.14)
RETICS/RBC NFR AUTO: 8.96 % (ref 0.5–1.5)
SODIUM BLD-SCNC: 139 MMOL/L (ref 132–146)
WBC NRBC COR # BLD: 5.92 10*3/MM3 (ref 3.5–10.8)

## 2018-07-23 PROCEDURE — 94640 AIRWAY INHALATION TREATMENT: CPT

## 2018-07-23 PROCEDURE — 92610 EVALUATE SWALLOWING FUNCTION: CPT

## 2018-07-23 PROCEDURE — 97110 THERAPEUTIC EXERCISES: CPT

## 2018-07-23 PROCEDURE — 94799 UNLISTED PULMONARY SVC/PX: CPT

## 2018-07-23 PROCEDURE — 71045 X-RAY EXAM CHEST 1 VIEW: CPT

## 2018-07-23 PROCEDURE — 85045 AUTOMATED RETICULOCYTE COUNT: CPT | Performed by: PHYSICIAN ASSISTANT

## 2018-07-23 PROCEDURE — 80053 COMPREHEN METABOLIC PANEL: CPT | Performed by: PHYSICIAN ASSISTANT

## 2018-07-23 PROCEDURE — 74177 CT ABD & PELVIS W/CONTRAST: CPT

## 2018-07-23 PROCEDURE — 99231 SBSQ HOSP IP/OBS SF/LOW 25: CPT | Performed by: NURSE PRACTITIONER

## 2018-07-23 PROCEDURE — 82962 GLUCOSE BLOOD TEST: CPT

## 2018-07-23 PROCEDURE — 80074 ACUTE HEPATITIS PANEL: CPT | Performed by: PHYSICIAN ASSISTANT

## 2018-07-23 PROCEDURE — 0 DIATRIZOATE MEGLUMINE & SODIUM PER 1 ML

## 2018-07-23 PROCEDURE — 99253 IP/OBS CNSLTJ NEW/EST LOW 45: CPT | Performed by: PHYSICIAN ASSISTANT

## 2018-07-23 PROCEDURE — 85027 COMPLETE CBC AUTOMATED: CPT | Performed by: PHYSICIAN ASSISTANT

## 2018-07-23 PROCEDURE — 85610 PROTHROMBIN TIME: CPT | Performed by: PHYSICIAN ASSISTANT

## 2018-07-23 PROCEDURE — 99232 SBSQ HOSP IP/OBS MODERATE 35: CPT | Performed by: PHYSICIAN ASSISTANT

## 2018-07-23 PROCEDURE — 94760 N-INVAS EAR/PLS OXIMETRY 1: CPT

## 2018-07-23 PROCEDURE — 25010000002 IOPAMIDOL 61 % SOLUTION: Performed by: THORACIC SURGERY (CARDIOTHORACIC VASCULAR SURGERY)

## 2018-07-23 PROCEDURE — 97116 GAIT TRAINING THERAPY: CPT

## 2018-07-23 RX ORDER — POTASSIUM CHLORIDE 7.45 MG/ML
10 INJECTION INTRAVENOUS
Status: DISCONTINUED | OUTPATIENT
Start: 2018-07-23 | End: 2018-07-26 | Stop reason: HOSPADM

## 2018-07-23 RX ORDER — POTASSIUM CHLORIDE 1.5 G/1.77G
40 POWDER, FOR SOLUTION ORAL AS NEEDED
Status: DISCONTINUED | OUTPATIENT
Start: 2018-07-23 | End: 2018-07-26 | Stop reason: HOSPADM

## 2018-07-23 RX ORDER — POTASSIUM CHLORIDE 750 MG/1
40 CAPSULE, EXTENDED RELEASE ORAL AS NEEDED
Status: DISCONTINUED | OUTPATIENT
Start: 2018-07-23 | End: 2018-07-26 | Stop reason: HOSPADM

## 2018-07-23 RX ORDER — POTASSIUM CHLORIDE 750 MG/1
40 CAPSULE, EXTENDED RELEASE ORAL ONCE
Status: COMPLETED | OUTPATIENT
Start: 2018-07-23 | End: 2018-07-23

## 2018-07-23 RX ORDER — BUMETANIDE 0.25 MG/ML
2 INJECTION INTRAMUSCULAR; INTRAVENOUS ONCE
Status: COMPLETED | OUTPATIENT
Start: 2018-07-23 | End: 2018-07-23

## 2018-07-23 RX ADMIN — ASPIRIN 81 MG: 81 TABLET, COATED ORAL at 08:27

## 2018-07-23 RX ADMIN — IPRATROPIUM BROMIDE AND ALBUTEROL SULFATE 3 ML: 2.5; .5 SOLUTION RESPIRATORY (INHALATION) at 02:40

## 2018-07-23 RX ADMIN — POTASSIUM CHLORIDE 40 MEQ: 750 CAPSULE, EXTENDED RELEASE ORAL at 08:27

## 2018-07-23 RX ADMIN — IPRATROPIUM BROMIDE AND ALBUTEROL SULFATE 3 ML: 2.5; .5 SOLUTION RESPIRATORY (INHALATION) at 07:52

## 2018-07-23 RX ADMIN — OXYCODONE HYDROCHLORIDE AND ACETAMINOPHEN 2 TABLET: 5; 325 TABLET ORAL at 18:19

## 2018-07-23 RX ADMIN — Medication: at 15:11

## 2018-07-23 RX ADMIN — IPRATROPIUM BROMIDE AND ALBUTEROL SULFATE 3 ML: 2.5; .5 SOLUTION RESPIRATORY (INHALATION) at 15:35

## 2018-07-23 RX ADMIN — OXYCODONE HYDROCHLORIDE AND ACETAMINOPHEN 2 TABLET: 5; 325 TABLET ORAL at 05:51

## 2018-07-23 RX ADMIN — GUAIFENESIN 1200 MG: 600 TABLET, EXTENDED RELEASE ORAL at 20:17

## 2018-07-23 RX ADMIN — SPIRONOLACTONE 25 MG: 25 TABLET, FILM COATED ORAL at 08:27

## 2018-07-23 RX ADMIN — Medication 10 ML: at 02:11

## 2018-07-23 RX ADMIN — LEVOTHYROXINE SODIUM 150 MCG: 100 TABLET ORAL at 05:51

## 2018-07-23 RX ADMIN — GUAIFENESIN 1200 MG: 600 TABLET, EXTENDED RELEASE ORAL at 08:27

## 2018-07-23 RX ADMIN — IPRATROPIUM BROMIDE AND ALBUTEROL SULFATE 3 ML: 2.5; .5 SOLUTION RESPIRATORY (INHALATION) at 20:04

## 2018-07-23 RX ADMIN — BUMETANIDE 0.5 MG: 1 TABLET ORAL at 08:27

## 2018-07-23 RX ADMIN — BUMETANIDE 2 MG: 0.25 INJECTION INTRAMUSCULAR; INTRAVENOUS at 09:18

## 2018-07-23 RX ADMIN — IOPAMIDOL 95 ML: 612 INJECTION, SOLUTION INTRAVENOUS at 16:46

## 2018-07-23 RX ADMIN — DIGOXIN 125 MCG: 125 TABLET ORAL at 11:28

## 2018-07-23 RX ADMIN — Medication: at 15:12

## 2018-07-23 RX ADMIN — IPRATROPIUM BROMIDE AND ALBUTEROL SULFATE 3 ML: 2.5; .5 SOLUTION RESPIRATORY (INHALATION) at 12:10

## 2018-07-24 ENCOUNTER — APPOINTMENT (OUTPATIENT)
Dept: GENERAL RADIOLOGY | Facility: HOSPITAL | Age: 58
End: 2018-07-24

## 2018-07-24 ENCOUNTER — TRANSCRIBE ORDERS (OUTPATIENT)
Dept: CARDIAC REHAB | Facility: HOSPITAL | Age: 58
End: 2018-07-24

## 2018-07-24 DIAGNOSIS — Z95.2 S/P AVR: Primary | ICD-10-CM

## 2018-07-24 LAB
ALBUMIN SERPL-MCNC: 3.36 G/DL (ref 3.2–4.8)
ALP SERPL-CCNC: 124 U/L (ref 25–100)
ALT SERPL W P-5'-P-CCNC: 38 U/L (ref 7–40)
ANION GAP SERPL CALCULATED.3IONS-SCNC: 9 MMOL/L (ref 3–11)
AST SERPL-CCNC: 55 U/L (ref 0–33)
BILIRUB CONJ SERPL-MCNC: 3 MG/DL (ref 0–0.2)
BILIRUB INDIRECT SERPL-MCNC: 1.4 MG/DL (ref 0.1–1.1)
BILIRUB SERPL-MCNC: 4.4 MG/DL (ref 0.3–1.2)
BUN BLD-MCNC: 11 MG/DL (ref 9–23)
BUN/CREAT SERPL: 19.3 (ref 7–25)
CALCIUM SPEC-SCNC: 8.6 MG/DL (ref 8.7–10.4)
CHLORIDE SERPL-SCNC: 93 MMOL/L (ref 99–109)
CO2 SERPL-SCNC: 34 MMOL/L (ref 20–31)
CREAT BLD-MCNC: 0.57 MG/DL (ref 0.6–1.3)
GFR SERPL CREATININE-BSD FRML MDRD: 110 ML/MIN/1.73
GLUCOSE BLD-MCNC: 129 MG/DL (ref 70–100)
GLUCOSE BLDC GLUCOMTR-MCNC: 135 MG/DL (ref 70–130)
GLUCOSE BLDC GLUCOMTR-MCNC: 140 MG/DL (ref 70–130)
GLUCOSE BLDC GLUCOMTR-MCNC: 141 MG/DL (ref 70–130)
GLUCOSE BLDC GLUCOMTR-MCNC: 156 MG/DL (ref 70–130)
INR PPP: 2.29 (ref 0.91–1.09)
MAGNESIUM SERPL-MCNC: 2.2 MG/DL (ref 1.3–2.7)
POTASSIUM BLD-SCNC: 3.1 MMOL/L (ref 3.5–5.5)
POTASSIUM BLD-SCNC: 4 MMOL/L (ref 3.5–5.5)
PROT SERPL-MCNC: 6.1 G/DL (ref 5.7–8.2)
PROTHROMBIN TIME: 24 SECONDS (ref 9.6–11.5)
SODIUM BLD-SCNC: 136 MMOL/L (ref 132–146)

## 2018-07-24 PROCEDURE — 83735 ASSAY OF MAGNESIUM: CPT | Performed by: HOSPITALIST

## 2018-07-24 PROCEDURE — 94760 N-INVAS EAR/PLS OXIMETRY 1: CPT

## 2018-07-24 PROCEDURE — 63710000001 INSULIN LISPRO (HUMAN) PER 5 UNITS: Performed by: HOSPITALIST

## 2018-07-24 PROCEDURE — 71045 X-RAY EXAM CHEST 1 VIEW: CPT

## 2018-07-24 PROCEDURE — 99231 SBSQ HOSP IP/OBS SF/LOW 25: CPT | Performed by: NURSE PRACTITIONER

## 2018-07-24 PROCEDURE — 99233 SBSQ HOSP IP/OBS HIGH 50: CPT | Performed by: HOSPITALIST

## 2018-07-24 PROCEDURE — 85610 PROTHROMBIN TIME: CPT | Performed by: PHYSICIAN ASSISTANT

## 2018-07-24 PROCEDURE — 99232 SBSQ HOSP IP/OBS MODERATE 35: CPT | Performed by: INTERNAL MEDICINE

## 2018-07-24 PROCEDURE — 94799 UNLISTED PULMONARY SVC/PX: CPT

## 2018-07-24 PROCEDURE — 84132 ASSAY OF SERUM POTASSIUM: CPT | Performed by: HOSPITALIST

## 2018-07-24 PROCEDURE — 80076 HEPATIC FUNCTION PANEL: CPT | Performed by: HOSPITALIST

## 2018-07-24 PROCEDURE — 80048 BASIC METABOLIC PNL TOTAL CA: CPT | Performed by: PHYSICIAN ASSISTANT

## 2018-07-24 PROCEDURE — 82962 GLUCOSE BLOOD TEST: CPT

## 2018-07-24 PROCEDURE — G0108 DIAB MANAGE TRN  PER INDIV: HCPCS

## 2018-07-24 RX ORDER — NICOTINE POLACRILEX 4 MG
15 LOZENGE BUCCAL
Status: DISCONTINUED | OUTPATIENT
Start: 2018-07-24 | End: 2018-07-26 | Stop reason: HOSPADM

## 2018-07-24 RX ORDER — DEXTROSE MONOHYDRATE 25 G/50ML
25 INJECTION, SOLUTION INTRAVENOUS
Status: DISCONTINUED | OUTPATIENT
Start: 2018-07-24 | End: 2018-07-26 | Stop reason: HOSPADM

## 2018-07-24 RX ORDER — BUMETANIDE 0.25 MG/ML
2 INJECTION INTRAMUSCULAR; INTRAVENOUS ONCE
Status: COMPLETED | OUTPATIENT
Start: 2018-07-24 | End: 2018-07-24

## 2018-07-24 RX ORDER — WARFARIN SODIUM 2.5 MG/1
2.5 TABLET ORAL
Status: DISCONTINUED | OUTPATIENT
Start: 2018-07-24 | End: 2018-07-25

## 2018-07-24 RX ADMIN — IPRATROPIUM BROMIDE AND ALBUTEROL SULFATE 3 ML: 2.5; .5 SOLUTION RESPIRATORY (INHALATION) at 12:30

## 2018-07-24 RX ADMIN — POTASSIUM CHLORIDE 40 MEQ: 750 CAPSULE, EXTENDED RELEASE ORAL at 08:38

## 2018-07-24 RX ADMIN — Medication 2 TABLET: at 21:10

## 2018-07-24 RX ADMIN — SPIRONOLACTONE 25 MG: 25 TABLET, FILM COATED ORAL at 08:42

## 2018-07-24 RX ADMIN — POTASSIUM CHLORIDE 40 MEQ: 750 CAPSULE, EXTENDED RELEASE ORAL at 16:43

## 2018-07-24 RX ADMIN — BUMETANIDE 2 MG: 0.25 INJECTION INTRAMUSCULAR; INTRAVENOUS at 10:54

## 2018-07-24 RX ADMIN — OXYCODONE HYDROCHLORIDE AND ACETAMINOPHEN 2 TABLET: 5; 325 TABLET ORAL at 02:59

## 2018-07-24 RX ADMIN — INSULIN LISPRO 2 UNITS: 100 INJECTION, SOLUTION INTRAVENOUS; SUBCUTANEOUS at 21:55

## 2018-07-24 RX ADMIN — GUAIFENESIN 1200 MG: 600 TABLET, EXTENDED RELEASE ORAL at 21:11

## 2018-07-24 RX ADMIN — POTASSIUM CHLORIDE 40 MEQ: 750 CAPSULE, EXTENDED RELEASE ORAL at 13:24

## 2018-07-24 RX ADMIN — OXYCODONE HYDROCHLORIDE AND ACETAMINOPHEN 2 TABLET: 5; 325 TABLET ORAL at 21:51

## 2018-07-24 RX ADMIN — WARFARIN SODIUM 2.5 MG: 2.5 TABLET ORAL at 18:56

## 2018-07-24 RX ADMIN — OXYCODONE HYDROCHLORIDE AND ACETAMINOPHEN 2 TABLET: 5; 325 TABLET ORAL at 08:40

## 2018-07-24 RX ADMIN — Medication 10 ML: at 08:46

## 2018-07-24 RX ADMIN — ASPIRIN 81 MG: 81 TABLET, COATED ORAL at 08:38

## 2018-07-24 RX ADMIN — IPRATROPIUM BROMIDE AND ALBUTEROL SULFATE 3 ML: 2.5; .5 SOLUTION RESPIRATORY (INHALATION) at 23:11

## 2018-07-24 RX ADMIN — IPRATROPIUM BROMIDE AND ALBUTEROL SULFATE 3 ML: 2.5; .5 SOLUTION RESPIRATORY (INHALATION) at 07:25

## 2018-07-24 RX ADMIN — DIGOXIN 125 MCG: 125 TABLET ORAL at 11:33

## 2018-07-24 RX ADMIN — BUMETANIDE 0.5 MG: 1 TABLET ORAL at 08:41

## 2018-07-24 RX ADMIN — IPRATROPIUM BROMIDE AND ALBUTEROL SULFATE 3 ML: 2.5; .5 SOLUTION RESPIRATORY (INHALATION) at 15:44

## 2018-07-24 RX ADMIN — OXYCODONE HYDROCHLORIDE AND ACETAMINOPHEN 2 TABLET: 5; 325 TABLET ORAL at 13:23

## 2018-07-24 RX ADMIN — GUAIFENESIN 1200 MG: 600 TABLET, EXTENDED RELEASE ORAL at 08:40

## 2018-07-24 RX ADMIN — Medication 10 ML: at 21:57

## 2018-07-24 RX ADMIN — IPRATROPIUM BROMIDE AND ALBUTEROL SULFATE 3 ML: 2.5; .5 SOLUTION RESPIRATORY (INHALATION) at 00:10

## 2018-07-24 RX ADMIN — IPRATROPIUM BROMIDE AND ALBUTEROL SULFATE 3 ML: 2.5; .5 SOLUTION RESPIRATORY (INHALATION) at 19:28

## 2018-07-24 RX ADMIN — LEVOTHYROXINE SODIUM 150 MCG: 100 TABLET ORAL at 05:19

## 2018-07-25 ENCOUNTER — APPOINTMENT (OUTPATIENT)
Dept: GENERAL RADIOLOGY | Facility: HOSPITAL | Age: 58
End: 2018-07-25

## 2018-07-25 LAB
ALBUMIN SERPL-MCNC: 3.31 G/DL (ref 3.2–4.8)
ALP SERPL-CCNC: 105 U/L (ref 25–100)
ALT SERPL W P-5'-P-CCNC: 30 U/L (ref 7–40)
AST SERPL-CCNC: 45 U/L (ref 0–33)
BILIRUB CONJ SERPL-MCNC: 1.7 MG/DL (ref 0–0.2)
BILIRUB INDIRECT SERPL-MCNC: 1 MG/DL (ref 0.1–1.1)
BILIRUB SERPL-MCNC: 2.7 MG/DL (ref 0.3–1.2)
GLUCOSE BLDC GLUCOMTR-MCNC: 121 MG/DL (ref 70–130)
GLUCOSE BLDC GLUCOMTR-MCNC: 126 MG/DL (ref 70–130)
GLUCOSE BLDC GLUCOMTR-MCNC: 141 MG/DL (ref 70–130)
GLUCOSE BLDC GLUCOMTR-MCNC: 169 MG/DL (ref 70–130)
INR PPP: 2.36 (ref 0.91–1.09)
POTASSIUM BLD-SCNC: 4.2 MMOL/L (ref 3.5–5.5)
PROT SERPL-MCNC: 6.2 G/DL (ref 5.7–8.2)
PROTHROMBIN TIME: 24.8 SECONDS (ref 9.6–11.5)

## 2018-07-25 PROCEDURE — 94799 UNLISTED PULMONARY SVC/PX: CPT

## 2018-07-25 PROCEDURE — 97116 GAIT TRAINING THERAPY: CPT

## 2018-07-25 PROCEDURE — 71045 X-RAY EXAM CHEST 1 VIEW: CPT

## 2018-07-25 PROCEDURE — 99233 SBSQ HOSP IP/OBS HIGH 50: CPT | Performed by: HOSPITALIST

## 2018-07-25 PROCEDURE — 94760 N-INVAS EAR/PLS OXIMETRY 1: CPT

## 2018-07-25 PROCEDURE — 99232 SBSQ HOSP IP/OBS MODERATE 35: CPT | Performed by: INTERNAL MEDICINE

## 2018-07-25 PROCEDURE — 82962 GLUCOSE BLOOD TEST: CPT

## 2018-07-25 PROCEDURE — 85610 PROTHROMBIN TIME: CPT | Performed by: PHYSICIAN ASSISTANT

## 2018-07-25 PROCEDURE — 99024 POSTOP FOLLOW-UP VISIT: CPT | Performed by: PHYSICIAN ASSISTANT

## 2018-07-25 PROCEDURE — 97110 THERAPEUTIC EXERCISES: CPT

## 2018-07-25 PROCEDURE — G0108 DIAB MANAGE TRN  PER INDIV: HCPCS

## 2018-07-25 PROCEDURE — 84132 ASSAY OF SERUM POTASSIUM: CPT | Performed by: THORACIC SURGERY (CARDIOTHORACIC VASCULAR SURGERY)

## 2018-07-25 PROCEDURE — 80076 HEPATIC FUNCTION PANEL: CPT | Performed by: PHYSICIAN ASSISTANT

## 2018-07-25 PROCEDURE — 99231 SBSQ HOSP IP/OBS SF/LOW 25: CPT | Performed by: PHYSICIAN ASSISTANT

## 2018-07-25 PROCEDURE — 94640 AIRWAY INHALATION TREATMENT: CPT

## 2018-07-25 RX ORDER — POTASSIUM CHLORIDE 750 MG/1
20 CAPSULE, EXTENDED RELEASE ORAL DAILY
Qty: 601 CAPSULE | Refills: 2 | Status: CANCELLED | OUTPATIENT
Start: 2018-07-25

## 2018-07-25 RX ORDER — POTASSIUM CHLORIDE 750 MG/1
20 CAPSULE, EXTENDED RELEASE ORAL DAILY
Status: DISCONTINUED | OUTPATIENT
Start: 2018-07-25 | End: 2018-07-26 | Stop reason: HOSPADM

## 2018-07-25 RX ORDER — FUROSEMIDE 20 MG/1
20 TABLET ORAL DAILY
Status: DISCONTINUED | OUTPATIENT
Start: 2018-07-25 | End: 2018-07-25

## 2018-07-25 RX ORDER — FUROSEMIDE 80 MG
80 TABLET ORAL DAILY
Qty: 3 TABLET | Refills: 0 | Status: SHIPPED | OUTPATIENT
Start: 2018-07-25 | End: 2018-07-26 | Stop reason: HOSPADM

## 2018-07-25 RX ORDER — POTASSIUM CHLORIDE 750 MG/1
20 CAPSULE, EXTENDED RELEASE ORAL DAILY
Qty: 60 CAPSULE | Refills: 0 | Status: SHIPPED | OUTPATIENT
Start: 2018-07-25 | End: 2018-07-27

## 2018-07-25 RX ORDER — POTASSIUM CHLORIDE 750 MG/1
20 CAPSULE, EXTENDED RELEASE ORAL DAILY
Status: DISCONTINUED | OUTPATIENT
Start: 2018-07-25 | End: 2018-07-25

## 2018-07-25 RX ORDER — FUROSEMIDE 40 MG/1
80 TABLET ORAL DAILY
Status: DISCONTINUED | OUTPATIENT
Start: 2018-07-25 | End: 2018-07-26

## 2018-07-25 RX ORDER — WARFARIN SODIUM 2 MG/1
2 TABLET ORAL TAKE AS DIRECTED
Qty: 45 TABLET | Refills: 2 | Status: SHIPPED | OUTPATIENT
Start: 2018-07-25 | End: 2018-07-27

## 2018-07-25 RX ORDER — WARFARIN SODIUM 2 MG/1
2 TABLET ORAL
Status: DISCONTINUED | OUTPATIENT
Start: 2018-07-25 | End: 2018-07-26 | Stop reason: HOSPADM

## 2018-07-25 RX ORDER — SPIRONOLACTONE 25 MG/1
25 TABLET ORAL DAILY
Qty: 30 TABLET | Refills: 2 | Status: SHIPPED | OUTPATIENT
Start: 2018-07-25 | End: 2018-07-27

## 2018-07-25 RX ORDER — ASPIRIN 81 MG/1
81 TABLET ORAL DAILY
Qty: 30 TABLET | Refills: 11 | Status: SHIPPED | OUTPATIENT
Start: 2018-07-25 | End: 2018-07-27

## 2018-07-25 RX ADMIN — Medication 2 TABLET: at 21:22

## 2018-07-25 RX ADMIN — OXYCODONE HYDROCHLORIDE AND ACETAMINOPHEN 2 TABLET: 5; 325 TABLET ORAL at 21:26

## 2018-07-25 RX ADMIN — DIGOXIN 125 MCG: 125 TABLET ORAL at 12:05

## 2018-07-25 RX ADMIN — OXYCODONE HYDROCHLORIDE AND ACETAMINOPHEN 2 TABLET: 5; 325 TABLET ORAL at 16:51

## 2018-07-25 RX ADMIN — OXYCODONE HYDROCHLORIDE AND ACETAMINOPHEN 2 TABLET: 5; 325 TABLET ORAL at 04:49

## 2018-07-25 RX ADMIN — FUROSEMIDE 20 MG: 20 TABLET ORAL at 08:34

## 2018-07-25 RX ADMIN — WARFARIN SODIUM 2 MG: 2 TABLET ORAL at 16:51

## 2018-07-25 RX ADMIN — ASPIRIN 81 MG: 81 TABLET, COATED ORAL at 08:35

## 2018-07-25 RX ADMIN — SPIRONOLACTONE 25 MG: 25 TABLET, FILM COATED ORAL at 08:36

## 2018-07-25 RX ADMIN — GUAIFENESIN 1200 MG: 600 TABLET, EXTENDED RELEASE ORAL at 21:22

## 2018-07-25 RX ADMIN — FUROSEMIDE 60 MG: 40 TABLET ORAL at 11:28

## 2018-07-25 RX ADMIN — IPRATROPIUM BROMIDE AND ALBUTEROL SULFATE 3 ML: 2.5; .5 SOLUTION RESPIRATORY (INHALATION) at 06:52

## 2018-07-25 RX ADMIN — Medication 10 ML: at 04:49

## 2018-07-25 RX ADMIN — IPRATROPIUM BROMIDE AND ALBUTEROL SULFATE 3 ML: 2.5; .5 SOLUTION RESPIRATORY (INHALATION) at 15:44

## 2018-07-25 RX ADMIN — GUAIFENESIN 1200 MG: 600 TABLET, EXTENDED RELEASE ORAL at 08:35

## 2018-07-25 RX ADMIN — LEVOTHYROXINE SODIUM 150 MCG: 100 TABLET ORAL at 04:49

## 2018-07-25 RX ADMIN — IPRATROPIUM BROMIDE AND ALBUTEROL SULFATE 3 ML: 2.5; .5 SOLUTION RESPIRATORY (INHALATION) at 23:12

## 2018-07-25 RX ADMIN — IPRATROPIUM BROMIDE AND ALBUTEROL SULFATE 3 ML: 2.5; .5 SOLUTION RESPIRATORY (INHALATION) at 19:33

## 2018-07-25 RX ADMIN — IPRATROPIUM BROMIDE AND ALBUTEROL SULFATE 3 ML: 2.5; .5 SOLUTION RESPIRATORY (INHALATION) at 12:03

## 2018-07-25 RX ADMIN — INSULIN LISPRO 2 UNITS: 100 INJECTION, SOLUTION INTRAVENOUS; SUBCUTANEOUS at 21:22

## 2018-07-25 RX ADMIN — OXYCODONE HYDROCHLORIDE AND ACETAMINOPHEN 2 TABLET: 5; 325 TABLET ORAL at 08:36

## 2018-07-25 RX ADMIN — POTASSIUM CHLORIDE 20 MEQ: 750 CAPSULE, EXTENDED RELEASE ORAL at 11:28

## 2018-07-26 ENCOUNTER — APPOINTMENT (OUTPATIENT)
Dept: GENERAL RADIOLOGY | Facility: HOSPITAL | Age: 58
End: 2018-07-26

## 2018-07-26 VITALS
RESPIRATION RATE: 18 BRPM | HEIGHT: 62 IN | SYSTOLIC BLOOD PRESSURE: 123 MMHG | BODY MASS INDEX: 37.25 KG/M2 | OXYGEN SATURATION: 93 % | HEART RATE: 90 BPM | WEIGHT: 202.4 LBS | DIASTOLIC BLOOD PRESSURE: 64 MMHG | TEMPERATURE: 98 F

## 2018-07-26 LAB
ANION GAP SERPL CALCULATED.3IONS-SCNC: 10 MMOL/L (ref 3–11)
BUN BLD-MCNC: 12 MG/DL (ref 9–23)
BUN/CREAT SERPL: 19.7 (ref 7–25)
CALCIUM SPEC-SCNC: 8.4 MG/DL (ref 8.7–10.4)
CHLORIDE SERPL-SCNC: 92 MMOL/L (ref 99–109)
CO2 SERPL-SCNC: 35 MMOL/L (ref 20–31)
CREAT BLD-MCNC: 0.61 MG/DL (ref 0.6–1.3)
DEPRECATED RDW RBC AUTO: 89.3 FL (ref 37–54)
ERYTHROCYTE [DISTWIDTH] IN BLOOD BY AUTOMATED COUNT: 21.4 % (ref 11.3–14.5)
GFR SERPL CREATININE-BSD FRML MDRD: 101 ML/MIN/1.73
GLUCOSE BLD-MCNC: 104 MG/DL (ref 70–100)
GLUCOSE BLDC GLUCOMTR-MCNC: 132 MG/DL (ref 70–130)
GLUCOSE BLDC GLUCOMTR-MCNC: 132 MG/DL (ref 70–130)
HCT VFR BLD AUTO: 29.5 % (ref 34.5–44)
HGB BLD-MCNC: 9.2 G/DL (ref 11.5–15.5)
INR PPP: 2.16 (ref 0.91–1.09)
MCH RBC QN AUTO: 35.9 PG (ref 27–31)
MCHC RBC AUTO-ENTMCNC: 31.2 G/DL (ref 32–36)
MCV RBC AUTO: 115.2 FL (ref 80–99)
PLATELET # BLD AUTO: 197 10*3/MM3 (ref 150–450)
PMV BLD AUTO: 10.3 FL (ref 6–12)
POTASSIUM BLD-SCNC: 3.9 MMOL/L (ref 3.5–5.5)
PROTHROMBIN TIME: 22.7 SECONDS (ref 9.6–11.5)
RBC # BLD AUTO: 2.56 10*6/MM3 (ref 3.89–5.14)
SODIUM BLD-SCNC: 137 MMOL/L (ref 132–146)
WBC NRBC COR # BLD: 8.31 10*3/MM3 (ref 3.5–10.8)

## 2018-07-26 PROCEDURE — 80048 BASIC METABOLIC PNL TOTAL CA: CPT | Performed by: PHYSICIAN ASSISTANT

## 2018-07-26 PROCEDURE — 82962 GLUCOSE BLOOD TEST: CPT

## 2018-07-26 PROCEDURE — 99232 SBSQ HOSP IP/OBS MODERATE 35: CPT | Performed by: NURSE PRACTITIONER

## 2018-07-26 PROCEDURE — 94760 N-INVAS EAR/PLS OXIMETRY 1: CPT

## 2018-07-26 PROCEDURE — 94799 UNLISTED PULMONARY SVC/PX: CPT

## 2018-07-26 PROCEDURE — 94640 AIRWAY INHALATION TREATMENT: CPT

## 2018-07-26 PROCEDURE — 71045 X-RAY EXAM CHEST 1 VIEW: CPT

## 2018-07-26 PROCEDURE — 85610 PROTHROMBIN TIME: CPT | Performed by: PHYSICIAN ASSISTANT

## 2018-07-26 PROCEDURE — 85027 COMPLETE CBC AUTOMATED: CPT | Performed by: PHYSICIAN ASSISTANT

## 2018-07-26 RX ORDER — FUROSEMIDE 40 MG/1
40 TABLET ORAL DAILY
Status: DISCONTINUED | OUTPATIENT
Start: 2018-07-26 | End: 2018-07-26 | Stop reason: HOSPADM

## 2018-07-26 RX ORDER — FUROSEMIDE 40 MG/1
40 TABLET ORAL DAILY
Qty: 60 TABLET | Refills: 0 | Status: SHIPPED | OUTPATIENT
Start: 2018-07-26 | End: 2018-09-06 | Stop reason: SDUPTHER

## 2018-07-26 RX ADMIN — SPIRONOLACTONE 25 MG: 25 TABLET, FILM COATED ORAL at 09:38

## 2018-07-26 RX ADMIN — GUAIFENESIN 1200 MG: 600 TABLET, EXTENDED RELEASE ORAL at 09:38

## 2018-07-26 RX ADMIN — OXYCODONE HYDROCHLORIDE AND ACETAMINOPHEN 2 TABLET: 5; 325 TABLET ORAL at 13:35

## 2018-07-26 RX ADMIN — IPRATROPIUM BROMIDE AND ALBUTEROL SULFATE 3 ML: 2.5; .5 SOLUTION RESPIRATORY (INHALATION) at 08:20

## 2018-07-26 RX ADMIN — ASPIRIN 81 MG: 81 TABLET, COATED ORAL at 09:38

## 2018-07-26 RX ADMIN — POTASSIUM CHLORIDE 20 MEQ: 750 CAPSULE, EXTENDED RELEASE ORAL at 09:37

## 2018-07-26 RX ADMIN — LEVOTHYROXINE SODIUM 150 MCG: 100 TABLET ORAL at 06:26

## 2018-07-26 RX ADMIN — DIGOXIN 125 MCG: 125 TABLET ORAL at 13:34

## 2018-07-26 RX ADMIN — FUROSEMIDE 40 MG: 40 TABLET ORAL at 09:39

## 2018-07-26 RX ADMIN — Medication 2 TABLET: at 09:37

## 2018-07-26 RX ADMIN — IPRATROPIUM BROMIDE AND ALBUTEROL SULFATE 3 ML: 2.5; .5 SOLUTION RESPIRATORY (INHALATION) at 15:16

## 2018-07-26 RX ADMIN — POLYETHYLENE GLYCOL 3350 17 G: 17 POWDER, FOR SOLUTION ORAL at 09:37

## 2018-07-26 RX ADMIN — IPRATROPIUM BROMIDE AND ALBUTEROL SULFATE 3 ML: 2.5; .5 SOLUTION RESPIRATORY (INHALATION) at 11:54

## 2018-07-27 RX ORDER — WARFARIN SODIUM 2 MG/1
2 TABLET ORAL TAKE AS DIRECTED
Qty: 45 TABLET | Refills: 2 | Status: SHIPPED | OUTPATIENT
Start: 2018-07-27 | End: 2018-09-17 | Stop reason: HOSPADM

## 2018-07-27 RX ORDER — SPIRONOLACTONE 25 MG/1
25 TABLET ORAL DAILY
Qty: 30 TABLET | Refills: 2 | Status: SHIPPED | OUTPATIENT
Start: 2018-07-27 | End: 2018-08-02 | Stop reason: DRUGHIGH

## 2018-07-27 RX ORDER — ASPIRIN 81 MG/1
81 TABLET ORAL DAILY
Qty: 30 TABLET | Refills: 11 | Status: SHIPPED | OUTPATIENT
Start: 2018-07-27 | End: 2018-11-02 | Stop reason: SDUPTHER

## 2018-07-27 RX ORDER — POTASSIUM CHLORIDE 750 MG/1
20 CAPSULE, EXTENDED RELEASE ORAL DAILY
Qty: 60 CAPSULE | Refills: 0 | Status: SHIPPED | OUTPATIENT
Start: 2018-07-27 | End: 2018-08-02 | Stop reason: ALTCHOICE

## 2018-08-01 ENCOUNTER — APPOINTMENT (OUTPATIENT)
Dept: LAB | Facility: HOSPITAL | Age: 58
End: 2018-08-01

## 2018-08-01 ENCOUNTER — OFFICE VISIT (OUTPATIENT)
Dept: CARDIOLOGY | Facility: HOSPITAL | Age: 58
End: 2018-08-01

## 2018-08-01 ENCOUNTER — ANTICOAGULATION VISIT (OUTPATIENT)
Dept: PHARMACY | Facility: HOSPITAL | Age: 58
End: 2018-08-01

## 2018-08-01 VITALS
SYSTOLIC BLOOD PRESSURE: 113 MMHG | RESPIRATION RATE: 18 BRPM | OXYGEN SATURATION: 96 % | DIASTOLIC BLOOD PRESSURE: 55 MMHG | HEART RATE: 58 BPM | HEIGHT: 62 IN | TEMPERATURE: 97.7 F | WEIGHT: 200.4 LBS | BODY MASS INDEX: 36.88 KG/M2

## 2018-08-01 DIAGNOSIS — I50.32 CHRONIC DIASTOLIC CONGESTIVE HEART FAILURE (HCC): Primary | ICD-10-CM

## 2018-08-01 DIAGNOSIS — I08.0 RHEUMATIC MITRAL VALVE AND AORTIC VALVE STENOSIS: ICD-10-CM

## 2018-08-01 DIAGNOSIS — I48.0 PAROXYSMAL ATRIAL FIBRILLATION (HCC): ICD-10-CM

## 2018-08-01 LAB
ANION GAP SERPL CALCULATED.3IONS-SCNC: 7 MMOL/L (ref 3–11)
BUN BLD-MCNC: 10 MG/DL (ref 9–23)
BUN/CREAT SERPL: 15.9 (ref 7–25)
CALCIUM SPEC-SCNC: 9 MG/DL (ref 8.7–10.4)
CHLORIDE SERPL-SCNC: 100 MMOL/L (ref 99–109)
CO2 SERPL-SCNC: 33 MMOL/L (ref 20–31)
CREAT BLD-MCNC: 0.63 MG/DL (ref 0.6–1.3)
GFR SERPL CREATININE-BSD FRML MDRD: 98 ML/MIN/1.73
GLUCOSE BLD-MCNC: 129 MG/DL (ref 70–100)
INR PPP: 1.2 (ref 0.91–1.09)
POTASSIUM BLD-SCNC: 4.3 MMOL/L (ref 3.5–5.5)
PROTHROMBIN TIME: 14.6 SECONDS (ref 10–13.8)
SODIUM BLD-SCNC: 140 MMOL/L (ref 132–146)

## 2018-08-01 PROCEDURE — 36415 COLL VENOUS BLD VENIPUNCTURE: CPT | Performed by: NURSE PRACTITIONER

## 2018-08-01 PROCEDURE — 99213 OFFICE O/P EST LOW 20 MIN: CPT | Performed by: NURSE PRACTITIONER

## 2018-08-01 PROCEDURE — G0463 HOSPITAL OUTPT CLINIC VISIT: HCPCS

## 2018-08-01 PROCEDURE — 85610 PROTHROMBIN TIME: CPT

## 2018-08-01 PROCEDURE — 36416 COLLJ CAPILLARY BLOOD SPEC: CPT

## 2018-08-01 PROCEDURE — 80048 BASIC METABOLIC PNL TOTAL CA: CPT | Performed by: NURSE PRACTITIONER

## 2018-08-01 NOTE — PROGRESS NOTES
Anticoagulation Clinic Progress Note  Indication: bioprosthetic AVR, MVR; paroxysmal afib (ANGELA ligation with Atricure clip + biatrial MAZE) -- 7/16/18  Referring Provider: Alireza  Initial Warfarin Start Date: 7/20/18  Planned Duration of Therapy: 3 months  Goal INR: 2-3  Current Drug Interactions: aspirin, levothyroxine, spironolactone  Other: Eliquis pre-CTS  CHADS-VASc: 3-4 (gender, HTN, DM?, CHF)  Bleed Risk: no history of bleeding    Diet: reviewed -- pt eats broccoli, brussels sprouts, salads, tuna in oil  Alcohol: none  Tobacco: none as of 3 months ago  OTC Pain Medication: advised to use APAP    Anticoagulation Clinic INR History:  Date 8/1           Total Weekly Dose 14mg           INR 1.2           Notes  [facility]            Clinic Interview:  Verbal Release Authorization signed on 8/1/18 -- may speak with Graham ()  Tablet Strength: 2mg tablets  Current Maintenance Dose: 2mg daily since discharge    Patient Findings:  Positives:   Other complaints   Negatives:   Signs/symptoms of thrombosis, Signs/symptoms of bleeding, Laboratory test error suspected, Change in health, Change in alcohol use, Change in activity, Upcoming invasive procedure, Emergency department visit, Upcoming dental procedure, Missed doses, Extra doses, Change in medications, Change in diet/appetite, Hospital admission, Bruising   Comments:   Mrs. Miller notes bilateral LE edema today with ongoing SOA -- will discuss further with Jessi Rushing after this appt.      Jill Miller is a new start to warfarin therapy. She was by herself for counseling. Discussed warfarin's indication, mechanism, and dosing. Also enforced the importance of taking warfarin as instructed and at the same time every day, preferably in the evening so that we can make dose adjustments more easily following subsequent clinic visits. She expressed understanding of this fact. We also discussed what she should do about a missed dose; pts can take missed doses  within about 12 hours of their usual scheduled dose, but she was instructed on the importance of not doubling up on doses unless told to do so by the warfarin clinic. Explained possible side effects of warfarin therapy, including increased risk of bleeding, s/sx of bleeding and s/sx of any additional clots/PE/CVA. Also discussed monitoring of warfarin, the INR, goal INR range 2-3, and the frequency of monitoring. Explained that she would be coming into the clinic more frequently in these first few weeks of therapy as we try to adjust her dose and achieve a therapeutic INR x 2 consecutive readings. Once that is achieved, pt will follow up in clinic every 4 weeks, on average. She stated no problems with transportation or scheduling clinic appts in this manner. Reviewed drug/food/tobacco/EtOH interactions and provided written information covering these topics in more detail, explaining that green, leafy vegetables interact most heavily with warfarin. Instructed the pt not to take or discontinue any medications without informing her physician/pharmacist and reminded her to inform us of any dietary changes, as well. She expressed understanding of the information provided and has no additional questions at this time.    Plan:  1. INR is subtherapeutic today. Spoke with Michelle Charlton PA-C (Dr. Cheatham) to review plan (bridge vs not). Instructed Mrs. Miller to increase her dose to warfarin 4mg daily + resume Lovenox bridge (as pre-op, Lovenox 80mg (0.89mg/kg) subQ q12h). #10 syringes (2 refills) called in to [facility] to start tonight -- take through Monday AM, at least.  2. RTC on Friday to ensure INR is trending appropriately (previously supratherapeutic after first two warfarin doses (5mg) inpt).  3. Medications reviewed, including DDIs (aspirin, levothyroxine, spironolactone).  4. Verbal and written information provided. Jill Miller expresses understanding by teach back and has no further questions at this  time.    Blanka Garcia [facility]  8/1/2018  9:32 AM

## 2018-08-01 NOTE — PROGRESS NOTES
Subjective:     Encounter Date:08/01/2018      Patient ID: Jill Miller is a 58 y.o. female.    Chief Complaint: post op multi valve replacement 7/16/18    History of Present Illness:  Ms. Miller is s/p AVR, MVR and Tricuspid ring with ANGELA occlusion/MAZE per Dr. Cheatham on 7/16/18.  Post operatively, she was affected by fluid overload.  She stayed an extra day and diuresed 3000 ml.  Dr. Lay has requested she follow up today with BMP.    Patient states she overall is feeling better.  Mild SOA only with walking. Sleeping fairly well.  She has just come from the Anticoagulation Clinic and warfarin has been adjusted.      Past Medical History:   Diagnosis Date   • Abscess     under left breast, mrsa    • Atrial fibrillation and flutter (CMS/HCC)    • Disease of thyroid gland    • Graves disease    • Hypertension    • Hyperthyroidism    • Lung cancer (CMS/HCC)    • MRSA (methicillin resistant staph aureus) culture positive 2014    under left breast, incision and debridement, treated with wound packing and po abx    • On home oxygen therapy     2L O2 at home per [place]    • Tobacco abuse 4/10/2017       Past Surgical History:   Procedure Laterality Date   • ABSCESS DRAINAGE      under left breast d/t mrsa    • AORTIC VALVE REPAIR/REPLACEMENT N/A 7/16/2018    Procedure: MEDIAN STERNOTOMY, AORTIC VALVE REPLACEMENT WITH TIFFANIE AND MAZE, TRICUSPID RING, LEFT ATRIAL OCCLUSION;  Surgeon: Ajay Chaetham MD;  Location:  [facility];  Service: Cardiothoracic   • BRONCHOSCOPY Left 5/12/2017    Procedure: BRONCHOSCOPY;  Surgeon: Ajay Cheatham MD;  Location:  [facility];  Service:    • BRONCHOSCOPY N/A 5/18/2017    Procedure: BRONCHOSCOPY BIOPSY AT BEDSIDE;  Surgeon: Ajay Cheatham MD;  Location:  [facility];  Service:    • CARDIAC CATHETERIZATION N/A 9/28/2017    Procedure: Left Heart Cath;  Surgeon: Marco Lay MD;  Location:  [facility];  Service:    • CARDIAC CATHETERIZATION N/A 9/28/2017     Procedure: Right Heart Cath;  Surgeon: Marco Lay MD;  Location:  [facility];  Service:    • LUNG BIOPSY  04/2017   • LYMPH NODE DISSECTION Left 5/12/2017    Procedure: MEDIASTINAL LYMPH NODE DISSECTION;  Surgeon: Ajay Cheatham MD;  Location:  [facility];  Service:    • MITRAL VALVE REPAIR/REPLACEMENT N/A 7/16/2018    Procedure: MITRAL VALVE REPLACEMENT;  Surgeon: Ajay Cheatham MD;  Location:  [facility];  Service: Cardiothoracic   • TEETH EXTRACTION     • THORACOSCOPY VIDEO ASSISTED WITH LOBECTOMY Left 5/12/2017    Procedure: THORACOSCOPY VIDEO ASSISTED WITH OPEN LOBECTOMY;  Surgeon: Ajay Cheatham MD;  Location:  [facility];  Service:    • TOTAL THYROIDECTOMY  approx 2012       Social History     Social History   • Marital status:      Spouse name: N/A   • Number of children: 3   • Years of education: N/A     Occupational History   • [employer]      Short Term Disab[employer]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 Caffeine: 0-1 serving per day.           Family History   Problem Relation Age of Onset   • Breast cancer Mother    • Diabetes Father        Review of Systems   Constitution: Negative for chills, decreased appetite, diaphoresis, fever, weakness, malaise/fatigue, night sweats, weight gain and weight loss.   HENT: Negative for congestion, hearing loss, hoarse voice and nosebleeds.    Eyes: Negative for blurred vision, visual disturbance and visual halos.   Cardiovascular: Positive for leg swelling. Negative for chest pain, claudication, cyanosis, dyspnea on exertion, irregular heartbeat, near-syncope, orthopnea, palpitations, paroxysmal nocturnal dyspnea and  "syncope.   Respiratory: Negative for cough, hemoptysis, shortness of breath, sleep disturbances due to breathing, snoring, sputum production and wheezing.    Hematologic/Lymphatic: Negative for bleeding problem. Does not bruise/bleed easily.   Skin: Negative for dry skin, itching and rash.   Musculoskeletal: Negative for arthritis, joint pain, joint swelling and myalgias.   Gastrointestinal: Negative for bloating, abdominal pain, constipation, diarrhea, flatus, heartburn, hematemesis, hematochezia, melena, nausea and vomiting.   Genitourinary: Negative for dysuria, frequency, hematuria, nocturia and urgency.   Neurological: Negative for excessive daytime sleepiness, dizziness, headaches, light-headedness and loss of balance.   Psychiatric/Behavioral: Negative for depression. The patient does not have insomnia and is not nervous/anxious.      Vitals:    08/01/18 1045 08/01/18 1047 08/01/18 1049   BP: 114/54 107/55 113/55   BP Location: Right arm Left arm Left arm   Patient Position: Sitting Sitting Standing   Pulse: 53  58   Resp: 18     Temp: 97.7 °F (36.5 °C)     TempSrc: Temporal Artery      SpO2: 96%     Weight: 90.9 kg (200 lb 6.4 oz)     Height: 157.5 cm (62\")         Objective:     Physical Exam   Constitutional: She is oriented to person, place, and time. She appears well-developed and well-nourished. No distress.   HENT:   Head: Normocephalic and atraumatic.   Eyes: Pupils are equal, round, and reactive to light. Conjunctivae are normal. No scleral icterus.   Neck: Normal range of motion. Neck supple. No JVD present.   Cardiovascular: Normal rate, regular rhythm and intact distal pulses.  Exam reveals no gallop and no friction rub.    Murmur heard.  Systolic murmur II/VI   Pulmonary/Chest: Effort normal. No respiratory distress. She has no wheezes. She has rales. She exhibits no tenderness.   LLL few rales.  Otherwise clear   Musculoskeletal: Normal range of motion. She exhibits edema.   +1 bilateral pedal, " ankle, and lower leg edema   Neurological: She is alert and oriented to person, place, and time. No cranial nerve deficit.   Skin: Skin is warm and dry.   Psychiatric: She has a normal mood and affect. Her behavior is normal.       Lab Review: Basic Metabolic Panel    Ref Range & Units 1d ago   Glucose 70 - 100 mg/dL 129     BUN 9 - 23 mg/dL 10    Creatinine 0.60 - 1.30 mg/dL 0.63    Sodium 132 - 146 mmol/L 140    Potassium 3.5 - 5.5 mmol/L 4.3    Chloride 99 - 109 mmol/L 100    CO2 20.0 - 31.0 mmol/L 33.0     Calcium 8.7 - 10.4 mg/dL 9.0    eGFR Non African Amer >60 mL/min/1.73 98    BUN/Creatinine Ratio 7.0 - 25.0 15.9    Anion Gap 3.0 - 11.0 mmol/L 7.0                 Assessment/ Plan:          Diagnosis Plan   1. Chronic diastolic congestive heart failure (CMS/HCC)  Fluid overload markedly improved compared to inpatient status.  Adjust Aldactone to 50 mg daily as today's BMP showed no issues with potassium or renal function.  DC potassium supplement.  Re-check BMP in 2 weeks.  See back in HF clinic 2 weeks on a day she comes to Coumadin clinic.  Continue lasix @ present dose.       2. Paroxysmal atrial fibrillation (CMS/HCC)  S/p MAZE and ANGELA occlusion.  CHADS Vasc = 2.  HRR today.  Deferred EKG. Metoprolol/ digoxin.       3. Rheumatic mitral valve and aortic valve stenosis  S/p AVR/MVR/ tricuspid ring.  Overall making good recovery progress from a surgical standpoint.  Coumadin clinic managing anticoagulation.  Increase ambulation.  She is not smoking.  CTS follow up   9/4/18.  Cardiac Rehab orientation 8/14/18.

## 2018-08-02 RX ORDER — SPIRONOLACTONE 50 MG/1
50 TABLET, FILM COATED ORAL DAILY
Qty: 30 TABLET | Refills: 3 | Status: SHIPPED | OUTPATIENT
Start: 2018-08-02 | End: 2018-11-01 | Stop reason: SDUPTHER

## 2018-08-02 NOTE — PAYOR COMM NOTE
"Jill Miller (58 y.o. Female)   Auth # [id number]  Rani Keller RN, BSN  Phone # [phone]  Fax # [phone]    Date of Birth Social Security Number Address Home Phone MRN    1960  [address]  [place] [address] [phone] [id number]    Restorationism Marital Status          None        Admission Date Admission Type Admitting Provider Attending Provider Department, Room/Bed    7/16/18 Elective Ajay Cheatham MD  [facility] 4G, S449/1    Discharge Date Discharge Disposition Discharge Destination        7/26/2018 Home or Self Care              Attending Provider:  (none)   Allergies:  Morphine And Related, Lortab [Hydrocodone-acetaminophen]    Isolation:  None   Infection:  None   Code Status:  Prior    Ht:  157.5 cm (62\")   Wt:  91.8 kg (202 lb 6.4 oz)    Admission Cmt:  None   Principal Problem:  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                 Active Insurance as of 7/16/2018     Primary Coverage     Payor Plan Insurance Group Employer/Plan Group    [employer] CROSS BLUE [employer] PPO [id number]     Payor Plan Address Payor Plan Phone Number Effective From Effective To    [id number] [phone] 1/1/2017     [facility] [id number]       Subscriber Name Subscriber Birth Date Member ID       JILL MILLER 1960 [id number]                 Emergency Contacts      (Rel.) Home Phone Work Phone Mobile Phone    Zen Miller (Spouse) -- -- [phone]               Discharge Summary      Michelle Charlton PA-C at 7/26/2018  5:14 PM          CTS Discharge Summary    Patient Care Team:  YANETH Olivera as PCP - General (Family Medicine)  Ajay Cheatham MD as Surgeon (Cardiothoracic Surgery)  Bess Simons MD as Consulting Physician (Hematology and Oncology)  Consults:   Consults     Date and Time Order Name Status Description    7/23/2018 0721 Inpatient Gastroenterology Consult Completed     " 7/20/2018 1506 Inpatient Consult to Hospitalist GUCCI for Medical Management Completed     7/16/2018 1600 Inpatient Consult to Cardiology Completed           Date of Admission: 7/16/2018  5:48 AM  Date of Discharge:  7/26/2018    Discharge Diagnosis  Past Medical History:   Diagnosis Date   • Abscess     under left breast, mrsa    • Atrial fibrillation and flutter (CMS/HCC)    • Disease of thyroid gland    • Graves disease    • Hypertension    • Hyperthyroidism    • Lung cancer (CMS/HCC)    • MRSA (methicillin resistant staph aureus) culture positive 2014    under left breast, incision and debridement, treated with wound packing and po abx    • On home oxygen therapy     2L O2 at home per NC    • Tobacco abuse 4/10/2017     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 female with a history of hypertension, chronic atrial fibrillation, tobacco abuse and lung cancer who presented with shortness of breath and fatigue.  She was found to have severe mitral stenosis/regurgitation and moderate aortic regurgitation.  Intraoperative transesophageal echocardiogram revealed new severe tricuspid regurgitation.  The patient was felt to be a reasonable candidate for mitral replacement, aortic valve replacement, tricuspid repair and MAZE procedure.      Hospital Course  Patient was taken to the operating room on 7/16/18 for Mitral valve replacement with a 27 mm Magna Ease tissue valve, Aortic valve replacement with a 21 mm Magna Ease tissue valve, Tricuspid valve repair with a 32 mm MC3 annuloplasty ring, Castillo IV biatrial MAZE procedure and left atrial appendage ligation.  Patient was transported to cardiac ICU intubated and in stable condition. Patient was extubated per  ICU protocol.  POD 1:  Epi being weaned.  On Levophed for support.  POD 2:  Fellows Jesu, arterial line and melvin removed. On high flow NC.  Hyperkalemic.  Ambulating with PT.  POD 3:  Primacor dc/d.  Chest tubes and pacing wires removed.    POD 4:  Transferred to telemetry. Coumadin started. RUQ ultrasound for elevated LFT's.  Ultrasound negative.  POD 5:  Aeration of left lung improved on CXR.    POD 6:  Diuresed for LE edema.    POD 7:  Potassium replaced.  Emesis.  Coumadin held for INR being supratherapeutic  POD 8:  Potassium replaced.  Coumadin resumed.  POD 9:  Diuresed.  Patient met discharge criteria and was discharged home.      Discharge Medications     Discharge Medications      New Medications      Instructions Start Date   aspirin 81 MG EC tablet   81 mg, Oral, Daily      oxyCODONE-acetaminophen 5-325 MG per tablet  Commonly known as:  PERCOCET   2 tablets, Oral, Every 4 Hours PRN      potassium chloride 10 MEQ CR capsule  Commonly known as:  MICRO-K   20 mEq, Oral, Daily      spironolactone 25 MG tablet  Commonly known as:  ALDACTONE   25 mg, Oral, Daily      warfarin 2 MG tablet  Commonly known as:  COUMADIN   2 mg, Oral, Take As Directed         Changes to Medications      Instructions Start Date   furosemide 40 MG tablet  Commonly known as:  LASIX  What changed:  · medication strength  · how much to take   40 mg, Oral, Daily         Continue These Medications      Instructions Start Date   acetaminophen 325 MG tablet  Commonly known as:  TYLENOL   325 mg, Oral, Daily PRN      atorvastatin 40 MG tablet  Commonly known as:  LIPITOR   40 mg, Oral, Nightly      digoxin 125 MCG tablet  Commonly known as:  LANOXIN   125 mcg, Oral, Daily      levothyroxine 150 MCG tablet  Commonly known as:  SYNTHROID, LEVOTHROID   150 mcg, Oral, Daily      metoprolol tartrate 50 MG tablet  Commonly known as:  LOPRESSOR   50 mg, Oral, 2 Times Daily      varenicline 1 MG tablet  Commonly known as:  CHANTIX CONTINUING MONTH  ASHLEE   1 mg, Oral, 2 Times Daily         Stop These Medications    apixaban 5 MG tablet tablet  Commonly known as:  ELIQUIS     LOVENOX SC     potassium chloride 10 MEQ CR tablet  Commonly known as:  K-DUR,KLOR-CON            Discharge Diet:   Diet Instructions     Diet: Cardiac, Consistent Carbohydrate       Discharge Diet:   Cardiac  Consistent Carbohydrate             Activity at Discharge:   Activity Instructions     Discharge Activity Restrictions       1) No driving for 6 weeks and no longer taking narcotics.   2) Do not lift / push / pull more than 10 lbs.          Follow-up Appointments  Future Appointments  Date Time Provider Department Center   8/1/2018 9:15 AM ANTI COAG CLINIC  [facility]ELIC[facility]COAG None   8/1/2018 10:15 AM GUCCI Guerrero [facility] FELICITY   8/14/2018 9:00 AM ORIENTATION -  [facility]EL[facility] CARD REHAB  [facility] FELIC[facility]   8/28/2018 2:30 PM GUCCI Novak [facility] FELICITY None   9/4/2018 9:00 AM Wilberto Cheatham MD [facility] CTS FELICITY None   9/11/2018 1:30 PM GUCCI Euceda [facility] L[facility] FELICITY None           Michelle Charlton PA-C  07/31/18  12:09 PM              Electronically signed by Wilberto Cheatham MD at 7/31/2018  6:57 PM       Discharge Order     Start     Ordered    07/26/18 0804  Discharge patient  Once     Expected Discharge Date:  07/26/18    Discharge Disposition:  Home or Self Care    Physician of Record for Attribution - Please select from Treatment Team:  WILBERTO CHEATHAM [[id number]]    Review needed by CMO to determine Physician of Record:  No       Question Answer Comment   Physician of Record for Attribution - Please select from Treatment Team WILBERTO CHEATHAM    Review needed by CMO to determine Physician of Record No        07/26/18 0804    07/25/18 0711  Discharge patient  Once,   Status:  Canceled     Expected Discharge Date:  07/25/18    Discharge Disposition:  Home or Self Care    Physician of Record for Attribution - Please select from Treatment Team:  WILBERTO CHEATHAM [[id number]]    Review needed by  CMO to determine Physician of Record:  No       Question Answer Comment   Physician of Record for Attribution - Please select from Treatment Team WILBERTO DANG    Review needed by CMO to determine Physician of Record No        07/25/18 0712        Marija Gallagher  Signed Case Management  Progress Notes Date of Service: 7/26/2018  3:00 PM         []Manual[]Template  []Copied  Case Management Discharge Note     Final Note: Met with pt and family at bedside to f/u DCP.  Goal remains to return home upon DC.  Discussed need for Cont home O2.  Pt relates she is current with [employer] (nocturnal O2).  Notified Mary of new orders.  Portable O2 tank will be delivered to pt's bedside prior to DC today.              Destination               No service has been selected for the patient.                         Durable Medical Equipment - Selection Complete      Service Request Status Selected Specialties Address Phone Number Fax Number     [facility] Selected DME Services [address] [facility] [phone]               Dialysis/Infusion      No service has been selected for the patient.               Home Medical Care      No service has been selected for the patient.               Social Care      No service has been selected for the patient.             Final Discharge Disposition Code: 01 - home or self-care        Marisa Suarez APRN Nurse Practitioner Signed Cardiology  Progress Notes Date of Service: 7/26/2018  9:24 AM      Expand All Collapse All    []Manual[]Template  []Copied  [facility]  Cardiology Progress Note        Chief Complaint/Reason for service:    · Satus post AVR/MVR/tricuspid annuloplasty/ANGELA ligation  · Chronic Heart Failure        Subjective         I was contacted by the nurse yesterday afternoon who had concerns about patient being discharged home.  She felt the patient was in volume overload  with lower extremity edema and crackles in lung bases.  I decided to keep the patient overnight for diuresis as I was not physically present at the hospital to assess and gave increased dose of po Lasix with good response of greater than 3000 mL's of output. The patient is a long time smoker with chronic pulmonary issues and uses home oxygen as needed. She has been up ambulating in the halls this morning without her oxygen with acceptable O2 sats and overall tells me she feels well and would like to go home.       Past medical, surgical, social and family history reviewed in the patient's electronic medical record.              Objective      Vital Sign Min/Max for last 24 hours  Temp  Min: 98 °F (36.7 °C)  Max: 98.4 °F (36.9 °C)   BP  Min: 105/53  Max: 123/64   Pulse  Min: 82  Max: 99   Resp  Min: 16  Max: 20   SpO2  Min: 84 %  Max: 97 %   Flow (L/min)  Min: 2  Max: 2       Intake/Output Summary (Last 24 hours) at 07/26/18 0924  Last data filed at 07/26/18 0500    Gross per 24 hour   Intake              360 ml   Output             3830 ml   Net            -3470 ml             Physical Exam   Constitutional: She is oriented to person, place, and time. She appears well-developed and well-nourished.   HENT:   Head: Normocephalic.   Neck: No JVD present. Carotid bruit is not present.   Cardiovascular: Normal rate, regular rhythm, normal heart sounds and intact distal pulses.  Exam reveals no gallop and no friction rub.    No murmur heard.  Pulmonary/Chest: Effort normal. She has rhonchi.   Abdominal: Soft.   Musculoskeletal: She exhibits edema.   1+ pitting edema in bilateral lower extremities   Neurological: She is alert and oriented to person, place, and time.   Skin: Skin is warm and dry. No cyanosis. Nails show no clubbing.   Psychiatric: She has a normal mood and affect. Her behavior is normal.         Results Review:   I reviewed the patient's recent labs in the electronic medical record.          Results from  last 7 days  Lab Units 07/26/18  0731 07/25/18  1606 07/24/18  2036 07/24/18  0539 07/23/18  0442 07/22/18  0544 07/21/18  0658 07/20/18  0347   SODIUM mmol/L 137  --   --  136 139 135 137 136   POTASSIUM mmol/L 3.9 4.2 4.0 3.1* 3.2* 3.5 4.1 5.0   CHLORIDE mmol/L 92*  --   --  93* 96* 98* 101 100   BUN mg/dL 12  --   --  11 14 18 21 22   CREATININE mg/dL 0.61  --   --  0.57* 0.60 0.62 0.74 0.86   MAGNESIUM mg/dL  --   --   --  2.2  --   --   --  2.4             Results from last 7 days  Lab Units 07/26/18  0731 07/23/18 0442 07/22/18  0544   07/20/18  0347   WBC 10*3/mm3 8.31 5.92  --   --  9.44   HEMOGLOBIN g/dL 9.2* 9.0* 9.7*  < > 8.7*   HEMATOCRIT % 29.5* 28.3* 30.9*  < > 27.5*   PLATELETS 10*3/mm3 197 107*  --   --  69*   < > = values in this interval not displayed.           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 Moderate pulmonary hypertension.  · Echo (4/3/18): LVEF 70%. Severe LA dilatation. Moderate AI. Severe MS and severe MR. RVSP approximately 56 mmHg.  · AVR/MVR/tricuspid annuloplasty and left atrial appendage ligation performed by Ajay Cheatham on 7/16/18           Paroxysmal atrial fibrillation (CMS/HCC)     Overview Addendum 7/17/2018  8:07 AM by Marco Lay IV, MD       · Postoperative atrial fibrillation with RVR in the setting of left upper lobectomy, 5/15/2017  · Echo (8/25/17): LVEF 60%.  Moderate to severe mitral stenosis (mean gradient 9 mmHg).  Moderate aortic insufficiency.  RVSP 50 mmHg  · Chads Vasc = 2 (female, HTN)  · Left atrial appendage ligation by Ajay Cheatham, 7/16/18           Thrombocytopenia (CMS/HCC)      Elevated transaminase level     Hyperbilirubinemia     Chronic diastolic congestive heart failure (CMS/HCC)     Type 2 diabetes mellitus (CMS/HCC)                   Plan         · Ok for discharge  · Follow up with heart & valve clinic on Tuesday and BMP same day  · F/U with Dr Nelson Lay in 6 weeks     Lore Suarez, GUCCI  7/26/2018             Ajay Cheatahm MD Physician Signed Cardiothoracic Surgery  Progress Notes Date of Service: 7/26/2018  7:55 AM         []Manual[]Template  []Copied  CTS Progress Note        POD 10 s/p AVR,MVR,TV repair,MAZE,ANGELA ligation         LOS: 10 days   Patient Care Team:  YANETH Olivera as PCP - General (Family Medicine)  Ajay Cheatham MD as Surgeon (Cardiothoracic Surgery)  Bess Simons MD as Consulting Physician (Hematology and Oncology)     Subjective  No acute events overnight.  Ambulating in hallway.  On 2L NC.     Objective     Vital Signs  Temp:  [98.4 °F (36.9 °C)] 98.4 °F (36.9 °C)  Heart Rate:  [82-99] 99  Resp:  [16-18] 16  BP: (105-116)/(53-63) 116/63     Physical Exam:              General Appearance: alert, appears stated age and cooperative              Lungs: clear to auscultation, respirations regular, respirations even and respirations unlabored              Heart: regular rhythm & normal rate, normal S1, S2, no murmur, no tatum, no rub and no click              Skin:  Incision c/d/i                Results     Results from last 7 days  Lab Units 07/23/18  0442   WBC 10*3/mm3 5.92   HEMOGLOBIN g/dL 9.0*   HEMATOCRIT % 28.3*   PLATELETS 10*3/mm3 107*         Results from last 7 days  Lab Units 07/25/18  1606   07/24/18  0539   SODIUM mmol/L  --   --  136   POTASSIUM mmol/L 4.2  < > 3.1*   CHLORIDE mmol/L  --   --  93*   CO2 mmol/L  --   --  34.0*   BUN mg/dL  --   --  11   CREATININE mg/dL  --   --  0.57*   GLUCOSE mg/dL  --   --  129*   CALCIUM mg/dL  --   --  8.6*   < > = values in this interval not displayed.                   Imaging Results (last  "24 hours)      Procedure Component Value Units Date/Time     XR Chest 1 View [[id number]] Updated:  07/26/18 0540     XR Chest 1 View [[id number]] Collected:  07/25/18 0911       Updated:  07/25/18 1007     Narrative:        EXAMINATION: XR CHEST 1 VW-      INDICATION: Status post CABG; Z74.09-Other reduced mobility;  I35.9-Nonrheumatic aortic valve disorder, unspecified; I08.0-Rheumatic  disorders of both mitral and aortic valves.     TECHNIQUE:  Single view frontal chest.     COMPARISONS: 07/24/2018.     FINDINGS:  Small right pleural effusion. Sternotomy wires and cardiac  valve replacement hardware. Atherosclerosis aortic knob. No  pneumothorax.        Impression:        Stable exam.     D:  07/25/2018  E:  07/25/2018     This report was finalized on 7/25/2018 10:05 AM by Bart Moya.                   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  Active Problems:    Paroxysmal atrial fibrillation (CMS/HCC)    Chronic diastolic congestive heart failure (CMS/HCC)    Type 2 diabetes mellitus (CMS/HCC)    Thrombocytopenia (CMS/HCC)    Elevated transaminase level    Hyperbilirubinemia        Plan   Coumadin  Diuresis  Pulmonary toilet, wean oxygen as tolerated  Ambulate  Discharge home today          Silvano, Bess Bonilla MD Physician Signed Gastroenterology  Progress Notes Date of Service: 7/25/2018  1:39 PM         []Manual[]Template  []Copied  GI Daily Progress Note  Subjective:     Chief Complaint:  Abnormal LFTs      Patient feeling well today.  Complains of right lower extremity edema.  No abdominal pain      Objective:     /53   Pulse 82   Temp 98.1 °F (36.7 °C) (Oral)   Resp 18   Ht 157.5 cm (62\")   Wt 93.3 kg (205 lb 9.6 oz)   SpO2 97%   BMI 37.60 kg/m²      Physical Exam   Constitutional: She appears well-developed. No distress.   Cardiovascular: Normal rate and regular rhythm.    Pulmonary/Chest: " Effort normal. No respiratory distress.   Abdominal: Soft. Bowel sounds are normal. She exhibits no distension. There is no tenderness.   Musculoskeletal:   2+ pitting edema of right lower extremity.  ~1+ edema of left lower extremity         Lab        Lab Results   Component Value Date     WBC 5.92 07/23/2018     HGB 9.0 (L) 07/23/2018     HGB 9.7 (L) 07/22/2018     HGB 8.9 (L) 07/21/2018     .7 (H) 07/23/2018      (L) 07/23/2018     INR 2.36 (H) 07/25/2018     INR 2.29 (H) 07/24/2018     INR 5.65 (H) 07/23/2018     INR 3.90 (H) 07/22/2018     INR 3.44 (H) 07/22/2018               Lab Results   Component Value Date     GLUCOSE 129 (H) 07/24/2018     BUN 11 07/24/2018     CREATININE 0.57 (L) 07/24/2018     EGFRIFNONA 110 07/24/2018     BCR 19.3 07/24/2018     CO2 34.0 (H) 07/24/2018     CALCIUM 8.6 (L) 07/24/2018     ALBUMIN 3.31 07/25/2018     ALKPHOS 105 (H) 07/25/2018     BILITOT 2.7 (H) 07/25/2018     BILIDIR 1.7 (H) 07/25/2018     ALT 30 07/25/2018     AST 45 (H) 07/25/2018         Assessment:     1. Jaundice, post operatively/abnormal LFTs   2. Valvular disease, s/p recent AVR, MVR, TV repair  3. Postoperative hypotension, requiring IV pressors, resolved  4. Macrocytic anemia   5. Supratherapeutic INR, improved   6. Thrombocytopenia      Plan:     >>> Suspect abnormal LFTs are secondary to combination of underlying fatty liver, congestive hepatopathy and recent shock liver.  LFTs are continuing to trend down  >>> Repeat LFTs in one week with primary care physician     Will sign off.  Please call for questions or concerns.  Follow [place] [facility] [place] 4-6 weeks      YANETH Mendoza  07/25/18  1:39 PM      Agree.  Patient again reminded to discuss OB/GYN outpatient care for incidental lesion found on CT scan.  Ok to dc from GI standpoing.         Revision History                                Marco Lay IV, MD Physician Signed Cardiology  Progress Notes Date of Service: 7/25/2018  8:12  AM      Expand All Collapse All    [facility]  Cardiology Progress Note        Chief Complaint/Reason for service:       · Satus post AVR/MVR/tricuspid annuloplasty/ANGELA ligation     Subjective         Patient feels good and would like to go home.     Past medical, surgical, social and family history reviewed in the patient's electronic medical record.              Objective      Vital Sign Min/Max for last 24 hours  Temp  Min: 97.6 °F (36.4 °C)  Max: 98 °F (36.7 °C)   BP  Min: 108/54  Max: 139/65   Pulse  Min: 80  Max: 103   Resp  Min: 15  Max: 18   SpO2  Min: 86 %  Max: 98 %   Flow (L/min)  Min: 1  Max: 2       Intake/Output Summary (Last 24 hours) at 07/25/18 0812  Last data filed at 07/25/18 0404    Gross per 24 hour   Intake              900 ml   Output             1150 ml   Net             -250 ml             Physical Exam   Constitutional: She is oriented to person, place, and time. She appears well-developed and well-nourished.   HENT:   Head: Normocephalic.   Neck: No JVD present. Carotid bruit is not present.   Cardiovascular: Normal rate, regular rhythm and intact distal pulses.  Exam reveals no gallop and no friction rub.    Murmur heard.  Pulmonary/Chest: Effort normal. She has wheezes. She has rhonchi.   Abdominal: Soft.   Musculoskeletal: She exhibits no edema.   Neurological: She is alert and oriented to person, place, and time.   Skin: Skin is warm and dry. No cyanosis. Nails show no clubbing.   Psychiatric: She has a normal mood and affect. Her behavior is normal.         Results Review:   I reviewed the patient's recent labs in the electronic medical record.          Results from last 7 days  Lab Units 07/24/18  2036 07/24/18  0539 07/23/18  0442 07/22/18  0544 07/21/18  0658 07/20/18  0347 07/19/18  0409 07/18/18  0950   SODIUM mmol/L  --  136 139 135 137 136 136 136   POTASSIUM mmol/L 4.0 3.1* 3.2* 3.5 4.1 5.0 4.7 5.5  5.5   CHLORIDE mmol/L  --  93* 96* 98*  101 100 103 105   BUN mg/dL  --  11 14 18 21 22 22 20   CREATININE mg/dL  --  0.57* 0.60 0.62 0.74 0.86 1.16 1.34*   MAGNESIUM mg/dL  --  2.2  --   --   --  2.4 2.3  --              Results from last 7 days  Lab Units 07/23/18  0442 07/22/18  0544 07/21/18  0658   07/20/18  0347 07/19/18  0409   WBC 10*3/mm3 5.92  --   --   --  9.44 13.45*   HEMOGLOBIN g/dL 9.0* 9.7* 8.9*  --  8.7* 8.5*   HEMATOCRIT % 28.3* 30.9* 28.4*  < > 27.5* 26.6*   PLATELETS 10*3/mm3 107*  --   --   --  69* 76*   < > = values in this interval not displayed.           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 Moderate pulmonary hypertension.  · Echo (4/3/18): LVEF 70%. Severe LA dilatation. Moderate AI. Severe MS and severe MR. RVSP approximately 56 mmHg.  · AVR/MVR/tricuspid annuloplasty and left atrial appendage ligation performed by Ajay Cheatham on 7/16/18           Chronic diastolic congestive heart failure (CMS/HCC)     Paroxysmal atrial fibrillation (CMS/HCC)     Overview Addendum 7/17/2018  8:07 AM by Marco Lay IV, MD       · Postoperative atrial fibrillation with RVR in the setting of left upper lobectomy, 5/15/2017  · Echo (8/25/17): LVEF 60%.  Moderate to severe mitral stenosis (mean gradient 9 mmHg).  Moderate aortic insufficiency.  RVSP 50 mmHg  · Chads Vasc = 2 (female, HTN)  · Left atrial appendage ligation by Ajay Cheatham, 7/16/18           Type 2 diabetes mellitus (CMS/HCC)      Thrombocytopenia (CMS/HCC)     Elevated transaminase level     Hyperbilirubinemia                   Plan             · Okay for discharge from my standpoint  · Coumadin to be managed by anticoagulation clinic  · Follow-up with me in 6 weeks        Marco Lay IV, MD  2018             Xenia Pederson MD Physician Signed Medicine  Progress Notes Date of Service: 2018  7:27 AM      Expand All Collapse All    []Manual[]Template  []Copied       [facility] Medicine Services  PROGRESS NOTE     Patient Name: Jill Miller  : 1960  MRN: [id number]     Date of Admission: 2018  Length of Stay: 9  Primary Care Physician: YANETH Olivera        Subjective      Subjective      CC:  FU DM     HPI:  NAEO. Pt reports her LE being more edematous. Ambulating. No complaints.      Review of Systems  Gen- No fevers, chills  CV- No chest pain, palpitations  Resp- No cough, dyspnea  GI- No N/V/D, abd pain     Otherwise ROS is negative except as mentioned in the HPI.        Objective      Objective      Vital Signs:   Temp:  [97.6 °F (36.4 °C)-98.1 °F (36.7 °C)] 98.1 °F (36.7 °C)  Heart Rate:  [] 90  Resp:  [15-18] 18  BP: (105-139)/(53-71) 105/53     Physical Exam:  Constitutional: No acute distress, awake, alert on 2LNC  Eyes: PERRLA, sclerae anicteric, no conjunctival injection  HENT: NCAT, mucous membranes moist  Neck: Supple, no thyromegaly, no lymphadenopathy, trachea midline  Respiratory: CTAB, nonlabored respirations   Cardiovascular: RRR, no murmurs, rubs, or gallops, palpable pedal pulses bilaterally, 2+ pitting bilateral LE  Gastrointestinal: Positive bowel sounds, soft, nontender, nondistended  Musculoskeletal: No bilateral ankle edema, no clubbing or cyanosis to extremities  Psychiatric: Appropriate affect, cooperative  Neurologic: Oriented x 3, strength symmetric in all extremities, Cranial Nerves grossly intact to confrontation, speech clear  Skin: No  rashel     Results Reviewed:  I have personally reviewed current lab, radiology, and data and agree.        Results from last 7 days  Lab Units 07/25/18  0532 07/24/18  0539 07/23/18 0442 07/22/18  0544 07/21/18  0658   07/20/18  0347 07/19/18  0409   WBC 10*3/mm3  --   --  5.92  --   --   --   --  9.44 13.45*   HEMOGLOBIN g/dL  --   --  9.0*  --  9.7* 8.9*  --  8.7* 8.5*   HEMATOCRIT %  --   --  28.3*  --  30.9* 28.4*  < > 27.5* 26.6*   PLATELETS 10*3/mm3  --   --  107*  --   --   --   --  69* 76*   INR   2.36* 2.29* 5.65*  < >  --  1.28*  < >  --   --    < > = values in this interval not displayed.    Results from last 7 days  Lab Units 07/24/18 2036 07/24/18 0539 07/23/18 0442 07/22/18  0544   SODIUM mmol/L  --  136 139 135   POTASSIUM mmol/L 4.0 3.1* 3.2* 3.5   CHLORIDE mmol/L  --  93* 96* 98*   CO2 mmol/L  --  34.0* 33.0* 26.0   BUN mg/dL  --  11 14 18   CREATININE mg/dL  --  0.57* 0.60 0.62   GLUCOSE mg/dL  --  129* 69* 55*   CALCIUM mg/dL  --  8.6* 8.1* 7.5*   ALT (SGPT) U/L  --  38 45* 50*   AST (SGOT) U/L  --  55* 71* 95*     No results found for: BNP  No results found for: PHART         Microbiology Results Abnormal      None                     Imaging Results (last 24 hours)      Procedure Component Value Units Date/Time     XR Chest 1 View [[id number]] Collected:  07/25/18 0911       Updated:  07/25/18 0911     Narrative:        EXAMINATION: XR CHEST 1 VW-      INDICATION: Status post CABG; Z74.09-Other reduced mobility;  I35.9-Nonrheumatic aortic valve disorder, unspecified; I08.0-Rheumatic  disorders of both mitral and aortic valves.     TECHNIQUE:  Single view frontal chest.     COMPARISONS: 07/24/2018.     FINDINGS:  Small right pleural effusion. Sternotomy wires and cardiac  valve replacement hardware. Atherosclerosis aortic knob. No  pneumothorax.        Impression:        Stable exam.     D:  07/25/2018  E:  07/25/2018           CT Abdomen Pelvis With Contrast [[id number]] Collected:   07/24/18 1044       Updated:  07/24/18 1302     Narrative:        EXAMINATION: CT ABDOMEN PELVIS W CONTRAST- 07/23/2018     INDICATION: IV contrast only; Z74.09-Other reduced mobility;  I35.9-Nonrheumatic aortic valve disorder, unspecified; I08.0-Rheumatic  disorders of both mitral and aortic valves     TECHNIQUE:  Axial CT data of the abdomen and pelvis were acquired  helically following IV contrast administration. Multiplanar reformatted  images were generated and reviewed. The radiation dose reduction device  was turned on for each scan per the ALARA (As Low as Reasonably  Achievable) protocol     COMPARISONS:  Right upper quadrant sonogram 07/19/2018     FINDINGS:       Layering density noted in the gallbladder which could represent  vicarious excretion from previous contrast administration or biliary  sludge. The liver is unremarkable in CT appearance as are the kidneys,  adrenal glands, and the spleen. There is a fat density lesion in the  uncinate process of the pancreas. There is no intrahepatic or  extrahepatic biliary ductal dilatation. Enhancing 1 cm lesion protruding  into the endometrial canal is noted, possibly a submucosal fibroid or  endometrial polyp. Adnexal structures are unremarkable in CT appearance.  A small amount of free fluid is noted in the pelvis. Small right pleural  effusion is noted. There is evidence of prior mitral and tricuspid valve  replacement. Trace left pleural effusion. Body wall edema. No aggressive  bone lesion.        Impression:           1. No evidence of biliary obstruction.  2. Layering density in the gallbladder could represent biliary sludge or  residual vicariously excreted contrast.  3. Enhancing 1 cm lesion protruding into the endometrial canal.  Differential considerations include endometrial polyp or submucosal  uterine fibroid.  4. Small right pleural effusion.     D:  07/24/2018  E:  07/24/2018     This report was finalized on 7/24/2018 1:00 PM by Bart Moya.         XR Chest 1 View [[id number]] Collected:  07/24/18 0745       Updated:  07/24/18 1225     Narrative:        EXAMINATION: XR CHEST 1 VW-07/24/2018:      INDICATION: S/P CABG; Z74.09-Other reduced mobility; I35.9-Nonrheumatic  aortic valve disorder, unspecified; I08.0-Rheumatic disorders of both  mitral and aortic valves.      COMPARISON: 07/23/2018.     FINDINGS: Persistent cardiomegaly with slight hazy opacifications  involving the left hemithorax similar to prior. Considerations for  layering effusion and/or airspace disease, however, no significant  interval change. Right hemithorax remains similar in appearance to prior  with right basilar opacities and probable trace right pleural effusion.  No pneumothorax.        Impression:        No significant interval change in pulmonary findings.     D:  07/24/2018  E:  07/24/2018     This report was finalized on 7/24/2018 12:22 PM by Dr. Zachary Meek.                  Results for orders placed in visit on 07/16/18   Intra-Operative Transesophageal Echocardiogram     Narrative Justino Snaders MD     7/16/2018  2:37 PM  Procedure Performed: Emergent/Open-Heart Anesthesia TIFFANIE     Start Time:        End Time:       Preanesthesia Checklist:  Patient identified, IV assessed, risks and benefits discussed, monitors   and equipment assessed, procedure being performed at surgeon's request and   anesthesia consent obtained.     General Procedure Information  Diagnostic Indications for Echo:  assessment of ascending aorta,   assessment of surgical repair and hemodynamic monitoring  Physician Requesting Echo: WILBERTO DANG  Location performed:  OR  Intubated  Bite block not placed  Heart visualized  Probe Insertion:  Easy  Probe Type:  Multiplane  Modalities:  Color flow mapping, continuous wave Doppler and pulse wave   Doppler     Echocardiographic and Doppler Measurements     Ventricles     Right Ventricle:  Cavity size normal.  Hypertrophy not present.  Thrombus not present.     Global function moderately impaired.    Left Ventricle:  Cavity size normal.  Thrombus not present.  Global Function moderately   impaired.  Ejection Fraction 40%.             Valves     Aortic Valve:  Annulus normal.  Stenosis not present.  Area: 2.1 cm².  Regurgitation   moderate.  Leaflets normal.  Leaflet motions normal.       Mitral Valve:  Annulus normal and calcified.  Stenosis moderate.  Area: 1.2 cm².  Mean   Gradient: 19 mean 7 mmHg.  Vena Contracta Width: 0.7 cm.  Regurgitation   severe.  Leaflets calcified and thickened.  Leaflet motions restricted.       Tricuspid Valve:  Annulus normal.  Regurgitation severe.  Leaflets normal.  Leaflet motions   normal.    Pulmonic Valve:  Annulus normal.  Regurgitation trace.             Aorta     Ascending Aorta:  Size normal.  Dissection not present.  Plaque thickness less than 3 mm.    Mobile plaque not present.    Aortic Arch:  Size normal.  Dissection not present.  Plaque thickness less than 3 mm.    Mobile plaque not present.    Descending Aorta:  Size normal.  Dissection not present.  Plaque thickness less than 3 mm.    Mobile plaque not present.             Atria     Right Atrium:  Size dilated.  Spontaneous echo contrast not present.  Thrombus not   present.  Tumor not present.  Device present.     Left Atrium:  Size dilated.  Spontaneous echo contrast not present.  Thrombus not   present.  Tumor not present.  Device not present.    Left atrial appendage normal.  Other Atria Findings:       LA 7.9 X 4.7     Septa     Atrial Septum:  Intra-atrial septal morphology normal.       Other atrial septal defect findings:       Tenting R to L; negative bubble study     Ventricular Septum:  Intra-ventricular septum morphology normal.             Other Findings  Pericardium:  pericardial effusion  Pleural Effusion:  none  Pulmonary Arteries:  normal  Pulmonary Venous Flow:  normal     Anesthesia Information  Performed Personally  Anesthesiologist:  SONI MCLEOD  W        Echocardiogram Comments:       Informed consent was obtained preoperatively.  Pradeep probe passed   without difficulty. S/p AVR, MVR, TVV, MAZE, ligation ANGELA:  Tissue prost   valve MV: leaflets open and coapt well, trace central MR, no perivalvular   leak seen, MV mean 2. Tissue prosthetic AV with no perivalvular leak,   leaflets open and coapt well, unable to obtain AVG. TR downgraded to   moderate aftwer annuloplasty.  NO flow seed prior ANGELA.         I have reviewed the medications.        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 Moderate pulmonary hypertension.  · Echo (4/3/18): LVEF 70%. Severe LA dilatation. Moderate AI. Severe MS and severe MR. RVSP approximately 56 mmHg.  · AVR/MVR/tricuspid annuloplasty and left atrial appendage ligation performed by Ajay Cheatham on 18           Paroxysmal atrial fibrillation (CMS/HCC)     Overview Addendum 2018  8:07 AM by Marco Lay IV, MD       · Postoperative atrial fibrillation with RVR in the setting of left upper lobectomy, 5/15/2017  · Echo (17): LVEF 60%.  Moderate to severe mitral stenosis (mean gradient 9 mmHg).  Moderate aortic insufficiency.  RVSP 50 mmHg  · Chads Vasc = 2 (female, HTN)  · Left atrial appendage ligation by Ajay Cheatham, 18           Chronic diastolic congestive heart failure (CMS/HCC)     Type 2 diabetes mellitus (CMS/HCC)     Thrombocytopenia (CMS/HCC)     Elevated transaminase level     Hyperbilirubinemia             Brief Hospital Course to date:  iJll Miller is a 58 y.o. female with PMH significant for atrial fibrillation/flutter (Eliquis), CHF, rheumatic heart disease, HTN, hypothyroidism (Graves' disease s/p thyroidectomy), tobacco abuse, hyperlipidemia,  non-small cell lung cancer stage IA status post ALBINA lobectomy May 2017. She was admitted to [facility] 7/16/18 for scheduled MVR/AVR/TV ring/MAZE/ANGELA ligation by Dr. Cheatham. Post-operative course complicated by acute hypoxic respiratory failure, hypotension, elevated LFTs, and anemia. She received 2 units of packed red blood cells as well as platelets, FFP, and cryo-during surgery. She transferred to the floor 7/20, hospital medicine following for medical management.      Assessment & Plan:     - Newly-diagnosed DM: A1C 7.8%. Well-controlled on ACHS + SSI while inpatient. Start Metformin 500mg BID upon discharge and FU with PCP in 1-2 weeks. 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 Thank you for the consult. We will sign off. Please call with any questions/concerns.     Xenia Pederson MD  07/25/18  10:05 AM                         Ajay Cheatham MD Physician Signed Cardiothoracic Surgery Progress Notes Date of Service: 7/25/2018  7:06 AM         []Manual[]Template  []Copied  CTS Progress Note        POD 9 s/p AVR,MVR,TV repair,MAZE,ANGELA ligation         LOS: 9 days   Patient Care Team:  YANETH lOivera as PCP - General (Family Medicine)  Ajay Cheatham MD as Surgeon (Cardiothoracic Surgery)  Bess Simons MD as Consulting Physician (Hematology and Oncology)     Subjective  No acute events overnight.  Ambulating in hallway.  On 2L NC.     Objective     Vital Signs  Temp:  [97.6 °F (36.4 °C)-98 °F (36.7 °C)] 97.8 °F (36.6 °C)  Heart Rate:  [] 81  Resp:  [15-18] 18  BP: (108-139)/(47-71) 139/65     Physical Exam:              General Appearance: alert, appears stated age and cooperative              Lungs: clear to auscultation, respirations regular, respirations even and respirations unlabored              Heart: regular rhythm &  normal rate, normal S1, S2, no murmur, no tatum, no rub and no click              Skin:  Incision c/d/i                Results     Results from last 7 days  Lab Units 07/23/18  0442   WBC 10*3/mm3 5.92   HEMOGLOBIN g/dL 9.0*   HEMATOCRIT % 28.3*   PLATELETS 10*3/mm3 107*         Results from last 7 days  Lab Units 07/24/18 2036 07/24/18  0539   SODIUM mmol/L  --  136   POTASSIUM mmol/L 4.0 3.1*   CHLORIDE mmol/L  --  93*   CO2 mmol/L  --  34.0*   BUN mg/dL  --  11   CREATININE mg/dL  --  0.57*   GLUCOSE mg/dL  --  129*   CALCIUM mg/dL  --  8.6*                       Imaging Results (last 24 hours)      Procedure Component Value Units Date/Time     XR Chest 1 View [[id number]] Updated:  07/25/18 0556     CT Abdomen Pelvis With Contrast [[id number]] Collected:  07/24/18 1044       Updated:  07/24/18 1302     Narrative:        EXAMINATION: CT ABDOMEN PELVIS W CONTRAST- 07/23/2018     INDICATION: IV contrast only; Z74.09-Other reduced mobility;  I35.9-Nonrheumatic aortic valve disorder, unspecified; I08.0-Rheumatic  disorders of both mitral and aortic valves     TECHNIQUE:  Axial CT data of the abdomen and pelvis were acquired  helically following IV contrast administration. Multiplanar reformatted  images were generated and reviewed. The radiation dose reduction device  was turned on for each scan per the ALARA (As Low as Reasonably  Achievable) protocol     COMPARISONS:  Right upper quadrant sonogram 07/19/2018     FINDINGS:       Layering density noted in the gallbladder which could represent  vicarious excretion from previous contrast administration or biliary  sludge. The liver is unremarkable in CT appearance as are the kidneys,  adrenal glands, and the spleen. There is a fat density lesion in the  uncinate process of the pancreas. There is no intrahepatic or  extrahepatic biliary ductal dilatation. Enhancing 1 cm lesion protruding  into the endometrial canal is noted, possibly a submucosal fibroid or  endometrial  polyp. Adnexal structures are unremarkable in CT appearance.  A small amount of free fluid is noted in the pelvis. Small right pleural  effusion is noted. There is evidence of prior mitral and tricuspid valve  replacement. Trace left pleural effusion. Body wall edema. No aggressive  bone lesion.        Impression:           1. No evidence of biliary obstruction.  2. Layering density in the gallbladder could represent biliary sludge or  residual vicariously excreted contrast.  3. Enhancing 1 cm lesion protruding into the endometrial canal.  Differential considerations include endometrial polyp or submucosal  uterine fibroid.  4. Small right pleural effusion.     D:  07/24/2018  E:  07/24/2018     This report was finalized on 7/24/2018 1:00 PM by Bart Moya.        XR Chest 1 View [[id number]] Collected:  07/24/18 0745       Updated:  07/24/18 1225     Narrative:        EXAMINATION: XR CHEST 1 VW-07/24/2018:      INDICATION: S/P CABG; Z74.09-Other reduced mobility; I35.9-Nonrheumatic  aortic valve disorder, unspecified; I08.0-Rheumatic disorders of both  mitral and aortic valves.      COMPARISON: 07/23/2018.     FINDINGS: Persistent cardiomegaly with slight hazy opacifications  involving the left hemithorax similar to prior. Considerations for  layering effusion and/or airspace disease, however, no significant  interval change. Right hemithorax remains similar in appearance to prior  with right basilar opacities and probable trace right pleural effusion.  No pneumothorax.        Impression:        No significant interval change in pulmonary findings.     D:  07/24/2018  E:  07/24/2018     This report was finalized on 7/24/2018 12:22 PM by Dr. Zachary Meek.                   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  Active Problems:    Paroxysmal atrial fibrillation (CMS/HCC)    Chronic diastolic congestive heart  "failure (CMS/HCC)    Type 2 diabetes mellitus (CMS/HCC)    Thrombocytopenia (CMS/HCC)    Elevated transaminase level    Hyperbilirubinemia        Plan   Coumadin  Diuresis  Pulmonary toilet, wean oxygen as tolerated  Ambulate  Discharge home today if all agree         Revision History                                Bess Schaefer MD Physician Signed Gastroenterology  Progress Notes Date of Service: 7/24/2018  5:54 PM      Expand All Collapse All    []Manual[]Template  []Copied  [facility]      Chief Complaint:  Abnormal liver tests        Interval History: Patient feeling well.  She was walking in [address] with aide and family.  No abdominal pain.                           Objective         Vital Signs Blood pressure 108/54, pulse 90, temperature 98 °F (36.7 °C), temperature source Oral, resp. rate 18, height 157.5 cm (62\"), weight 94.3 kg (208 lb), SpO2 95 %.     Physical Exam:              General Appearance: alert, appears stated age and cooperative  Head: normocephalic, without obvious abnormality and atraumatic  Lungs: wheezes bilateral lower  Heart: systolic murmur  3/6 at lower left sternal border  Abdomen: normal bowel sounds, no masses, no hepatomegaly, no splenomegaly, soft non-tender, no guarding and no rebound tenderness  Extremities: edema 2+ all extremities bilateral  Skin: no bleeding, bruising or rash                Results Review:              I reviewed the patient's new clinical results.     LABS:              Lab Results (last 24 hours)      Procedure Component Value Units Date/Time     POC Glucose Once [[id number]]  (Abnormal) Collected:  07/24/18 1623     Specimen:  Blood Updated:  07/24/18 1624       Glucose 140 (H) mg/dL       Narrative:        Meter: [id number] : [id number] Gu[employer]     Magnesium [[id number]]  (Normal) Collected:  07/24/18 0539     Specimen:  Blood Updated:  07/24/18 1331       Magnesium 2.2 mg/dL       POC Glucose Once [[id number]]  (Abnormal) Collected:  " 07/24/18 1126     Specimen:  Blood Updated:  07/24/18 1127       Glucose 135 (H) mg/dL       Narrative:        Meter: [id number] : [id number] [employer]     Hepatic Function Panel [[id number]]  (Abnormal) Collected:  07/24/18 0539     Specimen:  Blood Updated:  07/24/18 0757       Total Protein 6.1 g/dL         Albumin 3.36 g/dL         ALT (SGPT) 38 U/L         AST (SGOT) 55 (H) U/L         Alkaline Phosphatase 124 (H) U/L         Total Bilirubin 4.4 (H) mg/dL         Bilirubin, Direct 3.0 (H) mg/dL         Bilirubin, Indirect 1.4 (H) mg/dL       POC Glucose Once [[id number]]  (Abnormal) Collected:  07/24/18 0704     Specimen:  Blood Updated:  07/24/18 0706       Glucose 141 (H) mg/dL       Narrative:        Meter: [id number] : [id number] [employer]     Basic Metabolic Panel [[id number]]  (Abnormal) Collected:  07/24/18 0539     Specimen:  Blood Updated:  07/24/18 0647       Glucose 129 (H) mg/dL         BUN 11 mg/dL         Creatinine 0.57 (L) mg/dL         Sodium 136 mmol/L         Potassium 3.1 (L) mmol/L         Chloride 93 (L) mmol/L         CO2 34.0 (H) mmol/L         Calcium 8.6 (L) mg/dL         eGFR Non African Amer 110 mL/min/1.73         BUN/Creatinine Ratio 19.3       Anion Gap 9.0 mmol/L       Narrative:        National [employer] Guidelines     Stage     Description        GFR  1         Normal or High     90+  2         Mild decrease      60-89  3         Moderate decrease  30-59  4         Severe decrease    15-29  5         Kidney failure     <15     Protime-INR [[id number]]  (Abnormal) Collected:  07/24/18 0539     Specimen:  Blood Updated:  07/24/18 0644       Protime 24.0 (H) Seconds         INR 2.29 (H)          RADIOLOGY:  Imaging Results (last 24 hours)      Procedure Component Value Units Date/Time     CT Abdomen Pelvis With Contrast [[id number]] Collected:  07/24/18 1044       Updated:  07/24/18 1302     Narrative:        EXAMINATION: CT ABDOMEN PELVIS W CONTRAST-  07/23/2018     INDICATION: IV contrast only; Z74.09-Other reduced mobility;  I35.9-Nonrheumatic aortic valve disorder, unspecified; I08.0-Rheumatic  disorders of both mitral and aortic valves     TECHNIQUE:  Axial CT data of the abdomen and pelvis were acquired  helically following IV contrast administration. Multiplanar reformatted  images were generated and reviewed. The radiation dose reduction device  was turned on for each scan per the ALARA (As Low as Reasonably  Achievable) protocol     COMPARISONS:  Right upper quadrant sonogram 07/19/2018     FINDINGS:       Layering density noted in the gallbladder which could represent  vicarious excretion from previous contrast administration or biliary  sludge. The liver is unremarkable in CT appearance as are the kidneys,  adrenal glands, and the spleen. There is a fat density lesion in the  uncinate process of the pancreas. There is no intrahepatic or  extrahepatic biliary ductal dilatation. Enhancing 1 cm lesion protruding  into the endometrial canal is noted, possibly a submucosal fibroid or  endometrial polyp. Adnexal structures are unremarkable in CT appearance.  A small amount of free fluid is noted in the pelvis. Small right pleural  effusion is noted. There is evidence of prior mitral and tricuspid valve  replacement. Trace left pleural effusion. Body wall edema. No aggressive  bone lesion.        Impression:           1. No evidence of biliary obstruction.  2. Layering density in the gallbladder could represent biliary sludge or  residual vicariously excreted contrast.  3. Enhancing 1 cm lesion protruding into the endometrial canal.  Differential considerations include endometrial polyp or submucosal  uterine fibroid.  4. Small right pleural effusion.     D:  07/24/2018  E:  07/24/2018     This report was finalized on 7/24/2018 1:00 PM by Bart Moya.        XR Chest 1 View [[id number]] Collected:  07/24/18 0745       Updated:  07/24/18 1225     Narrative:         EXAMINATION: XR CHEST 1 VW-07/24/2018:      INDICATION: S/P CABG; Z74.09-Other reduced mobility; I35.9-Nonrheumatic  aortic valve disorder, unspecified; I08.0-Rheumatic disorders of both  mitral and aortic valves.      COMPARISON: 07/23/2018.     FINDINGS: Persistent cardiomegaly with slight hazy opacifications  involving the left hemithorax similar to prior. Considerations for  layering effusion and/or airspace disease, however, no significant  interval change. Right hemithorax remains similar in appearance to prior  with right basilar opacities and probable trace right pleural effusion.  No pneumothorax.        Impression:        No significant interval change in pulmonary findings.     D:  07/24/2018  E:  07/24/2018     This report was finalized on 7/24/2018 12:22 PM by Dr. Zachary Meek.                   Current Facility-Administered Medications:   •  acetaminophen (TYLENOL) tablet 650 mg, 650 mg, Oral, Q4H PRN, Ajay Cheatham MD  •  aspirin EC tablet 81 mg, 81 mg, Oral, Daily, Snehal Bush PA-C, 81 mg at 07/24/18 0838  •  bisacodyl (DULCOLAX) EC tablet 10 mg, 10 mg, Oral, Daily PRN, Michelle Charlton PA-C, 10 mg at 07/22/18 0855  •  bumetanide (BUMEX) tablet 0.5 mg, 0.5 mg, Oral, Daily, Siria Estrada MD, 0.5 mg at 07/24/18 0841  •  dextrose (D50W) solution 25 g, 25 g, Intravenous, Q15 Min PRN, Xenia Pederson MD  •  dextrose (GLUTOSE) oral gel 15 g, 15 g, Oral, Q15 Min PRN, Xenia Pederson MD  •  digoxin (LANOXIN) tablet 125 mcg, 125 mcg, Oral, Daily, Michelle Charlton PA-C, 125 mcg at 07/24/18 1133  •  docusate sodium (COLACE) capsule 100 mg, 100 mg, Oral, BID PRN, Michelle Charlton PA-C, 100 mg at 07/22/18 0856  •  glucagon (human recombinant) (GLUCAGEN DIAGNOSTIC) injection 1 mg, 1 mg, Subcutaneous, PRN, Xenia Pederson MD  •  guaiFENesin (MUCINEX) 12 hr tablet 1,200 mg, 1,200 mg, Oral, Q12H, GUCCI Crystal, 1,200 mg at 07/24/18 0840  •  insulin lispro (humaLOG) injection 0-7 Units, 0-7  Units, Subcutaneous, 4x Daily With Meals & Nightly, Xenia Pederson MD, Stopped at 07/24/18 0900  •  ipratropium-albuterol (DUO-NEB) nebulizer solution 3 mL, 3 mL, Nebulization, Q4H - RT, Goldy Keller MD, 3 mL at 07/24/18 1544  •  lactulose (CHRONULAC) 10 GM/15ML solution 20 g, 20 g, Oral, TID PRN, Snehal Bush PA-C  •  levothyroxine (SYNTHROID, LEVOTHROID) tablet 150 mcg, 150 mcg, Oral, Q AM, Michelle Charlton PA-C, 150 mcg at 07/24/18 0519  •  magnesium hydroxide (MILK OF MAGNESIA) suspension 2400 mg/10mL 30 mL, 30 mL, Oral, Daily PRN, Michoacano Lucero PA-C, 30 mL at 07/22/18 1158  •  Morphine PF injection 2 mg, 2 mg, Intravenous, Q2H PRN, Ajay Cheatham MD  •  ondansetron (ZOFRAN) injection 4 mg, 4 mg, Intravenous, Q6H PRN, Michelle Charlton PA-C, 4 mg at 07/17/18 1003  •  oxyCODONE-acetaminophen (PERCOCET) 5-325 MG per tablet 2 tablet, 2 tablet, Oral, Q4H PRN, Ajay Cheatham MD, 2 tablet at 07/24/18 1323  •  Pharmacy to dose warfarin, , Does not apply, Continuous PRN, YANETH Koroma  •  polyethylene glycol 3350 powder (packet), 17 g, Oral, Daily, Snehal Bush PA-C, 17 g at 07/22/18 1727  •  potassium chloride (MICRO-K) CR capsule 40 mEq, 40 mEq, Oral, PRN, 40 mEq at 07/24/18 1643 **OR** potassium chloride (KLOR-CON) packet 40 mEq, 40 mEq, Oral, PRN **OR** potassium chloride 10 mEq in 100 mL IVPB, 10 mEq, Intravenous, Q1H PRN, Snehal Bush PA-C  •  sennosides-docusate sodium (SENOKOT-S) 8.6-50 MG tablet 2 tablet, 2 tablet, Oral, BID, Michelle Charlton PA-C, 2 tablet at 07/22/18 2148  •  sodium chloride 0.9 % flush 1-10 mL, 1-10 mL, Intravenous, Q8H, YANETH Celeste, 10 mL at 07/24/18 0846  •  spironolactone (ALDACTONE) tablet 25 mg, 25 mg, Oral, Daily, Siria Estrada MD, 25 mg at 07/24/18 0842  •  warfarin (COUMADIN) (dosing per levels), , Does not apply, Daily, Jeremias Sandoval [facility], Stopped at 07/23/18 1800  •  warfarin (COUMADIN) tablet 2.5 mg, 2.5 mg,  Oral, Daily, Manjinder Morales [facility]     Facility-Administered Medications Ordered in Other Encounters:   •  Chlorhexidine Gluconate Cloth 2 % pads 1 application, 1 application, Topical, Q12H PRN, YANETH Boswell        Assessment/Plan       Abnormal liver tests which I suspect from combination of fatty liver disease with congestive hepatopathy from tricuspid regurgitation  -patient improving liver tests.  I suspect it may take a few more days for jaundice to go away as bilirubin usually the last parameter to decrease.  Must decrease below 2 to get rid of jaundice  -CT was unrevealing  -Discussed with patient CT findings and urged her to see OB/GYN in follow up for lesion found.  Patient and family demonstrated understanding with regards to the finding     Patients questions were answered.  Will recheck liver tests in AM.   No biopsy needed at this time           Bess Schaefer MD  07/24/18  5:54 PM                                     Ajay Cheatham MD Physician Signed Cardiothoracic Surgery Progress Notes Date of Service: 7/24/2018  8:38 AM         []Manual[]Template  []Copied  CTS Progress Note        POD 8 s/p AVR,MVR,TV repair,MAZE,ANGELA ligation         LOS: 8 days   Patient Care Team:  YANETH Olivera as PCP - General (Family Medicine)  Ajay Cheatham MD as Surgeon (Cardiothoracic Surgery)  Bess Simons MD as Consulting Physician (Hematology and Oncology)     Subjective  Pain controlled. No complaints  Up in chair  No abdominal pain, nausea or vomiting      Objective     Vital Signs  Temp:  [97.6 °F (36.4 °C)-98.4 °F (36.9 °C)] 97.6 °F (36.4 °C)  Heart Rate:  [69-96] 69  Resp:  [16-20] 17  BP: (115-122)/(47-64) 115/47     Physical Exam:              General Appearance: alert, appears stated age and cooperative              Lungs: clear to auscultation, respirations regular, respirations even and respirations unlabored              Heart: regular rhythm & normal rate, normal S1, S2, no murmur, no tatum, no  rub and no click              Skin:  Incision c/d/i                Results     Results from last 7 days  Lab Units 07/23/18  0442   WBC 10*3/mm3 5.92   HEMOGLOBIN g/dL 9.0*   HEMATOCRIT % 28.3*   PLATELETS 10*3/mm3 107*         Results from last 7 days  Lab Units 07/24/18  0539   SODIUM mmol/L 136   POTASSIUM mmol/L 3.1*   CHLORIDE mmol/L 93*   CO2 mmol/L 34.0*   BUN mg/dL 11   CREATININE mg/dL 0.57*   GLUCOSE mg/dL 129*   CALCIUM mg/dL 8.6*                       Imaging Results (last 24 hours)      Procedure Component Value Units Date/Time     XR Chest 1 View [[id number]] Collected:  07/24/18 0745       Updated:  07/24/18 0746     Narrative:           EXAMINATION: XR CHEST 1 VW-      INDICATION: s/p CABG; Z74.09-Other reduced mobility; I35.9-Nonrheumatic  aortic valve disorder, unspecified; I08.0-Rheumatic disorders of both  mitral and aortic valves      COMPARISON: 7/23/2018     FINDINGS: Persistent cardiomegaly with slight hazy opacifications  involving the left hemithorax similar to prior. Considerations for  layering effusion and/or airspace disease however no significant  interval change. Right hemithorax remains similar in appearance to prior  with right basilar opacities and probable trace right pleural effusion.  No pneumothorax.        Impression:        No significant interval change in pulmonary findings.           CT Abdomen Pelvis With Contrast [[id number]] Updated:  07/23/18 1651     XR Chest 1 View [[id number]] Collected:  07/23/18 1014       Updated:  07/23/18 1624     Narrative:        EXAMINATION: XR CHEST 1 VW-07/23/2018:      INDICATION: S/P CABG; Z74.09-Other reduced mobility; I35.9-Nonrheumatic  aortic valve disorder, unspecified; I08.0-Rheumatic disorders of both  mitral and aortic valves.      COMPARISON: 07/22/2018.     FINDINGS: Portable chest reveals the heart to be enlarged. There is  minimal increased markings at the lung bases bilaterally unchanged in  the interval. Pulmonary vascularity  is within normal limits.  Degenerative changes seen within the spine. No new focal parenchymal  opacification present.        Impression:        The heart is enlarged with patient status post median  sternotomy. There are minimal atelectatic changes seen in the lung  bases. No new focal parenchymal opacification present. Improvement seen  in the pulmonary vascularity.     D:  07/23/2018  E:  07/23/2018     This report was finalized on 7/23/2018 4:22 PM by Dr. Sola Gonzalez MD.                   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  Active Problems:    Paroxysmal atrial fibrillation (CMS/HCC)    Chronic diastolic congestive heart failure (CMS/HCC)    Type 2 diabetes mellitus (CMS/HCC)    Thrombocytopenia (CMS/HCC)    Elevated transaminase level    Hyperbilirubinemia        Plan   Replete potassium  Coumadin resume as per pharmacy  Diuresis  Pulmonary toilet, wean oxygen as tolerated  Ambulate  Elevated liver enzymes and bilirubin likely secondary to hepatic congestion with previous TR versus steatosis- no malperfusion or shock this admission  Arrange for D/C home           Marisa Suarez APRN Nurse Practitioner Signed Cardiology  Progress Notes Date of Service: 7/24/2018  8:25 AM      Expand All Collapse All    []Manual[]Template  []Copied  [facility]  Cardiology Progress Note        Chief Complaint/Reason for service:       · Satus post AVR/MVR/tricuspid annuloplasty/ANGELA ligation     Subjective         Patient improving with ambulation and walked 250 feet with PT yesterday. She walked the full length of the gordon today. GI evaluated for elevated LFT's. INR improved to 2.29 today.She denies chest pain or dyspnea.       Past medical, surgical, social and family history reviewed in the patient's electronic medical record.              Objective      Vital Sign  Min/Max for last 24 hours  Temp  Min: 97.6 °F (36.4 °C)  Max: 98.4 °F (36.9 °C)   BP  Min: 115/47  Max: 122/61   Pulse  Min: 69  Max: 96   Resp  Min: 16  Max: 20   SpO2  Min: 94 %  Max: 99 %   Flow (L/min)  Min: 2  Max: 2       Intake/Output Summary (Last 24 hours) at 07/24/18 0825  Last data filed at 07/24/18 0300    Gross per 24 hour   Intake              400 ml   Output              800 ml   Net             -400 ml             Physical Exam   Constitutional: She is oriented to person, place, and time. She appears well-developed and well-nourished.   HENT:   Head: Normocephalic.   Neck: No JVD present. Carotid bruit is not present.   Cardiovascular: Normal rate, regular rhythm and intact distal pulses.  Exam reveals no gallop and no friction rub.    Murmur heard.  Pulmonary/Chest: Effort normal. She has wheezes. She has rhonchi.   Abdominal: Soft.   Musculoskeletal: She exhibits no edema.   Neurological: She is alert and oriented to person, place, and time.   Skin: Skin is warm and dry. No cyanosis. Nails show no clubbing.   Psychiatric: She has a normal mood and affect. Her behavior is normal.         Results Review:   I reviewed the patient's recent labs in the electronic medical record.          Results from last 7 days  Lab Units 07/24/18  0539 07/23/18  0442 07/22/18  0544 07/21/18  0658 07/20/18  0347 07/19/18  0409 07/18/18  0950   SODIUM mmol/L 136 139 135 137 136 136 136   POTASSIUM mmol/L 3.1* 3.2* 3.5 4.1 5.0 4.7 5.5  5.5   CHLORIDE mmol/L 93* 96* 98* 101 100 103 105   BUN mg/dL 11 14 18 21 22 22 20   CREATININE mg/dL 0.57* 0.60 0.62 0.74 0.86 1.16 1.34*   MAGNESIUM mg/dL  --   --   --   --  2.4 2.3  --              Results from last 7 days  Lab Units 07/23/18  0442 07/22/18  0544 07/21/18  0658   07/20/18  0347 07/19/18  0409   WBC 10*3/mm3 5.92  --   --   --  9.44 13.45*   HEMOGLOBIN g/dL 9.0* 9.7* 8.9*  --  8.7* 8.5*   HEMATOCRIT % 28.3* 30.9* 28.4*  < > 27.5* 26.6*   PLATELETS 10*3/mm3 107*  --    --   --  69* 76*   < > = values in this interval not displayed.           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 Moderate pulmonary hypertension.  · Echo (4/3/18): LVEF 70%. Severe LA dilatation. Moderate AI. Severe MS and severe MR. RVSP approximately 56 mmHg.  · AVR/MVR/tricuspid annuloplasty and left atrial appendage ligation performed by Ajay Cheatham on 18           Paroxysmal atrial fibrillation (CMS/HCC)     Overview Addendum 2018  8:07 AM by Marco Lay IV, MD       · Postoperative atrial fibrillation with RVR in the setting of left upper lobectomy, 5/15/2017  · Echo (17): LVEF 60%.  Moderate to severe mitral stenosis (mean gradient 9 mmHg).  Moderate aortic insufficiency.  RVSP 50 mmHg  · Chads Vasc = 2 (female, HTN)  · Left atrial appendage ligation by Ajay Cheatham, 18           Thrombocytopenia (CMS/[facility])     Elevated transaminase level     Hyperbilirubinemia     Chronic diastolic congestive heart failure (CMS/HCC)     Type 2 diabetes mellitus (CMS/[facility])                   Plan             · Continue to ambulate with PT  · Replace K+        Lore Suarez, GUCCI  2018           Xenia Pederson MD Physician Signed Medicine  Progress Notes Date of Service: 2018  7:26 AM      Expand All Collapse All    []Manual[]Template  []Copied       [facility] Medicine Services  PROGRESS NOTE     Patient Name: Jill Miller  : 1960  MRN: [id number]     Date of Admission: 2018  Length of Stay: 8  Primary Care Physician: YANETH Olivera        Subjective      Subjective       CC:  FU DM     HPI:  Sister and mother at bedside. Pt reports feeling unhappy she is still inpatient. Diuresing well. No complaints.      Review of Systems  Gen- No fevers, chills  CV- No chest pain, palpitations  Resp- No cough, dyspnea  GI- No N/V/D, abd pain     Otherwise ROS is negative except as mentioned in the HPI.        Objective      Objective      Vital Signs:   Temp:  [97.6 °F (36.4 °C)-98.4 °F (36.9 °C)] 97.6 °F (36.4 °C)  Heart Rate:  [69-98] 92  Resp:  [15-20] 15  BP: (115-127)/(47-71) 127/71     Physical Exam:  Constitutional: No acute distress, awake, alert on 2LNC  Eyes: PERRLA, sclerae anicteric, no conjunctival injection  HENT: NCAT, mucous membranes moist  Neck: Supple, no thyromegaly, no lymphadenopathy, trachea midline  Respiratory: Mild diffuse wheezing, nonlabored respirations   Cardiovascular: RRR, no murmurs, rubs, or gallops, palpable pedal pulses bilaterally, 2+ pitting bilateral LE  Gastrointestinal: Positive bowel sounds, soft, nontender, nondistended  Musculoskeletal: No bilateral ankle edema, no clubbing or cyanosis to extremities  Psychiatric: Appropriate affect, cooperative  Neurologic: Oriented x 3, strength symmetric in all extremities, Cranial Nerves grossly intact to confrontation, speech clear  Skin: No rashes     Results Reviewed:  I have personally reviewed current lab, radiology, and data and agree.        Results from last 7 days  Lab Units 07/24/18  0539 07/23/18  0442 07/22/18  1045   07/22/18  0544 07/21/18  0658   07/20/18  0347 07/19/18  0409   WBC 10*3/mm3  --  5.92  --   --   --   --   --  9.44 13.45*   HEMOGLOBIN g/dL  --  9.0*  --   --  9.7* 8.9*  --  8.7* 8.5*   HEMATOCRIT %  --  28.3*  --   --  30.9* 28.4*  < > 27.5* 26.6*   PLATELETS 10*3/mm3  --  107*  --   --   --   --   --  69* 76*   INR   2.29* 5.65* 3.90*  < >  --  1.28*  < >  --   --    < > = values in this interval not displayed.    Results from last 7 days  Lab Units 07/24/18  0539 07/23/18  0442  07/22/18  0544   SODIUM mmol/L 136 139 135   POTASSIUM mmol/L 3.1* 3.2* 3.5   CHLORIDE mmol/L 93* 96* 98*   CO2 mmol/L 34.0* 33.0* 26.0   BUN mg/dL 11 14 18   CREATININE mg/dL 0.57* 0.60 0.62   GLUCOSE mg/dL 129* 69* 55*   CALCIUM mg/dL 8.6* 8.1* 7.5*   ALT (SGPT) U/L 38 45* 50*   AST (SGOT) U/L 55* 71* 95*     No results found for: BNP  No results found for: PHART         Microbiology Results Abnormal      None                     Imaging Results (last 24 hours)      Procedure Component Value Units Date/Time     CT Abdomen Pelvis With Contrast [[id number]] Collected:  07/24/18 1044       Updated:  07/24/18 1302     Narrative:        EXAMINATION: CT ABDOMEN PELVIS W CONTRAST- 07/23/2018     INDICATION: IV contrast only; Z74.09-Other reduced mobility;  I35.9-Nonrheumatic aortic valve disorder, unspecified; I08.0-Rheumatic  disorders of both mitral and aortic valves     TECHNIQUE:  Axial CT data of the abdomen and pelvis were acquired  helically following IV contrast administration. Multiplanar reformatted  images were generated and reviewed. The radiation dose reduction device  was turned on for each scan per the ALARA (As Low as Reasonably  Achievable) protocol     COMPARISONS:  Right upper quadrant sonogram 07/19/2018     FINDINGS:       Layering density noted in the gallbladder which could represent  vicarious excretion from previous contrast administration or biliary  sludge. The liver is unremarkable in CT appearance as are the kidneys,  adrenal glands, and the spleen. There is a fat density lesion in the  uncinate process of the pancreas. There is no intrahepatic or  extrahepatic biliary ductal dilatation. Enhancing 1 cm lesion protruding  into the endometrial canal is noted, possibly a submucosal fibroid or  endometrial polyp. Adnexal structures are unremarkable in CT appearance.  A small amount of free fluid is noted in the pelvis. Small right pleural  effusion is noted. There is evidence of prior mitral and  tricuspid valve  replacement. Trace left pleural effusion. Body wall edema. No aggressive  bone lesion.        Impression:           1. No evidence of biliary obstruction.  2. Layering density in the gallbladder could represent biliary sludge or  residual vicariously excreted contrast.  3. Enhancing 1 cm lesion protruding into the endometrial canal.  Differential considerations include endometrial polyp or submucosal  uterine fibroid.  4. Small right pleural effusion.     D:  07/24/2018  E:  07/24/2018     This report was finalized on 7/24/2018 1:00 PM by Bart Moya.        XR Chest 1 View [[id number]] Collected:  07/24/18 0745       Updated:  07/24/18 1225     Narrative:        EXAMINATION: XR CHEST 1 VW-07/24/2018:      INDICATION: S/P CABG; Z74.09-Other reduced mobility; I35.9-Nonrheumatic  aortic valve disorder, unspecified; I08.0-Rheumatic disorders of both  mitral and aortic valves.      COMPARISON: 07/23/2018.     FINDINGS: Persistent cardiomegaly with slight hazy opacifications  involving the left hemithorax similar to prior. Considerations for  layering effusion and/or airspace disease, however, no significant  interval change. Right hemithorax remains similar in appearance to prior  with right basilar opacities and probable trace right pleural effusion.  No pneumothorax.        Impression:        No significant interval change in pulmonary findings.     D:  07/24/2018  E:  07/24/2018     This report was finalized on 7/24/2018 12:22 PM by Dr. Zachary Meek.        XR Chest 1 View [[id number]] Collected:  07/23/18 1014       Updated:  07/23/18 1624     Narrative:        EXAMINATION: XR CHEST 1 VW-07/23/2018:      INDICATION: S/P CABG; Z74.09-Other reduced mobility; I35.9-Nonrheumatic  aortic valve disorder, unspecified; I08.0-Rheumatic disorders of both  mitral and aortic valves.      COMPARISON: 07/22/2018.     FINDINGS: Portable chest reveals the heart to be enlarged. There is  minimal increased markings at  the lung bases bilaterally unchanged in  the interval. Pulmonary vascularity is within normal limits.  Degenerative changes seen within the spine. No new focal parenchymal  opacification present.        Impression:        The heart is enlarged with patient status post median  sternotomy. There are minimal atelectatic changes seen in the lung  bases. No new focal parenchymal opacification present. Improvement seen  in the pulmonary vascularity.     D:  07/23/2018  E:  07/23/2018     This report was finalized on 7/23/2018 4:22 PM by Dr. Sola Gonzalez MD.                  Results for orders placed in visit on 07/16/18   Intra-Operative Transesophageal Echocardiogram     Narrative Justino Sanders MD     7/16/2018  2:37 PM  Procedure Performed: Emergent/Open-Heart Anesthesia TIFFANIE     Start Time:        End Time:       Preanesthesia Checklist:  Patient identified, IV assessed, risks and benefits discussed, monitors   and equipment assessed, procedure being performed at surgeon's request and   anesthesia consent obtained.     General Procedure Information  Diagnostic Indications for Echo:  assessment of ascending aorta,   assessment of surgical repair and hemodynamic monitoring  Physician Requesting Echo: WILBERTO DANG  Location performed:  OR  Intubated  Bite block not placed  Heart visualized  Probe Insertion:  Easy  Probe Type:  Multiplane  Modalities:  Color flow mapping, continuous wave Doppler and pulse wave   Doppler     Echocardiographic and Doppler Measurements     Ventricles     Right Ventricle:  Cavity size normal.  Hypertrophy not present.  Thrombus not present.    Global function moderately impaired.    Left Ventricle:  Cavity size normal.  Thrombus not present.  Global Function moderately   impaired.  Ejection Fraction 40%.             Valves     Aortic Valve:  Annulus normal.  Stenosis not present.  Area: 2.1 cm².  Regurgitation   moderate.  Leaflets normal.  Leaflet motions normal.       Mitral  Valve:  Annulus normal and calcified.  Stenosis moderate.  Area: 1.2 cm².  Mean   Gradient: 19 mean 7 mmHg.  Vena Contracta Width: 0.7 cm.  Regurgitation   severe.  Leaflets calcified and thickened.  Leaflet motions restricted.       Tricuspid Valve:  Annulus normal.  Regurgitation severe.  Leaflets normal.  Leaflet motions   normal.    Pulmonic Valve:  Annulus normal.  Regurgitation trace.             Aorta     Ascending Aorta:  Size normal.  Dissection not present.  Plaque thickness less than 3 mm.    Mobile plaque not present.    Aortic Arch:  Size normal.  Dissection not present.  Plaque thickness less than 3 mm.    Mobile plaque not present.    Descending Aorta:  Size normal.  Dissection not present.  Plaque thickness less than 3 mm.    Mobile plaque not present.             Atria     Right Atrium:  Size dilated.  Spontaneous echo contrast not present.  Thrombus not   present.  Tumor not present.  Device present.     Left Atrium:  Size dilated.  Spontaneous echo contrast not present.  Thrombus not   present.  Tumor not present.  Device not present.    Left atrial appendage normal.  Other Atria Findings:       LA 7.9 X 4.7     Septa     Atrial Septum:  Intra-atrial septal morphology normal.       Other atrial septal defect findings:       Tenting R to L; negative bubble study     Ventricular Septum:  Intra-ventricular septum morphology normal.             Other Findings  Pericardium:  pericardial effusion  Pleural Effusion:  none  Pulmonary Arteries:  normal  Pulmonary Venous Flow:  normal     Anesthesia Information  Performed Personally  Anesthesiologist:  SONI MCLEOD        Echocardiogram Comments:       Informed consent was obtained preoperatively.  Pradeep probe passed   without difficulty. S/p AVR, MVR, TVV, MAZE, ligation ANGELA:  Tissue prost   valve MV: leaflets open and coapt well, trace central MR, no perivalvular   leak seen, MV mean 2. Tissue prosthetic AV with no perivalvular leak,   leaflets open  and coapt well, unable to obtain AVG. TR downgraded to   moderate aftwer annuloplasty.  NO flow seed prior ANGELA.         I have reviewed the medications.        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 Moderate pulmonary hypertension.  · Echo (4/3/18): LVEF 70%. Severe LA dilatation. Moderate AI. Severe MS and severe MR. RVSP approximately 56 mmHg.  · AVR/MVR/tricuspid annuloplasty and left atrial appendage ligation performed by Ajay Cheatham on 7/16/18           Paroxysmal atrial fibrillation (CMS/HCC)     Overview Addendum 7/17/2018  8:07 AM by Marco Lay IV, MD       · Postoperative atrial fibrillation with RVR in the setting of left upper lobectomy, 5/15/2017  · Echo (8/25/17): LVEF 60%.  Moderate to severe mitral stenosis (mean gradient 9 mmHg).  Moderate aortic insufficiency.  RVSP 50 mmHg  · Chads Vasc = 2 (female, HTN)  · Left atrial appendage ligation by Ajay Cheatham, 7/16/18           Chronic diastolic congestive heart failure (CMS/HCC)     Type 2 diabetes mellitus (CMS/HCC)     Thrombocytopenia (CMS/HCC)     Elevated transaminase level     Hyperbilirubinemia             Brief Hospital Course to date:  Jill Miller is a 58 y.o. female with PMH significant for atrial fibrillation/flutter (Eliquis), CHF, rheumatic heart disease, HTN, hypothyroidism (Graves' disease s/p thyroidectomy), tobacco abuse, hyperlipidemia, non-small cell lung cancer stage IA status post ALBINA lobectomy May 2017. She was admitted to [facility]16[facility]18 for scheduled MVR/AVR/TV ring/MAZE/ANGELA ligation by Dr. Cheatham. Post-operative course complicated by acute hypoxic respiratory failure, hypotension, elevated LFTs, and anemia. She received 2 units of packed red blood cells as well as platelets,  FFP, and cryo-during surgery. She transferred to the floor 7/20, hospital medicine following for medical management.      Assessment & Plan:     - Newly-diagnosed DM: A1C 7.8%. ACHS + SSI while inpatient. Start Metformin 500mg BID upon discharge and FU with PCP in 1-2 weeks. Consult diabetic educator prior to DC  - Transaminitis: Improving with diuresis  - Hypokalemia: Replete. 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

## 2018-08-03 ENCOUNTER — ANTICOAGULATION VISIT (OUTPATIENT)
Dept: PHARMACY | Facility: HOSPITAL | Age: 58
End: 2018-08-03

## 2018-08-03 DIAGNOSIS — I48.0 PAROXYSMAL ATRIAL FIBRILLATION (HCC): ICD-10-CM

## 2018-08-03 DIAGNOSIS — I08.0 RHEUMATIC MITRAL VALVE AND AORTIC VALVE STENOSIS: ICD-10-CM

## 2018-08-03 LAB
INR PPP: 1.4 (ref 0.91–1.09)
PROTHROMBIN TIME: 16.8 SECONDS (ref 10–13.8)

## 2018-08-03 PROCEDURE — G0463 HOSPITAL OUTPT CLINIC VISIT: HCPCS

## 2018-08-03 PROCEDURE — 36416 COLLJ CAPILLARY BLOOD SPEC: CPT

## 2018-08-03 PROCEDURE — 85610 PROTHROMBIN TIME: CPT

## 2018-08-03 NOTE — PROGRESS NOTES
Anticoagulation Clinic Progress Note  Indication: bioprosthetic AVR, MVR; paroxysmal afib (ANGELA ligation with Atricure clip + biatrial MAZE) -- 7/16/18  Referring Provider: Alireza  Initial Warfarin Start Date: 7/20/18  Planned Duration of Therapy: 3 months  Goal INR: 2-3  Current Drug Interactions: aspirin, levothyroxine, spironolactone  Other: Eliquis pre-CTS  CHADS-VASc: 3-4 (gender, HTN, DM?, CHF)  Bleed Risk: no history of bleeding    Diet: reviewed -- pt eats broccoli, brussels sprouts, salads, tuna in oil  Alcohol: none  Tobacco: none as of 3 months ago  OTC Pain Medication: advised to use APAP    Anticoagulation Clinic INR History:  Date 8/1 8/3          Total Weekly Dose 14mg 18mg          INR 1.2           Notes  enox enox           Clinic Interview:  Verbal Release Authorization signed on 8/1/18 -- may speak with Graham ()  Tablet Strength: 2mg tablets  Current Maintenance Dose: 2mg daily since discharge    Patient Findings:  Positives:   Change in medications   Negatives:   Signs/symptoms of thrombosis, Signs/symptoms of bleeding, Laboratory test error suspected, Change in health, Change in alcohol use, Change in activity, Upcoming invasive procedure, Emergency department visit, Upcoming dental procedure, Missed doses, Extra doses, Change in diet/appetite, Hospital admission, Bruising, Other complaints   Comments:   Patient states Jessi Rushing increased her dose of spironolactone and discontinued potassium supplement at her appointment on 8/1. Patient began taking lovenox injections 8/2 AM due to her  not being available to administer the injection 8/1 PM. No other changes since last encounter.     Plan:  1. INR is subtherapeutic again today at 1.4 (slightly up from 1.2 on 8/1). Instructed Mrs. Miller to boost tonight's dose to warfarin 5mg, then continue warfarin 4mg daily + continue Lovenox bridge (as pre-op, Lovenox 80mg (0.89mg/kg) subQ q12h). #10 syringes (2 refills) called in to  [facility], started 8/2 AM -- take through Monday AM, at least.  2. RTC on Monday, 8/6. To ensure INR is trending appropriately.  3. Medications reviewed, including DDIs (aspirin, levothyroxine, spironolactone).  4. Verbal and written information provided. Jill Miller expresses understanding by teach back and has no further questions at this time.    Homa Duong, PharmD  8/3/2018  10:20 AM

## 2018-08-06 ENCOUNTER — TELEPHONE (OUTPATIENT)
Dept: CARDIOLOGY | Facility: HOSPITAL | Age: 58
End: 2018-08-06

## 2018-08-06 ENCOUNTER — ANTICOAGULATION VISIT (OUTPATIENT)
Dept: PHARMACY | Facility: HOSPITAL | Age: 58
End: 2018-08-06

## 2018-08-06 ENCOUNTER — TELEPHONE (OUTPATIENT)
Dept: PHARMACY | Facility: HOSPITAL | Age: 58
End: 2018-08-06

## 2018-08-06 DIAGNOSIS — I08.0 RHEUMATIC MITRAL VALVE AND AORTIC VALVE STENOSIS: ICD-10-CM

## 2018-08-06 DIAGNOSIS — I48.0 PAROXYSMAL ATRIAL FIBRILLATION (HCC): ICD-10-CM

## 2018-08-06 LAB
INR PPP: 2 (ref 0.91–1.09)
PROTHROMBIN TIME: 24.6 SECONDS (ref 10–13.8)

## 2018-08-06 PROCEDURE — 85610 PROTHROMBIN TIME: CPT

## 2018-08-06 PROCEDURE — 36416 COLLJ CAPILLARY BLOOD SPEC: CPT

## 2018-08-06 PROCEDURE — G0463 HOSPITAL OUTPT CLINIC VISIT: HCPCS

## 2018-08-06 NOTE — TELEPHONE ENCOUNTER
Spoke with patient.  She states weight is down from 197# on Friday to 193# today.  Leg swelling is slightly improved.  Advised OK to use OTC plain mucinex or robitussin.  Come to [facility] after Cardiac Rehab on 8/14.  We need another BMP due to increase of Aldactone.

## 2018-08-06 NOTE — PROGRESS NOTES
Anticoagulation Clinic Progress Note  Indication: bioprosthetic AVR, MVR; paroxysmal afib (ANGELA ligation with Atricure clip + biatrial MAZE) -- 7/16/18  Referring Provider: Alireza  Initial Warfarin Start Date: 7/20/18  Planned Duration of Therapy: 3 months  Goal INR: 2-3  Current Drug Interactions: aspirin, levothyroxine, spironolactone  Other: Eliquis pre-CTS  CHADS-VASc: 3-4 (gender, HTN, DM?, CHF)  Bleed Risk: no history of bleeding    Diet: reviewed -- pt eats broccoli, brussels sprouts, salads, tuna in oil  Alcohol: none  Tobacco: none as of 3 months ago  OTC Pain Medication: advised to use APAP    Anticoagulation Clinic INR History:  Date 8/1 8/3 8/6         Total Weekly Dose 14mg 18mg 25mg 29mg        INR 1.2 1.4 2.0         Notes  enox enox           Clinic Interview:  Verbal Release Authorization signed on 8/1/18 -- may speak with Graham ()  Tablet Strength: 2mg tablets  Current Maintenance Dose: 2mg daily since discharge    Patient Findings:  Positives:   Signs/symptoms of bleeding   Negatives:   Signs/symptoms of thrombosis, Laboratory test error suspected, Change in health, Change in alcohol use, Change in activity, Upcoming invasive procedure, Emergency department visit, Upcoming dental procedure, Missed doses, Extra doses, Change in medications, Change in diet/appetite, Hospital admission, Bruising, Other complaints   Comments:   Mrs. Miller notes bleeding from her Lovenox injection site last night -- it bled for about one hour, despite pressure on the site. She otherwise denies changes in diet or medications. She continues coughing, but she is not able to cough anything up.     Plan:  1. INR is therapeutic today at the lower end of goal. For now, instructed Mrs. Miller to continue warfarin 4mg daily. She experienced some bleeding from her Lovenox injection site last night (1 hour) -- because of this, and because her INR is in range today, will forgo any further shots for now.  2. RTC on  Wednesday to ensure INR does not trend significantly upward.   3. Verbal and written information provided. Jill JOHN Miller expresses understanding by teach back and has no further questions at this time.    Ann Cowden Mayer, PharmD  8/6/2018  10:14 AM

## 2018-08-06 NOTE — TELEPHONE ENCOUNTER
Jessi    Mrs. Miller was in the Anticoagulation Clinic today and noted unproductive coughing over the weekend. Would you recommend an OTC expectorant (Ex, guaifenesin) at this time? Please advise -- I would be happy to notify the patient of any suggestions.    Thank you,  Ann Cowden Mayer, PharmD  8/6/2018  11:22 AM

## 2018-08-08 ENCOUNTER — ANTICOAGULATION VISIT (OUTPATIENT)
Dept: PHARMACY | Facility: HOSPITAL | Age: 58
End: 2018-08-08

## 2018-08-08 DIAGNOSIS — I48.0 PAROXYSMAL ATRIAL FIBRILLATION (HCC): ICD-10-CM

## 2018-08-08 DIAGNOSIS — I08.0 RHEUMATIC MITRAL VALVE AND AORTIC VALVE STENOSIS: ICD-10-CM

## 2018-08-08 LAB
INR PPP: 2.7 (ref 0.91–1.09)
PROTHROMBIN TIME: 32.9 SECONDS (ref 10–13.8)

## 2018-08-08 PROCEDURE — 85610 PROTHROMBIN TIME: CPT

## 2018-08-08 PROCEDURE — G0463 HOSPITAL OUTPT CLINIC VISIT: HCPCS

## 2018-08-08 PROCEDURE — 36416 COLLJ CAPILLARY BLOOD SPEC: CPT

## 2018-08-08 RX ORDER — OXYCODONE HYDROCHLORIDE AND ACETAMINOPHEN 5; 325 MG/1; MG/1
1 TABLET ORAL EVERY 6 HOURS PRN
COMMUNITY
End: 2018-08-20

## 2018-08-08 NOTE — PROGRESS NOTES
Anticoagulation Clinic Progress Note  Indication: bioprosthetic AVR, MVR; paroxysmal afib (ANGELA ligation with Atricure clip + biatrial MAZE) -- 7/16/18  Referring Provider: Alireza  Initial Warfarin Start Date: 7/20/18  Planned Duration of Therapy: 3 months  Goal INR: 2-3  Current Drug Interactions: aspirin, levothyroxine, spironolactone  Other: Eliquis pre-CTS  CHADS-VASc: 3-4 (gender, HTN, DM?, CHF)  Bleed Risk: no history of bleeding    Diet: reviewed -- pt eats broccoli, brussels sprouts, salads, tuna in oil  Alcohol: none  Tobacco: none as of 3 months ago  OTC Pain Medication: advised to use APAP    Anticoagulation Clinic INR History:  Date 8/1 8/3 8/6 8/8        Total Weekly Dose 14mg 18mg 25mg 29mg        INR 1.2 1.4 2.0 2.7        Notes  enox enox           Clinic Interview:  Verbal Release Authorization signed on 8/1/18 -- may speak with Graham ()  Tablet Strength: 2mg tablets  Current Maintenance Dose: 2mg daily since discharge/ dose finding    Patient Findings   Negatives:   Signs/symptoms of thrombosis, Signs/symptoms of bleeding, Laboratory test error suspected, Change in health, Change in alcohol use, Change in activity, Upcoming invasive procedure, Emergency department visit, Upcoming dental procedure, Missed doses, Extra doses, Change in medications, Change in diet/appetite, Hospital admission, Bruising, Other complaints   Comments:   Mrs. Miller will discuss with  to watch bruising from lovenox. She understands what to look for for healing and will report if the are getting darker/ larger. As of now, there are no changes.     Plan:  1. INR is therapeutic today. Given significant increase in past two days, instructed Mrs. Miller to decrease dose to warfarin 4mg daily except 2mg Wed. May consider dose decrease to 2mg 2-3x/week.  2. RTC on Friday to ensure INR.  3. Verbal and written information provided. Jill Miller expresses understanding by teach back and has no further questions  at this time.    Sabrina Melgoza, PharmD  8/8/2018  10:14 AM

## 2018-08-09 ENCOUNTER — TELEPHONE (OUTPATIENT)
Dept: CARDIAC SURGERY | Facility: CLINIC | Age: 58
End: 2018-08-09

## 2018-08-10 ENCOUNTER — ANTICOAGULATION VISIT (OUTPATIENT)
Dept: PHARMACY | Facility: HOSPITAL | Age: 58
End: 2018-08-10

## 2018-08-10 DIAGNOSIS — I48.0 PAROXYSMAL ATRIAL FIBRILLATION (HCC): ICD-10-CM

## 2018-08-10 DIAGNOSIS — I08.0 RHEUMATIC MITRAL VALVE AND AORTIC VALVE STENOSIS: ICD-10-CM

## 2018-08-10 LAB
INR PPP: 3.4 (ref 0.91–1.09)
PROTHROMBIN TIME: 40.2 SECONDS (ref 10–13.8)

## 2018-08-10 PROCEDURE — 85610 PROTHROMBIN TIME: CPT

## 2018-08-10 PROCEDURE — G0463 HOSPITAL OUTPT CLINIC VISIT: HCPCS

## 2018-08-10 PROCEDURE — 36416 COLLJ CAPILLARY BLOOD SPEC: CPT

## 2018-08-10 NOTE — PROGRESS NOTES
Anticoagulation Clinic Progress Note  Indication: bioprosthetic AVR, MVR; paroxysmal afib (ANGELA ligation with Atricure clip + biatrial MAZE) -- 7/16/18  Referring Provider: Alireza  Initial Warfarin Start Date: 7/20/18  Planned Duration of Therapy: 3 months  Goal INR: 2-3  Current Drug Interactions: aspirin, levothyroxine, spironolactone  Other: Eliquis pre-CTS  CHADS-VASc: 3-4 (gender, HTN, DM?, CHF)  Bleed Risk: no history of bleeding    Diet: reviewed -- pt eats broccoli, brussels sprouts, salads, tuna in oil  Alcohol: none  Tobacco: none as of 3 months ago  OTC Pain Medication: advised to use APAP    Anticoagulation Clinic INR History:  Date 8/1 8/3 8/6 8/8 8/10       Total Weekly Dose 14mg 18mg 25mg 29mg 27mg 22mg      INR 1.2 1.4 2.0 2.7 3.4       Notes  enox enox           Clinic Interview:  Verbal Release Authorization signed on 8/1/18 -- may speak with Graham ()  Tablet Strength: 2mg tablets  Current Maintenance Dose: 2mg daily since discharge/ dose finding    Patient Findings:   Positives:   Bruising   Negatives:   Signs/symptoms of thrombosis, Signs/symptoms of bleeding, Laboratory test error suspected, Change in health, Change in alcohol use, Change in activity, Upcoming invasive procedure, Emergency department visit, Upcoming dental procedure, Missed doses, Extra doses, Change in medications, Change in diet/appetite, Hospital admission, Other complaints   Comments:   Mrs. Miller ate some peas and carrots on Monday, but she otherwise hasn't eaten many GLV this week. She denies signs of bleeding and reports the bruising on her stomach is not much better, but no worse than it was previously. She has been coughing up phlegm from her chest, so she hasn't used any guaifenesin (Robitussin or Mucinex), but she knows now that she may take these products, PRN.      Plan:  1. INR is supratherapeutic today -- increased another 0.7 since last check. For now, instructed Mrs. Miller to have a serving of green  beans tonight (and another early next week), and lower her dose to warfarin 2mg FriSun, 4mg SatMon.   2. RTC on Tuesday between other appts.   3. Verbal and written information provided. Jill Miller expresses understanding by teach back and has no further questions at this time.    Blanka Garcia [facility]  8/10/2018  9:50 AM

## 2018-08-14 ENCOUNTER — ANTICOAGULATION VISIT (OUTPATIENT)
Dept: PHARMACY | Facility: HOSPITAL | Age: 58
End: 2018-08-14

## 2018-08-14 ENCOUNTER — PROCEDURE VISIT (OUTPATIENT)
Dept: CARDIAC SURGERY | Facility: CLINIC | Age: 58
End: 2018-08-14

## 2018-08-14 ENCOUNTER — TREATMENT (OUTPATIENT)
Dept: CARDIAC REHAB | Facility: HOSPITAL | Age: 58
End: 2018-08-14

## 2018-08-14 ENCOUNTER — OFFICE VISIT (OUTPATIENT)
Dept: CARDIOLOGY | Facility: HOSPITAL | Age: 58
End: 2018-08-14

## 2018-08-14 ENCOUNTER — HOSPITAL ENCOUNTER (OUTPATIENT)
Dept: CARDIOLOGY | Facility: HOSPITAL | Age: 58
Discharge: HOME | End: 2018-08-14
Admitting: NURSE PRACTITIONER

## 2018-08-14 VITALS
HEART RATE: 69 BPM | WEIGHT: 189 LBS | OXYGEN SATURATION: 95 % | TEMPERATURE: 97.3 F | SYSTOLIC BLOOD PRESSURE: 119 MMHG | HEIGHT: 62 IN | RESPIRATION RATE: 18 BRPM | BODY MASS INDEX: 34.78 KG/M2 | DIASTOLIC BLOOD PRESSURE: 57 MMHG

## 2018-08-14 DIAGNOSIS — I08.0 RHEUMATIC MITRAL VALVE AND AORTIC VALVE STENOSIS: ICD-10-CM

## 2018-08-14 DIAGNOSIS — I48.0 PAF (PAROXYSMAL ATRIAL FIBRILLATION) (HCC): ICD-10-CM

## 2018-08-14 DIAGNOSIS — I50.32 CHRONIC DIASTOLIC CONGESTIVE HEART FAILURE (HCC): Primary | ICD-10-CM

## 2018-08-14 DIAGNOSIS — I10 ESSENTIAL HYPERTENSION: ICD-10-CM

## 2018-08-14 DIAGNOSIS — Z95.2 S/P AVR (AORTIC VALVE REPLACEMENT): Primary | ICD-10-CM

## 2018-08-14 DIAGNOSIS — R06.02 SHORTNESS OF BREATH: ICD-10-CM

## 2018-08-14 DIAGNOSIS — Z95.2 S/P AVR: Primary | ICD-10-CM

## 2018-08-14 DIAGNOSIS — I48.0 PAROXYSMAL ATRIAL FIBRILLATION (HCC): ICD-10-CM

## 2018-08-14 LAB
INR PPP: 4.2 (ref 0.91–1.09)
PROTHROMBIN TIME: 50 SECONDS (ref 10–13.8)

## 2018-08-14 PROCEDURE — G0463 HOSPITAL OUTPT CLINIC VISIT: HCPCS

## 2018-08-14 PROCEDURE — 36416 COLLJ CAPILLARY BLOOD SPEC: CPT

## 2018-08-14 PROCEDURE — 93010 ELECTROCARDIOGRAM REPORT: CPT | Performed by: INTERNAL MEDICINE

## 2018-08-14 PROCEDURE — 93005 ELECTROCARDIOGRAM TRACING: CPT | Performed by: NURSE PRACTITIONER

## 2018-08-14 PROCEDURE — 71046 X-RAY EXAM CHEST 2 VIEWS: CPT | Performed by: THORACIC SURGERY (CARDIOTHORACIC VASCULAR SURGERY)

## 2018-08-14 PROCEDURE — 93798 PHYS/QHP OP CAR RHAB W/ECG: CPT

## 2018-08-14 PROCEDURE — 85610 PROTHROMBIN TIME: CPT

## 2018-08-14 PROCEDURE — 99213 OFFICE O/P EST LOW 20 MIN: CPT | Performed by: NURSE PRACTITIONER

## 2018-08-14 RX ORDER — METOLAZONE 2.5 MG/1
2.5 TABLET ORAL EVERY OTHER DAY
Qty: 6 TABLET | Refills: 0 | Status: SHIPPED | OUTPATIENT
Start: 2018-08-14 | End: 2018-08-25

## 2018-08-14 RX ORDER — METOPROLOL SUCCINATE 100 MG/1
100 TABLET, EXTENDED RELEASE ORAL DAILY
Qty: 90 TABLET | Refills: 1 | Status: SHIPPED | OUTPATIENT
Start: 2018-08-14 | End: 2018-11-01 | Stop reason: SDUPTHER

## 2018-08-14 NOTE — PROGRESS NOTES
Anticoagulation Clinic Progress Note  Indication: bioprosthetic AVR, MVR; paroxysmal afib (ANGELA ligation with Atricure clip + biatrial MAZE) -- 7/16/18  Referring Provider: Alireza  Initial Warfarin Start Date: 7/20/18  Planned Duration of Therapy: 3 months  Goal INR: 2-3  Current Drug Interactions: aspirin, levothyroxine, spironolactone  Other: Eliquis pre-CTS  CHADS-VASc: 3-4 (gender, HTN, DM?, CHF)  Bleed Risk: no history of bleeding    Diet: reviewed -- pt eats broccoli, brussels sprouts, salads, tuna in oil  Alcohol: none  Tobacco: none as of 3 months ago  OTC Pain Medication: advised to use APAP    Anticoagulation Clinic INR History:  Date 8/1 8/3 8/6 8/8 8/10 8/14      Total Weekly Dose 14mg 18mg 25mg 29mg 27mg 22mg      INR 1.2 1.4 2.0 2.7 3.4 4.2      Notes  enox enox           Clinic Interview:  Verbal Release Authorization signed on 8/1/18 -- may speak with Graham ()  Tablet Strength: 2mg tablets  Current Maintenance Dose: 2mg daily since discharge/ dose finding    Patient Findings:   Negatives:   Signs/symptoms of thrombosis, Signs/symptoms of bleeding, Laboratory test error suspected, Change in health, Change in alcohol use, Change in activity, Upcoming invasive procedure, Emergency department visit, Upcoming dental procedure, Missed doses, Extra doses, Change in medications, Change in diet/appetite, Hospital admission, Bruising, Other complaints     Plan:  1. INR is supratherapeutic today -- increased further despite dose decrease + incr in GLV intake this past week / weekend. Instructed Mrs. Miller to HOLD her warfarin tonight, have another serving of GLV (peas / green beans), then take 2mg tomorrow.  2. RTC on Thursday, after cardiac rehab orientation.   3. Verbal and written information provided. Jill Miller expresses understanding by teach back and has no further questions at this time.    Blanka Garcia [facility]  8/14/2018  12:23 PM

## 2018-08-14 NOTE — PROGRESS NOTES
Subjective:     Encounter Date:08/14/2018      Patient ID: Jill Miller is a 58 y.o. female.    Chief Complaint: HF follow up     History of Present Illness:  Ms. Miller returns to HF clinic to follow up after increase of aldactone dose.  Weight is continuing to decrease slowly.  However, she still has a cough and leg swelling.  Oxygenation is getting better.  She has been off supplemental O2 all day today and pulse ox is mid-90's.  She is hopeful that starting Cardiac Rehab will help her regain some stamina.  She plans to see Dr. Cheatham later today and the Coumadin Clinic today, too.    Past Medical History:   Diagnosis Date   • Abscess     under left breast, mrsa    • Atrial fibrillation and flutter (CMS/HCC)    • Disease of thyroid gland    • Graves disease    • Hypertension    • Hyperthyroidism    • Lung cancer (CMS/HCC)    • MRSA (methicillin resistant staph aureus) culture positive 2014    under left breast, incision and debridement, treated with wound packing and po abx    • On home oxygen therapy     2L O2 at home per [place]    • Tobacco abuse 4/10/2017       Past Surgical History:   Procedure Laterality Date   • ABSCESS DRAINAGE      under left breast d/t mrsa    • AORTIC VALVE REPAIR/REPLACEMENT N/A 7/16/2018    Procedure: MEDIAN STERNOTOMY, AORTIC VALVE REPLACEMENT WITH TIFFANIE AND MAZE, TRICUSPID RING, LEFT ATRIAL OCCLUSION;  Surgeon: Ajay Cheatham MD;  Location:  [facility];  Service: Cardiothoracic   • BRONCHOSCOPY Left 5/12/2017    Procedure: BRONCHOSCOPY;  Surgeon: Ajay Cheatham MD;  Location:  [facility];  Service:    • BRONCHOSCOPY N/A 5/18/2017    Procedure: BRONCHOSCOPY BIOPSY AT BEDSIDE;  Surgeon: Ajay Cheatham MD;  Location:  [facility];  Service:    • CARDIAC CATHETERIZATION N/A 9/28/2017    Procedure: Left Heart Cath;  Surgeon: Marco Lay MD;  Location:  [facility];  Service:    • CARDIAC CATHETERIZATION N/A 9/28/2017    Procedure: Right Heart Cath;   Surgeon: Marco Lay MD;  Location:  [facility];  Service:    • LUNG BIOPSY  04/2017   • LYMPH NODE DISSECTION Left 5/12/2017    Procedure: MEDIASTINAL LYMPH NODE DISSECTION;  Surgeon: Ajay Cheatham MD;  Location:  [facility];  Service:    • MITRAL VALVE REPAIR/REPLACEMENT N/A 7/16/2018    Procedure: MITRAL VALVE REPLACEMENT;  Surgeon: Ajay Cheatham MD;  Location:  [facility];  Service: Cardiothoracic   • TEETH EXTRACTION     • THORACOSCOPY VIDEO ASSISTED WITH LOBECTOMY Left 5/12/2017    Procedure: THORACOSCOPY VIDEO ASSISTED WITH OPEN LOBECTOMY;  Surgeon: Ajay Cheatham MD;  Location:  [facility];  Service:    • TOTAL THYROIDECTOMY  approx 2012       Social History     Social History   • Marital status:      Spouse name: N/A   • Number of children: 3   • Years of education: N/A     Occupational History   • [email or web address]      Short Term Disabilt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 Caffeine: 0-1 serving per day.           Family History   Problem Relation Age of Onset   • Breast cancer Mother    • Diabetes Father        Review of Systems   Constitution: Positive for malaise/fatigue. Negative for chills, decreased appetite, diaphoresis, fever, night sweats, weight gain and weight loss.   HENT: Negative for congestion, hearing loss, hoarse voice and nosebleeds.    Eyes: Negative for blurred vision, visual disturbance and visual halos.   Cardiovascular: Positive for leg swelling. Negative for chest pain, claudication, cyanosis, dyspnea on exertion, irregular heartbeat, near-syncope, orthopnea, palpitations, paroxysmal nocturnal dyspnea and syncope.   Respiratory: Positive  "for shortness of breath. Negative for cough, hemoptysis, sleep disturbances due to breathing, snoring, sputum production and wheezing.    Hematologic/Lymphatic: Negative for bleeding problem. Does not bruise/bleed easily.   Skin: Negative for dry skin, itching and rash.   Musculoskeletal: Negative for arthritis, joint pain, joint swelling and myalgias.   Gastrointestinal: Negative for bloating, abdominal pain, constipation, diarrhea, flatus, heartburn, hematemesis, hematochezia, melena, nausea and vomiting.   Genitourinary: Negative for dysuria, frequency, hematuria, nocturia and urgency.   Neurological: Negative for excessive daytime sleepiness, dizziness, headaches, light-headedness and loss of balance.   Psychiatric/Behavioral: Negative for depression. The patient does not have insomnia and is not nervous/anxious.      Vitals:    08/14/18 1114 08/14/18 1117 08/14/18 1118   BP: 115/55 103/61 119/57   BP Location: Right arm Left arm Left arm   Patient Position: Sitting Sitting Standing   Cuff Size: Adult     Pulse: 65 62 69   Resp: 18     Temp: 97.3 °F (36.3 °C)     TempSrc: Temporal Artery      SpO2: 95%     Weight: 85.7 kg (189 lb)     Height: 157.5 cm (62\")         Objective:     Physical Exam   Constitutional: She is oriented to person, place, and time. She appears well-developed. No distress.   HENT:   Head: Normocephalic and atraumatic.   Eyes: Pupils are equal, round, and reactive to light. Conjunctivae are normal. No scleral icterus.   Neck: Normal range of motion. Neck supple. No JVD present.   Cardiovascular: Normal rate, regular rhythm and intact distal pulses.    Murmur heard.  II/VI systolic murmur   Pulmonary/Chest: Effort normal. No respiratory distress. She has no wheezes. She has rales. She exhibits no tenderness.   Sternal incision healing well w/o erythema, tenderness or exudate.  Fine rales LLL.  Generally diminished breath sounds in bases.   Musculoskeletal: Normal range of motion. She exhibits " edema.   +1 bilateral ankle and pedal edema   Neurological: She is alert and oriented to person, place, and time. No cranial nerve deficit.   Skin: Skin is warm and dry.   Psychiatric: She has a normal mood and affect. Her behavior is normal. Thought content normal.       Lab Review: Basic Metabolic Panel 8/1/18   Ref Range & Units 13d ago   Glucose 70 - 100 mg/dL 129     BUN 9 - 23 mg/dL 10    Creatinine 0.60 - 1.30 mg/dL 0.63    Sodium 132 - 146 mmol/L 140    Potassium 3.5 - 5.5 mmol/L 4.3    Chloride 99 - 109 mmol/L 100    CO2 20.0 - 31.0 mmol/L 33.0     Calcium 8.7 - 10.4 mg/dL 9.0    eGFR Non African Amer >60 mL/min/1.73 98    BUN/Creatinine Ratio 7.0 - 25.0 15.9    Anion Gap 3.0 - 11.0 mmol/L 7.0                 Assessment/ Plan:          Diagnosis Plan   1. Chronic diastolic congestive heart failure (CMS/HCC)  Additional four pound weight loss since last OV.  Continue lasix 40 mg daily and Aldactone 50 mg daily.  Check Basic Metabolic Panel.  Add short course metolazone 2.5 mg QOD x six doses then repeat labs again.  Also, switch metoprolol tartrate to succinate next refill. Next Cardiology follow up in September with Dr. Lay/ PB Suarez APRN.  Further follow up at HF clinic PRN or at discretion of Cardiology.     2. PAF (paroxysmal atrial fibrillation) (CMS/HCC)  S/p MAZE and ANGELA occlusion.  NSR per ekg today.  Remains on warfarin management per Coumadin Clinic.     3. 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  Starting Cardiac Rehab and following up with [facility] (CXR) today.     5. Essential hypertension   Controlled and not hypotensive.

## 2018-08-14 NOTE — PROGRESS NOTES
Cardiac Rehab Initial Assessment      Name: Jill Miller  :1960 Allergies:Morphine and related and Lortab [hydrocodone-acetaminophen]   MRN: [id number] 58 y.o. Physician: Marisa Lynch PA   Primary Diagnosis:    Diagnosis Plan   1. S/P AVR (aortic valve replacement)     2. 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 Paroxysmal atrial fibrillation (CMS/HCC)      Event Date: 18 Specialist: Dr. Lay, Dr. Cheatham   Secondary Diagnosis: Artrial Fibrillation Risk Stratification:High Risk Note Author: Batsheva Dick RN     Cardiovascular History: History of Heart Failure     EXERCISE AT HOME  yes  10min  7 days per week    EF: 70%      Source: Echo          Ambulatory Status:Independent  Ambulatory Fall Risk Assessed on Initial Visit: yes 6 Minute Walk Pre- Cardiac Rehab:  Distance:1134ft      RPE:11  Max. HR: 89       SPO2:94    MET:   MPH:              RPD:   Resting BP: 116/76 LA, 114/72 RA    Peak BP: 124/76  Recovery BP: 104/68  Comments:       NUTRITION  Lipids:none available If yes, labs as follows;  Total: No components found for: CHOLESTEROL  HDL: No results found for: HDL Lipids continued:  LDL:No results found for: LDL  Triglyceride: No components found for: TRIGLYCERIDE   Weight Management:                 Weight: 189.6  Height: 62 inches                                   BMI: There is no height or weight on file to calculate BMI.  Waist Circumference:   inches   Alcohol Use: none Diabetes:No    Last HGBA1C with date if applicable:No components found for: A1C         SOCIAL HISTORY  Social History     Social History   • Marital status:    • Number of children: 3     Occupational History   • [employer]      Short Term Disabiltiy     Social History Main Topics   • Smoking status: Former Smoker     Packs/day: 0.25     Years: 40.00     Types: Cigarettes     Quit date: 6/3/2018   • Smokeless tobacco: Never Used   • Alcohol use No   • Drug use: No   • Sexual  activity: Defer     Other Topics Concern   • Not on file     Social History Narrative    Lives with her . Caffeine: 0-1 serving per day.           Educational Level (choose one that applies) high school diploma/GED Learning Barriers:Ready to Learn    Family Support:yes    Living Arrangement: lives with their spouse    Risk Factors: Stress  Yes and Obesity  Yes     Tobacco Adjunct: N/A        Comorbidities: Malignancy and Diabetes Mellitus     PSYCHOSOCIAL  Clinical Depression: no    Stress: yes     Assess presence or absence of depression using a valid screening tool: no      PHYSICAL ASSESSMENT  Influenza vaccine: no  Pneumococcal vaccine: no          Angina: no    Describe angina scale of 0 - 4: 0 = none    Today are you having incisional pain? No. If, Yes, Scale: no pain reported        Today are you having any other pain? No. If, Yes, Scale: no pain reported     Diagnosed with Hypertension:yes    Heart Sounds: S1S2 loud murmur noted     Lung Sounds: Left lower lobe absent, left upper lobe diminished. Right lobes decreased.         Assessment: Mid sternal incision scabbed with pink tissue noted. Orthopedic Problems Intermittent right shoulder pain.     Are you being hurt, hit, or frightened by anyone at home or in your life? no    Are you being neglected by a caregiver? N/A Shoulder flexibility/Range of motion: Average     Recommended arm activity: Any    Chair sit and reach within: 0 inches   Leg flexibility: Average    Leg Strength/Balance/Five times sit to stand: 0 seconds.     Chose one: Average    Recommended stretching: Standing    Assessment:     Family attends [place]: yes Time of arrival: 0900  Time of departure:1040     Patient Goals: 1. Strength training by end of program. 2. Walk on treadmill at home for 30 minutes by the end of the program.         8/14/2018  1:02 PM  Batsheva Dick RN

## 2018-08-16 ENCOUNTER — ANTICOAGULATION VISIT (OUTPATIENT)
Dept: PHARMACY | Facility: HOSPITAL | Age: 58
End: 2018-08-16

## 2018-08-16 ENCOUNTER — TREATMENT (OUTPATIENT)
Dept: CARDIAC REHAB | Facility: HOSPITAL | Age: 58
End: 2018-08-16

## 2018-08-16 DIAGNOSIS — I08.0 RHEUMATIC MITRAL VALVE AND AORTIC VALVE STENOSIS: ICD-10-CM

## 2018-08-16 DIAGNOSIS — Z95.2 S/P AVR (AORTIC VALVE REPLACEMENT): Primary | ICD-10-CM

## 2018-08-16 DIAGNOSIS — I48.0 PAROXYSMAL ATRIAL FIBRILLATION (HCC): ICD-10-CM

## 2018-08-16 LAB
INR PPP: 2.9 (ref 0.91–1.09)
PROTHROMBIN TIME: 34.8 SECONDS (ref 10–13.8)

## 2018-08-16 PROCEDURE — G0463 HOSPITAL OUTPT CLINIC VISIT: HCPCS

## 2018-08-16 PROCEDURE — 93798 PHYS/QHP OP CAR RHAB W/ECG: CPT

## 2018-08-16 PROCEDURE — 85610 PROTHROMBIN TIME: CPT

## 2018-08-16 PROCEDURE — 36416 COLLJ CAPILLARY BLOOD SPEC: CPT

## 2018-08-16 NOTE — PROGRESS NOTES
Attended Phase II Cardiac Rehab. No medication or health history changes reported. See [facility] for details.

## 2018-08-16 NOTE — PROGRESS NOTES
Anticoagulation Clinic Progress Note  Indication: bioprosthetic AVR, MVR; paroxysmal afib (ANGELA ligation with Atricure clip + biatrial MAZE) -- 7/16/18  Referring Provider: Alireza  Initial Warfarin Start Date: 7/20/18  Planned Duration of Therapy: 3 months  Goal INR: 2-3  Current Drug Interactions: aspirin, levothyroxine, spironolactone  Other: Eliquis pre-CTS  CHADS-VASc: 3-4 (gender, HTN, DM?, CHF)  Bleed Risk: no history of bleeding    Diet: reviewed -- pt eats broccoli, brussels sprouts, salads, tuna in oil  Alcohol: none  Tobacco: none as of 3 months ago  OTC Pain Medication: advised to use APAP    Anticoagulation Clinic INR History:  Date 8/1 8/3 8/6 8/8 8/10 8/14 8/16     Total Weekly Dose 14mg 18mg 25mg 29mg 27mg 22mg 18mg 16mg    INR 1.2 1.4 2.0 2.7 3.4 4.2 2.9     Notes  enox enox    1 hold       Clinic Interview:  Verbal Release Authorization signed on 8/1/18 -- may speak with Graham ()  Tablet Strength: 2mg tablets  Current Maintenance Dose: dose finding    Patient Findings:   Negatives:   Signs/symptoms of thrombosis, Signs/symptoms of bleeding, Laboratory test error suspected, Change in health, Change in alcohol use, Change in activity, Upcoming invasive procedure, Emergency department visit, Upcoming dental procedure, Missed doses, Extra doses, Change in medications, Change in diet/appetite, Hospital admission, Bruising, Other complaints   Comments:   Mrs. Miller had her stitches out Tuesday. She otherwise reports she ate some more green beans and peas -- 1 cup x 2 days. She inquired about eating brussels sprouts -- will address incorporating these into her diet once her INR has stabilized. For now, she will continue eating green beans or peas every other day.      Plan:  1. INR is therapeutic today at the upper end of goal following dose hold Tuesday. For now, instructed Mrs. Miller to take warfarin 2mg daily except 4mg on Saturday (target 16mg by recheck).   2. RTC on Monday, after cardiac  rehab.  3. Verbal and written information provided. Jill JOHN Miller expresses understanding by teach back and has no further questions at this time.    Blanka Garcia [facility]  8/16/2018  10:07 AM

## 2018-08-20 ENCOUNTER — TREATMENT (OUTPATIENT)
Dept: CARDIAC REHAB | Facility: HOSPITAL | Age: 58
End: 2018-08-20

## 2018-08-20 ENCOUNTER — ANTICOAGULATION VISIT (OUTPATIENT)
Dept: PHARMACY | Facility: HOSPITAL | Age: 58
End: 2018-08-20

## 2018-08-20 DIAGNOSIS — Z95.2 S/P AVR (AORTIC VALVE REPLACEMENT): Primary | ICD-10-CM

## 2018-08-20 DIAGNOSIS — I48.0 PAROXYSMAL ATRIAL FIBRILLATION (HCC): ICD-10-CM

## 2018-08-20 DIAGNOSIS — I08.0 RHEUMATIC MITRAL VALVE AND AORTIC VALVE STENOSIS: ICD-10-CM

## 2018-08-20 LAB
INR PPP: 3.3 (ref 0.91–1.09)
PROTHROMBIN TIME: 40 SECONDS (ref 10–13.8)

## 2018-08-20 PROCEDURE — 85610 PROTHROMBIN TIME: CPT

## 2018-08-20 PROCEDURE — 36416 COLLJ CAPILLARY BLOOD SPEC: CPT

## 2018-08-20 PROCEDURE — G0463 HOSPITAL OUTPT CLINIC VISIT: HCPCS

## 2018-08-20 PROCEDURE — 93798 PHYS/QHP OP CAR RHAB W/ECG: CPT

## 2018-08-20 NOTE — PROGRESS NOTES
Anticoagulation Clinic Progress Note  Indication: bioprosthetic AVR, MVR; paroxysmal afib (ANGELA ligation with Atricure clip + biatrial MAZE) -- 7/16/18  Referring Provider: Alireza  Initial Warfarin Start Date: 7/20/18  Planned Duration of Therapy: 3 months  Goal INR: 2-3  Current Drug Interactions: aspirin, levothyroxine, spironolactone  Other: Eliquis pre-CTS  CHADS-VASc: 3-4 (gender, HTN, DM?, CHF)  Bleed Risk: no history of bleeding    Diet: reviewed -- pt eats broccoli, brussels sprouts, salads, tuna in oil  Alcohol: none  Tobacco: none as of 3 months ago  OTC Pain Medication: advised to use APAP    Anticoagulation Clinic INR History:  Date 8/1 8/3 8/6 8/8 8/10 8/14 8/16 8/20    Total Weekly Dose 14mg 18mg 25mg 29mg 27mg 22mg 18mg 16mg    INR 1.2 1.4 2.0 2.7 3.4 4.2 2.9 3.3    Notes  enox enox    1 hold       Clinic Interview:  Verbal Release Authorization signed on 8/1/18 -- may speak with Graham ()  Tablet Strength: 2mg tablets  Current Maintenance Dose: dose finding    Patient Findings   Positives:   Change in medications   Negatives:   Signs/symptoms of thrombosis, Signs/symptoms of bleeding, Laboratory test error suspected, Change in health, Change in alcohol use, Change in activity, Upcoming invasive procedure, Emergency department visit, Upcoming dental procedure, Missed doses, Extra doses, Change in diet/appetite, Hospital admission, Bruising, Other complaints   Comments:   She stated that she ate some peas and green beans over the weekend.  She is taking acetaminophen 325 mg every morning and often in the afternoon (DDI).  She may begin taking acetaminophen 650 mg in the morning prior to cardiac rehab.  She continues to avoid NSAIDs.  She was started on metalozone x 6 doses, has taken 3 of them so far (no DDI), reviewed recommendation to take 30 min prior to dose of furosemide.  She continues cardiac rehab three times per week.   Otherwise, denies changes.     Plan:  1. INR is slightly SUPRA  therapeutic today at 3.3. For now, instructed Mrs. Miller to eat an extra serving of green beans tonight and to take warfarin 2mg daily (target 14 mg by recheck).   2. RTC on Friday, after cardiac rehab.  3. Verbal and written information provided. Jill Miller expresses understanding by teach back and has no further questions at this time.    Hetal Colmenares, PharmD  8/20/2018  9:43 AM

## 2018-08-20 NOTE — PROGRESS NOTES
Attended Phase II Cardiac Rehab. No medication or health history changes reported. See [facility] for details.

## 2018-08-20 NOTE — PROGRESS NOTES
Anticoagulation Clinic Progress Note  Indication: bioprosthetic AVR, MVR; paroxysmal afib (ANGELA ligation with Atricure clip + biatrial MAZE) -- 7/16/18  Referring Provider: Alireza  Initial Warfarin Start Date: 7/20/18  Planned Duration of Therapy: 3 months  Goal INR: 2-3  Current Drug Interactions: aspirin, levothyroxine, spironolactone  Other: Eliquis pre-CTS  CHADS-VASc: 3-4 (gender, HTN, DM?, CHF)  Bleed Risk: no history of bleeding    Diet: reviewed -- pt eats broccoli, brussels sprouts, salads, tuna in oil  Alcohol: none  Tobacco: none as of 3 months ago  OTC Pain Medication: advised to use APAP    Anticoagulation Clinic INR History:  Date 8/1 8/3 8/6 8/8 8/10 8/14 8/16     Total Weekly Dose 14mg 18mg 25mg 29mg 27mg 22mg 18mg 16mg    INR 1.2 1.4 2.0 2.7 3.4 4.2 2.9     Notes  enox enox    1 hold       Clinic Interview:  Verbal Release Authorization signed on 8/1/18 -- may speak with Graham ()  Tablet Strength: 2mg tablets  Current Maintenance Dose: dose finding    Patient Findings:   Negatives:   Signs/symptoms of thrombosis, Signs/symptoms of bleeding, Laboratory test error suspected, Change in health, Change in alcohol use, Change in activity, Upcoming invasive procedure, Emergency department visit, Upcoming dental procedure, Missed doses, Extra doses, Change in medications, Change in diet/appetite, Hospital admission, Bruising, Other complaints   Comments:   Mrs. Miller had her stitches out Tuesday. She otherwise reports she ate some more green beans and peas -- 1 cup x 2 days. She inquired about eating brussels sprouts -- will address incorporating these into her diet once her INR has stabilized. For now, she will continue eating green beans or peas every other day.      Plan:  1. INR is therapeutic today at the upper end of goal following dose hold Tuesday. For now, instructed Mrs. Miller to take warfarin 2mg daily except 4mg on Saturday (target 16mg by recheck).   2. RTC on Monday, after cardiac  rehab.  3. Verbal and written information provided. Jill JOHN Miller expresses understanding by teach back and has no further questions at this time.    Blanka Garcia [facility]  8/20/2018  9:40 AM

## 2018-08-22 ENCOUNTER — TREATMENT (OUTPATIENT)
Dept: CARDIAC REHAB | Facility: HOSPITAL | Age: 58
End: 2018-08-22

## 2018-08-22 ENCOUNTER — HOSPITAL ENCOUNTER (OUTPATIENT)
Dept: CARDIOLOGY | Facility: HOSPITAL | Age: 58
Discharge: HOME | End: 2018-08-22
Admitting: PHYSICIAN ASSISTANT

## 2018-08-22 VITALS — BODY MASS INDEX: 33.13 KG/M2 | WEIGHT: 180 LBS | HEIGHT: 62 IN

## 2018-08-22 DIAGNOSIS — R06.02 SHORTNESS OF BREATH: ICD-10-CM

## 2018-08-22 DIAGNOSIS — Z95.2 S/P AVR (AORTIC VALVE REPLACEMENT): Primary | ICD-10-CM

## 2018-08-22 DIAGNOSIS — Z95.2 S/P AVR: ICD-10-CM

## 2018-08-22 LAB
BH CV ECHO MEAS - AO MAX PG (FULL): 21.4 MMHG
BH CV ECHO MEAS - AO MAX PG: 30 MMHG
BH CV ECHO MEAS - AO MEAN PG (FULL): 11.3 MMHG
BH CV ECHO MEAS - AO MEAN PG: 14.3 MMHG
BH CV ECHO MEAS - AO V2 MAX: 275.3 CM/SEC
BH CV ECHO MEAS - AO V2 MEAN: 165.3 CM/SEC
BH CV ECHO MEAS - AO V2 VTI: 42.9 CM
BH CV ECHO MEAS - AVA(I,A): 1.7 CM^2
BH CV ECHO MEAS - AVA(I,D): 1.7 CM^2
BH CV ECHO MEAS - AVA(V,A): 1.7 CM^2
BH CV ECHO MEAS - AVA(V,D): 1.7 CM^2
BH CV ECHO MEAS - BSA(HAYCOCK): 1.9 M^2
BH CV ECHO MEAS - BSA: 1.8 M^2
BH CV ECHO MEAS - BZI_BMI: 32.9 KILOGRAMS/M^2
BH CV ECHO MEAS - BZI_METRIC_HEIGHT: 157.5 CM
BH CV ECHO MEAS - BZI_METRIC_WEIGHT: 81.6 KG
BH CV ECHO MEAS - EDV(CUBED): 29.5 ML
BH CV ECHO MEAS - EDV(MOD-SP2): 42 ML
BH CV ECHO MEAS - EDV(MOD-SP4): 45 ML
BH CV ECHO MEAS - EDV(TEICH): 37.6 ML
BH CV ECHO MEAS - EF(CUBED): 65.8 %
BH CV ECHO MEAS - EF(MOD-SP2): 59.5 %
BH CV ECHO MEAS - EF(MOD-SP4): 57.8 %
BH CV ECHO MEAS - EF(TEICH): 58.9 %
BH CV ECHO MEAS - ESV(CUBED): 10.1 ML
BH CV ECHO MEAS - ESV(MOD-SP2): 17 ML
BH CV ECHO MEAS - ESV(MOD-SP4): 19 ML
BH CV ECHO MEAS - ESV(TEICH): 15.5 ML
BH CV ECHO MEAS - FS: 30.1 %
BH CV ECHO MEAS - IVS/LVPW: 1.1
BH CV ECHO MEAS - IVSD: 1.4 CM
BH CV ECHO MEAS - LA DIMENSION: 4 CM
BH CV ECHO MEAS - LAT PEAK E' VEL: 6.1 CM/SEC
BH CV ECHO MEAS - LV DIASTOLIC VOL/BSA (35-75): 24.6 ML/M^2
BH CV ECHO MEAS - LV MASS(C)D: 126.9 GRAMS
BH CV ECHO MEAS - LV MASS(C)DI: 69.4 GRAMS/M^2
BH CV ECHO MEAS - LV MAX PG: 8.6 MMHG
BH CV ECHO MEAS - LV MEAN PG: 3 MMHG
BH CV ECHO MEAS - LV SYSTOLIC VOL/BSA (12-30): 10.4 ML/M^2
BH CV ECHO MEAS - LV V1 MAX: 147 CM/SEC
BH CV ECHO MEAS - LV V1 MEAN: 75 CM/SEC
BH CV ECHO MEAS - LV V1 VTI: 23.8 CM
BH CV ECHO MEAS - LVIDD: 3.1 CM
BH CV ECHO MEAS - LVIDS: 2.2 CM
BH CV ECHO MEAS - LVLD AP2: 6.2 CM
BH CV ECHO MEAS - LVLD AP4: 6.4 CM
BH CV ECHO MEAS - LVLS AP2: 5.3 CM
BH CV ECHO MEAS - LVLS AP4: 5.3 CM
BH CV ECHO MEAS - LVOT AREA (M): 3.1 CM^2
BH CV ECHO MEAS - LVOT AREA: 3.1 CM^2
BH CV ECHO MEAS - LVOT DIAM: 2 CM
BH CV ECHO MEAS - LVPWD: 1.2 CM
BH CV ECHO MEAS - MED PEAK E' VEL: 4.3 CM/SEC
BH CV ECHO MEAS - MV A MAX VEL: 100 CM/SEC
BH CV ECHO MEAS - MV DEC SLOPE: 449 CM/SEC^2
BH CV ECHO MEAS - MV DEC TIME: 0.32 SEC
BH CV ECHO MEAS - MV E MAX VEL: 122 CM/SEC
BH CV ECHO MEAS - MV E/A: 1.2
BH CV ECHO MEAS - MV MAX PG: 7.3 MMHG
BH CV ECHO MEAS - MV MEAN PG: 3 MMHG
BH CV ECHO MEAS - MV P1/2T MAX VEL: 136.4 CM/SEC
BH CV ECHO MEAS - MV P1/2T: 89 MSEC
BH CV ECHO MEAS - MV V2 MAX: 135.3 CM/SEC
BH CV ECHO MEAS - MV V2 MEAN: 83 CM/SEC
BH CV ECHO MEAS - MV V2 VTI: 40.6 CM
BH CV ECHO MEAS - MVA P1/2T LCG: 1.6 CM^2
BH CV ECHO MEAS - MVA(P1/2T): 2.5 CM^2
BH CV ECHO MEAS - MVA(VTI): 1.8 CM^2
BH CV ECHO MEAS - PA ACC SLOPE: 481 CM/SEC^2
BH CV ECHO MEAS - PA ACC TIME: 0.11 SEC
BH CV ECHO MEAS - PA PR(ACCEL): 31.3 MMHG
BH CV ECHO MEAS - RAP SYSTOLE: 5 MMHG
BH CV ECHO MEAS - RVSP: 25 MMHG
BH CV ECHO MEAS - SI(CUBED): 10.6 ML/M^2
BH CV ECHO MEAS - SI(LVOT): 40.9 ML/M^2
BH CV ECHO MEAS - SI(MOD-SP2): 13.7 ML/M^2
BH CV ECHO MEAS - SI(MOD-SP4): 14.2 ML/M^2
BH CV ECHO MEAS - SI(TEICH): 12.1 ML/M^2
BH CV ECHO MEAS - SV(CUBED): 19.4 ML
BH CV ECHO MEAS - SV(LVOT): 74.8 ML
BH CV ECHO MEAS - SV(MOD-SP2): 25 ML
BH CV ECHO MEAS - SV(MOD-SP4): 26 ML
BH CV ECHO MEAS - SV(TEICH): 22.1 ML
BH CV ECHO MEAS - TAPSE (>1.6): 0.7 CM2
BH CV ECHO MEAS - TR MAX VEL: 221 CM/SEC
BH CV ECHO MEASUREMENTS AVERAGE E/E' RATIO: 23.46
BH CV VAS BP RIGHT ARM: NORMAL MMHG
BH CV XLRA - RV BASE: 3.8 CM
BH CV XLRA - RV LENGTH: 6.5 CM
BH CV XLRA - RV MID: 2.8 CM
BH CV XLRA - TDI S': 9.3 CM/SEC
LEFT ATRIUM VOLUME INDEX: 49.2 ML/M2
LV EF 2D ECHO EST: 65 %
MAXIMAL PREDICTED HEART RATE: 164 BPM
STRESS TARGET HR: 139 BPM

## 2018-08-22 PROCEDURE — 93306 TTE W/DOPPLER COMPLETE: CPT

## 2018-08-22 PROCEDURE — 93798 PHYS/QHP OP CAR RHAB W/ECG: CPT

## 2018-08-22 PROCEDURE — 93306 TTE W/DOPPLER COMPLETE: CPT | Performed by: INTERNAL MEDICINE

## 2018-08-22 NOTE — PROGRESS NOTES
Attended Phase II Cardiac Rehab. No medication or health history changes reported. See [facility] for details.

## 2018-08-24 ENCOUNTER — TREATMENT (OUTPATIENT)
Dept: CARDIAC REHAB | Facility: HOSPITAL | Age: 58
End: 2018-08-24

## 2018-08-24 ENCOUNTER — ANTICOAGULATION VISIT (OUTPATIENT)
Dept: PHARMACY | Facility: HOSPITAL | Age: 58
End: 2018-08-24

## 2018-08-24 DIAGNOSIS — Z95.2 S/P AVR (AORTIC VALVE REPLACEMENT): Primary | ICD-10-CM

## 2018-08-24 DIAGNOSIS — I48.0 PAROXYSMAL ATRIAL FIBRILLATION (HCC): ICD-10-CM

## 2018-08-24 DIAGNOSIS — I08.0 RHEUMATIC MITRAL VALVE AND AORTIC VALVE STENOSIS: ICD-10-CM

## 2018-08-24 LAB
INR PPP: 3 (ref 0.91–1.09)
PROTHROMBIN TIME: 36.4 SECONDS (ref 10–13.8)

## 2018-08-24 PROCEDURE — 36416 COLLJ CAPILLARY BLOOD SPEC: CPT

## 2018-08-24 PROCEDURE — 85610 PROTHROMBIN TIME: CPT

## 2018-08-24 PROCEDURE — 93798 PHYS/QHP OP CAR RHAB W/ECG: CPT

## 2018-08-24 PROCEDURE — G0463 HOSPITAL OUTPT CLINIC VISIT: HCPCS

## 2018-08-24 NOTE — PROGRESS NOTES
Anticoagulation Clinic Progress Note  Indication: bioprosthetic AVR, MVR; paroxysmal afib (ANGELA ligation with Atricure clip + biatrial MAZE) -- 7/16/18  Referring Provider: Alireza Rodrigez Warfarin Start Date: 7/20/18  Planned Duration of Therapy: 3 months  Goal INR: 2-3  Current Drug Interactions: aspirin, levothyroxine, spironolactone  Other: Eliquis pre-CTS  CHADS-VASc: 3-4 (gender, HTN, DM?, CHF)  Bleed Risk: no history of bleeding    Diet: reviewed -- pt eats broccoli, brussels sprouts, salads, tuna in oil  Alcohol: none  Tobacco: none as of 3 months ago  OTC Pain Medication: advised to use APAP    Anticoagulation Clinic INR History:  Date 8/1 8/3 8/6 8/8 8/10 8/14 8/16 8/20 8/24 8/27   Total Weekly Dose 14mg 18mg 25mg 29mg 27mg 22mg 18mg 16mg 16mg 14 mg   INR 1.2 1.4 2.0 2.7 3.4 4.2 2.9 3.3 3.0    Notes  enox enox    1 hold        Clinic Interview:  Verbal Release Authorization signed on 8/1/18 -- may speak with Graham ()  Tablet Strength: 2mg tablets  Current Maintenance Dose: dose finding    Patient Findings   Negatives:   Signs/symptoms of thrombosis, Signs/symptoms of bleeding, Laboratory test error suspected, Change in health, Change in alcohol use, Change in activity, Upcoming invasive procedure, Emergency department visit, Upcoming dental procedure, Missed doses, Extra doses, Change in medications, Change in diet/appetite, Hospital admission, Bruising, Other complaints   Comments:   Mrs. Miller is continuing APAP before cardiac rehab. Her sugar was high this week so she had baked chicken and brocolli. Her anniversy of 27 years is next Friday (8/31) and she's plans to go out to dinner. They have increased her exercise at cardiac rehab. She denied any missed doses, extra doses, signs of bleeding, or upcoming procedures.      Plan:  1. INR is therapeutic today at 3.0. For now, instructed Mrs. Miller to take warfarin 2mg daily (target 14 mg by recheck).   2. RTC on Monday, 8/27, after cardiac  rehab.  3. Verbal and written information provided. Jill Miller expresses understanding by teach back and has no further questions at this time.    Faiza Pena, Pharmacy Intern  8/24/2018  9:16 AM     I, Blanka Garcia [facility], have reviewed the note in full and agree with the assessment and plan.  08/24/18  4:50 PM

## 2018-08-24 NOTE — PROGRESS NOTES
Attended Phase II Cardiac Rehab. No medication or health history changes reported. See [facility] for details.

## 2018-08-27 ENCOUNTER — TREATMENT (OUTPATIENT)
Dept: CARDIAC REHAB | Facility: HOSPITAL | Age: 58
End: 2018-08-27

## 2018-08-27 ENCOUNTER — ANTICOAGULATION VISIT (OUTPATIENT)
Dept: PHARMACY | Facility: HOSPITAL | Age: 58
End: 2018-08-27

## 2018-08-27 DIAGNOSIS — I48.0 PAROXYSMAL ATRIAL FIBRILLATION (HCC): ICD-10-CM

## 2018-08-27 DIAGNOSIS — Z95.2 S/P AVR (AORTIC VALVE REPLACEMENT): Primary | ICD-10-CM

## 2018-08-27 DIAGNOSIS — I08.0 RHEUMATIC MITRAL VALVE AND AORTIC VALVE STENOSIS: ICD-10-CM

## 2018-08-27 LAB
INR PPP: 2 (ref 0.91–1.09)
PROTHROMBIN TIME: 24.4 SECONDS (ref 10–13.8)

## 2018-08-27 PROCEDURE — G0463 HOSPITAL OUTPT CLINIC VISIT: HCPCS

## 2018-08-27 PROCEDURE — 85610 PROTHROMBIN TIME: CPT

## 2018-08-27 PROCEDURE — 36416 COLLJ CAPILLARY BLOOD SPEC: CPT

## 2018-08-27 PROCEDURE — 93798 PHYS/QHP OP CAR RHAB W/ECG: CPT

## 2018-08-27 NOTE — PROGRESS NOTES
Anticoagulation Clinic Progress Note  Indication: bioprosthetic AVR, MVR; paroxysmal afib (ANGELA ligation with Atricure clip + biatrial MAZE) -- 7/16/18  Referring Provider: Alireza  Initial Warfarin Start Date: 7/20/18  Planned Duration of Therapy: 3 months  Goal INR: 2-3  Current Drug Interactions: aspirin, levothyroxine, spironolactone  Other: Eliquis pre-CTS  CHADS-VASc: 3-4 (gender, HTN, DM?, CHF)  Bleed Risk: no history of bleeding    Diet: reviewed -- pt eats broccoli, brussels sprouts, salads, tuna in oil  Alcohol: none  Tobacco: none as of 3 months ago  OTC Pain Medication: advised to use APAP    Anticoagulation Clinic INR History:  Date 8/1 8/3 8/6 8/8 8/10 8/14 8/16 8/20 8/24 8/27   Total Weekly Dose 14mg 18mg 25mg 29mg 27mg 22mg 18mg 16mg 16mg 14 mg   INR 1.2 1.4 2.0 2.7 3.4 4.2 2.9 3.3 3.0 2.0   Notes  enox enox    1 hold        Clinic Interview:  Verbal Release Authorization signed on 8/1/18 -- may speak with Graham ()  Tablet Strength: 2mg tablets  Current Maintenance Dose: dose finding    Patient Findings   Negatives:   Signs/symptoms of thrombosis, Signs/symptoms of bleeding, Laboratory test error suspected, Change in health, Change in alcohol use, Change in activity, Upcoming invasive procedure, Emergency department visit, Upcoming dental procedure, Missed doses, Extra doses, Change in medications, Change in diet/appetite, Hospital admission, Bruising, Other complaints   Comments:   Mrs. Miller is continuing APAP before cardiac rehab. Her anniversy of 27 years is next Friday (8/31) and she's plans to go out to dinner. They have increased her exercise at cardiac rehab. She denied any missed doses, extra doses, signs of bleeding, or upcoming procedures. She had one small serving of broccoli this last weekend and reports she typically eats broccoli twice weekly.     Plan:  1. INR is therapeutic today at 2.0. Given significant decrease will increase dose to 2mg daily except 4mg Mon.   2. RTC on  Friday, 8/31, after cardiac rehab. Of note: it is her 27th wedding anniversary.  3. Verbal and written information provided. Jill Miller expresses understanding by teach back and has no further questions at this time.    Sabrina Melgoza, PharmD  8/27/2018  9:31 AM

## 2018-08-29 ENCOUNTER — TREATMENT (OUTPATIENT)
Dept: CARDIAC REHAB | Facility: HOSPITAL | Age: 58
End: 2018-08-29

## 2018-08-29 DIAGNOSIS — Z95.2 S/P AVR (AORTIC VALVE REPLACEMENT): Primary | ICD-10-CM

## 2018-08-29 PROCEDURE — 93798 PHYS/QHP OP CAR RHAB W/ECG: CPT

## 2018-08-31 ENCOUNTER — ANTICOAGULATION VISIT (OUTPATIENT)
Dept: PHARMACY | Facility: HOSPITAL | Age: 58
End: 2018-08-31

## 2018-08-31 ENCOUNTER — TREATMENT (OUTPATIENT)
Dept: CARDIAC REHAB | Facility: HOSPITAL | Age: 58
End: 2018-08-31

## 2018-08-31 DIAGNOSIS — I08.0 RHEUMATIC MITRAL VALVE AND AORTIC VALVE STENOSIS: ICD-10-CM

## 2018-08-31 DIAGNOSIS — I48.0 PAROXYSMAL ATRIAL FIBRILLATION (HCC): ICD-10-CM

## 2018-08-31 DIAGNOSIS — Z95.2 S/P AVR (AORTIC VALVE REPLACEMENT): Primary | ICD-10-CM

## 2018-08-31 PROCEDURE — 85610 PROTHROMBIN TIME: CPT

## 2018-08-31 PROCEDURE — 36416 COLLJ CAPILLARY BLOOD SPEC: CPT

## 2018-08-31 PROCEDURE — G0463 HOSPITAL OUTPT CLINIC VISIT: HCPCS

## 2018-08-31 PROCEDURE — 93798 PHYS/QHP OP CAR RHAB W/ECG: CPT

## 2018-09-04 ENCOUNTER — OFFICE VISIT (OUTPATIENT)
Dept: CARDIAC SURGERY | Facility: CLINIC | Age: 58
End: 2018-09-04

## 2018-09-04 VITALS
TEMPERATURE: 97.1 F | SYSTOLIC BLOOD PRESSURE: 106 MMHG | OXYGEN SATURATION: 95 % | DIASTOLIC BLOOD PRESSURE: 60 MMHG | HEART RATE: 92 BPM | HEIGHT: 62 IN | WEIGHT: 181.8 LBS | BODY MASS INDEX: 33.45 KG/M2

## 2018-09-04 DIAGNOSIS — I08.0 RHEUMATIC MITRAL VALVE AND AORTIC VALVE STENOSIS: ICD-10-CM

## 2018-09-04 DIAGNOSIS — I05.9 MITRAL VALVE DISORDER: Primary | ICD-10-CM

## 2018-09-04 PROCEDURE — 99024 POSTOP FOLLOW-UP VISIT: CPT | Performed by: PHYSICIAN ASSISTANT

## 2018-09-04 PROCEDURE — 71046 X-RAY EXAM CHEST 2 VIEWS: CPT | Performed by: THORACIC SURGERY (CARDIOTHORACIC VASCULAR SURGERY)

## 2018-09-04 NOTE — PROGRESS NOTES
09/04/2018  Patient Information  Jill Miller                                                                                          [address]   [address]  [facility] [id number]   1960  'PCP/Referring Physician'  Marisa Lynch PA  [phone]  No ref. provider found    Chief Complaint   Patient presents with   • Post-op Follow-up     Hospital follow-up s/p MVR/AVR/ Tricuspid valve repair, MAZE, and left atrial appendage ligation 7/16/18.   • Cardiac Valve Problem       History of Present Illness:  Patient is a 58-year-old  female with history of hypertension, hyperlipidemia, chronic atrial fibrillation, type 2 diabetes mellitus, severe mitral stenosis and regurgitation, severe tricuspid regurgitation, moderate aortic regurgitation, history of lung cancer ([id number] Stage IA) and tobacco abuse who presents to office today for second postoperative follow-up for mitral valve replacement with #27 bioprosthetic valve, aortic valve replacement with #21 bioprosthetic valve, tricuspid valve repair with 32 mm annuloplasty ring, Castillo IV biatrial maze and left atrial appendage ligation performed 7/16/18.  Patient denies any chest pain or difficulty with ambulation, but does have some shortness of breath with extended ambulation.  She states that overall she is feeling better.  She will follow-up with her cardiologist, Dr. Lay, on 9/12/18.  She has already begun cardiac rehabilitation here at [facility].  The patient is otherwise doing well.      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   • Type 2 diabetes mellitus (CMS/HCC)   • Thrombocytopenia (CMS/HCC)   • Elevated  transaminase level   • Hyperbilirubinemia     Past Medical History:   Diagnosis Date   • Abscess     under left breast, mrsa    • Atrial fibrillation and flutter (CMS/HCC)    • Disease of thyroid gland    • Graves disease    • Hypertension    • Hyperthyroidism    • Lung cancer (CMS/HCC)    • MRSA (methicillin resistant staph aureus) culture positive 2014    under left breast, incision and debridement, treated with wound packing and po abx    • On home oxygen therapy     2L O2 at home per [place]    • Tobacco abuse 4/10/2017     Past Surgical History:   Procedure Laterality Date   • ABSCESS DRAINAGE      under left breast d/t mrsa    • AORTIC VALVE REPAIR/REPLACEMENT N/A 7/16/2018    Procedure: MEDIAN STERNOTOMY, AORTIC VALVE REPLACEMENT WITH TIFFANIE AND MAZE, TRICUSPID RING, LEFT ATRIAL OCCLUSION;  Surgeon: Ajay Cheatham MD;  Location:  [facility];  Service: Cardiothoracic   • BRONCHOSCOPY Left 5/12/2017    Procedure: BRONCHOSCOPY;  Surgeon: Ajay Cheatham MD;  Location:  [facility];  Service:    • BRONCHOSCOPY N/A 5/18/2017    Procedure: BRONCHOSCOPY BIOPSY AT BEDSIDE;  Surgeon: Ajay Cheatham MD;  Location:  [facility];  Service:    • CARDIAC CATHETERIZATION N/A 9/28/2017    Procedure: Left Heart Cath;  Surgeon: Marco Lay MD;  Location:  [facility];  Service:    • CARDIAC CATHETERIZATION N/A 9/28/2017    Procedure: Right Heart Cath;  Surgeon: Marco Lay MD;  Location:  [facility];  Service:    • LUNG BIOPSY  04/2017   • LYMPH NODE DISSECTION Left 5/12/2017    Procedure: MEDIASTINAL LYMPH NODE DISSECTION;  Surgeon: Ajay Cheatham MD;  Location:  [facility];  Service:    • MITRAL VALVE REPAIR/REPLACEMENT N/A 7/16/2018    Procedure: MITRAL VALVE REPLACEMENT;  Surgeon: Ajay Cheatham MD;  Location:  [facility];  Service: Cardiothoracic   • TEETH EXTRACTION     • THORACOSCOPY VIDEO ASSISTED WITH LOBECTOMY Left 5/12/2017    Procedure: THORACOSCOPY VIDEO ASSISTED  WITH OPEN LOBECTOMY;  Surgeon: Ajay Cheatham MD;  [facility];  Service:    • TOTAL THYROIDECTOMY  approx 2012       Current Outpatient Prescriptions:   •  acetaminophen (TYLENOL) 325 MG tablet, Take 325 mg by mouth Daily As Needed for Mild Pain  or Headache., Disp: , Rfl:   •  aspirin 81 MG EC tablet, Take 1 tablet by mouth Daily, Disp: 30 tablet, Rfl: 11  •  atorvastatin (LIPITOR) 40 MG tablet, Take 40 mg by mouth Every Night., Disp: , Rfl:   •  digoxin (LANOXIN) 125 MCG tablet, Take 1 tablet by mouth Daily., Disp: 30 tablet, Rfl: 5  •  furosemide (LASIX) 40 MG tablet, Take 1 tablet by mouth Daily., Disp: 60 tablet, Rfl: 0  •  levothyroxine (SYNTHROID, LEVOTHROID) 150 MCG tablet, Take 150 mcg by mouth Daily., Disp: , Rfl:   •  metoprolol succinate XL (TOPROL-XL) 100 MG 24 hr tablet, Take 1 tablet by mouth Daily., Disp: 90 tablet, Rfl: 1  •  spironolactone (ALDACTONE) 50 MG tablet, Take 1 tablet by mouth Daily., Disp: 30 tablet, Rfl: 3  •  warfarin (COUMADIN) 2 MG tablet, Take 1 tablet by mouth As Directed., Disp: 45 tablet, Rfl: 2  Allergies   Allergen Reactions   • Morphine And Related Nausea Only   • Lortab [Hydrocodone-Acetaminophen] Nausea And Vomiting and Dizziness     Social History     Social History   • Marital status:      Spouse name: N/A   • Number of children: 3   • Years of education: N/A     Occupational History   • [employer]      Short Term Disabilt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 Caffeine: 0-1 serving per day.         Family History   Problem Relation Age of Onset   • Breast cancer Mother    • Diabetes Father      ROS: As per HPI, otherwise negative.   Vitals:     "18 0859   BP: 106/60   BP Location: Right arm   Patient Position: Sitting   Pulse: 92   Temp: 97.1 °F (36.2 °C)   SpO2: 95%   Weight: 82.5 kg (181 lb 12.8 oz)   Height: 157.5 cm (62\")      Physical Exam:  Gen - NAD, pleasant, cooperative  CV - Regular rate and rhythm, no murmur gallop or rub  Pulm - Lungs clear to auscultation without wheeze or rhonchi   GI - Soft, normoactive bowel sounds, non-tender  Ext - Without edema   Incision - Sternum stable, no evidence of incisional dehiscence or cellulitis    Labs/Imagin18 CXR  Good quality chest radiograph. Bony structures are within normal limits. Trachea is midline. Left and right lung fields clear, costophrenic angles are sharp.  Radiopaque bioprosthetic aortic and mitral valve is present.  Tricuspid valve annuloplasty ring and left atrial appendage ligation clip are noted.  Sternal wires are manifestation of sternotomy procedure. No evidence of pleural effusion or pneumothorax. Compared to most recent chest xray (18), there is no acute cardiopulmonary process.    Assessment/Plan:  Patient is a 58-year-old  female with history of hypertension, hyperlipidemia, chronic atrial fibrillation, type 2 diabetes mellitus, severe mitral stenosis and regurgitation, severe tricuspid regurgitation, moderate aortic regurgitation, history of lung cancer ([id number] Stage IA) and tobacco abuse who presents to office today for second postoperative follow-up for mitral valve replacement with #27 bioprosthetic valve, aortic valve replacement with #21 bioprosthetic valve, tricuspid valve repair with 32 mm annuloplasty ring, Castillo IV biatrial maze and left atrial appendage ligation performed 18.  The patient is having a steady postoperative course.  Her sternum is stable, her incision is healing well and her chest x-ray is within normal limits.  The patient will follow up with her cardiologist on 18, and has already begun cardiac rehabilitation.  I would " like for the patient to follow-up with our office in 6 weeks to discuss anticoagulation.  If she has any questions or concerns during the interval, she may call her office at any time.  If the patient develops any acutely worsening symptoms, she may call our office or present to the nearest emergency department immediately.    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

## 2018-09-05 ENCOUNTER — TREATMENT (OUTPATIENT)
Dept: CARDIAC REHAB | Facility: HOSPITAL | Age: 58
End: 2018-09-05

## 2018-09-05 DIAGNOSIS — Z95.2 S/P AVR (AORTIC VALVE REPLACEMENT): Primary | ICD-10-CM

## 2018-09-05 PROCEDURE — 93798 PHYS/QHP OP CAR RHAB W/ECG: CPT

## 2018-09-05 RX ORDER — FUROSEMIDE 40 MG/1
TABLET ORAL
Qty: 20 TABLET | Refills: 0 | Status: SHIPPED | OUTPATIENT
Start: 2018-09-05 | End: 2018-09-06 | Stop reason: SDUPTHER

## 2018-09-06 RX ORDER — FUROSEMIDE 40 MG/1
40 TABLET ORAL DAILY PRN
Qty: 90 TABLET | Refills: 1 | Status: SHIPPED | OUTPATIENT
Start: 2018-09-06 | End: 2018-09-21 | Stop reason: SDUPTHER

## 2018-09-07 ENCOUNTER — ANTICOAGULATION VISIT (OUTPATIENT)
Dept: PHARMACY | Facility: HOSPITAL | Age: 58
End: 2018-09-07

## 2018-09-07 ENCOUNTER — TREATMENT (OUTPATIENT)
Dept: CARDIAC REHAB | Facility: HOSPITAL | Age: 58
End: 2018-09-07

## 2018-09-07 DIAGNOSIS — I48.0 PAROXYSMAL ATRIAL FIBRILLATION (HCC): ICD-10-CM

## 2018-09-07 DIAGNOSIS — Z95.2 S/P AVR (AORTIC VALVE REPLACEMENT): Primary | ICD-10-CM

## 2018-09-07 DIAGNOSIS — I08.0 RHEUMATIC MITRAL VALVE AND AORTIC VALVE STENOSIS: ICD-10-CM

## 2018-09-07 LAB
INR PPP: 2.6 (ref 0.91–1.09)
PROTHROMBIN TIME: 30.9 SECONDS (ref 10–13.8)

## 2018-09-07 PROCEDURE — 85610 PROTHROMBIN TIME: CPT

## 2018-09-07 PROCEDURE — 93798 PHYS/QHP OP CAR RHAB W/ECG: CPT

## 2018-09-07 PROCEDURE — G0463 HOSPITAL OUTPT CLINIC VISIT: HCPCS

## 2018-09-07 PROCEDURE — 36416 COLLJ CAPILLARY BLOOD SPEC: CPT

## 2018-09-07 NOTE — PROGRESS NOTES
Anticoagulation Clinic Progress Note  Indication: bioprosthetic AVR, MVR; paroxysmal afib (ANGELA ligation with Atricure clip + biatrial MAZE) -- 7/16/18  Referring Provider: Alireza  Initial Warfarin Start Date: 7/20/18  Planned Duration of Therapy: 3 months  Goal INR: 2-3  Current Drug Interactions: aspirin, levothyroxine, spironolactone  Other: Eliquis pre-CTS  CHADS-VASc: 3-4 (gender, HTN, DM?, CHF)  Bleed Risk: no history of bleeding    Diet: reviewed -- pt eats broccoli, brussels sprouts, salads, tuna in oil  Alcohol: none  Tobacco: none as of 3 months ago  OTC Pain Medication: advised to use APAP    Anticoagulation Clinic INR History:  Date 8/1 8/3 8/6 8/8 8/10 8/14 8/16 8/20 8/24 8/27 8/31   Total Weekly Dose 14mg 18mg 25mg 29mg 27mg 22mg 18mg 16mg 16mg 14 mg 16mg   INR 1.2 1.4 2.0 2.7 3.4 4.2 2.9 3.3 3.0 2.0 1.8   Notes  enox enox    1 hold    incr GLV     Date 9/7             Total Weekly Dose 17mg             INR 2.6             Notes                Clinic Interview:  Verbal Release Authorization signed on 8/1/18 -- may speak with Graham ()  Tablet Strength: 2mg tablets  Current Maintenance Dose: dose finding    Patient Findings:   Positives:   Change in diet/appetite   Negatives:   Signs/symptoms of thrombosis, Signs/symptoms of bleeding, Laboratory test error suspected, Change in health, Change in alcohol use, Change in activity, Upcoming invasive procedure, Emergency department visit, Upcoming dental procedure, Missed doses, Extra doses, Change in medications, Hospital admission, Bruising, Other complaints   Comments:   Mrs. Miller has just eaten about one serving of GLV since last week. Discussed difference between vitamin K content of broccoli and that of green beans -- she verbalizes understanding.     Plan:  1. INR is therapeutic today, so instructed Mrs. Miller to continue warfarin 2mg daily except 3mg Mon/Wed/Fri.  2. RTC on Wednesday.   3. Verbal and written information provided. Jill LOPEZ  Paul expresses understanding by teach back and has no further questions at this time.  4. Patient denies the need for refills at this time.    Blanka Garcia [facility]  9/7/2018  9:05 AM

## 2018-09-07 NOTE — PROGRESS NOTES
Attended Phase II Cardiac Rehab. No medication or health history changes reported. See [facility] for details.

## 2018-09-10 ENCOUNTER — TREATMENT (OUTPATIENT)
Dept: CARDIAC REHAB | Facility: HOSPITAL | Age: 58
End: 2018-09-10

## 2018-09-10 DIAGNOSIS — Z95.2 S/P AVR (AORTIC VALVE REPLACEMENT): Primary | ICD-10-CM

## 2018-09-10 PROCEDURE — 93798 PHYS/QHP OP CAR RHAB W/ECG: CPT

## 2018-09-10 NOTE — PROGRESS NOTES
Attended Phase II Cardiac Rehab. No medication or health history changes reported. See [facility] for details.

## 2018-09-10 NOTE — PROGRESS NOTES
Encounter Date:09/11/2018    Patient ID: Jill Miller is a 58 y.o. female who resides in [facility]  CC/Reason for visit:  Chronic diastolic congestive heart failure (CMS/HCC)            Jill Miller returns today for follow up after recent hospitalization where she had an aortic valve replacement, mitral valve replacement, tricuspid valve repair, Maze procedure and left atrial appendage ligation with Dr. Ajay Cheatham.  She is chronically anticoagulated with Coumadin for history of valvular atrial fibrillation that occurred postoperatively after a left upper lobe lobectomy in May 2017.  Her INRs are usually therapeutic but she increase her intake of broccoli which caused her INR to decrease to 1.8.  She had no recurrences of atrial fibrillation postop valve surgery.  She has been attending cardiac rehabilitation and doing well with it.  She still has some substernal chest soreness at her sternotomy site.  She suffered from a pleural effusion post operative and at her follow-up with CT surgery 2 weeks ago a chest x-ray was ordered but has not been performed.  She does get shortness of breath with any extended ambulation.  She takes Lasix and spiral lactone daily for this.  Her last renal function checked last month was within normal limits.  She is also taking digoxin for sinus tachycardia that occurred postoperatively.  The patient reports she needs to return back to work on October 1 for at least 2 days before she can have any further time off to recuperate from her surgery.    Review of Systems   Constitution: Negative for weakness and malaise/fatigue.   Eyes: Negative for vision loss in left eye and vision loss in right eye.   Cardiovascular: Negative for chest pain, dyspnea on exertion, near-syncope, orthopnea, palpitations, paroxysmal nocturnal dyspnea and syncope.   Musculoskeletal: Negative for myalgias.   Neurological: Negative for brief paralysis, excessive daytime sleepiness, focal weakness,  "numbness and paresthesias.   All other systems reviewed and are negative.      The patient's past medical, social, family history and ROS reviewed in the patient's electronic medical record.    Allergies  Morphine and related and Lortab [hydrocodone-acetaminophen]    Outpatient Prescriptions Marked as Taking for the 9/11/18 encounter (Office Visit) with Marisa Suarez APRN   Medication Sig Dispense Refill   • acetaminophen (TYLENOL) 325 MG tablet Take 325 mg by mouth Daily As Needed for Mild Pain  or Headache.     • aspirin 81 MG EC tablet Take 1 tablet by mouth Daily 30 tablet 11   • atorvastatin (LIPITOR) 40 MG tablet Take 40 mg by mouth Every Night.     • digoxin (LANOXIN) 125 MCG tablet Take 1 tablet by mouth Daily. 30 tablet 5   • levothyroxine (SYNTHROID, LEVOTHROID) 150 MCG tablet Take 150 mcg by mouth Daily.     • metoprolol succinate XL (TOPROL-XL) 100 MG 24 hr tablet Take 1 tablet by mouth Daily. 90 tablet 1   • spironolactone (ALDACTONE) 50 MG tablet Take 1 tablet by mouth Daily. 30 tablet 3   • warfarin (COUMADIN) 2 MG tablet Take 1 tablet by mouth As Directed. 45 tablet 2         Blood pressure 106/64, pulse 87, height 157.5 cm (62\"), weight 82.6 kg (182 lb).  Body mass index is 33.29 kg/m².  There were no vitals filed for this visit.    Physical Exam   Constitutional: She is oriented to person, place, and time. She appears well-developed and well-nourished.   HENT:   Head: Normocephalic and atraumatic.   Eyes: Pupils are equal, round, and reactive to light. No scleral icterus.   Neck: No JVD present. Carotid bruit is not present. No thyromegaly present.   Cardiovascular: Normal rate, regular rhythm, S1 normal and S2 normal.  Exam reveals no gallop.    Murmur heard.  Pulmonary/Chest: Effort normal and breath sounds normal.   Abdominal: Soft. There is no hepatosplenomegaly. There is no tenderness.   Neurological: She is alert and oriented to person, place, and time.   Skin: Skin is warm and dry. No " cyanosis. Nails show no clubbing.   Psychiatric: She has a normal mood and affect. Her behavior is normal.       Data Review:     Procedures    Lab Results   Component Value Date    AST 45 (H) 07/25/2018    ALT 30 07/25/2018       Lab Results   Component Value Date    HGBA1C 7.80 (H) 07/15/2018            Problem List Items Addressed This Visit        Cardiovascular and Mediastinum    Paroxysmal atrial fibrillation (CMS/HCC)    Overview     · Postoperative atrial fibrillation with RVR in the setting of left upper lobectomy, 5/15/2017  · Echo (8/25/17): LVEF 60%.  Moderate to severe mitral stenosis (mean gradient 9 mmHg).  Moderate aortic insufficiency.  RVSP 50 mmHg  · Chads Vasc = 2 (female, HTN)  · Left atrial appendage ligation and MAZE procedure by Ajay Cheatham, 7/16/18         Current Assessment & Plan     · Schedule transesophageal echocardiogram to evaluate for successive left atrial appendage ligation procedure and determine whether anticoagulation can be safely discontinued         Relevant Orders    Adult Transesophageal Echo (TIFFANIE) W/ Cont if Necessary Per Protocol    Essential hypertension    Current Assessment & Plan     · B/P is controlled  · Continue spironolactone 50 mg daily  · Continue metoprolol succinate 100 mg daily           Hyperlipidemia LDL goal <70    Overview     · High intensity statin indicated given the presence of diabetes and mild CAD         Current Assessment & Plan     · Continue Lipitor 40 mg daily         Chronic diastolic congestive heart failure (CMS/HCC) - Primary    Current Assessment & Plan     · Stable NYHA class II symptoms         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 Moderate pulmonary hypertension.  · Echo (4/3/18): LVEF 70%. Severe LA dilatation. Moderate AI. Severe MS and severe MR. RVSP approximately 56 mmHg.  · AVR/MVR/tricuspid annuloplasty (bioprothetic valves) and left atrial  appendage ligation performed by Ajay Cheatham, 7/16/18  · Echo (08/22/2018):LVEF = 65%. The bioprosthetic aortic and mitral valves are well seated and functioning normally. RVSP<35 mmHg.           Current Assessment & Plan     · Continue aspirin 81 mg daily  · Patient can return back to work 10/1/2018 with restrictions of no heavy lifting greater than 10 pounds  · Continue cardiac rehabilitation               Patient is doing well from a cardiovascular standpoint.  I will schedule her for a TIFFANIE to evaluate the success of the left atrial appendage ligation and determine whether anticoagulation can be safely discontinued.  We will continue her current medications for now.  Patient can return to work with no heavy lifting over 10 pounds starting 1/10/2018.  We will reevaluate her to determine if she needs any further time off or is able to perform her work duties.     · Schedule outpatient TIFFANIE determine left atrial appendage ligation success.  · TIFFANIE current medications  · Can return to work 10/1/2018 with restrictions of no heavy lifting over 10 pounds.  We will determine if patient is able to perform her work duties or will require further time off  · Follow-up 3 months or sooner if needed    Lore Suarez, APRN  9/11/2018

## 2018-09-11 ENCOUNTER — OFFICE VISIT (OUTPATIENT)
Dept: CARDIOLOGY | Facility: CLINIC | Age: 58
End: 2018-09-11

## 2018-09-11 VITALS
DIASTOLIC BLOOD PRESSURE: 64 MMHG | BODY MASS INDEX: 33.49 KG/M2 | HEIGHT: 62 IN | HEART RATE: 87 BPM | WEIGHT: 182 LBS | SYSTOLIC BLOOD PRESSURE: 106 MMHG

## 2018-09-11 DIAGNOSIS — I10 ESSENTIAL HYPERTENSION: ICD-10-CM

## 2018-09-11 DIAGNOSIS — E78.5 HYPERLIPIDEMIA LDL GOAL <70: ICD-10-CM

## 2018-09-11 DIAGNOSIS — I48.0 PAROXYSMAL ATRIAL FIBRILLATION (HCC): ICD-10-CM

## 2018-09-11 DIAGNOSIS — I08.0 RHEUMATIC MITRAL VALVE AND AORTIC VALVE STENOSIS: ICD-10-CM

## 2018-09-11 DIAGNOSIS — I50.32 CHRONIC DIASTOLIC CONGESTIVE HEART FAILURE (HCC): Primary | ICD-10-CM

## 2018-09-11 PROCEDURE — 99214 OFFICE O/P EST MOD 30 MIN: CPT | Performed by: NURSE PRACTITIONER

## 2018-09-11 NOTE — ASSESSMENT & PLAN NOTE
· B/P is controlled  · Continue spironolactone 50 mg daily  · Continue metoprolol succinate 100 mg daily

## 2018-09-11 NOTE — ASSESSMENT & PLAN NOTE
· Continue aspirin 81 mg daily  · Patient can return back to work 10/1/2018 with restrictions of no heavy lifting greater than 10 pounds  · Continue cardiac rehabilitation

## 2018-09-11 NOTE — ASSESSMENT & PLAN NOTE
· Schedule transesophageal echocardiogram to evaluate for successive left atrial appendage ligation procedure and determine whether anticoagulation can be safely discontinued

## 2018-09-12 ENCOUNTER — ANTICOAGULATION VISIT (OUTPATIENT)
Dept: PHARMACY | Facility: HOSPITAL | Age: 58
End: 2018-09-12

## 2018-09-12 ENCOUNTER — TREATMENT (OUTPATIENT)
Dept: CARDIAC REHAB | Facility: HOSPITAL | Age: 58
End: 2018-09-12

## 2018-09-12 DIAGNOSIS — I08.0 RHEUMATIC MITRAL VALVE AND AORTIC VALVE STENOSIS: ICD-10-CM

## 2018-09-12 DIAGNOSIS — Z95.2 S/P AVR (AORTIC VALVE REPLACEMENT): Primary | ICD-10-CM

## 2018-09-12 DIAGNOSIS — I48.0 PAROXYSMAL ATRIAL FIBRILLATION (HCC): ICD-10-CM

## 2018-09-12 LAB
INR PPP: 3 (ref 0.91–1.09)
PROTHROMBIN TIME: 36 SECONDS (ref 10–13.8)

## 2018-09-12 PROCEDURE — 85610 PROTHROMBIN TIME: CPT

## 2018-09-12 PROCEDURE — 36416 COLLJ CAPILLARY BLOOD SPEC: CPT

## 2018-09-12 PROCEDURE — G0463 HOSPITAL OUTPT CLINIC VISIT: HCPCS

## 2018-09-12 PROCEDURE — 93798 PHYS/QHP OP CAR RHAB W/ECG: CPT

## 2018-09-12 NOTE — PROGRESS NOTES
Anticoagulation Clinic Progress Note  Indication: bioprosthetic AVR, MVR; paroxysmal afib (ANGELA ligation with Atricure clip + biatrial MAZE) -- 7/16/18  Referring Provider: Alireza  Initial Warfarin Start Date: 7/20/18  Planned Duration of Therapy: 3 months  Goal INR: 2-3  Current Drug Interactions: aspirin, levothyroxine, spironolactone  Other: Eliquis pre-CTS  CHADS-VASc: 3-4 (gender, HTN, DM?, CHF)  Bleed Risk: no history of bleeding    Diet: reviewed -- pt eats peas   Alcohol: none  Tobacco: none as of 3 months ago  OTC Pain Medication: advised to use APAP    Anticoagulation Clinic INR History:  Date 8/1 8/3 8/6 8/8 8/10 8/14 8/16 8/20 8/24 8/27 8/31   Total Weekly Dose 14mg 18mg 25mg 29mg 27mg 22mg 18mg 16mg 16mg 14 mg 16mg   INR 1.2 1.4 2.0 2.7 3.4 4.2 2.9 3.3 3.0 2.0 1.8   Notes  enox enox    1 hold    incr GLV     Date 9/7 9/12            Total Weekly Dose 17mg 17mg            INR 2.6 3.0            Notes                Clinic Interview:  Verbal Release Authorization signed on 8/1/18 -- may speak with Graham ()  Tablet Strength: 2mg tablets  Current Maintenance Dose: dose finding    Patient Findings   Positives:   Change in diet/appetite   Negatives:   Signs/symptoms of thrombosis, Signs/symptoms of bleeding, Laboratory test error suspected, Change in health, Change in alcohol use, Change in activity, Upcoming invasive procedure, Emergency department visit, Upcoming dental procedure, Missed doses, Extra doses, Change in medications, Hospital admission, Bruising, Other complaints   Comments:   Mrs. Miller has not eaten any GLV this week. Discussed Vit K content of foods and provided Vit K Educational information sheet. Mrs. Miller voiced understanding and has not further questions at this time. She is planning to eat a cup of peas once a week. I will schedule her for a TIFFANIE to evaluate the success of the left atrial appendage ligation and determine whether anticoagulation can be safely discontinued.      Plan:  1. INR is therapeutic today. Given Mrs. Miller plans to decrease Vit K intake (stay away from broccoli and only eat ~ one cup of peas/week,) instructed Mrs. Miller to decrease dose to warfarin 2mg daily except 3mg MonFri for now.   2. RTC on Friday. She will return to clinic on Friday to ensure INR has not fallen to quickly on lower dose. Want to aim to have INR therapeutic prior to TIFFANIE scheduled for Monday.   3. Verbal and written information provided. Jill Miller expresses understanding by teach back and has no further questions at this time.  4. Patient denies the need for refills at this time.    Sabrina Melgoza, PharmD  9/12/2018  9:21 AM

## 2018-09-12 NOTE — PROGRESS NOTES
Attended Phase II Cardiac Rehab. No medication or health history changes reported. See [facility] for details.

## 2018-09-14 ENCOUNTER — TREATMENT (OUTPATIENT)
Dept: CARDIAC REHAB | Facility: HOSPITAL | Age: 58
End: 2018-09-14

## 2018-09-14 ENCOUNTER — ANTICOAGULATION VISIT (OUTPATIENT)
Dept: PHARMACY | Facility: HOSPITAL | Age: 58
End: 2018-09-14

## 2018-09-14 DIAGNOSIS — I08.0 RHEUMATIC MITRAL VALVE AND AORTIC VALVE STENOSIS: ICD-10-CM

## 2018-09-14 DIAGNOSIS — Z95.2 S/P AVR (AORTIC VALVE REPLACEMENT): Primary | ICD-10-CM

## 2018-09-14 DIAGNOSIS — I48.0 PAROXYSMAL ATRIAL FIBRILLATION (HCC): ICD-10-CM

## 2018-09-14 LAB
INR PPP: 1.9 (ref 0.91–1.09)
PROTHROMBIN TIME: 22.9 SECONDS (ref 10–13.8)

## 2018-09-14 PROCEDURE — G0463 HOSPITAL OUTPT CLINIC VISIT: HCPCS

## 2018-09-14 PROCEDURE — 36416 COLLJ CAPILLARY BLOOD SPEC: CPT

## 2018-09-14 PROCEDURE — 85610 PROTHROMBIN TIME: CPT

## 2018-09-14 PROCEDURE — 93798 PHYS/QHP OP CAR RHAB W/ECG: CPT

## 2018-09-14 NOTE — PROGRESS NOTES
Anticoagulation Clinic Progress Note  Indication: bioprosthetic AVR, MVR; paroxysmal afib (ANGELA ligation with Atricure clip + biatrial MAZE) -- 7/16/18  Referring Provider: Alireza  Initial Warfarin Start Date: 7/20/18  Planned Duration of Therapy: 3 months  Goal INR: 2-3  Current Drug Interactions: aspirin, levothyroxine, spironolactone  Other: Eliquis pre-CTS  CHADS-VASc: 3-4 (gender, HTN, DM?, CHF)  Bleed Risk: no history of bleeding    Diet: reviewed -- pt eats peas   Alcohol: none  Tobacco: none as of 3 months ago  OTC Pain Medication: advised to use APAP    Anticoagulation Clinic INR History:  Date 8/1 8/3 8/6 8/8 8/10 8/14 8/16 8/20 8/24 8/27 8/31   Total Weekly Dose 14mg 18mg 25mg 29mg 27mg 22mg 18mg 16mg 16mg 14 mg 16mg   INR 1.2 1.4 2.0 2.7 3.4 4.2 2.9 3.3 3.0 2.0 1.8   Notes  enox enox    1 hold    incr GLV     Date 9/7 9/12 9/14 9/17          Total Weekly Dose 17mg 17mg 16mg  17mg          INR 2.6 3.0 1.9           Notes                Clinic Interview:  Verbal Release Authorization signed on 8/1/18 -- may speak with Graham ()  Tablet Strength: 2mg tablets  Current Maintenance Dose: dose finding    Patient Findings   Negatives:   Signs/symptoms of thrombosis, Signs/symptoms of bleeding, Laboratory test error suspected, Change in health, Change in alcohol use, Change in activity, Upcoming invasive procedure, Emergency department visit, Upcoming dental procedure, Missed doses, Extra doses, Change in medications, Change in diet/appetite, Hospital admission, Bruising, Other complaints   Comments:   Mrs. Miller had serving of green beans last night. This was her only serving of green vegetables in the last two days. She has been drinking white grape juice, as well. Her activity has increased as she is trying to push herself more at home in preparation for going back to work on 10/1. She continues cardiac rehab but has cancelled her appt for 9/17 since she has a TIFFANIE scheduled. She has not had missed  doses, extra doses, changes in medications, and denied the need for refills. Of note, Ms. Miller said she is not planning to eat any green vegetables over the weekend and still plans to decrease her green vegetable intake so that she does not get burnt out on these foods.      Plan:  1. INR is slightly SUBtherapeutic today at 1.9- dropped 1.1 points in two days.. Instructed Mrs. Miller to INCREASE tonight's dose to warfarin 4mg then take warfarin 2 mg Saturday and Sunday. Will plan to increase dose to 2mg daily except 3mg MWF.  2. RTC on Monday for INR check, before her TIFFANIE appointment at 10:00 AM.  3. Verbal and written information provided. Jill Miller expresses understanding by teach back and has no further questions at this time.  4. Patient denies the need for refills at this time.    Faiza Pena, Pharmacy Intern  9/14/2018  9:36 AM     ISabrina, PharmD, have reviewed the note in full and agree with the assessment and plan.  09/14/18  5:51 PM

## 2018-09-17 ENCOUNTER — ANTICOAGULATION VISIT (OUTPATIENT)
Dept: PHARMACY | Facility: HOSPITAL | Age: 58
End: 2018-09-17

## 2018-09-17 ENCOUNTER — HOSPITAL ENCOUNTER (OUTPATIENT)
Dept: CARDIOLOGY | Facility: HOSPITAL | Age: 58
Discharge: HOME | End: 2018-09-17
Admitting: NURSE PRACTITIONER

## 2018-09-17 ENCOUNTER — APPOINTMENT (OUTPATIENT)
Dept: CARDIAC REHAB | Facility: HOSPITAL | Age: 58
End: 2018-09-17

## 2018-09-17 VITALS
OXYGEN SATURATION: 95 % | DIASTOLIC BLOOD PRESSURE: 62 MMHG | HEART RATE: 76 BPM | RESPIRATION RATE: 20 BRPM | SYSTOLIC BLOOD PRESSURE: 110 MMHG

## 2018-09-17 DIAGNOSIS — I08.0 RHEUMATIC MITRAL VALVE AND AORTIC VALVE STENOSIS: ICD-10-CM

## 2018-09-17 DIAGNOSIS — I48.0 PAROXYSMAL ATRIAL FIBRILLATION (HCC): ICD-10-CM

## 2018-09-17 PROBLEM — Z79.01 CHRONIC ANTICOAGULATION: Status: RESOLVED | Noted: 2018-09-17 | Resolved: 2018-09-17

## 2018-09-17 PROBLEM — Z79.01 CHRONIC ANTICOAGULATION: Status: ACTIVE | Noted: 2018-09-17

## 2018-09-17 LAB
BH CV ECHO MEAS - BSA(HAYCOCK): 1.9 M^2
BH CV ECHO MEAS - BSA: 1.8 M^2
BH CV ECHO MEAS - BZI_BMI: 33.5 KILOGRAMS/M^2
BH CV ECHO MEAS - BZI_METRIC_HEIGHT: 157 CM
BH CV ECHO MEAS - BZI_METRIC_WEIGHT: 82.6 KG
BH CV ECHO MEAS - MV MAX PG: 15.2 MMHG
BH CV ECHO MEAS - MV MEAN PG: 6.7 MMHG
BH CV ECHO MEAS - MV V2 MAX: 194.8 CM/SEC
BH CV ECHO MEAS - MV V2 MEAN: 125.4 CM/SEC
BH CV ECHO MEAS - MV V2 VTI: 49.9 CM
BH CV ECHO MEAS - TR MAX PG: 32 MMHG
BH CV ECHO MEAS - TR MAX VEL: 283.6 CM/SEC
BH CV VAS BP LEFT ARM: NORMAL MMHG
INR PPP: 1.8 (ref 0.91–1.09)
PROTHROMBIN TIME: 22 SECONDS (ref 10–13.8)

## 2018-09-17 PROCEDURE — 93325 DOPPLER ECHO COLOR FLOW MAPG: CPT | Performed by: INTERNAL MEDICINE

## 2018-09-17 PROCEDURE — 93321 DOPPLER ECHO F-UP/LMTD STD: CPT | Performed by: INTERNAL MEDICINE

## 2018-09-17 PROCEDURE — 36416 COLLJ CAPILLARY BLOOD SPEC: CPT

## 2018-09-17 PROCEDURE — 93320 DOPPLER ECHO COMPLETE: CPT

## 2018-09-17 PROCEDURE — 93321 DOPPLER ECHO F-UP/LMTD STD: CPT

## 2018-09-17 PROCEDURE — 93325 DOPPLER ECHO COLOR FLOW MAPG: CPT

## 2018-09-17 PROCEDURE — 93312 ECHO TRANSESOPHAGEAL: CPT

## 2018-09-17 PROCEDURE — 25010000002 MIDAZOLAM PER 1 MG: Performed by: INTERNAL MEDICINE

## 2018-09-17 PROCEDURE — 93312 ECHO TRANSESOPHAGEAL: CPT | Performed by: INTERNAL MEDICINE

## 2018-09-17 PROCEDURE — G0463 HOSPITAL OUTPT CLINIC VISIT: HCPCS

## 2018-09-17 PROCEDURE — 85610 PROTHROMBIN TIME: CPT

## 2018-09-17 RX ORDER — MIDAZOLAM HYDROCHLORIDE 1 MG/ML
INJECTION INTRAMUSCULAR; INTRAVENOUS
Status: COMPLETED | OUTPATIENT
Start: 2018-09-17 | End: 2018-09-17

## 2018-09-17 RX ADMIN — MIDAZOLAM HYDROCHLORIDE 2 MG: 1 INJECTION, SOLUTION INTRAMUSCULAR; INTRAVENOUS at 09:59

## 2018-09-17 RX ADMIN — METHOHEXITAL SODIUM 30 MG: 500 INJECTION, POWDER, LYOPHILIZED, FOR SOLUTION INTRAMUSCULAR; INTRAVENOUS; RECTAL at 10:00

## 2018-09-17 NOTE — H&P (VIEW-ONLY)
Encounter Date:09/11/2018    Patient ID: Jill Miller is a 58 y.o. female who resides in [facility]  CC/Reason for visit:  Chronic diastolic congestive heart failure (CMS/HCC)            Jill Miller returns today for follow up after recent hospitalization where she had an aortic valve replacement, mitral valve replacement, tricuspid valve repair, Maze procedure and left atrial appendage ligation with Dr. Ajay Cheatham.  She is chronically anticoagulated with Coumadin for history of valvular atrial fibrillation that occurred postoperatively after a left upper lobe lobectomy in May 2017.  Her INRs are usually therapeutic but she increase her intake of broccoli which caused her INR to decrease to 1.8.  She had no recurrences of atrial fibrillation postop valve surgery.  She has been attending cardiac rehabilitation and doing well with it.  She still has some substernal chest soreness at her sternotomy site.  She suffered from a pleural effusion post operative and at her follow-up with CT surgery 2 weeks ago a chest x-ray was ordered but has not been performed.  She does get shortness of breath with any extended ambulation.  She takes Lasix and spiral lactone daily for this.  Her last renal function checked last month was within normal limits.  She is also taking digoxin for sinus tachycardia that occurred postoperatively.  The patient reports she needs to return back to work on October 1 for at least 2 days before she can have any further time off to recuperate from her surgery.    Review of Systems   Constitution: Negative for weakness and malaise/fatigue.   Eyes: Negative for vision loss in left eye and vision loss in right eye.   Cardiovascular: Negative for chest pain, dyspnea on exertion, near-syncope, orthopnea, palpitations, paroxysmal nocturnal dyspnea and syncope.   Musculoskeletal: Negative for myalgias.   Neurological: Negative for brief paralysis, excessive daytime sleepiness, focal weakness,  "numbness and paresthesias.   All other systems reviewed and are negative.      The patient's past medical, social, family history and ROS reviewed in the patient's electronic medical record.    Allergies  Morphine and related and Lortab [hydrocodone-acetaminophen]    Outpatient Prescriptions Marked as Taking for the 9/11/18 encounter (Office Visit) with Marisa Suarez APRN   Medication Sig Dispense Refill   • acetaminophen (TYLENOL) 325 MG tablet Take 325 mg by mouth Daily As Needed for Mild Pain  or Headache.     • aspirin 81 MG EC tablet Take 1 tablet by mouth Daily 30 tablet 11   • atorvastatin (LIPITOR) 40 MG tablet Take 40 mg by mouth Every Night.     • digoxin (LANOXIN) 125 MCG tablet Take 1 tablet by mouth Daily. 30 tablet 5   • levothyroxine (SYNTHROID, LEVOTHROID) 150 MCG tablet Take 150 mcg by mouth Daily.     • metoprolol succinate XL (TOPROL-XL) 100 MG 24 hr tablet Take 1 tablet by mouth Daily. 90 tablet 1   • spironolactone (ALDACTONE) 50 MG tablet Take 1 tablet by mouth Daily. 30 tablet 3   • warfarin (COUMADIN) 2 MG tablet Take 1 tablet by mouth As Directed. 45 tablet 2         Blood pressure 106/64, pulse 87, height 157.5 cm (62\"), weight 82.6 kg (182 lb).  Body mass index is 33.29 kg/m².  There were no vitals filed for this visit.    Physical Exam   Constitutional: She is oriented to person, place, and time. She appears well-developed and well-nourished.   HENT:   Head: Normocephalic and atraumatic.   Eyes: Pupils are equal, round, and reactive to light. No scleral icterus.   Neck: No JVD present. Carotid bruit is not present. No thyromegaly present.   Cardiovascular: Normal rate, regular rhythm, S1 normal and S2 normal.  Exam reveals no gallop.    Murmur heard.  Pulmonary/Chest: Effort normal and breath sounds normal.   Abdominal: Soft. There is no hepatosplenomegaly. There is no tenderness.   Neurological: She is alert and oriented to person, place, and time.   Skin: Skin is warm and dry. No " cyanosis. Nails show no clubbing.   Psychiatric: She has a normal mood and affect. Her behavior is normal.       Data Review:     Procedures    Lab Results   Component Value Date    AST 45 (H) 07/25/2018    ALT 30 07/25/2018       Lab Results   Component Value Date    HGBA1C 7.80 (H) 07/15/2018            Problem List Items Addressed This Visit        Cardiovascular and Mediastinum    Paroxysmal atrial fibrillation (CMS/HCC)    Overview     · Postoperative atrial fibrillation with RVR in the setting of left upper lobectomy, 5/15/2017  · Echo (8/25/17): LVEF 60%.  Moderate to severe mitral stenosis (mean gradient 9 mmHg).  Moderate aortic insufficiency.  RVSP 50 mmHg  · Chads Vasc = 2 (female, HTN)  · Left atrial appendage ligation and MAZE procedure by Ajay Cheatham, 7/16/18         Current Assessment & Plan     · Schedule transesophageal echocardiogram to evaluate for successive left atrial appendage ligation procedure and determine whether anticoagulation can be safely discontinued         Relevant Orders    Adult Transesophageal Echo (TIFFANIE) W/ Cont if Necessary Per Protocol    Essential hypertension    Current Assessment & Plan     · B/P is controlled  · Continue spironolactone 50 mg daily  · Continue metoprolol succinate 100 mg daily           Hyperlipidemia LDL goal <70    Overview     · High intensity statin indicated given the presence of diabetes and mild CAD         Current Assessment & Plan     · Continue Lipitor 40 mg daily         Chronic diastolic congestive heart failure (CMS/HCC) - Primary    Current Assessment & Plan     · Stable NYHA class II symptoms         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 Moderate pulmonary hypertension.  · Echo (4/3/18): LVEF 70%. Severe LA dilatation. Moderate AI. Severe MS and severe MR. RVSP approximately 56 mmHg.  · AVR/MVR/tricuspid annuloplasty (bioprothetic valves) and left atrial  appendage ligation performed by Ajay Cheatham, 7/16/18  · Echo (08/22/2018):LVEF = 65%. The bioprosthetic aortic and mitral valves are well seated and functioning normally. RVSP<35 mmHg.           Current Assessment & Plan     · Continue aspirin 81 mg daily  · Patient can return back to work 10/1/2018 with restrictions of no heavy lifting greater than 10 pounds  · Continue cardiac rehabilitation               Patient is doing well from a cardiovascular standpoint.  I will schedule her for a TIFFANIE to evaluate the success of the left atrial appendage ligation and determine whether anticoagulation can be safely discontinued.  We will continue her current medications for now.  Patient can return to work with no heavy lifting over 10 pounds starting 1/10/2018.  We will reevaluate her to determine if she needs any further time off or is able to perform her work duties.     · Schedule outpatient TIFFANIE determine left atrial appendage ligation success.  · TIFFANIE current medications  · Can return to work 10/1/2018 with restrictions of no heavy lifting over 10 pounds.  We will determine if patient is able to perform her work duties or will require further time off  · Follow-up 3 months or sooner if needed    Lore Suarez, APRN  9/11/2018

## 2018-09-17 NOTE — INTERVAL H&P NOTE
H&P reviewed. The patient was examined and there are no changes to the H&P.      The patient presents for a TIFFANIE to evaluate success of the left atrial appendage ligation performed during her AVR/MVR and Tricuspid annuloplasty on 7/16/2018 to see if it is safe to discontinue her coumadin. The risks and benefits were diascussed and the patient is agreeable to proceed.    No changes top the physical exam    Hospital Problem List     * (Principal)Paroxysmal atrial fibrillation (CMS/HCC)    Overview Addendum 9/11/2018 10:52 AM by Marisa Suarez APRN     · Postoperative atrial fibrillation with RVR in the setting of left upper lobectomy, 5/15/2017  · Echo (8/25/17): LVEF 60%.  Moderate to severe mitral stenosis (mean gradient 9 mmHg).  Moderate aortic insufficiency.  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 Moderate pulmonary hypertension.  · Echo (4/3/18): LVEF 70%. Severe LA dilatation. Moderate AI. Severe MS and severe MR. RVSP approximately 56 mmHg.  · AVR/MVR/tricuspid annuloplasty (bioprothetic valves) and left atrial appendage ligation performed by Ajay Cheatham, 7/16/18  · Echo (08/22/2018):LVEF = 65%. The bioprosthetic aortic and mitral valves are well seated and functioning normally. RVSP<35 mmHg.           Chronic anticoagulation          Plan    · Proceed with transesophageal echocardiogram  · Further recommendations to follow    Marisa DUCKWORTH scribed for Dr Nelson Lay

## 2018-09-19 ENCOUNTER — TREATMENT (OUTPATIENT)
Dept: CARDIAC REHAB | Facility: HOSPITAL | Age: 58
End: 2018-09-19

## 2018-09-19 DIAGNOSIS — Z95.2 S/P AVR (AORTIC VALVE REPLACEMENT): Primary | ICD-10-CM

## 2018-09-19 PROCEDURE — 93798 PHYS/QHP OP CAR RHAB W/ECG: CPT

## 2018-09-19 NOTE — PROGRESS NOTES
Attended Phase II Cardiac Rehab. No medication or health history changes reported. Patient attended Anatomy Class today.  Teach back verified.  See [facility] for details.

## 2018-09-21 ENCOUNTER — TELEPHONE (OUTPATIENT)
Dept: CARDIOLOGY | Facility: CLINIC | Age: 58
End: 2018-09-21

## 2018-09-21 ENCOUNTER — ANTICOAGULATION VISIT (OUTPATIENT)
Dept: PHARMACY | Facility: HOSPITAL | Age: 58
End: 2018-09-21

## 2018-09-21 ENCOUNTER — TELEPHONE (OUTPATIENT)
Dept: PHARMACY | Facility: HOSPITAL | Age: 58
End: 2018-09-21

## 2018-09-21 ENCOUNTER — TREATMENT (OUTPATIENT)
Dept: CARDIAC REHAB | Facility: HOSPITAL | Age: 58
End: 2018-09-21

## 2018-09-21 DIAGNOSIS — I48.0 PAROXYSMAL ATRIAL FIBRILLATION (HCC): ICD-10-CM

## 2018-09-21 DIAGNOSIS — I08.0 RHEUMATIC MITRAL VALVE AND AORTIC VALVE STENOSIS: ICD-10-CM

## 2018-09-21 DIAGNOSIS — Z95.2 S/P AVR (AORTIC VALVE REPLACEMENT): Primary | ICD-10-CM

## 2018-09-21 LAB
INR PPP: 2 (ref 0.91–1.09)
PROTHROMBIN TIME: 24.4 SECONDS (ref 10–13.8)

## 2018-09-21 PROCEDURE — 93798 PHYS/QHP OP CAR RHAB W/ECG: CPT

## 2018-09-21 PROCEDURE — G0463 HOSPITAL OUTPT CLINIC VISIT: HCPCS | Performed by: PHARMACIST

## 2018-09-21 PROCEDURE — 36416 COLLJ CAPILLARY BLOOD SPEC: CPT

## 2018-09-21 PROCEDURE — 85610 PROTHROMBIN TIME: CPT

## 2018-09-21 RX ORDER — FUROSEMIDE 40 MG/1
40 TABLET ORAL DAILY PRN
Qty: 90 TABLET | Refills: 1 | Status: SHIPPED | OUTPATIENT
Start: 2018-09-21 | End: 2019-05-14 | Stop reason: SDUPTHER

## 2018-09-21 NOTE — TELEPHONE ENCOUNTER
Gorge with the coumadin clinic called to confirm if the patient is supposed to continue with coumadin?

## 2018-09-21 NOTE — PROGRESS NOTES
Attended Phase II Cardiac Rehab. No medication or health history changes reported. See [facility] for details.

## 2018-09-21 NOTE — PROGRESS NOTES
Anticoagulation Clinic Progress Note  Indication: bioprosthetic AVR, MVR; paroxysmal afib (ANGELA ligation with Atricure clip + biatrial MAZE) -- 7/16/18  Referring Provider: Alireza  Initial Warfarin Start Date: 7/20/18  Planned Duration of Therapy: 3 months  Goal INR: 2-3  Current Drug Interactions: aspirin, levothyroxine, spironolactone  Other: Eliquis pre-CTS  CHADS-VASc: 3-4 (gender, HTN, DM?, CHF)  Bleed Risk: no history of bleeding    Diet: peas, green beans, occasional broccoli  Alcohol: none  Tobacco: none as of 3 months ago  OTC Pain Medication: advised to use APAP    Anticoagulation Clinic INR History:  Date 8/1 8/3 8/6 8/8 8/10 8/14 8/16 8/20 8/24 8/27 8/31   Total Weekly Dose 14mg 18mg 25mg 29mg 27mg 22mg 18mg 16mg 16mg 14 mg 16mg   INR 1.2 1.4 2.0 2.7 3.4 4.2 2.9 3.3 3.0 2.0 1.8   Notes  enox enox    1 hold    incr GLV     Date 9/7 9/12 9/14 9/17 9/21         Total Weekly Dose 17mg 17mg 16mg  17mg 19mg         INR 2.6 3.0 1.9 1.8 2.0         Notes                Clinic Interview:  Verbal Release Authorization signed on 8/1/18 -- may speak with Graham ()  Tablet Strength: 2mg tablets  Current Maintenance Dose: dose finding    Patient Findings:  Negatives:   Signs/symptoms of thrombosis, Signs/symptoms of bleeding, Laboratory test error suspected, Change in health, Change in alcohol use, Change in activity, Upcoming invasive procedure, Emergency department visit, Upcoming dental procedure, Missed doses, Extra doses, Change in medications, Change in diet/appetite, Hospital admission, Bruising, Other complaints   Comments:   She has continued on the warfarin after her TIFFANIE, but she may stop warfarin. Calling Dr. Lay's office to confirm.       Plan:  1. INR is therapeutic today at 2.0. Confirmed that she may stop the warfarin ; will close her episode.    2. Verbal and written information provided. Jill Miller expresses understanding by teach back and has no further questions at this  time.    Gorge Osullivan [facility]  9/21/2018  9:11 AM

## 2018-09-24 ENCOUNTER — TREATMENT (OUTPATIENT)
Dept: CARDIAC REHAB | Facility: HOSPITAL | Age: 58
End: 2018-09-24

## 2018-09-24 DIAGNOSIS — Z95.2 S/P AVR (AORTIC VALVE REPLACEMENT): Primary | ICD-10-CM

## 2018-09-24 PROCEDURE — 93798 PHYS/QHP OP CAR RHAB W/ECG: CPT

## 2018-09-24 NOTE — PROGRESS NOTES
Attended Phase II Cardiac Rehab. No medication or health history changes reported. See [facility] for details.

## 2018-09-28 ENCOUNTER — TELEPHONE (OUTPATIENT)
Dept: CARDIAC REHAB | Facility: HOSPITAL | Age: 58
End: 2018-09-28

## 2018-10-16 ENCOUNTER — DOCUMENTATION (OUTPATIENT)
Dept: CARDIAC REHAB | Facility: HOSPITAL | Age: 58
End: 2018-10-16

## 2018-10-16 ENCOUNTER — TELEPHONE (OUTPATIENT)
Dept: CARDIAC REHAB | Facility: HOSPITAL | Age: 58
End: 2018-10-16

## 2018-10-16 NOTE — PROGRESS NOTES
Staff left message with Pt in regards to extended abscence. Pt. stated she planned to return on 10/01/18 after illness but has not been in Cardiac Rehab since 9/24/18. Staff stated on Pt's voicemail that if she did not contact staff or return to CR in the next 2 weeks she would be discharged from the program.

## 2018-10-30 ENCOUNTER — OFFICE VISIT (OUTPATIENT)
Dept: CARDIAC SURGERY | Facility: CLINIC | Age: 58
End: 2018-10-30

## 2018-10-30 VITALS
HEART RATE: 70 BPM | HEIGHT: 62 IN | BODY MASS INDEX: 33.13 KG/M2 | TEMPERATURE: 97.8 F | OXYGEN SATURATION: 96 % | DIASTOLIC BLOOD PRESSURE: 73 MMHG | SYSTOLIC BLOOD PRESSURE: 121 MMHG | WEIGHT: 180 LBS

## 2018-10-30 DIAGNOSIS — I08.0 RHEUMATIC MITRAL VALVE AND AORTIC VALVE STENOSIS: Primary | ICD-10-CM

## 2018-10-30 PROCEDURE — 99213 OFFICE O/P EST LOW 20 MIN: CPT | Performed by: PHYSICIAN ASSISTANT

## 2018-10-31 RX ORDER — DIGOXIN 125 MCG
125 TABLET ORAL DAILY
Qty: 90 TABLET | Refills: 3 | Status: SHIPPED | OUTPATIENT
Start: 2018-10-31 | End: 2020-11-10

## 2018-11-01 RX ORDER — METOPROLOL SUCCINATE 100 MG/1
100 TABLET, EXTENDED RELEASE ORAL DAILY
Qty: 90 TABLET | Refills: 0 | Status: SHIPPED | OUTPATIENT
Start: 2018-11-01 | End: 2020-11-10

## 2018-11-01 RX ORDER — SPIRONOLACTONE 50 MG/1
50 TABLET, FILM COATED ORAL DAILY
Qty: 90 TABLET | Refills: 0 | Status: SHIPPED | OUTPATIENT
Start: 2018-11-01 | End: 2020-11-10

## 2018-11-01 NOTE — TELEPHONE ENCOUNTER
Ms. Miller was last seen at the [facility] on 8/14/2018 and does not have a follow up appointment scheduled. [facility] reached out to request 90 day supplies of spironolactone and metroprolol succinate. A 90 day supply with 0 refills sent to [facility] for both medications.     Thanks,  Manjinder Morales, PharmD  Pharmacy Resident  11/1/2018  10:10 AM

## 2018-11-02 ENCOUNTER — APPOINTMENT (OUTPATIENT)
Dept: CARDIAC REHAB | Facility: HOSPITAL | Age: 58
End: 2018-11-02

## 2018-11-02 DIAGNOSIS — I08.0 RHEUMATIC MITRAL VALVE AND AORTIC VALVE STENOSIS: ICD-10-CM

## 2018-11-02 RX ORDER — ASPIRIN 81 MG/1
81 TABLET ORAL DAILY
Qty: 90 TABLET | Refills: 3 | Status: SHIPPED | OUTPATIENT
Start: 2018-11-02 | End: 2019-10-28

## 2018-11-05 ENCOUNTER — APPOINTMENT (OUTPATIENT)
Dept: CARDIAC REHAB | Facility: HOSPITAL | Age: 58
End: 2018-11-05

## 2018-11-07 ENCOUNTER — APPOINTMENT (OUTPATIENT)
Dept: CARDIAC REHAB | Facility: HOSPITAL | Age: 58
End: 2018-11-07

## 2018-11-09 ENCOUNTER — APPOINTMENT (OUTPATIENT)
Dept: CARDIAC REHAB | Facility: HOSPITAL | Age: 58
End: 2018-11-09

## 2018-11-12 ENCOUNTER — APPOINTMENT (OUTPATIENT)
Dept: CARDIAC REHAB | Facility: HOSPITAL | Age: 58
End: 2018-11-12

## 2018-11-14 ENCOUNTER — APPOINTMENT (OUTPATIENT)
Dept: CARDIAC REHAB | Facility: HOSPITAL | Age: 58
End: 2018-11-14

## 2018-11-16 ENCOUNTER — APPOINTMENT (OUTPATIENT)
Dept: CARDIAC REHAB | Facility: HOSPITAL | Age: 58
End: 2018-11-16

## 2018-11-19 ENCOUNTER — APPOINTMENT (OUTPATIENT)
Dept: CARDIAC REHAB | Facility: HOSPITAL | Age: 58
End: 2018-11-19

## 2018-11-21 ENCOUNTER — APPOINTMENT (OUTPATIENT)
Dept: CARDIAC REHAB | Facility: HOSPITAL | Age: 58
End: 2018-11-21

## 2018-11-23 ENCOUNTER — APPOINTMENT (OUTPATIENT)
Dept: CARDIAC REHAB | Facility: HOSPITAL | Age: 58
End: 2018-11-23

## 2018-11-26 ENCOUNTER — APPOINTMENT (OUTPATIENT)
Dept: CARDIAC REHAB | Facility: HOSPITAL | Age: 58
End: 2018-11-26

## 2018-11-28 ENCOUNTER — APPOINTMENT (OUTPATIENT)
Dept: CARDIAC REHAB | Facility: HOSPITAL | Age: 58
End: 2018-11-28

## 2018-11-30 ENCOUNTER — APPOINTMENT (OUTPATIENT)
Dept: CARDIAC REHAB | Facility: HOSPITAL | Age: 58
End: 2018-11-30

## 2019-01-14 RX ORDER — METOPROLOL SUCCINATE 100 MG/1
TABLET, EXTENDED RELEASE ORAL
Qty: 90 TABLET | Refills: 0 | OUTPATIENT
Start: 2019-01-14

## 2019-01-14 NOTE — TELEPHONE ENCOUNTER
Last seen on 8/14/18 with no follow up in the [facility]. A 90-day supply was sent for metoprolol succinate 100mg daily in November 2018 with instructions to contact PCP or cardiology for further refills. Refills denied today.

## 2019-02-07 ENCOUNTER — TELEPHONE (OUTPATIENT)
Dept: CARDIAC SURGERY | Facility: CLINIC | Age: 59
End: 2019-02-07

## 2019-03-01 ENCOUNTER — TELEPHONE (OUTPATIENT)
Dept: CARDIAC SURGERY | Facility: CLINIC | Age: 59
End: 2019-03-01

## 2019-03-05 ENCOUNTER — TELEPHONE (OUTPATIENT)
Dept: CARDIAC SURGERY | Facility: CLINIC | Age: 59
End: 2019-03-05

## 2019-03-05 NOTE — TELEPHONE ENCOUNTER
PT CALLING REGARDING RECALL. PT STATES SHE CAN'T RECEIVE PHONE CALLS UNTIL AFTER 3:30. PT ALSO STATES SHE CANNOT MAKE AN APPT UNTIL MAY OR June UNLESS ITS AFTER 4 PM. SHE STATES THAT SHE HAS TO WORK A CERTAIN AMOUNT OF TIME BEFORE SHE CAN BUILD HER DAYS BACK UP

## 2019-05-14 RX ORDER — FUROSEMIDE 40 MG/1
TABLET ORAL
Qty: 90 TABLET | Refills: 1 | Status: SHIPPED | OUTPATIENT
Start: 2019-05-14 | End: 2019-11-10 | Stop reason: SDUPTHER

## 2019-11-11 RX ORDER — FUROSEMIDE 40 MG/1
TABLET ORAL
Qty: 90 TABLET | Refills: 1 | Status: SHIPPED | OUTPATIENT
Start: 2019-11-11 | End: 2020-11-10

## 2020-07-20 DIAGNOSIS — I08.0 RHEUMATIC MITRAL VALVE AND AORTIC VALVE STENOSIS: Primary | ICD-10-CM

## 2020-07-28 ENCOUNTER — TELEPHONE (OUTPATIENT)
Dept: CARDIAC SURGERY | Facility: CLINIC | Age: 60
End: 2020-07-28

## 2020-07-28 NOTE — TELEPHONE ENCOUNTER
Left vm for pt regarding her ct chest. Our central scheduling department has left her 3vm as well.

## 2020-07-30 ENCOUNTER — TELEPHONE (OUTPATIENT)
Dept: CARDIAC SURGERY | Facility: CLINIC | Age: 60
End: 2020-07-30

## 2020-07-30 NOTE — TELEPHONE ENCOUNTER
Left another vm for pt regarding her test and f/u apt with Dr. Cheatham-please refer to tele enc from 07/28

## 2020-08-07 ENCOUNTER — HOSPITAL ENCOUNTER (OUTPATIENT)
Dept: CT IMAGING | Facility: HOSPITAL | Age: 60
Discharge: HOME | End: 2020-08-07
Admitting: NURSE PRACTITIONER

## 2020-08-07 DIAGNOSIS — I08.0 RHEUMATIC MITRAL VALVE AND AORTIC VALVE STENOSIS: ICD-10-CM

## 2020-08-07 PROCEDURE — 25010000002 IOPAMIDOL 61 % SOLUTION: Performed by: NURSE PRACTITIONER

## 2020-08-07 PROCEDURE — 71270 CT THORAX DX C-/C+: CPT

## 2020-08-07 RX ADMIN — IOPAMIDOL 75 ML: 612 INJECTION, SOLUTION INTRAVENOUS at 09:08

## 2020-10-16 ENCOUNTER — TRANSCRIBE ORDERS (OUTPATIENT)
Dept: ADMINISTRATIVE | Facility: HOSPITAL | Age: 60
End: 2020-10-16

## 2020-10-16 DIAGNOSIS — S46.012A TRAUMATIC COMPLETE TEAR OF LEFT ROTATOR CUFF, INITIAL ENCOUNTER: Primary | ICD-10-CM

## 2020-11-10 ENCOUNTER — OFFICE VISIT (OUTPATIENT)
Dept: CARDIAC SURGERY | Facility: CLINIC | Age: 60
End: 2020-11-10

## 2020-11-10 ENCOUNTER — CONSULT (OUTPATIENT)
Dept: CARDIOLOGY | Facility: CLINIC | Age: 60
End: 2020-11-10

## 2020-11-10 VITALS
HEIGHT: 62 IN | DIASTOLIC BLOOD PRESSURE: 72 MMHG | BODY MASS INDEX: 34.41 KG/M2 | HEART RATE: 58 BPM | OXYGEN SATURATION: 95 % | SYSTOLIC BLOOD PRESSURE: 124 MMHG | WEIGHT: 187 LBS

## 2020-11-10 VITALS
WEIGHT: 188 LBS | DIASTOLIC BLOOD PRESSURE: 90 MMHG | HEART RATE: 73 BPM | HEIGHT: 62 IN | TEMPERATURE: 97.1 F | OXYGEN SATURATION: 96 % | SYSTOLIC BLOOD PRESSURE: 150 MMHG | BODY MASS INDEX: 34.6 KG/M2

## 2020-11-10 DIAGNOSIS — I48.0 PAROXYSMAL ATRIAL FIBRILLATION (HCC): ICD-10-CM

## 2020-11-10 DIAGNOSIS — E11.9 TYPE 2 DIABETES MELLITUS WITHOUT COMPLICATION, WITHOUT LONG-TERM CURRENT USE OF INSULIN (HCC): ICD-10-CM

## 2020-11-10 DIAGNOSIS — C34.12 MALIGNANT NEOPLASM OF UPPER LOBE OF LEFT LUNG (HCC): ICD-10-CM

## 2020-11-10 DIAGNOSIS — E78.5 HYPERLIPIDEMIA LDL GOAL <70: ICD-10-CM

## 2020-11-10 DIAGNOSIS — F17.200 CURRENT SMOKER: ICD-10-CM

## 2020-11-10 DIAGNOSIS — I08.0 RHEUMATIC MITRAL VALVE AND AORTIC VALVE STENOSIS: Primary | ICD-10-CM

## 2020-11-10 DIAGNOSIS — I38 VALVULAR HEART DISEASE: Primary | ICD-10-CM

## 2020-11-10 PROBLEM — R74.01 ELEVATED TRANSAMINASE LEVEL: Status: RESOLVED | Noted: 2018-07-23 | Resolved: 2020-11-10

## 2020-11-10 PROBLEM — R93.89 ABNORMAL CXR: Status: RESOLVED | Noted: 2017-09-25 | Resolved: 2020-11-10

## 2020-11-10 PROBLEM — D69.6 THROMBOCYTOPENIA (HCC): Status: RESOLVED | Noted: 2018-07-23 | Resolved: 2020-11-10

## 2020-11-10 PROBLEM — I50.32 CHRONIC DIASTOLIC CONGESTIVE HEART FAILURE: Status: RESOLVED | Noted: 2018-04-02 | Resolved: 2020-11-10

## 2020-11-10 PROBLEM — I10 ESSENTIAL HYPERTENSION: Status: RESOLVED | Noted: 2017-04-10 | Resolved: 2020-11-10

## 2020-11-10 PROBLEM — E80.6 HYPERBILIRUBINEMIA: Status: RESOLVED | Noted: 2018-07-23 | Resolved: 2020-11-10

## 2020-11-10 PROCEDURE — 93000 ELECTROCARDIOGRAM COMPLETE: CPT | Performed by: INTERNAL MEDICINE

## 2020-11-10 PROCEDURE — 99204 OFFICE O/P NEW MOD 45 MIN: CPT | Performed by: INTERNAL MEDICINE

## 2020-11-10 PROCEDURE — 99214 OFFICE O/P EST MOD 30 MIN: CPT | Performed by: NURSE PRACTITIONER

## 2020-11-10 RX ORDER — DIPHENOXYLATE HYDROCHLORIDE AND ATROPINE SULFATE 2.5; .025 MG/1; MG/1
1 TABLET ORAL DAILY
COMMUNITY

## 2020-11-10 RX ORDER — PRAVASTATIN SODIUM 20 MG
20 TABLET ORAL NIGHTLY
COMMUNITY
Start: 2020-11-08

## 2020-11-10 RX ORDER — ASPIRIN 81 MG/1
81 TABLET ORAL DAILY
Start: 2020-11-10 | End: 2023-03-29 | Stop reason: SDUPTHER

## 2020-11-10 RX ORDER — ASPIRIN 81 MG/1
81 TABLET ORAL DAILY
COMMUNITY
End: 2020-11-10 | Stop reason: SDUPTHER

## 2020-11-10 NOTE — PROGRESS NOTES
[facility] Cardiothoracic Surgery Office Follow Up Note     Date of Encounter: 11/10/2020     MRN Number: [id number]  Name: Jill Miller  Phone Number:      Referred By: No ref. provider found  PCP: Marisa Lynch PA    Chief Complaint:    Chief Complaint   Patient presents with   • Follow-up     FU with CT Chest-Hx of Left Lung Cancer, and Mitral Regurg       History of Present Illness:    Jill Miller is a 60 y.o. 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 Cheatham, chronic afib, DM2, Severe MS/MR, Severe TR, mod AI s/p MV replacement, AVR, TV repair, MAZE, ANGELA ligation 7/16/2018 with Dr. Cheatham.  Patient has been lost to follow up since 10/30/18 secondary to caring for her  diagnosed with Leukemia and undergone chemo.  She presents today to discuss follow up CT chest 8/7/20 with contrast for surveillance of her lung cancer history.  She has restarted smoking 1/2 ppd.  Since last office visit in 2018, she has not followed up with Dr. Lay.  She did undergo TIFFANIE 9/17/18 which showed NL EF, normal functioning aortic and mitral valves with successful exclusion of ANGELA with discontinuation of Coumadin.  Continues on ASA. She reports since last office visit in Oct 2018, pt reports she has been doing well.  Denies CP, SOA, afib episodes, LE edema, presyncope, syncope, cough, hemoptysis, weight loss, or activity intolerance.      Review of Systems:  Review of Systems   Constitution: Negative for chills, decreased appetite, diaphoresis, fever, malaise/fatigue, night sweats and weight loss.   HENT: Negative for congestion, hoarse voice, sore throat and stridor.    Cardiovascular: Positive for leg swelling. Negative for chest pain, claudication, dyspnea on exertion, irregular heartbeat, near-syncope, orthopnea, palpitations, paroxysmal nocturnal dyspnea and syncope.  "  Respiratory: Negative for cough, hemoptysis, shortness of breath, sleep disturbances due to breathing, snoring, sputum production and wheezing.    Hematologic/Lymphatic: Negative for adenopathy and bleeding problem. Bruises/bleeds easily.   Skin: Negative for color change, dry skin, itching, poor wound healing and rash.   Musculoskeletal: Negative for arthritis, back pain, falls and muscle weakness.   Gastrointestinal: Negative for abdominal pain, anorexia, constipation, diarrhea, hematochezia, melena, nausea and vomiting.   Neurological: Negative for difficulty with concentration, disturbances in coordination, dizziness, loss of balance, numbness, seizures, vertigo and weakness.   Psychiatric/Behavioral: Negative for altered mental status, depression, memory loss and substance abuse. The patient does not have insomnia and is not nervous/anxious.    Allergic/Immunologic: Negative for persistent infections.       I have reviewed the review of systems as entered by my clinical support staff and have updated it as appropriate.       Allergies:  Allergies   Allergen Reactions   • Morphine And Related Nausea Only   • Lortab [Hydrocodone-Acetaminophen] Nausea And Vomiting and Dizziness       Medications:      Current Outpatient Medications:   •  aspirin 81 MG EC tablet, Take 81 mg by mouth Daily., Disp: , Rfl:   •  levothyroxine (SYNTHROID, LEVOTHROID) 150 MCG tablet, Take 150 mcg by mouth Daily., Disp: , Rfl:   •  multivitamin (MULTI VITAMIN DAILY PO), Take  by mouth Daily., Disp: , Rfl:   •  pravastatin (PRAVACHOL) 20 MG tablet, Daily., Disp: , Rfl:     Physical Exam:  Vital Signs:    Vitals:    11/10/20 1413   BP: 150/90   BP Location: Left arm   Patient Position: Sitting   Pulse: 73   Temp: 97.1 °F (36.2 °C)   SpO2: 96%   Weight: 85.3 kg (188 lb)   Height: 157.5 cm (62\")     Body mass index is 34.39 kg/m².     Physical Exam  Vitals signs and nursing note reviewed.   Constitutional:       Appearance: Normal " appearance. She is well-developed and well-groomed.   HENT:      Head: Normocephalic and atraumatic.   Neck:      Musculoskeletal: Neck supple.   Cardiovascular:      Rate and Rhythm: Normal rate and regular rhythm.      Heart sounds: S1 normal and S2 normal. Murmur present. No friction rub.   Pulmonary:      Comments: Unlabored, Clear to auscultation bilaterally  Abdominal:      General: Bowel sounds are normal.      Palpations: Abdomen is soft.      Tenderness: There is no abdominal tenderness.   Musculoskeletal:      Right lower leg: No edema.      Left lower leg: No edema.   Skin:     General: Skin is warm and dry.   Neurological:      Mental Status: She is alert and oriented to person, place, and time.   Psychiatric:         Attention and Perception: Attention normal.         Mood and Affect: Mood normal.         Speech: Speech normal.         Behavior: Behavior is cooperative.         Labs/Imagin/7/20 CT chest with and without:  Stable chest with stable nodular scarring seen posteriorly  within the right upper lobe. Slight volume loss identified on the left.  No acute intrathoracic abnormality. Diffuse fatty infiltration again  seen of the liver.    Personally reviewed    Assessment / Plan:  Diagnoses and all orders for this visit:    1. Rheumatic mitral valve and aortic valve stenosis (Primary)    2. Paroxysmal atrial fibrillation (CMS/HCC)    3. Malignant neoplasm of upper lobe of left lung (CMS/HCC)         Jill Miller is a 60 y.o. 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 Cheatham, chronic afib, DM2, Severe MS/MR, Severe TR, mod AI s/p MV replacement, AVR, TV repair, MAZE, ANGELA ligation 2018 with Dr. Cheatham.  Patient has been lost to follow up since 2018.  Discussed CT findings with patient and no recurrence.  Discussed tobacco cessation with patient for approximately  3-5 minutes.  Stable from post operative standpoint.  No further episodes of afib.  She does have a murmur on physical exam with last echo in 2018 with NL EF, normal functioning aortic and mitral valves with successful exclusion of ANGELA.  Continues on ASA.  Patient has been lost to follow up with Dr. Lay as well.  Dr. Cheatham personally spoke to Dr. Lay and has been kind to add her onto his schedule today for follow up.  Will have patient to return to clinic in 1 year with CT scan of chest with and without contrast.    Ernestine Hernandez, [facility] Cardiothoracic Surgery    Please note that portions of this note may have been completed with a voice recognition program. Efforts were made to edit the dictations, but occasionally words are mistranscribed.

## 2020-11-10 NOTE — PROGRESS NOTES
[facility] at [facility]  Office Visit Note    DATE: 11/10/2020    IDENTIFICATION: Jill Miller is a 60 y.o. female who resides in [place], [place].     REASON FOR VISIT:  • Valvular heart disease            Jill Miller is a 60-year-old female with a history of rheumatic valve disease who underwent aortic and mitral valve replacement and tricuspid valve annuloplasty in 2018.  Since her surgery, she has been missing in action due to family issues.  Her  developed lymphoma.  She reestablished care with Dr. Cheatham today and Dr. Cheatham is asked me to see her as well.    The patient has been doing quite well.  She is having no more heart failure symptoms and denies any angina symptoms.  She denies palpitations or stroke symptoms.  Her medicine list is short-- low-dose aspirin, statin, and thyroid medication.      Review of Systems   All other systems reviewed and are negative.      The patient's past medical, social, family history and ROS reviewed in the patient's electronic medical record.    Allergies   Allergen Reactions   • Morphine And Related Nausea Only   • Lortab [Hydrocodone-Acetaminophen] Nausea And Vomiting and Dizziness         Current Outpatient Medications:   •  aspirin 81 MG EC tablet, Take 1 tablet by mouth Daily., Disp:  , Rfl:   •  levothyroxine (SYNTHROID, LEVOTHROID) 150 MCG tablet, Take 150 mcg by mouth Daily., Disp: , Rfl:   •  multivitamin (MULTI VITAMIN DAILY PO), Take  by mouth Daily., Disp: , Rfl:   •  pravastatin (PRAVACHOL) 20 MG tablet, Daily., Disp: , Rfl:     Past Medical History:   Diagnosis Date   • Atrial fibrillation and flutter (CMS/HCC)    • Chronically Abnormal CXR (Left pleural disease post op) 9/25/2017   • Essential hypertension 4/10/2017    Target blood pressure <130/80 mmHg   • Graves disease    • Hypertension    • Hyperthyroidism    • Lung cancer (CMS/HCC)    • MRSA (methicillin resistant staph aureus) culture positive 2014    under left  breast, incision and debridement, treated with wound packing and po abx    • Type 2 diabetes mellitus (CMS/HCC) 7/17/2018       Past Surgical History:   Procedure Laterality Date   • ABSCESS DRAINAGE      under left breast d/t mrsa    • AORTIC VALVE REPAIR/REPLACEMENT N/A 7/16/2018    Procedure: MEDIAN STERNOTOMY, AORTIC VALVE REPLACEMENT WITH TIFFANIE AND MAZE, TRICUSPID RING, LEFT ATRIAL OCCLUSION;  Surgeon: Ajay Cheatham MD;  Location:  [facility];  Service: Cardiothoracic   • BRONCHOSCOPY Left 5/12/2017    Procedure: BRONCHOSCOPY;  Surgeon: Ajay Cheatham MD;  Location:  [facility];  Service:    • BRONCHOSCOPY N/A 5/18/2017    Procedure: BRONCHOSCOPY BIOPSY AT BEDSIDE;  Surgeon: Ajay Cheatham MD;  Location:  [facility];  Service:    • CARDIAC CATHETERIZATION N/A 9/28/2017    Procedure: Left Heart Cath;  Surgeon: Marco Lay MD;  Location:  [facility];  Service:    • CARDIAC CATHETERIZATION N/A 9/28/2017    Procedure: Right Heart Cath;  Surgeon: Marco Lay MD;  Location:  [facility];  Service:    • LUNG BIOPSY  04/2017   • LYMPH NODE DISSECTION Left 5/12/2017    Procedure: MEDIASTINAL LYMPH NODE DISSECTION;  Surgeon: Ajay Cheatham MD;  Location:  [facility];  Service:    • MAZE PROCEDURE     • MITRAL VALVE REPAIR/REPLACEMENT N/A 7/16/2018    Procedure: MITRAL VALVE REPLACEMENT;  Surgeon: Ajay Cheatham MD;  Location:  [facility];  Service: Cardiothoracic   • TEETH EXTRACTION     • THORACOSCOPY VIDEO ASSISTED WITH LOBECTOMY Left 5/12/2017    Procedure: THORACOSCOPY VIDEO ASSISTED WITH OPEN LOBECTOMY;  Surgeon: Ajay Cheatham MD;  Location:  [facility];  Service:    • TOTAL THYROIDECTOMY  approx 2012       Family History   Problem Relation Age of Onset   • Breast cancer Mother    • Diabetes Father        Social History     Tobacco Use   • Smoking status: Current Every Day Smoker     Packs/day: 0.25     Years: 40.00     Pack years: 10.00     Types: Cigarettes  "  • Smokeless tobacco: Never Used   Substance Use Topics   • Alcohol use: No           Blood pressure 124/72, pulse 58, height 157.5 cm (62\"), weight 84.8 kg (187 lb), SpO2 95 %.  Body mass index is 34.2 kg/m².  Vitals:    11/10/20 1552   Patient Position: Sitting       Constitutional:       Appearance: Well-developed.   Eyes:      General: No scleral icterus.     Pupils: Pupils are equal, round, and reactive to light.   HENT:      Head: Normocephalic and atraumatic.   Neck:      Thyroid: No thyromegaly.      Vascular: No carotid bruit or JVD.   Pulmonary:      Effort: Pulmonary effort is normal.      Breath sounds: Normal breath sounds.   Cardiovascular:      Normal rate. Regular rhythm.      Murmurs: There is a grade 2/6 harsh midsystolic murmur at the URSB, radiating to the neck.      No gallop.   Abdominal:      Palpations: Abdomen is soft. There is no abdominal mass.      Tenderness: There is no abdominal tenderness.   Musculoskeletal:      Extremities: No clubbing present.  Skin:     General: Skin is warm and dry. There is no cyanosis.   Neurological:      Mental Status: Alert and oriented to person, place, and time.   Psychiatric:         Behavior: Behavior normal.         Data Review (reviewed with patient):       ECG 12 Lead    Date/Time: 11/10/2020 4:30 PM  Performed by: Marco Lay IV, MD  Authorized by: Marco Lay IV, MD   Comparison: compared with previous ECG from 9/14/2018  Similar to previous ECG  Rhythm: sinus rhythm  BPM: 58                     Problem List Items Addressed This Visit        Cardiology Problems    Valvular heart disease - Primary    Overview     · Cardiac catheterization (9/20/17): Mild nonobstructive CAD. Moderate pulmonary hypertension.  · Echo (4/3/18): LVEF 70%. Severe LA dilatation. Moderate AI. Severe MS and severe MR. RVSP approximately 56 mmHg.  · AVR/MVR/tricuspid annuloplasty (bioprothetic valves) and left atrial appendage ligation performed by " Ajay Cheatham, 7/16/18  · Echo (08/22/2018): LVEF 65%. The bioprosthetic aortic and mitral valves are well seated and functioning normally. RVSP<35 mmHg.  · TIFFANIE (9/17/2018): Normal functioning MVR.  ANGELA completely occluded.         Current Assessment & Plan     · No heart failure symptoms  · Obtain surveillance echo  · Continue low-dose aspirin         Relevant Medications    aspirin 81 MG EC tablet    Other Relevant Orders    Adult Transthoracic Echo Complete W/ Cont if Necessary Per Protocol    CBC (No Diff)    Paroxysmal atrial fibrillation (CMS/HCC)    Overview     · Postoperative atrial fibrillation with RVR in the setting of left upper lobectomy, 5/15/2017  · Echo (8/25/17): LVEF 60%.  Moderate to severe mitral stenosis (mean gradient 9 mmHg).  Moderate aortic insufficiency.  RVSP 50 mmHg  · Chads Vasc = 2 (female, HTN)  · Left atrial appendage ligation and MAZE procedure by Ajay Cheatham, 7/16/18  · TIFFANIE (9/17/2018):  ANGELA completely occluded.         Current Assessment & Plan     · No symptomatic recurrence since surgery  · Continue low-dose aspirin         Hyperlipidemia LDL goal <70    Overview     · High intensity statin indicated given the presence of diabetes and mild CAD         Current Assessment & Plan     · Obtain CMP and lipids  · Intensify statin therapy if LDL >70         Relevant Orders    Comprehensive Metabolic Panel    Lipid Panel       Other    Current smoker    Current Assessment & Plan     · Smoking cessation recommended         Type 2 diabetes mellitus, without long-term current use of insulin (CMS/[facility])    Current Assessment & Plan     · Presently not taking diabetes medicine  · Obtain hemoglobin A1c         Relevant Orders    Hemoglobin A1c               · Obtain echo to assess aortic and mitral valve replacements  · Obtain CMP, CBC, lipids, hemoglobin A1c  · Smoking cessation recommended  Return in about 1 year (around 11/10/2021).      Marco Lay IV MD, [facility]  11/10/2020

## 2020-12-15 ENCOUNTER — APPOINTMENT (OUTPATIENT)
Dept: CARDIOLOGY | Facility: HOSPITAL | Age: 60
End: 2020-12-15

## 2021-09-13 ENCOUNTER — TELEPHONE (OUTPATIENT)
Dept: CARDIOLOGY | Facility: CLINIC | Age: 61
End: 2021-09-13

## 2021-09-13 NOTE — TELEPHONE ENCOUNTER
Pt left msg late this afternoon that she went into Afib last night- instructed that she will need to come into the office for an EKG- she states that she can be here by 4 pm tomorrow- encouraged coming sooner if possible.

## 2021-09-14 ENCOUNTER — CLINICAL SUPPORT (OUTPATIENT)
Dept: CARDIOLOGY | Facility: CLINIC | Age: 61
End: 2021-09-14

## 2021-09-14 DIAGNOSIS — I48.0 PAROXYSMAL ATRIAL FIBRILLATION (HCC): Primary | ICD-10-CM

## 2021-09-14 PROCEDURE — 93000 ELECTROCARDIOGRAM COMPLETE: CPT | Performed by: INTERNAL MEDICINE

## 2021-10-29 ENCOUNTER — TELEPHONE (OUTPATIENT)
Dept: CARDIAC SURGERY | Facility: CLINIC | Age: 61
End: 2021-10-29

## 2021-11-10 ENCOUNTER — TELEPHONE (OUTPATIENT)
Dept: CARDIAC SURGERY | Facility: CLINIC | Age: 61
End: 2021-11-10

## 2021-11-10 DIAGNOSIS — C34.12 MALIGNANT NEOPLASM OF UPPER LOBE OF LEFT LUNG (HCC): Primary | ICD-10-CM

## 2021-11-10 NOTE — TELEPHONE ENCOUNTER
Pt called yesterday afternoon and spoke with Bebe - she has received a letter in the mail and is ready to schedule her 1 yr f/u and CT chest with Dr. Cheatham. Verified demo.

## 2021-11-16 ENCOUNTER — OFFICE VISIT (OUTPATIENT)
Dept: CARDIOLOGY | Facility: CLINIC | Age: 61
End: 2021-11-16

## 2021-11-16 VITALS
SYSTOLIC BLOOD PRESSURE: 136 MMHG | HEART RATE: 78 BPM | WEIGHT: 180 LBS | BODY MASS INDEX: 33.13 KG/M2 | OXYGEN SATURATION: 95 % | DIASTOLIC BLOOD PRESSURE: 80 MMHG | HEIGHT: 62 IN

## 2021-11-16 DIAGNOSIS — I38 VALVULAR HEART DISEASE: ICD-10-CM

## 2021-11-16 DIAGNOSIS — I48.0 PAROXYSMAL ATRIAL FIBRILLATION (HCC): Primary | ICD-10-CM

## 2021-11-16 PROCEDURE — 99214 OFFICE O/P EST MOD 30 MIN: CPT | Performed by: INTERNAL MEDICINE

## 2021-11-16 RX ORDER — PROPAFENONE HYDROCHLORIDE 300 MG/1
600 TABLET, COATED ORAL AS NEEDED
Qty: 12 TABLET | Refills: 1 | Status: ON HOLD | OUTPATIENT
Start: 2021-11-16 | End: 2022-04-28 | Stop reason: SDUPTHER

## 2021-11-16 NOTE — PROGRESS NOTES
"[facility]      Identification: Jill Miller is a 61 y.o. female who resides in [place], [place].     Reason for visit:  · Bioprosthetic aortic valve/tricuspid valve  · Paroxysmal atrial fibrillation      Subjective      Jill Miller presents to [facility] for followup.    Jacqueline returns for routine follow-up.  She is doing well denies any heart failure symptoms.  No TIA or stroke symptoms.  She did have an episode of atrial fibrillation lasting 24 hours about 1 month ago.  By the time she came to the office for an EKG, it had spontaneously converted back to sinus.    The patient requests a medical exemption for COVID-19 vaccination.            Review of Systems   Constitutional: Negative for malaise/fatigue.   Eyes: Negative for vision loss in left eye and vision loss in right eye.   Cardiovascular: Negative for chest pain, dyspnea on exertion, near-syncope, orthopnea, palpitations, paroxysmal nocturnal dyspnea and syncope.   Musculoskeletal: Negative for myalgias.   Neurological: Negative for brief paralysis, excessive daytime sleepiness, focal weakness, numbness, paresthesias and weakness.   All other systems reviewed and are negative.      Objective     /80 (BP Location: Left arm, Patient Position: Sitting)   Pulse 78   Ht 157.5 cm (62\")   Wt 81.6 kg (180 lb)   SpO2 95%   BMI 32.92 kg/m²       Constitutional:       Appearance: Healthy appearance. Well-developed.   Eyes:      General: No scleral icterus.  HENT:      Head: Normocephalic and atraumatic.   Neck:      Vascular: No carotid bruit or JVD. JVD normal.   Pulmonary:      Effort: Pulmonary effort is normal.      Breath sounds: Normal breath sounds.   Cardiovascular:      Normal rate. Regular rhythm.      Murmurs: There is a grade 2/6 harsh midsystolic murmur at the URSB, radiating to the neck.      No gallop.   Musculoskeletal:      Extremities: No clubbing present.Skin:     General: Skin is warm and dry. " There is no cyanosis.   Neurological:      Mental Status: Alert.   Psychiatric:         Attention and Perception: Attention normal.         Behavior: Behavior normal.         Result Review :  Labs (07/16/2021):  · TSH 0.77  · WBC 6.9, RBC 4.40, HGB 16.5, HCT 46.3,    · Creat 0.68, BUN 12, K 4.4, Na 142, ALT 24, AST 31  · Chol 161, Trig 149, HDL 46, LDL 89  · HA1C 6.6        Assessment     Problem List Items Addressed This Visit        Cardiology Problems    Valvular heart disease    Overview     · Cardiac catheterization (9/20/17): Mild nonobstructive CAD. Moderate pulmonary hypertension.  · Echo (4/3/18): LVEF 70%. Severe LA dilatation. Moderate AI. Severe MS and severe MR. RVSP approximately 56 mmHg.  · AVR/MVR/tricuspid annuloplasty (bioprothetic valves) and left atrial appendage ligation performed by Ajay Cheatham, 7/16/18  · Echo (08/22/2018): LVEF 65%. The bioprosthetic aortic and mitral valves are well seated and functioning normally. RVSP<35 mmHg.  · TIFFANIE (9/17/2018): Normal functioning MVR.  ANGELA completely occluded.         Current Assessment & Plan     · No heart failure symptoms  · Echo ordered last year not performed due to cost  · Continue low-dose aspirin         Paroxysmal atrial fibrillation (HCC) - Primary    Overview     · Postoperative atrial fibrillation with RVR in the setting of left upper lobectomy, 5/15/2017  · Echo (8/25/17): LVEF 60%.  Moderate to severe mitral stenosis (mean gradient 9 mmHg).  Moderate aortic insufficiency.  RVSP 50 mmHg  · Chads Vasc = 2 (female, HTN)  · Left atrial appendage ligation and MAZE procedure by Ajay Cheatham, 7/16/18  · TIFFANIE (9/17/2018):  ANGELA completely occluded.         Current Assessment & Plan     · Occasional A. fib symptoms  · We will prescribe propafenone 600 mg as needed for pill in the pocket  · Continue low-dose aspirin  · No anticoagulation due to left atrial appendage ligation         Relevant Medications    propafenone (RYTHMOL) 300 MG tablet           Plan   • Continue present medical therapy  • Propafenone 600 mg as needed for pill the pocket approach  • Will hopefully obtain echocardiogram next year if affordable  • Medical exemption for COVID-19 vaccination denied      Follow-up   Return in about 6 months (around 5/16/2022).        Nelson Lay MD, [facility], [facility]  11/16/2021

## 2021-11-16 NOTE — ASSESSMENT & PLAN NOTE
· Occasional A. fib symptoms  · We will prescribe propafenone 600 mg as needed for pill in the pocket  · Continue low-dose aspirin  · No anticoagulation due to left atrial appendage ligation

## 2021-11-16 NOTE — ASSESSMENT & PLAN NOTE
· No heart failure symptoms  · Echo ordered last year not performed due to cost  · Continue low-dose aspirin

## 2022-03-18 ENCOUNTER — HOSPITAL ENCOUNTER (OUTPATIENT)
Dept: CT IMAGING | Facility: HOSPITAL | Age: 62
Discharge: HOME | End: 2022-03-18
Admitting: NURSE PRACTITIONER

## 2022-03-18 DIAGNOSIS — C34.12 MALIGNANT NEOPLASM OF UPPER LOBE OF LEFT LUNG: ICD-10-CM

## 2022-03-18 LAB — CREAT BLDA-MCNC: 0.7 MG/DL (ref 0.6–1.3)

## 2022-03-18 PROCEDURE — 25010000002 IOPAMIDOL 61 % SOLUTION: Performed by: NURSE PRACTITIONER

## 2022-03-18 PROCEDURE — 71270 CT THORAX DX C-/C+: CPT

## 2022-03-18 PROCEDURE — 82565 ASSAY OF CREATININE: CPT

## 2022-03-18 RX ADMIN — IOPAMIDOL 100 ML: 612 INJECTION, SOLUTION INTRAVENOUS at 15:27

## 2022-03-29 ENCOUNTER — OFFICE VISIT (OUTPATIENT)
Dept: CARDIAC SURGERY | Facility: CLINIC | Age: 62
End: 2022-03-29

## 2022-03-29 VITALS
WEIGHT: 196.6 LBS | SYSTOLIC BLOOD PRESSURE: 139 MMHG | TEMPERATURE: 97.1 F | BODY MASS INDEX: 36.18 KG/M2 | HEIGHT: 62 IN | HEART RATE: 79 BPM | OXYGEN SATURATION: 92 % | DIASTOLIC BLOOD PRESSURE: 82 MMHG

## 2022-03-29 DIAGNOSIS — I38 VALVULAR HEART DISEASE: ICD-10-CM

## 2022-03-29 DIAGNOSIS — C34.12 MALIGNANT NEOPLASM OF UPPER LOBE OF LEFT LUNG: Primary | ICD-10-CM

## 2022-03-29 PROCEDURE — 99213 OFFICE O/P EST LOW 20 MIN: CPT | Performed by: NURSE PRACTITIONER

## 2022-03-29 NOTE — PROGRESS NOTES
[facility] Cardiothoracic Surgery Office Follow Up Note     Date of Encounter: 2022     Name: Jill Miller  : 1960     Referred By: No ref. provider found  PCP: Marisa Lynch PA    Chief Complaint:    Chief Complaint   Patient presents with   • Lung Cancer     1 year follow-up with results from CT chest.       Subjective      History of Present Illness:    Jill Miller is a 61 y.o. 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 Cheatham, chronic A. fib, severe MS/MR, severe TR, moderate AI s/p MV replacement, AVR, TV repair, maze, ANGELA ligation 2018 with Dr. Cheatham.  Patient was lost to follow-up for a couple of years due to personal issues and was last seen in clinic 11/10/2020 by GUCCI Rios for surveillance of her lung cancer with CT chest which was stable with no concern for cancer recurrence.  At that time she had known cardiac murmur with last echo in  with normal EF, normal functioning aortic and mitral valves with successful exclusion of ANGELA.  Patient was last seen in clinic by Dr. Lay 2021, with occasional A. fib symptoms started on Propafenone as needed and unable to perform echo at that time due to patient cost (will attempt to perform echo in ).  Patient presents today for 1 year follow-up with repeat CT chest.  Patient denies any interval worsening cough, SOA, fevers, chills, lymphadenopathy, hemoptysis, or unintentional weight loss.  She denies any chest pain or worsening fatigue.  She plans to get repeat echo sometime around April and May when she is able to afford it.  She unfortunately has started to smoke again about half pack per day.  She denies any recent symptoms of palpitations.    Review of Systems:  Review of Systems   Constitutional: Negative for chills, decreased appetite, diaphoresis, fever,  malaise/fatigue, night sweats, weight gain and weight loss.   HENT: Negative for hoarse voice.    Eyes: Negative for blurred vision, double vision and visual disturbance.   Cardiovascular: Positive for dyspnea on exertion. Negative for chest pain, claudication, irregular heartbeat, leg swelling, near-syncope, orthopnea, palpitations, paroxysmal nocturnal dyspnea and syncope.        Cramping in calves     Respiratory: Negative for cough, hemoptysis, shortness of breath, sputum production and wheezing.    Hematologic/Lymphatic: Negative for adenopathy and bleeding problem. Does not bruise/bleed easily.   Skin: Negative for color change, nail changes, poor wound healing and rash.   Musculoskeletal: Positive for joint pain. Negative for back pain, falls and muscle cramps.   Gastrointestinal: Negative for abdominal pain, dysphagia and heartburn.   Genitourinary: Negative for flank pain.   Neurological: Negative for brief paralysis, disturbances in coordination, dizziness, focal weakness, headaches, light-headedness, loss of balance, numbness, paresthesias, sensory change, vertigo and weakness.   Psychiatric/Behavioral: Negative for depression and suicidal ideas. The patient is not nervous/anxious.    Allergic/Immunologic: Negative for persistent infections.       I have reviewed the following portions of the patient's history: allergies, current medications, past family history, past medical history, past social history, past surgical history, problem list and ROS and confirm it's accurate.    Allergies:  Allergies   Allergen Reactions   • Lortab [Hydrocodone-Acetaminophen] Nausea And Vomiting and Dizziness   • Morphine And Related Nausea Only       Medications:      Current Outpatient Medications:   •  aspirin 81 MG EC tablet, Take 1 tablet by mouth Daily., Disp:  , Rfl:   •  Cholecalciferol (vitamin D3) 125 MCG (5000 UT) tablet tablet, Take 5,000 Units by mouth Daily., Disp: , Rfl:   •  levothyroxine (SYNTHROID,  LEVOTHROID) 175 MCG tablet, Take 175 mcg by mouth Daily., Disp: , Rfl:   •  multivitamin (THERAGRAN) tablet tablet, Take  by mouth Daily., Disp: , Rfl:   •  pravastatin (PRAVACHOL) 20 MG tablet, Take 20 mg by mouth Daily., Disp: , Rfl:   •  propafenone (RYTHMOL) 300 MG tablet, Take 2 tablets by mouth As Needed (for atrial fibrillation)., Disp: 12 tablet, Rfl: 1    History:   Past Medical History:   Diagnosis Date   • Acute diastolic congestive heart failure (HCC)    • Aortic valve disorder    • Aortic valve disorder    • Atrial fibrillation and flutter (HCC)    • Chronic diastolic congestive heart failure (HCC)    • Chronically Abnormal CXR (Left pleural disease post op) 9/25/2017   • Essential hypertension 4/10/2017    Target blood pressure <130/80 mmHg   • Graves disease    • Hyperlipidemia LDL goal <70    • Hypertension    • Hyperthyroidism    • Lung cancer (HCC)    • MRSA (methicillin resistant staph aureus) culture positive 2014    under left breast, incision and debridement, treated with wound packing and po abx    • Rheumatic mitral stenosis    • Rheumatic mitral stenosis    • Type 2 diabetes mellitus (HCC) 7/17/2018   • Valvular heart disease    • Valvular heart disease        Past Surgical History:   Procedure Laterality Date   • ABLATION OF DYSRHYTHMIC FOCUS  7-   • ABSCESS DRAINAGE      under left breast d/t mrsa    • AORTIC VALVE REPAIR/REPLACEMENT N/A 7/16/2018    Procedure: MEDIAN STERNOTOMY, AORTIC VALVE REPLACEMENT WITH TIFFANIE AND MAZE, TRICUSPID RING, LEFT ATRIAL OCCLUSION;  Surgeon: Ajay Cheatham MD;  Location:  [facility];  Service: Cardiothoracic   • BRONCHOSCOPY Left 5/12/2017    Procedure: BRONCHOSCOPY;  Surgeon: Ajay Cheatham MD;  Location:  [facility];  Service:    • BRONCHOSCOPY N/A 5/18/2017    Procedure: BRONCHOSCOPY BIOPSY AT BEDSIDE;  Surgeon: Ajay Cheatham MD;  Location:  [facility];  Service:    • CARDIAC CATHETERIZATION N/A 9/28/2017    Procedure: Left Heart Cath;  Surgeon:  "Marco Lay MD;  Location:  [facility];  Service:    • CARDIAC CATHETERIZATION N/A 9/28/2017    Procedure: Right Heart Cath;  Surgeon: Marco Lay MD;  Location:  [facility];  Service:    • LUNG BIOPSY  04/2017   • LYMPH NODE DISSECTION Left 5/12/2017    Procedure: MEDIASTINAL LYMPH NODE DISSECTION;  Surgeon: Ajay Cheatham MD;  Location:  [facility];  Service:    • MAZE PROCEDURE     • MITRAL VALVE REPAIR/REPLACEMENT N/A 7/16/2018    Procedure: MITRAL VALVE REPLACEMENT;  Surgeon: Ajay Cheatham MD;  Location:  [facility];  Service: Cardiothoracic   • MITRAL VALVE REPLACEMENT  7-   • TEETH EXTRACTION     • THORACOSCOPY VIDEO ASSISTED WITH LOBECTOMY Left 5/12/2017    Procedure: THORACOSCOPY VIDEO ASSISTED WITH OPEN LOBECTOMY;  Surgeon: Ajay Cheatham MD;  Location:  [facility];  Service:    • TOTAL THYROIDECTOMY  approx 2012   • TRICUSPID VALVE SURGERY  7-       Social History     Socioeconomic History   • Marital status:    • Number of children: 3   Tobacco Use   • Smoking status: Current Every Day Smoker     Packs/day: 0.50     Years: 40.00     Pack years: 20.00     Types: Cigarettes     Start date: 5/5/1978     Last attempt to quit: 5/5/2018     Years since quitting: 3.9   • Smokeless tobacco: Never Used   • Tobacco comment: Couldn't remember exact dates.   Vaping Use   • Vaping Use: Never used   Substance and Sexual Activity   • Alcohol use: No   • Drug use: No   • Sexual activity: Not Currently     Partners: Male     Birth control/protection: None        Family History   Problem Relation Age of Onset   • Breast cancer Mother    • Diabetes Father        Objective     Physical Exam:  Vitals:    03/29/22 1309   BP: 139/82   BP Location: Right arm   Patient Position: Sitting   Pulse: 79   Temp: 97.1 °F (36.2 °C)   SpO2: 92%   Weight: 89.2 kg (196 lb 9.6 oz)   Height: 157.5 cm (62\")      Body mass index is 35.96 kg/m².    Physical " Exam  Vitals and nursing note reviewed.   Constitutional:       Appearance: Normal appearance. She is well-developed.   HENT:      Head: Normocephalic and atraumatic.   Eyes:      Pupils: Pupils are equal, round, and reactive to light.   Neck:      Vascular: No carotid bruit.   Cardiovascular:      Rate and Rhythm: Normal rate and regular rhythm.      Pulses: Normal pulses.      Heart sounds: S1 normal and S2 normal. Murmur heard.   Pulmonary:      Effort: Pulmonary effort is normal.      Breath sounds: Examination of the right-upper field reveals wheezing. Examination of the left-upper field reveals wheezing. Examination of the right-lower field reveals wheezing. Examination of the left-lower field reveals wheezing. Wheezing present.   Abdominal:      Palpations: Abdomen is soft.   Musculoskeletal:         General: No swelling.      Cervical back: Neck supple.      Right lower leg: No edema.      Left lower leg: No edema.   Skin:     General: Skin is warm and dry.      Capillary Refill: Capillary refill takes less than 2 seconds.      Findings: No bruising.   Neurological:      General: No focal deficit present.      Mental Status: She is alert and oriented to person, place, and time. Mental status is at baseline.      GCS: GCS eye subscore is 4. GCS verbal subscore is 5. GCS motor subscore is 6.      Motor: Motor function is intact.      Coordination: Coordination is intact.      Gait: Gait is intact.   Psychiatric:         Mood and Affect: Mood normal.         Speech: Speech normal.         Behavior: Behavior normal. Behavior is cooperative.         Cognition and Memory: Cognition normal.         Imaging/Labs:    CT Chest With & Without Contrast Diagnostic    Result Date: 3/18/2022  Chronic and postoperative changes are noted as above. There are no acute findings or definite evidence of recurrent or metastatic disease. Redemonstrated diffuse hepatic steatosis.  This report was finalized on 3/18/2022 5:25 PM by  Eduard Pino.             Assessment / Plan      Assessment / Plan:  Diagnoses and all orders for this visit:    1. Malignant neoplasm of upper lobe of left lung (HCC) (Primary)    2. Valvular heart disease       1. 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 Cheatham, chronic A. fib, severe MS/MR, severe TR, moderate AI s/p MV replacement, AVR, TV repair, maze, ANGELA ligation 7/16/2018 with Dr. Cheatham: Patient denies any interval worsening cough, SOA, fevers, chills, lymphadenopathy, hemoptysis, or unintentional weight loss.  She denies any chest pain or worsening fatigue. She denies any recent symptoms of palpitations.  Reviewed patient's CT chest revealing chronic/postoperative changes and no acute findings or definite evidence of recurrent or metastatic disease. She has continued to follow closely with cardiology Dr. Lay. She plans to get repeat echo sometime around April and May when she is able to afford it.  She unfortunately has started to smoke again about half pack per day.  I have counseled her on the importance of cessation with regards to her lung cancer and increased risk of recurrence with continued smoking status.  She is not interested in quitting at this time.  Encourage patient to continue following cardiology Dr. Lay and will plan to see the patient back with our office in 1 year with repeat CT chest for continued surveillance of her lung cancer.      Patient Education:  Jill Miller  reports that she has been smoking cigarettes. She started smoking about 43 years ago. She has a 20.00 pack-year smoking history. She has never used smokeless tobacco.. I have educated her on the risk of diseases from using tobacco products such as cancer, COPD and heart disease.     I advised her to quit and she is not willing to quit.    I spent 5 minutes counseling the patient.    Follow Up:   Return in about 1 year (around 3/29/2023) for F/u  with imaging.   Or sooner for any further concerns or worsening sign and symptoms. If unable to reach us in the office please dial 911 or go to the nearest emergency department.      Celena DUCKWROTH  [facility] Cardiothoracic Surgery    Time Spent: I spent 25 minutes caring for Jill on this date of service. This time includes time spent by me in the following activities: preparing for the visit, reviewing tests, obtaining and/or reviewing a separately obtained history, performing a medically appropriate examination and/or evaluation, counseling and educating the patient/family/caregiver, ordering medications, tests, or procedures, documenting information in the medical record, independently interpreting results and communicating that information with the patient/family/caregiver and care coordination.

## 2022-04-17 ENCOUNTER — APPOINTMENT (OUTPATIENT)
Dept: GENERAL RADIOLOGY | Facility: HOSPITAL | Age: 62
End: 2022-04-17

## 2022-04-17 ENCOUNTER — APPOINTMENT (OUTPATIENT)
Dept: CT IMAGING | Facility: HOSPITAL | Age: 62
End: 2022-04-17

## 2022-04-17 ENCOUNTER — HOSPITAL ENCOUNTER (INPATIENT)
Facility: HOSPITAL | Age: 62
LOS: 3 days | Discharge: HOME | End: 2022-04-20
Attending: EMERGENCY MEDICINE | Admitting: INTERNAL MEDICINE

## 2022-04-17 DIAGNOSIS — J44.1 COPD EXACERBATION: ICD-10-CM

## 2022-04-17 DIAGNOSIS — I48.92 ATRIAL FLUTTER, UNSPECIFIED TYPE: ICD-10-CM

## 2022-04-17 DIAGNOSIS — R00.2 HEART PALPITATIONS: Primary | ICD-10-CM

## 2022-04-17 DIAGNOSIS — R06.02 SHORTNESS OF BREATH: ICD-10-CM

## 2022-04-17 DIAGNOSIS — I48.0 PAROXYSMAL ATRIAL FIBRILLATION: ICD-10-CM

## 2022-04-17 LAB
ALBUMIN SERPL-MCNC: 4.2 G/DL (ref 3.5–5.2)
ALBUMIN/GLOB SERPL: 1.6 G/DL
ALP SERPL-CCNC: 121 U/L (ref 39–117)
ALT SERPL W P-5'-P-CCNC: 103 U/L (ref 1–33)
ANION GAP SERPL CALCULATED.3IONS-SCNC: 9 MMOL/L (ref 5–15)
AST SERPL-CCNC: 107 U/L (ref 1–32)
BASOPHILS # BLD AUTO: 0.03 10*3/MM3 (ref 0–0.2)
BASOPHILS NFR BLD AUTO: 0.3 % (ref 0–1.5)
BILIRUB SERPL-MCNC: 0.4 MG/DL (ref 0–1.2)
BUN SERPL-MCNC: 21 MG/DL (ref 6–20)
BUN/CREAT SERPL: 22.3 (ref 7–25)
CALCIUM SPEC-SCNC: 8.7 MG/DL (ref 8.6–10.5)
CHLORIDE SERPL-SCNC: 100 MMOL/L (ref 98–107)
CO2 SERPL-SCNC: 29 MMOL/L (ref 22–29)
CREAT SERPL-MCNC: 0.94 MG/DL (ref 0.57–1)
DEPRECATED RDW RBC AUTO: 49.1 FL (ref 37–54)
EGFRCR SERPLBLD CKD-EPI 2021: 70 ML/MIN/1.73
EOSINOPHIL # BLD AUTO: 0.04 10*3/MM3 (ref 0–0.4)
EOSINOPHIL NFR BLD AUTO: 0.5 % (ref 0.3–6.2)
ERYTHROCYTE [DISTWIDTH] IN BLOOD BY AUTOMATED COUNT: 12.4 % (ref 12.3–15.4)
GLOBULIN UR ELPH-MCNC: 2.6 GM/DL
GLUCOSE SERPL-MCNC: 284 MG/DL (ref 65–99)
HCT VFR BLD AUTO: 46.1 % (ref 34–46.6)
HGB BLD-MCNC: 16.3 G/DL (ref 12–15.9)
HOLD SPECIMEN: NORMAL
IMM GRANULOCYTES # BLD AUTO: 0.04 10*3/MM3 (ref 0–0.05)
IMM GRANULOCYTES NFR BLD AUTO: 0.5 % (ref 0–0.5)
LYMPHOCYTES # BLD AUTO: 1.36 10*3/MM3 (ref 0.7–3.1)
LYMPHOCYTES NFR BLD AUTO: 15.8 % (ref 19.6–45.3)
MAGNESIUM SERPL-MCNC: 1.8 MG/DL (ref 1.6–2.6)
MCH RBC QN AUTO: 38.7 PG (ref 26.6–33)
MCHC RBC AUTO-ENTMCNC: 35.4 G/DL (ref 31.5–35.7)
MCV RBC AUTO: 109.5 FL (ref 79–97)
MONOCYTES # BLD AUTO: 0.51 10*3/MM3 (ref 0.1–0.9)
MONOCYTES NFR BLD AUTO: 5.9 % (ref 5–12)
NEUTROPHILS NFR BLD AUTO: 6.63 10*3/MM3 (ref 1.7–7)
NEUTROPHILS NFR BLD AUTO: 77 % (ref 42.7–76)
NRBC BLD AUTO-RTO: 0 /100 WBC (ref 0–0.2)
NT-PROBNP SERPL-MCNC: 724.6 PG/ML (ref 0–900)
PLATELET # BLD AUTO: 201 10*3/MM3 (ref 140–450)
PMV BLD AUTO: 12.5 FL (ref 6–12)
POTASSIUM SERPL-SCNC: 4.2 MMOL/L (ref 3.5–5.2)
PROT SERPL-MCNC: 6.8 G/DL (ref 6–8.5)
RBC # BLD AUTO: 4.21 10*6/MM3 (ref 3.77–5.28)
SODIUM SERPL-SCNC: 138 MMOL/L (ref 136–145)
T4 FREE SERPL-MCNC: 1.56 NG/DL (ref 0.93–1.7)
TROPONIN T SERPL-MCNC: <0.01 NG/ML (ref 0–0.03)
TSH SERPL DL<=0.05 MIU/L-ACNC: 13.93 UIU/ML (ref 0.27–4.2)
WBC NRBC COR # BLD: 8.61 10*3/MM3 (ref 3.4–10.8)
WHOLE BLOOD HOLD SPECIMEN: NORMAL
WHOLE BLOOD HOLD SPECIMEN: NORMAL

## 2022-04-17 PROCEDURE — 71275 CT ANGIOGRAPHY CHEST: CPT

## 2022-04-17 PROCEDURE — 99223 1ST HOSP IP/OBS HIGH 75: CPT | Performed by: INTERNAL MEDICINE

## 2022-04-17 PROCEDURE — 99284 EMERGENCY DEPT VISIT MOD MDM: CPT

## 2022-04-17 PROCEDURE — 25010000002 ADENOSINE PER 6 MG: Performed by: PHYSICIAN ASSISTANT

## 2022-04-17 PROCEDURE — 84439 ASSAY OF FREE THYROXINE: CPT | Performed by: PHYSICIAN ASSISTANT

## 2022-04-17 PROCEDURE — 71045 X-RAY EXAM CHEST 1 VIEW: CPT

## 2022-04-17 PROCEDURE — 80050 GENERAL HEALTH PANEL: CPT

## 2022-04-17 PROCEDURE — 83880 ASSAY OF NATRIURETIC PEPTIDE: CPT | Performed by: EMERGENCY MEDICINE

## 2022-04-17 PROCEDURE — 84484 ASSAY OF TROPONIN QUANT: CPT | Performed by: EMERGENCY MEDICINE

## 2022-04-17 PROCEDURE — 0 IOPAMIDOL PER 1 ML: Performed by: EMERGENCY MEDICINE

## 2022-04-17 PROCEDURE — 93005 ELECTROCARDIOGRAM TRACING: CPT

## 2022-04-17 PROCEDURE — 83735 ASSAY OF MAGNESIUM: CPT | Performed by: EMERGENCY MEDICINE

## 2022-04-17 PROCEDURE — 93005 ELECTROCARDIOGRAM TRACING: CPT | Performed by: EMERGENCY MEDICINE

## 2022-04-17 RX ORDER — METHYLPREDNISOLONE SODIUM SUCCINATE 125 MG/2ML
60 INJECTION, POWDER, LYOPHILIZED, FOR SOLUTION INTRAMUSCULAR; INTRAVENOUS DAILY
Status: DISCONTINUED | OUTPATIENT
Start: 2022-04-18 | End: 2022-04-18

## 2022-04-17 RX ORDER — HEPARIN SODIUM 1000 [USP'U]/ML
60 INJECTION, SOLUTION INTRAVENOUS; SUBCUTANEOUS AS NEEDED
Status: DISCONTINUED | OUTPATIENT
Start: 2022-04-17 | End: 2022-04-17

## 2022-04-17 RX ORDER — ADENOSINE 3 MG/ML
6 INJECTION, SOLUTION INTRAVENOUS ONCE
Status: COMPLETED | OUTPATIENT
Start: 2022-04-17 | End: 2022-04-17

## 2022-04-17 RX ORDER — DILTIAZEM HCL IN NACL,ISO-OSM 125 MG/125
5-15 PLASTIC BAG, INJECTION (ML) INTRAVENOUS
Status: DISCONTINUED | OUTPATIENT
Start: 2022-04-17 | End: 2022-04-19

## 2022-04-17 RX ORDER — HEPARIN SOD,PORCINE/0.9 % NACL 25000/250
18 INTRAVENOUS SOLUTION INTRAVENOUS
Status: DISCONTINUED | OUTPATIENT
Start: 2022-04-18 | End: 2022-04-19

## 2022-04-17 RX ORDER — SODIUM CHLORIDE 0.9 % (FLUSH) 0.9 %
10 SYRINGE (ML) INJECTION AS NEEDED
Status: DISCONTINUED | OUTPATIENT
Start: 2022-04-17 | End: 2022-04-20 | Stop reason: HOSPADM

## 2022-04-17 RX ORDER — HEPARIN SODIUM 1000 [USP'U]/ML
30 INJECTION, SOLUTION INTRAVENOUS; SUBCUTANEOUS AS NEEDED
Status: DISCONTINUED | OUTPATIENT
Start: 2022-04-17 | End: 2022-04-17

## 2022-04-17 RX ORDER — HEPARIN SODIUM 1000 [USP'U]/ML
4000 INJECTION, SOLUTION INTRAVENOUS; SUBCUTANEOUS ONCE
Status: COMPLETED | OUTPATIENT
Start: 2022-04-18 | End: 2022-04-18

## 2022-04-17 RX ADMIN — IOPAMIDOL 80 ML: 755 INJECTION, SOLUTION INTRAVENOUS at 20:34

## 2022-04-17 RX ADMIN — Medication 5 MG/HR: at 20:29

## 2022-04-17 RX ADMIN — ADENOSINE 6 MG: 3 INJECTION INTRAVENOUS at 22:32

## 2022-04-17 NOTE — ED NOTES
"pt c /o rapid HR x 5 days; pt was seen per DR. Lay and given \" afib pill \".. pt took medication yesterday. pt denies CP. pt denies home O2 use. 85% RA noted., pt on 3L NC. Pt denies cough/fever/chills at home.  "

## 2022-04-18 ENCOUNTER — APPOINTMENT (OUTPATIENT)
Dept: CARDIOLOGY | Facility: HOSPITAL | Age: 62
End: 2022-04-18

## 2022-04-18 PROBLEM — I48.92 ATRIAL FLUTTER (HCC): Status: ACTIVE | Noted: 2022-04-17

## 2022-04-18 PROBLEM — R00.2 HEART PALPITATIONS: Status: ACTIVE | Noted: 2022-04-18

## 2022-04-18 LAB
ALBUMIN SERPL-MCNC: 4.3 G/DL (ref 3.5–5.2)
ALBUMIN/GLOB SERPL: 1.4 G/DL
ALP SERPL-CCNC: 126 U/L (ref 39–117)
ALT SERPL W P-5'-P-CCNC: 103 U/L (ref 1–33)
ANION GAP SERPL CALCULATED.3IONS-SCNC: 11 MMOL/L (ref 5–15)
APTT PPP: 27.8 SECONDS (ref 50–95)
AST SERPL-CCNC: 77 U/L (ref 1–32)
BACTERIA UR QL AUTO: ABNORMAL /HPF
BASOPHILS # BLD AUTO: 0.03 10*3/MM3 (ref 0–0.2)
BASOPHILS NFR BLD AUTO: 0.3 % (ref 0–1.5)
BILIRUB SERPL-MCNC: 0.5 MG/DL (ref 0–1.2)
BILIRUB UR QL STRIP: NEGATIVE
BUN SERPL-MCNC: 18 MG/DL (ref 6–20)
BUN/CREAT SERPL: 27.3 (ref 7–25)
CALCIUM SPEC-SCNC: 8.3 MG/DL (ref 8.6–10.5)
CHLORIDE SERPL-SCNC: 98 MMOL/L (ref 98–107)
CHOLEST SERPL-MCNC: 133 MG/DL (ref 0–200)
CLARITY UR: ABNORMAL
CO2 SERPL-SCNC: 27 MMOL/L (ref 22–29)
COLOR UR: YELLOW
CREAT SERPL-MCNC: 0.66 MG/DL (ref 0.57–1)
DEPRECATED RDW RBC AUTO: 48.6 FL (ref 37–54)
EGFRCR SERPLBLD CKD-EPI 2021: 101.2 ML/MIN/1.73
EOSINOPHIL # BLD AUTO: 0.02 10*3/MM3 (ref 0–0.4)
EOSINOPHIL NFR BLD AUTO: 0.2 % (ref 0.3–6.2)
ERYTHROCYTE [DISTWIDTH] IN BLOOD BY AUTOMATED COUNT: 11.7 % (ref 12.3–15.4)
FLUAV RNA RESP QL NAA+PROBE: NOT DETECTED
FLUBV RNA RESP QL NAA+PROBE: NOT DETECTED
GLOBULIN UR ELPH-MCNC: 3 GM/DL
GLUCOSE BLDC GLUCOMTR-MCNC: 292 MG/DL (ref 70–130)
GLUCOSE BLDC GLUCOMTR-MCNC: 322 MG/DL (ref 70–130)
GLUCOSE BLDC GLUCOMTR-MCNC: 357 MG/DL (ref 70–130)
GLUCOSE BLDC GLUCOMTR-MCNC: 369 MG/DL (ref 70–130)
GLUCOSE BLDC GLUCOMTR-MCNC: 438 MG/DL (ref 70–130)
GLUCOSE SERPL-MCNC: 366 MG/DL (ref 65–99)
GLUCOSE UR STRIP-MCNC: ABNORMAL MG/DL
HBA1C MFR BLD: 9.6 % (ref 4.8–5.6)
HCT VFR BLD AUTO: 45.8 % (ref 34–46.6)
HDLC SERPL-MCNC: 46 MG/DL (ref 40–60)
HGB BLD-MCNC: 15.9 G/DL (ref 12–15.9)
HGB UR QL STRIP.AUTO: ABNORMAL
HYALINE CASTS UR QL AUTO: ABNORMAL /LPF
IMM GRANULOCYTES # BLD AUTO: 0.04 10*3/MM3 (ref 0–0.05)
IMM GRANULOCYTES NFR BLD AUTO: 0.4 % (ref 0–0.5)
INR PPP: 1.01 (ref 0.84–1.13)
INR PPP: 1.03 (ref 0.84–1.13)
KETONES UR QL STRIP: ABNORMAL
LDLC SERPL CALC-MCNC: 58 MG/DL (ref 0–100)
LDLC/HDLC SERPL: 1.13 {RATIO}
LEUKOCYTE ESTERASE UR QL STRIP.AUTO: NEGATIVE
LYMPHOCYTES # BLD AUTO: 0.5 10*3/MM3 (ref 0.7–3.1)
LYMPHOCYTES NFR BLD AUTO: 4.7 % (ref 19.6–45.3)
MACROCYTES BLD QL SMEAR: NORMAL
MAGNESIUM SERPL-MCNC: 3.6 MG/DL (ref 1.6–2.6)
MAXIMAL PREDICTED HEART RATE: 161 BPM
MCH RBC QN AUTO: 38.8 PG (ref 26.6–33)
MCHC RBC AUTO-ENTMCNC: 34.7 G/DL (ref 31.5–35.7)
MCV RBC AUTO: 111.7 FL (ref 79–97)
MONOCYTES # BLD AUTO: 0.21 10*3/MM3 (ref 0.1–0.9)
MONOCYTES NFR BLD AUTO: 2 % (ref 5–12)
NEUTROPHILS NFR BLD AUTO: 9.93 10*3/MM3 (ref 1.7–7)
NEUTROPHILS NFR BLD AUTO: 92.4 % (ref 42.7–76)
NITRITE UR QL STRIP: NEGATIVE
NRBC BLD AUTO-RTO: 0 /100 WBC (ref 0–0.2)
NT-PROBNP SERPL-MCNC: 623.8 PG/ML (ref 0–900)
PH UR STRIP.AUTO: <=5 [PH] (ref 5–8)
PLAT MORPH BLD: NORMAL
PLATELET # BLD AUTO: 162 10*3/MM3 (ref 140–450)
PMV BLD AUTO: 10.5 FL (ref 6–12)
POTASSIUM SERPL-SCNC: 4.5 MMOL/L (ref 3.5–5.2)
PROT SERPL-MCNC: 7.3 G/DL (ref 6–8.5)
PROT UR QL STRIP: ABNORMAL
PROTHROMBIN TIME: 13.2 SECONDS (ref 11.4–14.4)
PROTHROMBIN TIME: 13.4 SECONDS (ref 11.4–14.4)
RBC # BLD AUTO: 4.1 10*6/MM3 (ref 3.77–5.28)
RBC # UR STRIP: ABNORMAL /HPF
REF LAB TEST METHOD: ABNORMAL
RSV RNA NPH QL NAA+NON-PROBE: NOT DETECTED
SARS-COV-2 RNA RESP QL NAA+PROBE: NOT DETECTED
SODIUM SERPL-SCNC: 136 MMOL/L (ref 136–145)
SP GR UR STRIP: 1.05 (ref 1–1.03)
SQUAMOUS #/AREA URNS HPF: ABNORMAL /HPF
STRESS TARGET HR: 137 BPM
TRIGL SERPL-MCNC: 176 MG/DL (ref 0–150)
TROPONIN T SERPL-MCNC: <0.01 NG/ML (ref 0–0.03)
TROPONIN T SERPL-MCNC: <0.01 NG/ML (ref 0–0.03)
UFH PPP CHRO-ACNC: 0.1 IU/ML (ref 0.3–0.7)
UFH PPP CHRO-ACNC: 0.18 IU/ML (ref 0.3–0.7)
UFH PPP CHRO-ACNC: 0.27 IU/ML (ref 0.3–0.7)
UFH PPP CHRO-ACNC: 0.46 IU/ML (ref 0.3–0.7)
UROBILINOGEN UR QL STRIP: ABNORMAL
VLDLC SERPL-MCNC: 29 MG/DL (ref 5–40)
WBC # UR STRIP: ABNORMAL /HPF
WBC MORPH BLD: NORMAL
WBC NRBC COR # BLD: 10.73 10*3/MM3 (ref 3.4–10.8)

## 2022-04-18 PROCEDURE — 85520 HEPARIN ASSAY: CPT

## 2022-04-18 PROCEDURE — 84484 ASSAY OF TROPONIN QUANT: CPT | Performed by: INTERNAL MEDICINE

## 2022-04-18 PROCEDURE — 83036 HEMOGLOBIN GLYCOSYLATED A1C: CPT | Performed by: INTERNAL MEDICINE

## 2022-04-18 PROCEDURE — 85007 BL SMEAR W/DIFF WBC COUNT: CPT | Performed by: INTERNAL MEDICINE

## 2022-04-18 PROCEDURE — 82962 GLUCOSE BLOOD TEST: CPT

## 2022-04-18 PROCEDURE — 94761 N-INVAS EAR/PLS OXIMETRY MLT: CPT

## 2022-04-18 PROCEDURE — 94799 UNLISTED PULMONARY SVC/PX: CPT

## 2022-04-18 PROCEDURE — 85610 PROTHROMBIN TIME: CPT | Performed by: INTERNAL MEDICINE

## 2022-04-18 PROCEDURE — 25010000002 HEPARIN (PORCINE) PER 1000 UNITS

## 2022-04-18 PROCEDURE — 93005 ELECTROCARDIOGRAM TRACING: CPT | Performed by: INTERNAL MEDICINE

## 2022-04-18 PROCEDURE — 80053 COMPREHEN METABOLIC PANEL: CPT | Performed by: INTERNAL MEDICINE

## 2022-04-18 PROCEDURE — 83735 ASSAY OF MAGNESIUM: CPT | Performed by: INTERNAL MEDICINE

## 2022-04-18 PROCEDURE — 25010000002 METHYLPREDNISOLONE PER 125 MG: Performed by: INTERNAL MEDICINE

## 2022-04-18 PROCEDURE — 93010 ELECTROCARDIOGRAM REPORT: CPT | Performed by: INTERNAL MEDICINE

## 2022-04-18 PROCEDURE — 83880 ASSAY OF NATRIURETIC PEPTIDE: CPT | Performed by: INTERNAL MEDICINE

## 2022-04-18 PROCEDURE — 92960 CARDIOVERSION ELECTRIC EXT: CPT

## 2022-04-18 PROCEDURE — 93306 TTE W/DOPPLER COMPLETE: CPT

## 2022-04-18 PROCEDURE — 85730 THROMBOPLASTIN TIME PARTIAL: CPT | Performed by: INTERNAL MEDICINE

## 2022-04-18 PROCEDURE — 63710000001 INSULIN LISPRO (HUMAN) PER 5 UNITS: Performed by: INTERNAL MEDICINE

## 2022-04-18 PROCEDURE — 99233 SBSQ HOSP IP/OBS HIGH 50: CPT | Performed by: INTERNAL MEDICINE

## 2022-04-18 PROCEDURE — 92960 CARDIOVERSION ELECTRIC EXT: CPT | Performed by: INTERNAL MEDICINE

## 2022-04-18 PROCEDURE — 80061 LIPID PANEL: CPT | Performed by: INTERNAL MEDICINE

## 2022-04-18 PROCEDURE — 5A2204Z RESTORATION OF CARDIAC RHYTHM, SINGLE: ICD-10-PCS | Performed by: EMERGENCY MEDICINE

## 2022-04-18 PROCEDURE — 0 MAGNESIUM SULFATE 4 GM/100ML SOLUTION: Performed by: NURSE PRACTITIONER

## 2022-04-18 PROCEDURE — 81001 URINALYSIS AUTO W/SCOPE: CPT | Performed by: INTERNAL MEDICINE

## 2022-04-18 PROCEDURE — 94640 AIRWAY INHALATION TREATMENT: CPT

## 2022-04-18 PROCEDURE — 85520 HEPARIN ASSAY: CPT | Performed by: INTERNAL MEDICINE

## 2022-04-18 PROCEDURE — 87637 SARSCOV2&INF A&B&RSV AMP PRB: CPT | Performed by: INTERNAL MEDICINE

## 2022-04-18 PROCEDURE — G0378 HOSPITAL OBSERVATION PER HR: HCPCS

## 2022-04-18 PROCEDURE — 63710000001 INSULIN DETEMIR PER 5 UNITS: Performed by: INTERNAL MEDICINE

## 2022-04-18 PROCEDURE — 25010000002 HEPARIN (PORCINE) PER 1000 UNITS: Performed by: INTERNAL MEDICINE

## 2022-04-18 PROCEDURE — 63710000001 INSULIN REGULAR HUMAN PER 5 UNITS: Performed by: INTERNAL MEDICINE

## 2022-04-18 PROCEDURE — 99221 1ST HOSP IP/OBS SF/LOW 40: CPT | Performed by: NURSE PRACTITIONER

## 2022-04-18 PROCEDURE — 93306 TTE W/DOPPLER COMPLETE: CPT | Performed by: INTERNAL MEDICINE

## 2022-04-18 PROCEDURE — 85025 COMPLETE CBC W/AUTO DIFF WBC: CPT | Performed by: INTERNAL MEDICINE

## 2022-04-18 RX ORDER — ASPIRIN 81 MG/1
81 TABLET ORAL DAILY
Status: DISCONTINUED | OUTPATIENT
Start: 2022-04-18 | End: 2022-04-20 | Stop reason: HOSPADM

## 2022-04-18 RX ORDER — DIPHENOXYLATE HYDROCHLORIDE AND ATROPINE SULFATE 2.5; .025 MG/1; MG/1
1 TABLET ORAL DAILY
Status: DISCONTINUED | OUTPATIENT
Start: 2022-04-18 | End: 2022-04-20 | Stop reason: HOSPADM

## 2022-04-18 RX ORDER — DEXTROSE MONOHYDRATE 25 G/50ML
25 INJECTION, SOLUTION INTRAVENOUS
Status: DISCONTINUED | OUTPATIENT
Start: 2022-04-18 | End: 2022-04-19 | Stop reason: SDUPTHER

## 2022-04-18 RX ORDER — METHYLPREDNISOLONE SODIUM SUCCINATE 125 MG/2ML
60 INJECTION, POWDER, LYOPHILIZED, FOR SOLUTION INTRAMUSCULAR; INTRAVENOUS DAILY
Status: DISCONTINUED | OUTPATIENT
Start: 2022-04-18 | End: 2022-04-19

## 2022-04-18 RX ORDER — ACETAMINOPHEN 160 MG/5ML
650 SOLUTION ORAL EVERY 4 HOURS PRN
Status: DISCONTINUED | OUTPATIENT
Start: 2022-04-18 | End: 2022-04-20 | Stop reason: HOSPADM

## 2022-04-18 RX ORDER — NICOTINE POLACRILEX 4 MG
15 LOZENGE BUCCAL
Status: DISCONTINUED | OUTPATIENT
Start: 2022-04-18 | End: 2022-04-19 | Stop reason: SDUPTHER

## 2022-04-18 RX ORDER — IPRATROPIUM BROMIDE AND ALBUTEROL SULFATE 2.5; .5 MG/3ML; MG/3ML
3 SOLUTION RESPIRATORY (INHALATION)
Status: DISCONTINUED | OUTPATIENT
Start: 2022-04-18 | End: 2022-04-19

## 2022-04-18 RX ORDER — SODIUM CHLORIDE 0.9 % (FLUSH) 0.9 %
1-10 SYRINGE (ML) INJECTION AS NEEDED
Status: DISCONTINUED | OUTPATIENT
Start: 2022-04-18 | End: 2022-04-20 | Stop reason: HOSPADM

## 2022-04-18 RX ORDER — PRAVASTATIN SODIUM 20 MG
20 TABLET ORAL NIGHTLY
Status: DISCONTINUED | OUTPATIENT
Start: 2022-04-18 | End: 2022-04-20 | Stop reason: HOSPADM

## 2022-04-18 RX ORDER — HEPARIN SODIUM 1000 [USP'U]/ML
2500 INJECTION, SOLUTION INTRAVENOUS; SUBCUTANEOUS ONCE
Status: COMPLETED | OUTPATIENT
Start: 2022-04-18 | End: 2022-04-18

## 2022-04-18 RX ORDER — SODIUM CHLORIDE 0.9 % (FLUSH) 0.9 %
10 SYRINGE (ML) INJECTION EVERY 12 HOURS SCHEDULED
Status: DISCONTINUED | OUTPATIENT
Start: 2022-04-18 | End: 2022-04-20 | Stop reason: HOSPADM

## 2022-04-18 RX ORDER — HEPARIN SODIUM 1000 [USP'U]/ML
5000 INJECTION, SOLUTION INTRAVENOUS; SUBCUTANEOUS ONCE
Status: COMPLETED | OUTPATIENT
Start: 2022-04-18 | End: 2022-04-18

## 2022-04-18 RX ORDER — MAGNESIUM SULFATE HEPTAHYDRATE 40 MG/ML
4 INJECTION, SOLUTION INTRAVENOUS AS NEEDED
Status: DISCONTINUED | OUTPATIENT
Start: 2022-04-18 | End: 2022-04-20 | Stop reason: HOSPADM

## 2022-04-18 RX ORDER — ACETAMINOPHEN 325 MG/1
650 TABLET ORAL EVERY 4 HOURS PRN
Status: DISCONTINUED | OUTPATIENT
Start: 2022-04-18 | End: 2022-04-20 | Stop reason: HOSPADM

## 2022-04-18 RX ORDER — MIDAZOLAM HYDROCHLORIDE 1 MG/ML
INJECTION INTRAMUSCULAR; INTRAVENOUS
Status: DISCONTINUED
Start: 2022-04-18 | End: 2022-04-20 | Stop reason: HOSPADM

## 2022-04-18 RX ORDER — MAGNESIUM SULFATE HEPTAHYDRATE 40 MG/ML
2 INJECTION, SOLUTION INTRAVENOUS AS NEEDED
Status: DISCONTINUED | OUTPATIENT
Start: 2022-04-18 | End: 2022-04-20 | Stop reason: HOSPADM

## 2022-04-18 RX ORDER — FLUMAZENIL 0.1 MG/ML
INJECTION INTRAVENOUS
Status: DISCONTINUED
Start: 2022-04-18 | End: 2022-04-18 | Stop reason: WASHOUT

## 2022-04-18 RX ORDER — ACETAMINOPHEN 650 MG/1
650 SUPPOSITORY RECTAL EVERY 4 HOURS PRN
Status: DISCONTINUED | OUTPATIENT
Start: 2022-04-18 | End: 2022-04-20 | Stop reason: HOSPADM

## 2022-04-18 RX ADMIN — PRAVASTATIN SODIUM 20 MG: 20 TABLET ORAL at 21:07

## 2022-04-18 RX ADMIN — METHOHEXITAL SODIUM 30 MG: 500 INJECTION, POWDER, LYOPHILIZED, FOR SOLUTION INTRAMUSCULAR; INTRAVENOUS; RECTAL at 09:43

## 2022-04-18 RX ADMIN — ACETAMINOPHEN 650 MG: 325 TABLET ORAL at 03:07

## 2022-04-18 RX ADMIN — INSULIN LISPRO 5 UNITS: 100 INJECTION, SOLUTION INTRAVENOUS; SUBCUTANEOUS at 13:07

## 2022-04-18 RX ADMIN — HEPARIN SODIUM 5000 UNITS: 1000 INJECTION, SOLUTION INTRAVENOUS; SUBCUTANEOUS at 07:14

## 2022-04-18 RX ADMIN — INSULIN HUMAN 10 UNITS: 100 INJECTION, SOLUTION PARENTERAL at 17:02

## 2022-04-18 RX ADMIN — HEPARIN SODIUM 4000 UNITS: 1000 INJECTION, SOLUTION INTRAVENOUS; SUBCUTANEOUS at 00:23

## 2022-04-18 RX ADMIN — METHYLPREDNISOLONE SODIUM SUCCINATE 60 MG: 125 INJECTION, POWDER, FOR SOLUTION INTRAMUSCULAR; INTRAVENOUS at 03:40

## 2022-04-18 RX ADMIN — ACETAMINOPHEN 650 MG: 325 TABLET ORAL at 21:08

## 2022-04-18 RX ADMIN — ASPIRIN 81 MG: 81 TABLET, COATED ORAL at 08:24

## 2022-04-18 RX ADMIN — INSULIN LISPRO 4 UNITS: 100 INJECTION, SOLUTION INTRAVENOUS; SUBCUTANEOUS at 01:18

## 2022-04-18 RX ADMIN — IPRATROPIUM BROMIDE 0.5 MG: 0.5 SOLUTION RESPIRATORY (INHALATION) at 08:12

## 2022-04-18 RX ADMIN — IPRATROPIUM BROMIDE 0.5 MG: 0.5 SOLUTION RESPIRATORY (INHALATION) at 02:41

## 2022-04-18 RX ADMIN — INSULIN LISPRO 7 UNITS: 100 INJECTION, SOLUTION INTRAVENOUS; SUBCUTANEOUS at 17:01

## 2022-04-18 RX ADMIN — HEPARIN SODIUM 2500 UNITS: 1000 INJECTION, SOLUTION INTRAVENOUS; SUBCUTANEOUS at 21:08

## 2022-04-18 RX ADMIN — Medication 10 ML: at 08:25

## 2022-04-18 RX ADMIN — ACETAMINOPHEN 650 MG: 325 TABLET ORAL at 08:23

## 2022-04-18 RX ADMIN — INSULIN DETEMIR 10 UNITS: 100 INJECTION, SOLUTION SUBCUTANEOUS at 21:08

## 2022-04-18 RX ADMIN — MULTIVITAMIN TABLET 1 TABLET: TABLET at 08:24

## 2022-04-18 RX ADMIN — HEPARIN SODIUM 12 UNITS/KG/HR: 5000 INJECTION INTRAVENOUS; SUBCUTANEOUS at 00:24

## 2022-04-18 RX ADMIN — IPRATROPIUM BROMIDE AND ALBUTEROL SULFATE 3 ML: .5; 2.5 SOLUTION RESPIRATORY (INHALATION) at 20:47

## 2022-04-18 RX ADMIN — MAGNESIUM SULFATE HEPTAHYDRATE 4 G: 40 INJECTION, SOLUTION INTRAVENOUS at 02:14

## 2022-04-18 RX ADMIN — INSULIN LISPRO 5 UNITS: 100 INJECTION, SOLUTION INTRAVENOUS; SUBCUTANEOUS at 17:01

## 2022-04-18 RX ADMIN — Medication 10 ML: at 03:42

## 2022-04-18 RX ADMIN — PRAVASTATIN SODIUM 20 MG: 20 TABLET ORAL at 01:13

## 2022-04-18 RX ADMIN — Medication 15 MG/HR: at 05:55

## 2022-04-18 RX ADMIN — INSULIN LISPRO 5 UNITS: 100 INJECTION, SOLUTION INTRAVENOUS; SUBCUTANEOUS at 05:55

## 2022-04-18 RX ADMIN — Medication 10 ML: at 21:14

## 2022-04-18 RX ADMIN — LEVOTHYROXINE SODIUM 175 MCG: 150 TABLET ORAL at 05:33

## 2022-04-18 RX ADMIN — INSULIN LISPRO 6 UNITS: 100 INJECTION, SOLUTION INTRAVENOUS; SUBCUTANEOUS at 13:07

## 2022-04-18 RX ADMIN — HEPARIN SODIUM 16 UNITS/KG/HR: 5000 INJECTION INTRAVENOUS; SUBCUTANEOUS at 18:25

## 2022-04-18 NOTE — PROGRESS NOTES
[facility] INFUSION  Jill Miller is a  61 y.o. female receiving heparin infusion.             Therapy for (VTE/Cardiac):   Cardiac  Patient Weight: 88.9 kg  Initial Bolus (Y/N):   Y  Any Bolus (Y/N):   Y        Signs or Symptoms of Bleeding: N/A      Cardiac or Other (Not VTE)   Initial Bolus: 60 units/kg (Max 4,000 units)  Initial rate: 12 units/kg/hr (Max 1,000 units/hr)   Anti-Xa (IU/mL) Bolus Dose Stop Infusion Rate Change Repeat Anti-Xa      ?0.19 60 units/kg 0 hrs Increase rate by   4 units/kg/hr 6 hrs       0.2 - 0.29  30 units/kg 0 hrs Increase rate by 2 units/kg/hr 6 hrs    0.3 - 0.7 0 0 hrs No change 6 hrs      0.71 - 0.99 0  0 hrs Decrease rate by 2 units/kg/hr 6 hrs            ?1 0 Hold 1 hr Decrease rate by 3 units/kg/hr 6 hrs      Recommend Xa every 6 hours.     Results from last 7 days   Lab Units 04/18/22  0610 04/18/22  0014 04/17/22  1858   INR  1.01 1.03  --    HEMOGLOBIN g/dL 15.9  --  16.3*   HEMATOCRIT % 45.8  --  46.1   PLATELETS 10*3/mm3 162  --  201          Date   Time   Anti-Xa Current Rate (Unit/kg/hr) Bolus   (Units) Rate Change   (Unit/kg/hr) New Rate (Unit/kg/hr) Next   Anti-Xa Comments  Pump Check Daily   4/17 2345 STAT  New 4000 +12 12 AM Christian RN   4/18 0610 0.18 12 5000 +4 16 1400 Liza [id number]                                                                                                                                                                                                                      Jeremias Sandoval [facility]  4/18/2022  07:04 EDT

## 2022-04-18 NOTE — PLAN OF CARE
Goal Outcome Evaluation:    Patient is now on 4 liters nasal cannula.  She got a breathing treatment and seemed to do a little better.  Solumedrol was given.  She is remaining in the low 90's pm 4 liters.  She was started on heparin gtt.  She is still on iv cardizem drip.  Her heart rate is still at 133-135. Will continue to  monitor.

## 2022-04-18 NOTE — NURSING NOTE
Paged on call provider.  Patient is now on 4 L  And saturations are at 89-90%.    Also Mag is 1.8.  Asking to see if provider would like it replaced.

## 2022-04-18 NOTE — H&P
[facility] Medicine Services  HISTORY AND PHYSICAL    Patient Name: Jill Miller  : 1960  MRN: [id number]  Primary Care Physician: Marisa Lynch PA  Date of admission: 2022      Subjective   Subjective     Chief Complaint:  Rapid Heart rate.    HPI:  Jill Miller is a 61 y.o. female  to ED secondary to rapid heart rate which she noted on her blood pressure monitor-going on for 5 days constant heart rate staying in 130-140s, do not complain of any overt palpitations, do not complain of any associated chest pain, lightheadedness or dizziness.  Patient does complain of dry cough which also has been going for 5 days, also complains of exertional dyspnea, no worsening pedal edema or PND symptoms.  No complaint of fever or chills, has been unvaccinated for Covid, do not complain of any headache.  Does complain of some sinus congestion.  Did not complain of any overt wheezing.  Denies any bleeding per GI or , no enlarged glands, do not complain of any focal neurological weakness.  No new skin rash.      In ED patient was started on Cardizem drip.  Current COVID Risks are:  [] Fever []  Cough [] Shortness of breath [] Fatigue [] Change in taste or smell    [] Exposure to COVID positive patient  [] High risk facility   []  NONE  COVID VACCINE status    The patient qualifies to receive the vaccine, but they have not yet received it.      Review of Systems   Complete ROS done, which is negative except as above and HPI.  Old records reviewed and summarized in PM hx      Personal History     Past Medical History:   Diagnosis Date   • Acute diastolic congestive heart failure (HCC)    • Aortic valve disorder    • Aortic valve disorder    • Atrial fibrillation and flutter (HCC)    • Chronic diastolic congestive heart failure (HCC)    • Chronically Abnormal CXR (Left pleural disease post op) 2017   • COPD exacerbation (HCC) 2022   • Essential hypertension 4/10/2017     Target blood pressure <130/80 mmHg   • Graves disease    • Hyperlipidemia LDL goal <70    • Hypertension    • Hyperthyroidism    • Lung cancer (HCC)    • MRSA (methicillin resistant staph aureus) culture positive 2014    under left breast, incision and debridement, treated with wound packing and po abx    • Rheumatic mitral stenosis    • Rheumatic mitral stenosis    • Type 2 diabetes mellitus (HCC) 7/17/2018   • Valvular heart disease    • Valvular heart disease    Hypertension  Hyperlipidemia-pravastatin 20 mg daily  Hx left upper lobe squamous cell carcinoma s/p left upper lobectomy 5/2017-follows up with Dr. Cheatham  Paroxysmal A. fib, s/p maze procedure, ANGELA ligation-2018 CHADS2 VASC=  3, on Rythmol 600 mg as needed for palpitations, follows with Dr. Lay, aspirin 81 mg  S/p bioprosthetic mitral valve, aortic valve replacement, tricuspid annuloplasty  DM2  Hypothyroid-Synthroid 175 mcg      Past Surgical History:   Procedure Laterality Date   • ABLATION OF DYSRHYTHMIC FOCUS  7-   • ABSCESS DRAINAGE      under left breast d/t mrsa    • AORTIC VALVE REPAIR/REPLACEMENT N/A 7/16/2018    Procedure: MEDIAN STERNOTOMY, AORTIC VALVE REPLACEMENT WITH TIFFANIE AND MAZE, TRICUSPID RING, LEFT ATRIAL OCCLUSION;  Surgeon: Ajay Cheatham MD;  Location:  [facility];  Service: Cardiothoracic   • BRONCHOSCOPY Left 5/12/2017    Procedure: BRONCHOSCOPY;  Surgeon: Ajay Cheatham MD;  Location:  [facility];  Service:    • BRONCHOSCOPY N/A 5/18/2017    Procedure: BRONCHOSCOPY BIOPSY AT BEDSIDE;  Surgeon: Ajay Cheatham MD;  Location:  [facility];  Service:    • CARDIAC CATHETERIZATION N/A 9/28/2017    Procedure: Left Heart Cath;  Surgeon: Marco Lay MD;  Location:  [facility];  Service:    • CARDIAC CATHETERIZATION N/A 9/28/2017    Procedure: Right Heart Cath;  Surgeon: Marco Lay MD;  Location:  [facility];  Service:    • LUNG BIOPSY  04/2017   • LYMPH NODE  DISSECTION Left 5/12/2017    Procedure: MEDIASTINAL LYMPH NODE DISSECTION;  Surgeon: Ajay Cheatham MD;  Location:  [facility];  Service:    • MAZE PROCEDURE     • MITRAL VALVE REPAIR/REPLACEMENT N/A 7/16/2018    Procedure: MITRAL VALVE REPLACEMENT;  Surgeon: Ajay Cheatham MD;  Location:  [facility];  Service: Cardiothoracic   • MITRAL VALVE REPLACEMENT  7-   • TEETH EXTRACTION     • THORACOSCOPY VIDEO ASSISTED WITH LOBECTOMY Left 5/12/2017    Procedure: THORACOSCOPY VIDEO ASSISTED WITH OPEN LOBECTOMY;  Surgeon: Ajay Cheatham MD;  Location:  [facility];  Service:    • TOTAL THYROIDECTOMY  approx 2012   • TRICUSPID VALVE SURGERY  7-       Family History: family history includes Breast cancer in her mother; Diabetes in her father. Otherwise pertinent FHx was reviewed and unremarkable.     Social History:  reports that she has been smoking cigarettes. She started smoking about 43 years ago. She has a 20.00 pack-year smoking history. She has never used smokeless tobacco. She reports that she does not drink alcohol and does not use drugs.      Medications:  Available home medication information reviewed.  Medications Prior to Admission   Medication Sig Dispense Refill Last Dose   • propafenone (RYTHMOL) 300 MG tablet Take 2 tablets by mouth As Needed (for atrial fibrillation). 12 tablet 1 4/16/2022 at Unknown time   • aspirin 81 MG EC tablet Take 1 tablet by mouth Daily.      • Cholecalciferol (vitamin D3) 125 MCG (5000 UT) tablet tablet Take 5,000 Units by mouth Daily.      • levothyroxine (SYNTHROID, LEVOTHROID) 175 MCG tablet Take 175 mcg by mouth Daily.      • multivitamin (THERAGRAN) tablet tablet Take  by mouth Daily.      • pravastatin (PRAVACHOL) 20 MG tablet Take 20 mg by mouth Daily.          Allergies   Allergen Reactions   • Lortab [Hydrocodone-Acetaminophen] Nausea And Vomiting and Dizziness   • Morphine And Related Nausea Only       Objective   Objective     Vital Signs:   Temp:  [98 °F (36.7 °C)]  98 °F (36.7 °C)  Heart Rate:  [135-142] 137  Resp:  [24] 24-80% on room air, on 2 L 88% age  BP: (105-140)/() 109/86        Physical Exam     GENERAL- Not distressed, well nourished.  RS-bilateral expiratory wheezing, with decreased air entry at the bases.  CVS- s1s2 Regular, tachycardic, could not appreciate murmur  ABD- soft, nontender, not distended, no organomegaly.  EXT- no edema.  NEURO- AAO-3, power 5/5 in all ext, no gross sensory deficit, cranial nerves intact.  EYES- Conjunctivae are normal. Pupils are equal, round, and reactive to light. No scleral icterus.   ENT- no external ear nose lesions, mucosa moist.  NECK- No JVD present. No tracheal deviation present. No thyromegaly present,No cervical lymphadenopathy.  JOINTS/MSK- no deformity, no swelling.  SKIN- no rash , warm to touch.  PSYCHIATRIC- Normal mood and affect. Behavior is normal. Thought content normal.     Results Reviewed:  I have personally reviewed current lab and radiology data.    Results from last 7 days   Lab Units 04/17/22  1858   WBC 10*3/mm3 8.61   HEMOGLOBIN g/dL 16.3*   HEMATOCRIT % 46.1   PLATELETS 10*3/mm3 201     Results from last 7 days   Lab Units 04/17/22  1933   SODIUM mmol/L 138   POTASSIUM mmol/L 4.2   CHLORIDE mmol/L 100   CO2 mmol/L 29.0   BUN mg/dL 21*   CREATININE mg/dL 0.94   GLUCOSE mg/dL 284*   CALCIUM mg/dL 8.7   ALT (SGPT) U/L 103*   AST (SGOT) U/L 107*   TROPONIN T ng/mL <0.010   PROBNP pg/mL 724.6     Estimated Creatinine Clearance: 66.7 mL/min (by C-G formula based on SCr of 0.94 mg/dL).  Brief Urine Lab Results     None        Imaging Results (Last 24 Hours)     Procedure Component Value Units Date/Time    CT Angiogram Chest [[id number]] Collected: 04/17/22 2102     Updated: 04/17/22 2111    Narrative:      CTA CHEST WITH CONTRAST    CLINICAL INDICATION: Tachycardia.    COMPARISON: None.    TECHNIQUE: 80 cc Isovue 370 administered intravenously. CTA images of the chest were obtained. Three-dimensional  volume reconstructions were generated. CT dose lowering techniques were used, to include: automated exposure control, adjustment for patient   size, and or use of iterative reconstruction.    FINDINGS:    CTA: No pulmonary arterial filling defect. Heart size normal without pericardial effusion. Mediastinal vessels normal in caliber and patency.    Mediastinum: No mediastinal or hilar lymphadenopathy. Trachea and central airways are patent. Thyroid is normal. Esophagus is normal.     Lungs and pleura: Status post left upper lobectomy. No focal consolidation, pleural effusion or pneumothorax.     Upper abdomen: No acute abnormality.    Body wall: No acute abnormality.    Bones: No acute osseous abnormality.      Impression:      No pulmonary embolism. No acute cardiopulmonary abnormality.    Electronically signed by:  Omid Ordoñez M.D.    4/17/2022 7:10 PM Mountain Time    XR Chest 1 View [[id number]] Collected: 04/17/22 1918     Updated: 04/17/22 1922    Narrative:      DATE OF EXAM: 4/17/2022 7:17 PM     PROCEDURE: XR CHEST 1 VW-     INDICATIONS: Dysrhythmia triage protocol     COMPARISON: CT chest 03/18/2022, AP chest x-ray 07/26/2018.     TECHNIQUE: Single radiographic AP view of the chest was obtained.     FINDINGS:  Cardiac silhouette is mildly prominent, but is exaggerated by portable  AP technique. Changes of median sternotomy, cardiac valve replacements,  and left atrial appendage occlusion are redemonstrated. There are mild  airspace opacities in both lower lungs. No pneumothorax or large pleural  effusion is seen.        Impression:      Mild bilateral lower lung airspace opacities, which may be due to  atelectasis or less likely pneumonia.     This report was finalized on 4/17/2022 7:19 PM by Shelia Mckeon MD.           Results for orders placed during the hospital encounter of 09/17/18    Adult Transesophageal Echo (TIFFANIE) W/ Cont if Necessary Per Protocol    Interpretation Summary  · Left ventricular  "systolic function is normal.  · Left atrial cavity size is severely dilated.  · The bioprosthetic aortic and mitral valves function normally  · Successful exclusion of the left atrial appendage at the time of valve surgery confirmed.    EKG-\"regular rhythm at the rate of 137 bpm, P waves noted on V2, ST depression noted in 1 and aVL,     Assessment/Plan   Active Hospital Problems    Diagnosis  POA   • **Heart palpitations [R00.2]  Yes   • COPD exacerbation (HCC) [J44.1]  Yes   • Valvular heart disease [I38]  Yes   • Type 2 diabetes mellitus (HCC) [E11.9]  Yes   • Hyperlipidemia LDL goal <70 [E78.5]  Yes     High intensity statin indicated given the presence of diabetes and mild CAD     • Acquired hypothyroidism [E03.9]  Yes       Assessment & Plan     Rapid heart rate-with head Minocin administration, slowed down for few seconds, most likely underlying rhythm being a flutter.  -In spite of Cardizem, patient's heart rate has been sustaining at 137,  -We will try to titrate the Cardizem as needed.  -Cardiology consult in a.m.,  -We will add IV heparin, given patient symptoms ongoing for 5 days.  -Echo in a.m.  -fup LENARD, probnp.    Dry cough, wheezing, hypoxia-most likely secondary to underlying COPD with mild exacerbation, will try to avoid albuterol nebs currently due to tachycardia,  -Likely will need evaluation for home O2 upon discharge and pulmonary consult for COPD evaluation.  -low dose steroids, ipratrapioum.    Hypertension-currently not on medications, appears controlled    Hyperlipidemia-pravastatin 20 mg daily    Hx left upper lobe squamous cell carcinoma s/p left upper lobectomy 5/2017-follows up with Dr. Chaparrita owen, s/p maze procedure, ANGELA ligation-2018 CHADS2 VASC=  3, on Rythmol 600 mg as needed for palpitations, follows with Dr. Lay, aspirin 81 mg    S/p bioprosthetic mitral valve, aortic valve replacement, tricuspid annuloplasty    DM2-blood sugar of 284-we will start " with fingerstick coverage tonight.    Hypothyroid-Synthroid 175 mcg-we will not change Synthroid dose currently.    DVT prophylaxis:    -TEDs/SCDs  -Lovenox-on heparin drip    CODE STATUS:    Code Status and Medical Interventions:   Ordered at: 04/17/22 3597     Level Of Support Discussed With:    Patient     Code Status (Patient has no pulse and is not breathing):    CPR (Attempt to Resuscitate)     Medical Interventions (Patient has pulse or is breathing):    Full Support         Admission Status:  I believe this patient meets inpatient status secondary to A. fib.

## 2022-04-18 NOTE — PROGRESS NOTES
[facility] Medicine Services  PROGRESS NOTE    Patient Name: Jill Miller  : 1960  MRN: [id number]    Date of Admission: 2022  Primary Care Physician: Marisa Lynch PA    Subjective   Subjective     CC:  Aflutter/copd    HPI:  Patient doing ok. Denies palpitations despite HR in 130s all night. Patient reports having difficult with atrovent nebs causing HA and does not want to continue these. NPO for cardioversion later today    ROS:  Gen- No fevers, chills  CV- No chest pain, palpitations  Resp- No cough, dyspnea  GI- No N/V/D, abd pain    +HA     Objective   Objective     Vital Signs:   Temp:  [96.7 °F (35.9 °C)-98 °F (36.7 °C)] 97.9 °F (36.6 °C)  Heart Rate:  [] 84  Resp:  [20-24] 22  BP: ()/() 111/75  Flow (L/min):  [2-4] 4     Physical Exam:  GEN: NAD, resting in bed, awake  HEENT: on room air, atraumatic, normocephalic  NECK: supple, no masses  RESP: on NC, no sig wheezing  CV: on tele, aflutter with rates in 130s  PSYCH: normal affect, appropriate  NEURO: awake, alert, no focal deficits noted  MSK: no edema noted  SKIN: no rashes noted       Results Reviewed:  LAB RESULTS:      Lab 22  0610 22  0014 22  1858   WBC 10.73  --  8.61   HEMOGLOBIN 15.9  --  16.3*   HEMATOCRIT 45.8  --  46.1   PLATELETS 162  --  201   NEUTROS ABS 9.93*  --  6.63   IMMATURE GRANS (ABS) 0.04  --  0.04   LYMPHS ABS 0.50*  --  1.36   MONOS ABS 0.21  --  0.51   EOS ABS 0.02  --  0.04   .7*  --  109.5*   PROTIME 13.2 13.4  --    APTT  --  27.8*  --    HEPARIN ANTI-XA 0.18* 0.10*  --          Lab 22  0610 22  1933   SODIUM 136 138   POTASSIUM 4.5 4.2   CHLORIDE 98 100   CO2 27.0 29.0   ANION GAP 11.0 9.0   BUN 18 21*   CREATININE 0.66 0.94   EGFR 101.2 70.0   GLUCOSE 366* 284*   CALCIUM 8.3* 8.7   MAGNESIUM  --  1.8   HEMOGLOBIN A1C 9.60*  --    TSH  --  13.930*         Lab 22  0610 22  1933   TOTAL PROTEIN 7.3 6.8    ALBUMIN 4.30 4.20   GLOBULIN 3.0 2.6   ALT (SGPT) 103* 103*   AST (SGOT) 77* 107*   BILIRUBIN 0.5 0.4   ALK PHOS 126* 121*         Lab 04/18/22  0610 04/18/22  0014 04/17/22 1933   PROBNP  --  623.8 724.6   TROPONIN T <0.010 <0.010 <0.010   PROTIME 13.2 13.4  --    INR 1.01 1.03  --          Lab 04/18/22 0610   CHOLESTEROL 133   LDL CHOL 58   HDL CHOL 46   TRIGLYCERIDES 176*             Brief Urine Lab Results  (Last result in the past 365 days)      Color   Clarity   Blood   Leuk Est   Nitrite   Protein   CREAT   Urine HCG        04/18/22 0607 Yellow   Cloudy   Trace   Negative   Negative   30 mg/dL (1+)                 Microbiology Results Abnormal     Procedure Component Value - Date/Time    COVID PRE-OP / PRE-PROCEDURE SCREENING ORDER (NO ISOLATION) - Swab, Nasopharynx [[id number]]  (Normal) Collected: 04/18/22 0530    Lab Status: Final result Specimen: Swab from Nasopharynx Updated: 04/18/22 0625    Narrative:      The following orders were created for panel order COVID PRE-OP / PRE-PROCEDURE SCREENING ORDER (NO ISOLATION) - Swab, Nasopharynx.  Procedure                               Abnormality         Status                     ---------                               -----------         ------                     COVID-19, FLU A/B, RSV P...[[id number]]  Normal              Final result                 Please view results for these tests on the individual orders.    COVID-19, FLU A/B, RSV PCR - Swab, Nasopharynx [[id number]]  (Normal) Collected: 04/18/22 0530    Lab Status: Final result Specimen: Swab from Nasopharynx Updated: 04/18/22 0625     COVID19 Not Detected     Influenza A PCR Not Detected     Influenza B PCR Not Detected     RSV, PCR Not Detected    Narrative:      Fact sheet for providers: https://www.fda.gov/media/481452/download    Fact sheet for patients: https://www.fda.gov/media/840214/download    Test performed by PCR.          XR Chest 1 View    Result Date: 4/17/2022  DATE OF EXAM: 4/17/2022  7:17 PM  PROCEDURE: XR CHEST 1 VW-  INDICATIONS: Dysrhythmia triage protocol  COMPARISON: CT chest 03/18/2022, AP chest x-ray 07/26/2018.  TECHNIQUE: Single radiographic AP view of the chest was obtained.  FINDINGS: Cardiac silhouette is mildly prominent, but is exaggerated by portable AP technique. Changes of median sternotomy, cardiac valve replacements, and left atrial appendage occlusion are redemonstrated. There are mild airspace opacities in both lower lungs. No pneumothorax or large pleural effusion is seen.      Impression: Mild bilateral lower lung airspace opacities, which may be due to atelectasis or less likely pneumonia.  This report was finalized on 4/17/2022 7:19 PM by Shelia Mckeon MD.      CT Angiogram Chest    Result Date: 4/17/2022  CTA CHEST WITH CONTRAST CLINICAL INDICATION: Tachycardia. COMPARISON: None. TECHNIQUE: 80 cc Isovue 370 administered intravenously. CTA images of the chest were obtained. Three-dimensional volume reconstructions were generated. CT dose lowering techniques were used, to include: automated exposure control, adjustment for patient size, and or use of iterative reconstruction. FINDINGS: CTA: No pulmonary arterial filling defect. Heart size normal without pericardial effusion. Mediastinal vessels normal in caliber and patency. Mediastinum: No mediastinal or hilar lymphadenopathy. Trachea and central airways are patent. Thyroid is normal. Esophagus is normal. Lungs and pleura: Status post left upper lobectomy. No focal consolidation, pleural effusion or pneumothorax. Upper abdomen: No acute abnormality. Body wall: No acute abnormality. Bones: No acute osseous abnormality.     Impression: No pulmonary embolism. No acute cardiopulmonary abnormality. Electronically signed by:  Omid Ordoñez M.D.  4/17/2022 7:10 PM Mountain Time      Results for orders placed during the hospital encounter of 09/17/18    Adult Transesophageal Echo (TIFFANIE) W/ Cont if Necessary Per  Protocol    Interpretation Summary  · Left ventricular systolic function is normal.  · Left atrial cavity size is severely dilated.  · The bioprosthetic aortic and mitral valves function normally  · Successful exclusion of the left atrial appendage at the time of valve surgery confirmed.      I have reviewed the medications:  Scheduled Meds:aspirin, 81 mg, Oral, Daily  insulin lispro, 0-7 Units, Subcutaneous, Q6H  levothyroxine, 175 mcg, Oral, Q AM  methohexital, , ,   methylPREDNISolone sodium succinate, 60 mg, Intravenous, Daily  midazolam, , ,   multivitamin, 1 tablet, Oral, Daily  pravastatin, 20 mg, Oral, Nightly  sodium chloride, 10 mL, Intravenous, Q12H      Continuous Infusions:dilTIAZem, 5-15 mg/hr, Last Rate: Stopped (04/18/22 1010)  heparin, 16 Units/kg/hr, Last Rate: 16 Units/kg/hr (04/18/22 0717)  Pharmacy to Dose Heparin,       PRN Meds:.•  acetaminophen **OR** acetaminophen **OR** acetaminophen  •  dextrose  •  dextrose  •  glucagon (human recombinant)  •  magnesium sulfate **OR** magnesium sulfate **OR** magnesium sulfate  •  Pharmacy to Dose Heparin  •  sodium chloride  •  sodium chloride    Assessment/Plan   Assessment & Plan     Active Hospital Problems    Diagnosis  POA   • **Atrial flutter (HCC) [I48.92]  Yes   • COPD exacerbation (HCC) [J44.1]  Yes   • Type 2 diabetes mellitus (HCC) [E11.9]  Yes   • Valvular heart disease [I38]  Yes   • Hyperlipidemia LDL goal <70 [E78.5]  Yes   • Paroxysmal atrial fibrillation (HCC) [I48.0]  Yes   • Acquired hypothyroidism [E03.9]  Yes      Resolved Hospital Problems    Diagnosis Date Resolved POA   • Lung cancer; S/P Bronch/Thor w/ ALBINA lobectomy/mediastinal lymph node resec (5/12/17, Dr. Cheatham) [C34.92] 09/29/2017 Yes        Brief Hospital Course to date:  Jill Miller is a 61 y.o. female  to ED secondary to rapid heart rate which she noted on her blood pressure monitor-going on for 5 days constant heart rate staying in 130-140s, do not complain of any  overt palpitations, do not complain of any associated chest pain, lightheadedness or dizziness.    Atrial Flutter with RVR  --s/p ECV with cardiology this AM  --on heparin/dilt gtt    Valvular Heart Dz  --ECHO ordered and pending    Copd with acute exacerbation  History of lung cancer  --continue IV steroids  --patient reports inability to tolerate atrovent nebs due to HA-- will trial duonebs and assess response  --wean oxygen as able     [id number]  --A1C 9.6  --hyperglycemic in setting of IV steroids as well  --uptitrate insulin today - add basal/prandial, monitor    Elevated TSH, nl T4  --monitor- will need to be rechecked in 4-6 weeks        DVT prophylaxis:  Medical and mechanical DVT prophylaxis orders are present.            Disposition: I expect the patient to be discharged pending improvement    CODE STATUS:   Code Status and Medical Interventions:   Ordered at: 04/17/22 0682     Level Of Support Discussed With:    Patient     Code Status (Patient has no pulse and is not breathing):    CPR (Attempt to Resuscitate)     Medical Interventions (Patient has pulse or is breathing):    Full Support       Mary Oscar MD  04/18/22

## 2022-04-18 NOTE — CASE MANAGEMENT/SOCIAL WORK
Discharge Planning Assessment  [facility]     Patient Name: Jill Miller  MRN: [id number]  Today's Date: 4/18/2022    Admit Date: 4/17/2022     Discharge Needs Assessment     Row Name 04/18/22 1521       Living Environment    People in Home spouse    Name(s) of People in Home Zen Miller    Current Living Arrangements home    Primary Care Provided by self    Provides Primary Care For no one    Family Caregiver if Needed spouse    Family Caregiver Names Zen Miller    Quality of Family Relationships unable to assess    Able to Return to Prior Arrangements no       Resource/Environmental Concerns    Resource/Environmental Concerns none       Transition Planning    Patient/Family Anticipates Transition to home    Patient/Family Anticipated Services at Transition none    Transportation Anticipated family or friend will provide       Discharge Needs Assessment    Readmission Within the Last 30 Days no previous admission in last 30 days    Equipment Currently Used at Home none    Concerns to be Addressed no discharge needs identified;denies needs/concerns at this time    Anticipated Changes Related to Illness none    Equipment Needed After Discharge none    Provided Post Acute Provider List? N/A    Discharge Coordination/Progress Home               Discharge Plan     Row Name 04/18/22 1524       Plan    Plan Home    Patient/Family in Agreement with Plan yes    Plan Comments Spoke with patient at bedside regarding discharge planning.  Patient denies use of HH or DME and reports she still works full time.  She indicates that she has prescription coverage and that now after this hospitalization her deductible will be met.   She lives with her  in an upstairs apartment and has 15 stairs to climb to enter.  She reports that she did not received the COVID vaccine.  She is currently on 4LO2NC but is not on any Oxygen at home.  No immediate discharge needs verbalzied.  YAZAN barakat.  Patient plan is to  discharge home via car with  family to transport.    Final Discharge Disposition Code 01 - home or self-care              Continued Care and Services - Admitted Since 4/17/2022    Coordination has not been started for this encounter.       Expected Discharge Date and Time     Expected Discharge Date Expected Discharge Time    Apr 20, 2022          Demographic Summary     Row Name 04/18/22 1520       General Information    Admission Type inpatient    Arrived From home    Referral Source admission list    Reason for Consult discharge planning    Preferred Language English    General Information Comments linette Lynch PA-C       Contact Information    Permission Granted to Share Info With     Contact Information Obtained for     Contact Information Comments Zen Miller, spouse   [phone]               Functional Status     Row Name 04/18/22 1521       Functional Status    Usual Activity Tolerance good    Current Activity Tolerance good       Functional Status, IADL    Medications independent    Meal Preparation independent    Housekeeping independent    Laundry independent    Shopping independent       Employment/    Employment/ Comments [employer]               Psychosocial    No documentation.                Abuse/Neglect    No documentation.                Legal    No documentation.                Substance Abuse    No documentation.                Patient Forms    No documentation.                   Nora Orozco RN

## 2022-04-18 NOTE — CONSULTS
"  Referring Provider: MD Dayne  Reason for Consultation: AoCRF    Subjective .   Education:  NN spoke with pt at BS.  Pt alert and able to answer questions appropriately.  Pt O2 sat 90% on 4  L currently, no home O2 use.  Pt reports the ability to ambulate ~15 stairsteps at baseline before experiencing SOB. She is not prescribed a rescue inhaler.  Patient is not up to date on COVID, flu and PNA vaccines. Patient is a current tobacco user.  Tobacco cessation encouraged.  Patient states no current level of motivation to quit.  Discussion of smoking cessation consisted of assessment interview, provision of community resources, counseling on tobacco dependence, nonpharmacologic cessation techniques, medications and relapse prevention.   Smoking cessation education completed. Cessation support resources discussed with pt. This discussion lasted between  5 and 8  minutes.  Pt reports no issues at this time with medications or transportation for appointments.  Pt reports no previous hx of formal COPD teaching, no understanding of action plan, or AR.  Stop light report, NN contact information, instructions for accessing [facility] and list of educational videos given to pt.  \"A Patient's Guide to COPD\" booklet left at BS. [id number] reference sheet discussed and given to patient at BS.  Benefits and various levels of pulmonary rehab explained.   COPD education completed in the form of explanation, handouts, and videos.  COPD Taking Control started at BS.  No new concerns or questions voiced at this time.  NN will continue to follow as needed.     Age: 61 y.o.  Sex: female  Smoker Status: current, ~39 pack years  Pulmonologist: NA  FEV1 (PFT): NA  Home O2: RA    Objective     SpO2 SpO2: (!) 89 % (04/18/22 1015)  Device Device (Oxygen Therapy): nasal cannula (04/18/22 1230)  Flow Flow (L/min): 4 (04/18/22 1230)  Incentive Spirometer    IS Predicted Level (mL)     Number of Repetitions     Level Incentive Spirometer (mL)  "   Patient Tolerance     Inhaler Treatment Status    Treatment Route        Home Medications:  Medications Prior to Admission   Medication Sig Dispense Refill Last Dose   • propafenone (RYTHMOL) 300 MG tablet Take 2 tablets by mouth As Needed (for atrial fibrillation). 12 tablet 1 4/16/2022 at Unknown time   • aspirin 81 MG EC tablet Take 1 tablet by mouth Daily.      • Cholecalciferol (vitamin D3) 125 MCG (5000 UT) tablet tablet Take 5,000 Units by mouth Daily.      • levothyroxine (SYNTHROID, LEVOTHROID) 175 MCG tablet Take 175 mcg by mouth Daily.      • multivitamin (THERAGRAN) tablet tablet Take  by mouth Daily.      • pravastatin (PRAVACHOL) 20 MG tablet Take 20 mg by mouth Daily.          Discussion: Per current GOLD Standards, please consider: No Rescue in place, No LAMA/LABA/ICS in place, Outpatient PFT, Rehab as appropriate, Palliative Care consult, NRT at [place], Annual LDCT per current screening guidelines (age 50-80 years old, smoking history of 20 pack years or more or has quit within past 15 years)           Eve Gilman RN

## 2022-04-18 NOTE — ED PROVIDER NOTES
[place]    EMERGENCY DEPARTMENT ENCOUNTER      Pt Name: Jill Miller  MRN: [id number]  YOB: 1960  Date of evaluation: 4/17/2022  Provider: YANETH Le    CHIEF COMPLAINT       Chief Complaint   Patient presents with   • Rapid Heart Rate         HISTORY OF PRESENT ILLNESS  (Location/Symptom, Timing/Onset, Context/Setting, Quality, Duration, Modifying Factors, Severity.)   Jill Miller is a 61 y.o. female who presents to the emergency department with complaints of a rapid heart rate and shortness of breath that is made worse by exertion.  Patient reports that she started experiencing an elevated heart rate approximately 5 days ago.  She states that she has been following with Dr. Lay with cardiology who prescribed her some medication for her elevated heart rate to use as needed.  She states that she took 1 dose of Rythmol yesterday but that it did not help at all and she has not taken any additional doses.  Patient is not anticoagulated and only takes an 81 mg aspirin.  She has past medical history significant for hypertension, hyperlipidemia, atrial fibrillation and flutter, hypothyroidism, diabetes and valvular heart disease.  Patient also with a history of lung cancer but continues to be an everyday smoker.  Patient denies anything specific that makes her symptoms better she reports that the increased heart rate makes her short of breath and fatigue.  She denies any additional associated symptoms on exam.  Of note in review of previous medical records most recent heart cath in August 2017 which demonstrated mild nonobstructive coronary artery disease and moderate pulmonary hypertension.  Echocardiogram (TIFFANIE) 2018 demonstrated bioprosthetic aortic and mitral valves functioning normal with left ventricular systolic function normal and a dilated left atrial cavity in addition to a successful exclusion of the left atrial appendage at time of valve surgery.  HPI   Nursing notes were  reviewed.    REVIEW OF SYSTEMS    (2-9 systems for level 4, 10 or more for level 5)   Review of Systems   Constitutional: Positive for activity change and fatigue. Negative for chills and fever.   Respiratory: Positive for shortness of breath. Negative for chest tightness.    Cardiovascular: Positive for palpitations. Negative for chest pain.   Gastrointestinal: Negative.    Genitourinary: Negative.    Musculoskeletal: Positive for arthralgias and myalgias.   Skin: Negative.    Neurological: Negative.         All systems reviewed and negative except for those discussed in HPI.   PAST MEDICAL HISTORY     Past Medical History:   Diagnosis Date   • Acute diastolic congestive heart failure (HCC)    • Aortic valve disorder    • Aortic valve disorder    • Atrial fibrillation and flutter (HCC)    • Chronic diastolic congestive heart failure (HCC)    • Chronically Abnormal CXR (Left pleural disease post op) 9/25/2017   • COPD exacerbation ([facility]) 4/17/2022   • Essential hypertension 4/10/2017    Target blood pressure <130/80 mmHg   • Graves disease    • Hyperlipidemia LDL goal <70    • Hypertension    • Hyperthyroidism    • Lung cancer (HCC)    • MRSA (methicillin resistant staph aureus) culture positive 2014    under left breast, incision and debridement, treated with wound packing and po abx    • Rheumatic mitral stenosis    • Rheumatic mitral stenosis    • Type 2 diabetes mellitus (HCC) 7/17/2018   • Valvular heart disease    • Valvular heart disease          SURGICAL HISTORY       Past Surgical History:   Procedure Laterality Date   • ABLATION OF DYSRHYTHMIC FOCUS  7-   • ABSCESS DRAINAGE      under left breast d/t mrsa    • AORTIC VALVE REPAIR/REPLACEMENT N/A 7/16/2018    Procedure: MEDIAN STERNOTOMY, AORTIC VALVE REPLACEMENT WITH TIFFANIE AND MAZE, TRICUSPID RING, LEFT ATRIAL OCCLUSION;  Surgeon: Ajay Cheatham MD;  Location: [facility];  Service: Cardiothoracic   • BRONCHOSCOPY Left 5/12/2017    Procedure:  BRONCHOSCOPY;  Surgeon: Ajay Cheatham MD;  Location:  [facility];  Service:    • BRONCHOSCOPY N/A 5/18/2017    Procedure: BRONCHOSCOPY BIOPSY AT BEDSIDE;  Surgeon: Ajay Cheatham MD;  Location:  [facility];  Service:    • CARDIAC CATHETERIZATION N/A 9/28/2017    Procedure: Left Heart Cath;  Surgeon: Marco Lay MD;  Location:  [facility];  Service:    • CARDIAC CATHETERIZATION N/A 9/28/2017    Procedure: Right Heart Cath;  Surgeon: Marco Lay MD;  Location:  [facility];  Service:    • LUNG BIOPSY  04/2017   • LYMPH NODE DISSECTION Left 5/12/2017    Procedure: MEDIASTINAL LYMPH NODE DISSECTION;  Surgeon: Ajay Cheatham MD;  Location:  [facility];  Service:    • MAZE PROCEDURE     • MITRAL VALVE REPAIR/REPLACEMENT N/A 7/16/2018    Procedure: MITRAL VALVE REPLACEMENT;  Surgeon: Ajay Cheatham MD;  Location:  [facility];  Service: Cardiothoracic   • MITRAL VALVE REPLACEMENT  7-   • TEETH EXTRACTION     • THORACOSCOPY VIDEO ASSISTED WITH LOBECTOMY Left 5/12/2017    Procedure: THORACOSCOPY VIDEO ASSISTED WITH OPEN LOBECTOMY;  Surgeon: Ajay Cheatham MD;  Location:  [facility];  Service:    • TOTAL THYROIDECTOMY  approx 2012   • TRICUSPID VALVE SURGERY  7-         CURRENT MEDICATIONS       Current Facility-Administered Medications:   •  acetaminophen (TYLENOL) tablet 650 mg, 650 mg, Oral, Q4H PRN, 650 mg at 04/18/22 0307 **OR** acetaminophen (TYLENOL) 160 MG/5ML solution 650 mg, 650 mg, Oral, Q4H PRN **OR** acetaminophen (TYLENOL) suppository 650 mg, 650 mg, Rectal, Q4H PRN, Gayla Poe MD  •  aspirin EC tablet 81 mg, 81 mg, Oral, Daily, Gayla oPe MD  •  dextrose (D50W) (25 g/50 mL) IV injection 25 g, 25 g, Intravenous, Q15 Min PRN, Gayla Poe MD  •  dextrose (GLUTOSE) oral gel 15 g, 15 g, Oral, Q15 Min PRN, Gayla Poe MD  •  dilTIAZem (CARDIZEM) 125 mg in 125 mL 0.7% sodium chloride  infusion, 5-15 mg/hr, Intravenous,  Titrated, Gayla Poe MD, Last Rate: 15 mL/hr at 04/17/22 2151, 15 mg/hr at 04/17/22 2151  •  glucagon (human recombinant) (GLUCAGEN DIAGNOSTIC) injection 1 mg, 1 mg, Intramuscular, Q15 Min PRN, Gayla Poe MD  •  heparin 79675 units/250 mL (100 units/mL) in 0.9% NaCl infusion, 12 Units/kg/hr, Intravenous, Titrated, Gayla Poe MD, Last Rate: 10.66 mL/hr at 04/18/22 0024, 12 Units/kg/hr at 04/18/22 0024  •  insulin lispro (humaLOG) injection 0-7 Units, 0-7 Units, Subcutaneous, Q6H, Gayla Poe MD, 4 Units at 04/18/22 0118  •  ipratropium (ATROVENT) nebulizer solution 0.5 mg, 0.5 mg, Nebulization, 4x Daily - RT, Gayla Poe MD, 0.5 mg at 04/18/22 0241  •  levothyroxine (SYNTHROID, LEVOTHROID) tablet 175 mcg, 175 mcg, Oral, Q AM, Gayla Poe MD  •  Magnesium Sulfate 2 gram Bolus, followed by 8 gram infusion (total Mg dose 10 grams)- Mg less than or equal to 1mg/dL, 2 g, Intravenous, PRN **OR** Magnesium Sulfate 2 gram / 50mL Infusion (GIVE X 3 BAGS TO EQUAL 6GM TOTAL DOSE) - Mg 1.1 - 1.5 mg/dl, 2 g, Intravenous, PRN **OR** Magnesium Sulfate 4 gram infusion- Mg 1.6-1.9 mg/dL, 4 g, Intravenous, PRN, Mary Cooper APRN, Last Rate: 25 mL/hr at 04/18/22 0214, 4 g at 04/18/22 0214  •  methylPREDNISolone sodium succinate (SOLU-Medrol) injection 60 mg, 60 mg, Intravenous, Daily, Gayla Poe MD, 60 mg at 04/18/22 0340  •  multivitamin (THERAGRAN) tablet 1 tablet, 1 tablet, Oral, Daily, Gayla Poe MD  •  Pharmacy to Dose Heparin, , Does not apply, Continuous PRN, Gayla Poe MD  •  pravastatin (PRAVACHOL) tablet 20 mg, 20 mg, Oral, Nightly, Gayla Poe MD, 20 mg at 04/18/22 0113  •  sodium chloride 0.9 % flush 1-10 mL, 1-10 mL, Intravenous, PRN, Gayla Peo MD  •  sodium chloride 0.9 % flush 10 mL, 10 mL, Intravenous, PRN, Gayla Poe MD  •  sodium chloride 0.9 % flush 10 mL, 10 mL, Intravenous, Q12H, Gayla Poe MD, 10 mL at 04/18/22 0342    ALLERGIES      Lortab [hydrocodone-acetaminophen] and Morphine and related    FAMILY HISTORY       Family History   Problem Relation Age of Onset   • Breast cancer Mother    • Diabetes Father           SOCIAL HISTORY       Social History     Socioeconomic History   • Marital status:    • Number of children: 3   Tobacco Use   • Smoking status: Current Every Day Smoker     Packs/day: 0.50     Years: 40.00     Pack years: 20.00     Types: Cigarettes     Start date: 5/5/1978     Last attempt to quit: 5/5/2018     Years since quitting: 3.9   • Smokeless tobacco: Never Used   • Tobacco comment: Couldn't remember exact dates.   Vaping Use   • Vaping Use: Never used   Substance and Sexual Activity   • Alcohol use: No   • Drug use: No   • Sexual activity: Not Currently     Partners: Male     Birth control/protection: None         PHYSICAL EXAM    (up to 7 for level 4, 8 or more for level 5)   Physical Exam  Vitals and nursing note reviewed.   Constitutional:       General: She is not in acute distress.     Appearance: Normal appearance. She is not ill-appearing or toxic-appearing.   HENT:      Head: Normocephalic and atraumatic.      Nose: Nose normal.      Mouth/Throat:      Mouth: Mucous membranes are moist.   Eyes:      Extraocular Movements: Extraocular movements intact.   Cardiovascular:      Rate and Rhythm: Regular rhythm. Tachycardia present.      Pulses: Normal pulses.   Pulmonary:      Effort: Pulmonary effort is normal. No respiratory distress.      Breath sounds: Normal breath sounds.   Abdominal:      Palpations: Abdomen is soft.      Tenderness: There is no abdominal tenderness.   Musculoskeletal:         General: Normal range of motion.      Cervical back: Normal range of motion.   Skin:     General: Skin is warm and dry.   Neurological:      General: No focal deficit present.      Mental Status: She is alert.   Psychiatric:         Mood and Affect: Mood normal.         Behavior: Behavior normal.         Thought  Content: Thought content normal.         Judgment: Judgment normal.          DIAGNOSTIC RESULTS     EKG: All EKGs are interpreted by the Emergency Department Physician who either signs or Co-signs this chart in the absence of a cardiologist.    ECG 12 Lead   Preliminary Result         ECG 12 Lead    (Results Pending)       RADIOLOGY:   Non-plain film images such as CT, Ultrasound and MRI are read by the radiologist. Plain radiographic images are visualized and preliminarily interpreted by the emergency physician with the below findings:      [x] Radiologist's Report Reviewed:  CT Angiogram Chest   Final Result   No pulmonary embolism. No acute cardiopulmonary abnormality.      Electronically signed by:  Omid Ordoñez M.D.     4/17/2022 7:10 PM Mountain Time      XR Chest 1 View   Final Result   Mild bilateral lower lung airspace opacities, which may be due to   atelectasis or less likely pneumonia.       This report was finalized on 4/17/2022 7:19 PM by Shelia Mckeon MD.                ED BEDSIDE ULTRASOUND:   Performed by ED Physician - none    LABS:    I have reviewed and interpreted all of the currently available lab results from this visit (if applicable):  Results for orders placed or performed during the hospital encounter of 04/17/22   Comprehensive Metabolic Panel    Specimen: Blood   Result Value Ref Range    Glucose 284 (H) 65 - 99 mg/dL    BUN 21 (H) 6 - 20 mg/dL    Creatinine 0.94 0.57 - 1.00 mg/dL    Sodium 138 136 - 145 mmol/L    Potassium 4.2 3.5 - 5.2 mmol/L    Chloride 100 98 - 107 mmol/L    CO2 29.0 22.0 - 29.0 mmol/L    Calcium 8.7 8.6 - 10.5 mg/dL    Total Protein 6.8 6.0 - 8.5 g/dL    Albumin 4.20 3.50 - 5.20 g/dL    ALT (SGPT) 103 (H) 1 - 33 U/L    AST (SGOT) 107 (H) 1 - 32 U/L    Alkaline Phosphatase 121 (H) 39 - 117 U/L    Total Bilirubin 0.4 0.0 - 1.2 mg/dL    Globulin 2.6 gm/dL    A/G Ratio 1.6 g/dL    BUN/Creatinine Ratio 22.3 7.0 - 25.0    Anion Gap 9.0 5.0 - 15.0 mmol/L    eGFR 70.0  >60.0 mL/min/1.73   Magnesium    Specimen: Blood   Result Value Ref Range    Magnesium 1.8 1.6 - 2.6 mg/dL   Troponin    Specimen: Blood   Result Value Ref Range    Troponin T <0.010 0.000 - 0.030 ng/mL   TSH    Specimen: Blood   Result Value Ref Range    TSH 13.930 (H) 0.270 - 4.200 uIU/mL   BNP    Specimen: Blood   Result Value Ref Range    proBNP 724.6 0.0 - 900.0 pg/mL   CBC Auto Differential    Specimen: Blood   Result Value Ref Range    WBC 8.61 3.40 - 10.80 10*3/mm3    RBC 4.21 3.77 - 5.28 10*6/mm3    Hemoglobin 16.3 (H) 12.0 - 15.9 g/dL    Hematocrit 46.1 34.0 - 46.6 %    .5 (H) 79.0 - 97.0 fL    MCH 38.7 (H) 26.6 - 33.0 pg    MCHC 35.4 31.5 - 35.7 g/dL    RDW 12.4 12.3 - 15.4 %    RDW-SD 49.1 37.0 - 54.0 fl    MPV 12.5 (H) 6.0 - 12.0 fL    Platelets 201 140 - 450 10*3/mm3    Neutrophil % 77.0 (H) 42.7 - 76.0 %    Lymphocyte % 15.8 (L) 19.6 - 45.3 %    Monocyte % 5.9 5.0 - 12.0 %    Eosinophil % 0.5 0.3 - 6.2 %    Basophil % 0.3 0.0 - 1.5 %    Immature Grans % 0.5 0.0 - 0.5 %    Neutrophils, Absolute 6.63 1.70 - 7.00 10*3/mm3    Lymphocytes, Absolute 1.36 0.70 - 3.10 10*3/mm3    Monocytes, Absolute 0.51 0.10 - 0.90 10*3/mm3    Eosinophils, Absolute 0.04 0.00 - 0.40 10*3/mm3    Basophils, Absolute 0.03 0.00 - 0.20 10*3/mm3    Immature Grans, Absolute 0.04 0.00 - 0.05 10*3/mm3    nRBC 0.0 0.0 - 0.2 /100 WBC   T4, Free    Specimen: Blood   Result Value Ref Range    Free T4 1.56 0.93 - 1.70 ng/dL   Protime-INR    Specimen: Blood   Result Value Ref Range    Protime 13.4 11.4 - 14.4 Seconds    INR 1.03 0.84 - 1.13   aPTT    Specimen: Blood   Result Value Ref Range    PTT 27.8 (L) 50.0 - 95.0 seconds   Heparin Anti-Xa    Specimen: Blood   Result Value Ref Range    Heparin Anti-Xa (UFH) 0.10 (L) 0.30 - 0.70 IU/ml   Troponin    Specimen: Blood   Result Value Ref Range    Troponin T <0.010 0.000 - 0.030 ng/mL   proBNP    Specimen: Blood   Result Value Ref Range    proBNP 623.8 0.0 - 900.0 pg/mL   POC Glucose  "Once    Specimen: Blood   Result Value Ref Range    Glucose 292 (H) 70 - 130 mg/dL   Green Top (Gel)   Result Value Ref Range    Extra Tube Hold for add-ons.    Lavender Top   Result Value Ref Range    Extra Tube hold for add-on    Gold Top - SST   Result Value Ref Range    Extra Tube Hold for add-ons.    Gray Top   Result Value Ref Range    Extra Tube Hold for add-ons.    Light Blue Top   Result Value Ref Range    Extra Tube hold for add-on         All other labs were within normal range or not returned as of this dictation.      EMERGENCY DEPARTMENT COURSE and DIFFERENTIAL DIAGNOSIS/MDM:   Vitals:    Vitals:    04/17/22 2338 04/18/22 0216 04/18/22 0241 04/18/22 0330   BP: 120/91   99/65   BP Location: Left arm   Left arm   Patient Position: Sitting   Lying   Pulse: (!) 135  (!) 135 (!) 133   Resp: 20  20 20   Temp: 96.7 °F (35.9 °C)      TempSrc: Oral      SpO2: 90% (!) 89% 90% 91%   Weight:       Height: 157.5 cm (62\")          ED Course as of 04/18/22 0348   Sun Apr 17, 2022   2211 Hospitalist messaged for admission [JG]   2223 I spoke with Dr. Poe to review patient for admission he recommended a dose of adenosine and will admit patient. [JG]   2244 6 mg dose of adenosine was administered with hospitalist and attending at bedside.  There was a brief reduction in heart rate and visualized P waves confirmed demonstrating atrial flutter.  Patient tolerated the procedure well.  Will be admitted to hospitalist for further evaluation and treatment. [JG]   Mon Apr 18, 2022   0345 In summary this is a 61-year-old female who presents the emergency room today with complaints of an elevated heart rate for the last 5 days.  Also shares some increased fatigue and shortness of breath.  Initially found to be hypoxic on exam but responded well to supplementation with oxygen via nasal cannula while in the ED.  Labs without acute or emergent abnormalities.  Initial troponin and proBNP within normal limits.CT of chest " demonstrates no pulmonary embolism and no acute cardiopulmonary abnormalities.  Cardizem for rate control initiated in the ED after case reviewed with attending Dr. Nagy.  Hospitalist was paged for admission and recommended a dose of adenosine for rate control and further evaluation.  This was given in the ED without conversion from patient's presenting rhythm.  Patient will be admitted for further evaluation. [JG]      ED Course User Index  [JG] Xavi Estrella PA       MDM  Number of Diagnoses or Management Options  Atrial flutter, unspecified type (HCC): established, worsening  Heart palpitations: established, worsening  Shortness of breath: new, needed workup     Amount and/or Complexity of Data Reviewed  Clinical lab tests: reviewed  Tests in the radiology section of CPT®: reviewed    Risk of Complications, Morbidity, and/or Mortality  Presenting problems: moderate  Diagnostic procedures: moderate  Management options: moderate    Patient Progress  Patient progress: stable         MEDICATIONS ADMINISTERED IN ED:  Medications   sodium chloride 0.9 % flush 10 mL (has no administration in time range)   dilTIAZem (CARDIZEM) 125 mg in 125 mL 0.7% sodium chloride  infusion (15 mg/hr Intravenous Rate/Dose Change 4/17/22 2151)   aspirin EC tablet 81 mg (has no administration in time range)   pravastatin (PRAVACHOL) tablet 20 mg (20 mg Oral Given 4/18/22 0113)   multivitamin (THERAGRAN) tablet 1 tablet (has no administration in time range)   levothyroxine (SYNTHROID, LEVOTHROID) tablet 175 mcg (has no administration in time range)   sodium chloride 0.9 % flush 10 mL (10 mL Intravenous Given 4/18/22 0342)   sodium chloride 0.9 % flush 1-10 mL (has no administration in time range)   acetaminophen (TYLENOL) tablet 650 mg (650 mg Oral Given 4/18/22 0307)     Or   acetaminophen (TYLENOL) 160 MG/5ML solution 650 mg ( Oral Not Given:  See Alt 4/18/22 0307)     Or   acetaminophen (TYLENOL) suppository 650 mg ( Rectal Not Given:   See Alt 4/18/22 0307)   dextrose (GLUTOSE) oral gel 15 g (has no administration in time range)   dextrose (D50W) (25 g/50 mL) IV injection 25 g (has no administration in time range)   glucagon (human recombinant) (GLUCAGEN DIAGNOSTIC) injection 1 mg (has no administration in time range)   insulin lispro (humaLOG) injection 0-7 Units (4 Units Subcutaneous Given 4/18/22 0118)   heparin 38479 units/250 mL (100 units/mL) in 0.9% NaCl infusion (12 Units/kg/hr × 88.9 kg Intravenous New Bag 4/18/22 0024)   Pharmacy to Dose Heparin (has no administration in time range)   Magnesium Sulfate 2 gram Bolus, followed by 8 gram infusion (total Mg dose 10 grams)- Mg less than or equal to 1mg/dL ( Intravenous Not Given:  See Alt 4/18/22 0214)     Or   Magnesium Sulfate 2 gram / 50mL Infusion (GIVE X 3 BAGS TO EQUAL 6GM TOTAL DOSE) - Mg 1.1 - 1.5 mg/dl ( Intravenous Not Given:  See Alt 4/18/22 0214)     Or   Magnesium Sulfate 4 gram infusion- Mg 1.6-1.9 mg/dL (4 g Intravenous New Bag 4/18/22 0214)   methylPREDNISolone sodium succinate (SOLU-Medrol) injection 60 mg (60 mg Intravenous Given 4/18/22 0340)   ipratropium (ATROVENT) nebulizer solution 0.5 mg (0.5 mg Nebulization Given 4/18/22 0241)   iopamidol (ISOVUE-370) 76 % injection 80 mL (80 mL Intravenous Given 4/17/22 2034)   adenosine (ADENOCARD) injection 6 mg (6 mg Intravenous Given 4/17/22 2232)   heparin (porcine) injection 4,000 Units (4,000 Units Intravenous Given 4/18/22 0023)       PROCEDURES:  Procedures          CRITICAL CARE TIME    Total Critical Care time was 0 minutes, excluding separately reportable procedures.   There was a high probability of clinically significant/life threatening deterioration in the patient's condition which required my urgent intervention.      FINAL IMPRESSION      1. Heart palpitations    2. Atrial flutter, unspecified type (HCC)    3. Shortness of breath          DISPOSITION/PLAN     ED Disposition     ED Disposition   Decision to Admit     Condition   --    Comment   Level of Care: Telemetry [5]   Diagnosis: Heart palpitations [707732]   Admitting Physician: ANNA DE LA ROSA [[id number]]   Attending Physician: ANNA DE LA ROSA [[id number]]   Certification: I Certify That Inpatient Hospital Services Are Medically Necessary For Greater Than 2 Midnights               PATIENT REFERRED TO:  No follow-up provider specified.    DISCHARGE MEDICATIONS:     Medication List      ASK your doctor about these medications    aspirin 81 MG EC tablet  Take 1 tablet by mouth Daily.     levothyroxine 175 MCG tablet  Commonly known as: SYNTHROID, LEVOTHROID     multivitamin tablet tablet     pravastatin 20 MG tablet  Commonly known as: PRAVACHOL     propafenone 300 MG tablet  Commonly known as: RYTHMOL  Take 2 tablets by mouth As Needed (for atrial fibrillation).     vitamin D3 125 MCG (5000 UT) tablet tablet                Comment: Please note this report has been produced using speech recognition software.      YANETH Le Jason C, PA  04/18/22 0343

## 2022-04-18 NOTE — ED PROCEDURE NOTE
Place of Service:[address]  Patient Name:Jill Miller  :1960    Procedure  Chemical Cardioversion    Date/Time: 2022 4:32 AM  Performed by: Rick Nagy DO  Authorized by: Rick Nagy DO     Consent:     Consent obtained:  Verbal    Consent given by:  Patient    Risks discussed:  Death, induced arrhythmia and pain    Alternatives discussed:  No treatment, delayed treatment, anti-coagulation medication and observation  Pre-procedure details:     Cardioversion basis:  Urgent    Rhythm:  Atrial flutter  Attempt one:     Cardioversion medication:  Adenosine 6mg      Cardioversion outcome attempt one: Atrial Flutter with regular rate.  Post-procedure details:     Patient status:  Awake    Patient tolerance of procedure:  Tolerated well, no immediate complications  Comments:      Per hospitalist request of adenosine was administered to further confirm my atrial flutter versus SVT.  The rate slowed down and the atrial flutter waves are more evident.  This is recorded on the twelve-lead rhythm strip.  Patient tolerated the procedure well and immediately after the brief pause and the slowing of the rate but the adenosine she went back up to 135 where she has been throughout her emergency department stay.  Unfortunately, despite diltiazem administration she remains tachycardic.  She is likely been at this rate for the past 5 days.  Patient will likely benefit from ultrasound to exclude thrombus and and electrical cardioversion today.  Critical Care  Performed by: Rick Nagy DO  Authorized by: Rick Nagy DO     Critical care provider statement:     Critical care time (minutes):  35    Critical care time was exclusive of:  Separately billable procedures and treating other patients and teaching time    Critical care was necessary to treat or prevent imminent or life-threatening deterioration of the following conditions:  Cardiac failure    Critical care was time spent personally by me on  the following activities:  Development of treatment plan with patient or surrogate, evaluation of patient's response to treatment, examination of patient, obtaining history from patient or surrogate, ordering and performing treatments and interventions, ordering and review of laboratory studies, ordering and review of radiographic studies, pulse oximetry, re-evaluation of patient's condition and review of old charts    I assumed direction of critical care for this patient from another provider in my specialty: no      Care discussed with: admitting provider                Rick Nagy DO  04/18/22 0436

## 2022-04-18 NOTE — CONSULTS
Inpatient Cardiology Consult  Consult performed by: Marisa Suarez APRN  Consult ordered by: Gayla Poe MD          Cardiology Consult   [facility] Cardiology      Reason for consultation:  · Atrial flutter with RVR    Requesting provider: Gayla Poe MD  Consulting provider: Nelson Lay MD    IDENTIFICATION: 61-year-old female who resides in [place], [place]      Active Hospital Problems    Diagnosis  POA   • **Atrial flutter (HCC) [I48.92]  Yes     Priority: High     · Chads Vasc = 2 (female, HTN)  · Left atrial appendage ligation and MAZE procedure by Ajay Cheatham, 7/16/18  · TIFFANIE (9/17/2018):  ANGELA completely occluded.  · [facility] presentation with symptomatic atrial flutter with rapid ventricular rate, 4/17/2022     • Type 2 diabetes mellitus (HCC) [E11.9]  Yes     Priority: High   • Valvular heart disease [I38]  Yes     Priority: High     · Cardiac catheterization (9/20/17): Mild nonobstructive CAD. Moderate pulmonary hypertension.  · Echo (4/3/18): LVEF 70%. Severe LA dilatation. Moderate AI. Severe MS and severe MR. RVSP approximately 56 mmHg.  · AVR/MVR/tricuspid annuloplasty (bioprothetic valves) and left atrial appendage ligation performed by Ajay Cheatham, 7/16/18  · Echo (08/22/2018): LVEF 65%. The bioprosthetic aortic and mitral valves are well seated and functioning normally. RVSP<35 mmHg.  · TIFFANIE (9/17/2018): Normal functioning MVR.  ANGELA completely occluded.     • Paroxysmal atrial fibrillation (HCC) [I48.0]  Yes     Priority: High     · Postoperative atrial fibrillation with RVR in the setting of left upper lobectomy, 5/15/2017  · Echo (8/25/17): LVEF 60%.  Moderate to severe mitral stenosis (mean gradient 9 mmHg).  Moderate aortic insufficiency.  RVSP 50 mmHg  · Chads Vasc = 2 (female, HTN)  · Left atrial appendage ligation and MAZE procedure by Ajay Cheatham, 7/16/18  · TIFFANIE (9/17/2018):  ANGELA completely occluded.     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  Patient presented [facility] last evening after noting her heart rate to be in the 130-140s on her blood pressure monitor.  She was also experiencing worsening shortness of breath and lower extremity edema.  EKG on arrival showed atrial flutter with RVR.  She was started on IV Cardizem and heparin drips which are still infusing.  Patient uses Rythmol as a pill in the pocket approach for palpitations.  Given she had a left atrial appendage ligation and TIFFANIE confirmed it was completely excluded she has not been chronically anticoagulated but takes daily aspirin.    Allergies   Allergen Reactions   • Lortab [Hydrocodone-Acetaminophen] Nausea And Vomiting and Dizziness   • Morphine And Related Nausea Only       Prior to Admission medications    Medication Sig Start Date End Date Taking? Authorizing Provider   propafenone (RYTHMOL) 300 MG tablet Take 2 tablets by mouth As Needed (for atrial fibrillation). 11/16/21  Yes Marco Lay IV, MD   aspirin 81 MG EC tablet Take 1 tablet by mouth Daily. 11/10/20   Marco Lay IV, MD   Cholecalciferol (vitamin D3) 125 MCG (5000 UT) tablet tablet Take 5,000 Units by mouth Daily.    ProviderAzael MD   levothyroxine (SYNTHROID, LEVOTHROID) 175 MCG tablet Take 175 mcg by mouth Daily.    ProviderAzael MD    multivitamin (THERAGRAN) tablet tablet Take  by mouth Daily.    Provider, MD Azael   pravastatin (PRAVACHOL) 20 MG tablet Take 20 mg by mouth Daily. 11/8/20   Provider, MD Azael       Past Medical History:   Diagnosis Date   • Atrial fibrillation and flutter (HCC)    • Chronic diastolic congestive heart failure (HCC)    • Chronically Abnormal CXR (Left pleural disease post op) 09/25/2017   • COPD exacerbation (HCC) 04/17/2022   • Essential hypertension 04/10/2017    Target blood pressure <130/80 mmHg   • Graves disease    • Hyperlipidemia LDL goal <70    • Hypertension    • Hyperthyroidism    • Lung cancer (HCC)    • MRSA (methicillin resistant staph aureus) culture positive 2014    under left breast, incision and debridement, treated with wound packing and po abx    • Rheumatic mitral stenosis    • Type 2 diabetes mellitus (HCC) 07/17/2018   • Valvular heart disease        Past Surgical History:   Procedure Laterality Date   • ABLATION OF DYSRHYTHMIC FOCUS  7-   • ABSCESS DRAINAGE      under left breast d/t mrsa    • AORTIC VALVE REPAIR/REPLACEMENT N/A 7/16/2018    Procedure: MEDIAN STERNOTOMY, AORTIC VALVE REPLACEMENT WITH TIFFANIE AND MAZE, TRICUSPID RING, LEFT ATRIAL OCCLUSION;  Surgeon: Ajay Cheatham MD;  Location:  [facility];  Service: Cardiothoracic   • BRONCHOSCOPY Left 5/12/2017    Procedure: BRONCHOSCOPY;  Surgeon: Ajay Cheatham MD;  Location:  [facility];  Service:    • BRONCHOSCOPY N/A 5/18/2017    Procedure: BRONCHOSCOPY BIOPSY AT BEDSIDE;  Surgeon: Ajay Cheatham MD;  Location:  [facility];  Service:    • CARDIAC CATHETERIZATION N/A 9/28/2017    Procedure: Left Heart Cath;  Surgeon: Marco Lay MD;  Location:  [facility];  Service:    • CARDIAC CATHETERIZATION N/A 9/28/2017    Procedure: Right Heart Cath;  Surgeon: Marco Lay MD;  Location:  [facility];  Service:    • LUNG BIOPSY  04/2017   • LYMPH NODE DISSECTION Left  5/12/2017    Procedure: MEDIASTINAL LYMPH NODE DISSECTION;  Surgeon: Ajay Cheatham MD;  Location:  [facility];  Service:    • MAZE PROCEDURE     • MITRAL VALVE REPAIR/REPLACEMENT N/A 7/16/2018    Procedure: MITRAL VALVE REPLACEMENT;  Surgeon: Ajay Cheatham MD;  Location:  [facility];  Service: Cardiothoracic   • MITRAL VALVE REPLACEMENT  7-   • TEETH EXTRACTION     • THORACOSCOPY VIDEO ASSISTED WITH LOBECTOMY Left 5/12/2017    Procedure: THORACOSCOPY VIDEO ASSISTED WITH OPEN LOBECTOMY;  Surgeon: Ajay Cheatham MD;  Location:  [facility];  Service:    • TOTAL THYROIDECTOMY  approx 2012   • TRICUSPID VALVE SURGERY  7-       Family History   Problem Relation Age of Onset   • Breast cancer Mother    • Diabetes Father        Social History     Tobacco Use   Smoking Status Current Every Day Smoker   • Packs/day: 0.50   • Years: 40.00   • Pack years: 20.00   • Types: Cigarettes   • Start date: 5/5/1978   • Last attempt to quit: 5/5/2018   • Years since quitting: 3.9   Smokeless Tobacco Never Used   Tobacco Comment    Couldn't remember exact dates.       Social History     Substance and Sexual Activity   Alcohol Use No         Review of Systems:   Review of Systems   Constitutional: Negative for malaise/fatigue.   Eyes: Negative for vision loss in left eye and vision loss in right eye.   Cardiovascular: Positive for dyspnea on exertion, irregular heartbeat, leg swelling and palpitations. Negative for chest pain, near-syncope, orthopnea, paroxysmal nocturnal dyspnea and syncope.   Respiratory: Positive for shortness of breath.    Musculoskeletal: Negative for myalgias.   Neurological: Negative for brief paralysis, excessive daytime sleepiness, focal weakness, numbness, paresthesias and weakness.   All other systems reviewed and are negative.           Vital Sign Min/Max for last 24 hours  Temp  Min: 96.7 °F (35.9 °C)  Max: 98 °F (36.7 °C)   BP  Min: 99/65  Max: 140/89   Pulse  Min: 130  Max: 142   Resp  Min: 20   Max: 24   SpO2  Min: 85 %  Max: 94 %   Flow (L/min)  Min: 2  Max: 4    No intake or output data in the 24 hours ending 04/18/22 1015          Constitutional:       Appearance: Healthy appearance. Well-developed.   Eyes:      General: Lids are normal. No scleral icterus.     Conjunctiva/sclera: Conjunctivae normal.   HENT:      Head: Normocephalic and atraumatic.   Neck:      Thyroid: No thyromegaly.      Vascular: No carotid bruit or JVD.   Pulmonary:      Effort: Pulmonary effort is normal.      Breath sounds: Normal breath sounds. No wheezing. No rhonchi. No rales.   Cardiovascular:      Tachycardia present. Regularly irregular rhythm.      Murmurs: There is a systolic murmur.      No gallop. No rub.   Pulses:     Intact distal pulses.   Edema:     Peripheral edema absent.   Abdominal:      General: There is no distension.      Palpations: Abdomen is soft. There is no abdominal mass.   Musculoskeletal:      Cervical back: Normal range of motion. Skin:     General: Skin is warm and dry.      Findings: No rash.   Neurological:      General: No focal deficit present.      Mental Status: Alert and oriented to person, place, and time.      Gait: Gait is intact.   Psychiatric:         Attention and Perception: Attention normal.         Mood and Affect: Mood normal.         Behavior: Behavior normal.          Tele: Atrial flutter    DATA REVIEW:    EKG (4/17/2022): Atrial flutter with RVR    CT angiogram (4/17/2022): no pulmonary embolism.  No acute cardiopulmonary abnormality    ECHO (4/18/2022): Results pending (last echo 8/2018 showed normal LVEF 65% and bioprosthetic aortic and mitral valves well-seated)      Results from last 7 days   Lab Units 04/18/22  0610 04/17/22  1933   SODIUM mmol/L 136 138   POTASSIUM mmol/L 4.5 4.2   CHLORIDE mmol/L 98 100   BUN mg/dL 18 21*   CREATININE mg/dL 0.66 0.94   MAGNESIUM mg/dL  --  1.8     Results from last 7 days   Lab Units 04/18/22  0610 04/18/22  0014 04/17/22 1933    TROPONIN T ng/mL <0.010 <0.010 <0.010     Results from last 7 days   Lab Units 04/18/22  0610 04/17/22  1858   WBC 10*3/mm3 10.73 8.61   HEMOGLOBIN g/dL 15.9 16.3*   HEMATOCRIT % 45.8 46.1   PLATELETS 10*3/mm3 162 201     Lab Results   Component Value Date    HGBA1C 9.60 (H) 04/18/2022     Lab Results   Component Value Date    CHOL 133 04/18/2022    TRIG 176 (H) 04/18/2022    HDL 46 04/18/2022    LDL 58 04/18/2022              Atrial flutter with RVR  · Has known paroxysmal atrial fibrillation and uses pill in the pocket approach with propafenone  · Has had left atrial appendage ligation and maze procedure in 2018.  · TIFFANIE in 2018 showed ANGELA completely occluded  · [facility] and atrial flutter with RVR started on IV Cardizem drip  · Negative proBNP and negative troponins      Valvular heart disease  · AVR/MVR/tricuspid annuloplasty and left atrial appendage ligation 2018  · Last echo 2018 showed bioprosthetic aortic and mitral valves well-seated and functioning normally      Hyperlipidemia  · LDL 58, total cholesterol 133  · Pravastatin 20 mg daily    Type 2 diabetes mellitus  · Not well controlled hemoglobin A1c 9.6      COPD/history lung cancer  · Left upper lobectomy in 2017 for lung carcinoma  · Hospitalist managing exacerbation COPD           · Keep n.p.o. for external cardioversion today  · Echocardiogram pending      Electronically signed by GUCCI Hall, 04/18/22, 9:34 AM EDT.

## 2022-04-19 PROBLEM — E66.9 OBESITY (BMI 30-39.9): Status: ACTIVE | Noted: 2022-04-19

## 2022-04-19 LAB
ALBUMIN SERPL-MCNC: 4 G/DL (ref 3.5–5.2)
ALBUMIN/GLOB SERPL: 1.4 G/DL
ALP SERPL-CCNC: 109 U/L (ref 39–117)
ALT SERPL W P-5'-P-CCNC: 89 U/L (ref 1–33)
ANION GAP SERPL CALCULATED.3IONS-SCNC: 10 MMOL/L (ref 5–15)
AST SERPL-CCNC: 57 U/L (ref 1–32)
BASOPHILS # BLD AUTO: 0.02 10*3/MM3 (ref 0–0.2)
BASOPHILS NFR BLD AUTO: 0.2 % (ref 0–1.5)
BILIRUB SERPL-MCNC: 0.4 MG/DL (ref 0–1.2)
BUN SERPL-MCNC: 19 MG/DL (ref 6–20)
BUN/CREAT SERPL: 26.4 (ref 7–25)
CALCIUM SPEC-SCNC: 8.6 MG/DL (ref 8.6–10.5)
CHLORIDE SERPL-SCNC: 97 MMOL/L (ref 98–107)
CO2 SERPL-SCNC: 28 MMOL/L (ref 22–29)
CREAT SERPL-MCNC: 0.72 MG/DL (ref 0.57–1)
DEPRECATED RDW RBC AUTO: 47.7 FL (ref 37–54)
EGFRCR SERPLBLD CKD-EPI 2021: 96.5 ML/MIN/1.73
EOSINOPHIL # BLD AUTO: 0 10*3/MM3 (ref 0–0.4)
EOSINOPHIL NFR BLD AUTO: 0 % (ref 0.3–6.2)
ERYTHROCYTE [DISTWIDTH] IN BLOOD BY AUTOMATED COUNT: 11.6 % (ref 12.3–15.4)
GLOBULIN UR ELPH-MCNC: 2.9 GM/DL
GLUCOSE BLDC GLUCOMTR-MCNC: 270 MG/DL (ref 70–130)
GLUCOSE BLDC GLUCOMTR-MCNC: 272 MG/DL (ref 70–130)
GLUCOSE BLDC GLUCOMTR-MCNC: 326 MG/DL (ref 70–130)
GLUCOSE BLDC GLUCOMTR-MCNC: 336 MG/DL (ref 70–130)
GLUCOSE BLDC GLUCOMTR-MCNC: 344 MG/DL (ref 70–130)
GLUCOSE BLDC GLUCOMTR-MCNC: 357 MG/DL (ref 70–130)
GLUCOSE SERPL-MCNC: 310 MG/DL (ref 65–99)
HCT VFR BLD AUTO: 42.4 % (ref 34–46.6)
HGB BLD-MCNC: 14.5 G/DL (ref 12–15.9)
IMM GRANULOCYTES # BLD AUTO: 0.06 10*3/MM3 (ref 0–0.05)
IMM GRANULOCYTES NFR BLD AUTO: 0.5 % (ref 0–0.5)
LYMPHOCYTES # BLD AUTO: 0.47 10*3/MM3 (ref 0.7–3.1)
LYMPHOCYTES NFR BLD AUTO: 4 % (ref 19.6–45.3)
MCH RBC QN AUTO: 38.5 PG (ref 26.6–33)
MCHC RBC AUTO-ENTMCNC: 34.2 G/DL (ref 31.5–35.7)
MCV RBC AUTO: 112.5 FL (ref 79–97)
MONOCYTES # BLD AUTO: 0.58 10*3/MM3 (ref 0.1–0.9)
MONOCYTES NFR BLD AUTO: 5 % (ref 5–12)
NEUTROPHILS NFR BLD AUTO: 10.51 10*3/MM3 (ref 1.7–7)
NEUTROPHILS NFR BLD AUTO: 90.3 % (ref 42.7–76)
NRBC BLD AUTO-RTO: 0 /100 WBC (ref 0–0.2)
PLATELET # BLD AUTO: 166 10*3/MM3 (ref 140–450)
PMV BLD AUTO: 10.9 FL (ref 6–12)
POTASSIUM SERPL-SCNC: 5 MMOL/L (ref 3.5–5.2)
PROT SERPL-MCNC: 6.9 G/DL (ref 6–8.5)
RBC # BLD AUTO: 3.77 10*6/MM3 (ref 3.77–5.28)
SODIUM SERPL-SCNC: 135 MMOL/L (ref 136–145)
UFH PPP CHRO-ACNC: 0.56 IU/ML (ref 0.3–0.7)
UFH PPP CHRO-ACNC: 0.61 IU/ML (ref 0.3–0.7)
WBC NRBC COR # BLD: 11.64 10*3/MM3 (ref 3.4–10.8)

## 2022-04-19 PROCEDURE — 85520 HEPARIN ASSAY: CPT

## 2022-04-19 PROCEDURE — 80053 COMPREHEN METABOLIC PANEL: CPT | Performed by: INTERNAL MEDICINE

## 2022-04-19 PROCEDURE — 63710000001 INSULIN LISPRO (HUMAN) PER 5 UNITS: Performed by: INTERNAL MEDICINE

## 2022-04-19 PROCEDURE — 85025 COMPLETE CBC W/AUTO DIFF WBC: CPT | Performed by: INTERNAL MEDICINE

## 2022-04-19 PROCEDURE — 94640 AIRWAY INHALATION TREATMENT: CPT

## 2022-04-19 PROCEDURE — 63710000001 INSULIN DETEMIR PER 5 UNITS: Performed by: INTERNAL MEDICINE

## 2022-04-19 PROCEDURE — G0378 HOSPITAL OBSERVATION PER HR: HCPCS

## 2022-04-19 PROCEDURE — 99232 SBSQ HOSP IP/OBS MODERATE 35: CPT | Performed by: INTERNAL MEDICINE

## 2022-04-19 PROCEDURE — 99232 SBSQ HOSP IP/OBS MODERATE 35: CPT | Performed by: NURSE PRACTITIONER

## 2022-04-19 PROCEDURE — 82962 GLUCOSE BLOOD TEST: CPT

## 2022-04-19 PROCEDURE — 25010000002 METHYLPREDNISOLONE PER 125 MG: Performed by: INTERNAL MEDICINE

## 2022-04-19 RX ORDER — NICOTINE POLACRILEX 4 MG
15 LOZENGE BUCCAL
Status: DISCONTINUED | OUTPATIENT
Start: 2022-04-19 | End: 2022-04-20 | Stop reason: HOSPADM

## 2022-04-19 RX ORDER — METHYLPREDNISOLONE SODIUM SUCCINATE 40 MG/ML
40 INJECTION, POWDER, LYOPHILIZED, FOR SOLUTION INTRAMUSCULAR; INTRAVENOUS DAILY
Status: DISCONTINUED | OUTPATIENT
Start: 2022-04-20 | End: 2022-04-20 | Stop reason: HOSPADM

## 2022-04-19 RX ORDER — BUDESONIDE AND FORMOTEROL FUMARATE DIHYDRATE 160; 4.5 UG/1; UG/1
2 AEROSOL RESPIRATORY (INHALATION)
Status: DISCONTINUED | OUTPATIENT
Start: 2022-04-19 | End: 2022-04-20 | Stop reason: HOSPADM

## 2022-04-19 RX ORDER — DEXTROSE MONOHYDRATE 25 G/50ML
25 INJECTION, SOLUTION INTRAVENOUS
Status: DISCONTINUED | OUTPATIENT
Start: 2022-04-19 | End: 2022-04-20 | Stop reason: HOSPADM

## 2022-04-19 RX ADMIN — BUDESONIDE AND FORMOTEROL FUMARATE DIHYDRATE 2 PUFF: 160; 4.5 AEROSOL RESPIRATORY (INHALATION) at 22:58

## 2022-04-19 RX ADMIN — INSULIN LISPRO 5 UNITS: 100 INJECTION, SOLUTION INTRAVENOUS; SUBCUTANEOUS at 11:29

## 2022-04-19 RX ADMIN — MULTIVITAMIN TABLET 1 TABLET: TABLET at 08:44

## 2022-04-19 RX ADMIN — INSULIN LISPRO 5 UNITS: 100 INJECTION, SOLUTION INTRAVENOUS; SUBCUTANEOUS at 08:44

## 2022-04-19 RX ADMIN — ASPIRIN 81 MG: 81 TABLET, COATED ORAL at 08:44

## 2022-04-19 RX ADMIN — PRAVASTATIN SODIUM 20 MG: 20 TABLET ORAL at 22:07

## 2022-04-19 RX ADMIN — INSULIN LISPRO 4 UNITS: 100 INJECTION, SOLUTION INTRAVENOUS; SUBCUTANEOUS at 17:26

## 2022-04-19 RX ADMIN — INSULIN DETEMIR 10 UNITS: 100 INJECTION, SOLUTION SUBCUTANEOUS at 22:07

## 2022-04-19 RX ADMIN — LEVOTHYROXINE SODIUM 175 MCG: 150 TABLET ORAL at 05:59

## 2022-04-19 RX ADMIN — INSULIN LISPRO 4 UNITS: 100 INJECTION, SOLUTION INTRAVENOUS; SUBCUTANEOUS at 05:59

## 2022-04-19 RX ADMIN — INSULIN LISPRO 5 UNITS: 100 INJECTION, SOLUTION INTRAVENOUS; SUBCUTANEOUS at 00:47

## 2022-04-19 RX ADMIN — Medication 10 ML: at 22:00

## 2022-04-19 RX ADMIN — INSULIN LISPRO 7 UNITS: 100 INJECTION, SOLUTION INTRAVENOUS; SUBCUTANEOUS at 17:26

## 2022-04-19 RX ADMIN — Medication 10 ML: at 09:28

## 2022-04-19 RX ADMIN — INSULIN LISPRO 5 UNITS: 100 INJECTION, SOLUTION INTRAVENOUS; SUBCUTANEOUS at 11:07

## 2022-04-19 RX ADMIN — METHYLPREDNISOLONE SODIUM SUCCINATE 60 MG: 125 INJECTION, POWDER, FOR SOLUTION INTRAMUSCULAR; INTRAVENOUS at 08:44

## 2022-04-19 NOTE — PROGRESS NOTES
Cardiology Progress Note      Reason for visit:    · Atrial flutter with RVR    IDENTIFICATION: 61-year-old female who resides in [place], [place]    Active Hospital Problems    Diagnosis  POA   • **Atrial flutter (HCC) [I48.92]  Yes     Priority: High     · Chads Vasc = 2 (female, HTN)  · Left atrial appendage ligation and MAZE procedure by Ajay Cheatham, 7/16/18  · TIFFANIE (9/17/2018):  ANGELA completely occluded.  · [facility] presentation with symptomatic atrial flutter with rapid ventricular rate, 4/17/2022     • Type 2 diabetes mellitus (HCC) [E11.9]  Yes     Priority: High   • Valvular heart disease [I38]  Yes     Priority: High     · Cardiac catheterization (9/20/17): Mild nonobstructive CAD. Moderate pulmonary hypertension.  · Echo (4/3/18): LVEF 70%. Severe LA dilatation. Moderate AI. Severe MS and severe MR. RVSP approximately 56 mmHg.  · AVR/MVR/tricuspid annuloplasty (bioprothetic valves) and left atrial appendage ligation performed by Ajay Cheatham, 7/16/18  · Echo (08/22/2018): LVEF 65%. The bioprosthetic aortic and mitral valves are well seated and functioning normally. RVSP<35 mmHg.  · TIFFANIE (9/17/2018): Normal functioning MVR.  ANGELA completely occluded.     • Paroxysmal atrial fibrillation (HCC) [I48.0]  Yes     Priority: High     · Postoperative atrial fibrillation with RVR in the setting of left upper lobectomy, 5/15/2017  · Echo (8/25/17): LVEF 60%.  Moderate to severe mitral stenosis (mean gradient 9 mmHg).  Moderate aortic insufficiency.  RVSP 50 mmHg  · Chads Vasc = 2 (female, HTN)  · Left atrial appendage ligation and MAZE procedure by Ajay Cheatham, 7/16/18  · TIFFANIE (9/17/2018):  ANGELA completely occluded.     • COPD exacerbation (HCC) [J44.1]  Yes     Priority: Medium   • Hyperlipidemia LDL goal <70 [E78.5]  Yes     Priority: Medium     · High intensity statin indicated given the presence of diabetes and mild CAD     • Acquired hypothyroidism [E03.9]  Yes     Priority: Low   • Heart palpitations [R00.2]   Yes            Patient underwent external cardioversion yesterday that was successful restoring normal sinus rhythm.  Heparin drip currently infusing.  She is sitting on the side of the bed without complaints.  Reports breathing has improved.  She is on 4 L via nasal cannula.  She does not use oxygen at home.  Echocardiogram was performed yesterday with results pending.           Vital Sign Min/Max for last 24 hours  Temp  Min: 96.1 °F (35.6 °C)  Max: 98.7 °F (37.1 °C)   BP  Min: 101/70  Max: 135/81   Pulse  Min: 75  Max: 130   Resp  Min: 18  Max: 22   SpO2  Min: 86 %  Max: 96 %   Flow (L/min)  Min: 4  Max: 4      Intake/Output Summary (Last 24 hours) at 4/19/2022 0748  Last data filed at 4/19/2022 0451  Gross per 24 hour   Intake 333.94 ml   Output 1550 ml   Net -1216.06 ml           Physical Exam  Constitutional:       Appearance: She is well-developed.   HENT:      Head: Normocephalic.   Neck:      Vascular: No carotid bruit or JVD.   Cardiovascular:      Rate and Rhythm: Normal rate and regular rhythm.      Heart sounds: Normal heart sounds. No murmur heard.    No friction rub. No gallop.   Pulmonary:      Effort: Pulmonary effort is normal.      Breath sounds: Normal breath sounds.   Abdominal:      Palpations: Abdomen is soft.   Skin:     General: Skin is warm and dry.      Nails: There is no clubbing.   Neurological:      Mental Status: She is alert and oriented to person, place, and time.   Psychiatric:         Behavior: Behavior normal.         Tele: Normal sinus rhythm    Results Review (reviewed the patient's recent labs in the electronic medical record):     EKG (4/18/2022): Normal sinus rhythm    CT angiogram of the chest (4/17/2022) no PE.  No acute cardiopulmonary abnormality:    ECHO (4/18/2022): LVEF 47.5%.  Aortic and mitral bioprosthetic valves present    Result Review:  I have personally reviewed the results from the time of this admission to 4/19/2022 07:48 EDT and agree with these  findings:  [x]  Laboratory  []  Microbiology  [x]  Radiology  [x]  EKG/Telemetry   [x]  Cardiology/Vascular   []  Pathology  []  Old records  []  Other:  Most notable findings include: Sodium 135, WBC 11.64    Results from last 7 days   Lab Units 04/19/22 0228 04/18/22  0610 04/17/22  1933   SODIUM mmol/L 135* 136 138   POTASSIUM mmol/L 5.0 4.5 4.2   CHLORIDE mmol/L 97* 98 100   BUN mg/dL 19 18 21*   CREATININE mg/dL 0.72 0.66 0.94   MAGNESIUM mg/dL  --  3.6* 1.8     Results from last 7 days   Lab Units 04/18/22  0610 04/18/22  0014 04/17/22  1933   TROPONIN T ng/mL <0.010 <0.010 <0.010     Results from last 7 days   Lab Units 04/19/22 0228 04/18/22  0610 04/17/22  1858   WBC 10*3/mm3 11.64* 10.73 8.61   HEMOGLOBIN g/dL 14.5 15.9 16.3*   HEMATOCRIT % 42.4 45.8 46.1   PLATELETS 10*3/mm3 166 162 201       Lab Results   Component Value Date    HGBA1C 9.60 (H) 04/18/2022       Lab Results   Component Value Date    CHOL 133 04/18/2022    TRIG 176 (H) 04/18/2022    HDL 46 04/18/2022    LDL 58 04/18/2022              Atrial flutter with RVR  · Has known paroxysmal atrial fibrillation and uses pill in the pocket approach with propafenone  · Has had left atrial appendage ligation and maze procedure in 2018.  · TIFFANIE in 2018 showed ANGELA completely occluded  · Presentation of [facility] 4/17/2022 and atrial flutter with RVR started on IV Cardizem drip  · Negative proBNP and negative troponins        Valvular heart disease  · AVR/MVR/tricuspid annuloplasty and left atrial appendage ligation 2018  · Last echo 2018 showed bioprosthetic aortic and mitral valves well-seated and functioning normally  · Echo 4/18/2022 shows LVEF 47.5%.        Hyperlipidemia  · LDL 58, total cholesterol 133  · Pravastatin 20 mg daily     Type 2 diabetes mellitus  · Not well controlled hemoglobin A1c 9.6        COPD/history lung cancer  · Left upper lobectomy in 2017 for lung carcinoma  · Hospitalist managing exacerbation  COPD                   · Wean oxygen to keep O2 saturations greater than 94%  · Discontinue heparin drip  · Results of echocardiogram pending    Electronically signed by GUCCI Hall, 04/19/22, 7:48 AM EDT.

## 2022-04-19 NOTE — PROGRESS NOTES
HEPARIN INFUSION  Jill Miller is a  61 y.o. female receiving heparin infusion.     Therapy for (VTE/Cardiac): Cardiac  Patient Weight: 88.9 kg  Initial Bolus (Y/N):   Y  Any Bolus (Y/N):   Y    Signs or Symptoms of Bleeding: none noted per RN  Cardiac or Other (Not VTE)   Initial Bolus: 60 units/kg (Max 4,000 units)  Initial rate: 12 units/kg/hr (Max 1,000 units/hr)   Anti-Xa (IU/mL) Bolus Dose Stop Infusion Rate Change Repeat Anti-Xa      ?0.19 60 units/kg 0 hrs Increase rate by 4 units/kg/hr 6 hrs       0.2 - 0.29  30 units/kg 0 hrs Increase rate by 2 units/kg/hr 6 hrs    0.3 - 0.7 0 0 hrs No change 6 hrs      0.71 - 0.99 0  0 hrs Decrease rate by 2 units/kg/hr 6 hrs            ?1 0 Hold 1 hr Decrease rate by 3 units/kg/hr 6 hrs      Results from last 7 days   Lab Units 04/19/22  0228 04/18/22  0610 04/18/22  0014 04/17/22  1858   INR   --  1.01 1.03  --    HEMOGLOBIN g/dL 14.5 15.9  --  16.3*   HEMATOCRIT % 42.4 45.8  --  46.1   PLATELETS 10*3/mm3 166 162  --  201          Date   Time   Anti-Xa Current Rate (Unit/kg/hr) Bolus   (Units) Rate Change   (Unit/kg/hr) New Rate (Unit/kg/hr) Next   Anti-Xa Comments  Pump Check Daily   4/17 2345 STAT  New 4000 +12 12 AM Dw RN   4/18 0610 0.18 12 5000 +4 16 1400 Liza 3242   4/18 1358 0.46 16 -- -- 16 2000 D/w ARIE Sylvester   4/18 2008 0.27 16 2500 +2 18 0300 D/w RN   4/19 0330 0.64 18 -- -- 18 0800 D/W RN   4/19 0735 0.56 18 -- -- 18 0800 Liam Stout, PharmD  4/19/2022  08:34 EDT

## 2022-04-19 NOTE — PLAN OF CARE
Goal Outcome Evaluation:       Patient is still on 4 liters.  Stated she felt better.  Still gets short of breath when walking to the bathroom.  She recovers quickly.  Will continue to monitor.

## 2022-04-19 NOTE — CASE MANAGEMENT/SOCIAL WORK
Continued Stay Note   [place]     Patient Name: Jill Miller  MRN: [id number]  Today's Date: 4/19/2022    Admit Date: 4/17/2022     Discharge Plan     Row Name 04/19/22 1507       Plan    Plan update    Patient/Family in Agreement with Plan yes    Plan Comments Spoke with patient at bedside regarding discharge plan.  Patient now on 1LO2NC, hoping to go home.  No immediate discharge needs  verbalized.  CM following.  Patient plan is to discharge home via car with family to transport.    Final Discharge Disposition Code 01 - home or self-care               Discharge Codes    No documentation.               Expected Discharge Date and Time     Expected Discharge Date Expected Discharge Time    Apr 20, 2022             Nora Orozco RN

## 2022-04-19 NOTE — PROGRESS NOTES
NN spoke with pt at .  Pt alert and able to answer questions appropriately. Pt O2 sat 95% on  2L currently, no home O2 use.  Smoking cessation encouraged.  COPD action plan reviewed. Deep breathing exercises encouraged. No new concerns or questions voiced at this time.  NN will continue to follow as needed.       Per current [facility] Standards, please consider: No Rescue in place, No LAMA/LABA/ICS in place, Outpatient PFT, Rehab as appropriate, Palliative Care consult, NRT at NH, Annual LDCT per current screening guidelines (age 50-80 years old, smoking history of 20 pack years or more or has quit within past 15 years)

## 2022-04-19 NOTE — PROGRESS NOTES
[facility] Medicine Services  PROGRESS NOTE    Patient Name: Jill Miller  : 1960  MRN: [id number]    Date of Admission: 2022  Primary Care Physician: Marisa Lynch PA    Subjective   Subjective     CC:  Aflutter/copd    HPI:  Patient doing ok. Spouse at bedside today. Remains on 4L overnight but was weaned to 3L this morning. Reports that she feels better. HR remains around 80s  ROS:  Gen- No fevers, chills  CV- No chest pain, palpitations  Resp- No cough, dyspnea  GI- No N/V/D, abd pain        Objective   Objective     Vital Signs:   Temp:  [96.1 °F (35.6 °C)-98.7 °F (37.1 °C)] 98.1 °F (36.7 °C)  Heart Rate:  [75-93] 75  Resp:  [16-22] 16  BP: (111-135)/(71-93) 119/72  Flow (L/min):  [2-4] 2     Physical Exam:  GEN: NAD, resting in bed, awake, sitting on edge of bed  HEENT: on room air, atraumatic, normocephalic  NECK: supple, no masses  RESP: on NC- 3L, no sig wheezing  CV: on tele, rates now in 80s  PSYCH: normal affect, appropriate  NEURO: awake, alert, no focal deficits noted  MSK: no edema noted  SKIN: no rashes noted       Results Reviewed:  LAB RESULTS:      Lab 22  0735 22  0228 22  1358 22  0610 22  0014 22  0014 22  1858   WBC  --  11.64*  --   --  10.73  --   --  8.61   HEMOGLOBIN  --  14.5  --   --  15.9  --   --  16.3*   HEMATOCRIT  --  42.4  --   --  45.8  --   --  46.1   PLATELETS  --  166  --   --  162  --   --  201   NEUTROS ABS  --  10.51*  --   --  9.93*  --   --  6.63   IMMATURE GRANS (ABS)  --  0.06*  --   --  0.04  --   --  0.04   LYMPHS ABS  --  0.47*  --   --  0.50*  --   --  1.36   MONOS ABS  --  0.58  --   --  0.21  --   --  0.51   EOS ABS  --  0.00  --   --  0.02  --   --  0.04   MCV  --  112.5*  --   --  111.7*  --   --  109.5*   PROTIME  --   --   --   --  13.2  --  13.4  --    APTT  --   --   --   --   --   --  27.8*  --    HEPARIN ANTI-XA 0.56 0.61 0.27* 0.46 0.18*   < > 0.10*   --     < > = values in this interval not displayed.         Lab 04/19/22 0228 04/18/22 0610 04/17/22 1933   SODIUM 135* 136 138   POTASSIUM 5.0 4.5 4.2   CHLORIDE 97* 98 100   CO2 28.0 27.0 29.0   ANION GAP 10.0 11.0 9.0   BUN 19 18 21*   CREATININE 0.72 0.66 0.94   EGFR 96.5 101.2 70.0   GLUCOSE 310* 366* 284*   CALCIUM 8.6 8.3* 8.7   MAGNESIUM  --  3.6* 1.8   HEMOGLOBIN A1C  --  9.60*  --    TSH  --   --  13.930*         Lab 04/19/22 0228 04/18/22 0610 04/17/22 1933   TOTAL PROTEIN 6.9 7.3 6.8   ALBUMIN 4.00 4.30 4.20   GLOBULIN 2.9 3.0 2.6   ALT (SGPT) 89* 103* 103*   AST (SGOT) 57* 77* 107*   BILIRUBIN 0.4 0.5 0.4   ALK PHOS 109 126* 121*         Lab 04/18/22 0610 04/18/22  0014 04/17/22 1933   PROBNP  --  623.8 724.6   TROPONIN T <0.010 <0.010 <0.010   PROTIME 13.2 13.4  --    INR 1.01 1.03  --          Lab 04/18/22  0610   CHOLESTEROL 133   LDL CHOL 58   HDL CHOL 46   TRIGLYCERIDES 176*             Brief Urine Lab Results  (Last result in the past 365 days)      Color   Clarity   Blood   Leuk Est   Nitrite   Protein   CREAT   Urine HCG        04/18/22 0607 Yellow   Cloudy   Trace   Negative   Negative   30 mg/dL (1+)                 Microbiology Results Abnormal     Procedure Component Value - Date/Time    COVID PRE-OP / PRE-PROCEDURE SCREENING ORDER (NO ISOLATION) - Swab, Nasopharynx [[id number]]  (Normal) Collected: 04/18/22 0530    Lab Status: Final result Specimen: Swab from Nasopharynx Updated: 04/18/22 0625    Narrative:      The following orders were created for panel order COVID PRE-OP / PRE-PROCEDURE SCREENING ORDER (NO ISOLATION) - Swab, Nasopharynx.  Procedure                               Abnormality         Status                     ---------                               -----------         ------                     COVID-19, FLU A/B, RSV P...[[id number]]  Normal              Final result                 Please view results for these tests on the individual orders.    COVID-19, FLU A/B,  RSV PCR - Swab, Nasopharynx [[id number]]  (Normal) Collected: 04/18/22 0530    Lab Status: Final result Specimen: Swab from Nasopharynx Updated: 04/18/22 0625     COVID19 Not Detected     Influenza A PCR Not Detected     Influenza B PCR Not Detected     RSV, PCR Not Detected    Narrative:      Fact sheet for providers: https://www.fda.gov/media/935405/download    Fact sheet for patients: https://www.fda.gov/media/737999/download    Test performed by PCR.          Cardioversion External in Cardiology Department    Result Date: 4/18/2022  · Successful cardioversion for atrial flutter      XR Chest 1 View    Result Date: 4/17/2022  DATE OF EXAM: 4/17/2022 7:17 PM  PROCEDURE: XR CHEST 1 VW-  INDICATIONS: Dysrhythmia triage protocol  COMPARISON: CT chest 03/18/2022, AP chest x-ray 07/26/2018.  TECHNIQUE: Single radiographic AP view of the chest was obtained.  FINDINGS: Cardiac silhouette is mildly prominent, but is exaggerated by portable AP technique. Changes of median sternotomy, cardiac valve replacements, and left atrial appendage occlusion are redemonstrated. There are mild airspace opacities in both lower lungs. No pneumothorax or large pleural effusion is seen.      Impression: Mild bilateral lower lung airspace opacities, which may be due to atelectasis or less likely pneumonia.  This report was finalized on 4/17/2022 7:19 PM by Shelia Mckeon MD.      CT Angiogram Chest    Result Date: 4/17/2022  CTA CHEST WITH CONTRAST CLINICAL INDICATION: Tachycardia. COMPARISON: None. TECHNIQUE: 80 cc Isovue 370 administered intravenously. CTA images of the chest were obtained. Three-dimensional volume reconstructions were generated. CT dose lowering techniques were used, to include: automated exposure control, adjustment for patient size, and or use of iterative reconstruction. FINDINGS: CTA: No pulmonary arterial filling defect. Heart size normal without pericardial effusion. Mediastinal vessels normal in caliber and  patency. Mediastinum: No mediastinal or hilar lymphadenopathy. Trachea and central airways are patent. Thyroid is normal. Esophagus is normal. Lungs and pleura: Status post left upper lobectomy. No focal consolidation, pleural effusion or pneumothorax. Upper abdomen: No acute abnormality. Body wall: No acute abnormality. Bones: No acute osseous abnormality.     Impression: No pulmonary embolism. No acute cardiopulmonary abnormality. Electronically signed by:  Omid Ordoñez M.D.  4/17/2022 7:10 PM Mountain Time      Results for orders placed during the hospital encounter of 09/17/18    Adult Transesophageal Echo (TIFFANIE) W/ Cont if Necessary Per Protocol    Interpretation Summary  · Left ventricular systolic function is normal.  · Left atrial cavity size is severely dilated.  · The bioprosthetic aortic and mitral valves function normally  · Successful exclusion of the left atrial appendage at the time of valve surgery confirmed.      I have reviewed the medications:  Scheduled Meds:aspirin, 81 mg, Oral, Daily  insulin detemir, 10 Units, Subcutaneous, Nightly  insulin lispro, 0-7 Units, Subcutaneous, Q6H  insulin lispro, 5 Units, Subcutaneous, TID With Meals  ipratropium-albuterol, 3 mL, Nebulization, 4x Daily - RT  levothyroxine, 175 mcg, Oral, Q AM  methohexital, , ,   methylPREDNISolone sodium succinate, 60 mg, Intravenous, Daily  midazolam, , ,   multivitamin, 1 tablet, Oral, Daily  pravastatin, 20 mg, Oral, Nightly  sodium chloride, 10 mL, Intravenous, Q12H      Continuous Infusions:   PRN Meds:.•  acetaminophen **OR** acetaminophen **OR** acetaminophen  •  dextrose  •  dextrose  •  dextrose  •  dextrose  •  glucagon (human recombinant)  •  glucagon (human recombinant)  •  magnesium sulfate **OR** magnesium sulfate **OR** magnesium sulfate  •  sodium chloride  •  sodium chloride    Assessment/Plan   Assessment & Plan     Active Hospital Problems    Diagnosis  POA   • **Atrial flutter (HCC) [I48.92]  Yes   • Heart  palpitations [R00.2]  Yes   • COPD exacerbation (HCC) [J44.1]  Yes   • Type 2 diabetes mellitus (HCC) [E11.9]  Yes   • Valvular heart disease [I38]  Yes   • Hyperlipidemia LDL goal <70 [E78.5]  Yes   • Paroxysmal atrial fibrillation (HCC) [I48.0]  Yes   • Acquired hypothyroidism [E03.9]  Yes      Resolved Hospital Problems    Diagnosis Date Resolved POA   • Lung cancer; S/P Bronch/Thor w/ ALBINA lobectomy/mediastinal lymph node resec (5/12/17, Dr. Cheatham) [C34.92] 09/29/2017 Yes        Brief Hospital Course to date:  Jill Miller is a 61 y.o. female  to ED secondary to rapid heart rate which she noted on her blood pressure monitor-going on for 5 days constant heart rate staying in 130-140s, do not complain of any overt palpitations, do not complain of any associated chest pain, lightheadedness or dizziness.    Atrial Flutter with RVR--improved  --s/p successful ECV 4/18 with cards, now in NSR  --previous ANGELA ligation, d/c heparin per cards  --monitor, follow labs    Valvular Heart Dz  --ECHO ordered and pending    Copd with acute exacerbation  Acute hypoxic RF  History of lung cancer  --continue IV steroids  --patient reports inability to tolerate atrovent nebs due to HA-- switched to duonebs with similar response- will trial symbicort today   --wean oxygen as able     [id number]--[id number]  --A1C 9.6  --hyperglycemic in setting of IV steroids as well  --uptitrated insulin 4/18 - will increase to mod dose SSI today as well as uptitrate prandial to 7 units with meals     Elevated TSH, nl T4  --monitor- will need to be rechecked in 4-6 weeks        DVT prophylaxis:  Mechanical DVT prophylaxis orders are present.            Disposition: I expect the patient to be discharged pending improvement    CODE STATUS:   Code Status and Medical Interventions:   Ordered at: 04/17/22 3669     Level Of Support Discussed With:    Patient     Code Status (Patient has no pulse and is not breathing):    CPR (Attempt to Resuscitate)      Medical Interventions (Patient has pulse or is breathing):    Full Support       Mary Oscar MD  04/19/22

## 2022-04-20 ENCOUNTER — READMISSION MANAGEMENT (OUTPATIENT)
Dept: CALL CENTER | Facility: HOSPITAL | Age: 62
End: 2022-04-20

## 2022-04-20 VITALS
DIASTOLIC BLOOD PRESSURE: 72 MMHG | WEIGHT: 193.3 LBS | HEIGHT: 62 IN | OXYGEN SATURATION: 90 % | TEMPERATURE: 97.8 F | RESPIRATION RATE: 17 BRPM | BODY MASS INDEX: 35.57 KG/M2 | HEART RATE: 71 BPM | SYSTOLIC BLOOD PRESSURE: 128 MMHG

## 2022-04-20 LAB
BH CV ECHO MEAS - AO MAX PG (FULL): 31 MMHG
BH CV ECHO MEAS - AO MAX PG: 38 MMHG
BH CV ECHO MEAS - AO MEAN PG (FULL): 16.5 MMHG
BH CV ECHO MEAS - AO MEAN PG: 19.7 MMHG
BH CV ECHO MEAS - AO ROOT AREA (BSA CORRECTED): 1.5
BH CV ECHO MEAS - AO ROOT AREA: 6.6 CM^2
BH CV ECHO MEAS - AO ROOT DIAM: 2.9 CM
BH CV ECHO MEAS - AO V2 MAX: 306.2 CM/SEC
BH CV ECHO MEAS - AO V2 MEAN: 205.3 CM/SEC
BH CV ECHO MEAS - AO V2 VTI: 66.1 CM
BH CV ECHO MEAS - AVA(I,A): 1.2 CM^2
BH CV ECHO MEAS - AVA(I,D): 1.2 CM^2
BH CV ECHO MEAS - AVA(V,A): 1.2 CM^2
BH CV ECHO MEAS - AVA(V,D): 1.2 CM^2
BH CV ECHO MEAS - BSA(HAYCOCK): 2 M^2
BH CV ECHO MEAS - BSA: 1.9 M^2
BH CV ECHO MEAS - BZI_BMI: 35.7 KILOGRAMS/M^2
BH CV ECHO MEAS - BZI_METRIC_HEIGHT: 157 CM
BH CV ECHO MEAS - BZI_METRIC_WEIGHT: 88 KG
BH CV ECHO MEAS - EDV(MOD-SP2): 134 ML
BH CV ECHO MEAS - EDV(MOD-SP4): 114 ML
BH CV ECHO MEAS - EF(MOD-BP): 47.5 %
BH CV ECHO MEAS - EF(MOD-SP2): 49.4 %
BH CV ECHO MEAS - EF(MOD-SP4): 46.1 %
BH CV ECHO MEAS - ESV(MOD-SP2): 67.8 ML
BH CV ECHO MEAS - ESV(MOD-SP4): 61.5 ML
BH CV ECHO MEAS - IVSD: 1.8 CM
BH CV ECHO MEAS - LAD MAJOR: 5.6 CM
BH CV ECHO MEAS - LAT PEAK E' VEL: 7 CM/SEC
BH CV ECHO MEAS - LATERAL E/E' RATIO: 34.3
BH CV ECHO MEAS - LV DIASTOLIC VOL/BSA (35-75): 60.5 ML/M^2
BH CV ECHO MEAS - LV MAX PG: 7 MMHG
BH CV ECHO MEAS - LV MEAN PG: 3.3 MMHG
BH CV ECHO MEAS - LV SYSTOLIC VOL/BSA (12-30): 32.7 ML/M^2
BH CV ECHO MEAS - LV V1 MAX: 131.9 CM/SEC
BH CV ECHO MEAS - LV V1 MEAN: 83.4 CM/SEC
BH CV ECHO MEAS - LV V1 VTI: 27.9 CM
BH CV ECHO MEAS - LVIDD: 4.5 CM
BH CV ECHO MEAS - LVIDS: 2.7 CM
BH CV ECHO MEAS - LVLD AP2: 8 CM
BH CV ECHO MEAS - LVLD AP4: 8.1 CM
BH CV ECHO MEAS - LVLS AP2: 6.9 CM
BH CV ECHO MEAS - LVLS AP4: 7.2 CM
BH CV ECHO MEAS - LVOT AREA (M): 2.8 CM^2
BH CV ECHO MEAS - LVOT AREA: 2.9 CM^2
BH CV ECHO MEAS - LVOT DIAM: 1.9 CM
BH CV ECHO MEAS - LVPWD: 1.4 CM
BH CV ECHO MEAS - MED PEAK E' VEL: 5.7 CM/SEC
BH CV ECHO MEAS - MEDIAL E/E' RATIO: 42.2
BH CV ECHO MEAS - MV DEC SLOPE: 890.1 CM/SEC^2
BH CV ECHO MEAS - MV E MAX VEL: 239 CM/SEC
BH CV ECHO MEAS - MV MAX PG: 30.4 MMHG
BH CV ECHO MEAS - MV MEAN PG: 11.6 MMHG
BH CV ECHO MEAS - MV P1/2T MAX VEL: 264.2 CM/SEC
BH CV ECHO MEAS - MV P1/2T: 86.9 MSEC
BH CV ECHO MEAS - MV V2 MAX: 275.1 CM/SEC
BH CV ECHO MEAS - MV V2 MEAN: 153.8 CM/SEC
BH CV ECHO MEAS - MV V2 VTI: 62.1 CM
BH CV ECHO MEAS - MVA P1/2T LCG: 0.83 CM^2
BH CV ECHO MEAS - MVA(P1/2T): 2.5 CM^2
BH CV ECHO MEAS - MVA(VTI): 1.3 CM^2
BH CV ECHO MEAS - PA ACC TIME: 0.12 SEC
BH CV ECHO MEAS - PA PR(ACCEL): 26.7 MMHG
BH CV ECHO MEAS - RAP SYSTOLE: 8 MMHG
BH CV ECHO MEAS - RVSP: 60 MMHG
BH CV ECHO MEAS - SI(AO): 230.5 ML/M^2
BH CV ECHO MEAS - SI(LVOT): 42.8 ML/M^2
BH CV ECHO MEAS - SI(MOD-SP2): 35.2 ML/M^2
BH CV ECHO MEAS - SI(MOD-SP4): 27.9 ML/M^2
BH CV ECHO MEAS - SV(AO): 433.9 ML
BH CV ECHO MEAS - SV(LVOT): 80.7 ML
BH CV ECHO MEAS - SV(MOD-SP2): 66.2 ML
BH CV ECHO MEAS - SV(MOD-SP4): 52.5 ML
BH CV ECHO MEAS - TAPSE (>1.6): 0.81 CM
BH CV ECHO MEAS - TR MAX PG: 52 MMHG
BH CV ECHO MEAS - TR MAX VEL: 361 CM/SEC
BH CV ECHO MEASUREMENTS AVERAGE E/E' RATIO: 37.64
BH CV VAS BP LEFT ARM: NORMAL MMHG
BH CV XLRA - TDI S': 9 CM/SEC
GLUCOSE BLDC GLUCOMTR-MCNC: 175 MG/DL (ref 70–130)
GLUCOSE BLDC GLUCOMTR-MCNC: 214 MG/DL (ref 70–130)
GLUCOSE BLDC GLUCOMTR-MCNC: 323 MG/DL (ref 70–130)
LEFT ATRIUM VOLUME INDEX: 35.4 ML/M^2
LEFT ATRIUM VOLUME: 66.7 ML
LV EF 2D ECHO EST: 50 %
MAXIMAL PREDICTED HEART RATE: 161 BPM
STRESS TARGET HR: 137 BPM

## 2022-04-20 PROCEDURE — 25010000002 METHYLPREDNISOLONE PER 40 MG: Performed by: INTERNAL MEDICINE

## 2022-04-20 PROCEDURE — 94761 N-INVAS EAR/PLS OXIMETRY MLT: CPT

## 2022-04-20 PROCEDURE — 82962 GLUCOSE BLOOD TEST: CPT

## 2022-04-20 PROCEDURE — 99239 HOSP IP/OBS DSCHRG MGMT >30: CPT | Performed by: INTERNAL MEDICINE

## 2022-04-20 PROCEDURE — 99232 SBSQ HOSP IP/OBS MODERATE 35: CPT | Performed by: NURSE PRACTITIONER

## 2022-04-20 PROCEDURE — 63710000001 INSULIN LISPRO (HUMAN) PER 5 UNITS: Performed by: INTERNAL MEDICINE

## 2022-04-20 PROCEDURE — 94799 UNLISTED PULMONARY SVC/PX: CPT

## 2022-04-20 PROCEDURE — G0378 HOSPITAL OBSERVATION PER HR: HCPCS

## 2022-04-20 RX ORDER — POTASSIUM CHLORIDE 750 MG/1
20 CAPSULE, EXTENDED RELEASE ORAL DAILY
Qty: 60 CAPSULE | Refills: 0 | Status: SHIPPED | OUTPATIENT
Start: 2022-04-20 | End: 2022-04-20 | Stop reason: SDUPTHER

## 2022-04-20 RX ORDER — PREDNISONE 20 MG/1
40 TABLET ORAL DAILY
Qty: 4 TABLET | Refills: 0 | Status: SHIPPED | OUTPATIENT
Start: 2022-04-21 | End: 2022-04-20 | Stop reason: SDUPTHER

## 2022-04-20 RX ORDER — PREDNISONE 20 MG/1
40 TABLET ORAL DAILY
Qty: 4 TABLET | Refills: 0 | Status: SHIPPED | OUTPATIENT
Start: 2022-04-21 | End: 2022-04-23

## 2022-04-20 RX ORDER — FUROSEMIDE 40 MG/1
40 TABLET ORAL DAILY
Qty: 60 TABLET | Refills: 1 | Status: SHIPPED | OUTPATIENT
Start: 2022-04-20 | End: 2022-04-20 | Stop reason: SDUPTHER

## 2022-04-20 RX ORDER — FUROSEMIDE 40 MG/1
40 TABLET ORAL DAILY
Qty: 60 TABLET | Refills: 1 | Status: SHIPPED | OUTPATIENT
Start: 2022-04-20 | End: 2022-09-20 | Stop reason: SDUPTHER

## 2022-04-20 RX ORDER — POTASSIUM CHLORIDE 750 MG/1
20 CAPSULE, EXTENDED RELEASE ORAL DAILY
Qty: 60 CAPSULE | Refills: 0 | Status: SHIPPED | OUTPATIENT
Start: 2022-04-20 | End: 2022-09-20 | Stop reason: SDUPTHER

## 2022-04-20 RX ORDER — POTASSIUM CHLORIDE 750 MG/1
20 CAPSULE, EXTENDED RELEASE ORAL DAILY
Status: DISCONTINUED | OUTPATIENT
Start: 2022-04-20 | End: 2022-04-20 | Stop reason: HOSPADM

## 2022-04-20 RX ORDER — BUDESONIDE AND FORMOTEROL FUMARATE DIHYDRATE 160; 4.5 UG/1; UG/1
2 AEROSOL RESPIRATORY (INHALATION)
Qty: 10.2 G | Refills: 12 | Status: SHIPPED | OUTPATIENT
Start: 2022-04-20 | End: 2022-04-20 | Stop reason: SDUPTHER

## 2022-04-20 RX ORDER — FUROSEMIDE 40 MG/1
40 TABLET ORAL DAILY
Status: DISCONTINUED | OUTPATIENT
Start: 2022-04-20 | End: 2022-04-20 | Stop reason: HOSPADM

## 2022-04-20 RX ORDER — BUDESONIDE AND FORMOTEROL FUMARATE DIHYDRATE 160; 4.5 UG/1; UG/1
2 AEROSOL RESPIRATORY (INHALATION)
Qty: 10.2 G | Refills: 12 | Status: SHIPPED | OUTPATIENT
Start: 2022-04-20 | End: 2022-04-27

## 2022-04-20 RX ADMIN — INSULIN LISPRO 2 UNITS: 100 INJECTION, SOLUTION INTRAVENOUS; SUBCUTANEOUS at 08:45

## 2022-04-20 RX ADMIN — LEVOTHYROXINE SODIUM 175 MCG: 150 TABLET ORAL at 05:13

## 2022-04-20 RX ADMIN — ASPIRIN 81 MG: 81 TABLET, COATED ORAL at 08:45

## 2022-04-20 RX ADMIN — BUDESONIDE AND FORMOTEROL FUMARATE DIHYDRATE 2 PUFF: 160; 4.5 AEROSOL RESPIRATORY (INHALATION) at 07:16

## 2022-04-20 RX ADMIN — FUROSEMIDE 40 MG: 40 TABLET ORAL at 12:10

## 2022-04-20 RX ADMIN — INSULIN LISPRO 7 UNITS: 100 INJECTION, SOLUTION INTRAVENOUS; SUBCUTANEOUS at 12:10

## 2022-04-20 RX ADMIN — INSULIN LISPRO 7 UNITS: 100 INJECTION, SOLUTION INTRAVENOUS; SUBCUTANEOUS at 01:09

## 2022-04-20 RX ADMIN — POTASSIUM CHLORIDE 20 MEQ: 750 CAPSULE, EXTENDED RELEASE ORAL at 12:10

## 2022-04-20 RX ADMIN — METHYLPREDNISOLONE SODIUM SUCCINATE 40 MG: 40 INJECTION, POWDER, FOR SOLUTION INTRAMUSCULAR; INTRAVENOUS at 08:45

## 2022-04-20 RX ADMIN — MULTIVITAMIN TABLET 1 TABLET: TABLET at 08:45

## 2022-04-20 RX ADMIN — Medication 10 ML: at 08:50

## 2022-04-20 RX ADMIN — METOPROLOL TARTRATE 12.5 MG: 25 TABLET, FILM COATED ORAL at 12:10

## 2022-04-20 RX ADMIN — INSULIN LISPRO 4 UNITS: 100 INJECTION, SOLUTION INTRAVENOUS; SUBCUTANEOUS at 12:10

## 2022-04-20 RX ADMIN — INSULIN LISPRO 7 UNITS: 100 INJECTION, SOLUTION INTRAVENOUS; SUBCUTANEOUS at 08:45

## 2022-04-20 NOTE — PROGRESS NOTES
NN spoke with patient again and she is agreeable to pulmonary follow up with [facility].  TORB received from attending.  Referral called to coordinator.  Discharging medication requiring PA, CM completing.  Patient will be informed of upcoming appointments.  No other questions or concerns at this time.  NN continues to follow.    Patient has been scheduled for a hospital follow up (establish care) with [facility] for  4/27/2022 @ 1:45 pm with GUCCI Walker.

## 2022-04-20 NOTE — DISCHARGE INSTRUCTIONS
Please discuss with your primary physician whether you would like to see a pulmonologist in the outpatient setting.  They can refer you if you wish to see one.  The benefits to consider are testing and medication management and the possibility of medication samples.

## 2022-04-20 NOTE — PROGRESS NOTES
Cardiology Progress Note      Reason for visit:    · Atrial flutter with RVR    IDENTIFICATION: 61-year-old female who resides in [place], [place]    Active Hospital Problems    Diagnosis  POA   • **Atrial flutter (HCC) [I48.92]  Yes     Priority: High     · Chads Vasc = 2 (female, HTN)  · Left atrial appendage ligation and MAZE procedure by Ajay Cheatham, 7/16/18  · TIFFANIE (9/17/2018):  ANGELA completely occluded.  · [facility] presentation with symptomatic atrial flutter with rapid ventricular rate, 4/17/2022     • Type 2 diabetes mellitus (HCC) [E11.9]  Yes     Priority: High   • Valvular heart disease [I38]  Yes     Priority: High     · Cardiac catheterization (9/20/17): Mild nonobstructive CAD. Moderate pulmonary hypertension.  · Echo (4/3/18): LVEF 70%. Severe LA dilatation. Moderate AI. Severe MS and severe MR. RVSP approximately 56 mmHg.  · AVR/MVR/tricuspid annuloplasty (bioprothetic valves) and left atrial appendage ligation performed by Ajay Cheatham, 7/16/18  · Echo (08/22/2018): LVEF 65%. The bioprosthetic aortic and mitral valves are well seated and functioning normally. RVSP<35 mmHg.  · TIFFANIE (9/17/2018): Normal functioning MVR.  ANGELA completely occluded.     • Paroxysmal atrial fibrillation (HCC) [I48.0]  Yes     Priority: High     · Postoperative atrial fibrillation with RVR in the setting of left upper lobectomy, 5/15/2017  · Echo (8/25/17): LVEF 60%.  Moderate to severe mitral stenosis (mean gradient 9 mmHg).  Moderate aortic insufficiency.  RVSP 50 mmHg  · Chads Vasc = 2 (female, HTN)  · Left atrial appendage ligation and MAZE procedure by Ajay Cheatham, 7/16/18  · TIFFANIE (9/17/2018):  ANGELA completely occluded.     • COPD exacerbation (HCC) [J44.1]  Yes     Priority: Medium   • Hyperlipidemia LDL goal <70 [E78.5]  Yes     Priority: Medium     · High intensity statin indicated given the presence of diabetes and mild CAD     • Acquired hypothyroidism [E03.9]  Yes     Priority: Low   • Obesity (BMI 30-39.9)  [E66.9]  Yes   • Heart palpitations [R00.2]  Yes            No events noted overnight.  Patient maintaining normal sinus rhythm.  She is sitting up in the bed on O2 at 2 L via nasal cannula.  She reports that nighttime they say her oxygen level drops.  She thinks is because she does not lie in the bed because it is too uncomfortable and sits up in the recliner and her head falls forward.  Echocardiogram shows normal LVEF 50% and bioprosthetic valve functioning normally.  She does have mild to moderate TR with elevated right ventricular systolic pressure greater than 55 mmHg.           Vital Sign Min/Max for last 24 hours  Temp  Min: 97.3 °F (36.3 °C)  Max: 98.1 °F (36.7 °C)   BP  Min: 119/72  Max: 130/82   Pulse  Min: 55  Max: 79   Resp  Min: 16  Max: 20   SpO2  Min: 86 %  Max: 95 %   Flow (L/min)  Min: 1  Max: 2    No intake or output data in the 24 hours ending 04/20/22 1046        Physical Exam  Constitutional:       Appearance: She is well-developed.   HENT:      Head: Normocephalic.   Neck:      Vascular: No carotid bruit or JVD.   Cardiovascular:      Rate and Rhythm: Normal rate and regular rhythm.      Heart sounds: Normal heart sounds. No murmur heard.    No friction rub. No gallop.   Pulmonary:      Effort: Pulmonary effort is normal.      Breath sounds: Decreased breath sounds present.   Abdominal:      Palpations: Abdomen is soft.   Skin:     General: Skin is warm and dry.      Nails: There is no clubbing.   Neurological:      Mental Status: She is alert and oriented to person, place, and time.   Psychiatric:         Behavior: Behavior normal.            Tele: Normal sinus rhythm     Results Review (reviewed the patient's recent labs in the electronic medical record):      EKG (4/18/2022): Normal sinus rhythm     CT angiogram of the chest (4/17/2022) no PE.  No acute cardiopulmonary abnormality:     ECHO (4/18/2022): LVEF 47.5%.  Aortic and mitral bioprosthetic valves present    Result Review:  I have  personally reviewed the results from the time of this admission to 4/20/2022 10:46 EDT and agree with these findings:  [x]  Laboratory  []  Microbiology  [x]  Radiology  [x]  EKG/Telemetry   [x]  Cardiology/Vascular   []  Pathology  []  Old records  []  Other:  Most notable findings include: No new labs resulted    Results from last 7 days   Lab Units 04/19/22 0228 04/18/22  0610 04/17/22  1933   SODIUM mmol/L 135* 136 138   POTASSIUM mmol/L 5.0 4.5 4.2   CHLORIDE mmol/L 97* 98 100   BUN mg/dL 19 18 21*   CREATININE mg/dL 0.72 0.66 0.94   MAGNESIUM mg/dL  --  3.6* 1.8     Results from last 7 days   Lab Units 04/18/22  0610 04/18/22  0014 04/17/22  1933   TROPONIN T ng/mL <0.010 <0.010 <0.010     Results from last 7 days   Lab Units 04/19/22 0228 04/18/22  0610 04/17/22  1858   WBC 10*3/mm3 11.64* 10.73 8.61   HEMOGLOBIN g/dL 14.5 15.9 16.3*   HEMATOCRIT % 42.4 45.8 46.1   PLATELETS 10*3/mm3 166 162 201       Lab Results   Component Value Date    HGBA1C 9.60 (H) 04/18/2022       Lab Results   Component Value Date    CHOL 133 04/18/2022    TRIG 176 (H) 04/18/2022    HDL 46 04/18/2022    LDL 58 04/18/2022                 Atrial flutter with RVR  · Has known paroxysmal atrial fibrillation and uses pill in the pocket approach with propafenone  · Has had left atrial appendage ligation and maze procedure in 2018.  · TIFFANIE in 2018 showed ANGELA completely occluded  · Presentation of [facility] 4/17/2022 and atrial flutter with RVR started on IV Cardizem drip  · Negative proBNP and negative troponins  · Cardioversion 4/18/2022 successful restoring normal sinus rhythm  · IV heparin drip discontinued 4/19/2022        Valvular heart disease  · AVR/MVR/tricuspid annuloplasty and left atrial appendage ligation 2018  · Last echo 2018 showed bioprosthetic aortic and mitral valves well-seated and functioning normally  · Echo 4/18/2022 shows LVEF 47.5%.        Hyperlipidemia  · LDL 58, total cholesterol 133  · Pravastatin  20 mg daily     Type 2 diabetes mellitus  · Not well controlled hemoglobin A1c 9.6        COPD/history lung cancer  · Left upper lobectomy in 2017 for lung carcinoma  · Hospitalist managing exacerbation COPD  · COPD nurse navigator following                   · Start Lasix 40 mg daily along with potassium 20 meq daily  · Start metoprolol tartrate 12.5 mg twice daily  · Home on aspirin but no anticoagulation given history of success left atrial appendage ligation  · Continue pill in the pocket approach with propafenone  · Follow-up with cardiology in 4 weeks  · Follow-up with the heart and valve clinic in 5 days with repeat BMP  · Likely home on oxygen to use as needed and particularly at bedtime  · Please call cardiology with any further questions.    Electronically signed by GUCCI Hall, 04/20/22, 8:11 AM EDT.

## 2022-04-20 NOTE — DISCHARGE INSTR - APPOINTMENTS
Marisa Lynch PA  General Family Medicine  [phone]  [address] [place] [id number]  Appointment: Wednesday, April 27th@ 10:00AM

## 2022-04-20 NOTE — CASE MANAGEMENT/SOCIAL WORK
Case Management Discharge Note      Final Note: Received call regarding patient Simicort Rx not being covered.  Spoke to COPD Navigator who has now provided a $0 pay card to patient and will provide the numbers on card to  Pharmacy.  RN notified.  Patient did not have a qualifying RA O2 Sat for home O2, (904%).  Spoke with patient at bedside regarding discharge plan, COPD Navigator at bedside, patient reports she has a ride today.  No discharge needs verbalized.  Patient plan is to discharge home today via car with family to transport.    Provided Post Acute Provider List?: N/A    Selected Continued Care - Admitted Since 4/17/2022     Destination    No services have been selected for the patient.              Durable Medical Equipment    No services have been selected for the patient.              Dialysis/Infusion    No services have been selected for the patient.              Home Medical Care    No services have been selected for the patient.              Therapy    No services have been selected for the patient.              Community Resources    No services have been selected for the patient.              Community & DME    No services have been selected for the patient.                       Final Discharge Disposition Code: 01 - home or self-care

## 2022-04-20 NOTE — PLAN OF CARE
Goal Outcome Evaluation:    Patient is now on 1 Liter nasal cannula.  While awake, her oxygen stays in the low to mid 90's.  While sleeping, she will drop to the mid 80's.  She breathes through her mouth while sleeping.  Encouraged her to lay on her side, and she did much better.

## 2022-04-20 NOTE — PROGRESS NOTES
NN spoke with pt at .  Pt alert and able to answer questions appropriately. Pt O2 sat  92  % on 1  L currently with two extensions present, no ome O2 use. COPD action plan reviewed. Deep breathing exercises encouraged. Home O2 safety and smoking cessation reviewed.  No new concerns or questions voiced at this time.  NN will continue to follow as needed.       Per current GOLD Standards, please consider: No Rescue in place, No LAMA/LABA/ICS in place, Outpatient PFT, Rehab as appropriate, Palliative Care consult, NRT at MO, Annual LDCT per current screening guidelines (age 50-80 years old, smoking history of 20 pack years or more or has quit within past 15 years)

## 2022-04-20 NOTE — PAYOR COMM NOTE
"LUCIA CAMP RN  UTILIZATION REVIEW  P:  [phone]  F:  [phone]      Notification of admission:  OBS to INPT.  Clinicals attached.    Ref# [id number]                  Jill Miller (61 y.o. Female)             Date of Birth   1960    Social Security Number       Address   [address] [place] [address] [address] [address]    Home Phone   [phone]    MRN   [id number]       Catholic   None    Marital Status                               Admission Date   22    Admission Type   Emergency    Admitting Provider   Mary Oscar MD    Attending Provider   Mary Oscar MD    Department, Room/Bed   [facility] 6B, N646/1       Discharge Date       Discharge Disposition   Home or Self Care    Discharge Destination                               Attending Provider: Mary Oscar MD    Allergies: Lortab [Hydrocodone-acetaminophen], Morphine And Related    Isolation: None   Infection: None   Code Status: CPR   Advance Care Planning Activity    Ht: 157.5 cm (62\")   Wt: 87.7 kg (193 lb 4.8 oz)    Admission Cmt: None   Principal Problem: Atrial flutter (HCC) [I48.92] More...                 Active Insurance as of 2022     Primary Coverage     Payor Plan Insurance Group Employer/Plan Group    [employer] [facility] [employer] SHIELD PPO [id number]     Payor Plan Address Payor Plan Phone Number Payor Plan Fax Number Effective Dates    [address] [phone]  2017 - None Entered    [place] [address]       Subscriber Name Subscriber Birth Date Member ID       JILL MILLER 1960 [id number]                 Emergency Contacts      (Rel.) Home Phone Work Phone Mobile Phone    Zen Miller (Spouse) [phone] -- [phone]               History & Physical      Gayla Poe MD at 22 2225              [facility]  HISTORY AND PHYSICAL    Patient Name: Jill Miller  : " 1960  MRN: [id number]  Primary Care Physician: Marisa Lynch PA  Date of admission: 4/17/2022      Subjective   Subjective     Chief Complaint:  Rapid Heart rate.    HPI:  Jill Miller is a 61 y.o. female  to ED secondary to rapid heart rate which she noted on her blood pressure monitor-going on for 5 days constant heart rate staying in 130-140s, do not complain of any overt palpitations, do not complain of any associated chest pain, lightheadedness or dizziness.  Patient does complain of dry cough which also has been going for 5 days, also complains of exertional dyspnea, no worsening pedal edema or PND symptoms.  No complaint of fever or chills, has been unvaccinated for Covid, do not complain of any headache.  Does complain of some sinus congestion.  Did not complain of any overt wheezing.  Denies any bleeding per GI or , no enlarged glands, do not complain of any focal neurological weakness.  No new skin rash.      In ED patient was started on Cardizem drip.  Current COVID Risks are:  [] Fever []  Cough [] Shortness of breath [] Fatigue [] Change in taste or smell    [] Exposure to COVID positive patient  [] High risk facility   []  NONE  COVID VACCINE status    The patient qualifies to receive the vaccine, but they have not yet received it.      Review of Systems   Complete ROS done, which is negative except as above and HPI.  Old records reviewed and summarized in PM hx      Personal History     Past Medical History:   Diagnosis Date   • Acute diastolic congestive heart failure (HCC)    • Aortic valve disorder    • Aortic valve disorder    • Atrial fibrillation and flutter (HCC)    • Chronic diastolic congestive heart failure (HCC)    • Chronically Abnormal CXR (Left pleural disease post op) 9/25/2017   • COPD exacerbation (HCC) 4/17/2022   • Essential hypertension 4/10/2017    Target blood pressure <130/80 mmHg   • Graves disease    • Hyperlipidemia LDL goal <70    • Hypertension    •  Hyperthyroidism    • Lung cancer (HCC)    • MRSA (methicillin resistant staph aureus) culture positive 2014    under left breast, incision and debridement, treated with wound packing and po abx    • Rheumatic mitral stenosis    • Rheumatic mitral stenosis    • Type 2 diabetes mellitus (HCC) 7/17/2018   • Valvular heart disease    • Valvular heart disease    Hypertension  Hyperlipidemia-pravastatin 20 mg daily  Hx left upper lobe squamous cell carcinoma s/p left upper lobectomy 5/2017-follows up with Dr. Cheatham  Paroxysmal A. fib, s/p maze procedure, ANGELA ligation-2018 CHADS2 VASC=  3, on Rythmol 600 mg as needed for palpitations, follows with Dr. Lay, aspirin 81 mg  S/p bioprosthetic mitral valve, aortic valve replacement, tricuspid annuloplasty  DM2  Hypothyroid-Synthroid 175 mcg      Past Surgical History:   Procedure Laterality Date   • ABLATION OF DYSRHYTHMIC FOCUS  7-   • ABSCESS DRAINAGE      under left breast d/t mrsa    • AORTIC VALVE REPAIR/REPLACEMENT N/A 7/16/2018    Procedure: MEDIAN STERNOTOMY, AORTIC VALVE REPLACEMENT WITH TIFFANIE AND MAZE, TRICUSPID RING, LEFT ATRIAL OCCLUSION;  Surgeon: Ajay Cheatham MD;  Location:  [facility];  Service: Cardiothoracic   • BRONCHOSCOPY Left 5/12/2017    Procedure: BRONCHOSCOPY;  Surgeon: Ajay Cheatham MD;  Location:  [facility];  Service:    • BRONCHOSCOPY N/A 5/18/2017    Procedure: BRONCHOSCOPY BIOPSY AT BEDSIDE;  Surgeon: Ajay Cheatham MD;  Location:  [facility];  Service:    • CARDIAC CATHETERIZATION N/A 9/28/2017    Procedure: Left Heart Cath;  Surgeon: Marco Lay MD;  [facility];  Service:    • CARDIAC CATHETERIZATION N/A 9/28/2017    Procedure: Right Heart Cath;  Surgeon: Marco Lay MD;  [facility];  Service:    • LUNG BIOPSY  04/2017   • LYMPH NODE DISSECTION Left 5/12/2017    Procedure: MEDIASTINAL LYMPH NODE DISSECTION;  Surgeon: Ajay Cheatham MD;  [facility]:   [facility];  Service:    • MAZE PROCEDURE     • MITRAL VALVE REPAIR/REPLACEMENT N/A 7/16/2018    Procedure: MITRAL VALVE REPLACEMENT;  Surgeon: Ajay Cheatham MD;  [facility];  Service: Cardiothoracic   • MITRAL VALVE REPLACEMENT  7-   • TEETH EXTRACTION     • THORACOSCOPY VIDEO ASSISTED WITH LOBECTOMY Left 5/12/2017    Procedure: THORACOSCOPY VIDEO ASSISTED WITH OPEN LOBECTOMY;  Surgeon: Ajay Cheatham MD;  Location:  [facility];  Service:    • TOTAL THYROIDECTOMY  approx 2012   • TRICUSPID VALVE SURGERY  7-       Family History: family history includes Breast cancer in her mother; Diabetes in her father. Otherwise pertinent FHx was reviewed and unremarkable.     Social History:  reports that she has been smoking cigarettes. She started smoking about 43 years ago. She has a 20.00 pack-year smoking history. She has never used smokeless tobacco. She reports that she does not drink alcohol and does not use drugs.      Medications:  Available home medication information reviewed.  Medications Prior to Admission   Medication Sig Dispense Refill Last Dose   • propafenone (RYTHMOL) 300 MG tablet Take 2 tablets by mouth As Needed (for atrial fibrillation). 12 tablet 1 4/16/2022 at Unknown time   • aspirin 81 MG EC tablet Take 1 tablet by mouth Daily.      • Cholecalciferol (vitamin D3) 125 MCG (5000 UT) tablet tablet Take 5,000 Units by mouth Daily.      • levothyroxine (SYNTHROID, LEVOTHROID) 175 MCG tablet Take 175 mcg by mouth Daily.      • multivitamin (THERAGRAN) tablet tablet Take  by mouth Daily.      • pravastatin (PRAVACHOL) 20 MG tablet Take 20 mg by mouth Daily.          Allergies   Allergen Reactions   • Lortab [Hydrocodone-Acetaminophen] Nausea And Vomiting and Dizziness   • Morphine And Related Nausea Only       Objective   Objective     Vital Signs:   Temp:  [98 °F (36.7 °C)] 98 °F (36.7 °C)  Heart Rate:  [135-142] 137  Resp:  [24] 24-80% on room air, on 2 L 88% age  BP:  (105-140)/() 109/86        Physical Exam     GENERAL- Not distressed, well nourished.  RS-bilateral expiratory wheezing, with decreased air entry at the bases.  CVS- s1s2 Regular, tachycardic, could not appreciate murmur  ABD- soft, nontender, not distended, no organomegaly.  EXT- no edema.  NEURO- AAO-3, power 5/5 in all ext, no gross sensory deficit, cranial nerves intact.  EYES- Conjunctivae are normal. Pupils are equal, round, and reactive to light. No scleral icterus.   ENT- no external ear nose lesions, mucosa moist.  NECK- No JVD present. No tracheal deviation present. No thyromegaly present,No cervical lymphadenopathy.  JOINTS/MSK- no deformity, no swelling.  SKIN- no rash , warm to touch.  PSYCHIATRIC- Normal mood and affect. Behavior is normal. Thought content normal.     Results Reviewed:  I have personally reviewed current lab and radiology data.    Results from last 7 days   Lab Units 04/17/22  1858   WBC 10*3/mm3 8.61   HEMOGLOBIN g/dL 16.3*   HEMATOCRIT % 46.1   PLATELETS 10*3/mm3 201     Results from last 7 days   Lab Units 04/17/22  1933   SODIUM mmol/L 138   POTASSIUM mmol/L 4.2   CHLORIDE mmol/L 100   CO2 mmol/L 29.0   BUN mg/dL 21*   CREATININE mg/dL 0.94   GLUCOSE mg/dL 284*   CALCIUM mg/dL 8.7   ALT (SGPT) U/L 103*   AST (SGOT) U/L 107*   TROPONIN T ng/mL <0.010   PROBNP pg/mL 724.6     Estimated Creatinine Clearance: 66.7 mL/min (by C-G formula based on SCr of 0.94 mg/dL).  Brief Urine Lab Results     None        Imaging Results (Last 24 Hours)     Procedure Component Value Units Date/Time    CT Angiogram Chest [[id number]] Collected: 04/17/22 2102     Updated: 04/17/22 2111    Narrative:      CTA CHEST WITH CONTRAST    CLINICAL INDICATION: Tachycardia.    COMPARISON: None.    TECHNIQUE: 80 cc Isovue 370 administered intravenously. CTA images of the chest were obtained. Three-dimensional volume reconstructions were generated. CT dose lowering techniques were used, to include: automated  exposure control, adjustment for patient   size, and or use of iterative reconstruction.    FINDINGS:    CTA: No pulmonary arterial filling defect. Heart size normal without pericardial effusion. Mediastinal vessels normal in caliber and patency.    Mediastinum: No mediastinal or hilar lymphadenopathy. Trachea and central airways are patent. Thyroid is normal. Esophagus is normal.     Lungs and pleura: Status post left upper lobectomy. No focal consolidation, pleural effusion or pneumothorax.     Upper abdomen: No acute abnormality.    Body wall: No acute abnormality.    Bones: No acute osseous abnormality.      Impression:      No pulmonary embolism. No acute cardiopulmonary abnormality.    Electronically signed by:  Omid Ordoñez M.D.    4/17/2022 7:10 PM Mountain Time    XR Chest 1 View [[id number]] Collected: 04/17/22 1918     Updated: 04/17/22 1922    Narrative:      DATE OF EXAM: 4/17/2022 7:17 PM     PROCEDURE: XR CHEST 1 VW-     INDICATIONS: Dysrhythmia triage protocol     COMPARISON: CT chest 03/18/2022, AP chest x-ray 07/26/2018.     TECHNIQUE: Single radiographic AP view of the chest was obtained.     FINDINGS:  Cardiac silhouette is mildly prominent, but is exaggerated by portable  AP technique. Changes of median sternotomy, cardiac valve replacements,  and left atrial appendage occlusion are redemonstrated. There are mild  airspace opacities in both lower lungs. No pneumothorax or large pleural  effusion is seen.        Impression:      Mild bilateral lower lung airspace opacities, which may be due to  atelectasis or less likely pneumonia.     This report was finalized on 4/17/2022 7:19 PM by Shelia Mckeon MD.           Results for orders placed during the hospital encounter of 09/17/18    Adult Transesophageal Echo (TIFFANIE) W/ Cont if Necessary Per Protocol    Interpretation Summary  · Left ventricular systolic function is normal.  · Left atrial cavity size is severely dilated.  · The bioprosthetic aortic  "and mitral valves function normally  · Successful exclusion of the left atrial appendage at the time of valve surgery confirmed.    EKG-\"regular rhythm at the rate of 137 bpm, P waves noted on V2, ST depression noted in 1 and aVL,     Assessment/Plan   Active Hospital Problems    Diagnosis  POA   • **Heart palpitations [R00.2]  Yes   • COPD exacerbation (HCC) [J44.1]  Yes   • Valvular heart disease [I38]  Yes   • Type 2 diabetes mellitus (HCC) [E11.9]  Yes   • Hyperlipidemia LDL goal <70 [E78.5]  Yes     · High intensity statin indicated given the presence of diabetes and mild CAD     • Acquired hypothyroidism [E03.9]  Yes       Assessment & Plan     Rapid heart rate-with head Minocin administration, slowed down for few seconds, most likely underlying rhythm being a flutter.  -In spite of Cardizem, patient's heart rate has been sustaining at 137,  -We will try to titrate the Cardizem as needed.  -Cardiology consult in a.m.,  -We will add IV heparin, given patient symptoms ongoing for 5 days.  -Echo in a.m.  -fup LENARD, probnp.    Dry cough, wheezing, hypoxia-most likely secondary to underlying COPD with mild exacerbation, will try to avoid albuterol nebs currently due to tachycardia,  -Likely will need evaluation for home O2 upon discharge and pulmonary consult for COPD evaluation.  -low dose steroids, ipratrapioum.    Hypertension-currently not on medications, appears controlled    Hyperlipidemia-pravastatin 20 mg daily    Hx left upper lobe squamous cell carcinoma s/p left upper lobectomy 5/2017-follows up with Dr. Chaparrita owen, s/p maze procedure, ANGELA ligation-2018 CHADS2 VASC=  3, on Rythmol 600 mg as needed for palpitations, follows with Dr. Lay, aspirin 81 mg    S/p bioprosthetic mitral valve, aortic valve replacement, tricuspid annuloplasty    DM2-blood sugar of 284-we will start with fingerstick coverage tonight.    Hypothyroid-Synthroid 175 mcg-we will not change Synthroid dose " "currently.    DVT prophylaxis:    -TEDs/SCDs  -Lovenox-on heparin drip    CODE STATUS:    Code Status and Medical Interventions:   Ordered at: 04/17/22 8867     Level Of Support Discussed With:    Patient     Code Status (Patient has no pulse and is not breathing):    CPR (Attempt to Resuscitate)     Medical Interventions (Patient has pulse or is breathing):    Full Support         Admission Status:  I believe this patient meets inpatient status secondary to A. fib.        Electronically signed by Gayla Poe MD at 04/17/22 2354          Emergency Department Notes      Raina Barger, RN at 04/17/22 1939        pt c /o rapid HR x 5 days; pt was seen per DR. Lay and given \" afib pill \".. pt took medication yesterday. pt denies CP. pt denies home O2 use. 85% RA noted., pt on 3L NC. Pt denies cough/fever/chills at home.    Electronically signed by Raina Barger RN at 04/17/22 1939     Xavi Estrella PA at 04/17/22 2032     Attestation signed by Rick Nagy DO at 04/18/22 0436        SUPERVISE: For this patient encounter, I reviewed the APC's documentation, treatment plan, and medical decision making.  Rick Nagy DO 4/18/2022 04:36 EDT                           [place]    EMERGENCY DEPARTMENT ENCOUNTER      Pt Name: Jill Miller  MRN: [id number]  YOB: 1960  Date of evaluation: 4/17/2022  Provider: YANETH Le    CHIEF COMPLAINT       Chief Complaint   Patient presents with   • Rapid Heart Rate         HISTORY OF PRESENT ILLNESS  (Location/Symptom, Timing/Onset, Context/Setting, Quality, Duration, Modifying Factors, Severity.)   Jill Miller is a 61 y.o. female who presents to the emergency department with complaints of a rapid heart rate and shortness of breath that is made worse by exertion.  Patient reports that she started experiencing an elevated heart rate approximately 5 days ago.  She states that she has been following with Dr. Lay with cardiology who prescribed her " some medication for her elevated heart rate to use as needed.  She states that she took 1 dose of Rythmol yesterday but that it did not help at all and she has not taken any additional doses.  Patient is not anticoagulated and only takes an 81 mg aspirin.  She has past medical history significant for hypertension, hyperlipidemia, atrial fibrillation and flutter, hypothyroidism, diabetes and valvular heart disease.  Patient also with a history of lung cancer but continues to be an everyday smoker.  Patient denies anything specific that makes her symptoms better she reports that the increased heart rate makes her short of breath and fatigue.  She denies any additional associated symptoms on exam.  Of note in review of previous medical records most recent heart cath in August 2017 which demonstrated mild nonobstructive coronary artery disease and moderate pulmonary hypertension.  Echocardiogram (TIFFANIE) 2018 demonstrated bioprosthetic aortic and mitral valves functioning normal with left ventricular systolic function normal and a dilated left atrial cavity in addition to a successful exclusion of the left atrial appendage at time of valve surgery.  [facility]   Nursing notes were reviewed.    REVIEW OF SYSTEMS    (2-9 systems for level 4, 10 or more for level 5)   Review of Systems   Constitutional: Positive for activity change and fatigue. Negative for chills and fever.   Respiratory: Positive for shortness of breath. Negative for chest tightness.    Cardiovascular: Positive for palpitations. Negative for chest pain.   Gastrointestinal: Negative.    Genitourinary: Negative.    Musculoskeletal: Positive for arthralgias and myalgias.   Skin: Negative.    Neurological: Negative.         All systems reviewed and negative except for those discussed in HPI.   PAST MEDICAL HISTORY     Past Medical History:   Diagnosis Date   • Acute diastolic congestive heart failure (HCC)    • Aortic valve disorder    • Aortic valve disorder    •  Atrial fibrillation and flutter (HCC)    • Chronic diastolic congestive heart failure (HCC)    • Chronically Abnormal CXR (Left pleural disease post op) 9/25/2017   • COPD exacerbation (HCC) 4/17/2022   • Essential hypertension 4/10/2017    Target blood pressure <130/80 mmHg   • Graves disease    • Hyperlipidemia LDL goal <70    • Hypertension    • Hyperthyroidism    • Lung cancer (HCC)    • MRSA (methicillin resistant staph aureus) culture positive 2014    under left breast, incision and debridement, treated with wound packing and po abx    • Rheumatic mitral stenosis    • Rheumatic mitral stenosis    • Type 2 diabetes mellitus (HCC) 7/17/2018   • Valvular heart disease    • Valvular heart disease          SURGICAL HISTORY       Past Surgical History:   Procedure Laterality Date   • ABLATION OF DYSRHYTHMIC FOCUS  7-   • ABSCESS DRAINAGE      under left breast d/t mrsa    • AORTIC VALVE REPAIR/REPLACEMENT N/A 7/16/2018    Procedure: MEDIAN STERNOTOMY, AORTIC VALVE REPLACEMENT WITH TIFFANIE AND MAZE, TRICUSPID RING, LEFT ATRIAL OCCLUSION;  Surgeon: Ajay Cheatham MD;  [facility];  Service: Cardiothoracic   • BRONCHOSCOPY Left 5/12/2017    Procedure: BRONCHOSCOPY;  Surgeon: Ajay Cheatham MD;  [facility];  Service:    • BRONCHOSCOPY N/A 5/18/2017    Procedure: BRONCHOSCOPY BIOPSY AT BEDSIDE;  Surgeon: Ajay Cheatham MD;  [facility];  Service:    • CARDIAC CATHETERIZATION N/A 9/28/2017    Procedure: Left Heart Cath;  Surgeon: Marco Lay MD;  [facility];  Service:    • CARDIAC CATHETERIZATION N/A 9/28/2017    Procedure: Right Heart Cath;  Surgeon: Marco aLy MD;  [facility];  Service:    • LUNG BIOPSY  04/2017   • LYMPH NODE DISSECTION Left 5/12/2017    Procedure: MEDIASTINAL LYMPH NODE DISSECTION;  Surgeon: Ajay Cheatham MD;  [facility];  Service:    • MAZE PROCEDURE     • MITRAL VALVE  REPAIR/REPLACEMENT N/A 7/16/2018    Procedure: MITRAL VALVE REPLACEMENT;  Surgeon: Ajay Cheatham MD;  [facility];  Service: Cardiothoracic   • MITRAL VALVE REPLACEMENT  7-   • TEETH EXTRACTION     • THORACOSCOPY VIDEO ASSISTED WITH LOBECTOMY Left 5/12/2017    Procedure: THORACOSCOPY VIDEO ASSISTED WITH OPEN LOBECTOMY;  Surgeon: Ajay Cheatham MD;  Location: [facility];  Service:    • TOTAL THYROIDECTOMY  approx 2012   • TRICUSPID VALVE SURGERY  7-         CURRENT MEDICATIONS       Current Facility-Administered Medications:   •  acetaminophen (TYLENOL) tablet 650 mg, 650 mg, Oral, Q4H PRN, 650 mg at 04/18/22 0307 **OR** acetaminophen (TYLENOL) 160 MG/5ML solution 650 mg, 650 mg, Oral, Q4H PRN **OR** acetaminophen (TYLENOL) suppository 650 mg, 650 mg, Rectal, Q4H PRN, Gayla Poe MD  •  aspirin EC tablet 81 mg, 81 mg, Oral, Daily, Gayla Poe MD  •  dextrose (D50W) (25 g/50 mL) IV injection 25 g, 25 g, Intravenous, Q15 Min PRN, Gayla Poe MD  •  dextrose (GLUTOSE) oral gel 15 g, 15 g, Oral, Q15 Min PRN, Gayla Poe MD  •  dilTIAZem (CARDIZEM) 125 mg in 125 mL 0.7% sodium chloride  infusion, 5-15 mg/hr, Intravenous, Titrated, Gayla Poe MD, Last Rate: 15 mL/hr at 04/17/22 2151, 15 mg/hr at 04/17/22 2151  •  glucagon (human recombinant) (GLUCAGEN DIAGNOSTIC) injection 1 mg, 1 mg, Intramuscular, Q15 Min PRN, Gayla Poe MD  •  heparin 80954 units/250 mL (100 units/mL) in 0.9% NaCl infusion, 12 Units/kg/hr, Intravenous, Titrated, Gayla Poe MD, Last Rate: 10.66 mL/hr at 04/18/22 0024, 12 Units/kg/hr at 04/18/22 0024  •  insulin lispro (humaLOG) injection 0-7 Units, 0-7 Units, Subcutaneous, Q6H, Gayla Poe MD, 4 Units at 04/18/22 0118  •  ipratropium (ATROVENT) nebulizer solution 0.5 mg, 0.5 mg, Nebulization, 4x Daily - RT, Gayla Poe MD, 0.5 mg at 04/18/22 0241  •  levothyroxine (SYNTHROID, LEVOTHROID) tablet 175 mcg, 175 mcg, Oral, Q AM, Lui  Gayla POP MD  •  Magnesium Sulfate 2 gram Bolus, followed by 8 gram infusion (total Mg dose 10 grams)- Mg less than or equal to 1mg/dL, 2 g, Intravenous, PRN **OR** Magnesium Sulfate 2 gram / 50mL Infusion (GIVE X 3 BAGS TO EQUAL 6GM TOTAL DOSE) - Mg 1.1 - 1.5 mg/dl, 2 g, Intravenous, PRN **OR** Magnesium Sulfate 4 gram infusion- Mg 1.6-1.9 mg/dL, 4 g, Intravenous, PRN, Mary Cooper, APRN, Last Rate: 25 mL/hr at 04/18/22 0214, 4 g at 04/18/22 0214  •  methylPREDNISolone sodium succinate (SOLU-Medrol) injection 60 mg, 60 mg, Intravenous, Daily, Gayla Poe MD, 60 mg at 04/18/22 0340  •  multivitamin (THERAGRAN) tablet 1 tablet, 1 tablet, Oral, Daily, Gayla Poe MD  •  Pharmacy to Dose Heparin, , Does not apply, Continuous PRN, Gayla Poe MD  •  pravastatin (PRAVACHOL) tablet 20 mg, 20 mg, Oral, Nightly, Gayla Poe MD, 20 mg at 04/18/22 0113  •  sodium chloride 0.9 % flush 1-10 mL, 1-10 mL, Intravenous, PRN, Gayla Poe MD  •  sodium chloride 0.9 % flush 10 mL, 10 mL, Intravenous, PRN, Gayla Poe MD  •  sodium chloride 0.9 % flush 10 mL, 10 mL, Intravenous, Q12H, Gayla Poe MD, 10 mL at 04/18/22 0342    ALLERGIES     Lortab [hydrocodone-acetaminophen] and Morphine and related    FAMILY HISTORY       Family History   Problem Relation Age of Onset   • Breast cancer Mother    • Diabetes Father           SOCIAL HISTORY       Social History     Socioeconomic History   • Marital status:    • Number of children: 3   Tobacco Use   • Smoking status: Current Every Day Smoker     Packs/day: 0.50     Years: 40.00     Pack years: 20.00     Types: Cigarettes     Start date: 5/5/1978     Last attempt to quit: 5/5/2018     Years since quitting: 3.9   • Smokeless tobacco: Never Used   • Tobacco comment: Couldn't remember exact dates.   Vaping Use   • Vaping Use: Never used   Substance and Sexual Activity   • Alcohol use: No   • Drug use: No   • Sexual activity: Not Currently      Partners: Male     Birth control/protection: None         PHYSICAL EXAM    (up to 7 for level 4, 8 or more for level 5)   Physical Exam  Vitals and nursing note reviewed.   Constitutional:       General: She is not in acute distress.     Appearance: Normal appearance. She is not ill-appearing or toxic-appearing.   HENT:      Head: Normocephalic and atraumatic.      Nose: Nose normal.      Mouth/Throat:      Mouth: Mucous membranes are moist.   Eyes:      Extraocular Movements: Extraocular movements intact.   Cardiovascular:      Rate and Rhythm: Regular rhythm. Tachycardia present.      Pulses: Normal pulses.   Pulmonary:      Effort: Pulmonary effort is normal. No respiratory distress.      Breath sounds: Normal breath sounds.   Abdominal:      Palpations: Abdomen is soft.      Tenderness: There is no abdominal tenderness.   Musculoskeletal:         General: Normal range of motion.      Cervical back: Normal range of motion.   Skin:     General: Skin is warm and dry.   Neurological:      General: No focal deficit present.      Mental Status: She is alert.   Psychiatric:         Mood and Affect: Mood normal.         Behavior: Behavior normal.         Thought Content: Thought content normal.         Judgment: Judgment normal.          DIAGNOSTIC RESULTS     EKG: All EKGs are interpreted by the Emergency Department Physician who either signs or Co-signs this chart in the absence of a cardiologist.    ECG 12 Lead   Preliminary Result         ECG 12 Lead    (Results Pending)       RADIOLOGY:   Non-plain film images such as CT, Ultrasound and MRI are read by the radiologist. Plain radiographic images are visualized and preliminarily interpreted by the emergency physician with the below findings:      [x] Radiologist's Report Reviewed:  CT Angiogram Chest   Final Result   No pulmonary embolism. No acute cardiopulmonary abnormality.      Electronically signed by:  Omid Ordoñez M.D.     4/17/2022 7:10 PM [facility]       XR Chest 1 View   Final Result   Mild bilateral lower lung airspace opacities, which may be due to   atelectasis or less likely pneumonia.       This report was finalized on 4/17/2022 7:19 PM by Shelia Mckeon MD.                ED BEDSIDE ULTRASOUND:   Performed by ED Physician - none    LABS:    I have reviewed and interpreted all of the currently available lab results from this visit (if applicable):  Results for orders placed or performed during the hospital encounter of 04/17/22   Comprehensive Metabolic Panel    Specimen: Blood   Result Value Ref Range    Glucose 284 (H) 65 - 99 mg/dL    BUN 21 (H) 6 - 20 mg/dL    Creatinine 0.94 0.57 - 1.00 mg/dL    Sodium 138 136 - 145 mmol/L    Potassium 4.2 3.5 - 5.2 mmol/L    Chloride 100 98 - 107 mmol/L    CO2 29.0 22.0 - 29.0 mmol/L    Calcium 8.7 8.6 - 10.5 mg/dL    Total Protein 6.8 6.0 - 8.5 g/dL    Albumin 4.20 3.50 - 5.20 g/dL    ALT (SGPT) 103 (H) 1 - 33 U/L    AST (SGOT) 107 (H) 1 - 32 U/L    Alkaline Phosphatase 121 (H) 39 - 117 U/L    Total Bilirubin 0.4 0.0 - 1.2 mg/dL    Globulin 2.6 gm/dL    A/G Ratio 1.6 g/dL    BUN/Creatinine Ratio 22.3 7.0 - 25.0    Anion Gap 9.0 5.0 - 15.0 mmol/L    eGFR 70.0 >60.0 mL/min/1.73   Magnesium    Specimen: Blood   Result Value Ref Range    Magnesium 1.8 1.6 - 2.6 mg/dL   Troponin    Specimen: Blood   Result Value Ref Range    Troponin T <0.010 0.000 - 0.030 ng/mL   TSH    Specimen: Blood   Result Value Ref Range    TSH 13.930 (H) 0.270 - 4.200 uIU/mL   BNP    Specimen: Blood   Result Value Ref Range    proBNP 724.6 0.0 - 900.0 pg/mL   CBC Auto Differential    Specimen: Blood   Result Value Ref Range    WBC 8.61 3.40 - 10.80 10*3/mm3    RBC 4.21 3.77 - 5.28 10*6/mm3    Hemoglobin 16.3 (H) 12.0 - 15.9 g/dL    Hematocrit 46.1 34.0 - 46.6 %    .5 (H) 79.0 - 97.0 fL    MCH 38.7 (H) 26.6 - 33.0 pg    MCHC 35.4 31.5 - 35.7 g/dL    RDW 12.4 12.3 - 15.4 %    RDW-SD 49.1 37.0 - 54.0 fl    MPV 12.5 (H) 6.0 - 12.0 fL     Platelets 201 140 - 450 10*3/mm3    Neutrophil % 77.0 (H) 42.7 - 76.0 %    Lymphocyte % 15.8 (L) 19.6 - 45.3 %    Monocyte % 5.9 5.0 - 12.0 %    Eosinophil % 0.5 0.3 - 6.2 %    Basophil % 0.3 0.0 - 1.5 %    Immature Grans % 0.5 0.0 - 0.5 %    Neutrophils, Absolute 6.63 1.70 - 7.00 10*3/mm3    Lymphocytes, Absolute 1.36 0.70 - 3.10 10*3/mm3    Monocytes, Absolute 0.51 0.10 - 0.90 10*3/mm3    Eosinophils, Absolute 0.04 0.00 - 0.40 10*3/mm3    Basophils, Absolute 0.03 0.00 - 0.20 10*3/mm3    Immature Grans, Absolute 0.04 0.00 - 0.05 10*3/mm3    nRBC 0.0 0.0 - 0.2 /100 WBC   T4, Free    Specimen: Blood   Result Value Ref Range    Free T4 1.56 0.93 - 1.70 ng/dL   Protime-INR    Specimen: Blood   Result Value Ref Range    Protime 13.4 11.4 - 14.4 Seconds    INR 1.03 0.84 - 1.13   aPTT    Specimen: Blood   Result Value Ref Range    PTT 27.8 (L) 50.0 - 95.0 seconds   Heparin Anti-Xa    Specimen: Blood   Result Value Ref Range    Heparin Anti-Xa (UFH) 0.10 (L) 0.30 - 0.70 IU/ml   Troponin    Specimen: Blood   Result Value Ref Range    Troponin T <0.010 0.000 - 0.030 ng/mL   proBNP    Specimen: Blood   Result Value Ref Range    proBNP 623.8 0.0 - 900.0 pg/mL   POC Glucose Once    Specimen: Blood   Result Value Ref Range    Glucose 292 (H) 70 - 130 mg/dL   Green Top (Gel)   Result Value Ref Range    Extra Tube Hold for add-ons.    Lavender Top   Result Value Ref Range    Extra Tube hold for add-on    Gold Top - SST   Result Value Ref Range    Extra Tube Hold for add-ons.    Gray Top   Result Value Ref Range    Extra Tube Hold for add-ons.    Light Blue Top   Result Value Ref Range    Extra Tube hold for add-on         All other labs were within normal range or not returned as of this dictation.      EMERGENCY DEPARTMENT COURSE and DIFFERENTIAL DIAGNOSIS/MDM:   Vitals:    Vitals:    04/17/22 2338 04/18/22 0216 04/18/22 0241 04/18/22 0330   BP: 120/91   99/65   BP Location: Left arm   Left arm   Patient Position: Sitting   Lying  "  Pulse: (!) 135  (!) 135 (!) 133   Resp: 20  20 20   Temp: 96.7 °F (35.9 °C)      TempSrc: Oral      SpO2: 90% (!) 89% 90% 91%   Weight:       Height: 157.5 cm (62\")          ED Course as of 04/18/22 0348   Sun Apr 17, 2022   2211 Hospitalist messaged for admission [JG]   2223 I spoke with Dr. Poe to review patient for admission he recommended a dose of adenosine and will admit patient. [JG]   2244 6 mg dose of adenosine was administered with hospitalist and attending at bedside.  There was a brief reduction in heart rate and visualized P waves confirmed demonstrating atrial flutter.  Patient tolerated the procedure well.  Will be admitted to hospitalist for further evaluation and treatment. [JG]   Mon Apr 18, 2022   0345 In summary this is a 61-year-old female who presents the emergency room today with complaints of an elevated heart rate for the last 5 days.  Also shares some increased fatigue and shortness of breath.  Initially found to be hypoxic on exam but responded well to supplementation with oxygen via nasal cannula while in the ED.  Labs without acute or emergent abnormalities.  Initial troponin and proBNP within normal limits.CT of chest demonstrates no pulmonary embolism and no acute cardiopulmonary abnormalities.  Cardizem for rate control initiated in the ED after case reviewed with attending Dr. Nagy.  Hospitalist was paged for admission and recommended a dose of adenosine for rate control and further evaluation.  This was given in the ED without conversion from patient's presenting rhythm.  Patient will be admitted for further evaluation. [JG]      ED Course User Index  [JG] Xavi Etsrella PA       MDM  Number of Diagnoses or Management Options  Atrial flutter, unspecified type (HCC): established, worsening  Heart palpitations: established, worsening  Shortness of breath: new, needed workup     Amount and/or Complexity of Data Reviewed  Clinical lab tests: reviewed  Tests in the radiology " section of CPT®: reviewed    Risk of Complications, Morbidity, and/or Mortality  Presenting problems: moderate  Diagnostic procedures: moderate  Management options: moderate    Patient Progress  Patient progress: stable         MEDICATIONS ADMINISTERED IN ED:  Medications   sodium chloride 0.9 % flush 10 mL (has no administration in time range)   dilTIAZem (CARDIZEM) 125 mg in 125 mL 0.7% sodium chloride  infusion (15 mg/hr Intravenous Rate/Dose Change 4/17/22 2151)   aspirin EC tablet 81 mg (has no administration in time range)   pravastatin (PRAVACHOL) tablet 20 mg (20 mg Oral Given 4/18/22 0113)   multivitamin (THERAGRAN) tablet 1 tablet (has no administration in time range)   levothyroxine (SYNTHROID, LEVOTHROID) tablet 175 mcg (has no administration in time range)   sodium chloride 0.9 % flush 10 mL (10 mL Intravenous Given 4/18/22 0342)   sodium chloride 0.9 % flush 1-10 mL (has no administration in time range)   acetaminophen (TYLENOL) tablet 650 mg (650 mg Oral Given 4/18/22 0307)     Or   acetaminophen (TYLENOL) 160 MG/5ML solution 650 mg ( Oral Not Given:  See Alt 4/18/22 0307)     Or   acetaminophen (TYLENOL) suppository 650 mg ( Rectal Not Given:  See Alt 4/18/22 0307)   dextrose (GLUTOSE) oral gel 15 g (has no administration in time range)   dextrose (D50W) (25 g/50 mL) IV injection 25 g (has no administration in time range)   glucagon (human recombinant) (GLUCAGEN DIAGNOSTIC) injection 1 mg (has no administration in time range)   insulin lispro (humaLOG) injection 0-7 Units (4 Units Subcutaneous Given 4/18/22 0118)   heparin 91508 units/250 mL (100 units/mL) in 0.9% NaCl infusion (12 Units/kg/hr × 88.9 kg Intravenous New Bag 4/18/22 0024)   Pharmacy to Dose Heparin (has no administration in time range)   Magnesium Sulfate 2 gram Bolus, followed by 8 gram infusion (total Mg dose 10 grams)- Mg less than or equal to 1mg/dL ( Intravenous Not Given:  See Alt 4/18/22 0214)     Or   Magnesium Sulfate 2 gram /  50mL Infusion (GIVE X 3 BAGS TO EQUAL 6GM TOTAL DOSE) - Mg 1.1 - 1.5 mg/dl ( Intravenous Not Given:  See Alt 4/18/22 0214)     Or   Magnesium Sulfate 4 gram infusion- Mg 1.6-1.9 mg/dL (4 g Intravenous New Bag 4/18/22 0214)   methylPREDNISolone sodium succinate (SOLU-Medrol) injection 60 mg (60 mg Intravenous Given 4/18/22 0340)   ipratropium (ATROVENT) nebulizer solution 0.5 mg (0.5 mg Nebulization Given 4/18/22 0241)   iopamidol (ISOVUE-370) 76 % injection 80 mL (80 mL Intravenous Given 4/17/22 2034)   adenosine (ADENOCARD) injection 6 mg (6 mg Intravenous Given 4/17/22 2232)   heparin (porcine) injection 4,000 Units (4,000 Units Intravenous Given 4/18/22 0023)       PROCEDURES:  Procedures          CRITICAL CARE TIME    Total Critical Care time was 0 minutes, excluding separately reportable procedures.   There was a high probability of clinically significant/life threatening deterioration in the patient's condition which required my urgent intervention.      FINAL IMPRESSION      1. Heart palpitations    2. Atrial flutter, unspecified type (HCC)    3. Shortness of breath          DISPOSITION/PLAN     ED Disposition     ED Disposition   Decision to Admit    Condition   --    Comment   Level of Care: Telemetry [5]   Diagnosis: Heart palpitations [521317]   Admitting Physician: ANNA DE LA ROSA [[id number]]   Attending Physician: ANNA DE LA ROSA [[id number]]   Certification: I Certify That Inpatient Hospital Services Are Medically Necessary For Greater Than 2 Midnights               PATIENT REFERRED TO:  No follow-up provider specified.    DISCHARGE MEDICATIONS:     Medication List      ASK your doctor about these medications    aspirin 81 MG EC tablet  Take 1 tablet by mouth Daily.     levothyroxine 175 MCG tablet  Commonly known as: SYNTHROID, LEVOTHROID     multivitamin tablet tablet     pravastatin 20 MG tablet  Commonly known as: PRAVACHOL     propafenone 300 MG tablet  Commonly known as: RYTHMOL  Take 2 tablets by mouth As  Needed (for atrial fibrillation).     vitamin D3 125 MCG (5000 UT) tablet tablet                Comment: Please note this report has been produced using speech recognition software.      YANETH Le Jason C, PA  04/18/22 9548      Electronically signed by Rick Nagy DO at 04/18/22 0436       Vital Signs (last 3 days)     Date/Time Temp Temp src Pulse Resp BP Patient Position SpO2    04/20/22 1149 -- -- 72 17 128/72 Sitting 91    04/20/22 1035 -- -- 66 -- -- -- 91    04/20/22 0800 -- -- 75 -- -- -- 91    04/20/22 0722 97.8 (36.6) Oral 55 19 120/76 Sitting 95    04/20/22 0716 -- -- 60 20 -- -- 93    04/20/22 0600 -- -- 72 -- -- -- 89    04/20/22 0500 -- -- 70 -- -- -- 93    04/20/22 0439 97.3 (36.3) Oral 70 18 120/79 Lying 92    04/20/22 0400 -- -- 58 -- -- -- 90    04/20/22 0300 -- -- 71 -- -- -- 91    04/20/22 0200 -- -- 67 -- -- -- 88    04/20/22 0100 -- -- 74 -- -- -- 86    04/20/22 0005 97.3 (36.3) Oral 67 20 121/66 Lying 91    04/20/22 0000 -- -- 66 -- -- -- 92    04/19/22 2300 -- -- 69 -- -- -- 94    04/19/22 2258 -- -- 71 18 -- Lying 94    04/19/22 2200 -- -- 79 -- -- -- 92    04/19/22 2100 -- -- 69 -- -- -- 94    04/19/22 2016 97.4 (36.3) Oral 76 18 130/82 Lying 92    04/19/22 2000 -- -- 71 -- -- -- 91    04/19/22 1054 98.1 (36.7) Oral 75 16 119/72 Lying 95    04/19/22 0927 -- -- 79 18 -- -- 96    04/19/22 0750 98.4 (36.9) Oral -- 18 123/76 Sitting --    04/19/22 0500 -- -- 83 -- -- -- --    04/19/22 0451 98.1 (36.7) Oral 81 18 135/81 Lying --    04/19/22 0300 -- -- 84 -- -- -- --    04/19/22 0100 -- -- 90 -- -- -- --    04/19/22 0041 -- -- 82 22 -- -- 92    04/18/22 2323 98.7 (37.1) Oral 80 20 120/71 Lying 94    04/18/22 2300 -- -- 88 -- -- -- 94    04/18/22 2100 -- -- 86 -- -- -- 92    04/18/22 2049 96.1 (35.6) Oral 80 18 123/93 Lying 96    04/18/22 2047 -- -- 83 20 -- -- 96    04/18/22 1900 -- -- 93 -- -- -- 95    04/18/22 1423 97.6 (36.4) Oral 83 22 125/86 Lying --    04/18/22 1132  97.9 (36.6) Oral 84 22 111/75 Lying --    04/18/22 1015 -- -- 82 -- 115/72 -- 89    04/18/22 1010 -- -- 80 -- 121/76 -- 89    04/18/22 1005 -- -- 80 -- 120/74 -- 89    04/18/22 1000 -- -- 78 -- 112/74 -- 90    04/18/22 0955 -- -- 75 -- 110/70 -- 90    04/18/22 0950 -- -- 75 -- 101/70 -- 89 04/18/22 0945 -- -- 78 -- 115/73 -- 86    04/18/22 0940 -- -- 130 -- 117/84 -- 89 04/18/22 0818 -- -- 130 -- -- -- 89    04/18/22 0812 -- -- -- 22 -- -- --    04/18/22 0700 97.4 (36.3) Oral 130 20 105/74 Lying --    04/18/22 0330 97.7 (36.5) Oral 133 20 99/65 Lying 91    04/18/22 0241 -- -- 135 20 -- -- 90    04/18/22 0216 -- -- -- -- -- -- 89 04/17/22 2338 96.7 (35.9) Oral 135 20 120/91 Sitting 90    04/17/22 2300 98 (36.7) Oral 135 20 119/88 Lying 90    04/17/22 2253 -- -- 137 -- -- -- 91 04/17/22 2244 -- -- 135 -- -- -- 85 04/17/22 2237 -- -- 135 -- -- -- 92 04/17/22 2234 -- -- 135 -- -- -- 92 04/17/22 2232 -- -- 135 -- 117/83 -- 92 04/17/22 2230 -- -- 135 -- 117/83 -- 92 04/17/22 2217 -- -- 137 -- -- -- 92 04/17/22 2200 -- -- 135 -- 109/86 -- 91 04/17/22 2150 -- -- 137 -- -- -- 93    04/17/22 2146 -- -- 137 -- -- -- 92    04/17/22 2143 -- -- 137 -- -- -- 92    04/17/22 2130 -- -- 138 -- 105/84 -- 91    04/17/22 2108 -- -- 137 -- -- -- 93    04/17/22 2100 -- -- 138 -- 115/86 -- 91    04/17/22 2040 -- -- 135 -- 129/95 -- 88    04/17/22 2000 -- -- 135 -- 120/96 -- 92    04/17/22 1930 -- -- 137 -- 111/82 -- 92    04/17/22 1911 -- -- 137 -- -- -- 93    04/17/22 1900 -- -- 138 -- 112/80 -- 93    04/17/22 1842 -- -- -- -- -- -- 94    04/17/22 1841 -- -- 137 -- -- -- --    04/17/22 1840 -- -- -- -- 140/89 -- --    04/17/22 1831 -- -- -- -- -- -- 92    04/17/22 1830 -- -- 138 -- -- -- 91 04/17/22 1827 98 (36.7) Oral 142 24 134/100 Sitting 85          Oxygen Therapy (last 3 days)     Date/Time SpO2 Device (Oxygen Therapy) Flow (L/min) Oxygen Concentration (%) ETCO2 (mmHg)    04/20/22 1149 91 -- -- --  --    04/20/22 1104 -- room air -- -- --    04/20/22 1035 91 nasal cannula 1 -- --    04/20/22 0800 91 nasal cannula 1 -- --    04/20/22 0722 95 -- -- -- --    04/20/22 0716 93 nasal cannula 1 -- --    04/20/22 0600 89 -- -- -- --    04/20/22 0500 93 -- -- -- --    04/20/22 0439 92 -- -- -- --    04/20/22 0400 90 -- -- -- --    04/20/22 0300 91 -- -- -- --    04/20/22 0200 88 -- -- -- --    04/20/22 0100 86 -- -- -- --    04/20/22 0005 91 -- -- -- --    04/20/22 0000 92 -- -- -- --    04/19/22 2300 94 -- -- -- --    04/19/22 2258 94 nasal cannula 1 -- --    04/19/22 2204 -- nasal cannula 1 -- --    04/19/22 2200 92 -- -- -- --    04/19/22 2100 94 -- -- -- --    04/19/22 2016 92 -- -- -- --    04/19/22 2000 91 -- -- -- --    04/19/22 1752 -- nasal cannula 2 -- --    04/19/22 1615 -- nasal cannula 2 -- --    04/19/22 1414 -- nasal cannula 2 -- --    04/19/22 1228 -- nasal cannula 2 -- --    04/19/22 1054 95 -- -- -- --    04/19/22 1022 -- nasal cannula 2 -- --    04/19/22 0927 96 -- 2  -- --    04/19/22 0830 -- nasal cannula 2 -- --    04/19/22 0041 92 nasal cannula 4 -- --    04/18/22 2323 94 nasal cannula -- -- --    04/18/22 2300 94 nasal cannula -- -- --    04/18/22 2100 92 nasal cannula -- -- --    04/18/22 2049 96 nasal cannula -- -- --    04/18/22 2047 96 nasal cannula 4 -- --    04/18/22 1900 95 nasal cannula -- -- --    04/18/22 1759 -- nasal cannula 4 -- --    04/18/22 1619 -- humidified;nasal cannula 4 -- --    04/18/22 1430 -- humidified;nasal cannula 4 -- --    04/18/22 1230 -- nasal cannula 4 -- --    04/18/22 1030 -- nasal cannula 4 -- --    04/18/22 1015 89 nasal cannula 4 -- --    04/18/22 1010 89 -- -- -- --    04/18/22 1005 89 -- -- -- --    04/18/22 1000 90 nasal cannula 4 -- --    04/18/22 0955 90 -- -- -- --    04/18/22 0950 89 -- -- -- --    04/18/22 0945 86 -- -- -- --    04/18/22 0940 89 -- -- -- --    04/18/22 0818 89 -- -- -- --    04/18/22 0812 -- nasal cannula;humidified 4 -- --     04/18/22 0800 -- humidified;nasal cannula 4 -- --    04/18/22 0330 91 -- 4 -- --    04/18/22 0241 90 nasal cannula;humidified 4 -- --    04/18/22 0216 89 humidified;nasal cannula 4 -- --    04/17/22 2352 -- humidified;nasal cannula 3 -- --    04/17/22 2338 90 nasal cannula;humidified 3 -- --    04/17/22 2300 90 nasal cannula 2.5 -- --    04/17/22 2253 91 -- -- -- --    04/17/22 2244 85 -- -- -- --    04/17/22 2237 92 -- -- -- --    04/17/22 2234 92 -- -- -- --    04/17/22 2232 92 -- -- -- --    04/17/22 2230 92 -- -- -- --    04/17/22 2217 92 -- -- -- --    04/17/22 2200 91 -- -- -- --    04/17/22 2150 93 -- -- -- --    04/17/22 2146 92 -- -- -- --    04/17/22 2143 92 -- -- -- --    04/17/22 2130 91 -- -- -- --    04/17/22 2108 93 -- -- -- --    04/17/22 2100 91 -- -- -- --    04/17/22 2040 88 -- -- -- --    04/17/22 2000 92 -- -- -- --    04/17/22 1930 92 -- -- -- --    04/17/22 1911 93 -- -- -- --    04/17/22 1900 93 -- -- -- --    04/17/22 1842 94 -- -- -- --    04/17/22 1831 92 nasal cannula 4 -- --    04/17/22 1830 91 nasal cannula 2 -- --    04/17/22 1827 85 room air -- -- --          Current Facility-Administered Medications   Medication Dose Route Frequency Provider Last Rate Last Admin   • acetaminophen (TYLENOL) tablet 650 mg  650 mg Oral Q4H PRN Gayla Poe MD   650 mg at 04/18/22 2108    Or   • acetaminophen (TYLENOL) 160 MG/5ML solution 650 mg  650 mg Oral Q4H PRN Gayla Poe MD        Or   • acetaminophen (TYLENOL) suppository 650 mg  650 mg Rectal Q4H PRN Gayla Poe MD       • aspirin EC tablet 81 mg  81 mg Oral Daily Gayla Poe MD   81 mg at 04/20/22 0845   • budesonide-formoterol (SYMBICORT) 160-4.5 MCG/ACT inhaler 2 puff  2 puff Inhalation BID - RT Mary Oscar MD   2 puff at 04/20/22 0716   • dextrose (D50W) (25 g/50 mL) IV injection 25 g  25 g Intravenous Q15 Min PRN Mary Oscar MD       • dextrose (GLUTOSE) oral gel 15 g  15 g Oral Q15 Min PRN Mary Oscar MD        • furosemide (LASIX) tablet 40 mg  40 mg Oral Daily Marisa Suarez APRN   40 mg at 04/20/22 1210   • glucagon (human recombinant) (GLUCAGEN DIAGNOSTIC) injection 1 mg  1 mg Intramuscular Q15 Min PRN Mary Oscar MD       • insulin detemir (LEVEMIR) injection 10 Units  10 Units Subcutaneous Nightly Mary Oscar MD   10 Units at 04/19/22 2207   • insulin lispro (humaLOG) injection 0-9 Units  0-9 Units Subcutaneous TID AC Mary Oscar MD   4 Units at 04/20/22 1210   • insulin lispro (humaLOG) injection 7 Units  7 Units Subcutaneous TID With Meals Mary Oscar MD   7 Units at 04/20/22 1210   • levothyroxine (SYNTHROID, LEVOTHROID) tablet 175 mcg  175 mcg Oral Q AM Gayla Poe MD   175 mcg at 04/20/22 0513   • Magnesium Sulfate 2 gram Bolus, followed by 8 gram infusion (total Mg dose 10 grams)- Mg less than or equal to 1mg/dL  2 g Intravenous PRN Mary Cooper APRN        Or   • Magnesium Sulfate 2 gram / 50mL Infusion (GIVE X 3 BAGS TO EQUAL 6GM TOTAL DOSE) - Mg 1.1 - 1.5 mg/dl  2 g Intravenous PRN Mary Cooper APRN        Or   • Magnesium Sulfate 4 gram infusion- Mg 1.6-1.9 mg/dL  4 g Intravenous PRN Mary Cooper APRN 25 mL/hr at 04/18/22 0214 4 g at 04/18/22 0214   • methohexital (BREVITAL) injection  - ADS Override Pull            • methylPREDNISolone sodium succinate (SOLU-Medrol) injection 40 mg  40 mg Intravenous Daily Mary Oscar MD   40 mg at 04/20/22 0845   • metoprolol tartrate (LOPRESSOR) half tablet 12.5 mg  12.5 mg Oral Q12H Marisa Suarez APRN   12.5 mg at 04/20/22 1210   • midazolam (VERSED) 2 MG/2ML injection  - ADS Override Pull            • multivitamin (THERAGRAN) tablet 1 tablet  1 tablet Oral Daily Gayla Poe MD   1 tablet at 04/20/22 0845   • potassium chloride (MICRO-K) CR capsule 20 mEq  20 mEq Oral Daily Marisa Suarez APRN   20 mEq at 04/20/22 1210   • pravastatin (PRAVACHOL) tablet 20 mg  20 mg Oral Nightly Lui,  Gayla POP MD   20 mg at 04/19/22 2207   • sodium chloride 0.9 % flush 1-10 mL  1-10 mL Intravenous PRN Gayla Poe MD       • sodium chloride 0.9 % flush 10 mL  10 mL Intravenous PRN Gayla Poe MD       • sodium chloride 0.9 % flush 10 mL  10 mL Intravenous Q12H Gayla Poe MD   10 mL at 04/20/22 0850     Lab Results (last 72 hours)     Procedure Component Value Units Date/Time    POC Glucose Once [[id number]]  (Abnormal) Collected: 04/20/22 1148    Specimen: Blood Updated: 04/20/22 1150     Glucose 214 mg/dL      Comment: Meter: [id number] : [id number] العلي Nerissa       POC Glucose Once [[id number]]  (Abnormal) Collected: 04/20/22 0723    Specimen: Blood Updated: 04/20/22 0724     Glucose 175 mg/dL      Comment: Meter: [id number] : [id number] العلي Nerissa       POC Glucose Once [[id number]]  (Abnormal) Collected: 04/20/22 0008    Specimen: Blood Updated: 04/20/22 0009     Glucose 323 mg/dL      Comment: Meter: [id number] : [id number] Coltin Yantz       POC Glucose Once [[id number]]  (Abnormal) Collected: 04/19/22 2025    Specimen: Blood Updated: 04/19/22 2026     Glucose 357 mg/dL      Comment: Meter: [id number] : [id number] Coltin Yantz       POC Glucose Once [[id number]]  (Abnormal) Collected: 04/19/22 1635    Specimen: Blood Updated: 04/19/22 1649     Glucose 326 mg/dL      Comment: Meter: [id number] : [id number] Ross Tania       POC Glucose Once [[id number]]  (Abnormal) Collected: 04/19/22 1056    Specimen: Blood Updated: 04/19/22 1107     Glucose 344 mg/dL      Comment: Meter: [id number] : [id number] Ross Tania       POC Glucose Once [[id number]]  (Abnormal) Collected: 04/19/22 0807    Specimen: Blood Updated: 04/19/22 0810     Glucose 272 mg/dL      Comment: Meter: [id number] : [id number] James Marin       Heparin Anti-Xa [[id number]]  (Normal) Collected: 04/19/22 0735    Specimen: Blood Updated: 04/19/22 0807     Heparin Anti-Xa (UFH) 0.56 IU/ml     POC Glucose  Once [[id number]]  (Abnormal) Collected: 04/19/22 0551    Specimen: Blood Updated: 04/19/22 0557     Glucose 270 mg/dL      Comment: Meter: [id number] : [id number] Piotr Barrera       Comprehensive Metabolic Panel [[id number]]  (Abnormal) Collected: 04/19/22 0228    Specimen: Blood Updated: 04/19/22 0345     Glucose 310 mg/dL      BUN 19 mg/dL      Creatinine 0.72 mg/dL      Sodium 135 mmol/L      Potassium 5.0 mmol/L      Chloride 97 mmol/L      CO2 28.0 mmol/L      Calcium 8.6 mg/dL      Total Protein 6.9 g/dL      Albumin 4.00 g/dL      ALT (SGPT) 89 U/L      AST (SGOT) 57 U/L      Alkaline Phosphatase 109 U/L      Total Bilirubin 0.4 mg/dL      Globulin 2.9 gm/dL      Comment: Calculated Result        A/G Ratio 1.4 g/dL      BUN/Creatinine Ratio 26.4     Anion Gap 10.0 mmol/L      eGFR 96.5 mL/min/1.73      Comment: National Kidney Foundation and American Society of Nephrology (ASN) Task Force recommended calculation based on the Chronic Kidney Disease Epidemiology Collaboration (CKD-EPI) equation refit without adjustment for race.       Narrative:      GFR Normal >60  Chronic Kidney Disease <60  Kidney Failure <15      Heparin Anti-Xa [[id number]]  (Normal) Collected: 04/19/22 0228    Specimen: Blood Updated: 04/19/22 0330     Heparin Anti-Xa (UFH) 0.61 IU/ml     CBC & Differential [[id number]]  (Abnormal) Collected: 04/19/22 0228    Specimen: Blood Updated: 04/19/22 0318    Narrative:      The following orders were created for panel order CBC & Differential.  Procedure                               Abnormality         Status                     ---------                               -----------         ------                     CBC Auto Differential[[id number]]        Abnormal            Final result               Scan Slide[[id number]]                                                                    Please view results for these tests on the individual orders.    CBC Auto Differential [[id number]]  (Abnormal)  Collected: 04/19/22 0228    Specimen: Blood Updated: 04/19/22 0318     WBC 11.64 10*3/mm3      RBC 3.77 10*6/mm3      Hemoglobin 14.5 g/dL      Hematocrit 42.4 %      .5 fL      MCH 38.5 pg      MCHC 34.2 g/dL      RDW 11.6 %      RDW-SD 47.7 fl      MPV 10.9 fL      Platelets 166 10*3/mm3      Neutrophil % 90.3 %      Lymphocyte % 4.0 %      Monocyte % 5.0 %      Eosinophil % 0.0 %      Basophil % 0.2 %      Immature Grans % 0.5 %      Neutrophils, Absolute 10.51 10*3/mm3      Lymphocytes, Absolute 0.47 10*3/mm3      Monocytes, Absolute 0.58 10*3/mm3      Eosinophils, Absolute 0.00 10*3/mm3      Basophils, Absolute 0.02 10*3/mm3      Immature Grans, Absolute 0.06 10*3/mm3      nRBC 0.0 /100 WBC     POC Glucose Once [[id number]]  (Abnormal) Collected: 04/19/22 0037    Specimen: Blood Updated: 04/19/22 0038     Glucose 336 mg/dL      Comment: Meter: [id number] : [id number] Jose De Jesus       Magnesium [[id number]]  (Abnormal) Collected: 04/18/22 0610    Specimen: Blood Updated: 04/18/22 2234     Magnesium 3.6 mg/dL     POC Glucose Once [[id number]]  (Abnormal) Collected: 04/18/22 2051    Specimen: Blood Updated: 04/18/22 2054     Glucose 357 mg/dL      Comment: Meter: [id number] : [id number] Piotr Barrera       Heparin Anti-Xa [[id number]]  (Abnormal) Collected: 04/18/22 2008    Specimen: Blood Updated: 04/18/22 2048     Heparin Anti-Xa (UFH) 0.27 IU/ml     POC Glucose Once [[id number]]  (Abnormal) Collected: 04/18/22 1614    Specimen: Blood Updated: 04/18/22 1617     Glucose 438 mg/dL      Comment: Physician Notified Meter: [id number] : [id number] Yvrose Gutierrez       Heparin Anti-Xa [[id number]]  (Normal) Collected: 04/18/22 1358    Specimen: Blood Updated: 04/18/22 1450     Heparin Anti-Xa (UFH) 0.46 IU/ml     POC Glucose Once [[id number]]  (Abnormal) Collected: 04/18/22 1221    Specimen: Blood Updated: 04/18/22 1223     Glucose 369 mg/dL      Comment: Meter: [id number] : [id number] Yvrose Gutierrez        Urinalysis, Microscopic Only - Urine, Clean Catch [[id number]]  (Abnormal) Collected: 04/18/22 0607    Specimen: Urine, Clean Catch Updated: 04/18/22 0715     RBC, UA 3-6 /HPF      WBC, UA 13-20 /HPF      Bacteria, UA 4+ /HPF      Squamous Epithelial Cells, UA 7-12 /HPF      Hyaline Casts, UA 0-6 /LPF      Methodology Automated Microscopy    Urinalysis With Microscopic If Indicated (No Culture) - Urine, Clean Catch [[id number]]  (Abnormal) Collected: 04/18/22 0607    Specimen: Urine, Clean Catch Updated: 04/18/22 0715     Color, UA Yellow     Appearance, UA Cloudy     pH, UA <=5.0     Specific Gravity, UA 1.051     Glucose, UA >=1000 mg/dL (3+)     Ketones, UA 15 mg/dL (1+)     Bilirubin, UA Negative     Blood, UA Trace     Protein, UA 30 mg/dL (1+)     Leuk Esterase, UA Negative     Nitrite, UA Negative     Urobilinogen, UA 0.2 E.U./dL    CBC & Differential [[id number]]  (Abnormal) Collected: 04/18/22 0610    Specimen: Blood Updated: 04/18/22 0710    Narrative:      The following orders were created for panel order CBC & Differential.  Procedure                               Abnormality         Status                     ---------                               -----------         ------                     CBC Auto Differential[[id number]]        Abnormal            Final result               Scan Slide[[id number]]                                       Final result                 Please view results for these tests on the individual orders.    CBC Auto Differential [[id number]]  (Abnormal) Collected: 04/18/22 0610    Specimen: Blood Updated: 04/18/22 0710     WBC 10.73 10*3/mm3      RBC 4.10 10*6/mm3      Hemoglobin 15.9 g/dL      Hematocrit 45.8 %      .7 fL      MCH 38.8 pg      MCHC 34.7 g/dL      RDW 11.7 %      RDW-SD 48.6 fl      MPV 10.5 fL      Platelets 162 10*3/mm3      Neutrophil % 92.4 %      Lymphocyte % 4.7 %      Monocyte % 2.0 %      Eosinophil % 0.2 %      Basophil % 0.3 %      Immature  Grans % 0.4 %      Neutrophils, Absolute 9.93 10*3/mm3      Lymphocytes, Absolute 0.50 10*3/mm3      Monocytes, Absolute 0.21 10*3/mm3      Eosinophils, Absolute 0.02 10*3/mm3      Basophils, Absolute 0.03 10*3/mm3      Immature Grans, Absolute 0.04 10*3/mm3      nRBC 0.0 /100 WBC     Narrative:      Appended report. These results have been appended to a previously verified report.    Scan Slide [[id number]] Collected: 04/18/22 0610    Specimen: Blood Updated: 04/18/22 0710     Macrocytes Slight/1+     WBC Morphology Normal     Platelet Morphology Normal    Comprehensive Metabolic Panel [[id number]]  (Abnormal) Collected: 04/18/22 0610    Specimen: Blood Updated: 04/18/22 0710     Glucose 366 mg/dL      BUN 18 mg/dL      Creatinine 0.66 mg/dL      Sodium 136 mmol/L      Potassium 4.5 mmol/L      Comment: Slight hemolysis detected by analyzer. Results may be affected.        Chloride 98 mmol/L      CO2 27.0 mmol/L      Calcium 8.3 mg/dL      Total Protein 7.3 g/dL      Albumin 4.30 g/dL      ALT (SGPT) 103 U/L      AST (SGOT) 77 U/L      Alkaline Phosphatase 126 U/L      Total Bilirubin 0.5 mg/dL      Globulin 3.0 gm/dL      Comment: Calculated Result        A/G Ratio 1.4 g/dL      BUN/Creatinine Ratio 27.3     Anion Gap 11.0 mmol/L      eGFR 101.2 mL/min/1.73      Comment: National Kidney Foundation and American Society of Nephrology (ASN) Task Force recommended calculation based on the Chronic Kidney Disease Epidemiology Collaboration (CKD-EPI) equation refit without adjustment for race.       Narrative:      GFR Normal >60  Chronic Kidney Disease <60  Kidney Failure <15      Lipid Panel [[id number]]  (Abnormal) Collected: 04/18/22 0610    Specimen: Blood Updated: 04/18/22 0710     Total Cholesterol 133 mg/dL      Triglycerides 176 mg/dL      HDL Cholesterol 46 mg/dL      LDL Cholesterol  58 mg/dL      VLDL Cholesterol 29 mg/dL      LDL/HDL Ratio 1.13    Narrative:      Cholesterol Reference Ranges  (U.S. Department  of Health and Human Services ATP III Classifications)    Desirable          <200 mg/dL  Borderline High    200-239 mg/dL  High Risk          >240 mg/dL      Triglyceride Reference Ranges  (U.S. Department of Health and Human Services ATP III Classifications)    Normal           <150 mg/dL  Borderline High  150-199 mg/dL  High             200-499 mg/dL  Very High        >500 mg/dL    HDL Reference Ranges  (U.S. Department of Health and Human Services ATP III Classifications)    Low     <40 mg/dl (major risk factor for CHD)  High    >60 mg/dl ('negative' risk factor for CHD)        LDL Reference Ranges  (U.S. Department of Health and Human Services ATP III Classifications)    Optimal          <100 mg/dL  Near Optimal     100-129 mg/dL  Borderline High  130-159 mg/dL  High             160-189 mg/dL  Very High        >189 mg/dL    Troponin [[id number]]  (Normal) Collected: 04/18/22 0610    Specimen: Blood Updated: 04/18/22 0705     Troponin T <0.010 ng/mL     Narrative:      Troponin T Reference Range:  <= 0.03 ng/mL-   Negative for AMI  >0.03 ng/mL-     Abnormal for myocardial necrosis.  Clinicians would have to utilize clinical acumen, EKG, Troponin and serial changes to determine if it is an Acute Myocardial Infarction or myocardial injury due to an underlying chronic condition.       Results may be falsely decreased if patient taking Biotin.      Heparin Anti-Xa [[id number]]  (Abnormal) Collected: 04/18/22 0610    Specimen: Blood Updated: 04/18/22 0651     Heparin Anti-Xa (UFH) 0.18 IU/ml     Protime-INR [[id number]]  (Normal) Collected: 04/18/22 0610    Specimen: Blood Updated: 04/18/22 0650     Protime 13.2 Seconds      INR 1.01    Hemoglobin A1c [[id number]]  (Abnormal) Collected: 04/18/22 0610    Specimen: Blood Updated: 04/18/22 0649     Hemoglobin A1C 9.60 %     Narrative:      Hemoglobin A1C Ranges:    Increased Risk for Diabetes  5.7% to 6.4%  Diabetes                     >= 6.5%  Diabetic Goal                <  7.0%    COVID PRE-OP / PRE-PROCEDURE SCREENING ORDER (NO ISOLATION) - Swab, Nasopharynx [[id number]]  (Normal) Collected: 04/18/22 0530    Specimen: Swab from Nasopharynx Updated: 04/18/22 0625    Narrative:      The following orders were created for panel order COVID PRE-OP / PRE-PROCEDURE SCREENING ORDER (NO ISOLATION) - Swab, Nasopharynx.  Procedure                               Abnormality         Status                     ---------                               -----------         ------                     COVID-19, FLU A/B, RSV P...[[id number]]  Normal              Final result                 Please view results for these tests on the individual orders.    COVID-19, FLU A/B, RSV PCR - Swab, Nasopharynx [[id number]]  (Normal) Collected: 04/18/22 0530    Specimen: Swab from Nasopharynx Updated: 04/18/22 0625     COVID19 Not Detected     Influenza A PCR Not Detected     Influenza B PCR Not Detected     RSV, PCR Not Detected    Narrative:      Fact sheet for providers: https://www.fda.gov/media/664961/download    Fact sheet for patients: https://www.fda.gov/media/283173/download    Test performed by PCR.    POC Glucose Once [[id number]]  (Abnormal) Collected: 04/18/22 0542    Specimen: Blood Updated: 04/18/22 0544     Glucose 322 mg/dL      Comment: Meter: [id number] : [id number] Costella Procesa       POC Glucose Once [[id number]]  (Abnormal) Collected: 04/18/22 0113    Specimen: Blood Updated: 04/18/22 0114     Glucose 292 mg/dL      Comment: Meter: [id number] : [id number] Costella Procesa       proBNP [[id number]]  (Normal) Collected: 04/18/22 0014    Specimen: Blood Updated: 04/18/22 0109     proBNP 623.8 pg/mL     Narrative:      Among patients with dyspnea, NT-proBNP is highly sensitive for the detection of acute congestive heart failure. In addition NT-proBNP of <300 pg/ml effectively rules out acute congestive heart failure with 99% negative predictive value.    Results may be falsely decreased if  patient taking Biotin.      Troponin [[id number]]  (Normal) Collected: 04/18/22 0014    Specimen: Blood Updated: 04/18/22 0109     Troponin T <0.010 ng/mL     Narrative:      Troponin T Reference Range:  <= 0.03 ng/mL-   Negative for AMI  >0.03 ng/mL-     Abnormal for myocardial necrosis.  Clinicians would have to utilize clinical acumen, EKG, Troponin and serial changes to determine if it is an Acute Myocardial Infarction or myocardial injury due to an underlying chronic condition.       Results may be falsely decreased if patient taking Biotin.      Heparin Anti-Xa [[id number]]  (Abnormal) Collected: 04/18/22 0014    Specimen: Blood Updated: 04/18/22 0101     Heparin Anti-Xa (UFH) 0.10 IU/ml     Protime-INR [[id number]]  (Normal) Collected: 04/18/22 0014    Specimen: Blood Updated: 04/18/22 0100     Protime 13.4 Seconds      INR 1.03    aPTT [[id number]]  (Abnormal) Collected: 04/18/22 0014    Specimen: Blood Updated: 04/18/22 0100     PTT 27.8 seconds     Narrative:      PTT = The equivalent PTT values for the therapeutic range of heparin levels at 0.3 to 0.5 U/ml are 55 to 70 seconds.    [place] Draw [[id number]] Collected: 04/17/22 1858    Specimen: Blood Updated: 04/17/22 2302    Narrative:      The following orders were created for panel order [place] Draw.  Procedure                               Abnormality         Status                     ---------                               -----------         ------                     Green Top (Gel)[[id number]]                                  Final result               Lavender Top[[id number]]                                     Final result               Gold Top - SST[[id number]]                                   Final result               Lynch Top[[id number]]                                         Final result               Light Blue Top[[id number]]                                   Final result                 Please view results for these tests on the individual  orders.    Lynch Top [[id number]] Collected: 04/17/22 1858    Specimen: Blood Updated: 04/17/22 2302     Extra Tube Hold for add-ons.     Comment: Auto resulted.       T4, Free [[id number]]  (Normal) Collected: 04/17/22 1933    Specimen: Blood Updated: 04/17/22 2054     Free T4 1.56 ng/dL     Narrative:      Results may be falsely increased if patient taking Biotin.      Green Top (Gel) [[id number]] Collected: 04/17/22 1933    Specimen: Blood Updated: 04/17/22 2047     Extra Tube Hold for add-ons.     Comment: Auto resulted.       TSH [[id number]]  (Abnormal) Collected: 04/17/22 1933    Specimen: Blood Updated: 04/17/22 2005     TSH 13.930 uIU/mL     Narrative:      Due to abnormal TSH results, suggest ordering Free T4.    BNP [[id number]]  (Normal) Collected: 04/17/22 1933    Specimen: Blood Updated: 04/17/22 2005     proBNP 724.6 pg/mL     Narrative:      Among patients with dyspnea, NT-proBNP is highly sensitive for the detection of acute congestive heart failure. In addition NT-proBNP of <300 pg/ml effectively rules out acute congestive heart failure with 99% negative predictive value.    Results may be falsely decreased if patient taking Biotin.      Troponin [[id number]]  (Normal) Collected: 04/17/22 1933    Specimen: Blood Updated: 04/17/22 2005     Troponin T <0.010 ng/mL     Narrative:      Troponin T Reference Range:  <= 0.03 ng/mL-   Negative for AMI  >0.03 ng/mL-     Abnormal for myocardial necrosis.  Clinicians would have to utilize clinical acumen, EKG, Troponin and serial changes to determine if it is an Acute Myocardial Infarction or myocardial injury due to an underlying chronic condition.       Results may be falsely decreased if patient taking Biotin.      Light Blue Top [[id number]] Collected: 04/17/22 1858    Specimen: Blood Updated: 04/17/22 2002     Extra Tube hold for add-on     Comment: Auto resulted       Lavender Top [[id number]] Collected: 04/17/22 1858    Specimen: Blood Updated: 04/17/22 2002      Extra Tube hold for add-on     Comment: Auto resulted       Gold Top - SST [[id number]] Collected: 04/17/22 1858    Specimen: Blood Updated: 04/17/22 2002     Extra Tube Hold for add-ons.     Comment: Auto resulted.       Comprehensive Metabolic Panel [[id number]]  (Abnormal) Collected: 04/17/22 1933    Specimen: Blood Updated: 04/17/22 1959     Glucose 284 mg/dL      BUN 21 mg/dL      Creatinine 0.94 mg/dL      Sodium 138 mmol/L      Potassium 4.2 mmol/L      Comment: Slight hemolysis detected by analyzer. Results may be affected.        Chloride 100 mmol/L      CO2 29.0 mmol/L      Calcium 8.7 mg/dL      Total Protein 6.8 g/dL      Albumin 4.20 g/dL      ALT (SGPT) 103 U/L      AST (SGOT) 107 U/L      Alkaline Phosphatase 121 U/L      Total Bilirubin 0.4 mg/dL      Globulin 2.6 gm/dL      Comment: Calculated Result        A/G Ratio 1.6 g/dL      BUN/Creatinine Ratio 22.3     Anion Gap 9.0 mmol/L      eGFR 70.0 mL/min/1.73      Comment: National Kidney Foundation and American Society of Nephrology (ASN) Task Force recommended calculation based on the Chronic Kidney Disease Epidemiology Collaboration (CKD-EPI) equation refit without adjustment for race.       Narrative:      GFR Normal >60  Chronic Kidney Disease <60  Kidney Failure <15      Magnesium [[id number]]  (Normal) Collected: 04/17/22 1933    Specimen: Blood Updated: 04/17/22 1959     Magnesium 1.8 mg/dL     CBC & Differential [[id number]]  (Abnormal) Collected: 04/17/22 1858    Specimen: Blood Updated: 04/17/22 1912    Narrative:      The following orders were created for panel order CBC & Differential.  Procedure                               Abnormality         Status                     ---------                               -----------         ------                     CBC Auto Differential[[id number]]        Abnormal            Final result               Scan Slide[[id number]]                                                                    Please view  results for these tests on the individual orders.    CBC Auto Differential [[id number]]  (Abnormal) Collected: 04/17/22 1858    Specimen: Blood Updated: 04/17/22 1911     WBC 8.61 10*3/mm3      RBC 4.21 10*6/mm3      Hemoglobin 16.3 g/dL      Hematocrit 46.1 %      .5 fL      MCH 38.7 pg      MCHC 35.4 g/dL      RDW 12.4 %      RDW-SD 49.1 fl      MPV 12.5 fL      Platelets 201 10*3/mm3      Neutrophil % 77.0 %      Lymphocyte % 15.8 %      Monocyte % 5.9 %      Eosinophil % 0.5 %      Basophil % 0.3 %      Immature Grans % 0.5 %      Neutrophils, Absolute 6.63 10*3/mm3      Lymphocytes, Absolute 1.36 10*3/mm3      Monocytes, Absolute 0.51 10*3/mm3      Eosinophils, Absolute 0.04 10*3/mm3      Basophils, Absolute 0.03 10*3/mm3      Immature Grans, Absolute 0.04 10*3/mm3      nRBC 0.0 /100 WBC           Imaging Results (Last 72 Hours)     Procedure Component Value Units Date/Time    CT Angiogram Chest [[id number]] Collected: 04/17/22 2102     Updated: 04/17/22 2111    Narrative:      CTA CHEST WITH CONTRAST    CLINICAL INDICATION: Tachycardia.    COMPARISON: None.    TECHNIQUE: 80 cc Isovue 370 administered intravenously. CTA images of the chest were obtained. Three-dimensional volume reconstructions were generated. CT dose lowering techniques were used, to include: automated exposure control, adjustment for patient   size, and or use of iterative reconstruction.    FINDINGS:    CTA: No pulmonary arterial filling defect. Heart size normal without pericardial effusion. Mediastinal vessels normal in caliber and patency.    Mediastinum: No mediastinal or hilar lymphadenopathy. Trachea and central airways are patent. Thyroid is normal. Esophagus is normal.     Lungs and pleura: Status post left upper lobectomy. No focal consolidation, pleural effusion or pneumothorax.     Upper abdomen: No acute abnormality.    Body wall: No acute abnormality.    Bones: No acute osseous abnormality.      Impression:      No  pulmonary embolism. No acute cardiopulmonary abnormality.    Electronically signed by:  Omid Ordoñez M.D.    4/17/2022 7:10 PM Mountain Time    XR Chest 1 View [[id number]] Collected: 04/17/22 1918     Updated: 04/17/22 1922    Narrative:      DATE OF EXAM: 4/17/2022 7:17 PM     PROCEDURE: XR CHEST 1 VW-     INDICATIONS: Dysrhythmia triage protocol     COMPARISON: CT chest 03/18/2022, AP chest x-ray 07/26/2018.     TECHNIQUE: Single radiographic AP view of the chest was obtained.     FINDINGS:  Cardiac silhouette is mildly prominent, but is exaggerated by portable  AP technique. Changes of median sternotomy, cardiac valve replacements,  and left atrial appendage occlusion are redemonstrated. There are mild  airspace opacities in both lower lungs. No pneumothorax or large pleural  effusion is seen.        Impression:      Mild bilateral lower lung airspace opacities, which may be due to  atelectasis or less likely pneumonia.     This report was finalized on 4/17/2022 7:19 PM by Shelia Mckeon MD.           Operative/Procedure Notes (last 72 hours)  Notes from 04/17/22 1320 through 04/20/22 1320   No notes of this type exist for this encounter.            Physician Progress Notes (last 72 hours)      Marisa Suarez APRN at 04/20/22 0811          Cardiology Progress Note      Reason for visit:    · Atrial flutter with RVR    IDENTIFICATION: 61-year-old female who resides in [place], [place]  Subjective     No events noted overnight.  Patient maintaining normal sinus rhythm.  She is sitting up in the bed on O2 at 2 L via nasal cannula.  She reports that nighttime they say her oxygen level drops.  She thinks is because she does not lie in the bed because it is too uncomfortable and sits up in the recliner and her head falls forward.  Echocardiogram shows normal LVEF 50% and bioprosthetic valve functioning normally.  She does have mild to moderate TR with elevated right ventricular systolic pressure greater than  55 mmHg.        Physical Exam  Constitutional:       Appearance: She is well-developed.   HENT:      Head: Normocephalic.   Neck:      Vascular: No carotid bruit or JVD.   Cardiovascular:      Rate and Rhythm: Normal rate and regular rhythm.      Heart sounds: Normal heart sounds. No murmur heard.    No friction rub. No gallop.   Pulmonary:      Effort: Pulmonary effort is normal.      Breath sounds: Decreased breath sounds present.   Abdominal:      Palpations: Abdomen is soft.   Skin:     General: Skin is warm and dry.      Nails: There is no clubbing.   Neurological:      Mental Status: She is alert and oriented to person, place, and time.   Psychiatric:         Behavior: Behavior normal.            Tele: Normal sinus rhythm     Results Review (reviewed the patient's recent labs in the electronic medical record):      EKG (4/18/2022): Normal sinus rhythm     CT angiogram of the chest (4/17/2022) no PE.  No acute cardiopulmonary abnormality:     ECHO (4/18/2022): LVEF 47.5%.  Aortic and mitral bioprosthetic valves present      Assessment        Atrial flutter with RVR  · Has known paroxysmal atrial fibrillation and uses pill in the pocket approach with propafenone  · Has had left atrial appendage ligation and maze procedure in 2018.  · TIFFANIE in 2018 showed ANGELA completely occluded  · Presentation of [facility] 4/17/2022 and atrial flutter with RVR started on IV Cardizem drip  · Negative proBNP and negative troponins  · Cardioversion 4/18/2022 successful restoring normal sinus rhythm  · IV heparin drip discontinued 4/19/2022        Valvular heart disease  · AVR/MVR/tricuspid annuloplasty and left atrial appendage ligation 2018  · Last echo 2018 showed bioprosthetic aortic and mitral valves well-seated and functioning normally  · Echo 4/18/2022 shows LVEF 47.5%.        Hyperlipidemia  · LDL 58, total cholesterol 133  · Pravastatin 20 mg daily     Type 2 diabetes mellitus  · Not well controlled hemoglobin  A1c 9.6        COPD/history lung cancer  · Left upper lobectomy in 2017 for lung carcinoma  · Hospitalist managing exacerbation COPD  · COPD nurse navigator following              Plan     · Start Lasix 40 mg daily along with potassium 20 meq daily  · Start metoprolol tartrate 12.5 mg twice daily  · Home on aspirin but no anticoagulation given history of success left atrial appendage ligation  · Continue pill in the pocket approach with propafenone  · Follow-up with cardiology in 4 weeks  · Follow-up with the heart and valve clinic in 5 days with repeat BMP  · Likely home on oxygen to use as needed and particularly at bedtime  · Please call cardiology with any further questions.    Electronically signed by GUCCI Hall, 22, 8:11 AM EDT.    Electronically signed by Marisa Suarez APRN at 22 1307     Mary Oscar MD at 22 1111              [facility] Medicine Services  PROGRESS NOTE    Patient Name: Jill Miller  : 1960  MRN: [id number]    Date of Admission: 2022  Primary Care Physician: Marisa Lynch PA    Subjective   Subjective     CC:  Aflutter/copd    HPI:  Patient doing ok. Spouse at bedside today. Remains on 4L overnight but was weaned to 3L this morning. Reports that she feels better. HR remains around 80s  ROS:  Gen- No fevers, chills  CV- No chest pain, palpitations  Resp- No cough, dyspnea  GI- No N/V/D, abd pain        Objective      Physical Exam:  GEN: NAD, resting in bed, awake, sitting on edge of bed  HEENT: on room air, atraumatic, normocephalic  NECK: supple, no masses  RESP: on NC- 3L, no sig wheezing  CV: on tele, rates now in 80s  PSYCH: normal affect, appropriate  NEURO: awake, alert, no focal deficits noted  MSK: no edema noted  SKIN: no rashes noted         Cardioversion External in Cardiology Department    Result Date: 2022  · Successful cardioversion for atrial flutter      Results for orders placed during  the hospital encounter of 09/17/18    Adult Transesophageal Echo (TIFFANIE) W/ Cont if Necessary Per Protocol    Interpretation Summary  · Left ventricular systolic function is normal.  · Left atrial cavity size is severely dilated.  · The bioprosthetic aortic and mitral valves function normally  · Successful exclusion of the left atrial appendage at the time of valve surgery confirmed.       Brief Hospital Course to date:  Jill Miller is a 61 y.o. female  to ED secondary to rapid heart rate which she noted on her blood pressure monitor-going on for 5 days constant heart rate staying in 130-140s, do not complain of any overt palpitations, do not complain of any associated chest pain, lightheadedness or dizziness.    Atrial Flutter with RVR--improved  --s/p successful ECV 4/18 with cards, now in NSR  --previous ANGELA ligation, d/c heparin per cards  --monitor, follow labs    Valvular Heart Dz  --ECHO ordered and pending    Copd with acute exacerbation  Acute hypoxic RF  History of lung cancer  --continue IV steroids  --patient reports inability to tolerate atrovent nebs due to HA-- switched to duonebs with similar response- will trial symbicort today   --wean oxygen as able     S1GC--[id number]  --A1C 9.6  --hyperglycemic in setting of IV steroids as well  --uptitrated insulin 4/18 - will increase to mod dose SSI today as well as uptitrate prandial to 7 units with meals     Elevated TSH, nl T4  --monitor- will need to be rechecked in 4-6 weeks        DVT prophylaxis:  Mechanical DVT prophylaxis orders are present.          Mary Oscar MD  04/19/22                Electronically signed by Mary Oscar MD at 04/19/22 4399     Marisa Suarez APRN at 04/19/22 0456          Cardiology Progress Note      Reason for visit:    · Atrial flutter with RVR    IDENTIFICATION: 61-year-old female who resides in [place], [place]      Physical Exam  Constitutional:       Appearance: She is well-developed.   HENT:       Head: Normocephalic.   Neck:      Vascular: No carotid bruit or JVD.   Cardiovascular:      Rate and Rhythm: Normal rate and regular rhythm.      Heart sounds: Normal heart sounds. No murmur heard.    No friction rub. No gallop.   Pulmonary:      Effort: Pulmonary effort is normal.      Breath sounds: Normal breath sounds.   Abdominal:      Palpations: Abdomen is soft.   Skin:     General: Skin is warm and dry.      Nails: There is no clubbing.   Neurological:      Mental Status: She is alert and oriented to person, place, and time.   Psychiatric:         Behavior: Behavior normal.         Tele: Normal sinus rhythm    Results Review (reviewed the patient's recent labs in the electronic medical record):     EKG (4/18/2022): Normal sinus rhythm    CT angiogram of the chest (4/17/2022) no PE.  No acute cardiopulmonary abnormality:    ECHO (4/18/2022): LVEF 47.5%.  Aortic and mitral bioprosthetic valves present      Assessment     Atrial flutter with RVR  · Has known paroxysmal atrial fibrillation and uses pill in the pocket approach with propafenone  · Has had left atrial appendage ligation and maze procedure in 2018.  · TIFFANIE in 2018 showed ANGELA completely occluded  · Presentation of [facility] 4/17/2022 and atrial flutter with RVR started on IV Cardizem drip  · Negative proBNP and negative troponins        Valvular heart disease  · AVR/MVR/tricuspid annuloplasty and left atrial appendage ligation 2018  · Last echo 2018 showed bioprosthetic aortic and mitral valves well-seated and functioning normally  · Echo 4/18/2022 shows LVEF 47.5%.        Hyperlipidemia  · LDL 58, total cholesterol 133  · Pravastatin 20 mg daily     Type 2 diabetes mellitus  · Not well controlled hemoglobin A1c 9.6        COPD/history lung cancer  · Left upper lobectomy in 2017 for lung carcinoma  · Hospitalist managing exacerbation COPD              Plan     · Wean oxygen to keep O2 saturations greater than 94%  · Discontinue heparin  drip  · Results of echocardiogram pending    Electronically signed by GUCCI Hall, 22, 7:48 AM EDT.    Electronically signed by Marisa Suarez APRN at 22 0843     Mary Oscar MD at 22 1251              [facility] Medicine Services  PROGRESS NOTE    Patient Name: Jill Miller  : 1960  MRN: [id number]    Date of Admission: 2022  Primary Care Physician: Marisa Lynch PA    Subjective   Subjective     CC:  Aflutter/copd    HPI:  Patient doing ok. Denies palpitations despite HR in 130s all night. Patient reports having difficult with atrovent nebs causing HA and does not want to continue these. NPO for cardioversion later today    ROS:  Gen- No fevers, chills  CV- No chest pain, palpitations  Resp- No cough, dyspnea  GI- No N/V/D, abd pain    +HA     Objective   Objective       Physical Exam:  GEN: NAD, resting in bed, awake  HEENT: on room air, atraumatic, normocephalic  NECK: supple, no masses  RESP: on NC, no sig wheezing  CV: on tele, aflutter with rates in 130s  PSYCH: normal affect, appropriate  NEURO: awake, alert, no focal deficits noted  MSK: no edema noted  SKIN: no rashes noted              Brief Hospital Course to date:  Jill Miller is a 61 y.o. female  to ED secondary to rapid heart rate which she noted on her blood pressure monitor-going on for 5 days constant heart rate staying in 130-140s, do not complain of any overt palpitations, do not complain of any associated chest pain, lightheadedness or dizziness.    Atrial Flutter with RVR  --s/p ECV with cardiology this AM  --on heparin/dilt gtt    Valvular Heart Dz  --ECHO ordered and pending    Copd with acute exacerbation  History of lung cancer  --continue IV steroids  --patient reports inability to tolerate atrovent nebs due to HA-- will trial duonebs and assess response  --wean oxygen as able     I9JP--fkvefovmgynu  --A1C 9.6  --hyperglycemic in setting of IV steroids  as well  --uptitrate insulin today - add basal/prandial, monitor    Elevated TSH, nl T4  --monitor- will need to be rechecked in 4-6 weeks        DVT prophylaxis:  Medical and mechanical DVT prophylaxis orders are present.          Mary Oscar MD  04/18/22                Electronically signed by Mary Oscar MD at 04/18/22 1258          Consult Notes (last 72 hours)      Marisa Suarez APRN at 04/18/22 0934      Consult Orders    1. Inpatient Cardiology Consult [[id number]] ordered by Gayla Poe MD          Attestation signed by Marco Lay IV, MD at 04/18/22 1301    I have reviewed this documentation and agree.    H. C. 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  Patient presented [facility] last evening after noting her heart rate to be in the 130-140s on her blood pressure monitor.  She was also experiencing worsening shortness of breath and lower extremity edema.  EKG on arrival showed atrial flutter with RVR.  She was started on IV Cardizem and heparin drips which are still infusing.  Patient uses Rythmol as a pill in  the pocket approach for palpitations.  Given she had a left atrial appendage ligation and TIFFANIE confirmed it was completely excluded she has not been chronically anticoagulated but takes daily aspirin.Review of Systems:   Review of Systems   Constitutional: Negative for malaise/fatigue.   Eyes: Negative for vision loss in left eye and vision loss in right eye.   Cardiovascular: Positive for dyspnea on exertion, irregular heartbeat, leg swelling and palpitations. Negative for chest pain, near-syncope, orthopnea, paroxysmal nocturnal dyspnea and syncope.   Respiratory: Positive for shortness of breath.    Musculoskeletal: Negative for myalgias.   Neurological: Negative for brief paralysis, excessive daytime sleepiness, focal weakness, numbness, paresthesias and weakness.   All other systems reviewed and are negative.      Objective         Constitutional:       Appearance: Healthy appearance. Well-developed.   Eyes:      General: Lids are normal. No scleral icterus.     Conjunctiva/sclera: Conjunctivae normal.   HENT:      Head: Normocephalic and atraumatic.   Neck:    Thyroid: No thyromegaly.      Vascular: No carotid bruit or JVD.   Pulmonary:      Effort: Pulmonary effort is normal.      Breath sounds: Normal breath sounds. No wheezing. No rhonchi. No rales.   Cardiovascular:      Tachycardia present. Regularly irregular rhythm.      Murmurs: There is a systolic murmur.      No gallop. No rub.   Pulses:     Intact distal pulses.   Edema:     Peripheral edema absent.   Abdominal:      General: There is no distension.      Palpations: Abdomen is soft. There is no abdominal mass.   Musculoskeletal:      Cervical back: Normal range of motion. Skin:     General: Skin is warm and dry.      Findings: No rash.   Neurological:      General: No focal deficit present.      Mental Status: Alert and oriented to person, place, and time.      Gait: Gait is intact.   Psychiatric:         Attention and Perception: Attention normal.          Mood and Affect: Mood normal.         Behavior: Behavior normal.          Tele: Atrial flutter    DATA REVIEW:    EKG (4/17/2022): Atrial flutter with RVR    CT angiogram (4/17/2022): no pulmonary embolism.  No acute cardiopulmonary abnormality    ECHO (4/18/2022): Results pending (last echo 8/2018 showed normal LVEF 65% and bioprosthetic aortic and mitral valves well-seated)    Assessment   Atrial flutter with RVR  · Has known paroxysmal atrial fibrillation and uses pill in the pocket approach with propafenone  · Has had left atrial appendage ligation and maze procedure in 2018.  · TIFFANIE in 2018 showed ANGELA completely occluded  · Presentation of [facility] 4/17/2022 and atrial flutter with RVR started on IV Cardizem drip  · Negative proBNP and negative troponins      Valvular heart disease  · AVR/MVR/tricuspid annuloplasty and left atrial appendage ligation 2018  · Last echo 2018 showed bioprosthetic aortic and mitral valves well-seated and functioning normally      Hyperlipidemia  · LDL 58, total cholesterol 133  · Pravastatin 20 mg daily    Type 2 diabetes mellitus  · Not well controlled hemoglobin A1c 9.6      COPD/history lung cancer  · Left upper lobectomy in 2017 for lung carcinoma  · Hospitalist managing exacerbation COPD      Plan     · Keep n.p.o. for external cardioversion today  · Echocardiogram pending      Electronically signed by GUCCI Hall, 04/18/22, 9:34 AM EDT.      Electronically signed by Marco Lay IV, MD at 04/18/22 0158

## 2022-04-20 NOTE — DISCHARGE SUMMARY
[facility] Medicine Services  DISCHARGE SUMMARY    Patient Name: Jill Miller  : 1960  MRN: [id number]    Date of Admission: 2022  6:34 PM  Date of Discharge:  22  Primary Care Physician: Marisa Lynch PA    Consults     Date and Time Order Name Status Description    2022  1:04 AM Inpatient Cardiology Consult Completed           Hospital Course     Presenting Problem:   Heart palpitations [R00.2]    Active Hospital Problems    Diagnosis  POA   • **Atrial flutter (HCC) [I48.92]  Yes   • Obesity (BMI 30-39.9) [E66.9]  Yes   • Heart palpitations [R00.2]  Yes   • COPD exacerbation (HCC) [J44.1]  Yes   • Type 2 diabetes mellitus (HCC) [E11.9]  Yes   • Valvular heart disease [I38]  Yes   • Hyperlipidemia LDL goal <70 [E78.5]  Yes   • Paroxysmal atrial fibrillation (HCC) [I48.0]  Yes   • Acquired hypothyroidism [E03.9]  Yes      Resolved Hospital Problems    Diagnosis Date Resolved POA   • Lung cancer; S/P Bronch/Thor w/ ALBINA lobectomy/mediastinal lymph node resec (17, Dr. Cheatham) [C34.92] 2017 Yes          Hospital Course:  Jill Miller is a 61 y.o. female  to ED secondary to rapid heart rate which she noted on her blood pressure monitor-going on for 5 days constant heart rate staying in 130-140s, do not complain of any overt palpitations, do not complain of any associated chest pain, lightheadedness or dizziness.     Atrial Flutter with RVR--improved  --s/p successful ECV  with cards, now in NSR  --previous ANGELA ligation, d/c heparin per cards and patient does not require AC  --patient will f/u with cardiology as below and continue pill in pocket approach     Valvular Heart Dz  --ECHO ordered and reviewed, EF normal   --patient's medications were adjusted with help of cardiology including addition of lasix 40 qday/K20 qday, metoprolol 25 BID  Follow-up with the heart and valve clinic in 5 days with repeat BMP       Copd with acute  exacerbation  Acute hypoxic RF  History of lung cancer  --continue IV steroids--> ultimately transitioned to PO prednisone for completion of 5 days total  --patient reports inability to tolerate atrovent nebs due to HA-- switched to duonebs with similar response- will trial symbicort today , she tolerated well and this was continued upon discharge. A rescue inhaler or LAMA was not started upon discharge due to concern of tachycardia with aflutter-- she was advised that we would recommend pulm f/u as outpatient, but she wished to continue PCP f/u and will consider in the future  --nursing/CM assessed for need of home oxygen at discharge, she was weaned but still requiring 1L at discharge  --advised on smoking cessation, offered NRT but refused      D5SN--gojneswjbilz  --A1C 9.6  --hyperglycemic in setting of IV steroids as well  --no diabetes meds at discharge. Patient given oral metformin at discharge and advised to f/u with PCP in 1 week     Elevated TSH, nl T4  --monitor- will need to be rechecked in 4-6 weeks         Discharge Follow Up Recommendations for outpatient labs/diagnostics:  PCP within 1 week  H&V clinic in 5 days with [facility]  Cardiology clinic 4 weeks    Day of Discharge     HPI:   Feels well. Wants to go home. Remains on oxygen but 1 L. COPD nurse present at bedside as well. D/w her all plans including continuing smoking cessation/pulm f/u etc    Review of Systems  Gen- No fevers, chills  CV- No chest pain, palpitations  Resp- No cough, dyspnea  GI- No N/V/D, abd pain      Vital Signs:   Temp:  [97.3 °F (36.3 °C)-97.8 °F (36.6 °C)] 97.8 °F (36.6 °C)  Heart Rate:  [55-79] 75  Resp:  [18-20] 19  BP: (120-130)/(66-82) 120/76  Flow (L/min):  [1-2] 1      Physical Exam:  GEN- no acute distress noted, resting in chair, awake  HEENT- atraumatic, normocephalic, eomi  NECK- supple, trachea midline, no masses  RESP: ctab, normal effort, on NC with minimal wheezing  CV: no murmurs, s1/s2, rrr  MSK: no edema noted,  spontaneous movement of all extremities  NEURO: alert, oriented, no focal deficits  SKIN: no rashes  PSYCH: appropriate mood and affect       Pertinent  and/or Most Recent Results     LAB RESULTS:      Lab 04/19/22  0735 04/19/22 0228 04/18/22 2008 04/18/22  1358 04/18/22  0610 04/18/22  0014 04/18/22  0014 04/17/22  1858   WBC  --  11.64*  --   --  10.73  --   --  8.61   HEMOGLOBIN  --  14.5  --   --  15.9  --   --  16.3*   HEMATOCRIT  --  42.4  --   --  45.8  --   --  46.1   PLATELETS  --  166  --   --  162  --   --  201   NEUTROS ABS  --  10.51*  --   --  9.93*  --   --  6.63   IMMATURE GRANS (ABS)  --  0.06*  --   --  0.04  --   --  0.04   LYMPHS ABS  --  0.47*  --   --  0.50*  --   --  1.36   MONOS ABS  --  0.58  --   --  0.21  --   --  0.51   EOS ABS  --  0.00  --   --  0.02  --   --  0.04   MCV  --  112.5*  --   --  111.7*  --   --  109.5*   PROTIME  --   --   --   --  13.2  --  13.4  --    APTT  --   --   --   --   --   --  27.8*  --    HEPARIN ANTI-XA 0.56 0.61 0.27* 0.46 0.18*   < > 0.10*  --     < > = values in this interval not displayed.         Lab 04/19/22 0228 04/18/22  0610 04/17/22  1933   SODIUM 135* 136 138   POTASSIUM 5.0 4.5 4.2   CHLORIDE 97* 98 100   CO2 28.0 27.0 29.0   ANION GAP 10.0 11.0 9.0   BUN 19 18 21*   CREATININE 0.72 0.66 0.94   EGFR 96.5 101.2 70.0   GLUCOSE 310* 366* 284*   CALCIUM 8.6 8.3* 8.7   MAGNESIUM  --  3.6* 1.8   HEMOGLOBIN A1C  --  9.60*  --    TSH  --   --  13.930*         Lab 04/19/22  0228 04/18/22  0610 04/17/22 1933   TOTAL PROTEIN 6.9 7.3 6.8   ALBUMIN 4.00 4.30 4.20   GLOBULIN 2.9 3.0 2.6   ALT (SGPT) 89* 103* 103*   AST (SGOT) 57* 77* 107*   BILIRUBIN 0.4 0.5 0.4   ALK PHOS 109 126* 121*         Lab 04/18/22  0610 04/18/22  0014 04/17/22 1933   PROBNP  --  623.8 724.6   TROPONIN T <0.010 <0.010 <0.010   PROTIME 13.2 13.4  --    INR 1.01 1.03  --          Lab 04/18/22  0610   CHOLESTEROL 133   LDL CHOL 58   HDL CHOL 46   TRIGLYCERIDES 176*             Brief  Urine Lab Results  (Last result in the past 365 days)      Color   Clarity   Blood   Leuk Est   Nitrite   Protein   CREAT   Urine HCG        04/18/22 0607 Yellow   Cloudy   Trace   Negative   Negative   30 mg/dL (1+)               Microbiology Results (last 10 days)     Procedure Component Value - Date/Time    COVID PRE-OP / PRE-PROCEDURE SCREENING ORDER (NO ISOLATION) - Swab, Nasopharynx [[id number]]  (Normal) Collected: 04/18/22 0530    Lab Status: Final result Specimen: Swab from Nasopharynx Updated: 04/18/22 0625    Narrative:      The following orders were created for panel order COVID PRE-OP / PRE-PROCEDURE SCREENING ORDER (NO ISOLATION) - Swab, Nasopharynx.  Procedure                               Abnormality         Status                     ---------                               -----------         ------                     COVID-19, FLU A/B, RSV P...[[id number]]  Normal              Final result                 Please view results for these tests on the individual orders.    COVID-19, FLU A/B, RSV PCR - Swab, Nasopharynx [[id number]]  (Normal) Collected: 04/18/22 0530    Lab Status: Final result Specimen: Swab from Nasopharynx Updated: 04/18/22 0625     COVID19 Not Detected     Influenza A PCR Not Detected     Influenza B PCR Not Detected     RSV, PCR Not Detected    Narrative:      Fact sheet for providers: https://www.fda.gov/media/178421/download    Fact sheet for patients: https://www.fda.gov/media/466378/download    Test performed by PCR.          Adult Transthoracic Echo Complete w/ Color, Spectral and Contrast if necessary per protocol    Result Date: 4/20/2022  · Left ventricular systolic function is normal. Estimated left ventricular EF = 50%. · Bioprosthetic aortic valve present and functions normally · Bioprosthetic mitral valve present with mild mitral regurgitation present. · Mild to moderate tricuspid regurgitation is present.      Cardioversion External in Cardiology Department    Result  Date: 4/18/2022  · Successful cardioversion for atrial flutter      XR Chest 1 View    Result Date: 4/17/2022  DATE OF EXAM: 4/17/2022 7:17 PM  PROCEDURE: XR CHEST 1 VW-  INDICATIONS: Dysrhythmia triage protocol  COMPARISON: CT chest 03/18/2022, AP chest x-ray 07/26/2018.  TECHNIQUE: Single radiographic AP view of the chest was obtained.  FINDINGS: Cardiac silhouette is mildly prominent, but is exaggerated by portable AP technique. Changes of median sternotomy, cardiac valve replacements, and left atrial appendage occlusion are redemonstrated. There are mild airspace opacities in both lower lungs. No pneumothorax or large pleural effusion is seen.      Mild bilateral lower lung airspace opacities, which may be due to atelectasis or less likely pneumonia.  This report was finalized on 4/17/2022 7:19 PM by Shelia Mckeon MD.      CT Angiogram Chest    Result Date: 4/17/2022  CTA CHEST WITH CONTRAST CLINICAL INDICATION: Tachycardia. COMPARISON: None. TECHNIQUE: 80 cc Isovue 370 administered intravenously. CTA images of the chest were obtained. Three-dimensional volume reconstructions were generated. CT dose lowering techniques were used, to include: automated exposure control, adjustment for patient size, and or use of iterative reconstruction. FINDINGS: CTA: No pulmonary arterial filling defect. Heart size normal without pericardial effusion. Mediastinal vessels normal in caliber and patency. Mediastinum: No mediastinal or hilar lymphadenopathy. Trachea and central airways are patent. Thyroid is normal. Esophagus is normal. Lungs and pleura: Status post left upper lobectomy. No focal consolidation, pleural effusion or pneumothorax. Upper abdomen: No acute abnormality. Body wall: No acute abnormality. Bones: No acute osseous abnormality.     No pulmonary embolism. No acute cardiopulmonary abnormality. Electronically signed by:  Omid Ordoñez M.D.  4/17/2022 7:10 PM Mountain Time              Results for orders placed  during the hospital encounter of 04/17/22    Adult Transthoracic Echo Complete w/ Color, Spectral and Contrast if necessary per protocol    Interpretation Summary  · Left ventricular systolic function is normal. Estimated left ventricular EF = 50%.  · Bioprosthetic aortic valve present and functions normally  · Bioprosthetic mitral valve present with mild mitral regurgitation present.  · Mild to moderate tricuspid regurgitation is present.      Plan for Follow-up of Pending Labs/Results:     Discharge Details        Discharge Medications      New Medications      Instructions Start Date   budesonide-formoterol 160-4.5 MCG/ACT inhaler  Commonly known as: SYMBICORT   2 puffs, Inhalation, 2 Times Daily - RT      furosemide 40 MG tablet  Commonly known as: LASIX   40 mg, Oral, Daily      metFORMIN 1000 MG tablet  Commonly known as: GLUCOPHAGE   1,000 mg, Oral, Daily With Breakfast      metoprolol tartrate 25 MG tablet  Commonly known as: LOPRESSOR   12.5 mg, Oral, Every 12 Hours Scheduled      potassium chloride 10 MEQ CR capsule  Commonly known as: MICRO-K   20 mEq, Oral, Daily      predniSONE 20 MG tablet  Commonly known as: DELTASONE   40 mg, Oral, Daily   Start Date: April 21, 2022        Continue These Medications      Instructions Start Date   aspirin 81 MG EC tablet   81 mg, Oral, Daily      levothyroxine 175 MCG tablet  Commonly known as: SYNTHROID, LEVOTHROID   175 mcg, Oral, Daily      multivitamin tablet tablet   Oral, Daily      pravastatin 20 MG tablet  Commonly known as: PRAVACHOL   20 mg, Oral, Daily      propafenone 300 MG tablet  Commonly known as: RYTHMOL   600 mg, Oral, As Needed      vitamin D3 125 MCG (5000 UT) tablet tablet   5,000 Units, Oral, Daily             Allergies   Allergen Reactions   • Lortab [Hydrocodone-Acetaminophen] Nausea And Vomiting and Dizziness   • Morphine And Related Nausea Only         Discharge Disposition:  Home or Self Care    Diet:  Hospital:  Diet Order   Procedures   •  Diet Regular; Cardiac, Consistent Carbohydrate       Activity:  As tolerated    Restrictions or Other Recommendations:  AS tolerated       CODE STATUS:    Code Status and Medical Interventions:   Ordered at: 04/17/22 5799     Level Of Support Discussed With:    Patient     Code Status (Patient has no pulse and is not breathing):    CPR (Attempt to Resuscitate)     Medical Interventions (Patient has pulse or is breathing):    Full Support       Future Appointments   Date Time Provider Department Center   5/17/2022  9:00 AM Marisa Suarez APRN [facility]   3/21/2023  9:00 AM Celena Keller APRN [facility]       Additional Instructions for the Follow-ups that You Need to Schedule     Discharge Follow-up with PCP   As directed       Currently Documented PCP:    Marisa Lynch PA    PCP Phone Number:    [phone]     Follow Up Details: 1 week, will need close T2DM f/u         Discharge Follow-up with PCP   As directed       Currently Documented PCP:    Marisa Lynch PA    PCP Phone Number:    [phone]     Follow Up Details: within 1 week         Discharge Follow-up with Specified Provider: Cardiology as scheduled- H&V clinic in 5 days and 4 weeks with cardiology; 1 Week   As directed      To: Cardiology as scheduled- H&V clinic in 5 days and 4 weeks with cardiology    Follow Up: 1 Week         Basic Metabolic Panel    Apr 25, 2022 (Approximate)      Release to patient: Immediate                     Mary Oscar MD  04/20/22      Time Spent on Discharge:  I spent  35 minutes on this discharge activity which included: face-to-face encounter with the patient, reviewing the data in the system, coordination of the care with the nursing staff as well as consultants, documentation, and entering orders.

## 2022-04-20 NOTE — CONSULTS
"                  Clinical Nutrition     Nutrition Education   Reason for Visit:   Physician Consult       Patient Name: Jlil Miller  YOB: 1960  MRN: [id number]  Date of Encounter: 04/20/22 11:42 EDT  Admission date: 4/17/2022      Comments:     Assessment   Applicable Diagnoses   DM-T2 w/ elevated HgbA1C  Grave's Disease  HLD  VHD  BMI=35.36    Diet/Nutrition Related History:   Pt reports some diabetes education provided by physician when first diagnosed w/ DM. Told if she lost weight she could get rid of her diabetes. She noted initial lost of 40 lbs and improvement in Hgb A1C. She reports regain of 40 lbs initially lost. She is motivated to lose her weight again and is receptive to education.   Pt provided \"Planning for Healthy Meals\" handout. This handout was discussed w/ pt as well as  and  for consistent carbohydrate, healthy heart diet. Pt expressed concerned w/ 5 am work schedule and diet. Pt was able to provide teach back and resolved this concern on her own. Slow steady weight loss discussed with weekly goals discussed. Pt advised to have 3 carb servings /meal to lose weight and improve Hgb A1C      Labs reviewed   Yes  Hgb A1C-9.6 %  TSH 13.93   BG during hospitalization have been poorly controlled >180 (200-300's)    Nutrition Diagnosis       Problem Poor glycemic control   Etiology Food and nutrition related knowledge deficit r/t energy needs and DM _T2   Signs/Symptoms Hgb A1C - 9.6 %, BMI=35.36     Goal:   Improve nutrition related laboratory values  Appropriate weight loss    Nutrition Intervention     Provided nutrition education regarding Consistent carbohydrate, heart healthy diet w/ 3 carb servings per meal and weight loss, emphasis on adherence to portion size and healthy food selections.    Education provided regarding nutrition therapy for: Diet rationale, Key food habit change, Type 2 Diabetes and Heart Healthy eating    Education provided regarding food habits/behavior " "related to:Food choices, Eating pattern, Appropriate portions, Label reading and Meal planning     RD provided printed material via Other \"Planning Healthy Meals\"    Pt acknowledged understanding of material covered      Monitoring/Evaluation:     Biochemical data , weight    Pt likely requires additional education/support via outpatient nutrition education     Yeni Aguilar, MS,RD,LD  Time Spent: 30 miins    "

## 2022-04-21 NOTE — OUTREACH NOTE
Prep Survey    Flowsheet Row Responses   [facility] facility patient discharged from? [place]   Is LACE score < 7 ? No   Emergency Room discharge w/ pulse ox? No   Eligibility Readm Mgmt   Discharge diagnosis Atrial Flutter with RVR   Does the patient have one of the following disease processes/diagnoses(primary or secondary)? Other   Does the patient have Home health ordered? No   Is there a DME ordered? No   Prep survey completed? Yes          CHARLEEN CORNEJO - Registered Nurse

## 2022-04-22 ENCOUNTER — TELEPHONE (OUTPATIENT)
Dept: CARDIOLOGY | Facility: CLINIC | Age: 62
End: 2022-04-22

## 2022-04-22 DIAGNOSIS — Z01.812 BLOOD TESTS PRIOR TO TREATMENT OR PROCEDURE: Primary | ICD-10-CM

## 2022-04-22 LAB
QT INTERVAL: 434 MS
QTC INTERVAL: 484 MS

## 2022-04-22 NOTE — TELEPHONE ENCOUNTER
Patient called reporting she had some questions. Called back but unable to reach her. Left message for return call.

## 2022-04-23 LAB
QT INTERVAL: 310 MS
QTC INTERVAL: 471 MS

## 2022-04-25 ENCOUNTER — CLINICAL SUPPORT NO REQUIREMENTS (OUTPATIENT)
Dept: PULMONOLOGY | Facility: CLINIC | Age: 62
End: 2022-04-25

## 2022-04-25 ENCOUNTER — READMISSION MANAGEMENT (OUTPATIENT)
Dept: CALL CENTER | Facility: HOSPITAL | Age: 62
End: 2022-04-25

## 2022-04-25 DIAGNOSIS — Z01.812 BLOOD TESTS PRIOR TO TREATMENT OR PROCEDURE: ICD-10-CM

## 2022-04-25 PROCEDURE — 99211 OFF/OP EST MAY X REQ PHY/QHP: CPT | Performed by: NURSE PRACTITIONER

## 2022-04-25 PROCEDURE — U0004 COV-19 TEST NON-CDC HGH THRU: HCPCS | Performed by: NURSE PRACTITIONER

## 2022-04-25 NOTE — OUTREACH NOTE
Medical Week 1 Survey    Flowsheet Row Responses   [facility] patient discharged from? [place]   Does the patient have one of the following disease processes/diagnoses(primary or secondary)? Other   Week 1 attempt successful? Yes   Call start time 1247   Call end time 1253   Discharge diagnosis Atrial Flutter with RVR   Meds reviewed with patient/caregiver? Yes   Is the patient having any side effects they believe may be caused by any medication additions or changes? No   Does the patient have all medications ordered at discharge? Yes   Is the patient taking all medications as directed (includes completed medication regime)? Yes   Does the patient have a primary care provider?  Yes   Does the patient have an appointment with their PCP within 7 days of discharge? Yes   Has the patient kept scheduled appointments due by today? N/A   Has home health visited the patient within 72 hours of discharge? N/A   Psychosocial issues? No   Did the patient receive a copy of their discharge instructions? Yes   What is the patient's perception of their health status since discharge? Worsening   Is the patient/caregiver able to teach back the hierarchy of who to call/visit for symptoms/problems? PCP, Specialist, Home health nurse, Urgent Care, ED, 911 Yes   Week 1 call completed? Yes   Wrap up additional comments Back in afib, went back in afib the after discharge.  MD is aware and has recommended med adjustments.  Asymptomatic other than tired.          LUIS ARANA - Registered Nurse

## 2022-04-26 ENCOUNTER — LAB (OUTPATIENT)
Dept: LAB | Facility: HOSPITAL | Age: 62
End: 2022-04-26

## 2022-04-26 ENCOUNTER — OFFICE VISIT (OUTPATIENT)
Dept: CARDIOLOGY | Facility: HOSPITAL | Age: 62
End: 2022-04-26

## 2022-04-26 ENCOUNTER — HOSPITAL ENCOUNTER (OUTPATIENT)
Dept: CARDIOLOGY | Facility: HOSPITAL | Age: 62
Discharge: HOME | End: 2022-04-26

## 2022-04-26 VITALS
DIASTOLIC BLOOD PRESSURE: 69 MMHG | TEMPERATURE: 97.4 F | WEIGHT: 187.13 LBS | BODY MASS INDEX: 34.44 KG/M2 | OXYGEN SATURATION: 94 % | HEART RATE: 134 BPM | RESPIRATION RATE: 18 BRPM | SYSTOLIC BLOOD PRESSURE: 101 MMHG | HEIGHT: 62 IN

## 2022-04-26 DIAGNOSIS — I48.4 ATYPICAL ATRIAL FLUTTER: ICD-10-CM

## 2022-04-26 DIAGNOSIS — I48.0 PAROXYSMAL ATRIAL FIBRILLATION: ICD-10-CM

## 2022-04-26 DIAGNOSIS — I38 VALVULAR HEART DISEASE: ICD-10-CM

## 2022-04-26 DIAGNOSIS — E78.5 HYPERLIPIDEMIA LDL GOAL <70: ICD-10-CM

## 2022-04-26 DIAGNOSIS — I48.0 PAROXYSMAL ATRIAL FIBRILLATION: Primary | ICD-10-CM

## 2022-04-26 LAB
ANION GAP SERPL CALCULATED.3IONS-SCNC: 19.9 MMOL/L (ref 5–15)
BUN SERPL-MCNC: 24 MG/DL (ref 6–20)
BUN/CREAT SERPL: 28.6 (ref 7–25)
CALCIUM SPEC-SCNC: 10.2 MG/DL (ref 8.6–10.5)
CHLORIDE SERPL-SCNC: 93 MMOL/L (ref 98–107)
CO2 SERPL-SCNC: 22.1 MMOL/L (ref 22–29)
CREAT SERPL-MCNC: 0.84 MG/DL (ref 0.57–1)
EGFRCR SERPLBLD CKD-EPI 2021: 80.2 ML/MIN/1.73
GLUCOSE SERPL-MCNC: 285 MG/DL (ref 65–99)
POTASSIUM SERPL-SCNC: 4.6 MMOL/L (ref 3.5–5.2)
QT INTERVAL: 304 MS
QTC INTERVAL: 452 MS
SARS-COV-2 RNA NOSE QL NAA+PROBE: NOT DETECTED
SODIUM SERPL-SCNC: 135 MMOL/L (ref 136–145)

## 2022-04-26 PROCEDURE — 80048 BASIC METABOLIC PNL TOTAL CA: CPT

## 2022-04-26 PROCEDURE — 99214 OFFICE O/P EST MOD 30 MIN: CPT | Performed by: NURSE PRACTITIONER

## 2022-04-26 PROCEDURE — 93010 ELECTROCARDIOGRAM REPORT: CPT | Performed by: INTERNAL MEDICINE

## 2022-04-26 PROCEDURE — 93005 ELECTROCARDIOGRAM TRACING: CPT | Performed by: NURSE PRACTITIONER

## 2022-04-26 PROCEDURE — 36415 COLL VENOUS BLD VENIPUNCTURE: CPT

## 2022-04-26 NOTE — PATIENT INSTRUCTIONS
- Lab work on 7th floor    - Continue metoprolol    - Do not take propafenone until [facility] calls

## 2022-04-26 NOTE — PROGRESS NOTES
Chief Complaint  Hospital Follow Up Visit and Atrial Flutter    Subjective      History of Present Illness {CC  Problem List  Visit  Diagnosis   Encounters  Notes  Medications  Labs  Result Review Imaging  Media :23}     Jill Miller, 61 y.o. female with atrial flutter/paroxysmal atrial fibrillation s/p maze/ANGELA ligation (2018), valvular heart disease, HLD, T2DM, hypothyroidism presents to [facility] for Hospital Follow Up Visit and Atrial Flutter.    Patient recently presented to [facility] emergency department secondary to rapid heart rate.  During hospitalization patient underwent successful cardioversion 4/18/22.  Patient was initiated on furosemide/potassium at discharge, and metoprolol to tartrate 12.5 mg twice daily prescribed for rate control, as well as pill in the pocket approach with propafenone. Patient had contacted Dr. Lay's office for complaints of increased heart rate and metoprolol was increased to 25 mg twice daily.    Patient presents today noting elevated heart rate for the past 5 days with heart rates approximately 130.  She notes slightly increased dyspnea with long periods of walking and a faster heartbeat; otherwise, asymptomatic.  She has increased her metoprolol to 25 mg twice daily as directed, and has trialed 300 mg Rythmol daily since Saturday with continued elevated heart rates.  ECG at time of visit reveals atrial flutter, ventricular rate 133.  She notes that lower extremity edema has improved since hospital discharge.  She denies chest pain, near syncope/syncope.      Objective     Vital Signs:   Vitals:    04/26/22 1255 04/26/22 1256 04/26/22 1257   BP: 106/66 101/67 101/69   BP Location: Right arm Left arm Left arm   Patient Position: Sitting Sitting Sitting   Cuff Size: Adult Adult Adult   Pulse: (!) 134 (!) 134 (!) 134   Resp:   18   Temp:  97.4 °F (36.3 °C) 97.4 °F (36.3 °C)   TempSrc:  Temporal Temporal   SpO2: 94% 94% 94%  "  Weight:   84.9 kg (187 lb 2 oz)   Height:   157.5 cm (62\")     Body mass index is 34.23 kg/m².  Physical Exam  Vitals and nursing note reviewed.   Constitutional:       Appearance: Normal appearance.   HENT:      Head: Normocephalic.   Eyes:      Extraocular Movements: Extraocular movements intact.   Neck:      Vascular: No carotid bruit.   Cardiovascular:      Rate and Rhythm: Tachycardia present. Rhythm irregular.      Pulses: Normal pulses.      Heart sounds: Normal heart sounds, S1 normal and S2 normal. No murmur heard.  Pulmonary:      Effort: Pulmonary effort is normal. No respiratory distress.      Breath sounds: Normal breath sounds.   Musculoskeletal:      Cervical back: Neck supple.      Right lower leg: No edema.      Left lower leg: No edema.   Skin:     General: Skin is warm and dry.   Neurological:      General: No focal deficit present.      Mental Status: She is alert.   Psychiatric:         Mood and Affect: Mood normal.         Behavior: Behavior normal.         Thought Content: Thought content normal.        Data Reviewed:{ Labs  Result Review  Imaging  Med Tab  Media :23}     Comprehensive Metabolic Panel (04/19/2022 02:28)  CBC & Differential (04/19/2022 02:28)    Adult Transthoracic Echo Complete w/ Color, Spectral and Contrast if necessary per protocol (04/18/2022 11:10)  Cardioversion External in Cardiology Department (04/18/2022 09:23)      Assessment/Plan   Assessment and Plan {CC Problem List  Visit Diagnosis  ROS  Review (Popup)  Health Maintenance  Quality  BestPractice  Medications  SmartSets  SnapShot Encounters  Media :23}     1. Paroxysmal atrial flutter/fibrillation (HCC)  -Recurrent atrial flutter on ECG at time of visit, ventricular rate 133.  Recent hospitalization with cardioversion to normal sinus rhythm.  Notes slightly increased dyspnea with long periods of walking and a faster heartbeat; otherwise, asymptomatic.  She has increased her metoprolol to 25 mg " twice daily as directed, and has trialed 300 mg Rythmol daily since Saturday with continued elevated heart rates.  Discussed with Dr. Lay and plan for cardioversion this week.  - ANGELA ligation/maze procedure by Dr. Cheatham in 2018  -[id number]: 2.  ASA only with ANGELA closure.  -Continue metoprolol 25mg BID  - Basic Metabolic Panel; Future  - ECG 12 Lead; Future  -Continue with plans for cardioversion as above    2. Valvular heart disease  -AVR/MVR/tricuspid annuloplasty and left atrial appendage ligation 2018  -Last echo 2018 showed bioprosthetic aortic and mitral valves well-seated and functioning normally  -Echo 4/18/2022 shows LVEF 47.5%.    3. Hyperlipidemia LDL goal <70  -Continue pravastatin 20 Mg daily as prescribed      Follow Up {Instructions Charge Capture  Follow-up Communications :23}     Return if symptoms worsen or fail to improve.      Patient was given instructions and counseling regarding her condition or for health maintenance advice. Please see specific information pulled into the AVS if appropriate.  Patient was instructed to call the [facility] with any questions, concerns, or worsening symptoms.    Dictated Utilizing Dragon Dictation   Please note that portions of this note were completed with a voice recognition program.   Part of this note may be an electronic transcription/translation of spoken language to printed text using the Dragon Dictation System.

## 2022-04-27 ENCOUNTER — OFFICE VISIT (OUTPATIENT)
Dept: PULMONOLOGY | Facility: CLINIC | Age: 62
End: 2022-04-27

## 2022-04-27 VITALS
HEIGHT: 62 IN | OXYGEN SATURATION: 93 % | WEIGHT: 185.5 LBS | BODY MASS INDEX: 34.14 KG/M2 | SYSTOLIC BLOOD PRESSURE: 114 MMHG | TEMPERATURE: 97.5 F | DIASTOLIC BLOOD PRESSURE: 76 MMHG | HEART RATE: 135 BPM | RESPIRATION RATE: 22 BRPM

## 2022-04-27 DIAGNOSIS — I48.4 ATYPICAL ATRIAL FLUTTER: ICD-10-CM

## 2022-04-27 DIAGNOSIS — J44.9 CHRONIC OBSTRUCTIVE PULMONARY DISEASE, UNSPECIFIED COPD TYPE: ICD-10-CM

## 2022-04-27 DIAGNOSIS — F17.210 CIGARETTE NICOTINE DEPENDENCE WITHOUT COMPLICATION: Primary | ICD-10-CM

## 2022-04-27 DIAGNOSIS — R06.02 SOB (SHORTNESS OF BREATH): ICD-10-CM

## 2022-04-27 PROCEDURE — 99215 OFFICE O/P EST HI 40 MIN: CPT | Performed by: NURSE PRACTITIONER

## 2022-04-27 PROCEDURE — 94726 PLETHYSMOGRAPHY LUNG VOLUMES: CPT | Performed by: NURSE PRACTITIONER

## 2022-04-27 PROCEDURE — 94729 DIFFUSING CAPACITY: CPT | Performed by: NURSE PRACTITIONER

## 2022-04-27 PROCEDURE — 94060 EVALUATION OF WHEEZING: CPT | Performed by: NURSE PRACTITIONER

## 2022-04-27 RX ORDER — ALBUTEROL SULFATE 90 UG/1
4 AEROSOL, METERED RESPIRATORY (INHALATION)
Status: DISCONTINUED | OUTPATIENT
Start: 2022-04-27 | End: 2022-06-01

## 2022-04-27 RX ADMIN — ALBUTEROL SULFATE 4 PUFF: 90 AEROSOL, METERED RESPIRATORY (INHALATION) at 15:25

## 2022-04-28 ENCOUNTER — HOSPITAL ENCOUNTER (OUTPATIENT)
Dept: CARDIOLOGY | Facility: HOSPITAL | Age: 62
Discharge: HOME | End: 2022-04-28
Attending: INTERNAL MEDICINE | Admitting: INTERNAL MEDICINE

## 2022-04-28 VITALS
BODY MASS INDEX: 34.04 KG/M2 | HEIGHT: 62 IN | HEART RATE: 81 BPM | SYSTOLIC BLOOD PRESSURE: 101 MMHG | OXYGEN SATURATION: 96 % | TEMPERATURE: 97.3 F | DIASTOLIC BLOOD PRESSURE: 66 MMHG | WEIGHT: 185 LBS

## 2022-04-28 DIAGNOSIS — I48.0 PAROXYSMAL ATRIAL FIBRILLATION: ICD-10-CM

## 2022-04-28 PROBLEM — J44.9 COPD (CHRONIC OBSTRUCTIVE PULMONARY DISEASE): Status: ACTIVE | Noted: 2022-04-17

## 2022-04-28 PROBLEM — F17.210 CIGARETTE NICOTINE DEPENDENCE WITHOUT COMPLICATION: Status: ACTIVE | Noted: 2017-04-10

## 2022-04-28 LAB
MAXIMAL PREDICTED HEART RATE: 161 BPM
STRESS TARGET HR: 137 BPM

## 2022-04-28 PROCEDURE — 92960 CARDIOVERSION ELECTRIC EXT: CPT

## 2022-04-28 PROCEDURE — 92960 CARDIOVERSION ELECTRIC EXT: CPT | Performed by: INTERNAL MEDICINE

## 2022-04-28 PROCEDURE — 93005 ELECTROCARDIOGRAM TRACING: CPT | Performed by: INTERNAL MEDICINE

## 2022-04-28 PROCEDURE — 93010 ELECTROCARDIOGRAM REPORT: CPT | Performed by: INTERNAL MEDICINE

## 2022-04-28 PROCEDURE — 25010000002 MIDAZOLAM PER 1 MG: Performed by: INTERNAL MEDICINE

## 2022-04-28 RX ORDER — NALOXONE HYDROCHLORIDE 0.4 MG/ML
INJECTION, SOLUTION INTRAMUSCULAR; INTRAVENOUS; SUBCUTANEOUS
Status: DISCONTINUED
Start: 2022-04-28 | End: 2022-04-28 | Stop reason: WASHOUT

## 2022-04-28 RX ORDER — MIDAZOLAM HYDROCHLORIDE 1 MG/ML
INJECTION INTRAMUSCULAR; INTRAVENOUS
Status: COMPLETED | OUTPATIENT
Start: 2022-04-28 | End: 2022-04-28

## 2022-04-28 RX ORDER — MIDAZOLAM HYDROCHLORIDE 1 MG/ML
INJECTION INTRAMUSCULAR; INTRAVENOUS
Status: DISCONTINUED
Start: 2022-04-28 | End: 2022-04-28 | Stop reason: HOSPADM

## 2022-04-28 RX ORDER — FENTANYL CITRATE 50 UG/ML
INJECTION, SOLUTION INTRAMUSCULAR; INTRAVENOUS
Status: DISCONTINUED
Start: 2022-04-28 | End: 2022-04-28 | Stop reason: WASHOUT

## 2022-04-28 RX ORDER — FLUMAZENIL 0.1 MG/ML
INJECTION INTRAVENOUS
Status: DISCONTINUED
Start: 2022-04-28 | End: 2022-04-28 | Stop reason: WASHOUT

## 2022-04-28 RX ORDER — PROPAFENONE HYDROCHLORIDE 300 MG/1
300 TABLET, COATED ORAL 2 TIMES DAILY
Qty: 180 TABLET | Refills: 1 | Status: SHIPPED | OUTPATIENT
Start: 2022-04-28 | End: 2022-06-02 | Stop reason: HOSPADM

## 2022-04-28 RX ADMIN — MIDAZOLAM 2 MG: 1 INJECTION INTRAMUSCULAR; INTRAVENOUS at 09:45

## 2022-04-28 RX ADMIN — Medication 20 MG: at 09:45

## 2022-04-28 NOTE — PROGRESS NOTES
[facility] Pulmonary Initial Evaluation    CHIEF COMPLAINT    Dyspnea/COPD    Referred by:  Dr. Oscar    HISTORY OF PRESENT ILLNESS    Jill Miller is a 61 y.o.female here today for an initial evaluation of her COPD.  She states that she was diagnosed with COPD in the past but has never had full pulmonary function testing.  She was recently hospitalized at [facility] on 4/18-4/20 for heart palpitations.  She was found to be in atrial flutter with RVR and had a ECV on 4/18.  She has been following closely with cardiology since discharge.  She states that she is planning on having another ECV tomorrow as her heart rate been elevated for the last couple days.    She denies any breathing difficulties as a child.  She denies any childhood asthma.  She denies any chemical or environmental exposures in the past.  She denies any exposure to TB.  She denies any history of lung cancer in her first-degree relatives.  Her mother had breast cancer.  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    She continues to smokes couple of cigarettes per day.  She has smoked for around 40 years.  She has at least a [id number] history.  Her last CT scan was in March with results below.    She states that she does have shortness of breath with any exertion.  She states that especially when her heart rate is elevated she has more shortness of breath.  She also notices more fatigue.    She was discharged home on Symbicort 2 puffs twice a day and has been using this every day.  She does mention that her insurance would not cover this inhaler and needs an alternative.  She does not have a rescue inhaler.    She has a history of lung cancer in 2017 had a left upper lobectomy with Dr. Cheatham.  This was consistent with squamous cell carcinoma.    She states that she sleeps okay.  She has never had a sleep study performed.  She does not know if she snores.  She feels well rested in the mornings.    She denies  fever, chills, hemoptysis, night sweats, weight loss, chest pain or palpitations.  She has noticed some lower extremity edema in the past but is on Lasix daily.  She denies any calf tenderness.  She does have seasonal sinus and allergy symptoms.  She will take over-the-counter medication as needed.  She denies any reflux symptoms.    She did smoke marijuana in the past but not currently.  She denies any EtOH usage.    She has not received her flu vaccination this past season.    Patient Active Problem List   Diagnosis   • Acquired hypothyroidism   • Cigarette nicotine dependence without complication   • Graves disease   • Paroxysmal atrial fibrillation (HCC)   • Hyperlipidemia LDL goal <70   • Valvular heart disease   • Type 2 diabetes mellitus (HCC)   • Atrial flutter (HCC)   • COPD (chronic obstructive pulmonary disease) (HCC)   • Heart palpitations   • Obesity (BMI 30-39.9)       Allergies   Allergen Reactions   • Lortab [Hydrocodone-Acetaminophen] Nausea And Vomiting and Dizziness   • Morphine And Related Nausea Only     No current facility-administered medications for this visit.  No current outpatient medications on file.  MEDICATION LIST AND ALLERGIES REVIEWED.    Social History     Tobacco Use   • Smoking status: Current Every Day Smoker     Packs/day: 0.50     Years: 40.00     Pack years: 20.00     Types: Cigarettes     Start date: 5/5/1978   • Smokeless tobacco: Never Used   • Tobacco comment: Couldn't remember exact dates.   Vaping Use   • Vaping Use: Never used   Substance Use Topics   • Alcohol use: No   • Drug use: No       FAMILY AND SOCIAL HISTORY REVIEWED.    Review of Systems   Constitutional: Positive for fatigue. Negative for activity change, appetite change, fever and unexpected weight change.   HENT: Positive for postnasal drip. Negative for congestion, rhinorrhea, sinus pressure, sore throat and voice change.    Eyes: Negative for visual disturbance.   Respiratory: Positive for shortness of  "breath. Negative for cough, chest tightness and wheezing.    Cardiovascular: Positive for leg swelling. Negative for chest pain and palpitations.   Gastrointestinal: Negative for abdominal distention, abdominal pain, nausea and vomiting.   Endocrine: Negative for cold intolerance and heat intolerance.   Genitourinary: Negative for difficulty urinating and urgency.   Musculoskeletal: Negative for arthralgias, back pain and neck pain.   Skin: Negative for color change and pallor.   Allergic/Immunologic: Negative for environmental allergies and food allergies.   Neurological: Negative for dizziness, syncope, weakness and light-headedness.   Hematological: Negative for adenopathy. Does not bruise/bleed easily.   Psychiatric/Behavioral: Negative for agitation and behavioral problems.   .    /76 (BP Location: Left arm, Patient Position: Sitting, Cuff Size: Adult)   Pulse (!) 135   Temp 97.5 °F (36.4 °C)   Resp 22   Ht 157.5 cm (62\")   Wt 84.1 kg (185 lb 8 oz)   SpO2 93% Comment: room air at rest  BMI 33.93 kg/m²     There is no immunization history for the selected administration types on file for this patient.    Physical Exam  Vitals and nursing note reviewed.   Constitutional:       Appearance: She is well-developed. She is not diaphoretic.   HENT:      Head: Normocephalic and atraumatic.   Eyes:      Pupils: Pupils are equal, round, and reactive to light.   Neck:      Thyroid: No thyromegaly.   Cardiovascular:      Rate and Rhythm: Regular rhythm. Tachycardia present.      Heart sounds: Normal heart sounds. No murmur heard.    No friction rub. No gallop.   Pulmonary:      Effort: Pulmonary effort is normal. No respiratory distress.      Breath sounds: Normal breath sounds. No wheezing or rales.   Chest:      Chest wall: No tenderness.   Abdominal:      General: Bowel sounds are normal.      Palpations: Abdomen is soft.      Tenderness: There is no abdominal tenderness.   Musculoskeletal:         General: " No swelling. Normal range of motion.      Cervical back: Normal range of motion and neck supple.   Lymphadenopathy:      Cervical: No cervical adenopathy.   Skin:     General: Skin is warm and dry.      Capillary Refill: Capillary refill takes less than 2 seconds.   Neurological:      Mental Status: She is alert and oriented to person, place, and time.   Psychiatric:         Mood and Affect: Mood normal.         Behavior: Behavior normal.           RESULTS    PFTS in the office today, read by me, FVC 1.96 62% predicted, FEV1 1.34 55% predicted, FEV1/FVC 68% predicted, TLC 4.20 91% predicted, DLCO 64% predicted, mild obstruction with no postbronchodilator response, no restriction and reduced DLCO.    CT Chest With & Without Contrast Diagnostic    Result Date: 3/18/2022  Chronic and postoperative changes are noted as above. There are no acute findings or definite evidence of recurrent or metastatic disease. Redemonstrated diffuse hepatic steatosis.  This report was finalized on 3/18/2022 5:25 PM by Eduard Pino.      XR Chest 1 View    Result Date: 4/17/2022  Mild bilateral lower lung airspace opacities, which may be due to atelectasis or less likely pneumonia.  This report was finalized on 4/17/2022 7:19 PM by Shelia Mckeon MD.      CT Angiogram Chest    Result Date: 4/17/2022  No pulmonary embolism. No acute cardiopulmonary abnormality. Electronically signed by:  Omid Ordoñez M.D.  4/17/2022 7:10 PM Mountain Time    PROBLEM LIST    Problem List Items Addressed This Visit        Cardiac and Vasculature    Atrial flutter (HCC)    Overview     · Chads Vasc = 2 (female, HTN)  · Left atrial appendage ligation and MAZE procedure by Ajay Cheatham, 7/16/18  · TIFFANIE (9/17/2018):  ANGELA completely occluded.  ·  [facility] presentation with symptomatic atrial flutter with rapid ventricular rate, 4/17/2022              Pulmonary and Pneumonias    COPD (chronic obstructive pulmonary disease) (HCC)       Tobacco    Cigarette  nicotine dependence without complication - Primary      Other Visit Diagnoses     SOB (shortness of breath)        Relevant Medications    albuterol sulfate HFA (PROVENTIL HFA;VENTOLIN HFA;PROAIR HFA) inhaler 4 puff    Other Relevant Orders    Pulmonary Function Test (Completed)            DISCUSSION    Ms. Miller was here for an initial evaluation of her COPD.  We did review her full PFTs in the office today and she has a mild obstruction.  She is currently on Symbicort twice a day and her insurance will not cover this inhaler.  I did give her a sample of Stiolto to use 2 puffs daily.  If she feels that this inhaler is working good for her I did advise her to call me and I would send this into her pharmacy to check coverage as well.    She is having a cardioversion tomorrow and will continue to follow closely with cardiology for her history of atrial flutter.    I am concerned that she could have some sleep apnea that has been untreated and at this time she would like to put off a sleep study until she gets her heart back in rhythm.  I did advise her that we will readdress this at her next appointment.    We did review her CT scan that was performed in March that shows no nodule or mass concerning for malignancy.    We did discuss complete smoking cessation in the office for 3 minutes.  I am going to send in a prescription for Chantix for her to start.  I did stress the importance of smoking cessation.  She will continue yearly CT screenings for lung cancer and her next CT scan will be due in March 2023.    She will follow-up in 2 to 3 months or sooner if her symptoms worsen.  I did advise her to call with any additional concerns or questions.    Level of service justified based on 42 minutes spent in patient care on this date of service including, but not limited to: preparing to see the patient, obtaining and/or reviewing history, performing medically appropriate examination, ordering tests/medicine/procedures,  independently interpreting results, documenting clinical information in EHR, and counseling/education of patient/family/caregiver. (Level 4 30-39 minutes; Level 5 40-54 minutes)      GUCCI Perry  04/27/202208:25 EDT  Electronically signed     Please note that portions of this note were completed with a voice recognition program.        CC: Marisa Lynch, PA

## 2022-05-04 ENCOUNTER — READMISSION MANAGEMENT (OUTPATIENT)
Dept: CALL CENTER | Facility: HOSPITAL | Age: 62
End: 2022-05-04

## 2022-05-04 NOTE — OUTREACH NOTE
Medical Week 2 Survey    Flowsheet Row Responses   [facility] patient discharged from? [facility]   Does the patient have one of the following disease processes/diagnoses(primary or secondary)? Other   Week 2 attempt successful? Yes   Call start time 1511   Discharge diagnosis Atrial Flutter with RVR   Call end time 1523   Is patient permission given to speak with other caregiver? Yes   List who call center can speak with    Meds reviewed with patient/caregiver? Yes   Is the patient having any side effects they believe may be caused by any medication additions or changes? No   Does the patient have all medications ordered at discharge? Yes   Is the patient taking all medications as directed (includes completed medication regime)? Yes   Does the patient have a primary care provider?  Yes   Comments regarding PCP f/u appt 5-9   Has the patient kept scheduled appointments due by today? N/A   Psychosocial issues? No   Comments Pt c/o fast , has called MD will be seeing EP MD on 5-9. 120/79 BP at home. Pt denies SOA/palpitations.    What is the patient's perception of their health status since discharge? Same   Is the patient/caregiver able to teach back signs and symptoms related to disease process for when to call PCP? Yes   Is the patient/caregiver able to teach back signs and symptoms related to disease process for when to call 911? Yes   Additional teach back comments Reviewed need for ER & MD call if HR >125 even if she is not feeling bad as she reports.    Week 2 Call Completed? Yes          TASHI ARANA - Registered Nurse

## 2022-05-09 DIAGNOSIS — I48.0 PAROXYSMAL ATRIAL FIBRILLATION: ICD-10-CM

## 2022-05-09 NOTE — PROGRESS NOTES
Cardiac Electrophysiology Outpatient Note  [facility] at [facility]    Office Visit     Jill Miller  [id number]  05/10/2022    Primary Care Physician: Marisa Lynch PA    Referred By: No ref. provider found    CC: Paroxsymal atrial fibrillation/ atrial flutter    Problem List:  Paroxysmal atrial fibrillation/atrial flutter  Status post maze/ANGELA ligation 2018  [id number]= 2  [facility] ED 4/18/2022 with rapid heart rate, found to be in atrial flutter, successful cardioversion.  Sent home on pill in pocket propafenone, metoprolol  Recurrent atrial flutter 4/26/2022 at heart valve office visit  Successful cardioversion 4/28/2022, sent home on propafenone  Valvular heart disease  AVR/MVR/tricuspid annuloplasty, MAZE, ANGELA ligation 2018, Dr. Cheatham  Echo 4/20/2022: EF 50%, bioprosthetic aortic valve functioning normally, bioprosthetic mitral valve with mild mitral regurg.  Mild to moderate tricuspid regurg present.  Hyperlipidemia  Type 2 diabetes  Hypothyroidism    History of Present Illness:   Jill Miller is a 62 y.o. female who presents to the electrophysiology clinic for consultation regarding paroxysmal atrial fibrillation and atrial flutter.  She was recently admitted to the hospital on 4/18/2022 with rapid heart rate and found to be in atrial flutter with successful cardioversion and was sent home with pill in pocket propafenone and metoprolol.  She had a follow-up appointment with the heart valve clinic and was found to be in recurrent atrial flutter on 4/26/2022 with subsequent cardioversion on 4/28/2022 by Dr. Lay. Since then she believes she has had recurrence of afib/flutter, feeling her heart racing at times at home, reports HR at home  bpm after she takes her medications. Reports constant fatigue, dyspnea on exertion, lower extremity swelling. Denies chest pain.    BRANDIE- never tested  etoh- former, quit 20 years ago  Tobacco- 2 cigarettes/day, was  smoking 1/2 ppd. Trying to quit.  HTN- controlled    Past Surgical History:   Procedure Laterality Date   • ABLATION OF DYSRHYTHMIC FOCUS  7-   • ABSCESS DRAINAGE      under left breast d/t mrsa    • AORTIC VALVE REPAIR/REPLACEMENT N/A 7/16/2018    Procedure: MEDIAN STERNOTOMY, AORTIC VALVE REPLACEMENT WITH TIFFANIE AND MAZE, TRICUSPID RING, LEFT ATRIAL OCCLUSION;  Surgeon: Ajay Cheatham MD;  Location:  [facility];  Service: Cardiothoracic   • BRONCHOSCOPY Left 5/12/2017    Procedure: BRONCHOSCOPY;  Surgeon: Ajay Cheatham MD;  Location:  [facility];  Service:    • BRONCHOSCOPY N/A 5/18/2017    Procedure: BRONCHOSCOPY BIOPSY AT BEDSIDE;  Surgeon: Ajay Cheatham MD;  Location:  [facility];  Service:    • CARDIAC CATHETERIZATION N/A 9/28/2017    Procedure: Left Heart Cath;  Surgeon: Marco Lay MD;  Location:  [facility];  Service:    • CARDIAC CATHETERIZATION N/A 9/28/2017    Procedure: Right Heart Cath;  Surgeon: Marco Lay MD;  Location:  [facility];  Service:    • LUNG BIOPSY  04/2017   • LYMPH NODE DISSECTION Left 5/12/2017    Procedure: MEDIASTINAL LYMPH NODE DISSECTION;  Surgeon: Ajay Cheatham MD;  Location:  [facility];  Service:    • MAZE PROCEDURE     • MITRAL VALVE REPAIR/REPLACEMENT N/A 7/16/2018    Procedure: MITRAL VALVE REPLACEMENT;  Surgeon: Ajay Cheatham MD;  Location:  [facility];  Service: Cardiothoracic   • MITRAL VALVE REPLACEMENT  7-   • TEETH EXTRACTION     • THORACOSCOPY VIDEO ASSISTED WITH LOBECTOMY Left 5/12/2017    Procedure: THORACOSCOPY VIDEO ASSISTED WITH OPEN LOBECTOMY;  Surgeon: Ajay Cheatham MD;  Location:  [facility];  Service:    • TOTAL THYROIDECTOMY  approx 2012   • TRICUSPID VALVE SURGERY  7-       Family History   Problem Relation Age of Onset   • Breast cancer Mother    • Diabetes Father    • Heart disease Son        Social History     Socioeconomic History   • Marital status:    • Number of  children: 3   Tobacco Use   • Smoking status: Current Every Day Smoker     Packs/day: 0.50     Years: 40.00     Pack years: 20.00     Types: Cigarettes     Start date: 5/5/1978   • Smokeless tobacco: Never Used   • Tobacco comment: Couldn't remember exact dates.   Vaping Use   • Vaping Use: Never used   Substance and Sexual Activity   • Alcohol use: No   • Drug use: No   • Sexual activity: Not Currently     Partners: Male     Birth control/protection: None         Current Outpatient Medications:   •  aspirin 81 MG EC tablet, Take 1 tablet by mouth Daily., Disp:  , Rfl:   •  Cholecalciferol (vitamin D3) 125 MCG (5000 UT) tablet tablet, Take 5,000 Units by mouth Daily., Disp: , Rfl:   •  furosemide (LASIX) 40 MG tablet, Take 1 tablet by mouth Daily., Disp: 60 tablet, Rfl: 1  •  levothyroxine (SYNTHROID, LEVOTHROID) 175 MCG tablet, Take 175 mcg by mouth Daily., Disp: , Rfl:   •  metFORMIN (GLUCOPHAGE) 1000 MG tablet, Take 1 tablet by mouth Daily With Breakfast., Disp: 30 tablet, Rfl: 2  •  metoprolol tartrate (LOPRESSOR) 25 MG tablet, Take 1 tablet by mouth Every 12 (Twelve) Hours., Disp: 90 tablet, Rfl: 1  •  multivitamin (THERAGRAN) tablet tablet, Take  by mouth Daily., Disp: , Rfl:   •  potassium chloride (MICRO-K) 10 MEQ CR capsule, Take 2 capsules by mouth Daily., Disp: 60 capsule, Rfl: 0  •  pravastatin (PRAVACHOL) 20 MG tablet, Take 20 mg by mouth Daily., Disp: , Rfl:   •  propafenone (RYTHMOL) 300 MG tablet, Take 1 tablet by mouth 2 (Two) Times a Day., Disp: 180 tablet, Rfl: 1  •  varenicline (CHANTIX) 0.5 MG tablet, As Needed., Disp: , Rfl:     Current Facility-Administered Medications:   •  albuterol sulfate HFA (PROVENTIL HFA;VENTOLIN HFA;PROAIR HFA) inhaler 4 puff, 4 puff, Inhalation, 4x Daily - RT, Kristen Bautista APRN, 4 puff at 04/27/22 1525    Allergies:   Allergies   Allergen Reactions   • Lortab [Hydrocodone-Acetaminophen] Nausea And Vomiting and Dizziness   • Morphine And Related Nausea Only  "      Review of Systems:  Review of Systems   Constitutional: Positive for malaise/fatigue.   Cardiovascular: Positive for dyspnea on exertion, leg swelling and palpitations. Negative for chest pain, near-syncope and syncope.   Respiratory: Negative for shortness of breath.    Neurological: Negative for dizziness and light-headedness.        Vital Signs: Blood pressure 126/74, pulse 82, height 158.8 cm (62.5\"), weight 87.5 kg (193 lb), SpO2 95 %.    Physical Exam    Lab Results   Component Value Date    GLUCOSE 285 (H) 04/26/2022    CALCIUM 10.2 04/26/2022     (L) 04/26/2022    K 4.6 04/26/2022    CO2 22.1 04/26/2022    CL 93 (L) 04/26/2022    BUN 24 (H) 04/26/2022    CREATININE 0.84 04/26/2022    EGFRIFNONA 98 08/01/2018    BCR 28.6 (H) 04/26/2022    ANIONGAP 19.9 (H) 04/26/2022     Lab Results   Component Value Date    WBC 11.64 (H) 04/19/2022    HGB 14.5 04/19/2022    HCT 42.4 04/19/2022    .5 (H) 04/19/2022     04/19/2022     Lab Results   Component Value Date    INR 1.01 04/18/2022    INR 1.03 04/18/2022    INR 2.0 (H) 09/21/2018    PROTIME 13.2 04/18/2022    PROTIME 13.4 04/18/2022    PROTIME 24.4 (H) 09/21/2018     Lab Results   Component Value Date    TSH 13.930 (H) 04/17/2022        Results for orders placed during the hospital encounter of 04/17/22    Adult Transthoracic Echo Complete w/ Color, Spectral and Contrast if necessary per protocol    Interpretation Summary  · Left ventricular systolic function is normal. Estimated left ventricular EF = 50%.  · Bioprosthetic aortic valve present and functions normally  · Bioprosthetic mitral valve present with mild mitral regurgitation present.  · Mild to moderate tricuspid regurgitation is present.      I personally viewed and interpreted the patient's EKG/Telemetry/lab data.      ECG 12 Lead    Date/Time: 5/10/2022 9:22 AM  Performed by: Nigel Huber MD  Authorized by: Nigel Huber MD   Comparison: compared with previous ECG from " 4/8/2022  Similar to previous ECG  Comments: Appears to be sinus rhythm with very low voltage P waves              Assessment & Plan    1. Paroxysmal atrial fibrillation/flutter (HCC)  - 62-year-old  female patient status post mitral and aortic valve replacement as well as maze, ANGELA ligation in 2018.  Presented to the ED on 4/18/2022 and atrial flutter with successful ECV and was discharged home on propafenone and metoprolol.  Flutter recurrence on 4/26/2022 with subsequent ECV on 4/28/2022.  Since that time she does feel like she has gone in and out of flutter at home a few times mostly based on symptoms feeling her heart race, fatigue, dyspnea on exertion.  -We discussed the pathophysiology of atrial flutters after Maze procedure and potential treatment options.  The 2 options would be a catheter ablation to target the areas of scar and likely as well for the circuits.  It is uncertain whether this is right-sided or left-sided or homely circuits are available.  The other option will be to consider additional antiarrhythmic therapy.  The best option of this would likely be Tikosyn.  We did discuss the Tikosyn in the hospital for at least 2 nights for this.  I also think Tikosyn would likely be limited due to baseline QT prolongation.  After this discussion she would like to proceed with the ablation procedure.  We will work on scheduling this.  We will hold her propafenone for 5 days prior to the procedure.  -Of note, she has had TIFFANIE postsurgery which confirmed that her left atrial appendage is ligated.    2. Valvular heart disease  - s/p bioprosthetic aortic and mitral valves 2018  - Echo 04/18/022: EF 50%, Bioprosthetic aortic valve present and functions normally, Bioprosthetic mitral valve present with mild mitral regurgitation present, Mild to moderate TR  - Continue to follow with Dr. Lay for this.    Follow Up:  No follow-ups on file.      Scribed for Nigel Huber MD by Diana DUKCWORTH.  5/10/2022  09:05 EDT    I, Nigel Huber MD, personally performed the services described in this documentation as scribed by the above named individual in my presence, and it is both accurate and complete.  5/10/2022  09:23 EDT

## 2022-05-10 ENCOUNTER — OFFICE VISIT (OUTPATIENT)
Dept: CARDIOLOGY | Facility: CLINIC | Age: 62
End: 2022-05-10

## 2022-05-10 VITALS
BODY MASS INDEX: 34.2 KG/M2 | DIASTOLIC BLOOD PRESSURE: 74 MMHG | WEIGHT: 193 LBS | HEIGHT: 63 IN | HEART RATE: 82 BPM | SYSTOLIC BLOOD PRESSURE: 126 MMHG | OXYGEN SATURATION: 95 %

## 2022-05-10 DIAGNOSIS — I48.92 ATRIAL FLUTTER, UNSPECIFIED TYPE: Primary | ICD-10-CM

## 2022-05-10 DIAGNOSIS — I48.0 PAROXYSMAL ATRIAL FIBRILLATION: ICD-10-CM

## 2022-05-10 DIAGNOSIS — I48.0 PAROXYSMAL ATRIAL FIBRILLATION: Primary | ICD-10-CM

## 2022-05-10 DIAGNOSIS — I38 VALVULAR HEART DISEASE: ICD-10-CM

## 2022-05-10 PROCEDURE — 93000 ELECTROCARDIOGRAM COMPLETE: CPT | Performed by: STUDENT IN AN ORGANIZED HEALTH CARE EDUCATION/TRAINING PROGRAM

## 2022-05-10 PROCEDURE — 99204 OFFICE O/P NEW MOD 45 MIN: CPT | Performed by: STUDENT IN AN ORGANIZED HEALTH CARE EDUCATION/TRAINING PROGRAM

## 2022-05-10 RX ORDER — VARENICLINE TARTRATE 0.5 MG/1
0.5 TABLET, FILM COATED ORAL AS NEEDED
COMMUNITY
Start: 2022-04-28 | End: 2023-03-29

## 2022-05-11 ENCOUNTER — READMISSION MANAGEMENT (OUTPATIENT)
Dept: CALL CENTER | Facility: HOSPITAL | Age: 62
End: 2022-05-11

## 2022-05-11 NOTE — OUTREACH NOTE
Medical Week 3 Survey    Flowsheet Row Responses   [facility] patient discharged from? [facility]   Does the patient have one of the following disease processes/diagnoses(primary or secondary)? Other   Week 3 attempt successful? Yes   Call start time 1517   Call end time 1520   Discharge diagnosis Atrial Flutter with RVR   Meds reviewed with patient/caregiver? Yes   Is the patient having any side effects they believe may be caused by any medication additions or changes? No   Does the patient have all medications ordered at discharge? N/A   Is the patient taking all medications as directed (includes completed medication regime)? Yes   Does the patient have a primary care provider?  Yes   Comments regarding PCP f/u appt 5-9   Does the patient have an appointment with their PCP within 7 days of discharge? Yes   Has the patient kept scheduled appointments due by today? Yes   Has home health visited the patient within 72 hours of discharge? N/A   Psychosocial issues? No   Did the patient receive a copy of their discharge instructions? Yes   Nursing interventions Reviewed instructions with patient   What is the patient's perception of their health status since discharge? Same  [Patient having ablation 6/1/22]   Is the patient/caregiver able to teach back signs and symptoms related to disease process for when to call PCP? Yes   Is the patient/caregiver able to teach back signs and symptoms related to disease process for when to call 911? Yes   Is the patient/caregiver able to teach back the hierarchy of who to call/visit for symptoms/problems? PCP, Specialist, Home health nurse, Urgent Care, ED, 911 Yes   If the patient is a current smoker, are they able to teach back resources for cessation? Smoking cessation medications   Week 3 Call Completed? Yes   Wrap up additional comments Patient having EP study/ablation 6/1/22          ANGELINA LI - Registered Nurse

## 2022-05-16 ENCOUNTER — PREP FOR SURGERY (OUTPATIENT)
Dept: OTHER | Facility: HOSPITAL | Age: 62
End: 2022-05-16

## 2022-05-16 DIAGNOSIS — I48.0 PAROXYSMAL ATRIAL FIBRILLATION: Primary | ICD-10-CM

## 2022-05-16 RX ORDER — SODIUM CHLORIDE 0.9 % (FLUSH) 0.9 %
10 SYRINGE (ML) INJECTION AS NEEDED
Status: CANCELLED | OUTPATIENT
Start: 2022-05-16

## 2022-05-16 RX ORDER — SODIUM CHLORIDE 0.9 % (FLUSH) 0.9 %
3 SYRINGE (ML) INJECTION EVERY 12 HOURS SCHEDULED
Status: CANCELLED | OUTPATIENT
Start: 2022-05-16

## 2022-05-16 RX ORDER — ACETAMINOPHEN 325 MG/1
650 TABLET ORAL EVERY 4 HOURS PRN
Status: CANCELLED | OUTPATIENT
Start: 2022-05-16

## 2022-05-16 RX ORDER — NITROGLYCERIN 0.4 MG/1
0.4 TABLET SUBLINGUAL
Status: CANCELLED | OUTPATIENT
Start: 2022-05-16

## 2022-05-23 ENCOUNTER — READMISSION MANAGEMENT (OUTPATIENT)
Dept: CALL CENTER | Facility: HOSPITAL | Age: 62
End: 2022-05-23

## 2022-05-23 NOTE — OUTREACH NOTE
Medical Week 4 Survey    Flowsheet Row Responses   [facility] patient discharged from? [facility]   Does the patient have one of the following disease processes/diagnoses(primary or secondary)? Other   Week 4 attempt successful? Yes   Call start time 1306   Call end time 1310   List who call center can speak with    Person spoke with today (if not patient) and relationship    Meds reviewed with patient/caregiver? Yes   Is the patient taking all medications as directed (includes completed medication regime)? Yes   Has the patient kept scheduled appointments due by today? Yes   Is the patient still receiving Home Health Services? N/A   Psychosocial issues? No   Comments Preop on 05/31, Ablation 06/01   What is the patient's perception of their health status since discharge? Improving   Is the patient/caregiver able to teach back the hierarchy of who to call/visit for symptoms/problems? PCP, Specialist, Home health nurse, Urgent Care, ED, 911 Yes   Additional teach back comments BP WNL, ,110,sometimes 92 after medications   Week 4 Call Completed? Yes   Would the patient like one additional call? No   Graduated Yes   Is the patient interested in additional calls from an ambulatory ?  NOTE:  applies to high risk patients requiring additional follow-up. No   Did the patient feel the follow up calls were helpful during their recovery period? Yes   Wrap up additional comments Pt reports she is doing well.  She denies needs.          JUDD PANTOJA - Registered Nurse

## 2022-05-26 DIAGNOSIS — E66.9 OBESITY (BMI 30.0-34.9): Primary | ICD-10-CM

## 2022-05-31 ENCOUNTER — HOSPITAL ENCOUNTER (OUTPATIENT)
Dept: CT IMAGING | Facility: HOSPITAL | Age: 62
Discharge: HOME | End: 2022-05-31

## 2022-05-31 ENCOUNTER — PRE-ADMISSION TESTING (OUTPATIENT)
Dept: PREADMISSION TESTING | Facility: HOSPITAL | Age: 62
End: 2022-05-31

## 2022-05-31 ENCOUNTER — ANESTHESIA EVENT (OUTPATIENT)
Dept: CARDIOLOGY | Facility: HOSPITAL | Age: 62
End: 2022-05-31

## 2022-05-31 DIAGNOSIS — I48.0 PAROXYSMAL ATRIAL FIBRILLATION: ICD-10-CM

## 2022-05-31 DIAGNOSIS — I48.0 PAROXYSMAL ATRIAL FIBRILLATION: Primary | ICD-10-CM

## 2022-05-31 LAB
ANION GAP SERPL CALCULATED.3IONS-SCNC: 12 MMOL/L (ref 5–15)
BUN SERPL-MCNC: 14 MG/DL (ref 8–23)
BUN/CREAT SERPL: 18.7 (ref 7–25)
CALCIUM SPEC-SCNC: 9.9 MG/DL (ref 8.6–10.5)
CHLORIDE SERPL-SCNC: 101 MMOL/L (ref 98–107)
CO2 SERPL-SCNC: 27 MMOL/L (ref 22–29)
CREAT SERPL-MCNC: 0.75 MG/DL (ref 0.57–1)
DEPRECATED RDW RBC AUTO: 46.5 FL (ref 37–54)
EGFRCR SERPLBLD CKD-EPI 2021: 91.3 ML/MIN/1.73
ERYTHROCYTE [DISTWIDTH] IN BLOOD BY AUTOMATED COUNT: 11.5 % (ref 12.3–15.4)
GLUCOSE SERPL-MCNC: 189 MG/DL (ref 65–99)
HCT VFR BLD AUTO: 46.2 % (ref 34–46.6)
HGB BLD-MCNC: 15.8 G/DL (ref 12–15.9)
INR PPP: 1.04 (ref 0.84–1.13)
MCH RBC QN AUTO: 37.4 PG (ref 26.6–33)
MCHC RBC AUTO-ENTMCNC: 34.2 G/DL (ref 31.5–35.7)
MCV RBC AUTO: 109.5 FL (ref 79–97)
PLATELET # BLD AUTO: 155 10*3/MM3 (ref 140–450)
PMV BLD AUTO: 10.3 FL (ref 6–12)
POTASSIUM SERPL-SCNC: 4.2 MMOL/L (ref 3.5–5.2)
PROTHROMBIN TIME: 13.5 SECONDS (ref 11.4–14.4)
RBC # BLD AUTO: 4.22 10*6/MM3 (ref 3.77–5.28)
SARS-COV-2 RNA PNL SPEC NAA+PROBE: NOT DETECTED
SODIUM SERPL-SCNC: 140 MMOL/L (ref 136–145)
WBC NRBC COR # BLD: 8.17 10*3/MM3 (ref 3.4–10.8)

## 2022-05-31 PROCEDURE — 85027 COMPLETE CBC AUTOMATED: CPT

## 2022-05-31 PROCEDURE — 85610 PROTHROMBIN TIME: CPT

## 2022-05-31 PROCEDURE — 0 IOPAMIDOL PER 1 ML: Performed by: STUDENT IN AN ORGANIZED HEALTH CARE EDUCATION/TRAINING PROGRAM

## 2022-05-31 PROCEDURE — 80048 BASIC METABOLIC PNL TOTAL CA: CPT

## 2022-05-31 PROCEDURE — 71275 CT ANGIOGRAPHY CHEST: CPT

## 2022-05-31 PROCEDURE — 83036 HEMOGLOBIN GLYCOSYLATED A1C: CPT | Performed by: STUDENT IN AN ORGANIZED HEALTH CARE EDUCATION/TRAINING PROGRAM

## 2022-05-31 PROCEDURE — 36415 COLL VENOUS BLD VENIPUNCTURE: CPT

## 2022-05-31 PROCEDURE — C9803 HOPD COVID-19 SPEC COLLECT: HCPCS

## 2022-05-31 PROCEDURE — U0004 COV-19 TEST NON-CDC HGH THRU: HCPCS

## 2022-05-31 RX ORDER — TIOTROPIUM BROMIDE AND OLODATEROL 3.124; 2.736 UG/1; UG/1
2 SPRAY, METERED RESPIRATORY (INHALATION)
COMMUNITY
End: 2023-03-29

## 2022-05-31 RX ADMIN — IOPAMIDOL 65 ML: 755 INJECTION, SOLUTION INTRAVENOUS at 17:01

## 2022-06-01 ENCOUNTER — HOSPITAL ENCOUNTER (OUTPATIENT)
Facility: HOSPITAL | Age: 62
Discharge: HOME | End: 2022-06-02
Attending: STUDENT IN AN ORGANIZED HEALTH CARE EDUCATION/TRAINING PROGRAM | Admitting: STUDENT IN AN ORGANIZED HEALTH CARE EDUCATION/TRAINING PROGRAM

## 2022-06-01 ENCOUNTER — APPOINTMENT (OUTPATIENT)
Dept: GENERAL RADIOLOGY | Facility: HOSPITAL | Age: 62
End: 2022-06-01

## 2022-06-01 ENCOUNTER — ANESTHESIA (OUTPATIENT)
Dept: CARDIOLOGY | Facility: HOSPITAL | Age: 62
End: 2022-06-01

## 2022-06-01 DIAGNOSIS — I48.0 PAROXYSMAL ATRIAL FIBRILLATION: ICD-10-CM

## 2022-06-01 PROBLEM — R94.31 PROLONGED Q-T INTERVAL ON ECG: Status: ACTIVE | Noted: 2022-06-01

## 2022-06-01 LAB
ACT BLD: 297 SECONDS (ref 82–152)
ACT BLD: 309 SECONDS (ref 82–152)
ACT BLD: 309 SECONDS (ref 82–152)
ACT BLD: 333 SECONDS (ref 82–152)
ACT BLD: 351 SECONDS (ref 82–152)
GLUCOSE BLDC GLUCOMTR-MCNC: 272 MG/DL (ref 70–130)
GLUCOSE BLDC GLUCOMTR-MCNC: 306 MG/DL (ref 70–130)
GLUCOSE BLDC GLUCOMTR-MCNC: 347 MG/DL (ref 70–130)
HBA1C MFR BLD: 10.4 % (ref 4.8–5.6)
QT INTERVAL: 412 MS
QTC INTERVAL: 495 MS

## 2022-06-01 PROCEDURE — C1759 CATH, INTRA ECHOCARDIOGRAPHY: HCPCS | Performed by: STUDENT IN AN ORGANIZED HEALTH CARE EDUCATION/TRAINING PROGRAM

## 2022-06-01 PROCEDURE — C1766 INTRO/SHEATH,STRBLE,NON-PEEL: HCPCS | Performed by: STUDENT IN AN ORGANIZED HEALTH CARE EDUCATION/TRAINING PROGRAM

## 2022-06-01 PROCEDURE — 25010000002 PHENYLEPHRINE 10 MG/ML SOLUTION 1 ML VIAL: Performed by: NURSE ANESTHETIST, CERTIFIED REGISTERED

## 2022-06-01 PROCEDURE — C1732 CATH, EP, DIAG/ABL, 3D/VECT: HCPCS | Performed by: STUDENT IN AN ORGANIZED HEALTH CARE EDUCATION/TRAINING PROGRAM

## 2022-06-01 PROCEDURE — 93622 COMP EP EVAL L VENTR PAC&REC: CPT | Performed by: STUDENT IN AN ORGANIZED HEALTH CARE EDUCATION/TRAINING PROGRAM

## 2022-06-01 PROCEDURE — 93005 ELECTROCARDIOGRAM TRACING: CPT | Performed by: STUDENT IN AN ORGANIZED HEALTH CARE EDUCATION/TRAINING PROGRAM

## 2022-06-01 PROCEDURE — 93655 ICAR CATH ABLTJ DSCRT ARRHYT: CPT | Performed by: STUDENT IN AN ORGANIZED HEALTH CARE EDUCATION/TRAINING PROGRAM

## 2022-06-01 PROCEDURE — 71046 X-RAY EXAM CHEST 2 VIEWS: CPT

## 2022-06-01 PROCEDURE — 82962 GLUCOSE BLOOD TEST: CPT

## 2022-06-01 PROCEDURE — 94640 AIRWAY INHALATION TREATMENT: CPT

## 2022-06-01 PROCEDURE — 93662 INTRACARDIAC ECG (ICE): CPT | Performed by: STUDENT IN AN ORGANIZED HEALTH CARE EDUCATION/TRAINING PROGRAM

## 2022-06-01 PROCEDURE — C1894 INTRO/SHEATH, NON-LASER: HCPCS | Performed by: STUDENT IN AN ORGANIZED HEALTH CARE EDUCATION/TRAINING PROGRAM

## 2022-06-01 PROCEDURE — 93005 ELECTROCARDIOGRAM TRACING: CPT | Performed by: INTERNAL MEDICINE

## 2022-06-01 PROCEDURE — 93656 COMPRE EP EVAL ABLTJ ATR FIB: CPT | Performed by: STUDENT IN AN ORGANIZED HEALTH CARE EDUCATION/TRAINING PROGRAM

## 2022-06-01 PROCEDURE — 94799 UNLISTED PULMONARY SVC/PX: CPT

## 2022-06-01 PROCEDURE — 94761 N-INVAS EAR/PLS OXIMETRY MLT: CPT

## 2022-06-01 PROCEDURE — 99214 OFFICE O/P EST MOD 30 MIN: CPT | Performed by: INTERNAL MEDICINE

## 2022-06-01 PROCEDURE — 25010000002 ONDANSETRON PER 1 MG: Performed by: NURSE ANESTHETIST, CERTIFIED REGISTERED

## 2022-06-01 PROCEDURE — 93623 PRGRMD STIMJ&PACG IV RX NFS: CPT | Performed by: STUDENT IN AN ORGANIZED HEALTH CARE EDUCATION/TRAINING PROGRAM

## 2022-06-01 PROCEDURE — C1730 CATH, EP, 19 OR FEW ELECT: HCPCS | Performed by: STUDENT IN AN ORGANIZED HEALTH CARE EDUCATION/TRAINING PROGRAM

## 2022-06-01 PROCEDURE — 85347 COAGULATION TIME ACTIVATED: CPT

## 2022-06-01 PROCEDURE — 93653 COMPRE EP EVAL TX SVT: CPT | Performed by: STUDENT IN AN ORGANIZED HEALTH CARE EDUCATION/TRAINING PROGRAM

## 2022-06-01 PROCEDURE — C1893 INTRO/SHEATH, FIXED,NON-PEEL: HCPCS | Performed by: STUDENT IN AN ORGANIZED HEALTH CARE EDUCATION/TRAINING PROGRAM

## 2022-06-01 PROCEDURE — 25010000002 PROPOFOL 10 MG/ML EMULSION: Performed by: NURSE ANESTHETIST, CERTIFIED REGISTERED

## 2022-06-01 PROCEDURE — 25010000002 FUROSEMIDE PER 20 MG: Performed by: STUDENT IN AN ORGANIZED HEALTH CARE EDUCATION/TRAINING PROGRAM

## 2022-06-01 PROCEDURE — 25010000002 DEXAMETHASONE PER 1 MG: Performed by: NURSE ANESTHETIST, CERTIFIED REGISTERED

## 2022-06-01 PROCEDURE — 63710000001 INSULIN LISPRO (HUMAN) PER 5 UNITS: Performed by: NURSE PRACTITIONER

## 2022-06-01 PROCEDURE — 25010000002 PROTAMINE SULFATE PER 10 MG: Performed by: STUDENT IN AN ORGANIZED HEALTH CARE EDUCATION/TRAINING PROGRAM

## 2022-06-01 PROCEDURE — 25010000002 HEPARIN (PORCINE) PER 1000 UNITS: Performed by: STUDENT IN AN ORGANIZED HEALTH CARE EDUCATION/TRAINING PROGRAM

## 2022-06-01 PROCEDURE — C1760 CLOSURE DEV, VASC: HCPCS | Performed by: STUDENT IN AN ORGANIZED HEALTH CARE EDUCATION/TRAINING PROGRAM

## 2022-06-01 RX ORDER — MAGNESIUM SULFATE HEPTAHYDRATE 40 MG/ML
2 INJECTION, SOLUTION INTRAVENOUS AS NEEDED
Status: DISCONTINUED | OUTPATIENT
Start: 2022-06-01 | End: 2022-06-02 | Stop reason: HOSPADM

## 2022-06-01 RX ORDER — PROMETHAZINE HYDROCHLORIDE 25 MG/1
25 TABLET ORAL ONCE AS NEEDED
Status: DISCONTINUED | OUTPATIENT
Start: 2022-06-01 | End: 2022-06-02

## 2022-06-01 RX ORDER — POTASSIUM CHLORIDE 7.45 MG/ML
10 INJECTION INTRAVENOUS
Status: DISCONTINUED | OUTPATIENT
Start: 2022-06-01 | End: 2022-06-02 | Stop reason: HOSPADM

## 2022-06-01 RX ORDER — SODIUM CHLORIDE 0.9 % (FLUSH) 0.9 %
10 SYRINGE (ML) INJECTION AS NEEDED
Status: DISCONTINUED | OUTPATIENT
Start: 2022-06-01 | End: 2022-06-02 | Stop reason: HOSPADM

## 2022-06-01 RX ORDER — POTASSIUM CHLORIDE 750 MG/1
40 CAPSULE, EXTENDED RELEASE ORAL AS NEEDED
Status: DISCONTINUED | OUTPATIENT
Start: 2022-06-01 | End: 2022-06-02 | Stop reason: HOSPADM

## 2022-06-01 RX ORDER — SODIUM CHLORIDE 0.9 % (FLUSH) 0.9 %
10 SYRINGE (ML) INJECTION EVERY 12 HOURS SCHEDULED
Status: DISCONTINUED | OUTPATIENT
Start: 2022-06-01 | End: 2022-06-02 | Stop reason: HOSPADM

## 2022-06-01 RX ORDER — HEPARIN SODIUM 10000 [USP'U]/100ML
INJECTION, SOLUTION INTRAVENOUS CONTINUOUS PRN
Status: COMPLETED | OUTPATIENT
Start: 2022-06-01 | End: 2022-06-01

## 2022-06-01 RX ORDER — PROTAMINE SULFATE 10 MG/ML
INJECTION, SOLUTION INTRAVENOUS AS NEEDED
Status: DISCONTINUED | OUTPATIENT
Start: 2022-06-01 | End: 2022-06-01 | Stop reason: HOSPADM

## 2022-06-01 RX ORDER — IPRATROPIUM BROMIDE AND ALBUTEROL SULFATE 2.5; .5 MG/3ML; MG/3ML
3 SOLUTION RESPIRATORY (INHALATION) EVERY 4 HOURS PRN
Status: DISCONTINUED | OUTPATIENT
Start: 2022-06-01 | End: 2022-06-02 | Stop reason: HOSPADM

## 2022-06-01 RX ORDER — NICOTINE POLACRILEX 4 MG
15 LOZENGE BUCCAL
Status: DISCONTINUED | OUTPATIENT
Start: 2022-06-01 | End: 2022-06-02 | Stop reason: HOSPADM

## 2022-06-01 RX ORDER — ACETAMINOPHEN 650 MG/1
650 SUPPOSITORY RECTAL EVERY 4 HOURS PRN
Status: DISCONTINUED | OUTPATIENT
Start: 2022-06-01 | End: 2022-06-02 | Stop reason: HOSPADM

## 2022-06-01 RX ORDER — LIDOCAINE HYDROCHLORIDE 10 MG/ML
INJECTION, SOLUTION EPIDURAL; INFILTRATION; INTRACAUDAL; PERINEURAL AS NEEDED
Status: DISCONTINUED | OUTPATIENT
Start: 2022-06-01 | End: 2022-06-01 | Stop reason: SURG

## 2022-06-01 RX ORDER — SODIUM CHLORIDE 0.9 % (FLUSH) 0.9 %
3-10 SYRINGE (ML) INJECTION AS NEEDED
Status: DISCONTINUED | OUTPATIENT
Start: 2022-06-01 | End: 2022-06-02 | Stop reason: HOSPADM

## 2022-06-01 RX ORDER — PROPOFOL 10 MG/ML
VIAL (ML) INTRAVENOUS AS NEEDED
Status: DISCONTINUED | OUTPATIENT
Start: 2022-06-01 | End: 2022-06-01 | Stop reason: SURG

## 2022-06-01 RX ORDER — HEPARIN SODIUM 1000 [USP'U]/ML
INJECTION, SOLUTION INTRAVENOUS; SUBCUTANEOUS AS NEEDED
Status: DISCONTINUED | OUTPATIENT
Start: 2022-06-01 | End: 2022-06-01 | Stop reason: HOSPADM

## 2022-06-01 RX ORDER — ALBUTEROL SULFATE 2.5 MG/3ML
2.5 SOLUTION RESPIRATORY (INHALATION)
Status: DISCONTINUED | OUTPATIENT
Start: 2022-06-01 | End: 2022-06-01

## 2022-06-01 RX ORDER — ROCURONIUM BROMIDE 10 MG/ML
INJECTION, SOLUTION INTRAVENOUS AS NEEDED
Status: DISCONTINUED | OUTPATIENT
Start: 2022-06-01 | End: 2022-06-01 | Stop reason: SURG

## 2022-06-01 RX ORDER — BUPIVACAINE HCL/0.9 % NACL/PF 0.125 %
PLASTIC BAG, INJECTION (ML) EPIDURAL AS NEEDED
Status: DISCONTINUED | OUTPATIENT
Start: 2022-06-01 | End: 2022-06-01 | Stop reason: SURG

## 2022-06-01 RX ORDER — IPRATROPIUM BROMIDE AND ALBUTEROL SULFATE 2.5; .5 MG/3ML; MG/3ML
3 SOLUTION RESPIRATORY (INHALATION)
Status: DISCONTINUED | OUTPATIENT
Start: 2022-06-01 | End: 2022-06-02 | Stop reason: HOSPADM

## 2022-06-01 RX ORDER — SODIUM CHLORIDE 9 MG/ML
INJECTION, SOLUTION INTRAVENOUS CONTINUOUS PRN
Status: DISCONTINUED | OUTPATIENT
Start: 2022-06-01 | End: 2022-06-01 | Stop reason: SURG

## 2022-06-01 RX ORDER — HYDROMORPHONE HYDROCHLORIDE 1 MG/ML
0.5 INJECTION, SOLUTION INTRAMUSCULAR; INTRAVENOUS; SUBCUTANEOUS
Status: DISCONTINUED | OUTPATIENT
Start: 2022-06-01 | End: 2022-06-02

## 2022-06-01 RX ORDER — DEXTROSE MONOHYDRATE 25 G/50ML
25 INJECTION, SOLUTION INTRAVENOUS
Status: DISCONTINUED | OUTPATIENT
Start: 2022-06-01 | End: 2022-06-02 | Stop reason: HOSPADM

## 2022-06-01 RX ORDER — HYDRALAZINE HYDROCHLORIDE 20 MG/ML
5 INJECTION INTRAMUSCULAR; INTRAVENOUS
Status: DISCONTINUED | OUTPATIENT
Start: 2022-06-01 | End: 2022-06-02

## 2022-06-01 RX ORDER — SODIUM CHLORIDE 0.9 % (FLUSH) 0.9 %
3 SYRINGE (ML) INJECTION EVERY 12 HOURS SCHEDULED
Status: DISCONTINUED | OUTPATIENT
Start: 2022-06-01 | End: 2022-06-02 | Stop reason: HOSPADM

## 2022-06-01 RX ORDER — FENTANYL CITRATE 50 UG/ML
50 INJECTION, SOLUTION INTRAMUSCULAR; INTRAVENOUS
Status: DISCONTINUED | OUTPATIENT
Start: 2022-06-01 | End: 2022-06-02

## 2022-06-01 RX ORDER — SODIUM CHLORIDE 0.9 % (FLUSH) 0.9 %
3 SYRINGE (ML) INJECTION EVERY 12 HOURS SCHEDULED
Status: DISCONTINUED | OUTPATIENT
Start: 2022-06-01 | End: 2022-06-01 | Stop reason: HOSPADM

## 2022-06-01 RX ORDER — MAGNESIUM SULFATE HEPTAHYDRATE 40 MG/ML
4 INJECTION, SOLUTION INTRAVENOUS AS NEEDED
Status: DISCONTINUED | OUTPATIENT
Start: 2022-06-01 | End: 2022-06-02 | Stop reason: HOSPADM

## 2022-06-01 RX ORDER — POTASSIUM CHLORIDE 1.5 G/1.77G
40 POWDER, FOR SOLUTION ORAL AS NEEDED
Status: DISCONTINUED | OUTPATIENT
Start: 2022-06-01 | End: 2022-06-02 | Stop reason: HOSPADM

## 2022-06-01 RX ORDER — FUROSEMIDE 10 MG/ML
40 INJECTION INTRAMUSCULAR; INTRAVENOUS ONCE
Status: COMPLETED | OUTPATIENT
Start: 2022-06-01 | End: 2022-06-01

## 2022-06-01 RX ORDER — ASPIRIN 81 MG/1
81 TABLET ORAL DAILY
Status: DISCONTINUED | OUTPATIENT
Start: 2022-06-02 | End: 2022-06-02 | Stop reason: HOSPADM

## 2022-06-01 RX ORDER — ACETAMINOPHEN 325 MG/1
650 TABLET ORAL EVERY 4 HOURS PRN
Status: DISCONTINUED | OUTPATIENT
Start: 2022-06-01 | End: 2022-06-01 | Stop reason: SDUPTHER

## 2022-06-01 RX ORDER — LABETALOL HYDROCHLORIDE 5 MG/ML
5 INJECTION, SOLUTION INTRAVENOUS
Status: DISCONTINUED | OUTPATIENT
Start: 2022-06-01 | End: 2022-06-02 | Stop reason: HOSPADM

## 2022-06-01 RX ORDER — ACETAMINOPHEN 325 MG/1
650 TABLET ORAL EVERY 4 HOURS PRN
Status: DISCONTINUED | OUTPATIENT
Start: 2022-06-01 | End: 2022-06-02 | Stop reason: HOSPADM

## 2022-06-01 RX ORDER — NICOTINE 21 MG/24HR
1 PATCH, TRANSDERMAL 24 HOURS TRANSDERMAL EVERY 24 HOURS
Status: DISCONTINUED | OUTPATIENT
Start: 2022-06-01 | End: 2022-06-02 | Stop reason: HOSPADM

## 2022-06-01 RX ORDER — ACETAMINOPHEN 325 MG/1
650 TABLET ORAL EVERY 4 HOURS PRN
Status: DISCONTINUED | OUTPATIENT
Start: 2022-06-01 | End: 2022-06-01 | Stop reason: HOSPADM

## 2022-06-01 RX ORDER — IPRATROPIUM BROMIDE AND ALBUTEROL SULFATE 2.5; .5 MG/3ML; MG/3ML
3 SOLUTION RESPIRATORY (INHALATION) ONCE AS NEEDED
Status: DISCONTINUED | OUTPATIENT
Start: 2022-06-01 | End: 2022-06-02 | Stop reason: HOSPADM

## 2022-06-01 RX ORDER — NALOXONE HCL 0.4 MG/ML
0.4 VIAL (ML) INJECTION AS NEEDED
Status: DISCONTINUED | OUTPATIENT
Start: 2022-06-01 | End: 2022-06-02 | Stop reason: HOSPADM

## 2022-06-01 RX ORDER — DIPHENOXYLATE HYDROCHLORIDE AND ATROPINE SULFATE 2.5; .025 MG/1; MG/1
1 TABLET ORAL DAILY
Status: DISCONTINUED | OUTPATIENT
Start: 2022-06-02 | End: 2022-06-02 | Stop reason: HOSPADM

## 2022-06-01 RX ORDER — ONDANSETRON 2 MG/ML
INJECTION INTRAMUSCULAR; INTRAVENOUS AS NEEDED
Status: DISCONTINUED | OUTPATIENT
Start: 2022-06-01 | End: 2022-06-01 | Stop reason: SURG

## 2022-06-01 RX ORDER — ACETAMINOPHEN 650 MG/1
650 SUPPOSITORY RECTAL EVERY 4 HOURS PRN
Status: DISCONTINUED | OUTPATIENT
Start: 2022-06-01 | End: 2022-06-01 | Stop reason: SDUPTHER

## 2022-06-01 RX ORDER — INSULIN LISPRO 100 [IU]/ML
0-7 INJECTION, SOLUTION INTRAVENOUS; SUBCUTANEOUS
Status: DISCONTINUED | OUTPATIENT
Start: 2022-06-01 | End: 2022-06-02 | Stop reason: HOSPADM

## 2022-06-01 RX ORDER — PRAVASTATIN SODIUM 20 MG
20 TABLET ORAL NIGHTLY
Status: DISCONTINUED | OUTPATIENT
Start: 2022-06-01 | End: 2022-06-02 | Stop reason: HOSPADM

## 2022-06-01 RX ORDER — ACETAMINOPHEN 160 MG/5ML
650 SOLUTION ORAL EVERY 4 HOURS PRN
Status: DISCONTINUED | OUTPATIENT
Start: 2022-06-01 | End: 2022-06-02 | Stop reason: HOSPADM

## 2022-06-01 RX ORDER — IPRATROPIUM BROMIDE AND ALBUTEROL SULFATE 2.5; .5 MG/3ML; MG/3ML
3 SOLUTION RESPIRATORY (INHALATION) ONCE
Status: COMPLETED | OUTPATIENT
Start: 2022-06-01 | End: 2022-06-01

## 2022-06-01 RX ORDER — IBUPROFEN 200 MG
400 TABLET ORAL EVERY 6 HOURS PRN
Status: DISCONTINUED | OUTPATIENT
Start: 2022-06-01 | End: 2022-06-01

## 2022-06-01 RX ORDER — PROMETHAZINE HYDROCHLORIDE 25 MG/1
25 SUPPOSITORY RECTAL ONCE AS NEEDED
Status: DISCONTINUED | OUTPATIENT
Start: 2022-06-01 | End: 2022-06-02

## 2022-06-01 RX ORDER — SODIUM CHLORIDE 0.9 % (FLUSH) 0.9 %
10 SYRINGE (ML) INJECTION AS NEEDED
Status: DISCONTINUED | OUTPATIENT
Start: 2022-06-01 | End: 2022-06-01 | Stop reason: HOSPADM

## 2022-06-01 RX ORDER — FUROSEMIDE 40 MG/1
40 TABLET ORAL DAILY
Status: DISCONTINUED | OUTPATIENT
Start: 2022-06-02 | End: 2022-06-02 | Stop reason: HOSPADM

## 2022-06-01 RX ORDER — NITROGLYCERIN 0.4 MG/1
0.4 TABLET SUBLINGUAL
Status: DISCONTINUED | OUTPATIENT
Start: 2022-06-01 | End: 2022-06-01 | Stop reason: HOSPADM

## 2022-06-01 RX ORDER — DEXAMETHASONE SODIUM PHOSPHATE 4 MG/ML
INJECTION, SOLUTION INTRA-ARTICULAR; INTRALESIONAL; INTRAMUSCULAR; INTRAVENOUS; SOFT TISSUE AS NEEDED
Status: DISCONTINUED | OUTPATIENT
Start: 2022-06-01 | End: 2022-06-01 | Stop reason: SURG

## 2022-06-01 RX ORDER — ONDANSETRON 2 MG/ML
4 INJECTION INTRAMUSCULAR; INTRAVENOUS ONCE AS NEEDED
Status: DISCONTINUED | OUTPATIENT
Start: 2022-06-01 | End: 2022-06-02

## 2022-06-01 RX ADMIN — LIDOCAINE HYDROCHLORIDE 90 MG: 10 INJECTION, SOLUTION EPIDURAL; INFILTRATION; INTRACAUDAL; PERINEURAL at 08:14

## 2022-06-01 RX ADMIN — FUROSEMIDE 40 MG: 10 INJECTION, SOLUTION INTRAMUSCULAR; INTRAVENOUS at 17:28

## 2022-06-01 RX ADMIN — PROPOFOL 120 MG: 10 INJECTION, EMULSION INTRAVENOUS at 08:14

## 2022-06-01 RX ADMIN — SODIUM CHLORIDE: 9 INJECTION, SOLUTION INTRAVENOUS at 08:06

## 2022-06-01 RX ADMIN — Medication 100 MCG: at 08:16

## 2022-06-01 RX ADMIN — Medication 10 ML: at 20:18

## 2022-06-01 RX ADMIN — PRAVASTATIN SODIUM 20 MG: 20 TABLET ORAL at 20:14

## 2022-06-01 RX ADMIN — Medication 1 PATCH: at 20:14

## 2022-06-01 RX ADMIN — ACETAMINOPHEN 650 MG: 325 TABLET ORAL at 20:13

## 2022-06-01 RX ADMIN — INSULIN LISPRO 6 UNITS: 100 INJECTION, SOLUTION INTRAVENOUS; SUBCUTANEOUS at 17:35

## 2022-06-01 RX ADMIN — APIXABAN 5 MG: 5 TABLET, FILM COATED ORAL at 20:14

## 2022-06-01 RX ADMIN — ROCURONIUM BROMIDE 40 MG: 10 INJECTION INTRAVENOUS at 09:40

## 2022-06-01 RX ADMIN — SUGAMMADEX 200 MG: 100 INJECTION, SOLUTION INTRAVENOUS at 12:31

## 2022-06-01 RX ADMIN — PHENYLEPHRINE HYDROCHLORIDE 0.5 MCG/KG/MIN: 10 INJECTION INTRAVENOUS at 08:17

## 2022-06-01 RX ADMIN — IPRATROPIUM BROMIDE AND ALBUTEROL SULFATE 3 ML: .5; 2.5 SOLUTION RESPIRATORY (INHALATION) at 15:31

## 2022-06-01 RX ADMIN — DEXAMETHASONE SODIUM PHOSPHATE 8 MG: 4 INJECTION INTRA-ARTICULAR; INTRALESIONAL; INTRAMUSCULAR; INTRAVENOUS; SOFT TISSUE at 08:19

## 2022-06-01 RX ADMIN — ACETAMINOPHEN 650 MG: 325 TABLET ORAL at 15:47

## 2022-06-01 RX ADMIN — ROCURONIUM BROMIDE 50 MG: 10 INJECTION INTRAVENOUS at 08:14

## 2022-06-01 RX ADMIN — ONDANSETRON 4 MG: 2 INJECTION INTRAMUSCULAR; INTRAVENOUS at 12:23

## 2022-06-01 NOTE — CONSULTS
[facility] Medicine Services  CONSULT NOTE      Patient Name: Jill Miller  : 1960  MRN: [id number]    Primary Care Physician: Marisa Lynch PA  Provider requesting consultation: Nigel Huber MD    Subjective   Subjective     Reason for Consultation:  Acute hypoxic respiratory failure    HPI:  Jill Miller is a 62 y.o. female with a PMH significant for atrial fibrillation s/p ECV (2022), s/p left atrial appendage ligation, diastolic CHF, COPD, tobacco abuse, pulmonary hypertension, uncontrolled diabetes mellitus, valvular heart disease who presented today for a pulmonary vein ablation with Dr. Huber for atrial fibrillation/flutter found to have acute hypoxic respiratory failure postprocedure.  Postprocedure, patient was found to have significant hypoxia.  Nursing staff reported O2 sats in the low 60s with ambulation.  Patient says that she has had worsening dyspnea on exertion recently which she correlates with a rapid heart rate.  She denies orthopnea, lower extremity edema, cough, fever, recent illness.      Review of Systems    All other systems reviewed and are negative.     Personal History     Past Medical History:   Diagnosis Date   • Atrial fibrillation and flutter (HCC)    • Chronic diastolic congestive heart failure (HCC)    • Chronically Abnormal CXR (Left pleural disease post op) 2017   • COPD exacerbation (HCC) 2022   • Essential hypertension 04/10/2017    Target blood pressure <130/80 mmHg   • Graves disease    • Hyperlipidemia LDL goal <70    • Hypertension    • Hyperthyroidism    • Lung cancer (HCC)    • MRSA (methicillin resistant staph aureus) culture positive     under left breast, incision and debridement, treated with wound packing and po abx    • Prolonged QTC interval on ECG 2022   • Rheumatic mitral stenosis    • Type 2 diabetes mellitus (HCC) 2018   • Valvular heart disease        Past Surgical History:    Procedure Laterality Date   • ABLATION OF DYSRHYTHMIC FOCUS  07/16/2018   • ABSCESS DRAINAGE      under left breast d/t mrsa    • AORTIC VALVE REPAIR/REPLACEMENT N/A 07/16/2018    Procedure: MEDIAN STERNOTOMY, AORTIC VALVE REPLACEMENT WITH TIFFANIE AND MAZE, TRICUSPID RING, LEFT ATRIAL OCCLUSION;  Surgeon: Ajay Cheatham MD;  Location:  [facility];  Service: Cardiothoracic   • BRONCHOSCOPY Left 05/12/2017    Procedure: BRONCHOSCOPY;  Surgeon: Ajay Cheatham MD;  Location:  [facility];  Service:    • BRONCHOSCOPY N/A 05/18/2017    Procedure: BRONCHOSCOPY BIOPSY AT BEDSIDE;  Surgeon: Ajay Cheatham MD;  Location:  [facility];  Service:    • CARDIAC CATHETERIZATION N/A 09/28/2017    Procedure: Left Heart Cath;  Surgeon: Marco Lay MD;  Location:  [facility];  Service:    • CARDIAC CATHETERIZATION N/A 09/28/2017    Procedure: Right Heart Cath;  Surgeon: Marco Lay MD;  Location:  [facility];  Service:    • LUNG BIOPSY  04/2017   • LYMPH NODE DISSECTION Left 05/12/2017    Procedure: MEDIASTINAL LYMPH NODE DISSECTION;  Surgeon: Ajay Cheatham MD;  Location:  [facility];  Service:    • MAZE PROCEDURE     • MITRAL VALVE REPAIR/REPLACEMENT N/A 07/16/2018    Procedure: MITRAL VALVE REPLACEMENT;  Surgeon: Ajay Cheatham MD;  Location:  [facility];  Service: Cardiothoracic   • MITRAL VALVE REPLACEMENT  07/16/2018   • TEETH EXTRACTION     • THORACOSCOPY VIDEO ASSISTED WITH LOBECTOMY Left 05/12/2017    Procedure: THORACOSCOPY VIDEO ASSISTED WITH OPEN LOBECTOMY;  Surgeon: Ajay Cheatham MD;  Location:  [facility];  Service:    • TOTAL THYROIDECTOMY  approx 2012   • TRICUSPID VALVE SURGERY  07/16/2018       Family History:  family history includes Breast cancer in her mother; Diabetes in her father; Heart disease in her son. Otherwise pertinent FHx was reviewed and unremarkable.     Social History:  reports that she has been smoking cigarettes. She started smoking about 44  years ago. She has a 20.00 pack-year smoking history. She has never used smokeless tobacco. She reports that she does not drink alcohol and does not use drugs.    Medications:  Albuterol Sulfate, aspirin, furosemide, levothyroxine, metFORMIN, metoprolol tartrate, multivitamin, potassium chloride, pravastatin, propafenone, tiotropium bromide-olodaterol, varenicline, and vitamin D3    Scheduled Meds:apixaban, 5 mg, Oral, Q12H  [START ON 6/2/2022] aspirin, 81 mg, Oral, Daily  [START ON 6/2/2022] furosemide, 40 mg, Oral, Daily  insulin lispro, 0-7 Units, Subcutaneous, TID AC  ipratropium-albuterol, 3 mL, Nebulization, Q6H While Awake - RT  [START ON 6/2/2022] levothyroxine, 175 mcg, Oral, Daily  metoprolol tartrate, 25 mg, Oral, Q12H  [START ON 6/2/2022] multivitamin, 1 tablet, Oral, Daily  nicotine, 1 patch, Transdermal, Q24H  pravastatin, 20 mg, Oral, Nightly  sodium chloride, 10 mL, Intravenous, Q12H  sodium chloride, 3 mL, Intravenous, Q12H  sodium chloride, 3 mL, Intravenous, Q12H      Continuous Infusions:   PRN Meds:.•  acetaminophen **OR** acetaminophen **OR** acetaminophen  •  dextrose  •  dextrose  •  fentanyl  •  glucagon (human recombinant)  •  hydrALAZINE  •  HYDROmorphone  •  ipratropium-albuterol  •  labetalol  •  lactated ringers  •  magnesium sulfate **OR** magnesium sulfate **OR** magnesium sulfate  •  naloxone  •  ondansetron  •  potassium chloride **OR** potassium chloride **OR** potassium chloride  •  promethazine **OR** promethazine  •  sodium chloride  •  sodium chloride  •  sodium chloride    Allergies   Allergen Reactions   • Lortab [Hydrocodone-Acetaminophen] Nausea And Vomiting and Dizziness   • Morphine And Related Nausea Only       Objective   Objective     Vital Signs:   Temp:  [96.8 °F (36 °C)] 96.8 °F (36 °C)  Heart Rate:  [] 93  Resp:  [16] 16  BP: ()/(57-95) 100/66  Flow (L/min):  [2-4] 2    Physical Exam  Constitutional: Awake, alert  Eyes: PERRLA, sclerae anicteric, no  conjunctival injection  HENT: NCAT, mucous membranes moist  Neck: Supple, no thyromegaly, no lymphadenopathy, trachea midline  Respiratory: Poor air movement but no wheezes, rales or rhonchi appreciated  Cardiovascular: RRR, no murmurs, rubs, or gallops, palpable pedal pulses bilaterally  Gastrointestinal: Positive bowel sounds, soft, nontender, nondistended  Musculoskeletal: Trace bilateral ankle edema, no clubbing or cyanosis to extremities  Psychiatric: Appropriate affect, cooperative  Neurologic: Oriented x 3, strength symmetric in all extremities, Cranial Nerves grossly intact to confrontation, speech clear  Skin: No rashes        Result Review:  I have personally reviewed the results from the time of admission and agree with these findings:  [x]  Laboratory  [x]  Radiology  [x]  EKG/Telemetry   []  Pathology  [x]  Old records  []  Other:      LAB RESULTS:      Lab 05/31/22  1554   WBC 8.17   HEMOGLOBIN 15.8   HEMATOCRIT 46.2   PLATELETS 155   .5*   PROTIME 13.5         Lab 05/31/22  1554   SODIUM 140   POTASSIUM 4.2   CHLORIDE 101   CO2 27.0   ANION GAP 12.0   BUN 14   CREATININE 0.75   EGFR 91.3   GLUCOSE 189*   CALCIUM 9.9   HEMOGLOBIN A1C 10.40*             Lab 05/31/22  1554   PROTIME 13.5   INR 1.04                 Brief Urine Lab Results  (Last result in the past 365 days)      Color   Clarity   Blood   Leuk Est   Nitrite   Protein   CREAT   Urine HCG        04/18/22 0607 Yellow   Cloudy   Trace   Negative   Negative   30 mg/dL (1+)               Microbiology Results (last 10 days)     Procedure Component Value - Date/Time    COVID PRE-OP / PRE-PROCEDURE SCREENING ORDER (NO ISOLATION) - Swab, Nasopharynx [[id number]]  (Normal) Collected: 05/31/22 1554    Lab Status: Final result Specimen: Swab from Nasopharynx Updated: 05/31/22 2033    Narrative:      The following orders were created for panel order COVID PRE-OP / PRE-PROCEDURE SCREENING ORDER (NO ISOLATION) - Swab, Nasopharynx.  Procedure                                Abnormality         Status                     ---------                               -----------         ------                     COVID-19, APTIMA PANTHER...[[id number]]  Normal              Final result                 Please view results for these tests on the individual orders.    COVID-19, APTIMA PANTHER [facility] IN-HOUSE NP/OP SWAB IN UTM/VTM/SALINE TRANSPORT MEDIA 24HR TAT - Swab, Nasopharynx [[id number]]  (Normal) Collected: 05/31/22 1554    Lab Status: Final result Specimen: Swab from Nasopharynx Updated: 05/31/22 2033     COVID19 Not Detected    Narrative:      Fact sheet for providers: https://www.fda.gov/media/952486/download     Fact sheet for patients: https://www.fda.gov/media/087494/download    Test performed by RT PCR.          CT Angiogram Chest With & Without Contrast    Result Date: 5/31/2022  DATE OF EXAM: 5/31/2022 16:59 EDT PROCEDURE: CT ANGIOGRAM CHEST W WO CONTRAST INDICATIONS: AF, PVA COMPARISON: No Comparisons Available TECHNIQUE: Contiguous axial imaging was obtained from the thoracic inlet through the upper abdomen following the administration of 99 mL intravenous contrast. Reconstructed coronal and sagittal images were also obtained. In addition, a 3 D volume rendered image was  obtained after post processing. Automated exposure control and iterative reconstruction methods were used.  FINDINGS: Nonaneurysmal minimally atherosclerotic thoracic aorta. Prominent pulmonary arteries, main pulmonary artery diameter 3.7 cm. A conjoined left pulmonary vein is present draining both the upper and lower lobes. The left atrial appendage is well opacified, although moderately diminutive. The esophagus traverses midline posterior to the left atrium. Prior mitral and aortic valve prosthesis noted. The imaged lung fields are grossly clear.     Impression: Conjoined, single left pulmonary vein. No additional anomalous pulmonary venous return. Mildly atherosclerotic nonaneurysmal  thoracic aorta. Marked prominence of the pulmonary arteries suggesting pulmonary arterial hypertension. The esophagus traverses midline posterior to the left atrium. Left atrial appendage diminutive or partially thrombosed. Electronically Signed: Keith Pino 5/31/2022 18:39 EDT    EP/CRM Study    Result Date: 6/1/2022  DATE OF PROCEDURE: 06/01/22 /ELECTROPHYSIOLOGIST: Nigel Huber MD PROCEDURE(S) PERFORMED: 1. Diagnostic electrophysiology study with redo pulmonary vein isolation 2. Transseptal catheterization to the left atrium x2. 3. Left ventricular pacing and recording 4.  Induction of arrhythmia on drug therapy 5.  Ablation of the cavotricuspid isthmus for CTI dependent atrial flutter 6.  Anterior mitral line for mitral isthmus dependent atrial flutter 7.  Ablation at the left atrial septum for a septal atrial flutter. INDICATIONS FOR PROCDEDURE:  Patient is a pleasant 62-year-old female with a history of aortic and mitral valve replacement, as well as tricuspid valve repair.  She underwent biatrial maze during this procedure.  She has more recently had recurrences of an atypical atrial flutter and presents now for elective catheter ablation. MEDICATION(S) GIVEN TO PATIENT DURING THIS PROCEDURE: 1. General anesthesia. 2. Heparin. 3. Isoproterenol. 4. Protamine. DESCRIPTION(S) OF THE PROCEDURE: The patient was brought to the cardiovascular electrophysiology laboratory in a non-sedated state. The risks and benefits of general anesthesia, diagnostic electrophysiology study, and possible radiofrequency ablation were explained to the patient and written, informed consent was obtained. The patient was prepped and draped in the usual sterile manner. Access through the right femoral vein, and left femoral vein was achieved x4 using the modified Seldinger technique and the following sheaths and catheters were inserted: 1. A 6-Andorran, Cordis Gonzalez catheter was advanced via a 7-Andorran sheath in the left  femoral vein to the coronary sinus for left atrial recording and pacing. 2. An 8-Jordanian, intracardiac ultrasound catheter was advanced via a 9-Jordanian in the left femoral vein to the high right atrium. 3. A 8-Jordanian 3.5 mm, THERMOCOOL ablation catheter was advanced via Vizigo sheath to the left atrium for mapping and ablation. 4. A 7-Jordanian, Pentaray catheter was advanced via a long, intravascular, 8.5-Jordanian sheath in the left atrium for mapping. A diagnostic electrophysiology study was then performed. The patient arrived in the clinical electrophysiology laboratory in atrial flutter with a heart rate of 120 beats per minute. The QRS width was measured at 81 milliseconds, and the QT interval was measured at 335 milliseconds. The HV interval was measured at 41 milliseconds.  Intracardiac ultrasound demonstrated no left atrial appendage thrombus or significant left atrial smoke.  The initial atrial flutter Baseline had a cycle length of around 256 ms.  This had a proximal to distal activation sequence in the coronary sinus electrodes.  Right atrial activation mapping was performed which seem to show a circuit compatible with counterclockwise dependent CTI flutter.  Ablation was performed connecting the tricuspid valve annulus to the IVC.  This was somewhat difficult due to the prior tricuspid valve repair.  On completion of this line tachycardia was noted to slightly change atrial flutter #2. Atrial flutter to have a cycle length around 260 to 270 ms.  Is also had a proximal to distal activation sequence the coronary sinus electrodes.  Right atrial activation mapping seem to show a septal breakthrough from the left atrium.  Transseptal catheterization was then performed with a [employer] Versa[employer] wire.  Left atrial activation mapping showed a counterclockwise dependent mitral isthmus flutter.  There was significant slow conduction on the anterior side of the mitral valve.  There was also seem to be a second possible  circuit around an area of scar anterior to the atrial appendage.  Because of this decision was made to interrupt the circuit via the anterior side.  A line was created Into the scar in the left atrial roof to the mitral annulus.  During this ablation tachycardia terminated to sinus rhythm.  Additional ablation was required to achieve bidirectional block across this line. No additional arrhythmia was able to be induced at baseline conditions.  Full left atrial activation mapping showed a small area of possible root conduction near the right gerald and this was addressed with additional ablation to limit these potentials. Isoproterenol at 10 mcg/minute was then started in an attempt to induce other atrial arrhythmias.  The patient's resting heart rate increased to 75 beats per minute. The A-V block point off isoproterenol was measured at 310 milliseconds. Left ventricular pacing and recording were performed by placing catheters safely and carefully across the mitral valve annulus to the left ventricle from the left atrium.  Adequate sensing and pacing thresholds were obtained from the left ventricle.  AV conduction ( antegrade ) as well as VA conduction ( retrograde ) were studied.   Findings were conclusive for no accessory pathway and VA block at 600. A third atrial flutter was able to be induced on isoproterenol.  This had a cycle length of around 200 ms.  This also had proximal to distal activation the coronary sinus electrodes.  Left and right atrial activation mapping was performed.  This was difficult to interpret as the atrial conduction time seem to be longer than the tachycardia cycle length.  There are multiple areas of slow conduction.  The most suspicious of these was an area on the left atrial septum with very slow conduction and significant fractionation.  Ablation was performed in this area with resulting termination of tachycardia.  No further arrhythmia was inducible after this.  The prior CTI line  was again checked and bidirectional block was noted. The sheaths and catheters were then removed, and hemostasis was achieved with Vascade devices. The patient tolerated the procedure without difficulty and was transferred to their room in a stable condition. COMPLICATIONS: None. FINAL IMPRESSIONS:  1. Successful repeat pulmonary vein isolation. 2.  Successful ablation of CTI dependent atrial flutter 3.  Assess for ablation mitral isthmus dependent atrial flutter 4.  Successful ablation of a left atrial septal tachycardia. RECOMMENDATION(S): 1. The patient will be monitored on telemetry. 2. Bedrest 2 hours       3. If stable, the patient will be discharged to home thereafter Nigel Huber MD 06/01/22 12:36 EDT    XR Chest PA & Lateral    Result Date: 6/1/2022  DATE OF EXAM: 6/1/2022 6:38 PM  PROCEDURE: XR CHEST PA AND LATERAL-  INDICATIONS: Hypoxia post ablation; I48.0-Paroxysmal atrial fibrillation; I48.0-Paroxysmal atrial fibrillation  COMPARISON: 04/17/2022  TECHNIQUE: Two radiologic views of the chest, PA and lateral, were obtained.  FINDINGS: Status post multiple valve repairs and left atrial appendage exclusion. Heart size borderline. Prominent central pulmonary vessels. Slightly indistinct peripheral pulmonary vessels with mild increased interstitial markings in the peripheral lower right lung and minimal blunting of the costophrenic angles      Impression: Pulmonary vascular congestion with mild interstitial edema and trace pleural effusions  This report was finalized on 6/1/2022 7:38 PM by Stevo Hernandez.        Results for orders placed during the hospital encounter of 04/17/22    Adult Transthoracic Echo Complete w/ Color, Spectral and Contrast if necessary per protocol    Interpretation Summary  · Left ventricular systolic function is normal. Estimated left ventricular EF = 50%.  · Bioprosthetic aortic valve present and functions normally  · Bioprosthetic mitral valve present with mild mitral  regurgitation present.  · Mild to moderate tricuspid regurgitation is present.      Assessment & Plan   Assessment & Plan     Active Hospital Problems    Diagnosis  POA   • **Paroxysmal atrial fibrillation/flutter (HCC) [I48.0]  Yes   • Prolonged QTC interval on ECG [R94.31]  Unknown   • COPD (chronic obstructive pulmonary disease) (HCC) [J44.9]  Yes   • Type 2 diabetes mellitus (HCC) [E11.9]  Yes   • Acute respiratory failure with hypoxia (HCC) [J96.01]  Unknown   • Acute diastolic congestive heart failure (HCC) [I50.31]  Yes   • Hyperlipidemia LDL goal <70 [E78.5]  Yes   • Lung cancer; S/P Bronch/Thor w/ ALBINA lobectomy/mediastinal lymph node resec (5/12/17, Dr. Cheatham) [C34.90]  Yes   • Hypertension [I10]  Yes   • Acquired hypothyroidism [E03.9]  Yes   • Cigarette nicotine dependence without complication [F17.210]  Yes      Resolved Hospital Problems   No resolved problems to display.   Jill Miller is a 62 y.o. female with a PMH significant for atrial fibrillation s/p ECV (4/28/2022), s/p left atrial appendage ligation, diastolic CHF, COPD, tobacco abuse, pulmonary hypertension, uncontrolled diabetes mellitus, valvular heart disease who presented today for a pulmonary vein ablation with Dr. Huber for atrial fibrillation/flutter found to have acute hypoxic respiratory failure postprocedure.    Acute hypoxic respiratory failure due to acute diastolic CHF  COPD  - Satting in the low 60s with ambulation  - Chest x-ray shows volume overload  - CTA on 5/31/2022 negative for PE or acute process  - No reports of aspiration during procedure, general anesthesia with intubation  - No reports of cough, fever.  - CBC, CMP, lactic acid, Pro-Myles pending  - Given Lasix 40 mg IV x1  -resume home oral Lasix for a.m., defer to day team for continued IV diuresis  - Strict I's and O's  - Daily weights  - TTE for a.m.   -Pulmonary hypertension noted on prior echo  - Scheduled and as needed duo nebs  -Incentive  spirometry  -monitor for need for antibiotics    Prolonged QTC  - Repeat EKG  -  on 5/31/2022  - Check magnesium      Thank you for allowing [facility] Medicine Service to provide consultative care for your patient, we will continue to follow while clinically appropriate.    Patricia Hickman,   06/01/22

## 2022-06-01 NOTE — ANESTHESIA PROCEDURE NOTES
Airway  Urgency: elective    Date/Time: 6/1/2022 8:16 AM  Airway not difficult    General Information and Staff    Patient location during procedure: OR  CRNA/CAA: Sera Wiseman CRNA    Indications and Patient Condition  Indications for airway management: airway protection    Preoxygenated: yes  MILS maintained throughout  Mask difficulty assessment: 2 - vent by mask + OA or adjuvant +/- NMBA    Final Airway Details  Final airway type: endotracheal airway      Successful airway: ETT  Cuffed: yes   Successful intubation technique: direct laryngoscopy  Facilitating devices/methods: intubating stylet  Endotracheal tube insertion site: oral  Blade: Filomena  Blade size: 4  ETT size (mm): 7.0  Cormack-Lehane Classification: grade I - full view of glottis  Placement verified by: chest auscultation and capnometry   Measured from: lips  ETT/EBT  to lips (cm): 21  Number of attempts at approach: 1  Assessment: lips, teeth, and gum same as pre-op and atraumatic intubation

## 2022-06-01 NOTE — INTERVAL H&P NOTE
H&P updated. The patient was examined and the following changes are noted:      Ms. Miller presents today for a pulmonary vein ablation.  She was last seen in the office on 5/10/2022.  Since that time she denies any hospitalizations, ED visits, open sores, wounds, infections, medication changes, strokelike symptoms.  Has a history of left atrial appendage ligation that has been confirmed by TIFFANIE.  She takes a 81 mg aspirin daily and last dose was this morning.  She was previously maintained on propafenone 300 mg twice daily, last dose 5/26/2022 evening dose.  This morning on telemetry rates are between 125 and 130 bpm, she reports that after her first missed dose of propafenone her heart rate jumped up and has been maintaining this for several days.  She also reports increased fatigue and shortness of breath with this.  Murmur is present.  No lower extremity edema noted.  She is alert, oriented, and ready to proceed with the procedure today.    Electronically signed by GUCCI Lowry, 06/01/22, 7:32 AM EDT.         [employer]

## 2022-06-01 NOTE — ANESTHESIA PREPROCEDURE EVALUATION
Anesthesia Evaluation     Patient summary reviewed and Nursing notes reviewed                Airway   Mallampati: II  TM distance: >3 FB  Neck ROM: full  No difficulty expected  Dental - normal exam     Pulmonary - normal exam   (+) a smoker Current, lung cancer, COPD,   Cardiovascular - normal exam    (+) hypertension, valvular problems/murmurs (s/p AVR/MVR 2018), dysrhythmias Paroxysmal Atrial Fib, hyperlipidemia,     ROS comment:   Echo 4/22: EF 50%, bioprosthetic AV (normal) and MV (mild MR), mild-mod TR (RVSP >55)    Neuro/Psych- negative ROS  GI/Hepatic/Renal/Endo    (+) obesity,   diabetes mellitus, thyroid problem hypothyroidism    Musculoskeletal (-) negative ROS    Abdominal  - normal exam    Bowel sounds: normal.   Substance History - negative use     OB/GYN negative ob/gyn ROS         Other                      Anesthesia Plan    ASA 4     general   ([place])  intravenous induction     Anesthetic plan, all risks, benefits, and alternatives have been provided, discussed and informed consent has been obtained with: patient.    Plan discussed with CRNA.        CODE STATUS:

## 2022-06-01 NOTE — ANESTHESIA POSTPROCEDURE EVALUATION
Patient: Jill Miller    Procedure Summary     Date: 06/01/22 Room / Location: [facility]    Anesthesia Start: 0806 Anesthesia Stop:     Procedure: Ablation atrial fibrillation (atypical A flutter). Hold Rythmol x5 days (N/A ) Diagnosis:       Paroxysmal atrial fibrillation (HCC)      (afib, alutter)    Providers: Nigel Huber MD Provider: Sterling Rosales MD    Anesthesia Type: general ASA Status: 4          Anesthesia Type: general    Vitals  Vitals Value Taken Time   /70 06/01/22 1500   Temp     Pulse 90 06/01/22 1513   Resp 16 06/01/22 1329   SpO2 91 % 06/01/22 1513   Vitals shown include unvalidated device data.        Post Anesthesia Care and Evaluation    Patient location during evaluation: PACU  Patient participation: complete - patient participated  Level of consciousness: awake and alert  Pain score: 0  Pain management: adequate  Airway patency: patent  Anesthetic complications: No anesthetic complications  PONV Status: none  Cardiovascular status: hemodynamically stable and acceptable  Respiratory status: nonlabored ventilation, acceptable and nasal cannula  Hydration status: acceptable

## 2022-06-02 ENCOUNTER — TELEPHONE (OUTPATIENT)
Dept: CARDIOLOGY | Facility: CLINIC | Age: 62
End: 2022-06-02

## 2022-06-02 ENCOUNTER — APPOINTMENT (OUTPATIENT)
Dept: CARDIOLOGY | Facility: HOSPITAL | Age: 62
End: 2022-06-02

## 2022-06-02 VITALS
BODY MASS INDEX: 34.78 KG/M2 | DIASTOLIC BLOOD PRESSURE: 58 MMHG | RESPIRATION RATE: 17 BRPM | SYSTOLIC BLOOD PRESSURE: 100 MMHG | HEART RATE: 70 BPM | OXYGEN SATURATION: 91 % | WEIGHT: 189 LBS | HEIGHT: 62 IN | TEMPERATURE: 97.8 F

## 2022-06-02 LAB
BH CV ECHO MEAS - AO MAX PG: 31.2 MMHG
BH CV ECHO MEAS - AO MEAN PG: 18.8 MMHG
BH CV ECHO MEAS - AO ROOT DIAM: 2.7 CM
BH CV ECHO MEAS - AO V2 MAX: 279.1 CM/SEC
BH CV ECHO MEAS - AO V2 VTI: 56.8 CM
BH CV ECHO MEAS - AVA(I,D): 1.57 CM2
BH CV ECHO MEAS - EDV(CUBED): 125 ML
BH CV ECHO MEAS - EDV(MOD-SP2): 78.6 ML
BH CV ECHO MEAS - EDV(MOD-SP4): 69.6 ML
BH CV ECHO MEAS - EF(MOD-BP): 45.6 %
BH CV ECHO MEAS - EF(MOD-SP2): 44.3 %
BH CV ECHO MEAS - EF(MOD-SP4): 46.1 %
BH CV ECHO MEAS - ESV(CUBED): 52.9 ML
BH CV ECHO MEAS - ESV(MOD-SP2): 43.8 ML
BH CV ECHO MEAS - ESV(MOD-SP4): 37.5 ML
BH CV ECHO MEAS - FS: 24.9 %
BH CV ECHO MEAS - IVS/LVPW: 1.04 CM
BH CV ECHO MEAS - IVSD: 0.95 CM
BH CV ECHO MEAS - LA DIMENSION: 6.1 CM
BH CV ECHO MEAS - LAT PEAK E' VEL: 5.1 CM/SEC
BH CV ECHO MEAS - LV MASS(C)D: 165.6 GRAMS
BH CV ECHO MEAS - LV MAX PG: 7.5 MMHG
BH CV ECHO MEAS - LV MEAN PG: 3.7 MMHG
BH CV ECHO MEAS - LV V1 MAX: 137.4 CM/SEC
BH CV ECHO MEAS - LV V1 VTI: 29.1 CM
BH CV ECHO MEAS - LVIDD: 5 CM
BH CV ECHO MEAS - LVIDS: 3.8 CM
BH CV ECHO MEAS - LVOT AREA: 3.1 CM2
BH CV ECHO MEAS - LVOT DIAM: 1.98 CM
BH CV ECHO MEAS - LVPWD: 0.91 CM
BH CV ECHO MEAS - MED PEAK E' VEL: 4.9 CM/SEC
BH CV ECHO MEAS - MV DEC SLOPE: 910.4 CM/SEC2
BH CV ECHO MEAS - MV DEC TIME: 0.35 MSEC
BH CV ECHO MEAS - MV E MAX VEL: 246.7 CM/SEC
BH CV ECHO MEAS - MV MAX PG: 31.6 MMHG
BH CV ECHO MEAS - MV MEAN PG: 13.6 MMHG
BH CV ECHO MEAS - MV P1/2T: 92.8 MSEC
BH CV ECHO MEAS - MV V2 VTI: 68.4 CM
BH CV ECHO MEAS - MVA(P1/2T): 2.37 CM2
BH CV ECHO MEAS - MVA(VTI): 1.3 CM2
BH CV ECHO MEAS - PA ACC TIME: 0.16 SEC
BH CV ECHO MEAS - PA PR(ACCEL): 7.7 MMHG
BH CV ECHO MEAS - PA V2 MAX: 93.1 CM/SEC
BH CV ECHO MEAS - RAP SYSTOLE: 8 MMHG
BH CV ECHO MEAS - RVSP: 58 MMHG
BH CV ECHO MEAS - SV(LVOT): 89.2 ML
BH CV ECHO MEAS - SV(MOD-SP2): 34.8 ML
BH CV ECHO MEAS - SV(MOD-SP4): 32.1 ML
BH CV ECHO MEAS - TAPSE (>1.6): 1.99 CM
BH CV ECHO MEAS - TR MAX PG: 50 MMHG
BH CV ECHO MEAS - TR MAX VEL: 352 CM/SEC
BH CV ECHO MEASUREMENTS AVERAGE E/E' RATIO: 49.34
BH CV VAS BP LEFT ARM: NORMAL MMHG
BH CV XLRA - RV BASE: 4.3 CM
BH CV XLRA - RV LENGTH: 5.9 CM
BH CV XLRA - RV MID: 3.5 CM
BH CV XLRA - TDI S': 7.5 CM/SEC
GLUCOSE BLDC GLUCOMTR-MCNC: 245 MG/DL (ref 70–130)
GLUCOSE BLDC GLUCOMTR-MCNC: 285 MG/DL (ref 70–130)
LEFT ATRIUM VOLUME INDEX: 52.6 ML/M2
LV EF 2D ECHO EST: 65 %
MAXIMAL PREDICTED HEART RATE: 160 BPM
QT INTERVAL: 394 MS
QTC INTERVAL: 460 MS
STRESS TARGET HR: 136 BPM

## 2022-06-02 PROCEDURE — 63710000001 INSULIN LISPRO (HUMAN) PER 5 UNITS: Performed by: INTERNAL MEDICINE

## 2022-06-02 PROCEDURE — 25010000002 FUROSEMIDE PER 20 MG: Performed by: INTERNAL MEDICINE

## 2022-06-02 PROCEDURE — 93306 TTE W/DOPPLER COMPLETE: CPT

## 2022-06-02 PROCEDURE — 94799 UNLISTED PULMONARY SVC/PX: CPT

## 2022-06-02 PROCEDURE — 82962 GLUCOSE BLOOD TEST: CPT

## 2022-06-02 PROCEDURE — 94761 N-INVAS EAR/PLS OXIMETRY MLT: CPT

## 2022-06-02 PROCEDURE — 93306 TTE W/DOPPLER COMPLETE: CPT | Performed by: INTERNAL MEDICINE

## 2022-06-02 PROCEDURE — 99214 OFFICE O/P EST MOD 30 MIN: CPT | Performed by: INTERNAL MEDICINE

## 2022-06-02 RX ORDER — FUROSEMIDE 10 MG/ML
40 INJECTION INTRAMUSCULAR; INTRAVENOUS ONCE
Status: COMPLETED | OUTPATIENT
Start: 2022-06-02 | End: 2022-06-02

## 2022-06-02 RX ADMIN — ACETAMINOPHEN 650 MG: 325 TABLET ORAL at 07:40

## 2022-06-02 RX ADMIN — MULTIVITAMIN TABLET 1 TABLET: TABLET at 07:41

## 2022-06-02 RX ADMIN — METOPROLOL TARTRATE 25 MG: 25 TABLET, FILM COATED ORAL at 07:40

## 2022-06-02 RX ADMIN — IPRATROPIUM BROMIDE AND ALBUTEROL SULFATE 3 ML: .5; 2.5 SOLUTION RESPIRATORY (INHALATION) at 08:50

## 2022-06-02 RX ADMIN — FUROSEMIDE 40 MG: 10 INJECTION, SOLUTION INTRAMUSCULAR; INTRAVENOUS at 12:38

## 2022-06-02 RX ADMIN — APIXABAN 5 MG: 5 TABLET, FILM COATED ORAL at 07:41

## 2022-06-02 RX ADMIN — INSULIN LISPRO 3 UNITS: 100 INJECTION, SOLUTION INTRAVENOUS; SUBCUTANEOUS at 12:37

## 2022-06-02 RX ADMIN — INSULIN LISPRO 5 UNITS: 100 INJECTION, SOLUTION INTRAVENOUS; SUBCUTANEOUS at 07:47

## 2022-06-02 RX ADMIN — ASPIRIN 81 MG: 81 TABLET, COATED ORAL at 07:40

## 2022-06-02 RX ADMIN — FUROSEMIDE 40 MG: 40 TABLET ORAL at 07:40

## 2022-06-02 RX ADMIN — LEVOTHYROXINE SODIUM 175 MCG: 150 TABLET ORAL at 05:44

## 2022-06-02 NOTE — PROGRESS NOTES
[facility]  Progress Note       LOS: 0 days   Patient Care Team:  Marisa Lynch PA as PCP - General (Family Medicine)  Ajay Cheatham MD as Surgeon (Cardiothoracic Surgery)  Marco Lay IV, MD as Cardiologist (Interventional Cardiology)  Nigel Huber MD as Consulting Physician (Cardiology)    Chief Complaint:  Atrial fibrillation    Subjective     Interval History:   Pt sitting up on exam this afternoon, reports that she is feeling much better. Denies shortness of breath, chest pain.       Review of Systems:   Pertinent positives in HPI, all others reviewed and negative.      Objective       Current Facility-Administered Medications:   •  acetaminophen (TYLENOL) tablet 650 mg, 650 mg, Oral, Q4H PRN, 650 mg at 06/02/22 0740 **OR** acetaminophen (TYLENOL) 160 MG/5ML solution 650 mg, 650 mg, Oral, Q4H PRN **OR** acetaminophen (TYLENOL) suppository 650 mg, 650 mg, Rectal, Q4H PRN, Marylou, Patricia G, DO  •  apixaban (ELIQUIS) tablet 5 mg, 5 mg, Oral, Q12H, Marylou, Patricia G, DO, 5 mg at 06/02/22 0741  •  aspirin EC tablet 81 mg, 81 mg, Oral, Daily, Marylou, Patricia G, DO, 81 mg at 06/02/22 0740  •  dextrose (D50W) (25 g/50 mL) IV injection 25 g, 25 g, Intravenous, Q15 Min PRN, Marylou, Patricia G, DO  •  dextrose (GLUTOSE) oral gel 15 g, 15 g, Oral, Q15 Min PRN, Marylou, Patricia G, DO  •  furosemide (LASIX) tablet 40 mg, 40 mg, Oral, Daily, Mraylou, Patricia G, DO, 40 mg at 06/02/22 0740  •  glucagon (human recombinant) (GLUCAGEN DIAGNOSTIC) injection 1 mg, 1 mg, Intramuscular, Q15 Min PRN, Marylou, Patricia G, DO  •  Insulin Lispro (humaLOG) injection 0-7 Units, 0-7 Units, Subcutaneous, TID AC, Marylou, Patricia G, DO, 5 Units at 06/02/22 0747  •  ipratropium-albuterol (DUO-NEB) nebulizer solution 3 mL, 3 mL, Nebulization, Once PRN, Patricia Hickman DO  •  ipratropium-albuterol (DUO-NEB) nebulizer solution 3 mL, 3 mL, Nebulization, Q6H While Awake - RT, Patricia Hickman DO, 3 mL at  06/02/22 0850  •  ipratropium-albuterol (DUO-NEB) nebulizer solution 3 mL, 3 mL, Nebulization, Q4H PRN, Marylou, Patricia G, DO  •  labetalol (NORMODYNE,TRANDATE) injection 5 mg, 5 mg, Intravenous, Q5 Min PRN, Marylou, Patricia G, DO  •  levothyroxine (SYNTHROID, LEVOTHROID) tablet 175 mcg, 175 mcg, Oral, Daily, Marylou, Patricia G, DO, 175 mcg at 06/02/22 0544  •  Magnesium Sulfate 2 gram Bolus, followed by 8 gram infusion (total Mg dose 10 grams)- Mg less than or equal to 1mg/dL, 2 g, Intravenous, PRN **OR** Magnesium Sulfate 2 gram / 50mL Infusion (GIVE X 3 BAGS TO EQUAL 6GM TOTAL DOSE) - Mg 1.1 - 1.5 mg/dl, 2 g, Intravenous, PRN **OR** Magnesium Sulfate 4 gram infusion- Mg 1.6-1.9 mg/dL, 4 g, Intravenous, PRN, Marylou, Patricia G, DO  •  metoprolol tartrate (LOPRESSOR) tablet 25 mg, 25 mg, Oral, Q12H, Marylou, Patricia G, DO, 25 mg at 06/02/22 0740  •  multivitamin (THERAGRAN) tablet 1 tablet, 1 tablet, Oral, Daily, Marylou, Patricia G, DO, 1 tablet at 06/02/22 0741  •  naloxone (NARCAN) injection 0.4 mg, 0.4 mg, Intravenous, PRN, Marylou, Patricia G, DO  •  nicotine (NICODERM CQ) 21 MG/24HR patch 1 patch, 1 patch, Transdermal, Q24H, Maryluo, Patricia G, DO, 1 patch at 06/01/22 2014  •  Pharmacy Consult - [facility], , Does not apply, Daily, Lorrie Lieberman, PharmD  •  potassium chloride (MICRO-K) CR capsule 40 mEq, 40 mEq, Oral, PRN **OR** potassium chloride (KLOR-CON) packet 40 mEq, 40 mEq, Oral, PRN **OR** potassium chloride 10 mEq in 100 mL IVPB, 10 mEq, Intravenous, Q1H PRN, Marylou, Patricia G, DO  •  pravastatin (PRAVACHOL) tablet 20 mg, 20 mg, Oral, Nightly, Patricia Hickman G, DO, 20 mg at 06/01/22 2014  •  sodium chloride 0.9 % flush 10 mL, 10 mL, Intravenous, PRN, MarylouGiuliaPatricia G, DO  •  sodium chloride 0.9 % flush 10 mL, 10 mL, Intravenous, Q12H, Patricia Hickman, DO, 10 mL at 06/01/22 2018  •  sodium chloride 0.9 % flush 10 mL, 10 mL, Intravenous, PRN, MarylouGiulia bhandarisie G, DO  •  sodium chloride 0.9 % flush 3 mL, 3 mL, Intravenous, Q12H, Patricia Hickman  "G, DO  •  sodium chloride 0.9 % flush 3 mL, 3 mL, Intravenous, Q12H, Marylou, Patricia G, DO  •  sodium chloride 0.9 % flush 3-10 mL, 3-10 mL, Intravenous, PRN, Marylou, Patricia G, DO    Vital Sign Min/Max for last 24 hours  Temp  Min: 97.7 °F (36.5 °C)  Max: 98.2 °F (36.8 °C)   BP  Min: 90/63  Max: 118/72   Pulse  Min: 58  Max: 114   Resp  Min: 16  Max: 18   SpO2  Min: 80 %  Max: 98 %   Flow (L/min)  Min: 1  Max: 4   Weight  Min: 85.7 kg (189 lb)  Max: 85.7 kg (189 lb)     Flowsheet Rows    Flowsheet Row First Filed Value   Admission Height 157.5 cm (62\") Documented at 06/01/2022 0625   Admission Weight 86 kg (189 lb 9.5 oz) Documented at 06/01/2022 0625            Intake/Output Summary (Last 24 hours) at 6/2/2022 1120  Last data filed at 6/2/2022 0900  Gross per 24 hour   Intake 2607 ml   Output --   Net 2607 ml       Physical Exam:     General Appearance:    Alert, cooperative, in no acute distress   Lungs:     Clear to auscultation,respirations regular, even and                  unlabored    Heart:    Regular rhythm and normal rate, normal S1 and S2, +            murmur   Abdomen:     Normal bowel sounds, soft, non-tender, non-distended   Extremities:   Moves all extremities well, no edema, no cyanosis, no             redness   Pulses:   Pulses palpable and equal bilaterally   Skin:   No bleeding, bruising or rash        Results Review:   Results from last 7 days   Lab Units 05/31/22  1554   WBC 10*3/mm3 8.17   HEMOGLOBIN g/dL 15.8   HEMATOCRIT % 46.2   PLATELETS 10*3/mm3 155     Results from last 7 days   Lab Units 05/31/22  1554   SODIUM mmol/L 140   POTASSIUM mmol/L 4.2   CHLORIDE mmol/L 101   CO2 mmol/L 27.0   BUN mg/dL 14   CREATININE mg/dL 0.75   GLUCOSE mg/dL 189*      Results from last 7 days   Lab Units 05/31/22  1554   HEMOGLOBIN A1C % 10.40*                 Results from last 7 days   Lab Units 05/31/22  1554   PROTIME Seconds 13.5   INR  1.04           Intake/Output Summary (Last 24 hours) at 6/2/2022 " 1120  Last data filed at 6/2/2022 0900  Gross per 24 hour   Intake 2607 ml   Output --   Net 2607 ml       I personally viewed and interpreted the patient's EKG/Telemetry data    EKG: no new EKG this AM    Telemetry: NSR 60s during exam, occasional PAC    Ejection Fraction  No results found for: EF    Echo EF Estimated  Lab Results   Component Value Date    ECHOEFEST 50 04/18/2022         Present on Admission:  • Paroxysmal atrial fibrillation/flutter (HCC)  • Acquired hypothyroidism  • Hyperlipidemia LDL goal <70  • Cigarette nicotine dependence without complication  • COPD (chronic obstructive pulmonary disease) (HCC)  • Acute diastolic congestive heart failure (HCC)  • Lung cancer; S/P Bronch/Thor w/ ALBINA lobectomy/mediastinal lymph node resec (5/12/17, Dr. Cheatham)  • Hypertension  • Type 2 diabetes mellitus (HCC)    Assessment & Plan   1.  Paroxysmal atrial fibrillation/flutter  -Status post PVA with Dr. Huber yesterday 6/1/2022  -Eliquis 5 mg twice daily initiated. She will stay on this for 1 month. Patient given 1 month sample supply.  -Maintained NSR overnight.    2.  Valvular heart disease/pulmonary hypertension  -Echo today: EF 65%, LA severely increased, bioprosthetic aortic valve- gradient across the valve is mildly elevated (mean gradient 18 mmHg). bioprosthetic mitral valve- gradient across the mitral valve is moderately increased (mean gradient 13 mmHg). Severe TR. RVSP markedly elevated (>55 mmHg).  -Follow up with Dr. Lay outpatient.    3.  Acute hypoxic respiratory failure postprocedure  -Oxygen saturation dropped significantly during ambulation post procedure yesterday.  Patient was given 40 mg IV Lasix.  She does take p.o. Lasix at home which was resumed today.  - Pt reports feeling much better this AM, denies shortness of breath. On room air since this AM without desaturations. Maintaining 98% O2 during exam, drops to ~92% during ambulation.  -Per hospitalist, she declined pulmonary referral  at this time.    Plan for disposition: Home today. Heart and Valve clinic f/u in 3-4 weeks, f/u with Dr. Huber in 3 months, continue to f/u with Dr. Lay.    Electronically signed by GUCCI Lowry, 06/02/22, 12:20 PM EDT.

## 2022-06-02 NOTE — PROGRESS NOTES
[facility] Medicine Services  PROGRESS NOTE    Patient Name: Jill Miller  : 1960  MRN: [id number]    Date of Admission: 2022  Primary Care Physician: Marisa Lynch PA    Subjective   Subjective     CC:  Follow-up for hypoxia    HPI:  I have seen and evaluated the patient this morning.  Sitting comfortably in the recliner at bedside.  Denies any shortness of breath.  Oxygen saturation 94% on room air.  Had discussion regarding the need to follow-up with pulmonary service as outpatient but she declined.  She wants to have her lung condition managed through her family doctor despite explaining to her that seeing pulmonologist will better for optimal management but again she declined.  No chest pain or other acute complaints    ROS:  General : no fevers, chills  CVS: No chest pain, palpitations  Respiratory: No cough, dyspnea  GI: No N/V/D, abd pain  10 point review of systems is negative except for what is mentioned in HPI    Objective   Objective     Vital Signs:   Temp:  [97.8 °F (36.6 °C)-98.2 °F (36.8 °C)] 97.8 °F (36.6 °C)  Heart Rate:  [] 76  Resp:  [16-18] 18  BP: ()/(57-76) 109/75  Flow (L/min):  [2-4] 2     Physical Exam:  General: Comfortable, not in any acute distress, appears stated age, conversant and cooperative  Head: Atraumatic and normocephalic, without obvious abnormality  Eyes:   Conjunctivae and sclerae normal, no Icterus. No pallor  Ears:  Ears appear intact with no abnormalities noted  Throat: No oral lesions, no thrush, oral mucosa moist  Neck: Supple, trachea midline, no thyromegaly  Back:   No kyphoscoliosis present. No tenderness to palpation,   no sacral edema  Lungs: Diminished air entry bilateral lung fields.  No wheezing.  No crackles.    Heart:  Normal S1 and S2, no murmur, no gallop, No JVD, 2+ lower extremity swelling  Abdomen:  Soft, no tenderness, no organomegaly, normal bowel sounds  Normal bowel sounds, no masses, no  organomegaly. Soft, nontender, nondistended, no guarding, no rebound tenderness.  Extremities: No gross abnormalities, no clubbing, pulses palpable and equal bilaterally  Skin: No bleeding, bruising or rash, normal skin turgor and elasticity  Neurologic: Cranial nerves appear intact with no evidence of facial asymmetry, normal motor and sensory functions in all 4 extremities  Psych: Alert and oriented x 3, normal mood    Results Reviewed:  LAB RESULTS:      Lab 05/31/22  1554   WBC 8.17   HEMOGLOBIN 15.8   HEMATOCRIT 46.2   PLATELETS 155   .5*   PROTIME 13.5         Lab 05/31/22  1554   SODIUM 140   POTASSIUM 4.2   CHLORIDE 101   CO2 27.0   ANION GAP 12.0   BUN 14   CREATININE 0.75   EGFR 91.3   GLUCOSE 189*   CALCIUM 9.9   HEMOGLOBIN A1C 10.40*             Lab 05/31/22  1554   PROTIME 13.5   INR 1.04                 Brief Urine Lab Results  (Last result in the past 365 days)      Color   Clarity   Blood   Leuk Est   Nitrite   Protein   CREAT   Urine HCG        04/18/22 0607 Yellow   Cloudy   Trace   Negative   Negative   30 mg/dL (1+)                 Microbiology Results Abnormal     None          CT Angiogram Chest With & Without Contrast    Result Date: 5/31/2022  DATE OF EXAM: 5/31/2022 16:59 EDT PROCEDURE: CT ANGIOGRAM CHEST W WO CONTRAST INDICATIONS: AF, PVA COMPARISON: No Comparisons Available TECHNIQUE: Contiguous axial imaging was obtained from the thoracic inlet through the upper abdomen following the administration of 99 mL intravenous contrast. Reconstructed coronal and sagittal images were also obtained. In addition, a 3 D volume rendered image was  obtained after post processing. Automated exposure control and iterative reconstruction methods were used.  FINDINGS: Nonaneurysmal minimally atherosclerotic thoracic aorta. Prominent pulmonary arteries, main pulmonary artery diameter 3.7 cm. A conjoined left pulmonary vein is present draining both the upper and lower lobes. The left atrial appendage  is well opacified, although moderately diminutive. The esophagus traverses midline posterior to the left atrium. Prior mitral and aortic valve prosthesis noted. The imaged lung fields are grossly clear.     Impression: Conjoined, single left pulmonary vein. No additional anomalous pulmonary venous return. Mildly atherosclerotic nonaneurysmal thoracic aorta. Marked prominence of the pulmonary arteries suggesting pulmonary arterial hypertension. The esophagus traverses midline posterior to the left atrium. Left atrial appendage diminutive or partially thrombosed. Electronically Signed: Keith Pino 5/31/2022 18:39 EDT    EP/CRM Study    Result Date: 6/1/2022  DATE OF PROCEDURE: 06/01/22 /ELECTROPHYSIOLOGIST: Nigel Huber MD PROCEDURE(S) PERFORMED: 1. Diagnostic electrophysiology study with redo pulmonary vein isolation 2. Transseptal catheterization to the left atrium x2. 3. Left ventricular pacing and recording 4.  Induction of arrhythmia on drug therapy 5.  Ablation of the cavotricuspid isthmus for CTI dependent atrial flutter 6.  Anterior mitral line for mitral isthmus dependent atrial flutter 7.  Ablation at the left atrial septum for a septal atrial flutter. INDICATIONS FOR PROCDEDURE:  Patient is a pleasant 62-year-old female with a history of aortic and mitral valve replacement, as well as tricuspid valve repair.  She underwent biatrial maze during this procedure.  She has more recently had recurrences of an atypical atrial flutter and presents now for elective catheter ablation. MEDICATION(S) GIVEN TO PATIENT DURING THIS PROCEDURE: 1. General anesthesia. 2. Heparin. 3. Isoproterenol. 4. Protamine. DESCRIPTION(S) OF THE PROCEDURE: The patient was brought to the cardiovascular electrophysiology laboratory in a non-sedated state. The risks and benefits of general anesthesia, diagnostic electrophysiology study, and possible radiofrequency ablation were explained to the patient and written, informed  consent was obtained. The patient was prepped and draped in the usual sterile manner. Access through the right femoral vein, and left femoral vein was achieved x4 using the modified Seldinger technique and the following sheaths and catheters were inserted: 1. A 6-Portuguese, Cordis Gonzalez catheter was advanced via a 7-Portuguese sheath in the left femoral vein to the coronary sinus for left atrial recording and pacing. 2. An 8-Portuguese, intracardiac ultrasound catheter was advanced via a 9-Portuguese in the left femoral vein to the high right atrium. 3. A 8-Portuguese 3.5 mm, THERMOCOOL ablation catheter was advanced via Vizigo sheath to the left atrium for mapping and ablation. 4. A 7-Portuguese, Pentaray catheter was advanced via a long, intravascular, 8.5-Portuguese sheath in the left atrium for mapping. A diagnostic electrophysiology study was then performed. The patient arrived in the clinical electrophysiology laboratory in atrial flutter with a heart rate of 120 beats per minute. The QRS width was measured at 81 milliseconds, and the QT interval was measured at 335 milliseconds. The HV interval was measured at 41 milliseconds.  Intracardiac ultrasound demonstrated no left atrial appendage thrombus or significant left atrial smoke.  The initial atrial flutter Baseline had a cycle length of around 256 ms.  This had a proximal to distal activation sequence in the coronary sinus electrodes.  Right atrial activation mapping was performed which seem to show a circuit compatible with counterclockwise dependent CTI flutter.  Ablation was performed connecting the tricuspid valve annulus to the IVC.  This was somewhat difficult due to the prior tricuspid valve repair.  On completion of this line tachycardia was noted to slightly change atrial flutter #2. Atrial flutter to have a cycle length around 260 to 270 ms.  Is also had a proximal to distal activation sequence the coronary sinus electrodes.  Right atrial activation mapping seem to show a  septal breakthrough from the left atrium.  Transseptal catheterization was then performed with a M[employer] VersaCross wire.  Left atrial activation mapping showed a counterclockwise dependent mitral isthmus flutter.  There was significant slow conduction on the anterior side of the mitral valve.  There was also seem to be a second possible circuit around an area of scar anterior to the atrial appendage.  Because of this decision was made to interrupt the circuit via the anterior side.  A line was created Into the scar in the left atrial roof to the mitral annulus.  During this ablation tachycardia terminated to sinus rhythm.  Additional ablation was required to achieve bidirectional block across this line. No additional arrhythmia was able to be induced at baseline conditions.  Full left atrial activation mapping showed a small area of possible root conduction near the right gerald and this was addressed with additional ablation to limit these potentials. Isoproterenol at 10 mcg/minute was then started in an attempt to induce other atrial arrhythmias.  The patient's resting heart rate increased to 75 beats per minute. The A-V block point off isoproterenol was measured at 310 milliseconds. Left ventricular pacing and recording were performed by placing catheters safely and carefully across the mitral valve annulus to the left ventricle from the left atrium.  Adequate sensing and pacing thresholds were obtained from the left ventricle.  AV conduction ( antegrade ) as well as VA conduction ( retrograde ) were studied.   Findings were conclusive for no accessory pathway and VA block at 600. A third atrial flutter was able to be induced on isoproterenol.  This had a cycle length of around 200 ms.  This also had proximal to distal activation the coronary sinus electrodes.  Left and right atrial activation mapping was performed.  This was difficult to interpret as the atrial conduction time seem to be longer than the  tachycardia cycle length.  There are multiple areas of slow conduction.  The most suspicious of these was an area on the left atrial septum with very slow conduction and significant fractionation.  Ablation was performed in this area with resulting termination of tachycardia.  No further arrhythmia was inducible after this.  The prior CTI line was again checked and bidirectional block was noted. The sheaths and catheters were then removed, and hemostasis was achieved with Vascade devices. The patient tolerated the procedure without difficulty and was transferred to their room in a stable condition. COMPLICATIONS: None. FINAL IMPRESSIONS:  1. Successful repeat pulmonary vein isolation. 2.  Successful ablation of CTI dependent atrial flutter 3.  Assess for ablation mitral isthmus dependent atrial flutter 4.  Successful ablation of a left atrial septal tachycardia. RECOMMENDATION(S): 1. The patient will be monitored on telemetry. 2. Bedrest 2 hours       3. If stable, the patient will be discharged to home thereafter Nigel Huber MD 06/01/22 12:36 EDT    XR Chest PA & Lateral    Result Date: 6/1/2022  DATE OF EXAM: 6/1/2022 6:38 PM  PROCEDURE: XR CHEST PA AND LATERAL-  INDICATIONS: Hypoxia post ablation; I48.0-Paroxysmal atrial fibrillation; I48.0-Paroxysmal atrial fibrillation  COMPARISON: 04/17/2022  TECHNIQUE: Two radiologic views of the chest, PA and lateral, were obtained.  FINDINGS: Status post multiple valve repairs and left atrial appendage exclusion. Heart size borderline. Prominent central pulmonary vessels. Slightly indistinct peripheral pulmonary vessels with mild increased interstitial markings in the peripheral lower right lung and minimal blunting of the costophrenic angles      Impression: Pulmonary vascular congestion with mild interstitial edema and trace pleural effusions  This report was finalized on 6/1/2022 7:38 PM by Stevo Hernandez.        Results for orders placed during the hospital encounter  of 04/17/22    Adult Transthoracic Echo Complete w/ Color, Spectral and Contrast if necessary per protocol    Interpretation Summary  · Left ventricular systolic function is normal. Estimated left ventricular EF = 50%.  · Bioprosthetic aortic valve present and functions normally  · Bioprosthetic mitral valve present with mild mitral regurgitation present.  · Mild to moderate tricuspid regurgitation is present.      I have reviewed the medications:  Scheduled Meds:apixaban, 5 mg, Oral, Q12H  aspirin, 81 mg, Oral, Daily  furosemide, 40 mg, Oral, Daily  insulin lispro, 0-7 Units, Subcutaneous, TID AC  ipratropium-albuterol, 3 mL, Nebulization, Q6H While Awake - RT  levothyroxine, 175 mcg, Oral, Daily  metoprolol tartrate, 25 mg, Oral, Q12H  multivitamin, 1 tablet, Oral, Daily  nicotine, 1 patch, Transdermal, Q24H  pravastatin, 20 mg, Oral, Nightly  sodium chloride, 10 mL, Intravenous, Q12H  sodium chloride, 3 mL, Intravenous, Q12H  sodium chloride, 3 mL, Intravenous, Q12H      Continuous Infusions:   PRN Meds:.•  acetaminophen **OR** acetaminophen **OR** acetaminophen  •  dextrose  •  dextrose  •  glucagon (human recombinant)  •  ipratropium-albuterol  •  ipratropium-albuterol  •  labetalol  •  magnesium sulfate **OR** magnesium sulfate **OR** magnesium sulfate  •  naloxone  •  potassium chloride **OR** potassium chloride **OR** potassium chloride  •  sodium chloride  •  sodium chloride  •  sodium chloride    Assessment & Plan   Assessment & Plan     Active Hospital Problems    Diagnosis  POA   • **Paroxysmal atrial fibrillation/flutter ([facility]) [I48.0]  Yes   • Prolonged QTC interval on ECG [R94.31]  Unknown   • COPD (chronic obstructive pulmonary disease) ([facility]) [J44.9]  Yes   • Type 2 diabetes mellitus ([facility]) [E11.9]  Yes   • Acute respiratory failure with hypoxia ([facility]) [J96.01]  Unknown   • Acute diastolic congestive heart failure ([facility]) [I50.31]  Yes   • Hyperlipidemia LDL goal <70 [E78.5]  Yes   • Lung cancer; S/P  Bronch/Thor w/ ALBINA lobectomy/mediastinal lymph node resec (5/12/17, Dr. Cheatham) [C34.90]  Yes   • Hypertension [I10]  Yes   • Acquired hypothyroidism [E03.9]  Yes   • Cigarette nicotine dependence without complication [F17.210]  Yes      Resolved Hospital Problems   No resolved problems to display.        Brief Hospital Course to date:  Jill Miller is a 62 y.o. female with a PMH significant for atrial fibrillation s/p ECV (4/28/2022), s/p left atrial appendage ligation, diastolic CHF, COPD, tobacco abuse, pulmonary hypertension, uncontrolled diabetes mellitus, valvular heart disease who presented today for a pulmonary vein ablation with Dr. Huber for atrial fibrillation/flutter found to have acute hypoxic respiratory failure postprocedure.     Assessment and plan:  Acute hypoxic respiratory insufficiency  Volume overload with decompensated diastolic heart failure  Improved with IV diuresis  Currently oxygen saturation is 94% on room air  We will give her 1 dose of IV Lasix 40 mg now  Instructed to take her Lasix away from food at home with her thyroid medicine to optimize its absorption and efficacy    COPD  Pulmonary hypertension  Had pulmonary function test done as outpatient and per her report, she was borderline COPD  Never follow-up with pulmonary service.  Most of her COPD management is through her family doctor  I offered her pulmonary referral but she declined.  Explained to her that seeing pulmonologist is essential for optimum management for COPD but again she declined.  She wants to have her inhalers and management of her COPD through her family doctor  Continue to encourage incentive spirometry  Consult about smoking    Paroxysmal atrial fibrillation  Status post PVA by Dr. Huber on 6/1/2022  Will be discharged on Eliquis 5 mg twice daily for 1 month.  Follow with Dr. Lay as outpatient    Valvular heart disease  Bioprosthetic aortic valve  Bioprosthetic mitral valve  Severe tricuspid  regurgitation and severe pulmonary hypertension  Follows with Dr. Lay as outpatient    DVT prophylaxis:  Medical and mechanical DVT prophylaxis orders are present.       Disposition: Cleared for discharge from medical standpoint    CODE STATUS:   Code Status and Medical Interventions:   Ordered at: 06/01/22 1950     Level Of Support Discussed With:    Patient     Code Status (Patient has no pulse and is not breathing):    CPR (Attempt to Resuscitate)     Medical Interventions (Patient has pulse or is breathing):    Full Support     Regino Mahmood MD  06/02/22

## 2022-06-02 NOTE — PROGRESS NOTES
Patient is on Apixaban. Education provided on 6/2/22 in writing. Information provided includes effects of medication, drug-drug and drug-food interactions, and signs/symptoms of bleeding and clotting. Patient has previously taken and had no questions at this time.    Lorrie Lieberman, PharmD, BCPS  6/2/2022  14:52 EDT

## 2022-06-02 NOTE — CASE MANAGEMENT/SOCIAL WORK
Discharge Planning Assessment  Psychiatric     Patient Name: Jill Miller  MRN: [id number]  Today's Date: 6/2/2022    Admit Date: 6/1/2022     Discharge Needs Assessment     Row Name 06/02/22 1108       Living Environment    People in Home spouse    Name(s) of People in Home Zen Miller,     Current Living Arrangements apartment    Primary Care Provided by self    Provides Primary Care For no one    Family Caregiver if Needed spouse       Transition Planning    Patient/Family Anticipates Transition to home with family    Patient/Family Anticipated Services at Transition     Transportation Anticipated family or friend will provide       Discharge Needs Assessment    Equipment Currently Used at Home none    Discharge Coordination/Progress Patient lives in an apartment in [place] with her .  No DME, HH, or AD.  No needs ATT.               Discharge Plan     Row Name 06/02/22 1112       Plan    Plan Home    Patient/Family in Agreement with Plan yes    Plan Comments Spoke with patient at bedside.  Patient resides in an apartment in [place] with her . Patient denied using any DME, having a history of HH, or an advanced directive.  Patient's PCP is Marisa Lynch, and she gets her prescriptions filled at [facility] off of [address].  There are no issues obtaining her medications.  Asked patient about preference of DME company for oxygen.  At this time, she does not have a preference.  While talking to patient, she was 93% on room air while at rest.  CM will continue to follow and arrange oxygen as needed.    Final Discharge Disposition Code 01 - home or self-care              Continued Care and Services - Admitted Since 6/1/2022    Coordination has not been started for this encounter.       Expected Discharge Date and Time     Expected Discharge Date Expected Discharge Time    Jun 6, 2022          Demographic Summary    No documentation.                Functional  Status     Row Name 06/02/22 1108       Functional Status    Usual Activity Tolerance good    Current Activity Tolerance good       Functional Status, IADL    Medications independent    Meal Preparation independent    Housekeeping independent    Laundry independent    Shopping independent       Mental Status    General Appearance WDL WDL               Psychosocial    No documentation.                Abuse/Neglect    No documentation.                Legal    No documentation.                Substance Abuse    No documentation.                Patient Forms    No documentation.                   Liza Hunter RN

## 2022-06-03 LAB
QT INTERVAL: 394 MS
QTC INTERVAL: 476 MS

## 2022-06-14 ENCOUNTER — TELEPHONE (OUTPATIENT)
Dept: CARDIOLOGY | Facility: CLINIC | Age: 62
End: 2022-06-14

## 2022-06-14 NOTE — TELEPHONE ENCOUNTER
Patient and her  notified and aware that his [facility] paperwork has been faxed to his employer.

## 2022-06-28 NOTE — TELEPHONE ENCOUNTER
Updated [facility] paperwork faxed to [phone]. Patient and  notified and aware.

## 2022-08-08 DIAGNOSIS — I48.0 PAROXYSMAL ATRIAL FIBRILLATION: ICD-10-CM

## 2022-09-20 ENCOUNTER — OFFICE VISIT (OUTPATIENT)
Dept: CARDIOLOGY | Facility: CLINIC | Age: 62
End: 2022-09-20

## 2022-09-20 VITALS
OXYGEN SATURATION: 93 % | SYSTOLIC BLOOD PRESSURE: 123 MMHG | DIASTOLIC BLOOD PRESSURE: 72 MMHG | WEIGHT: 183 LBS | BODY MASS INDEX: 33.68 KG/M2 | HEIGHT: 62 IN | HEART RATE: 75 BPM

## 2022-09-20 VITALS
WEIGHT: 183 LBS | SYSTOLIC BLOOD PRESSURE: 122 MMHG | BODY MASS INDEX: 33.68 KG/M2 | DIASTOLIC BLOOD PRESSURE: 72 MMHG | OXYGEN SATURATION: 93 % | HEART RATE: 87 BPM | HEIGHT: 62 IN

## 2022-09-20 DIAGNOSIS — I48.0 PAROXYSMAL ATRIAL FIBRILLATION: Primary | ICD-10-CM

## 2022-09-20 DIAGNOSIS — I48.92 ATRIAL FLUTTER, UNSPECIFIED TYPE: ICD-10-CM

## 2022-09-20 DIAGNOSIS — I10 ESSENTIAL HYPERTENSION: ICD-10-CM

## 2022-09-20 DIAGNOSIS — I48.0 PAROXYSMAL ATRIAL FIBRILLATION: ICD-10-CM

## 2022-09-20 DIAGNOSIS — I48.92 ATRIAL FLUTTER, UNSPECIFIED TYPE: Primary | ICD-10-CM

## 2022-09-20 DIAGNOSIS — I50.32 CHRONIC DIASTOLIC CONGESTIVE HEART FAILURE: ICD-10-CM

## 2022-09-20 DIAGNOSIS — E78.5 HYPERLIPIDEMIA LDL GOAL <70: ICD-10-CM

## 2022-09-20 DIAGNOSIS — R94.31 PROLONGED Q-T INTERVAL ON ECG: ICD-10-CM

## 2022-09-20 DIAGNOSIS — I38 VALVULAR HEART DISEASE: ICD-10-CM

## 2022-09-20 PROCEDURE — 93000 ELECTROCARDIOGRAM COMPLETE: CPT | Performed by: STUDENT IN AN ORGANIZED HEALTH CARE EDUCATION/TRAINING PROGRAM

## 2022-09-20 PROCEDURE — 99213 OFFICE O/P EST LOW 20 MIN: CPT | Performed by: STUDENT IN AN ORGANIZED HEALTH CARE EDUCATION/TRAINING PROGRAM

## 2022-09-20 PROCEDURE — 99214 OFFICE O/P EST MOD 30 MIN: CPT | Performed by: NURSE PRACTITIONER

## 2022-09-20 RX ORDER — FUROSEMIDE 40 MG/1
40 TABLET ORAL DAILY
Qty: 60 TABLET | Refills: 1 | Status: SHIPPED | OUTPATIENT
Start: 2022-09-20 | End: 2022-10-24 | Stop reason: SDUPTHER

## 2022-09-20 RX ORDER — POTASSIUM CHLORIDE 750 MG/1
20 CAPSULE, EXTENDED RELEASE ORAL DAILY
Qty: 60 CAPSULE | Refills: 0 | Status: SHIPPED | OUTPATIENT
Start: 2022-09-20 | End: 2022-10-21

## 2022-09-20 NOTE — PROGRESS NOTES
[facility] Cardiology      Identification: Jill Miller is a 62 y.o. female who resides in [place], [place]    Reason for visit:  Chronic diastolic congestive heart failure    Assessment     Problem List Items Addressed This Visit        Cardiac and Vasculature    Paroxysmal atrial fibrillation/flutter (HCC) - Primary    Overview     · Postoperative atrial fibrillation with RVR in the setting of left upper lobectomy, 5/15/2017  · Echo (8/25/17): LVEF 60%.  Moderate to severe mitral stenosis (mean gradient 9 mmHg).  Moderate aortic insufficiency.  RVSP 50 mmHg  · Chads Vasc = 2 (female, HTN)  · Left atrial appendage ligation and MAZE procedure by Ajay Cheatham, 7/16/18  · TIFFANIE (9/17/2018):  ANGELA completely occluded.  · Successful external cardioversion for atypical atrial flutter after initiation of propafenone, 4/28/2022  · Pulmonary vein ablation with Dr. Huber 6/1/2022 for atrial flutter         Current Assessment & Plan     · Patient reports improvement in symptoms following ablation.  No return of atrial flutter  · Continue aspirin 81 mg daily  · Defer NOAC due to left atrial appendage ligation performed in 2018  · Continue metoprolol tartrate 25 mg twice daily         Relevant Medications    metoprolol tartrate (LOPRESSOR) 25 MG tablet    Valvular heart disease    Overview     · Cardiac catheterization (9/20/17): Mild nonobstructive CAD. Moderate pulmonary hypertension.  · Echo (4/3/18): LVEF 70%. Severe LA dilatation. Moderate AI. Severe MS and severe MR. RVSP approximately 56 mmHg.  · AVR/MVR/tricuspid annuloplasty (bioprothetic valves) and left atrial appendage ligation performed by Ajay Cheatham, 7/16/18  · Echo (08/22/2018): LVEF 65%. The bioprosthetic aortic and mitral valves are well seated and functioning normally. RVSP<35 mmHg.  · TIFFANIE (9/17/2018): Normal functioning MVR.  ANGELA completely occluded.  · Echo (4/20/2022):LVEF = 50%.  Bioprosthetic aortic valve functions normally.  Bioprosthetic  mitral valve with mild MR.  Mild to moderate TR.  · Echo (6/2/2022):LVEF = 65%.  Bioprosthetic aortic valve present with mean gradient 18 mmHg.  Bioprosthetic mitral valve present with mean gradient 13 mmHg.  Severe TR with RVSP greater than 55 mmHg         Current Assessment & Plan     · Stable NYHA class II symptoms  · Continue metoprolol tartrate 25 mg twice daily  · Continue aspirin 81 mg daily         Relevant Medications    metoprolol tartrate (LOPRESSOR) 25 MG tablet    Atrial flutter (HCC)    Overview     · Chads Vasc = 2 (female, HTN)  · Left atrial appendage ligation and MAZE procedure by Ajay Cheatham, 7/16/18  · TIFFANIE (9/17/2018):  ANGELA completely occluded.   · SMILEY Moody presentation with symptomatic atrial flutter with rapid ventricular rate, 4/17/2022  · Pulmonary vein ablation with Dr. Huber 6/1/2022 for atrial flutter         Current Assessment & Plan     · Continue aspirin 81 mg daily  · Defer NOAC due to left atrial appendage ligation performed 2018         Relevant Medications    metoprolol tartrate (LOPRESSOR) 25 MG tablet    Essential hypertension    Overview     • Target blood pressure <130/80 mmHg         Current Assessment & Plan     · Hypertension is controlled  · Continue metoprolol tartrate 25 mg twice daily         Relevant Medications    metoprolol tartrate (LOPRESSOR) 25 MG tablet    furosemide (LASIX) 40 MG tablet    Hyperlipidemia LDL goal <70    Overview     · High intensity statin indicated given the presence of diabetes and mild CAD         Current Assessment & Plan     · Continue pravastatin 20 mg daily         Chronic diastolic congestive heart failure (HCC)    Current Assessment & Plan     · Stable NYHA class II symptoms  · Continue Lasix 40 mg along with potassium 20 mill equivalents daily         Relevant Medications    metoprolol tartrate (LOPRESSOR) 25 MG tablet      Patient is doing well from a cardiovascular standpoint.  Heart failure symptoms are stable and compensated.  We  will continue her current medications and she will follow-up 6 months or sooner if needed.    Plan   • Heart failure symptoms stable compensated  • Defer NOAC due to left atrial appendage ligation, continue aspirin monotherapy  • Follow-up with EP at already scheduled appointment for later today      Follow-up   Return in about 6 months (around 3/20/2023), or if symptoms worsen or fail to improve, for Follow-up with Dr. Lay next visit.        Subjective      Patient is a 62-year-old female who returns today for follow-up of her paroxysmal atrial fibrillation/flutter, valvular heart disease and cardiac risk factors.  Patient has a history of an AVR, MVR and tricuspid annuloplasty performed Dr. Ajay Cheatham in July 2018.  In April she underwent an external cardioversion for atrial flutter with RVR.  She had previously been using propafenone as pill in the pocket.  She was referred to EP and underwent a pulmonary vein ablation for her atrial flutter with Dr. Huber in June of this year.  The patient had an echocardiogram in June which showed mild leaking of the bioprosthetic aortic and mitral valves  with mean gradients of 18 and 13 mmHg respectively.  She also had severe TR with an elevated right ventricular systolic pressure of greater than 55 mmHg.  This had worsened compared to prior echo in April of this year.  She is relatively asymptomatic and compensated.  She denies any worsening in heart failure symptoms and overall feels much better than she did in April.  She feels much better particularly since her ablation.  Initially after the ablation she was placed on Eliquis which had historically been deferred due to the left atrial branch ligation performed at the time of her valve surgery.  Dr. Huber Her on NOAC for 1 month postprocedure but she has now been transition back to aspirin monotherapy.  She actually has an appointment with him later today for follow-up.      Review of Systems   Constitutional:  "Negative for malaise/fatigue.   Eyes: Negative for vision loss in left eye and vision loss in right eye.   Cardiovascular: Negative for chest pain, dyspnea on exertion, near-syncope, orthopnea, palpitations, paroxysmal nocturnal dyspnea and syncope.   Musculoskeletal: Negative for myalgias.   Neurological: Negative for brief paralysis, excessive daytime sleepiness, focal weakness, numbness, paresthesias and weakness.   All other systems reviewed and are negative.      Objective     /72 (BP Location: Left arm, Patient Position: Sitting, Cuff Size: Adult)   Pulse 75   Ht 157.5 cm (62\")   Wt 83 kg (183 lb)   SpO2 93%   BMI 33.47 kg/m²       Constitutional:       Appearance: Healthy appearance. Well-developed.   Eyes:      General: Lids are normal. No scleral icterus.     Conjunctiva/sclera: Conjunctivae normal.   HENT:      Head: Normocephalic and atraumatic.   Neck:      Thyroid: No thyromegaly.      Vascular: No carotid bruit or JVD.   Pulmonary:      Effort: Pulmonary effort is normal.      Breath sounds: Normal breath sounds. No wheezing. No rhonchi. No rales.   Cardiovascular:      Normal rate. Regular rhythm.      Murmurs: There is no murmur.      No gallop. No rub.   Pulses:     Intact distal pulses.   Edema:     Peripheral edema absent.   Abdominal:      General: There is no distension.      Palpations: Abdomen is soft. There is no abdominal mass.   Musculoskeletal:      Cervical back: Normal range of motion. Skin:     General: Skin is warm and dry.      Findings: No rash.   Neurological:      General: No focal deficit present.      Mental Status: Alert and oriented to person, place, and time.      Gait: Gait is intact.   Psychiatric:         Attention and Perception: Attention normal.         Mood and Affect: Mood normal.         Behavior: Behavior normal.         Result Review  (reviewed with patient):            Lab Results   Component Value Date    GLUCOSE 189 (H) 05/31/2022    BUN 14 05/31/2022 "    CREATININE 0.75 05/31/2022    EGFRIFNONA 98 08/01/2018    BCR 18.7 05/31/2022    K 4.2 05/31/2022    CO2 27.0 05/31/2022    CALCIUM 9.9 05/31/2022    ALBUMIN 4.00 04/19/2022    AST 57 (H) 04/19/2022    ALT 89 (H) 04/19/2022     Lab Results   Component Value Date    WBC 8.17 05/31/2022    HGB 15.8 05/31/2022    HCT 46.2 05/31/2022    .5 (H) 05/31/2022     05/31/2022     Lab Results   Component Value Date    CHOL 133 04/18/2022    TRIG 176 (H) 04/18/2022    HDL 46 04/18/2022    LDL 58 04/18/2022     Lab Results   Component Value Date    HGBA1C 10.40 (H) 05/31/2022       Marisa Suarez, APRN  9/20/2022

## 2022-09-20 NOTE — PROGRESS NOTES
Cardiac Electrophysiology Outpatient Note  [facility]    Office Visit     Jill Miller  [id number]  09/20/2022    Primary Care Physician: Marisa Lynch PA    Referred By: No ref. provider found    Subjective     Chief Complaint   Patient presents with   • Paroxysmal atrial fibrillation/flutter (HCC)       History of Present Illness:   Jill Miller is a 62 y.o. female who presents to my electrophysiology clinic for follow up of atrial fibrillation and atrial flutter.  She is status post surgical bioprosthetic aortic and mitral valve replacement as well as tricuspid valve repair.  She presented initially with recurrent atrial flutter.  We did an ablation approximately 3 months ago.  This consisted of redo pulmonary vein isolation, CTI flutter ablation, mitral flutter ablation, and a flutter circuit on the septal aspect of the left atrium.  She is actually done quite well and is without recurrent symptomatic arrhythmia.  She still has some shortness of breath on exertion but is overall feeling better.  She gets some lower extremity swelling which is well controlled with Lasix.    Past Medical History:   Diagnosis Date   • Atrial fibrillation and flutter (HCC)    • Chronic diastolic congestive heart failure (HCC)    • Chronically Abnormal CXR (Left pleural disease post op) 09/25/2017   • COPD exacerbation ([facility]) 04/17/2022   • Essential hypertension 04/10/2017    Target blood pressure <130/80 mmHg   • Graves disease    • Hyperlipidemia LDL goal <70    • Hypertension    • Hyperthyroidism    • Lung cancer (HCC)    • MRSA (methicillin resistant staph aureus) culture positive 2014    under left breast, incision and debridement, treated with wound packing and po abx    • Prolonged QTC interval on ECG 06/01/2022   • Rheumatic mitral stenosis    • Type 2 diabetes mellitus (HCC) 07/17/2018   • Valvular heart disease        Past Surgical History:   Procedure  Laterality Date   • ABLATION OF DYSRHYTHMIC FOCUS  07/16/2018   • ABSCESS DRAINAGE      under left breast d/t mrsa    • AORTIC VALVE REPAIR/REPLACEMENT N/A 07/16/2018    Procedure: MEDIAN STERNOTOMY, AORTIC VALVE REPLACEMENT WITH TIFFANIE AND MAZE, TRICUSPID RING, LEFT ATRIAL OCCLUSION;  Surgeon: Ajay Cheatham MD;  Location:  [facility];  Service: Cardiothoracic   • BRONCHOSCOPY Left 05/12/2017    Procedure: BRONCHOSCOPY;  Surgeon: Ajay Cheatham MD;  Location:  [facility];  Service:    • BRONCHOSCOPY N/A 05/18/2017    Procedure: BRONCHOSCOPY BIOPSY AT BEDSIDE;  Surgeon: Ajay Cheatham MD;  Location:  [facility];  Service:    • CARDIAC CATHETERIZATION N/A 09/28/2017    Procedure: Left Heart Cath;  Surgeon: Marco Lay MD;  Location:  [facility];  Service:    • CARDIAC CATHETERIZATION N/A 09/28/2017    Procedure: Right Heart Cath;  Surgeon: Marco Lay MD;  Location:  [facility];  Service:    • CARDIAC ELECTROPHYSIOLOGY PROCEDURE N/A 6/1/2022    Procedure: Ablation atrial fibrillation (atypical A flutter). Hold Rythmol x5 days;  Surgeon: Nigel Huber MD;  Location:  [facility];  Service: Cardiovascular;  Laterality: N/A;   • LUNG BIOPSY  04/2017   • LYMPH NODE DISSECTION Left 05/12/2017    Procedure: MEDIASTINAL LYMPH NODE DISSECTION;  Surgeon: Ajay Cheatham MD;  Location:  [facility];  Service:    • MAZE PROCEDURE     • MITRAL VALVE REPAIR/REPLACEMENT N/A 07/16/2018    Procedure: MITRAL VALVE REPLACEMENT;  Surgeon: Ajay Cheatham MD;  Location:  [facility];  Service: Cardiothoracic   • MITRAL VALVE REPLACEMENT  07/16/2018   • TEETH EXTRACTION     • THORACOSCOPY VIDEO ASSISTED WITH LOBECTOMY Left 05/12/2017    Procedure: THORACOSCOPY VIDEO ASSISTED WITH OPEN LOBECTOMY;  Surgeon: Ajay Cheatham MD;  Location:  [facility];  Service:    • TOTAL THYROIDECTOMY  approx 2012   • TRICUSPID VALVE SURGERY  07/16/2018       Family History   Problem  Relation Age of Onset   • Breast cancer Mother    • Diabetes Father    • Heart disease Son        Social History     Socioeconomic History   • Marital status:    • Number of children: 3   Tobacco Use   • Smoking status: Current Some Day Smoker     Packs/day: 0.25     Years: 40.00     Pack years: 10.00     Types: Cigarettes     Start date: 5/5/1978   • Smokeless tobacco: Never Used   • Tobacco comment: Couldn't remember exact dates.   Vaping Use   • Vaping Use: Never used   Substance and Sexual Activity   • Alcohol use: No   • Drug use: No   • Sexual activity: Not Currently     Partners: Male     Birth control/protection: None         Current Outpatient Medications:   •  ALBUTEROL SULFATE IN, Inhale 2 puffs Every 6 (Six) Hours As Needed., Disp: , Rfl:   •  aspirin 81 MG EC tablet, Take 1 tablet by mouth Daily., Disp:  , Rfl:   •  Cholecalciferol (vitamin D3) 125 MCG (5000 UT) tablet tablet, Take 5,000 Units by mouth Daily., Disp: , Rfl:   •  furosemide (LASIX) 40 MG tablet, Take 1 tablet by mouth Daily., Disp: 60 tablet, Rfl: 1  •  levothyroxine (SYNTHROID, LEVOTHROID) 175 MCG tablet, Take 175 mcg by mouth Daily., Disp: , Rfl:   •  metFORMIN (GLUCOPHAGE) 1000 MG tablet, Take 1 tablet by mouth Daily With Breakfast. (Patient taking differently: Take 1,000 mg by mouth Daily With Dinner.), Disp: 30 tablet, Rfl: 2  •  metoprolol tartrate (LOPRESSOR) 25 MG tablet, Take 1 tablet by mouth Every 12 (Twelve) Hours., Disp: 90 tablet, Rfl: 1  •  multivitamin (THERAGRAN) tablet tablet, Take 1 tablet by mouth Daily., Disp: , Rfl:   •  potassium chloride (MICRO-K) 10 MEQ CR capsule, Take 2 capsules by mouth Daily., Disp: 60 capsule, Rfl: 0  •  pravastatin (PRAVACHOL) 20 MG tablet, Take 20 mg by mouth Every Night., Disp: , Rfl:   •  tiotropium bromide-olodaterol (Stiolto Respimat) 2.5-2.5 MCG/ACT aerosol solution inhaler, Inhale 2 puffs Daily., Disp: , Rfl:   •  varenicline (CHANTIX) 0.5 MG tablet, Take 0.5 mg by mouth As  "Needed. 6/1/22---HASN'T STARTED YET, Disp: , Rfl:     Allergies:   Allergies   Allergen Reactions   • Lortab [Hydrocodone-Acetaminophen] Nausea And Vomiting and Dizziness   • Morphine And Related Nausea Only       Objective   Vital Signs: Blood pressure 122/72, pulse 87, height 157.5 cm (62\"), weight 83 kg (183 lb), SpO2 93 %.    PHYSICAL EXAM  General appearance: Awake, alert, cooperative  Head: Normocephalic, without obvious abnormality, atraumatic  Neck: No JVD  Lungs: Clear to ascultation bilaterally  Heart: Regular rate and rhythm, 3-6 systolic ejection murmur, 2+ LE pulses, no lower extremity swelling  Skin: Skin color, turgor normal, no rashes or lesions  Neurologic: Grossly normal     Lab Results   Component Value Date    GLUCOSE 189 (H) 05/31/2022    CALCIUM 9.9 05/31/2022     05/31/2022    K 4.2 05/31/2022    CO2 27.0 05/31/2022     05/31/2022    BUN 14 05/31/2022    CREATININE 0.75 05/31/2022    EGFRIFNONA 98 08/01/2018    BCR 18.7 05/31/2022    ANIONGAP 12.0 05/31/2022     Lab Results   Component Value Date    WBC 8.17 05/31/2022    HGB 15.8 05/31/2022    HCT 46.2 05/31/2022    .5 (H) 05/31/2022     05/31/2022     Lab Results   Component Value Date    INR 1.04 05/31/2022    INR 1.01 04/18/2022    INR 1.03 04/18/2022    PROTIME 13.5 05/31/2022    PROTIME 13.2 04/18/2022    PROTIME 13.4 04/18/2022     Lab Results   Component Value Date    TSH 13.930 (H) 04/17/2022          Results for orders placed during the hospital encounter of 06/01/22    Adult Transthoracic Echo Complete W/ Cont if Necessary Per Protocol    Interpretation Summary  · Left ventricular systolic function is normal. Estimated left ventricular EF = 65%.  · Left atrial volume is severely increased.  · There is a bioprosthetic aortic valve present. The gradient across the valve is mildly elevated (mean gradient 18 mmHg)  · There is a bioprosthetic mitral valve present. The gradient across the mitral valve is " moderately increased (mean gradient 13 mmHg)  · Severe tricuspid valve regurgitation is present.  · Estimated right ventricular systolic pressure from tricuspid regurgitation is markedly elevated (>55 mmHg).     Results for orders placed during the hospital encounter of 09/25/17    Cardiac Catheterization/Vascular Study    Impression  · Mild nonobstructive coronary disease  · Normal LV systolic function with mildly elevated LV filling pressure  · Moderate to severe mitral stenosis and mitral regurgitation  · Moderate pulmonary hypertension  · Reduced reduced cardiac output/index    RECOMMENDATIONS:  · Consult Ajay Cheatham for MVR/AVR/left atrial appendage ligation    ----------------------------------------------------------------------------------------------------------------------    Clinical History: 57-year-old female with paroxysmal atrial fibrillation and recently demonstrated mitral stenosis and regurgitation.    Access: 6 Ukrainian right femoral arterial and 8 Ukrainian right femoral venous    Procedure narrative:  Patient wasn't Cath Lab in stable condition.  The right groin was prepped and draped in sterile fashion.  A 6 Ukrainian sheath was placed in right femoral artery and an 8 Ukrainian sheath placed in right femoral vein.  Coronary angiography is performed.  Right heart catheterization was performed.  Measurement of the right atrial, right ventricular, PA pressures were obtained.  Cardiac output was obtained using thermodilution.  Next, a pigtail catheter was placed in the left ventricle.  Simultaneous pulmonary capillary wedge and LVEDP pressures were obtained    Angiographic Findings:  · Coronary circulation is right dominant  · Left main: Normal  · LAD: Small caliber vessel with minimal disease  · LCX: Mild luminal irregularity  · RCA: Mild luminal irregularity    Left ventricle: LVEF 60%    Mitral regurgitation: 3+    Hemodynamic Findings:  Ao pressure: 88/57 mmHg  LVEDP: 21 mmHg      No aortic valve gradient  to suggest aortic stenosis.    RA:  18/14/12 mmHg  RV: 53/5/12 mmHg  PA: 50/22 mmHg  PA mean: 35 mmHg  Mean PCWP:  23 mmHg  CO: 3.5 L/min  CI: 1.36 L/min/m^2  PVR: 2 Wood units      PCWP/LVEDP 11 mmHg    Complications: None apparent      I personally viewed and interpreted the patient's EKG/Telemetry/lab data      ECG 12 Lead    Date/Time: 9/20/2022 4:33 PM  Performed by: Nigel Hbuer MD  Authorized by: Nigel Huber MD   Comparison: compared with previous ECG from 6/3/2022  Similar to previous ECG  Comments: Sinus rhythm, first-degree AV block, prolonged QT            Jill Miller  reports that she has been smoking cigarettes. She started smoking about 44 years ago. She has a 10.00 pack-year smoking history. She has never used smokeless tobacco..    Assessment & Plan    1. Atrial flutter, unspecified type (HCC)  Jocy is a history of recurrent atrial flutter after a mitral and aortic valve replacement. We did an ablation approximately 3 months ago.  This consisted of redo pulmonary vein isolation, CTI flutter ablation, mitral flutter ablation, and a flutter circuit on the septal aspect of the left atrium.  She is actually done quite well and is without recurrent symptomatic arrhythmia.  We will continue to monitor going forward.  She has a left atrial appendage ligation with proven complete ligation so she is off anticoagulation just on aspirin    2. Prolonged QTC interval on ECG  Her QTc interval was prolonged.  Today on EKG it is approximately 486 ms.  She is not on any QT prolonging agents.  We will continue to monitor this.    3. Chronic diastolic congestive heart failure (HCC)  She has a history of heart failure, this is related to her valvular disease.  She is currently well compensated and follows with Lore Suarez and Dr. Lay.       Follow Up:  No follow-ups on file.      Thank you for allowing me to participate in the care of your patient. Please do not hesitate to contact me with additional  questions or concerns.      Nigel Huber M.D.  Cardiac Electrophysiologist  [facility] / [facility]

## 2022-09-20 NOTE — ASSESSMENT & PLAN NOTE
· Patient reports improvement in symptoms following ablation.  No return of atrial flutter  · Continue aspirin 81 mg daily  · Defer NOAC due to left atrial appendage ligation performed in 2018  · Continue metoprolol tartrate 25 mg twice daily

## 2022-09-20 NOTE — ASSESSMENT & PLAN NOTE
· Stable NYHA class II symptoms  · Continue Lasix 40 mg along with potassium 20 mill equivalents daily

## 2022-09-20 NOTE — ASSESSMENT & PLAN NOTE
· Stable NYHA class II symptoms  · Continue metoprolol tartrate 25 mg twice daily  · Continue aspirin 81 mg daily

## 2022-10-21 RX ORDER — POTASSIUM CHLORIDE 750 MG/1
CAPSULE, EXTENDED RELEASE ORAL
Qty: 180 CAPSULE | Refills: 1 | Status: SHIPPED | OUTPATIENT
Start: 2022-10-21 | End: 2022-10-24 | Stop reason: SDUPTHER

## 2022-10-21 NOTE — TELEPHONE ENCOUNTER
Lab Results   Component Value Date    GLUCOSE 189 (H) 05/31/2022    CALCIUM 9.9 05/31/2022     05/31/2022    K 4.2 05/31/2022    CO2 27.0 05/31/2022     05/31/2022    BUN 14 05/31/2022    CREATININE 0.75 05/31/2022    EGFRIFNONA 98 08/01/2018    BCR 18.7 05/31/2022    ANIONGAP 12.0 05/31/2022

## 2022-10-24 DIAGNOSIS — I48.0 PAROXYSMAL ATRIAL FIBRILLATION: ICD-10-CM

## 2022-10-24 RX ORDER — FUROSEMIDE 40 MG/1
40 TABLET ORAL DAILY
Qty: 60 TABLET | Refills: 1 | Status: SHIPPED | OUTPATIENT
Start: 2022-10-24 | End: 2023-03-29 | Stop reason: SDUPTHER

## 2022-10-24 RX ORDER — POTASSIUM CHLORIDE 750 MG/1
20 CAPSULE, EXTENDED RELEASE ORAL DAILY
Qty: 180 CAPSULE | Refills: 1 | Status: SHIPPED | OUTPATIENT
Start: 2022-10-24 | End: 2023-03-29 | Stop reason: SDUPTHER

## 2022-10-24 NOTE — TELEPHONE ENCOUNTER
Lab Results   Component Value Date    GLUCOSE 189 (H) 05/31/2022    BUN 14 05/31/2022    CREATININE 0.75 05/31/2022    EGFRIFNONA 98 08/01/2018    BCR 18.7 05/31/2022    K 4.2 05/31/2022    CO2 27.0 05/31/2022    CALCIUM 9.9 05/31/2022    ALBUMIN 4.00 04/19/2022    AST 57 (H) 04/19/2022    ALT 89 (H) 04/19/2022

## 2023-02-12 DIAGNOSIS — I48.0 PAROXYSMAL ATRIAL FIBRILLATION: ICD-10-CM

## 2023-02-15 DIAGNOSIS — C34.12 MALIGNANT NEOPLASM OF UPPER LOBE OF LEFT LUNG: Primary | ICD-10-CM

## 2023-03-28 NOTE — PROGRESS NOTES
"    Cardiology Outpatient Visit      Identification: Jill Miller is a 62 y.o. female who resides in [place], [place].     Reason for visit:  Bioprosthetic aortic and mitral valves  Atrial flutter      Subjective      Jacquelnie underwent flutter ablation with Dr. Huber last year.  She had not had a recurrence of atrial flutter until last week.  She states her resting heart rate popped up to about 180 bpm.  She took propafenone 300 mg daily and after 1 to 2 hours it resolved.  This is the first episode of flutter she is experienced since her pulmonary vein ablation.    She continues to have shortness of breath.  She is discouraged to hear that her bioprosthetic valves are not functioning normally.    Review of Systems   Cardiovascular: Positive for irregular heartbeat.        Tachycardia also    All other systems reviewed and are negative.      Allergies   Allergen Reactions   • Lortab [Hydrocodone-Acetaminophen] Nausea And Vomiting and Dizziness   • Morphine And Related Nausea Only         Current Outpatient Medications   Medication Instructions   • ALBUTEROL SULFATE IN 2 puffs, Inhalation, Every 6 Hours PRN   • aspirin 81 mg, Oral, Daily   • Cholecalciferol 5,000 Units, Oral   • furosemide (LASIX) 40 mg, Oral, Daily   • ibuprofen (ADVIL,MOTRIN) 800 mg, As Needed   • levothyroxine (SYNTHROID, LEVOTHROID) 175 mcg, Oral, Daily   • metFORMIN (GLUCOPHAGE) 1,000 mg, Oral, Daily With Breakfast   • metoprolol tartrate (LOPRESSOR) 25 mg, Oral, 2 Times Daily   • multivitamin (THERAGRAN) tablet tablet 1 tablet, Oral, Daily   • nystatin (MYCOSTATIN) 234781 UNIT/GM powder Topical, As Needed   • potassium chloride (MICRO-K) 10 MEQ CR capsule 20 mEq, Oral, Daily   • pravastatin (PRAVACHOL) 20 mg, Oral, Nightly   • propafenone (RYTHMOL) 600 mg, Oral, As Needed         Objective     /74 (BP Location: Right arm, Patient Position: Sitting)   Pulse 60   Ht 157.5 cm (62\")   Wt 83.9 kg (185 lb)   SpO2 95%   BMI 33.84 kg/m²   "     Constitutional:       Appearance: Healthy appearance.   Eyes:      General: No scleral icterus.  Neck:      Thyroid: No thyroid mass.      Vascular: No carotid bruit or JVD. JVD normal.   Pulmonary:      Effort: Pulmonary effort is normal.      Breath sounds: Decreased breath sounds present. Wheezing present.   Cardiovascular:      Normal rate. Regular rhythm.      Murmurs: There is no murmur.      No gallop.   Skin:     General: Skin is warm. There is no cyanosis.   Neurological:      General: No focal deficit present.      Mental Status: Alert.   Psychiatric:         Attention and Perception: Attention normal.         Result Review  (reviewed with patient):        ECG 12 Lead    Date/Time: 3/29/2023 10:40 AM  Performed by: Marco Lay IV, MD  Authorized by: Marco Lay IV, MD   Comparison: compared with previous ECG from 9/20/2022  Similar to previous ECG  Comparison to previous ECG: QT interval has shortened  Rhythm: sinus rhythm  BPM: 62    Clinical impression: normal ECG             Lab Results   Component Value Date    GLUCOSE 189 (H) 05/31/2022    BUN 14 05/31/2022    CREATININE 0.75 05/31/2022    EGFR 91.3 05/31/2022    BCR 18.7 05/31/2022    K 4.2 05/31/2022    CO2 27.0 05/31/2022    CALCIUM 9.9 05/31/2022    ALBUMIN 4.00 04/19/2022    BILITOT 0.4 04/19/2022    AST 57 (H) 04/19/2022    ALT 89 (H) 04/19/2022     Lab Results   Component Value Date    WBC 8.17 05/31/2022    HGB 15.8 05/31/2022    HCT 46.2 05/31/2022    .5 (H) 05/31/2022     05/31/2022     Lab Results   Component Value Date    CHOL 133 04/18/2022    TRIG 176 (H) 04/18/2022    HDL 46 04/18/2022    LDL 58 04/18/2022     Lab Results   Component Value Date    HGBA1C 10.40 (H) 05/31/2022           Assessment     Diagnoses and all orders for this visit:    1. Tobacco user (Primary)    2. Valvular heart disease  Overview:  · Cardiac catheterization (9/20/17): Mild nonobstructive CAD. Moderate pulmonary  hypertension.  · Echo (4/3/18): LVEF 70%. Severe LA dilatation. Moderate AI. Severe MS and severe MR. RVSP approximately 56 mmHg.  · AVR/MVR/tricuspid annuloplasty (bioprothetic valves) and left atrial appendage ligation performed by Ajay Cheatham, 7/16/18  · Echo (08/22/2018): LVEF 65%. The bioprosthetic aortic and mitral valves are well seated and functioning normally. RVSP<35 mmHg.  · TIFFANIE (9/17/2018): Normal functioning MVR.  ANGELA completely occluded.  · Echo (4/20/2022): LVEF 50%.  Bioprosthetic aortic valve functions normally.  Bioprosthetic mitral valve with mild MR.  Mild to moderate TR.  · Echo (6/2/2022): LVEF 65%.  Bioprosthetic aortic valve present with mean gradient 18 mmHg.  Bioprosthetic mitral valve present with mean gradient 13 mmHg. RVSP > 55 mmHg.    Assessment & Plan:  · Repeat transthoracic echo to reassess gradients across aortic and mitral valves  · If elevated, will refer to structural heart clinic for evaluation    Orders:  -     aspirin 81 MG EC tablet; Take 1 tablet by mouth Daily.  -     Adult Transthoracic Echo Complete W/ Cont if Necessary Per Protocol; Future  -     ECG 12 Lead    3. Paroxysmal atrial fibrillation (HCC)  Overview:  · Postoperative atrial fibrillation with RVR in the setting of left upper lobectomy, 5/15/2017  · Echo (8/25/17): LVEF 60%.  Moderate to severe mitral stenosis (mean gradient 9 mmHg).  Moderate aortic insufficiency.  RVSP 50 mmHg  · Chads Vasc = 2 (female, HTN)  · Left atrial appendage ligation and MAZE procedure by Ajay Cheatham, 7/16/18  · TIFFANIE (9/17/2018):  ANGELA completely occluded.  · Successful external cardioversion for atypical atrial flutter after initiation of propafenone, 4/28/2022  · PVA for atrial flutter by Nigel Huber, 6/1/2022    Assessment & Plan:  · Recurrent episode of atrial flutter with symptoms last week that terminated with propafenone.  This is the first episode since pulmonary vein ablation last June  · History of prolonged QT on EKG  complicates drug therapy for rhythm control  · Will discuss with Dr. Huber options for a flutter treat      Orders:  -     metoprolol tartrate (LOPRESSOR) 25 MG tablet; Take 1 tablet by mouth 2 (Two) Times a Day.  Dispense: 180 tablet; Refill: 3  -     propafenone (RYTHMOL) 300 MG tablet; Take 2 tablets by mouth As Needed (for atrial flutter).  Dispense: 20 tablet; Refill: 1  -     Adult Transthoracic Echo Complete W/ Cont if Necessary Per Protocol; Future    4. Hyperlipidemia LDL goal <70  Overview:  · High intensity statin indicated given the presence of diabetes and mild CAD    Assessment & Plan:  · Continue pravastatin      5. Chronic diastolic congestive heart failure (HCC)  Overview:  · Echo (6/2/2022): LVEF 65%.  Bioprosthetic aortic valve present with mean gradient 18 mmHg.  Bioprosthetic mitral valve present with mean gradient 13 mmHg. RVSP > 55 mmHg.    Orders:  -     furosemide (LASIX) 40 MG tablet; Take 1 tablet by mouth Daily.  Dispense: 90 tablet; Refill: 3  -     potassium chloride (MICRO-K) 10 MEQ CR capsule; Take 2 capsules by mouth Daily.  Dispense: 180 capsule; Refill: 3  -     Adult Transthoracic Echo Complete W/ Cont if Necessary Per Protocol; Future        Plan   • Transthoracic echo  • If mitral valve gradient remains elevated, will recommend TIFFANIE with [facility] for evaluation for structural intervention  • Propafenone 600 mg for pill in the pocket approach  • Dr. Huber to address whether daily rhythm control strategy needed for recurrent flutter      Follow-up   Return in about 6 months (around 9/29/2023) for Follow-up with cardiology APRN/PA.        Nelson Lay MD, FACC, [facility]  3/29/2023

## 2023-03-29 ENCOUNTER — OFFICE VISIT (OUTPATIENT)
Dept: CARDIOLOGY | Facility: CLINIC | Age: 63
End: 2023-03-29
Payer: COMMERCIAL

## 2023-03-29 VITALS
OXYGEN SATURATION: 95 % | WEIGHT: 185 LBS | SYSTOLIC BLOOD PRESSURE: 136 MMHG | HEIGHT: 62 IN | BODY MASS INDEX: 34.04 KG/M2 | DIASTOLIC BLOOD PRESSURE: 74 MMHG | HEART RATE: 60 BPM

## 2023-03-29 DIAGNOSIS — Z72.0 TOBACCO USER: Primary | ICD-10-CM

## 2023-03-29 DIAGNOSIS — E78.5 HYPERLIPIDEMIA LDL GOAL <70: ICD-10-CM

## 2023-03-29 DIAGNOSIS — I50.32 CHRONIC DIASTOLIC CONGESTIVE HEART FAILURE: ICD-10-CM

## 2023-03-29 DIAGNOSIS — I38 VALVULAR HEART DISEASE: ICD-10-CM

## 2023-03-29 DIAGNOSIS — I48.0 PAROXYSMAL ATRIAL FIBRILLATION: ICD-10-CM

## 2023-03-29 PROBLEM — I48.92 ATRIAL FLUTTER: Status: RESOLVED | Noted: 2022-04-17 | Resolved: 2023-03-29

## 2023-03-29 PROBLEM — Z00.00 ENCOUNTER FOR GENERAL ADULT MEDICAL EXAMINATION WITHOUT ABNORMAL FINDINGS: Status: RESOLVED | Noted: 2023-01-24 | Resolved: 2023-03-29

## 2023-03-29 PROBLEM — M77.8 TENDINITIS OF LEFT ELBOW: Status: ACTIVE | Noted: 2022-01-21

## 2023-03-29 PROBLEM — J96.01 ACUTE RESPIRATORY FAILURE WITH HYPOXIA: Status: RESOLVED | Noted: 2018-04-02 | Resolved: 2023-03-29

## 2023-03-29 PROBLEM — Z00.00 ENCOUNTER FOR GENERAL ADULT MEDICAL EXAMINATION WITHOUT ABNORMAL FINDINGS: Status: ACTIVE | Noted: 2023-01-24

## 2023-03-29 PROBLEM — F17.210 CIGARETTE NICOTINE DEPENDENCE WITHOUT COMPLICATION: Status: RESOLVED | Noted: 2017-04-10 | Resolved: 2023-03-29

## 2023-03-29 PROBLEM — R00.2 HEART PALPITATIONS: Status: RESOLVED | Noted: 2022-04-18 | Resolved: 2023-03-29

## 2023-03-29 PROBLEM — M77.10 LATERAL EPICONDYLITIS: Status: ACTIVE | Noted: 2021-01-15

## 2023-03-29 PROBLEM — I10 ESSENTIAL HYPERTENSION: Status: RESOLVED | Noted: 2017-05-12 | Resolved: 2023-03-29

## 2023-03-29 PROBLEM — J40 BRONCHITIS: Status: ACTIVE | Noted: 2020-01-15

## 2023-03-29 PROBLEM — J40 BRONCHITIS: Status: RESOLVED | Noted: 2020-01-15 | Resolved: 2023-03-29

## 2023-03-29 PROBLEM — R94.31 PROLONGED Q-T INTERVAL ON ECG: Status: RESOLVED | Noted: 2022-06-01 | Resolved: 2023-03-29

## 2023-03-29 RX ORDER — POTASSIUM CHLORIDE 750 MG/1
20 CAPSULE, EXTENDED RELEASE ORAL DAILY
Qty: 180 CAPSULE | Refills: 3 | Status: SHIPPED | OUTPATIENT
Start: 2023-03-29

## 2023-03-29 RX ORDER — PROPAFENONE HYDROCHLORIDE 300 MG/1
600 TABLET, COATED ORAL AS NEEDED
Qty: 20 TABLET | Refills: 1 | Status: SHIPPED | OUTPATIENT
Start: 2023-03-29

## 2023-03-29 RX ORDER — NYSTATIN 100000 [USP'U]/G
POWDER TOPICAL AS NEEDED
COMMUNITY
Start: 2023-01-24 | End: 2024-01-24

## 2023-03-29 RX ORDER — ASPIRIN 81 MG/1
81 TABLET ORAL DAILY
Start: 2023-03-29

## 2023-03-29 RX ORDER — FUROSEMIDE 40 MG/1
40 TABLET ORAL DAILY
Qty: 90 TABLET | Refills: 3 | Status: SHIPPED | OUTPATIENT
Start: 2023-03-29

## 2023-03-29 RX ORDER — IBUPROFEN 800 MG/1
800 TABLET ORAL AS NEEDED
COMMUNITY
Start: 2022-12-15

## 2023-03-29 RX ORDER — COVID-19 ANTIGEN TEST
KIT MISCELLANEOUS
COMMUNITY
Start: 2023-03-17 | End: 2023-03-29

## 2023-03-29 RX ORDER — PROPAFENONE HYDROCHLORIDE 300 MG/1
TABLET, COATED ORAL
COMMUNITY
Start: 2022-10-24 | End: 2023-03-29

## 2023-03-29 NOTE — ASSESSMENT & PLAN NOTE
· Recurrent episode of atrial flutter with symptoms last week that terminated with propafenone.  This is the first episode since pulmonary vein ablation last June  · History of prolonged QT on EKG complicates drug therapy for rhythm control  · Will discuss with Dr. Huber options for a flutter treat

## 2023-03-29 NOTE — ASSESSMENT & PLAN NOTE
· Repeat transthoracic echo to reassess gradients across aortic and mitral valves  · If elevated, will refer to structural heart clinic for evaluation

## 2023-04-07 ENCOUNTER — HOSPITAL ENCOUNTER (OUTPATIENT)
Dept: CARDIOLOGY | Facility: HOSPITAL | Age: 63
Discharge: HOME | End: 2023-04-07
Admitting: INTERNAL MEDICINE
Payer: COMMERCIAL

## 2023-04-07 VITALS — HEIGHT: 62 IN | BODY MASS INDEX: 34.04 KG/M2 | WEIGHT: 184.97 LBS

## 2023-04-07 DIAGNOSIS — I38 VALVULAR HEART DISEASE: ICD-10-CM

## 2023-04-07 DIAGNOSIS — I48.0 PAROXYSMAL ATRIAL FIBRILLATION: ICD-10-CM

## 2023-04-07 DIAGNOSIS — I50.32 CHRONIC DIASTOLIC CONGESTIVE HEART FAILURE: ICD-10-CM

## 2023-04-07 LAB
BH CV ECHO MEAS - AO MAX PG: 34.7 MMHG
BH CV ECHO MEAS - AO MEAN PG: 20.2 MMHG
BH CV ECHO MEAS - AO ROOT DIAM: 3 CM
BH CV ECHO MEAS - AO V2 MAX: 294.6 CM/SEC
BH CV ECHO MEAS - AO V2 VTI: 70.7 CM
BH CV ECHO MEAS - AVA(I,D): 1.29 CM2
BH CV ECHO MEAS - EDV(CUBED): 88.6 ML
BH CV ECHO MEAS - EDV(MOD-SP2): 76 ML
BH CV ECHO MEAS - EDV(MOD-SP4): 79 ML
BH CV ECHO MEAS - EF(MOD-BP): 62 %
BH CV ECHO MEAS - EF(MOD-SP2): 64.5 %
BH CV ECHO MEAS - EF(MOD-SP4): 63.3 %
BH CV ECHO MEAS - ESV(CUBED): 19.4 ML
BH CV ECHO MEAS - ESV(MOD-SP2): 27 ML
BH CV ECHO MEAS - ESV(MOD-SP4): 29 ML
BH CV ECHO MEAS - FS: 39.7 %
BH CV ECHO MEAS - IVS/LVPW: 0.88 CM
BH CV ECHO MEAS - IVSD: 0.97 CM
BH CV ECHO MEAS - LA DIMENSION: 5.1 CM
BH CV ECHO MEAS - LAT PEAK E' VEL: 4.6 CM/SEC
BH CV ECHO MEAS - LV DIASTOLIC VOL/BSA (35-75): 42.7 CM2
BH CV ECHO MEAS - LV MASS(C)D: 158.2 GRAMS
BH CV ECHO MEAS - LV MAX PG: 5.4 MMHG
BH CV ECHO MEAS - LV MEAN PG: 3.1 MMHG
BH CV ECHO MEAS - LV SYSTOLIC VOL/BSA (12-30): 15.7 CM2
BH CV ECHO MEAS - LV V1 MAX: 116.6 CM/SEC
BH CV ECHO MEAS - LV V1 VTI: 28.5 CM
BH CV ECHO MEAS - LVIDD: 4.5 CM
BH CV ECHO MEAS - LVIDS: 2.7 CM
BH CV ECHO MEAS - LVOT AREA: 3.2 CM2
BH CV ECHO MEAS - LVOT DIAM: 2.02 CM
BH CV ECHO MEAS - LVPWD: 1.1 CM
BH CV ECHO MEAS - MED PEAK E' VEL: 4.72 CM/SEC
BH CV ECHO MEAS - MV DEC SLOPE: 534.7 CM/SEC2
BH CV ECHO MEAS - MV DEC TIME: 0.54 MSEC
BH CV ECHO MEAS - MV E MAX VEL: 244.1 CM/SEC
BH CV ECHO MEAS - MV MAX PG: 31.9 MMHG
BH CV ECHO MEAS - MV MEAN PG: 13.1 MMHG
BH CV ECHO MEAS - MV P1/2T: 165.2 MSEC
BH CV ECHO MEAS - MV V2 VTI: 100.5 CM
BH CV ECHO MEAS - MVA(P1/2T): 1.33 CM2
BH CV ECHO MEAS - MVA(VTI): 0.91 CM2
BH CV ECHO MEAS - PA ACC SLOPE: 568.3 CM/SEC2
BH CV ECHO MEAS - PA ACC TIME: 0.13 SEC
BH CV ECHO MEAS - PA PR(ACCEL): 20.4 MMHG
BH CV ECHO MEAS - PI END-D VEL: 113.4 CM/SEC
BH CV ECHO MEAS - RAP SYSTOLE: 3 MMHG
BH CV ECHO MEAS - RVSP: 46.7 MMHG
BH CV ECHO MEAS - SI(MOD-SP2): 26.5 ML/M2
BH CV ECHO MEAS - SI(MOD-SP4): 27 ML/M2
BH CV ECHO MEAS - SV(LVOT): 91.3 ML
BH CV ECHO MEAS - SV(MOD-SP2): 49 ML
BH CV ECHO MEAS - SV(MOD-SP4): 50 ML
BH CV ECHO MEAS - TAPSE (>1.6): 2.1 CM
BH CV ECHO MEAS - TR MAX PG: 43.7 MMHG
BH CV ECHO MEAS - TR MAX VEL: 328.9 CM/SEC
BH CV ECHO MEASUREMENTS AVERAGE E/E' RATIO: 52.38
BH CV VAS BP RIGHT ARM: NORMAL MMHG
BH CV XLRA - RV BASE: 4.7 CM
BH CV XLRA - RV LENGTH: 7.7 CM
BH CV XLRA - RV MID: 4 CM
BH CV XLRA - TDI S': 9.53 CM/SEC
LEFT ATRIUM VOLUME INDEX: 42.7 ML/M2
MAXIMAL PREDICTED HEART RATE: 160 BPM
STRESS TARGET HR: 136 BPM

## 2023-04-07 PROCEDURE — 93306 TTE W/DOPPLER COMPLETE: CPT | Performed by: INTERNAL MEDICINE

## 2023-04-07 PROCEDURE — 93306 TTE W/DOPPLER COMPLETE: CPT

## 2023-04-12 ENCOUNTER — TELEPHONE (OUTPATIENT)
Dept: CARDIOLOGY | Facility: CLINIC | Age: 63
End: 2023-04-12
Payer: COMMERCIAL

## 2023-04-12 NOTE — TELEPHONE ENCOUNTER
On Monday while she was at work, she had a 40 minute episode of atrial flutter. She took 2 tablets of Propafenone 300 mg and went back into NSR. Then during the night she woke up with another episode that lasted 40 minutes. So she took 2 more tablets of Propafenone 300 mg and went back into NSR.   On Tuesday she had another episode that lasted an hour and 15 minutes. She took 1 tablet of Propafenone 300 mg and converted back to NSR.   When she has these episodes she is short of breath, diaphoretic and feels like she cannot sit or lie down. She does not have chest pain.  Today she is feeling better. Her BP is 122/78 and her HR has been in the 50's.  She would like to know if her medications need to be adjusted?

## 2023-04-13 RX ORDER — PROPAFENONE HYDROCHLORIDE 225 MG/1
225 CAPSULE, EXTENDED RELEASE ORAL 2 TIMES DAILY
Qty: 60 CAPSULE | Refills: 6 | Status: SHIPPED | OUTPATIENT
Start: 2023-04-13 | End: 2023-04-14 | Stop reason: SDUPTHER

## 2023-04-13 NOTE — TELEPHONE ENCOUNTER
I spoke with the patient. She is agreeable to restart Propafenone  mg BID. RX sent to [facility] pharmacy.

## 2023-04-14 RX ORDER — PROPAFENONE HYDROCHLORIDE 225 MG/1
225 CAPSULE, EXTENDED RELEASE ORAL 2 TIMES DAILY
Qty: 180 CAPSULE | Refills: 1 | Status: SHIPPED | OUTPATIENT
Start: 2023-04-14

## 2023-04-17 ENCOUNTER — TELEPHONE (OUTPATIENT)
Dept: CARDIOLOGY | Facility: CLINIC | Age: 63
End: 2023-04-17
Payer: COMMERCIAL

## 2023-04-17 NOTE — TELEPHONE ENCOUNTER
Patient called and left a message, I returned call, no answer I left a message for a return call.

## 2023-09-28 NOTE — PROGRESS NOTES
Cardiology Outpatient Visit      Identification: Jill Miller is a 63 y.o. female who resides in [place], [place]    Reason for visit:  Paroxysmal atrial fibrillation/flutter      Subjective      Patient is 63-year-old female who returns today for follow-up for valvular heart disease status post history of AVR/MVR and tricuspid annuloplasty, chronic diastolic heart failure, paroxysmal atrial fibrillation and cardiac risk factors.  The patient does not take any NOAC as she has a history of left atrial appendage ligation performed at time of her valvular surgery in 2018.  She had a TIFFANIE that did confirm it was completely occluded.  She denies signs or symptoms TIA or CVA.  Last year she underwent a ablation for atrial flutter with Dr. Huber.  Postprocedure she did have breakthrough episodes and is now been placed on long-acting propafenone twice daily which has pretty much resolved them.  She does report her heart rates have been lower ever since starting the medication.  She has sinus bradycardia today at 48 bpm but is asymptomatic with it.  She reports that she feels much better than she used to.  She denies any chest pain or shortness of breath.  She had echocardiogram last year that did show some mild mitral stenosis with a mean gradient of 13 mmHg and some elevated right heart pressures.  She has type 2 diabetes mellitus and is supposed to take metformin 1000 mg twice daily but it causes diarrhea so bad she is unable to work.  She has only been taking it on the weekends.  Her hemoglobin A1c last year was over 10.  She knows it is still elevated.    Review of Systems   Constitutional: Negative for malaise/fatigue.   Eyes:  Negative for vision loss in left eye and vision loss in right eye.   Cardiovascular:  Negative for chest pain, dyspnea on exertion, near-syncope, orthopnea, palpitations, paroxysmal nocturnal dyspnea and syncope.   Musculoskeletal:  Negative for myalgias.   Neurological:  Negative for  "brief paralysis, excessive daytime sleepiness, focal weakness, numbness, paresthesias and weakness.   All other systems reviewed and are negative.      Allergies   Allergen Reactions    Lortab [Hydrocodone-Acetaminophen] Nausea And Vomiting and Dizziness    Morphine And Related Nausea Only         Current Outpatient Medications   Medication Instructions    ALBUTEROL SULFATE IN 2 puffs, Inhalation, Every 6 Hours PRN    aspirin 81 mg, Oral, Daily    Cholecalciferol 5,000 Units, Oral    empagliflozin (JARDIANCE) 10 mg, Oral, Daily    furosemide (LASIX) 40 mg, Oral, Daily    ibuprofen (ADVIL,MOTRIN) 800 mg, As Needed    levothyroxine (SYNTHROID, LEVOTHROID) 175 mcg, Oral, Daily    metFORMIN (GLUCOPHAGE) 1,000 mg, Oral, Daily With Breakfast    metoprolol tartrate (LOPRESSOR) 25 mg, Oral, 2 Times Daily    multivitamin (THERAGRAN) tablet tablet 1 tablet, Oral, Daily    nystatin (MYCOSTATIN) 457624 UNIT/GM powder Topical, As Needed    potassium chloride (MICRO-K) 10 MEQ CR capsule 20 mEq, Oral, Daily    pravastatin (PRAVACHOL) 20 mg, Oral, Nightly    propafenone SR (RYTHMOL SR) 225 mg, Oral, 2 Times Daily         Objective     /68 (BP Location: Right arm, Patient Position: Sitting)   Pulse (!) 48   Ht 157.5 cm (62\")   Wt 82.6 kg (182 lb)   SpO2 93%   BMI 33.29 kg/mý       Constitutional:       Appearance: Healthy appearance. Well-developed.   Eyes:      General: Lids are normal. No scleral icterus.     Conjunctiva/sclera: Conjunctivae normal.   HENT:      Head: Normocephalic and atraumatic.   Neck:      Thyroid: No thyromegaly.      Vascular: No carotid bruit or JVD.   Pulmonary:      Effort: Pulmonary effort is normal.      Breath sounds: Normal breath sounds. No wheezing. No rhonchi. No rales.   Cardiovascular:      Normal rate. Regular rhythm.      Murmurs: There is a diastolic murmur.      No gallop.  No rub.   Pulses:     Intact distal pulses.   Edema:     Peripheral edema absent.   Abdominal:      General: " There is no distension.      Palpations: Abdomen is soft. There is no abdominal mass.   Musculoskeletal:      Cervical back: Normal range of motion. Skin:     General: Skin is warm and dry.      Findings: No rash.   Neurological:      General: No focal deficit present.      Mental Status: Alert and oriented to person, place, and time.      Gait: Gait is intact.   Psychiatric:         Attention and Perception: Attention normal.         Mood and Affect: Mood normal.         Behavior: Behavior normal.         Result Review  (reviewed with patient):        ECG 12 Lead    Date/Time: 10/10/2023 11:12 AM  Performed by: Marisa Suarez APRN    Authorized by: Marisa Suarez APRN  Comparison: compared with previous ECG from 3/29/2023  Comparison to previous ECG: Previous EKG sinus rhythm, current EKG sinus bradycardia with first-degree AV block  BPM: 48  Conduction: 1st degree AV block    Clinical impression: abnormal EKG  Comments: QT/QTc 492/439 MS           Lab Results   Component Value Date    GLUCOSE 189 (H) 05/31/2022    BUN 14 05/31/2022    CREATININE 0.75 05/31/2022    EGFR 91.3 05/31/2022    BCR 18.7 05/31/2022    K 4.2 05/31/2022    CO2 27.0 05/31/2022    CALCIUM 9.9 05/31/2022    ALBUMIN 4.00 04/19/2022    BILITOT 0.4 04/19/2022    AST 57 (H) 04/19/2022    ALT 89 (H) 04/19/2022     Lab Results   Component Value Date    WBC 8.17 05/31/2022    HGB 15.8 05/31/2022    HCT 46.2 05/31/2022    .5 (H) 05/31/2022     05/31/2022     Lab Results   Component Value Date    CHOL 133 04/18/2022    TRIG 176 (H) 04/18/2022    HDL 46 04/18/2022    LDL 58 04/18/2022     Lab Results   Component Value Date    HGBA1C 10.40 (H) 05/31/2022           Assessment     Diagnoses and all orders for this visit:    1. Valvular heart disease (Primary)  Overview:  Cardiac catheterization (9/20/17): Mild nonobstructive CAD. Moderate pulmonary hypertension.  Echo (4/3/18): LVEF 70%. Severe LA dilatation. Moderate AI. Severe MS  and severe MR. RVSP approximately 56 mmHg.  AVR/MVR/tricuspid annuloplasty (bioprothetic valves) and left atrial appendage ligation performed by Ajay Cheatham, 7/16/18  Echo (08/22/2018): LVEF 65%. The bioprosthetic aortic and mitral valves are well seated and functioning normally. RVSP<35 mmHg.  TIFFANIE (9/17/2018): Normal functioning MVR.  ANGELA completely occluded.  Echo (4/20/2022): LVEF 50%.  Bioprosthetic aortic valve functions normally.  Bioprosthetic mitral valve with mild MR.  Mild to moderate TR.  Echo (6/2/2022): LVEF 65%.  Bioprosthetic aortic valve present with mean gradient 18 mmHg.  Bioprosthetic mitral valve present with mean gradient 13 mmHg. RVSP > 55 mmHg.  Echo (4/7/2023): LVEF 62%.  Mild LVH.  Mild bioprosthetic aortic valve stenosis.  Bioprosthetic mitral valve stenosis with mean gradient 13 mmHg).  Moderate TR.  RVSP 45-55 mmHg    Assessment & Plan:  No signs or symptoms of heart failure  Recent echo shows mild mitral stenosis.  Continue aspirin 81 mg daily      2. Paroxysmal atrial fibrillation  Overview:  Postoperative atrial fibrillation with RVR in the setting of left upper lobectomy, 5/15/2017  Echo (8/25/17): LVEF 60%.  Moderate to severe mitral stenosis (mean gradient 9 mmHg).  Moderate aortic insufficiency.  RVSP 50 mmHg  Chads Vasc = 2 (female, HTN)  Left atrial appendage ligation and MAZE procedure by Ajay Cheatham, 7/16/18  TIFFANIE (9/17/2018):  ANGELA completely occluded.  Successful external cardioversion for atypical atrial flutter after initiation of propafenone, 4/28/2022  PVA for atrial flutter by Nigel Huber, 6/1/2022    Assessment & Plan:  No signs or symptoms TIA or CVA.  Defer anticoagulation due to left atrial appendage ligation performed 2018.  TIFFANIE confirmed completely occluded  Follow-up with Dr. Huber for management.  Continue propafenone 225 mg twice daily      3. Hyperlipidemia LDL goal <70  Overview:  High intensity statin indicated given the presence of diabetes and mild  CAD    Assessment & Plan:  Continue Pravachol 20 mg daily      4. Essential hypertension  Overview:  Target blood pressure <130/80 mmHg    Assessment & Plan:  Hypertension is controlled  Continue metoprolol tartrate 25 mg twice daily      5. Chronic diastolic congestive heart failure  Overview:  Echo (6/2/2022): LVEF 65%.  Bioprosthetic aortic valve present with mean gradient 18 mmHg.  Bioprosthetic mitral valve present with mean gradient 13 mmHg. RVSP > 55 mmHg.  Echo (4/7/2023): LVEF 62%.  Mild LVH.  Mild bioprosthetic aortic valve stenosis.  Bioprosthetic mitral valve stenosis with mean gradient 13 mmHg).  Moderate TR.  RVSP 45-55 mmHg    Assessment & Plan:  Stable NYHA class II symptoms  Continue Lasix 40 mg along with potassium 20 mill equivalents daily  Start Jardiance 10 mg daily      Other orders  -     empagliflozin (Jardiance) 10 MG tablet tablet; Take 1 tablet by mouth Daily.  Dispense: 90 tablet; Refill: 1  -     ECG 12 Lead          Plan   No signs or symptoms of angina and heart failure symptoms are stable and compensated.  Start Jardiance 10 mg daily for uncontrolled diabetes mellitus and for heart failure  Follow-up with PCP for further recommendations for better control of diabetes mellitus.  Hemoglobin A1c goal is 7.0 or less      Follow-up   Return in about 6 months (around 4/10/2024), or if symptoms worsen or fail to improve, for Follow-up with Dr. Lay next visit.      GUCCI Euceda  10/10/2023

## 2023-10-10 ENCOUNTER — OFFICE VISIT (OUTPATIENT)
Dept: CARDIOLOGY | Facility: CLINIC | Age: 63
End: 2023-10-10
Payer: COMMERCIAL

## 2023-10-10 VITALS
OXYGEN SATURATION: 93 % | WEIGHT: 182 LBS | SYSTOLIC BLOOD PRESSURE: 112 MMHG | HEART RATE: 48 BPM | BODY MASS INDEX: 33.49 KG/M2 | DIASTOLIC BLOOD PRESSURE: 68 MMHG | HEIGHT: 62 IN

## 2023-10-10 DIAGNOSIS — I48.0 PAROXYSMAL ATRIAL FIBRILLATION: ICD-10-CM

## 2023-10-10 DIAGNOSIS — I38 VALVULAR HEART DISEASE: Primary | ICD-10-CM

## 2023-10-10 DIAGNOSIS — I10 ESSENTIAL HYPERTENSION: ICD-10-CM

## 2023-10-10 DIAGNOSIS — E78.5 HYPERLIPIDEMIA LDL GOAL <70: ICD-10-CM

## 2023-10-10 DIAGNOSIS — I50.32 CHRONIC DIASTOLIC CONGESTIVE HEART FAILURE: ICD-10-CM

## 2023-10-10 PROCEDURE — 99214 OFFICE O/P EST MOD 30 MIN: CPT | Performed by: NURSE PRACTITIONER

## 2023-10-10 PROCEDURE — 93000 ELECTROCARDIOGRAM COMPLETE: CPT | Performed by: NURSE PRACTITIONER

## 2023-10-10 NOTE — ASSESSMENT & PLAN NOTE
No signs or symptoms TIA or CVA.  Defer anticoagulation due to left atrial appendage ligation performed 2018.  TIFFANIE confirmed completely occluded  Follow-up with Dr. Huber for management.  Continue propafenone 225 mg twice daily

## 2023-10-10 NOTE — ASSESSMENT & PLAN NOTE
Stable NYHA class II symptoms  Continue Lasix 40 mg along with potassium 20 mill equivalents daily  Start Jardiance 10 mg daily

## 2023-10-10 NOTE — ASSESSMENT & PLAN NOTE
No signs or symptoms of heart failure  Recent echo shows mild mitral stenosis.  Continue aspirin 81 mg daily

## 2023-10-23 RX ORDER — PROPAFENONE HYDROCHLORIDE 225 MG/1
CAPSULE, EXTENDED RELEASE ORAL
Qty: 180 CAPSULE | Refills: 3 | Status: SHIPPED | OUTPATIENT
Start: 2023-10-23

## 2024-01-11 ENCOUNTER — CLINICAL SUPPORT (OUTPATIENT)
Dept: CARDIOLOGY | Facility: CLINIC | Age: 64
End: 2024-01-11
Payer: COMMERCIAL

## 2024-01-11 ENCOUNTER — HOSPITAL ENCOUNTER (EMERGENCY)
Facility: HOSPITAL | Age: 64
Discharge: HOME | End: 2024-01-11
Attending: EMERGENCY MEDICINE
Payer: COMMERCIAL

## 2024-01-11 ENCOUNTER — APPOINTMENT (OUTPATIENT)
Dept: GENERAL RADIOLOGY | Facility: HOSPITAL | Age: 64
End: 2024-01-11
Payer: COMMERCIAL

## 2024-01-11 ENCOUNTER — TELEPHONE (OUTPATIENT)
Dept: CARDIOLOGY | Facility: CLINIC | Age: 64
End: 2024-01-11

## 2024-01-11 VITALS
RESPIRATION RATE: 19 BRPM | SYSTOLIC BLOOD PRESSURE: 96 MMHG | HEART RATE: 67 BPM | TEMPERATURE: 98.1 F | HEIGHT: 62 IN | DIASTOLIC BLOOD PRESSURE: 51 MMHG | WEIGHT: 170 LBS | OXYGEN SATURATION: 93 % | BODY MASS INDEX: 31.28 KG/M2

## 2024-01-11 DIAGNOSIS — R79.89 ELEVATED TROPONIN: ICD-10-CM

## 2024-01-11 DIAGNOSIS — I48.92 ATRIAL FLUTTER WITH RAPID VENTRICULAR RESPONSE: Primary | ICD-10-CM

## 2024-01-11 DIAGNOSIS — R73.9 HYPERGLYCEMIA: ICD-10-CM

## 2024-01-11 DIAGNOSIS — I48.0 PAROXYSMAL ATRIAL FIBRILLATION: Primary | ICD-10-CM

## 2024-01-11 DIAGNOSIS — R00.2 PALPITATIONS: ICD-10-CM

## 2024-01-11 DIAGNOSIS — R79.89 ELEVATED TSH: ICD-10-CM

## 2024-01-11 DIAGNOSIS — R79.89 ELEVATED BRAIN NATRIURETIC PEPTIDE (BNP) LEVEL: ICD-10-CM

## 2024-01-11 DIAGNOSIS — R45.1 RESTLESSNESS: ICD-10-CM

## 2024-01-11 LAB
ALBUMIN SERPL-MCNC: 4.5 G/DL (ref 3.5–5.2)
ALBUMIN/GLOB SERPL: 1.5 G/DL
ALP SERPL-CCNC: 104 U/L (ref 39–117)
ALT SERPL W P-5'-P-CCNC: 19 U/L (ref 1–33)
ANION GAP SERPL CALCULATED.3IONS-SCNC: 15 MMOL/L (ref 5–15)
AST SERPL-CCNC: 28 U/L (ref 1–32)
BASOPHILS # BLD AUTO: 0.04 10*3/MM3 (ref 0–0.2)
BASOPHILS NFR BLD AUTO: 0.4 % (ref 0–1.5)
BILIRUB SERPL-MCNC: 0.7 MG/DL (ref 0–1.2)
BUN SERPL-MCNC: 26 MG/DL (ref 8–23)
BUN/CREAT SERPL: 27.4 (ref 7–25)
CALCIUM SPEC-SCNC: 9.6 MG/DL (ref 8.6–10.5)
CHLORIDE SERPL-SCNC: 94 MMOL/L (ref 98–107)
CO2 SERPL-SCNC: 27 MMOL/L (ref 22–29)
CREAT SERPL-MCNC: 0.95 MG/DL (ref 0.57–1)
DEPRECATED RDW RBC AUTO: 47.6 FL (ref 37–54)
EGFRCR SERPLBLD CKD-EPI 2021: 68.3 ML/MIN/1.73
EOSINOPHIL # BLD AUTO: 0.02 10*3/MM3 (ref 0–0.4)
EOSINOPHIL NFR BLD AUTO: 0.2 % (ref 0.3–6.2)
ERYTHROCYTE [DISTWIDTH] IN BLOOD BY AUTOMATED COUNT: 11.7 % (ref 12.3–15.4)
GLOBULIN UR ELPH-MCNC: 3 GM/DL
GLUCOSE SERPL-MCNC: 238 MG/DL (ref 65–99)
HCT VFR BLD AUTO: 50.1 % (ref 34–46.6)
HGB BLD-MCNC: 17.6 G/DL (ref 12–15.9)
HOLD SPECIMEN: NORMAL
IMM GRANULOCYTES # BLD AUTO: 0.05 10*3/MM3 (ref 0–0.05)
IMM GRANULOCYTES NFR BLD AUTO: 0.5 % (ref 0–0.5)
LYMPHOCYTES # BLD AUTO: 1.48 10*3/MM3 (ref 0.7–3.1)
LYMPHOCYTES NFR BLD AUTO: 15.3 % (ref 19.6–45.3)
MAGNESIUM SERPL-MCNC: 2.5 MG/DL (ref 1.6–2.4)
MCH RBC QN AUTO: 38.6 PG (ref 26.6–33)
MCHC RBC AUTO-ENTMCNC: 35.1 G/DL (ref 31.5–35.7)
MCV RBC AUTO: 109.9 FL (ref 79–97)
MONOCYTES # BLD AUTO: 0.6 10*3/MM3 (ref 0.1–0.9)
MONOCYTES NFR BLD AUTO: 6.2 % (ref 5–12)
NEUTROPHILS NFR BLD AUTO: 7.49 10*3/MM3 (ref 1.7–7)
NEUTROPHILS NFR BLD AUTO: 77.4 % (ref 42.7–76)
NRBC BLD AUTO-RTO: 0.6 /100 WBC (ref 0–0.2)
NT-PROBNP SERPL-MCNC: 3680 PG/ML (ref 0–900)
PLATELET # BLD AUTO: 175 10*3/MM3 (ref 140–450)
PMV BLD AUTO: 11.1 FL (ref 6–12)
POTASSIUM SERPL-SCNC: 4 MMOL/L (ref 3.5–5.2)
PROT SERPL-MCNC: 7.5 G/DL (ref 6–8.5)
RBC # BLD AUTO: 4.56 10*6/MM3 (ref 3.77–5.28)
SODIUM SERPL-SCNC: 136 MMOL/L (ref 136–145)
T4 FREE SERPL-MCNC: 1.52 NG/DL (ref 0.93–1.7)
TROPONIN T SERPL HS-MCNC: 25 NG/L
TSH SERPL DL<=0.05 MIU/L-ACNC: 11.77 UIU/ML (ref 0.27–4.2)
WBC NRBC COR # BLD AUTO: 9.68 10*3/MM3 (ref 3.4–10.8)
WHOLE BLOOD HOLD COAG: NORMAL
WHOLE BLOOD HOLD SPECIMEN: NORMAL

## 2024-01-11 PROCEDURE — 84439 ASSAY OF FREE THYROXINE: CPT | Performed by: EMERGENCY MEDICINE

## 2024-01-11 PROCEDURE — 83735 ASSAY OF MAGNESIUM: CPT | Performed by: EMERGENCY MEDICINE

## 2024-01-11 PROCEDURE — 93005 ELECTROCARDIOGRAM TRACING: CPT

## 2024-01-11 PROCEDURE — 84484 ASSAY OF TROPONIN QUANT: CPT | Performed by: EMERGENCY MEDICINE

## 2024-01-11 PROCEDURE — 25010000002 PROPOFOL 10 MG/ML EMULSION: Performed by: EMERGENCY MEDICINE

## 2024-01-11 PROCEDURE — 80050 GENERAL HEALTH PANEL: CPT | Performed by: EMERGENCY MEDICINE

## 2024-01-11 PROCEDURE — 83880 ASSAY OF NATRIURETIC PEPTIDE: CPT | Performed by: EMERGENCY MEDICINE

## 2024-01-11 PROCEDURE — 93005 ELECTROCARDIOGRAM TRACING: CPT | Performed by: EMERGENCY MEDICINE

## 2024-01-11 PROCEDURE — 99285 EMERGENCY DEPT VISIT HI MDM: CPT

## 2024-01-11 PROCEDURE — 71045 X-RAY EXAM CHEST 1 VIEW: CPT

## 2024-01-11 PROCEDURE — 92960 CARDIOVERSION ELECTRIC EXT: CPT

## 2024-01-11 RX ORDER — SODIUM CHLORIDE 0.9 % (FLUSH) 0.9 %
10 SYRINGE (ML) INJECTION AS NEEDED
Status: DISCONTINUED | OUTPATIENT
Start: 2024-01-11 | End: 2024-01-11 | Stop reason: HOSPADM

## 2024-01-11 RX ORDER — PROPOFOL 10 MG/ML
VIAL (ML) INTRAVENOUS
Status: COMPLETED | OUTPATIENT
Start: 2024-01-11 | End: 2024-01-11

## 2024-01-11 RX ORDER — PROPOFOL 10 MG/ML
VIAL (ML) INTRAVENOUS
Status: COMPLETED
Start: 2024-01-11 | End: 2024-01-11

## 2024-01-11 RX ORDER — PROPOFOL 10 MG/ML
10 VIAL (ML) INTRAVENOUS ONCE
Status: COMPLETED | OUTPATIENT
Start: 2024-01-11 | End: 2024-01-11

## 2024-01-11 RX ADMIN — PROPOFOL 25 MG: 10 INJECTION, EMULSION INTRAVENOUS at 18:16

## 2024-01-11 NOTE — TELEPHONE ENCOUNTER
Pt came in for walk in EKG in regards to elevated heart rate as addressed in patient message yesterday. EKG shown to Jamil Lima PA-C who advised patient to try vagal maneuvers and if unable to slow the heart rate, pt to go to ER due to how long she has been in this rhythm. I went in to the room with patient and she states she has tried coughing and vagal maneuvers multiple times without decreasing her heart rate. Pt advised to go to ER. Pt agreeable. Medical assistant took patient to the ER via wheelchair.

## 2024-01-11 NOTE — ED PROVIDER NOTES
Subjective   History of Present Illness  Patient is a pleasant 63-year-old female with a history of coronary artery disease, atrial fibs/flutter and cardiac ablation.  She is followed by Dr. Huber and Dr. Lay, cardiology.  Presents today with rapid heart rate and suspected atrial flutter with rapid ventricular response.  She states that for the past couple days she has been restless.  She has been unable to sleep at night and also during the day.  She has not had palpitations or chest pain but given these vague symptoms she did check her pulse this morning and noted a heart rate between 160 and 180.  This prompted her visit to the cardiology clinic and she was referred here to the emergency department due to the elevated rate.  Patient is not currently anticoagulated.  Denies fever, chills, chest pain, abdominal pain, nausea, vomiting, diarrhea, or other acute complaint.      Review of Systems   All other systems reviewed and are negative.      Past Medical History:   Diagnosis Date    Atrial fibrillation and flutter     Chronic diastolic congestive heart failure     Chronically Abnormal CXR (Left pleural disease post op) 09/25/2017    COPD exacerbation 04/17/2022    Essential hypertension 04/10/2017    Target blood pressure <130/80 mmHg    Graves disease     Hyperlipidemia LDL goal <70     Hypertension     Hyperthyroidism     Lung cancer     MRSA (methicillin resistant staph aureus) culture positive 2014    under left breast, incision and debridement, treated with wound packing and po abx     Prolonged QTC interval on ECG 06/01/2022    Rheumatic mitral stenosis     Type 2 diabetes mellitus 07/17/2018    Valvular heart disease        Allergies   Allergen Reactions    Lortab [Hydrocodone-Acetaminophen] Nausea And Vomiting and Dizziness    Morphine And Related Nausea Only       Past Surgical History:   Procedure Laterality Date    ABLATION OF DYSRHYTHMIC FOCUS  07/16/2018    ABSCESS DRAINAGE      under left  breast d/t mrsa     AORTIC VALVE REPAIR/REPLACEMENT N/A 07/16/2018    Procedure: MEDIAN STERNOTOMY, AORTIC VALVE REPLACEMENT WITH TIFFANIE AND MAZE, TRICUSPID RING, LEFT ATRIAL OCCLUSION;  Surgeon: Ajay Cheatham MD;  Location:  [facility];  Service: Cardiothoracic    BRONCHOSCOPY Left 05/12/2017    Procedure: BRONCHOSCOPY;  Surgeon: Ajay Cheatham MD;  Location:  [facility];  Service:     BRONCHOSCOPY N/A 05/18/2017    Procedure: BRONCHOSCOPY BIOPSY AT BEDSIDE;  Surgeon: Ajay Cheatham MD;  Location:  [facility];  Service:     CARDIAC CATHETERIZATION N/A 09/28/2017    Procedure: Left Heart Cath;  Surgeon: Marco Lay MD;  Location:  [facility];  Service:     CARDIAC CATHETERIZATION N/A 09/28/2017    Procedure: Right Heart Cath;  Surgeon: Marco Lay MD;  Location:  [facility];  Service:     CARDIAC ELECTROPHYSIOLOGY PROCEDURE N/A 6/1/2022    Procedure: Ablation atrial fibrillation (atypical A flutter). Hold Rythmol x5 days;  Surgeon: Nigel Huber MD;  Location:  [facility];  Service: Cardiovascular;  Laterality: N/A;    LUNG BIOPSY  04/2017    LYMPH NODE DISSECTION Left 05/12/2017    Procedure: MEDIASTINAL LYMPH NODE DISSECTION;  Surgeon: Ajay Cheatham MD;  Location:  [facility];  Service:     MAZE PROCEDURE      MITRAL VALVE REPAIR/REPLACEMENT N/A 07/16/2018    Procedure: MITRAL VALVE REPLACEMENT;  Surgeon: Ajay Cheatham MD;  Location:  [facility];  Service: Cardiothoracic    MITRAL VALVE REPLACEMENT  07/16/2018    TEETH EXTRACTION      THORACOSCOPY VIDEO ASSISTED WITH LOBECTOMY Left 05/12/2017    Procedure: THORACOSCOPY VIDEO ASSISTED WITH OPEN LOBECTOMY;  Surgeon: Ajay Cheatham MD;  Location:  [facility];  Service:     TOTAL THYROIDECTOMY  approx 2012    TRICUSPID VALVE SURGERY  07/16/2018       Family History   Problem Relation Age of Onset    Breast cancer Mother     Diabetes Father     Heart disease Son        Social History      Socioeconomic History    Marital status:     Number of children: 3   Tobacco Use    Smoking status: Some Days     Packs/day: 0.25     Years: 40.00     Additional pack years: 0.00     Total pack years: 10.00     Types: Cigarettes     Start date: 5/5/1978     Passive exposure: Current    Smokeless tobacco: Never    Tobacco comments:     Couldn't remember exact dates.   Vaping Use    Vaping Use: Never used   Substance and Sexual Activity    Alcohol use: No    Drug use: No    Sexual activity: Not Currently     Partners: Male     Birth control/protection: None           Objective   Physical Exam  Vitals and nursing note reviewed.   Constitutional:       General: She is not in acute distress.     Appearance: Normal appearance.   HENT:      Head: Normocephalic and atraumatic.   Eyes:      Conjunctiva/sclera: Conjunctivae normal.      Pupils: Pupils are equal, round, and reactive to light.   Neck:      Thyroid: No thyromegaly.   Cardiovascular:      Rate and Rhythm: Regular rhythm. Tachycardia present.      Heart sounds: Normal heart sounds. No murmur heard.     No friction rub. No gallop.   Pulmonary:      Effort: Pulmonary effort is normal. No respiratory distress.      Breath sounds: Normal breath sounds.   Chest:      Chest wall: No tenderness.   Abdominal:      Palpations: Abdomen is soft.      Tenderness: There is no abdominal tenderness.   Musculoskeletal:         General: Normal range of motion.      Cervical back: Normal range of motion and neck supple.   Lymphadenopathy:      Cervical: No cervical adenopathy.   Skin:     General: Skin is warm and dry.      Capillary Refill: Capillary refill takes less than 2 seconds.   Neurological:      General: No focal deficit present.      Mental Status: She is alert and oriented to person, place, and time.   Psychiatric:         Mood and Affect: Mood normal.         Behavior: Behavior normal.         Thought Content: Thought content normal.         Electrical  Cardioversion    Date/Time: 1/11/2024 6:30 PM    Performed by: Rick Nagy DO  Authorized by: Rick Nagy DO    Consent:     Consent obtained:  Verbal and written    Consent given by:  Patient    Risks, benefits, and alternatives were discussed: yes      Risks discussed:  Cutaneous burn, death, induced arrhythmia and pain (Stroke)    Alternatives discussed:  Rate-control medication, alternative treatment, referral, delayed treatment and observation  Universal protocol:     Procedure explained and questions answered to patient or proxy's satisfaction: yes      Relevant documents present and verified: yes      Test results available: yes      Imaging studies available: yes      Required blood products, implants, devices, and special equipment available: yes      Immediately prior to procedure, a time out was called: yes      Patient identity confirmed:  Verbally with patient and arm band  Pre-procedure details:     Cardioversion basis:  Elective    Rhythm:  Atrial flutter    Electrode placement:  Anterior-posterior  Attempt one:     Cardioversion mode:  Synchronous    Waveform:  Biphasic    Shock (Joules):  120    Shock outcome:  Conversion to normal sinus rhythm  Post-procedure details:     Patient status:  Awake    Procedure completion:  Tolerated well, no immediate complications             ED Course  ED Course as of 01/11/24 1934   Thu Jan 11, 2024   1905 Patient cardioverted without difficulty.  See procedure note.  Patient is feeling well and denies any other symptoms at this time.  Both she and her  are comfortable with discharge at this time.  I am concerned slightly about her oxygen saturation.  She is averaged approximately 90% here in the emergency department.  Averages about 93 at home.  She has a history of a partial lobectomy secondary to lung cancer and smoking history.  She has pulse ox at home and will monitor her pressure.  She and her  both understand have a low threshold to return  to the emergency department should symptoms arise.  Otherwise we will follow-up with cardiology at next availability. [CP]      ED Course User Index  [CP] Rick Nagy DO      Recent Results (from the past 24 hour(s))   ECG 12 Lead ED Triage Standing Order; Dysrhythmia    Collection Time: 01/11/24  3:08 PM   Result Value Ref Range    QT Interval 292 ms    QTC Interval 450 ms   Comprehensive Metabolic Panel    Collection Time: 01/11/24  3:28 PM    Specimen: Blood   Result Value Ref Range    Glucose 238 (H) 65 - 99 mg/dL    BUN 26 (H) 8 - 23 mg/dL    Creatinine 0.95 0.57 - 1.00 mg/dL    Sodium 136 136 - 145 mmol/L    Potassium 4.0 3.5 - 5.2 mmol/L    Chloride 94 (L) 98 - 107 mmol/L    CO2 27.0 22.0 - 29.0 mmol/L    Calcium 9.6 8.6 - 10.5 mg/dL    Total Protein 7.5 6.0 - 8.5 g/dL    Albumin 4.5 3.5 - 5.2 g/dL    ALT (SGPT) 19 1 - 33 U/L    AST (SGOT) 28 1 - 32 U/L    Alkaline Phosphatase 104 39 - 117 U/L    Total Bilirubin 0.7 0.0 - 1.2 mg/dL    Globulin 3.0 gm/dL    A/G Ratio 1.5 g/dL    BUN/Creatinine Ratio 27.4 (H) 7.0 - 25.0    Anion Gap 15.0 5.0 - 15.0 mmol/L    eGFR 68.3 >60.0 mL/min/1.73   Magnesium    Collection Time: 01/11/24  3:28 PM    Specimen: Blood   Result Value Ref Range    Magnesium 2.5 (H) 1.6 - 2.4 mg/dL   Single High Sensitivity Troponin T    Collection Time: 01/11/24  3:28 PM    Specimen: Blood   Result Value Ref Range    HS Troponin T 25 (H) <14 ng/L   TSH    Collection Time: 01/11/24  3:28 PM    Specimen: Blood   Result Value Ref Range    TSH 11.770 (H) 0.270 - 4.200 uIU/mL   BNP    Collection Time: 01/11/24  3:28 PM    Specimen: Blood   Result Value Ref Range    proBNP 3,680.0 (H) 0.0 - 900.0 pg/mL   Green Top (Gel)    Collection Time: 01/11/24  3:28 PM   Result Value Ref Range    Extra Tube Hold for add-ons.    Lavender Top    Collection Time: 01/11/24  3:28 PM   Result Value Ref Range    Extra Tube hold for add-on    Gold Top - SST    Collection Time: 01/11/24  3:28 PM   Result Value Ref Range     Extra Tube Hold for add-ons.    Light Blue Top    Collection Time: 01/11/24  3:28 PM   Result Value Ref Range    Extra Tube Hold for add-ons.    CBC Auto Differential    Collection Time: 01/11/24  3:28 PM    Specimen: Blood   Result Value Ref Range    WBC 9.68 3.40 - 10.80 10*3/mm3    RBC 4.56 3.77 - 5.28 10*6/mm3    Hemoglobin 17.6 (H) 12.0 - 15.9 g/dL    Hematocrit 50.1 (H) 34.0 - 46.6 %    .9 (H) 79.0 - 97.0 fL    MCH 38.6 (H) 26.6 - 33.0 pg    MCHC 35.1 31.5 - 35.7 g/dL    RDW 11.7 (L) 12.3 - 15.4 %    RDW-SD 47.6 37.0 - 54.0 fl    MPV 11.1 6.0 - 12.0 fL    Platelets 175 140 - 450 10*3/mm3    Neutrophil % 77.4 (H) 42.7 - 76.0 %    Lymphocyte % 15.3 (L) 19.6 - 45.3 %    Monocyte % 6.2 5.0 - 12.0 %    Eosinophil % 0.2 (L) 0.3 - 6.2 %    Basophil % 0.4 0.0 - 1.5 %    Immature Grans % 0.5 0.0 - 0.5 %    Neutrophils, Absolute 7.49 (H) 1.70 - 7.00 10*3/mm3    Lymphocytes, Absolute 1.48 0.70 - 3.10 10*3/mm3    Monocytes, Absolute 0.60 0.10 - 0.90 10*3/mm3    Eosinophils, Absolute 0.02 0.00 - 0.40 10*3/mm3    Basophils, Absolute 0.04 0.00 - 0.20 10*3/mm3    Immature Grans, Absolute 0.05 0.00 - 0.05 10*3/mm3    nRBC 0.6 (H) 0.0 - 0.2 /100 WBC   ECG 12 Lead Rhythm Change    Collection Time: 01/11/24  6:21 PM   Result Value Ref Range    QT Interval 430 ms    QTC Interval 447 ms     Note: In addition to lab results from this visit, the labs listed above may include labs taken at another facility or during a different encounter within the last 24 hours. Please correlate lab times with ED admission and discharge times for further clarification of the services performed during this visit.    XR Chest 1 View   Final Result   Impression:   1. No acute process.   2. Chronic findings above.            Electronically Signed: Philip Ybarra MD     1/11/2024 4:19 PM EST     Workstation ID: [id number]        Vitals:    01/11/24 1823 01/11/24 1824 01/11/24 1825 01/11/24 1836   BP:   105/57    BP Location:       Patient Position:        Pulse: 62 65 64 65   Resp:       Temp:       TempSrc:       SpO2: 100%  98% 91%   Weight:       Height:         Medications   sodium chloride 0.9 % flush 10 mL (has no administration in time range)   Propofol (DIPRIVAN) injection 10 mg ( Intravenous Override Pull-Not given 1/11/24 1825)   Propofol (DIPRIVAN) injection (25 mg Intravenous Given 1/11/24 1816)     ECG/EMG Results (last 24 hours)       Procedure Component Value Units Date/Time    ECG 12 Lead ED Triage Standing Order; Dysrhythmia [[id number]] Collected: 01/11/24 1508     Updated: 01/11/24 1513     QT Interval 292 ms      QTC Interval 450 ms     Narrative:      Test Reason : ED Triage Standing Order~  Blood Pressure :   */*   mmHG  Vent. Rate : 143 BPM     Atrial Rate : 143 BPM     P-R Int : 136 ms          QRS Dur :  92 ms      QT Int : 292 ms       P-R-T Axes :   *  33 162 degrees     QTc Int : 450 ms    Sinus tachycardia  Marked ST abnormality, possible lateral subendocardial injury  Abnormal ECG  When compared with ECG of 01-JUN-2022 20:30,  Vent. rate has increased BY  55 BPM  Non-specific change in ST segment in Inferior leads  ST now depressed in Lateral leads    Referred By:            Confirmed By:           ECG 12 Lead Rhythm Change   Preliminary Result   Test Reason : RHYTHMCHANGE   Blood Pressure :   */*   mmHG   Vent. Rate :  65 BPM     Atrial Rate :  65 BPM      P-R Int : 252 ms          QRS Dur :  94 ms       QT Int : 430 ms       P-R-T Axes :  90  44  59 degrees      QTc Int : 447 ms      ** Poor data quality, interpretation may be adversely affected   Sinus rhythm with 1st degree AV block with occasional premature    ventricular complexes   Septal infarct , age undetermined   Abnormal ECG   When compared with ECG of 11-JAN-2024 15:08, (Unconfirmed)   premature ventricular complexes are now present   ID interval has increased   Vent. rate has decreased BY  78 BPM   ST less depressed in Lateral leads   Nonspecific T wave abnormality has  replaced inverted T waves in Lateral    leads      Referred By: [place]           Confirmed By:       ECG 12 Lead ED Triage Standing Order; Dysrhythmia   Preliminary Result   Test Reason : ED Triage Standing Order~   Blood Pressure :   */*   mmHG   Vent. Rate : 143 BPM     Atrial Rate : 143 BPM      P-R Int : 136 ms          QRS Dur :  92 ms       QT Int : 292 ms       P-R-T Axes :   *  33 162 degrees      QTc Int : 450 ms      Sinus tachycardia   Marked ST abnormality, possible lateral subendocardial injury   Abnormal ECG   When compared with ECG of 01-JUN-2022 20:30,   Vent. rate has increased BY  55 BPM   Non-specific change in ST segment in Inferior leads   ST now depressed in Lateral leads      Referred By:            Confirmed By:                                                  Medical Decision Making  Case discussed with cardiology.  Cardiology notes the atrial appendage closure in the past and thus patient does not require anticoagulation prior to cardioversion.  Discussed risk and benefits of sedation and cardioversion with the patient.  Patient agrees to have procedure performed.  Procedure went well.  Please see procedure note.  Patient was observed for an extended period following the procedure.  Remained asymptomatic and feels better following cardioversion.  Patient will follow-up with the cardiology service at next availability.  She understands to monitor her symptoms and have a low threshold to return to the emergency department if symptoms persist, worsen, or other concerns arise.    Problems Addressed:  Atrial flutter with rapid ventricular response: complicated acute illness or injury  Elevated brain natriuretic peptide (BNP) level: complicated acute illness or injury  Elevated troponin: complicated acute illness or injury  Elevated TSH: complicated acute illness or injury  Hyperglycemia: complicated acute illness or injury  Restlessness: complicated acute illness or injury    Amount and/or  Complexity of Data Reviewed  Independent Historian: EMS  External Data Reviewed: notes.  Labs: ordered. Decision-making details documented in ED Course.  Radiology: ordered and independent interpretation performed. Decision-making details documented in ED Course.  ECG/medicine tests: ordered and independent interpretation performed. Decision-making details documented in ED Course.    Risk  Prescription drug management.        Final diagnoses:   Atrial flutter with rapid ventricular response   Restlessness   Elevated brain natriuretic peptide (BNP) level   Elevated troponin   Elevated TSH   Hyperglycemia       ED Disposition  ED Disposition       ED Disposition   Discharge    Condition   Stable    Comment   --                  Rick Nagy,   01/12/24 0205       Rick Nagy,   01/12/24 0210

## 2024-01-14 LAB
QT INTERVAL: 292 MS
QT INTERVAL: 430 MS
QTC INTERVAL: 447 MS
QTC INTERVAL: 450 MS

## 2024-01-18 DIAGNOSIS — I50.32 CHRONIC DIASTOLIC CONGESTIVE HEART FAILURE: ICD-10-CM

## 2024-01-18 RX ORDER — FUROSEMIDE 40 MG/1
40 TABLET ORAL DAILY
Qty: 90 TABLET | Refills: 1 | Status: SHIPPED | OUTPATIENT
Start: 2024-01-18

## 2024-01-18 NOTE — TELEPHONE ENCOUNTER
Lab Results   Component Value Date    GLUCOSE 238 (H) 01/11/2024    CALCIUM 9.6 01/11/2024     01/11/2024    K 4.0 01/11/2024    CO2 27.0 01/11/2024    CL 94 (L) 01/11/2024    BUN 26 (H) 01/11/2024    CREATININE 0.95 01/11/2024    EGFR 68.3 01/11/2024    BCR 27.4 (H) 01/11/2024    ANIONGAP 15.0 01/11/2024

## 2024-01-19 ENCOUNTER — OFFICE VISIT (OUTPATIENT)
Dept: CARDIOLOGY | Facility: HOSPITAL | Age: 64
End: 2024-01-19
Payer: COMMERCIAL

## 2024-01-19 VITALS
OXYGEN SATURATION: 91 % | DIASTOLIC BLOOD PRESSURE: 50 MMHG | RESPIRATION RATE: 20 BRPM | HEIGHT: 62 IN | WEIGHT: 174 LBS | TEMPERATURE: 97.1 F | HEART RATE: 61 BPM | SYSTOLIC BLOOD PRESSURE: 100 MMHG | BODY MASS INDEX: 32.02 KG/M2

## 2024-01-19 DIAGNOSIS — E78.5 HYPERLIPIDEMIA LDL GOAL <70: ICD-10-CM

## 2024-01-19 DIAGNOSIS — I48.92 ATRIAL FLUTTER WITH RAPID VENTRICULAR RESPONSE: Primary | ICD-10-CM

## 2024-01-19 DIAGNOSIS — I10 ESSENTIAL HYPERTENSION: ICD-10-CM

## 2024-01-19 DIAGNOSIS — I50.32 CHRONIC DIASTOLIC HEART FAILURE: ICD-10-CM

## 2024-01-19 NOTE — PROGRESS NOTES
Chief Complaint  Atrial Flutter    Subjective    History of Present Illness {  Problem List  Visit  Diagnosis   Encounters  Notes  Medications  Labs  Result Review Imaging  Media :23}       History of Present Illness   63-year-old female presents the office today for for ongoing evaluation of her atrial flutter.  She presented to [facility] ED on 1/11/2024 after found to be in atrial flutter.  She underwent a cardioversion and has been maintaining normal sinus rhythm.  She reports she is feeling better overall.  Notes that immediately after the cardioversion she did experience fatigue but notes that her energy level has improved.  She does report that her dyspnea is at baseline and she currently denies dizziness, presyncope or pedal edema.  She is checking her blood pressure at home with heart rates in the 60s and 70s and blood pressure she notes is within normal limits.  History of valvular heart disease with AVR/MVR/tricuspid annuloplasty and left atrial appendage ligation per Dr. Cheatham 7/16/2018.  Echo April 2023 shows an EF of 62%, mild LVH mild bioprosthetic aortic valve stenosis, bioprosthetic mitral valve stenosis, moderate TR and RVSP 45 to 55 mmHg.  History of PAF originally diagnosed in the setting of left upper lobe lobectomy May 2017.  Did undergo L AA and maze per Dr. Cheatham 7/16/2018.  Underwent PVA per [facility] per Dr. Huber June 2022.  Past medical history also significant for hyperlipidemia, hypertension, chronic diastolic heart failure, acquired hypothyroidism, type 2 diabetes mellitus, COPD, status post lung CA with bronchus sore with left upper lobe lobectomy mediastinal lymph node resection May 2017 per Dr. Cheatham  Objective     Vital Signs:   Vitals:    01/19/24 1152   BP: 100/50   BP Location: Left arm   Patient Position: Sitting   Cuff Size: Adult   Pulse: 61   Resp: 20   Temp: 97.1 °F (36.2 °C)   TempSrc: Temporal   SpO2: 91%   Weight: 78.9 kg (174 lb)   Height:  "157.5 cm (62\")     Body mass index is 31.83 kg/m².  Physical Exam  Vitals and nursing note reviewed.   Constitutional:       Appearance: Normal appearance.   HENT:      Head: Normocephalic.   Eyes:      Pupils: Pupils are equal, round, and reactive to light.   Cardiovascular:      Rate and Rhythm: Normal rate and regular rhythm.      Pulses: Normal pulses.      Heart sounds: Murmur heard.   Pulmonary:      Effort: Pulmonary effort is normal.      Breath sounds: Normal breath sounds.   Abdominal:      General: Bowel sounds are normal.      Palpations: Abdomen is soft.   Musculoskeletal:         General: Normal range of motion.      Cervical back: Normal range of motion.      Right lower leg: No edema.      Left lower leg: No edema.   Skin:     General: Skin is warm and dry.      Capillary Refill: Capillary refill takes less than 2 seconds.   Neurological:      Mental Status: She is alert and oriented to person, place, and time.   Psychiatric:         Mood and Affect: Mood normal.         Thought Content: Thought content normal.              Result Review  Data Reviewed:{ Labs  Result Review  Imaging  Med Tab  Media :23}   ECG 12 Lead Rhythm Change (01/11/2024 18:21)  ECG 12 Lead ED Triage Standing Order; Dysrhythmia (01/11/2024 15:08)  T4, Free (01/11/2024 15:28)  BNP (01/11/2024 15:28)  TSH (01/11/2024 15:28)  Single High Sensitivity Troponin T (01/11/2024 15:28)  Magnesium (01/11/2024 15:28)  Comprehensive Metabolic Panel (01/11/2024 15:28)  CBC & Differential (01/11/2024 15:28)           Assessment and Plan {CC Problem List  Visit Diagnosis  ROS  Review (Popup)  Health Maintenance  Quality  BestPractice  Medications  SmartSets  SnapShot Encounters  Media :23}   1. Atrial flutter with rapid ventricular response  Status post cardioversion on 1/11/2024  Status post maze and ANGELA per Dr. Cheatham  Continue Lopressor, propafenone  2. Chronic diastolic heart failure  Stable and currently euvolemic  Continue " Lasix daily  3. Essential hypertension  Stable on Lopressor    4. Hyperlipidemia LDL goal <70  Stable on pravastatin           Follow Up {Instructions Charge Capture  Follow-up Communications :23}   Return if symptoms worsen or fail to improve.    Patient was given instructions and counseling regarding her condition or for health maintenance advice. Please see specific information pulled into the AVS if appropriate.  Patient was instructed to call the [facility] with any questions, concerns, or worsening symptoms.

## 2024-02-14 RX ORDER — EMPAGLIFLOZIN 10 MG/1
10 TABLET, FILM COATED ORAL DAILY
Qty: 90 TABLET | Refills: 3 | Status: SHIPPED | OUTPATIENT
Start: 2024-02-14

## 2024-03-13 DIAGNOSIS — I48.0 PAROXYSMAL ATRIAL FIBRILLATION: ICD-10-CM

## 2024-04-02 DIAGNOSIS — I50.32 CHRONIC DIASTOLIC CONGESTIVE HEART FAILURE: ICD-10-CM

## 2024-04-02 RX ORDER — POTASSIUM CHLORIDE 750 MG/1
20 CAPSULE, EXTENDED RELEASE ORAL DAILY
Qty: 180 CAPSULE | Refills: 1 | Status: SHIPPED | OUTPATIENT
Start: 2024-04-02

## 2024-07-19 DIAGNOSIS — I50.32 CHRONIC DIASTOLIC CONGESTIVE HEART FAILURE: ICD-10-CM

## 2024-07-19 RX ORDER — FUROSEMIDE 40 MG/1
40 TABLET ORAL DAILY
Qty: 90 TABLET | Refills: 1 | Status: SHIPPED | OUTPATIENT
Start: 2024-07-19

## 2024-09-25 ENCOUNTER — HOSPITAL ENCOUNTER (INPATIENT)
Facility: HOSPITAL | Age: 64
LOS: 1 days | Discharge: HOME | End: 2024-09-27
Attending: STUDENT IN AN ORGANIZED HEALTH CARE EDUCATION/TRAINING PROGRAM | Admitting: INTERNAL MEDICINE
Payer: COMMERCIAL

## 2024-09-25 ENCOUNTER — APPOINTMENT (OUTPATIENT)
Dept: CT IMAGING | Facility: HOSPITAL | Age: 64
End: 2024-09-25
Payer: COMMERCIAL

## 2024-09-25 DIAGNOSIS — B34.8 RHINOVIRUS INFECTION: ICD-10-CM

## 2024-09-25 DIAGNOSIS — B34.9 ACUTE VIRAL SYNDROME: ICD-10-CM

## 2024-09-25 DIAGNOSIS — I50.33 ACUTE ON CHRONIC DIASTOLIC CONGESTIVE HEART FAILURE: ICD-10-CM

## 2024-09-25 DIAGNOSIS — J96.01 ACUTE RESPIRATORY FAILURE WITH HYPOXIA: Primary | ICD-10-CM

## 2024-09-25 PROBLEM — R09.02 HYPOXIA: Status: ACTIVE | Noted: 2024-09-25

## 2024-09-25 LAB
ALBUMIN SERPL-MCNC: 4.2 G/DL (ref 3.5–5.2)
ALBUMIN/GLOB SERPL: 1.3 G/DL
ALP SERPL-CCNC: 94 U/L (ref 39–117)
ALT SERPL W P-5'-P-CCNC: 8 U/L (ref 1–33)
ANION GAP SERPL CALCULATED.3IONS-SCNC: 14 MMOL/L (ref 5–15)
ARTERIAL PATENCY WRIST A: ABNORMAL
AST SERPL-CCNC: 21 U/L (ref 1–32)
ATMOSPHERIC PRESS: ABNORMAL MM[HG]
B PARAPERT DNA SPEC QL NAA+PROBE: NOT DETECTED
B PERT DNA SPEC QL NAA+PROBE: NOT DETECTED
BASE EXCESS BLDA CALC-SCNC: 4.4 MMOL/L (ref 0–2)
BASOPHILS # BLD AUTO: 0.04 10*3/MM3 (ref 0–0.2)
BASOPHILS NFR BLD AUTO: 0.4 % (ref 0–1.5)
BDY SITE: ABNORMAL
BILIRUB SERPL-MCNC: 0.7 MG/DL (ref 0–1.2)
BODY TEMPERATURE: 37
BUN SERPL-MCNC: 17 MG/DL (ref 8–23)
BUN/CREAT SERPL: 24.6 (ref 7–25)
C PNEUM DNA NPH QL NAA+NON-PROBE: NOT DETECTED
CALCIUM SPEC-SCNC: 9.1 MG/DL (ref 8.6–10.5)
CHLORIDE SERPL-SCNC: 94 MMOL/L (ref 98–107)
CO2 BLDA-SCNC: 33.8 MMOL/L (ref 22–33)
CO2 SERPL-SCNC: 28 MMOL/L (ref 22–29)
COHGB MFR BLD: 6 % (ref 0–2)
CREAT SERPL-MCNC: 0.69 MG/DL (ref 0.57–1)
DEPRECATED RDW RBC AUTO: 47.9 FL (ref 37–54)
EGFRCR SERPLBLD CKD-EPI 2021: 98.3 ML/MIN/1.73
EOSINOPHIL # BLD AUTO: 0.04 10*3/MM3 (ref 0–0.4)
EOSINOPHIL NFR BLD AUTO: 0.4 % (ref 0.3–6.2)
EPAP: 0
ERYTHROCYTE [DISTWIDTH] IN BLOOD BY AUTOMATED COUNT: 12 % (ref 12.3–15.4)
FLUAV SUBTYP SPEC NAA+PROBE: NOT DETECTED
FLUBV RNA ISLT QL NAA+PROBE: NOT DETECTED
GLOBULIN UR ELPH-MCNC: 3.2 GM/DL
GLUCOSE SERPL-MCNC: 169 MG/DL (ref 65–99)
HADV DNA SPEC NAA+PROBE: NOT DETECTED
HCO3 BLDA-SCNC: 32 MMOL/L (ref 20–26)
HCOV 229E RNA SPEC QL NAA+PROBE: NOT DETECTED
HCOV HKU1 RNA SPEC QL NAA+PROBE: NOT DETECTED
HCOV NL63 RNA SPEC QL NAA+PROBE: NOT DETECTED
HCOV OC43 RNA SPEC QL NAA+PROBE: NOT DETECTED
HCT VFR BLD AUTO: 48.1 % (ref 34–46.6)
HCT VFR BLD CALC: 51.3 % (ref 38–51)
HGB BLD-MCNC: 16.9 G/DL (ref 12–15.9)
HGB BLDA-MCNC: 16.7 G/DL (ref 14–18)
HMPV RNA NPH QL NAA+NON-PROBE: NOT DETECTED
HOLD SPECIMEN: NORMAL
HPIV1 RNA ISLT QL NAA+PROBE: NOT DETECTED
HPIV2 RNA SPEC QL NAA+PROBE: NOT DETECTED
HPIV3 RNA NPH QL NAA+PROBE: NOT DETECTED
HPIV4 P GENE NPH QL NAA+PROBE: NOT DETECTED
IMM GRANULOCYTES # BLD AUTO: 0.07 10*3/MM3 (ref 0–0.05)
IMM GRANULOCYTES NFR BLD AUTO: 0.7 % (ref 0–0.5)
INHALED O2 CONCENTRATION: 32 %
IPAP: 0
LYMPHOCYTES # BLD AUTO: 0.99 10*3/MM3 (ref 0.7–3.1)
LYMPHOCYTES NFR BLD AUTO: 10.1 % (ref 19.6–45.3)
M PNEUMO IGG SER IA-ACNC: NOT DETECTED
MCH RBC QN AUTO: 38 PG (ref 26.6–33)
MCHC RBC AUTO-ENTMCNC: 35.1 G/DL (ref 31.5–35.7)
MCV RBC AUTO: 108.1 FL (ref 79–97)
METHGB BLD QL: -0.1 % (ref 0–1.5)
MODALITY: ABNORMAL
MONOCYTES # BLD AUTO: 0.74 10*3/MM3 (ref 0.1–0.9)
MONOCYTES NFR BLD AUTO: 7.6 % (ref 5–12)
NEUTROPHILS NFR BLD AUTO: 7.92 10*3/MM3 (ref 1.7–7)
NEUTROPHILS NFR BLD AUTO: 80.8 % (ref 42.7–76)
NRBC BLD AUTO-RTO: 0.5 /100 WBC (ref 0–0.2)
NT-PROBNP SERPL-MCNC: 4795 PG/ML (ref 0–900)
OXYHGB MFR BLDV: 88 % (ref 94–99)
PAW @ PEAK INSP FLOW SETTING VENT: 0 CMH2O
PCO2 BLDA: 57.3 MM HG (ref 35–45)
PCO2 TEMP ADJ BLD: 57.3 MM HG (ref 35–45)
PH BLDA: 7.36 PH UNITS (ref 7.35–7.45)
PH, TEMP CORRECTED: 7.36 PH UNITS
PLATELET # BLD AUTO: 168 10*3/MM3 (ref 140–450)
PMV BLD AUTO: 10.2 FL (ref 6–12)
PO2 BLDA: 65.7 MM HG (ref 83–108)
PO2 TEMP ADJ BLD: 65.7 MM HG (ref 83–108)
POTASSIUM SERPL-SCNC: 3.7 MMOL/L (ref 3.5–5.2)
PROCALCITONIN SERPL-MCNC: 0.07 NG/ML (ref 0–0.25)
PROT SERPL-MCNC: 7.4 G/DL (ref 6–8.5)
RBC # BLD AUTO: 4.45 10*6/MM3 (ref 3.77–5.28)
RHINOVIRUS RNA SPEC NAA+PROBE: DETECTED
RSV RNA NPH QL NAA+NON-PROBE: NOT DETECTED
SARS-COV-2 RNA NPH QL NAA+NON-PROBE: NOT DETECTED
SODIUM SERPL-SCNC: 136 MMOL/L (ref 136–145)
TOTAL RATE: 0 BREATHS/MINUTE
TROPONIN T SERPL HS-MCNC: 9 NG/L
WBC NRBC COR # BLD AUTO: 9.8 10*3/MM3 (ref 3.4–10.8)
WHOLE BLOOD HOLD COAG: NORMAL
WHOLE BLOOD HOLD SPECIMEN: NORMAL

## 2024-09-25 PROCEDURE — 83050 HGB METHEMOGLOBIN QUAN: CPT

## 2024-09-25 PROCEDURE — 25510000001 IOPAMIDOL PER 1 ML: Performed by: STUDENT IN AN ORGANIZED HEALTH CARE EDUCATION/TRAINING PROGRAM

## 2024-09-25 PROCEDURE — 0202U NFCT DS 22 TRGT SARS-COV-2: CPT | Performed by: PHYSICIAN ASSISTANT

## 2024-09-25 PROCEDURE — 71275 CT ANGIOGRAPHY CHEST: CPT

## 2024-09-25 PROCEDURE — 93005 ELECTROCARDIOGRAM TRACING: CPT

## 2024-09-25 PROCEDURE — 80053 COMPREHEN METABOLIC PANEL: CPT | Performed by: STUDENT IN AN ORGANIZED HEALTH CARE EDUCATION/TRAINING PROGRAM

## 2024-09-25 PROCEDURE — 85025 COMPLETE CBC W/AUTO DIFF WBC: CPT | Performed by: STUDENT IN AN ORGANIZED HEALTH CARE EDUCATION/TRAINING PROGRAM

## 2024-09-25 PROCEDURE — 36415 COLL VENOUS BLD VENIPUNCTURE: CPT

## 2024-09-25 PROCEDURE — 83880 ASSAY OF NATRIURETIC PEPTIDE: CPT | Performed by: STUDENT IN AN ORGANIZED HEALTH CARE EDUCATION/TRAINING PROGRAM

## 2024-09-25 PROCEDURE — 84145 PROCALCITONIN (PCT): CPT | Performed by: PHYSICIAN ASSISTANT

## 2024-09-25 PROCEDURE — 84484 ASSAY OF TROPONIN QUANT: CPT | Performed by: STUDENT IN AN ORGANIZED HEALTH CARE EDUCATION/TRAINING PROGRAM

## 2024-09-25 PROCEDURE — 93005 ELECTROCARDIOGRAM TRACING: CPT | Performed by: STUDENT IN AN ORGANIZED HEALTH CARE EDUCATION/TRAINING PROGRAM

## 2024-09-25 PROCEDURE — 82805 BLOOD GASES W/O2 SATURATION: CPT

## 2024-09-25 PROCEDURE — 99285 EMERGENCY DEPT VISIT HI MDM: CPT

## 2024-09-25 PROCEDURE — G0378 HOSPITAL OBSERVATION PER HR: HCPCS

## 2024-09-25 PROCEDURE — 82375 ASSAY CARBOXYHB QUANT: CPT

## 2024-09-25 PROCEDURE — 36600 WITHDRAWAL OF ARTERIAL BLOOD: CPT

## 2024-09-25 PROCEDURE — 99223 1ST HOSP IP/OBS HIGH 75: CPT | Performed by: STUDENT IN AN ORGANIZED HEALTH CARE EDUCATION/TRAINING PROGRAM

## 2024-09-25 PROCEDURE — 25010000002 FUROSEMIDE PER 20 MG: Performed by: PHYSICIAN ASSISTANT

## 2024-09-25 RX ORDER — INSULIN LISPRO 100 [IU]/ML
2-9 INJECTION, SOLUTION INTRAVENOUS; SUBCUTANEOUS
Status: DISCONTINUED | OUTPATIENT
Start: 2024-09-26 | End: 2024-09-27 | Stop reason: HOSPADM

## 2024-09-25 RX ORDER — SODIUM CHLORIDE 0.9 % (FLUSH) 0.9 %
10 SYRINGE (ML) INJECTION AS NEEDED
Status: DISCONTINUED | OUTPATIENT
Start: 2024-09-25 | End: 2024-09-27 | Stop reason: HOSPADM

## 2024-09-25 RX ORDER — DEXTROSE MONOHYDRATE 25 G/50ML
25 INJECTION, SOLUTION INTRAVENOUS
Status: DISCONTINUED | OUTPATIENT
Start: 2024-09-25 | End: 2024-09-27 | Stop reason: HOSPADM

## 2024-09-25 RX ORDER — IOPAMIDOL 755 MG/ML
90 INJECTION, SOLUTION INTRAVASCULAR
Status: COMPLETED | OUTPATIENT
Start: 2024-09-25 | End: 2024-09-25

## 2024-09-25 RX ORDER — FUROSEMIDE 10 MG/ML
80 INJECTION INTRAMUSCULAR; INTRAVENOUS ONCE
Status: COMPLETED | OUTPATIENT
Start: 2024-09-25 | End: 2024-09-25

## 2024-09-25 RX ORDER — IBUPROFEN 600 MG/1
1 TABLET ORAL
Status: DISCONTINUED | OUTPATIENT
Start: 2024-09-25 | End: 2024-09-27 | Stop reason: HOSPADM

## 2024-09-25 RX ORDER — NICOTINE POLACRILEX 4 MG
15 LOZENGE BUCCAL
Status: DISCONTINUED | OUTPATIENT
Start: 2024-09-25 | End: 2024-09-27 | Stop reason: HOSPADM

## 2024-09-25 RX ADMIN — IOPAMIDOL 90 ML: 755 INJECTION, SOLUTION INTRAVENOUS at 20:20

## 2024-09-25 RX ADMIN — FUROSEMIDE 80 MG: 10 INJECTION, SOLUTION INTRAMUSCULAR; INTRAVENOUS at 23:17

## 2024-09-25 NOTE — ED PROVIDER NOTES
EMERGENCY DEPARTMENT ENCOUNTER    Pt Name: Jill Miller  MRN: [id number]  Pt :   1960  Room Number:  10/10  Date of encounter:  2024  PCP: Marisa Lynch PA  ED Provider: YANETH Le    Historian: Patient    HPI:  Chief Complaint: Shortness of breath    Context: Jill Miller is a 64 y.o. female who presents to the ED c/o shortness of breath.  Patient reports that she has been low on her oxygen since Monday.  She states that she has a pulse oximeter at home and has been monitoring her oxygen status and has had readings as low as 72% on room air.  Patient does not normally require supplemental oxygen at home.  Patient also with complaints of a productive cough.  She has not had a fever.  She does report history of lung disease and lung cancer.  Patient is a smoker.  She states that her shortness of breath has been worse with any exertion.  She has been using her albuterol rescue inhaler without any significant change in her symptoms.  She also has been using DayQuil and NyQuil to treat her symptoms.  She denies any additional complaints on interview and exam.  HPI     REVIEW OF SYSTEMS  A chief complaint appropriate review of systems was completed and is negative except as noted in the HPI.     PAST MEDICAL HISTORY  Past Medical History:   Diagnosis Date    Atrial fibrillation and flutter     Chronic diastolic congestive heart failure     Chronically Abnormal CXR (Left pleural disease post op) 2017    COPD exacerbation 2022    Essential hypertension 04/10/2017    Target blood pressure <130/80 mmHg    Graves disease     Hyperlipidemia LDL goal <70     Hypertension     Hyperthyroidism     Lung cancer     MRSA (methicillin resistant staph aureus) culture positive     under left breast, incision and debridement, treated with wound packing and po abx     Prolonged QTC interval on ECG 2022    Rheumatic mitral stenosis     Type 2 diabetes mellitus 2018     Valvular heart disease        PAST SURGICAL HISTORY  Past Surgical History:   Procedure Laterality Date    ABLATION OF DYSRHYTHMIC FOCUS  07/16/2018    ABSCESS DRAINAGE      under left breast d/t mrsa     AORTIC VALVE REPAIR/REPLACEMENT N/A 07/16/2018    Procedure: MEDIAN STERNOTOMY, AORTIC VALVE REPLACEMENT WITH TIFFANIE AND MAZE, TRICUSPID RING, LEFT ATRIAL OCCLUSION;  Surgeon: Ajay Cheatham MD;  Location:  [facility];  Service: Cardiothoracic    BRONCHOSCOPY Left 05/12/2017    Procedure: BRONCHOSCOPY;  Surgeon: Ajay Cheatham MD;  Location:  [facility];  Service:     BRONCHOSCOPY N/A 05/18/2017    Procedure: BRONCHOSCOPY BIOPSY AT BEDSIDE;  Surgeon: Ajay Cheatham MD;  Location:  [facility];  Service:     CARDIAC CATHETERIZATION N/A 09/28/2017    Procedure: Left Heart Cath;  Surgeon: Marco Lay MD;  Location:  [facility];  Service:     CARDIAC CATHETERIZATION N/A 09/28/2017    Procedure: Right Heart Cath;  Surgeon: Marco Lay MD;  Location:  [facility];  Service:     CARDIAC ELECTROPHYSIOLOGY PROCEDURE N/A 6/1/2022    Procedure: Ablation atrial fibrillation (atypical A flutter). Hold Rythmol x5 days;  Surgeon: Nigel Huber MD;  Location:  [facility];  Service: Cardiovascular;  Laterality: N/A;    LUNG BIOPSY  04/2017    LYMPH NODE DISSECTION Left 05/12/2017    Procedure: MEDIASTINAL LYMPH NODE DISSECTION;  Surgeon: Ajay Cheahtam MD;  Location:  [facility];  Service:     MAZE PROCEDURE      MITRAL VALVE REPAIR/REPLACEMENT N/A 07/16/2018    Procedure: MITRAL VALVE REPLACEMENT;  Surgeon: Ajay Cheatham MD;  Location:  [facility];  Service: Cardiothoracic    MITRAL VALVE REPLACEMENT  07/16/2018    TEETH EXTRACTION      THORACOSCOPY VIDEO ASSISTED WITH LOBECTOMY Left 05/12/2017    Procedure: THORACOSCOPY VIDEO ASSISTED WITH OPEN LOBECTOMY;  Surgeon: Ajay Cheatham MD;  Location:  [facility];  Service:     TOTAL THYROIDECTOMY  approx 2012     TRICUSPID VALVE SURGERY  2018       FAMILY HISTORY  Family History   Problem Relation Age of Onset    Breast cancer Mother     Diabetes Father     Heart disease Son        SOCIAL HISTORY  Social History     Socioeconomic History    Marital status:     Number of children: 3   Tobacco Use    Smoking status: Former     Current packs/day: 0.00     Types: Cigarettes     Start date: 1978     Quit date: 2023     Years since quittin.3     Passive exposure: Current    Smokeless tobacco: Never    Tobacco comments:     Couldn't remember exact dates.   Vaping Use    Vaping status: Never Used   Substance and Sexual Activity    Alcohol use: No    Drug use: No    Sexual activity: Not Currently     Partners: Male     Birth control/protection: None       ALLERGIES  Lortab [hydrocodone-acetaminophen] and Morphine and codeine    PHYSICAL EXAM  Physical Exam  Vitals and nursing note reviewed.   Constitutional:       General: She is not in acute distress.     Appearance: Normal appearance. She is ill-appearing. She is not toxic-appearing.   HENT:      Head: Normocephalic and atraumatic.      Nose: Nose normal.      Mouth/Throat:      Mouth: Mucous membranes are moist.   Eyes:      Extraocular Movements: Extraocular movements intact.   Cardiovascular:      Rate and Rhythm: Normal rate and regular rhythm.   Pulmonary:      Effort: Pulmonary effort is normal. Respiratory distress present.      Breath sounds: Wheezing and rhonchi present.   Chest:      Chest wall: No tenderness.   Abdominal:      General: There is no distension.   Musculoskeletal:         General: Normal range of motion.      Cervical back: Normal range of motion and neck supple.   Skin:     General: Skin is warm and dry.   Neurological:      General: No focal deficit present.      Mental Status: She is alert.   Psychiatric:         Mood and Affect: Mood normal.         Behavior: Behavior normal.       LAB RESULTS  Results for orders placed or  performed during the hospital encounter of 09/25/24   Respiratory Panel PCR w/COVID-19(SARS-CoV-2) [facility]-House, NP Swab in UTM/VTM, 2 HR TAT - Swab, Nasopharynx    Specimen: Nasopharynx; Swab   Result Value Ref Range    ADENOVIRUS, PCR Not Detected Not Detected    Coronavirus 229E Not Detected Not Detected    Coronavirus HKU1 Not Detected Not Detected    Coronavirus NL63 Not Detected Not Detected    Coronavirus OC43 Not Detected Not Detected    COVID19 Not Detected Not Detected - Ref. Range    Human Metapneumovirus Not Detected Not Detected    Human Rhinovirus/Enterovirus Detected (A) Not Detected    Influenza A PCR Not Detected Not Detected    Influenza B PCR Not Detected Not Detected    Parainfluenza Virus 1 Not Detected Not Detected    Parainfluenza Virus 2 Not Detected Not Detected    Parainfluenza Virus 3 Not Detected Not Detected    Parainfluenza Virus 4 Not Detected Not Detected    RSV, PCR Not Detected Not Detected    Bordetella pertussis pcr Not Detected Not Detected    Bordetella parapertussis PCR Not Detected Not Detected    Chlamydophila pneumoniae PCR Not Detected Not Detected    Mycoplasma pneumo by PCR Not Detected Not Detected   Comprehensive Metabolic Panel    Specimen: Blood   Result Value Ref Range    Glucose 169 (H) 65 - 99 mg/dL    BUN 17 8 - 23 mg/dL    Creatinine 0.69 0.57 - 1.00 mg/dL    Sodium 136 136 - 145 mmol/L    Potassium 3.7 3.5 - 5.2 mmol/L    Chloride 94 (L) 98 - 107 mmol/L    CO2 28.0 22.0 - 29.0 mmol/L    Calcium 9.1 8.6 - 10.5 mg/dL    Total Protein 7.4 6.0 - 8.5 g/dL    Albumin 4.2 3.5 - 5.2 g/dL    ALT (SGPT) 8 1 - 33 U/L    AST (SGOT) 21 1 - 32 U/L    Alkaline Phosphatase 94 39 - 117 U/L    Total Bilirubin 0.7 0.0 - 1.2 mg/dL    Globulin 3.2 gm/dL    A/G Ratio 1.3 g/dL    BUN/Creatinine Ratio 24.6 7.0 - 25.0    Anion Gap 14.0 5.0 - 15.0 mmol/L    eGFR 98.3 >60.0 mL/min/1.73   BNP    Specimen: Blood   Result Value Ref Range    proBNP 4,795.0 (H) 0.0 - 900.0  pg/mL   Single High Sensitivity Troponin T    Specimen: Blood   Result Value Ref Range    HS Troponin T 9 <14 ng/L   CBC Auto Differential    Specimen: Blood   Result Value Ref Range    WBC 9.80 3.40 - 10.80 10*3/mm3    RBC 4.45 3.77 - 5.28 10*6/mm3    Hemoglobin 16.9 (H) 12.0 - 15.9 g/dL    Hematocrit 48.1 (H) 34.0 - 46.6 %    .1 (H) 79.0 - 97.0 fL    MCH 38.0 (H) 26.6 - 33.0 pg    MCHC 35.1 31.5 - 35.7 g/dL    RDW 12.0 (L) 12.3 - 15.4 %    RDW-SD 47.9 37.0 - 54.0 fl    MPV 10.2 6.0 - 12.0 fL    Platelets 168 140 - 450 10*3/mm3    Neutrophil % 80.8 (H) 42.7 - 76.0 %    Lymphocyte % 10.1 (L) 19.6 - 45.3 %    Monocyte % 7.6 5.0 - 12.0 %    Eosinophil % 0.4 0.3 - 6.2 %    Basophil % 0.4 0.0 - 1.5 %    Immature Grans % 0.7 (H) 0.0 - 0.5 %    Neutrophils, Absolute 7.92 (H) 1.70 - 7.00 10*3/mm3    Lymphocytes, Absolute 0.99 0.70 - 3.10 10*3/mm3    Monocytes, Absolute 0.74 0.10 - 0.90 10*3/mm3    Eosinophils, Absolute 0.04 0.00 - 0.40 10*3/mm3    Basophils, Absolute 0.04 0.00 - 0.20 10*3/mm3    Immature Grans, Absolute 0.07 (H) 0.00 - 0.05 10*3/mm3    nRBC 0.5 (H) 0.0 - 0.2 /100 WBC   Procalcitonin    Specimen: Blood   Result Value Ref Range    Procalcitonin 0.07 0.00 - 0.25 ng/mL   Blood Gas, Arterial With Co-Ox    Specimen: Arterial Blood   Result Value Ref Range    Site Right Radial     Zen's Test N/A     pH, Arterial 7.355 7.350 - 7.450 pH units    pCO2, Arterial 57.3 (H) 35.0 - 45.0 mm Hg    pO2, Arterial 65.7 (L) 83.0 - 108.0 mm Hg    HCO3, Arterial 32.0 (H) 20.0 - 26.0 mmol/L    Base Excess, Arterial 4.4 (H) 0.0 - 2.0 mmol/L    Hemoglobin, Blood Gas 16.7 14 - 18 g/dL    Hematocrit, Blood Gas 51.3 (H) 38.0 - 51.0 %    Oxyhemoglobin 88.0 (L) 94 - 99 %    Methemoglobin -0.10 (L) 0.00 - 1.50 %    Carboxyhemoglobin 6.0 (H) 0 - 2 %    CO2 Content 33.8 (H) 22 - 33 mmol/L    Temperature 37.0     Barometric Pressure for Blood Gas      Modality Nasal Cannula     FIO2 32 %    Rate 0 Breaths/minute    PIP 0 cmH2O     IPAP 0     EPAP 0     pH, Temp Corrected 7.355 pH Units    pCO2, Temperature Corrected 57.3 (H) 35 - 45 mm Hg    pO2, Temperature Corrected 65.7 (L) 83 - 108 mm Hg   ECG 12 Lead ED Triage Standing Order; SOA   Result Value Ref Range    QT Interval 460 ms    QTC Interval 486 ms   Green Top (Gel)   Result Value Ref Range    Extra Tube Hold for add-ons.    Lavender Top   Result Value Ref Range    Extra Tube hold for add-on    Gold Top - SST   Result Value Ref Range    Extra Tube Hold for add-ons.    Gray Top   Result Value Ref Range    Extra Tube Hold for add-ons.    Light Blue Top   Result Value Ref Range    Extra Tube Hold for add-ons.        If labs were ordered, I independently reviewed the results and considered them in treating the patient.    RADIOLOGY  CT Angiogram Chest   Final Result   Impression:      No evidence of pulmonary embolism. Enlarged main pulmonary artery measuring 4 cm in width. Finding may be secondary to pulmonary arterial hypertension or possibly secondary to CHF.      No evidence of thoracic aortic aneurysm or dissection.      Mild cardiomegaly. Findings compatible with mild to moderate CHF. Prior sternotomy, aortic, tricuspid, and mitral valve replacement.      Prior left upper lobectomy.            Electronically Signed: Anthony Ramirez MD     9/25/2024 8:41 PM EDT     Workstation ID: [id number]        [x] Radiologist's Report Reviewed:  I ordered and independently interpreted the above noted radiographic studies.  See radiologist's dictation for official interpretation.      PROCEDURES    Procedures    ECG 12 Lead ED Triage Standing Order; SOA   Preliminary Result   Test Reason : ED Triage Standing Order~   Blood Pressure :   */*   mmHG   Vent. Rate :  67 BPM     Atrial Rate :  67 BPM      P-R Int : 180 ms          QRS Dur :  98 ms       QT Int : 460 ms       P-R-T Axes : 108  43 111 degrees      QTc Int : 486 ms      Normal sinus rhythm   Minimal voltage criteria for LVH, may be normal  variant ( Lamar product    )   Septal infarct (cited on or before 11-JAN-2024)   ST & T wave abnormality, consider lateral ischemia   Abnormal ECG   When compared with ECG of 11-JAN-2024 18:21,   premature ventricular complexes are no longer present   IA interval has decreased      Referred By:            Confirmed By:           MEDICATIONS GIVEN IN ER    Medications   sodium chloride 0.9 % flush 10 mL (has no administration in time range)   dextrose (GLUTOSE) oral gel 15 g (has no administration in time range)   dextrose (D50W) (25 g/50 mL) IV injection 25 g (has no administration in time range)   glucagon (GLUCAGEN) injection 1 mg (has no administration in time range)   Insulin Lispro (humaLOG) injection 2-9 Units (has no administration in time range)   iopamidol (ISOVUE-370) 76 % injection 90 mL (90 mL Intravenous Given 9/25/24 2020)   furosemide (LASIX) injection 80 mg (80 mg Intravenous Given 9/25/24 2317)       MEDICAL DECISION MAKING, PROGRESS, and CONSULTS   Medical Decision Making  In summary this is a nontoxic-appearing 64-year-old female who presents ED for evaluation of shortness of breath and decreased oxygen saturation.  Patient positive for human rhinovirus/enterovirus.  Also with evidence of acute on chronic exacerbation of CHF.  CTA findings consistent with mild to moderate CHF exacerbation.  EKG without evidence of acute ischemic changes.  BNP elevated, remainder of labs without acute abnormalities.  Lasix initiated in ED.  Patient admitted to hospital medicine for further evaluation and treatment of her acute hypoxic respiratory failure.  Time of admission disposition patient resting comfortably on 3 L of oxygen via nasal cannula.    Problems Addressed:  Acute on chronic diastolic congestive heart failure: complicated acute illness or injury  Acute respiratory failure with hypoxia: complicated acute illness or injury  Acute viral syndrome: complicated acute illness or injury  Rhinovirus  infection: complicated acute illness or injury    Amount and/or Complexity of Data Reviewed  Independent Historian: spouse  External Data Reviewed: notes.     Details: Personally reviewed patient's primary care office visit where patient was evaluated by Marisa Lynch PA-C on 7/12/2024 with visit diagnosis is that included type 2 diabetes, COPD, dyslipidemia, rheumatic mitral valve and aortic valve stenosis, valvular heart disease, Graves' disease, hypertension  Labs: ordered. Decision-making details documented in ED Course.  Radiology: ordered and independent interpretation performed. Decision-making details documented in ED Course.  ECG/medicine tests: ordered and independent interpretation performed. Decision-making details documented in ED Course.    Risk  Prescription drug management.  Decision regarding hospitalization.      Discussion below represents my analysis of pertinent findings related to patient's condition, differential diagnosis, treatment plan and final disposition.    Differential diagnosis:  The differential diagnosis associated with the patient's presentation includes: ACS, CHF, PE, pneumothorax , asthma, COPD exacerbation, infectious etiologies such as PNA.    Additional sources  Discussed/ obtained information from independent historians:   [x] Spouse  [] Parent  [] Family member  [] Friend  [] EMS   [] Other:  External (non-ED) record review:   [] Inpatient record:   [] Office record:   [] Outpatient record:   [] Prior Outpatient labs:   [] Prior Outpatient radiology:   [x] Primary Care record:   [] Outside ED record:   [] Other:   Patient's care impacted by:   [x] Diabetes  [x] Hypertension  [x] Hyperlipidemia  [x] Hypothyroidism   [] Coronary Artery Disease   [] COPD   [x] Cancer: History of lung cancer   [] Obesity  [] GERD   [x] Tobacco Abuse   [] Substance Abuse    [] Anxiety   [] Depression   [x] Other: CHF  Care significantly affected by Social Determinants of Health (housing and  economic circumstances, unemployment)    [] Yes     [x] No   If yes, Patient's care significantly limited by  Social Determinants of Health including:   [] Inadequate housing   [] Low income   [] Alcoholism and drug addiction in family   [] Problems related to primary support group   [] Unemployment   [] Problems related to employment   [] Other Social Determinants of Health:   Shared decision making: I reviewed workup performed in ED including labs and imaging. Based on findings, recommendation made for admission. Patient is agreeable to plan of care and hospital admission.      Orders placed during this visit:  Orders Placed This Encounter   Procedures    COVID PRE-OP / PRE-PROCEDURE SCREENING ORDER (NO ISOLATION) - Swab, Nasopharynx    Respiratory Panel PCR w/COVID-19(SARS-CoV-2) [facility], NP Swab in UTM/VTM, 2 HR TAT - Swab, Nasopharynx    CT Angiogram Chest    [place] Draw    Comprehensive Metabolic Panel    BNP    Single High Sensitivity Troponin T    CBC Auto Differential    Procalcitonin    Blood Gas, Arterial -With Co-Ox Panel: Yes    Blood Gas, Arterial With Co-Ox    Hemoglobin A1c    NPO Diet NPO Type: Strict NPO    Undress & Gown    Continuous Pulse Oximetry    Vital Signs    Code Status and Medical Interventions: No CPR (Do Not Attempt to Resuscitate); Full Support    Patient Isolation Contact and Droplet    Oxygen Therapy- Nasal Cannula; Titrate 1-6 LPM Per SpO2; 90 - 95%    POC Glucose 4x Daily Before Meals & at Bedtime    ECG 12 Lead ED Triage Standing Order; SOA    Insert Peripheral IV    Initiate Observation Status    CBC & Differential    Green Top (Gel)    Lavender Top    Gold Top - SST    Gray Top    Light Blue Top     ED Course:    ED Course as of 09/26/24 0022   Wed Sep 25, 2024   1914 Vitals and Telemetry tracing was reviewed and directly interpreted by myself demonstrating blood pressure 122/69, afebrile, heart rate of 80, respirations 18 breaths/min and oxygen  saturation 83% on room air [JG]   1914 BP: 122/69 [JG]   1914 Temp: 98 °F (36.7 °C) [JG]   1914 Heart Rate: 80 [JG]   1914 Resp: 18 [JG]   1914 SpO2(!): 83 % [JG]   1916 EKG personally interpreted by myself in addition to interpretation by attending without evidence of acute ischemic changes; sinus rhythm [JG]   2222 Reassessment at bedside patient resting in her bed history of comfort in no acute distress.  She is currently on 3 L of oxygen via nasal cannula with oxygen saturation of 92%. [JG]   2226 Hospital medicine Dr. Callahan messaged for admission.  [JG]   Thu Sep 26, 2024   0017 Nasopharyngeal swab positive for human rhinovirus/enterovirus, BNP elevated 4795 compared to 3680 8 months prior.  Remainder of labs without acute abnormalities. [JG]   0018 CTA chest personally interpreted by myself and with official read provided by radiology demonstrates no evidence of PE, no evidence of thoracic aortic aneurysm or dissection, cardiomegaly, findings consistent with mild to moderate CHF.  Chronic appearance of prior left upper lobectomy. [JG]      ED Course User Index  [JG] Xavi Estrella PA          DIAGNOSIS  Final diagnoses:   Acute respiratory failure with hypoxia   Acute viral syndrome   Rhinovirus infection   Acute on chronic diastolic congestive heart failure     DISPOSITION    ED Disposition       ED Disposition   Decision to Admit    Condition   --    Comment   Level of Care: Telemetry [5]   Diagnosis: Hypoxia [847649]   Admitting Physician: WILBERTO CALLAHAN [[id number]]   Attending Physician: WILBERTO CALLAHAN [[id number]]               Please note that portions of this document were completed with voice recognition software.        Xavi Estrella PA  09/26/24 0022

## 2024-09-26 ENCOUNTER — APPOINTMENT (OUTPATIENT)
Dept: CARDIOLOGY | Facility: HOSPITAL | Age: 64
End: 2024-09-26
Payer: COMMERCIAL

## 2024-09-26 PROBLEM — M77.8 TENDINITIS OF LEFT ELBOW: Status: RESOLVED | Noted: 2022-01-21 | Resolved: 2024-09-26

## 2024-09-26 PROBLEM — E05.00 GRAVES DISEASE: Status: RESOLVED | Noted: 2017-05-12 | Resolved: 2024-09-26

## 2024-09-26 PROBLEM — I50.33 ACUTE ON CHRONIC DIASTOLIC CONGESTIVE HEART FAILURE: Status: ACTIVE | Noted: 2018-04-02

## 2024-09-26 PROBLEM — T82.857A PROSTHETIC AORTIC VALVE STENOSIS: Status: ACTIVE | Noted: 2018-07-11

## 2024-09-26 PROBLEM — J96.01 ACUTE HYPOXIC RESPIRATORY FAILURE: Status: ACTIVE | Noted: 2024-09-25

## 2024-09-26 PROBLEM — B34.8 RHINOVIRUS: Status: ACTIVE | Noted: 2024-09-26

## 2024-09-26 PROBLEM — M77.10 LATERAL EPICONDYLITIS: Status: RESOLVED | Noted: 2021-01-15 | Resolved: 2024-09-26

## 2024-09-26 PROBLEM — I27.20 PULMONARY HYPERTENSION: Status: ACTIVE | Noted: 2024-09-26

## 2024-09-26 LAB
ANION GAP SERPL CALCULATED.3IONS-SCNC: 18 MMOL/L (ref 5–15)
BASOPHILS # BLD AUTO: 0.04 10*3/MM3 (ref 0–0.2)
BASOPHILS NFR BLD AUTO: 0.5 % (ref 0–1.5)
BH CV ECHO MEAS - AI P1/2T: 506.7 MSEC
BH CV ECHO MEAS - AO MAX PG: 76.2 MMHG
BH CV ECHO MEAS - AO MEAN PG: 52 MMHG
BH CV ECHO MEAS - AO ROOT DIAM: 2.9 CM
BH CV ECHO MEAS - AO V2 MAX: 435.7 CM/SEC
BH CV ECHO MEAS - AO V2 VTI: 96.5 CM
BH CV ECHO MEAS - AVA(I,D): 1.12 CM2
BH CV ECHO MEAS - EDV(CUBED): 97.3 ML
BH CV ECHO MEAS - EDV(MOD-SP2): 61.5 ML
BH CV ECHO MEAS - EDV(MOD-SP4): 67.4 ML
BH CV ECHO MEAS - EF(MOD-BP): 57.5 %
BH CV ECHO MEAS - EF(MOD-SP2): 54.3 %
BH CV ECHO MEAS - EF(MOD-SP4): 59.6 %
BH CV ECHO MEAS - ESV(CUBED): 39.3 ML
BH CV ECHO MEAS - ESV(MOD-SP2): 28.1 ML
BH CV ECHO MEAS - ESV(MOD-SP4): 27.2 ML
BH CV ECHO MEAS - FS: 26.1 %
BH CV ECHO MEAS - IVS/LVPW: 1 CM
BH CV ECHO MEAS - IVSD: 1.1 CM
BH CV ECHO MEAS - LA DIMENSION: 5.1 CM
BH CV ECHO MEAS - LAT PEAK E' VEL: 6.3 CM/SEC
BH CV ECHO MEAS - LV MASS(C)D: 181.2 GRAMS
BH CV ECHO MEAS - LV MAX PG: 13.7 MMHG
BH CV ECHO MEAS - LV MEAN PG: 7 MMHG
BH CV ECHO MEAS - LV V1 MAX: 183 CM/SEC
BH CV ECHO MEAS - LV V1 VTI: 42.4 CM
BH CV ECHO MEAS - LVIDD: 4.6 CM
BH CV ECHO MEAS - LVIDS: 3.4 CM
BH CV ECHO MEAS - LVOT AREA: 2.5 CM2
BH CV ECHO MEAS - LVOT DIAM: 1.8 CM
BH CV ECHO MEAS - LVPWD: 1.1 CM
BH CV ECHO MEAS - MED PEAK E' VEL: 5.6 CM/SEC
BH CV ECHO MEAS - MR MAX PG: 105.3 MMHG
BH CV ECHO MEAS - MR MAX VEL: 513 CM/SEC
BH CV ECHO MEAS - MR MEAN PG: 74.5 MMHG
BH CV ECHO MEAS - MR MEAN VEL: 414 CM/SEC
BH CV ECHO MEAS - MR VTI: 175 CM
BH CV ECHO MEAS - MV A MAX VEL: 98.5 CM/SEC
BH CV ECHO MEAS - MV DEC TIME: 0.32 SEC
BH CV ECHO MEAS - MV E MAX VEL: 188 CM/SEC
BH CV ECHO MEAS - MV E/A: 1.91
BH CV ECHO MEAS - MV MAX PG: 26.7 MMHG
BH CV ECHO MEAS - MV MEAN PG: 13 MMHG
BH CV ECHO MEAS - MV V2 VTI: 96.8 CM
BH CV ECHO MEAS - MVA(VTI): 1.11 CM2
BH CV ECHO MEAS - PA ACC TIME: 0.09 SEC
BH CV ECHO MEAS - PI END-D VEL: 170.5 CM/SEC
BH CV ECHO MEAS - RAP SYSTOLE: 3 MMHG
BH CV ECHO MEAS - RVSP: 58 MMHG
BH CV ECHO MEAS - SV(LVOT): 107.9 ML
BH CV ECHO MEAS - SV(MOD-SP2): 33.4 ML
BH CV ECHO MEAS - SV(MOD-SP4): 40.2 ML
BH CV ECHO MEAS - TAPSE (>1.6): 0.82 CM
BH CV ECHO MEAS - TR MAX PG: 55 MMHG
BH CV ECHO MEAS - TR MAX VEL: 341.5 CM/SEC
BH CV ECHO MEASUREMENTS AVERAGE E/E' RATIO: 31.6
BH CV XLRA - RV BASE: 3.9 CM
BH CV XLRA - RV LENGTH: 7.1 CM
BH CV XLRA - RV MID: 3.1 CM
BH CV XLRA - TDI S': 11.2 CM/SEC
BUN SERPL-MCNC: 16 MG/DL (ref 8–23)
BUN/CREAT SERPL: 21.9 (ref 7–25)
CA-I SERPL ISE-MCNC: 1.07 MMOL/L (ref 1.15–1.3)
CALCIUM SPEC-SCNC: 8.8 MG/DL (ref 8.6–10.5)
CHLORIDE SERPL-SCNC: 93 MMOL/L (ref 98–107)
CO2 SERPL-SCNC: 28 MMOL/L (ref 22–29)
CREAT SERPL-MCNC: 0.73 MG/DL (ref 0.57–1)
DEPRECATED RDW RBC AUTO: 46.6 FL (ref 37–54)
EGFRCR SERPLBLD CKD-EPI 2021: 93.1 ML/MIN/1.73
EOSINOPHIL # BLD AUTO: 0.07 10*3/MM3 (ref 0–0.4)
EOSINOPHIL NFR BLD AUTO: 0.8 % (ref 0.3–6.2)
ERYTHROCYTE [DISTWIDTH] IN BLOOD BY AUTOMATED COUNT: 11.9 % (ref 12.3–15.4)
GLUCOSE BLDC GLUCOMTR-MCNC: 128 MG/DL (ref 70–130)
GLUCOSE BLDC GLUCOMTR-MCNC: 144 MG/DL (ref 70–130)
GLUCOSE BLDC GLUCOMTR-MCNC: 151 MG/DL (ref 70–130)
GLUCOSE BLDC GLUCOMTR-MCNC: 159 MG/DL (ref 70–130)
GLUCOSE SERPL-MCNC: 118 MG/DL (ref 65–99)
HBA1C MFR BLD: 7.3 % (ref 4.8–5.6)
HCT VFR BLD AUTO: 48.6 % (ref 34–46.6)
HGB BLD-MCNC: 16.9 G/DL (ref 12–15.9)
IMM GRANULOCYTES # BLD AUTO: 0.06 10*3/MM3 (ref 0–0.05)
IMM GRANULOCYTES NFR BLD AUTO: 0.7 % (ref 0–0.5)
LEFT ATRIUM VOLUME INDEX: 46.2 ML/M2
LV EF 2D ECHO EST: 60 %
LYMPHOCYTES # BLD AUTO: 1.01 10*3/MM3 (ref 0.7–3.1)
LYMPHOCYTES NFR BLD AUTO: 11.5 % (ref 19.6–45.3)
MAGNESIUM SERPL-MCNC: 2.2 MG/DL (ref 1.6–2.4)
MCH RBC QN AUTO: 37.5 PG (ref 26.6–33)
MCHC RBC AUTO-ENTMCNC: 34.8 G/DL (ref 31.5–35.7)
MCV RBC AUTO: 107.8 FL (ref 79–97)
MONOCYTES # BLD AUTO: 0.66 10*3/MM3 (ref 0.1–0.9)
MONOCYTES NFR BLD AUTO: 7.5 % (ref 5–12)
NEUTROPHILS NFR BLD AUTO: 6.97 10*3/MM3 (ref 1.7–7)
NEUTROPHILS NFR BLD AUTO: 79 % (ref 42.7–76)
NRBC BLD AUTO-RTO: 0.2 /100 WBC (ref 0–0.2)
PHOSPHATE SERPL-MCNC: 3.3 MG/DL (ref 2.5–4.5)
PLATELET # BLD AUTO: 164 10*3/MM3 (ref 140–450)
PMV BLD AUTO: 10.1 FL (ref 6–12)
POTASSIUM SERPL-SCNC: 3.6 MMOL/L (ref 3.5–5.2)
POTASSIUM SERPL-SCNC: 4.7 MMOL/L (ref 3.5–5.2)
QT INTERVAL: 460 MS
QTC INTERVAL: 486 MS
RBC # BLD AUTO: 4.51 10*6/MM3 (ref 3.77–5.28)
SODIUM SERPL-SCNC: 139 MMOL/L (ref 136–145)
WBC NRBC COR # BLD AUTO: 8.81 10*3/MM3 (ref 3.4–10.8)

## 2024-09-26 PROCEDURE — 63710000001 INSULIN LISPRO (HUMAN) PER 5 UNITS: Performed by: INTERNAL MEDICINE

## 2024-09-26 PROCEDURE — 99232 SBSQ HOSP IP/OBS MODERATE 35: CPT | Performed by: INTERNAL MEDICINE

## 2024-09-26 PROCEDURE — 99222 1ST HOSP IP/OBS MODERATE 55: CPT | Performed by: INTERNAL MEDICINE

## 2024-09-26 PROCEDURE — 83735 ASSAY OF MAGNESIUM: CPT | Performed by: INTERNAL MEDICINE

## 2024-09-26 PROCEDURE — 25010000002 ENOXAPARIN PER 10 MG: Performed by: STUDENT IN AN ORGANIZED HEALTH CARE EDUCATION/TRAINING PROGRAM

## 2024-09-26 PROCEDURE — 82330 ASSAY OF CALCIUM: CPT | Performed by: INTERNAL MEDICINE

## 2024-09-26 PROCEDURE — 84132 ASSAY OF SERUM POTASSIUM: CPT | Performed by: INTERNAL MEDICINE

## 2024-09-26 PROCEDURE — 25010000002 FUROSEMIDE PER 20 MG: Performed by: STUDENT IN AN ORGANIZED HEALTH CARE EDUCATION/TRAINING PROGRAM

## 2024-09-26 PROCEDURE — 82948 REAGENT STRIP/BLOOD GLUCOSE: CPT

## 2024-09-26 PROCEDURE — 99222 1ST HOSP IP/OBS MODERATE 55: CPT | Performed by: THORACIC SURGERY (CARDIOTHORACIC VASCULAR SURGERY)

## 2024-09-26 PROCEDURE — 84100 ASSAY OF PHOSPHORUS: CPT | Performed by: INTERNAL MEDICINE

## 2024-09-26 PROCEDURE — 93306 TTE W/DOPPLER COMPLETE: CPT | Performed by: INTERNAL MEDICINE

## 2024-09-26 PROCEDURE — 93306 TTE W/DOPPLER COMPLETE: CPT

## 2024-09-26 PROCEDURE — 85025 COMPLETE CBC W/AUTO DIFF WBC: CPT | Performed by: STUDENT IN AN ORGANIZED HEALTH CARE EDUCATION/TRAINING PROGRAM

## 2024-09-26 PROCEDURE — 83036 HEMOGLOBIN GLYCOSYLATED A1C: CPT | Performed by: STUDENT IN AN ORGANIZED HEALTH CARE EDUCATION/TRAINING PROGRAM

## 2024-09-26 PROCEDURE — 80048 BASIC METABOLIC PNL TOTAL CA: CPT | Performed by: STUDENT IN AN ORGANIZED HEALTH CARE EDUCATION/TRAINING PROGRAM

## 2024-09-26 PROCEDURE — 94799 UNLISTED PULMONARY SVC/PX: CPT

## 2024-09-26 RX ORDER — PRAVASTATIN SODIUM 20 MG
20 TABLET ORAL NIGHTLY
Status: DISCONTINUED | OUTPATIENT
Start: 2024-09-26 | End: 2024-09-27 | Stop reason: HOSPADM

## 2024-09-26 RX ORDER — ENOXAPARIN SODIUM 100 MG/ML
40 INJECTION SUBCUTANEOUS DAILY
Status: DISCONTINUED | OUTPATIENT
Start: 2024-09-26 | End: 2024-09-27 | Stop reason: HOSPADM

## 2024-09-26 RX ORDER — SODIUM CHLORIDE 0.9 % (FLUSH) 0.9 %
10 SYRINGE (ML) INJECTION EVERY 12 HOURS SCHEDULED
Status: DISCONTINUED | OUTPATIENT
Start: 2024-09-26 | End: 2024-09-27 | Stop reason: HOSPADM

## 2024-09-26 RX ORDER — SODIUM CHLORIDE 9 MG/ML
40 INJECTION, SOLUTION INTRAVENOUS AS NEEDED
Status: DISCONTINUED | OUTPATIENT
Start: 2024-09-26 | End: 2024-09-27 | Stop reason: HOSPADM

## 2024-09-26 RX ORDER — ONDANSETRON 2 MG/ML
4 INJECTION INTRAMUSCULAR; INTRAVENOUS EVERY 6 HOURS PRN
Status: DISCONTINUED | OUTPATIENT
Start: 2024-09-26 | End: 2024-09-26

## 2024-09-26 RX ORDER — NYSTATIN 100000 [USP'U]/G
1 POWDER TOPICAL 2 TIMES DAILY
COMMUNITY
Start: 2024-07-15 | End: 2025-07-15

## 2024-09-26 RX ORDER — ALBUTEROL SULFATE 0.83 MG/ML
2.5 SOLUTION RESPIRATORY (INHALATION)
Status: DISCONTINUED | OUTPATIENT
Start: 2024-09-26 | End: 2024-09-27 | Stop reason: HOSPADM

## 2024-09-26 RX ORDER — METOPROLOL TARTRATE 25 MG/1
25 TABLET, FILM COATED ORAL EVERY 12 HOURS
Status: DISCONTINUED | OUTPATIENT
Start: 2024-09-26 | End: 2024-09-27 | Stop reason: HOSPADM

## 2024-09-26 RX ORDER — FUROSEMIDE 10 MG/ML
40 INJECTION INTRAMUSCULAR; INTRAVENOUS EVERY 12 HOURS
Status: DISCONTINUED | OUTPATIENT
Start: 2024-09-26 | End: 2024-09-27 | Stop reason: HOSPADM

## 2024-09-26 RX ORDER — GUAIFENESIN 600 MG/1
1200 TABLET, EXTENDED RELEASE ORAL EVERY 12 HOURS SCHEDULED
Status: DISCONTINUED | OUTPATIENT
Start: 2024-09-26 | End: 2024-09-27 | Stop reason: HOSPADM

## 2024-09-26 RX ORDER — LEVOTHYROXINE SODIUM 175 UG/1
175 TABLET ORAL
Status: DISCONTINUED | OUTPATIENT
Start: 2024-09-27 | End: 2024-09-27 | Stop reason: HOSPADM

## 2024-09-26 RX ORDER — POTASSIUM CHLORIDE 1500 MG/1
40 TABLET, EXTENDED RELEASE ORAL EVERY 4 HOURS
Status: COMPLETED | OUTPATIENT
Start: 2024-09-26 | End: 2024-09-26

## 2024-09-26 RX ORDER — SODIUM CHLORIDE 0.9 % (FLUSH) 0.9 %
10 SYRINGE (ML) INJECTION AS NEEDED
Status: DISCONTINUED | OUTPATIENT
Start: 2024-09-26 | End: 2024-09-26

## 2024-09-26 RX ORDER — ALBUTEROL SULFATE 0.83 MG/ML
2.5 SOLUTION RESPIRATORY (INHALATION) EVERY 6 HOURS PRN
Status: DISCONTINUED | OUTPATIENT
Start: 2024-09-26 | End: 2024-09-26

## 2024-09-26 RX ORDER — ALBUTEROL SULFATE 0.83 MG/ML
2.5 SOLUTION RESPIRATORY (INHALATION)
Status: DISCONTINUED | OUTPATIENT
Start: 2024-09-26 | End: 2024-09-26

## 2024-09-26 RX ORDER — PROPAFENONE HYDROCHLORIDE 225 MG/1
225 CAPSULE, EXTENDED RELEASE ORAL EVERY 12 HOURS SCHEDULED
Status: DISCONTINUED | OUTPATIENT
Start: 2024-09-26 | End: 2024-09-27 | Stop reason: HOSPADM

## 2024-09-26 RX ORDER — LEVOTHYROXINE SODIUM 200 MCG
200 TABLET ORAL
COMMUNITY
Start: 2024-07-28 | End: 2024-09-27 | Stop reason: HOSPADM

## 2024-09-26 RX ORDER — NITROGLYCERIN 0.4 MG/1
0.4 TABLET SUBLINGUAL
Status: DISCONTINUED | OUTPATIENT
Start: 2024-09-26 | End: 2024-09-27 | Stop reason: HOSPADM

## 2024-09-26 RX ORDER — ALBUTEROL SULFATE 90 UG/1
2 INHALANT RESPIRATORY (INHALATION) 2 TIMES DAILY
Status: ON HOLD | COMMUNITY
Start: 2024-07-15 | End: 2024-09-27

## 2024-09-26 RX ORDER — LEVOTHYROXINE SODIUM 175 UG/1
175 TABLET ORAL DAILY
Status: DISCONTINUED | OUTPATIENT
Start: 2024-09-26 | End: 2024-09-26

## 2024-09-26 RX ADMIN — PRAVASTATIN SODIUM 20 MG: 20 TABLET ORAL at 21:37

## 2024-09-26 RX ADMIN — INSULIN LISPRO 2 UNITS: 100 INJECTION, SOLUTION INTRAVENOUS; SUBCUTANEOUS at 18:14

## 2024-09-26 RX ADMIN — POTASSIUM CHLORIDE 40 MEQ: 1500 TABLET, EXTENDED RELEASE ORAL at 10:40

## 2024-09-26 RX ADMIN — ENOXAPARIN SODIUM 40 MG: 100 INJECTION SUBCUTANEOUS at 09:09

## 2024-09-26 RX ADMIN — FUROSEMIDE 40 MG: 10 INJECTION, SOLUTION INTRAMUSCULAR; INTRAVENOUS at 05:24

## 2024-09-26 RX ADMIN — EMPAGLIFLOZIN 10 MG: 10 TABLET, FILM COATED ORAL at 09:09

## 2024-09-26 RX ADMIN — METOPROLOL TARTRATE 25 MG: 25 TABLET, FILM COATED ORAL at 09:09

## 2024-09-26 RX ADMIN — PROPAFENONE HYDROCHLORIDE 225 MG: 225 CAPSULE, EXTENDED RELEASE ORAL at 09:09

## 2024-09-26 RX ADMIN — Medication 10 ML: at 21:37

## 2024-09-26 RX ADMIN — GUAIFENESIN 1200 MG: 600 TABLET, EXTENDED RELEASE ORAL at 10:40

## 2024-09-26 RX ADMIN — Medication 10 ML: at 01:13

## 2024-09-26 RX ADMIN — INSULIN LISPRO 2 UNITS: 100 INJECTION, SOLUTION INTRAVENOUS; SUBCUTANEOUS at 21:37

## 2024-09-26 RX ADMIN — PROPAFENONE HYDROCHLORIDE 225 MG: 225 CAPSULE, EXTENDED RELEASE ORAL at 21:54

## 2024-09-26 RX ADMIN — GUAIFENESIN 1200 MG: 600 TABLET, EXTENDED RELEASE ORAL at 21:37

## 2024-09-26 RX ADMIN — POTASSIUM CHLORIDE 40 MEQ: 1500 TABLET, EXTENDED RELEASE ORAL at 15:55

## 2024-09-26 RX ADMIN — LEVOTHYROXINE SODIUM 175 MCG: 0.17 TABLET ORAL at 05:24

## 2024-09-26 RX ADMIN — Medication 10 ML: at 09:08

## 2024-09-26 NOTE — ED NOTES
Jill Miller    Nursing Report ED to Floor:  Mental status: ax04  Ambulatory status: assistx1  Oxygen Therapy:  2l  Cardiac Rhythm: sinus mack  Admitted from: home  Safety Concerns:  none  Social Issues: none  ED Room #:  10    ED Nurse Phone Extension - [phone] or may call [phone].      HPI:   Chief Complaint   Patient presents with    Shortness of Breath       Past Medical History:  Past Medical History:   Diagnosis Date    Atrial fibrillation and flutter     Chronic diastolic congestive heart failure     Chronically Abnormal CXR (Left pleural disease post op) 09/25/2017    COPD exacerbation 04/17/2022    Essential hypertension 04/10/2017    Target blood pressure <130/80 mmHg    Graves disease     Hyperlipidemia LDL goal <70     Hypertension     Hyperthyroidism     Lung cancer     MRSA (methicillin resistant staph aureus) culture positive 2014    under left breast, incision and debridement, treated with wound packing and po abx     Prolonged QTC interval on ECG 06/01/2022    Rheumatic mitral stenosis     Type 2 diabetes mellitus 07/17/2018    Valvular heart disease         Past Surgical History:  Past Surgical History:   Procedure Laterality Date    ABLATION OF DYSRHYTHMIC FOCUS  07/16/2018    ABSCESS DRAINAGE      under left breast d/t mrsa     AORTIC VALVE REPAIR/REPLACEMENT N/A 07/16/2018    Procedure: MEDIAN STERNOTOMY, AORTIC VALVE REPLACEMENT WITH TIFFANIE AND MAZE, TRICUSPID RING, LEFT ATRIAL OCCLUSION;  Surgeon: Ajay Cheatham MD;  Location:  [facility];  Service: Cardiothoracic    BRONCHOSCOPY Left 05/12/2017    Procedure: BRONCHOSCOPY;  Surgeon: Ajay Cheatham MD;  Location:  [facility];  Service:     BRONCHOSCOPY N/A 05/18/2017    Procedure: BRONCHOSCOPY BIOPSY AT BEDSIDE;  Surgeon: Ajay Cheatham MD;  Location:  [facility];  Service:     CARDIAC CATHETERIZATION N/A 09/28/2017    Procedure: Left Heart Cath;  Surgeon: Marco Lay MD;  Location:  [facility];  Service:      CARDIAC CATHETERIZATION N/A 09/28/2017    Procedure: Right Heart Cath;  Surgeon: Marco Lay MD;  Location:  [facility];  Service:     CARDIAC ELECTROPHYSIOLOGY PROCEDURE N/A 6/1/2022    Procedure: Ablation atrial fibrillation (atypical A flutter). Hold Rythmol x5 days;  Surgeon: Nigel Huber MD;  Location:  [facility];  Service: Cardiovascular;  Laterality: N/A;    LUNG BIOPSY  04/2017    LYMPH NODE DISSECTION Left 05/12/2017    Procedure: MEDIASTINAL LYMPH NODE DISSECTION;  Surgeon: Ajay Cheatham MD;  Location:  [facility];  Service:     MAZE PROCEDURE      MITRAL VALVE REPAIR/REPLACEMENT N/A 07/16/2018    Procedure: MITRAL VALVE REPLACEMENT;  Surgeon: Ajay Cheatham MD;  Location:  [facility];  Service: Cardiothoracic    MITRAL VALVE REPLACEMENT  07/16/2018    TEETH EXTRACTION      THORACOSCOPY VIDEO ASSISTED WITH LOBECTOMY Left 05/12/2017    Procedure: THORACOSCOPY VIDEO ASSISTED WITH OPEN LOBECTOMY;  Surgeon: Ajay Cheatham MD;  Location:  [facility];  Service:     TOTAL THYROIDECTOMY  approx 2012    TRICUSPID VALVE SURGERY  07/16/2018        Admitting Doctor:   Ajay Goff MD    Consulting Provider(s):  Consults       No orders found from 8/27/2024 to 9/26/2024.             Admitting Diagnosis:   The primary encounter diagnosis was Acute respiratory failure with hypoxia. Diagnoses of Acute viral syndrome, Rhinovirus infection, and Acute on chronic diastolic congestive heart failure were also pertinent to this visit.    Most Recent Vitals:   Vitals:    09/25/24 2032 09/25/24 2132 09/25/24 2202 09/25/24 2300   BP: 96/51 108/63 116/55 118/68   BP Location:       Patient Position:       Pulse: 54 53 51 54   Resp:       Temp:       TempSrc:       SpO2: 93% 92% 93% 93%   Weight:       Height:           Active LDAs/IV Access:   Lines, Drains & Airways       Active LDAs       Name Placement date Placement time Site Days    Peripheral IV 09/25/24 1930 Anterior;Right  Forearm 09/25/24  1930  Forearm  less than 1                    Labs (abnormal labs have a star):   Labs Reviewed   RESPIRATORY PANEL PCR W/ COVID-19 (SARS-COV-2), NP SWAB IN UTM/VTP, 2 HR TAT - Abnormal; Notable for the following components:       Result Value    Human Rhinovirus/Enterovirus Detected (*)     All other components within normal limits    Narrative:     In the setting of a positive respiratory panel with a viral infection PLUS a negative procalcitonin without other underlying concern for bacterial infection, consider observing off antibiotics or discontinuation of antibiotics and continue supportive care. If the respiratory panel is positive for atypical bacterial infection (Bordetella pertussis, Chlamydophila pneumoniae, or Mycoplasma pneumoniae), consider antibiotic de-escalation to target atypical bacterial infection.   COMPREHENSIVE METABOLIC PANEL - Abnormal; Notable for the following components:    Glucose 169 (*)     Chloride 94 (*)     All other components within normal limits    Narrative:     GFR Normal >60  Chronic Kidney Disease <60  Kidney Failure <15     BNP (IN-HOUSE) - Abnormal; Notable for the following components:    proBNP 4,795.0 (*)     All other components within normal limits    Narrative:     This assay is used as an aid in the diagnosis of individuals suspected of having heart failure. It can be used as an aid in the diagnosis of acute decompensated heart failure (ADHF) in patients presenting with signs and symptoms of ADHF to the emergency department (ED). In addition, NT-proBNP of <300 pg/mL indicates ADHF is not likely.    Age Range Result Interpretation  NT-proBNP Concentration (pg/mL:      <50             Positive            >450                   Gray                 300-450                    Negative             <300    50-75           Positive            >900                  Gray                300-900                  Negative            <300      >75              Positive            >1800                  Gray                300-1800                  Negative            <300   CBC WITH AUTO DIFFERENTIAL - Abnormal; Notable for the following components:    Hemoglobin 16.9 (*)     Hematocrit 48.1 (*)     .1 (*)     MCH 38.0 (*)     RDW 12.0 (*)     Neutrophil % 80.8 (*)     Lymphocyte % 10.1 (*)     Immature Grans % 0.7 (*)     Neutrophils, Absolute 7.92 (*)     Immature Grans, Absolute 0.07 (*)     nRBC 0.5 (*)     All other components within normal limits   BLOOD GAS, ARTERIAL W/CO-OXIMETRY - Abnormal; Notable for the following components:    pCO2, Arterial 57.3 (*)     pO2, Arterial 65.7 (*)     HCO3, Arterial 32.0 (*)     Base Excess, Arterial 4.4 (*)     Hematocrit, Blood Gas 51.3 (*)     Oxyhemoglobin 88.0 (*)     Methemoglobin -0.10 (*)     Carboxyhemoglobin 6.0 (*)     CO2 Content 33.8 (*)     pCO2, Temperature Corrected 57.3 (*)     pO2, Temperature Corrected 65.7 (*)     All other components within normal limits   SINGLE HS TROPONIN T - Normal    Narrative:     High Sensitive Troponin T Reference Range:  <14.0 ng/L- Negative Female for AMI  <22.0 ng/L- Negative Male for AMI  >=14 - Abnormal Female indicating possible myocardial injury.  >=22 - Abnormal Male indicating possible myocardial injury.   Clinicians would have to utilize clinical acumen, EKG, Troponin, and serial changes to determine if it is an Acute Myocardial Infarction or myocardial injury due to an underlying chronic condition.        PROCALCITONIN - Normal    Narrative:     As a Marker for Sepsis (Non-Neonates):    1. <0.5 ng/mL represents a low risk of severe sepsis and/or septic shock.  2. >2 ng/mL represents a high risk of severe sepsis and/or septic shock.    As a Marker for Lower Respiratory Tract Infections that require antibiotic therapy:    PCT on Admission    Antibiotic Therapy       6-12 Hrs later    >0.5                Strongly Recommended  >0.25 - <0.5        Recommended   0.1 - 0.25  "         Discouraged              Remeasure/reassess PCT  <0.1                Strongly Discouraged     Remeasure/reassess PCT    As 28 day mortality risk marker: \"Change in Procalcitonin Result\" (>80% or <=80%) if Day 0 (or Day 1) and Day 4 values are available. Refer to [email or web address]    Change in PCT <=80%  A decrease of PCT levels below or equal to 80% defines a positive change in PCT test result representing a higher risk for 28-day all-cause mortality of patients diagnosed with severe sepsis for septic shock.    Change in PCT >80%  A decrease of PCT levels of more than 80% defines a negative change in PCT result representing a lower risk for 28-day all-cause mortality of patients diagnosed with severe sepsis or septic shock.      COVID PRE-OP / PRE-PROCEDURE SCREENING ORDER (NO ISOLATION)    Narrative:     The following orders were created for panel order COVID PRE-OP / PRE-PROCEDURE SCREENING ORDER (NO ISOLATION) - Swab, Nasopharynx.  Procedure                               Abnormality         Status                     ---------                               -----------         ------                     Respiratory Panel PCR w/...[[id number]]  Abnormal            Final result                 Please view results for these tests on the individual orders.   RAINBOW DRAW    Narrative:     The following orders were created for panel order [place] Draw.  Procedure                               Abnormality         Status                     ---------                               -----------         ------                     Green Top (Gel)[[id number]]                                  Final result               Lavender Top[[id number]]                                     Final result               Gold Top - SST[[id number]]                                   Final result               Lynch Top[[id number]]                                         Final result               Light Blue Top[[id number]]   "                                 Final result                 Please view results for these tests on the individual orders.   BLOOD GAS, ARTERIAL   CBC AND DIFFERENTIAL    Narrative:     The following orders were created for panel order CBC & Differential.  Procedure                               Abnormality         Status                     ---------                               -----------         ------                     CBC Auto Differential[[id number]]        Abnormal            Final result               Scan Slide[[id number]]                                                                    Please view results for these tests on the individual orders.   GREEN TOP   LAVENDER TOP   GOLD TOP - SST   GRAY TOP   LIGHT BLUE TOP       Meds Given in ED:   Medications   sodium chloride 0.9 % flush 10 mL (has no administration in time range)   iopamidol (ISOVUE-370) 76 % injection 90 mL (90 mL Intravenous Given 9/25/24 2020)   furosemide (LASIX) injection 80 mg (80 mg Intravenous Given 9/25/24 2317)           Last NIH score:                                                          Dysphagia screening results:  Patient Factors Component (Dysphagia:Stroke or Rule-out)  Best Eye Response: 4-->(E4) spontaneous (09/25/24 1931)  Best Motor Response: 6-->(M6) obeys commands (09/25/24 1931)  Best Verbal Response: 5-->(V5) oriented (09/25/24 1931)  [place] Coma Scale Score: 15 (09/25/24 1931)     [place] Coma Scale:  No data recorded     CIWA:        Restraint Type:            Isolation Status:  No active isolations

## 2024-09-26 NOTE — PROGRESS NOTES
[facility] Medicine Services  PROGRESS NOTE    Patient Name: Jill Miller  : 1960  MRN: [id number]    Date of Admission: 2024  Primary Care Physician: Marisa Lynch PA    Subjective   Subjective     CC: f/u SOA    HPI: UP in chair. Feels about the same. Still SOA. No CP. Having cough.      Objective   Objective     Vital Signs:   Temp:  [97.8 °F (36.6 °C)-98.3 °F (36.8 °C)] 98.3 °F (36.8 °C)  Heart Rate:  [51-80] 65  Resp:  [18] 18  BP: ()/(51-72) 110/51  Flow (L/min):  [3-5] 3     Physical Exam:  Constitutional: No acute distress, awake, alert  HENT: NCAT, mucous membranes moist, 3L O2  Respiratory: Rhonchi  Cardiovascular: RRR, no murmurs, rubs, or gallops  Gastrointestinal: Positive bowel sounds, soft, nontender, nondistended  Musculoskeletal: No bilateral ankle edema  Psychiatric: Appropriate affect, cooperative  Neurologic: Oriented x 3, strength symmetric in all extremities, Cranial Nerves grossly intact to confrontation, speech clear  Skin: No rashes     Results Reviewed:  LAB RESULTS:      Lab 24  0614 24   WBC 8.81 9.80   HEMOGLOBIN 16.9* 16.9*   HEMATOCRIT 48.6* 48.1*   PLATELETS 164 168   NEUTROS ABS 6.97 7.92*   IMMATURE GRANS (ABS) 0.06* 0.07*   LYMPHS ABS 1.01 0.99   MONOS ABS 0.66 0.74   EOS ABS 0.07 0.04   .8* 108.1*   PROCALCITONIN  --  0.07         Lab 24  0614 24  0013 24   SODIUM 139  --  136   POTASSIUM 3.6  --  3.7   CHLORIDE 93*  --  94*   CO2 28.0  --  28.0   ANION GAP 18.0*  --  14.0   BUN 16  --  17   CREATININE 0.73  --  0.69   EGFR 93.1  --  98.3   GLUCOSE 118*  --  169*   CALCIUM 8.8  --  9.1   HEMOGLOBIN A1C  --  7.30*  --          Lab 24   TOTAL PROTEIN 7.4   ALBUMIN 4.2   GLOBULIN 3.2   ALT (SGPT) 8   AST (SGOT) 21   BILIRUBIN 0.7   ALK PHOS 94         Lab 24   PROBNP 4,795.0*   HSTROP T 9                 Lab 24   PH, ARTERIAL 7.355   PCO2,  ARTERIAL 57.3*   PO2 ART 65.7*   FIO2 32   HCO3 ART 32.0*   BASE EXCESS ART 4.4*   CARBOXYHEMOGLOBIN 6.0*     Brief Urine Lab Results       None            Microbiology Results Abnormal       None            Adult Transthoracic Echo Complete w/ Color, Spectral and Contrast if necessary per protocol    Result Date: 9/26/2024    Left ventricular systolic function is normal. Estimated left ventricular EF = 60%   Left ventricular wall thickness is consistent with mild concentric hypertrophy.   The left atrial cavity is moderately dilated.   Bioprosthetic aortic valve present.  Severe bioprosthetic aortic valve stenosis (mean gradient 52 mmHg).  Moderate aortic valve insufficiency.   There is a bioprosthetic mitral valve present.  Elevated gradient across mitral valve consistent with moderate to severe mitral stenosis (mean gradient 13 mmHg)   There is  a tricuspid valve ring present.   Estimated right ventricular systolic pressure from tricuspid regurgitation is markedly elevated (58 mmHg).     CT Angiogram Chest    Result Date: 9/25/2024  CT ANGIOGRAM CHEST Date of Exam: 9/25/2024 8:05 PM EDT Indication: Hypoxia. Comparison: Chest CTA performed on April 17, 2022 Technique: CTA of the chest was performed after the uneventful intravenous administration of Isovue-370, 90 mL. Reconstructed coronal and sagittal images were also obtained. In addition, a 3-D volume rendered image was created for interpretation. Automated exposure control and iterative reconstruction methods were used. Findings: Pulmonary arteries: Adequate opacification of the pulmonary arteries. No evidence of acute pulmonary embolism. The main pulmonary artery is enlarged measuring 4 cm. This may be secondary to CHF versus pulmonary arterial hypertension. Thoracic aorta: The thoracic aorta is normal in caliber and contour. Small scattered atheromatous plaques are visualized. Cardiovascular: The heart is mildly enlarged. No evidence of pericardial effusion.  Patient is post sternotomy, tricuspid valve, mitral valve, and aortic valve replacement. There is mild to moderate pulmonary vascular congestion. Thyroid: The patient is post thyroidectomy. Lungs and Pleura: No focal consolidation or effusion. Focal airspace opacity in the right upper lobe near the apex measures 1.2 cm and appears stable from prior study performed in 2022. Finding may represent focal scarring. Patient is post left upper lobectomy. There is mild linear scarring in the left lung base, stable. No pneumothorax. Central airways are patent. Mediastinum/Jennifer: No significant mediastinal or hilar lymphadenopathy. Lymph nodes: No axillary or supraclavicular lymphadenopathy. Bones and Soft Tissue:No acute fracture, aggressive osseous lesions, or soft tissue process. Upper Abdomen: No acute process in the upper abdomen.     Impression: Impression: No evidence of pulmonary embolism. Enlarged main pulmonary artery measuring 4 cm in width. Finding may be secondary to pulmonary arterial hypertension or possibly secondary to CHF. No evidence of thoracic aortic aneurysm or dissection. Mild cardiomegaly. Findings compatible with mild to moderate CHF. Prior sternotomy, aortic, tricuspid, and mitral valve replacement. Prior left upper lobectomy. Electronically Signed: Anthony Ramirez MD  9/25/2024 8:41 PM EDT  Workstation ID: [id number]     Results for orders placed during the hospital encounter of 09/25/24    Adult Transthoracic Echo Complete w/ Color, Spectral and Contrast if necessary per protocol    Interpretation Summary    Left ventricular systolic function is normal. Estimated left ventricular EF = 60%    Left ventricular wall thickness is consistent with mild concentric hypertrophy.    The left atrial cavity is moderately dilated.    Bioprosthetic aortic valve present.  Severe bioprosthetic aortic valve stenosis (mean gradient 52 mmHg).  Moderate aortic valve insufficiency.    There is a bioprosthetic mitral valve  present.  Elevated gradient across mitral valve consistent with moderate to severe mitral stenosis (mean gradient 13 mmHg)    There is  a tricuspid valve ring present.    Estimated right ventricular systolic pressure from tricuspid regurgitation is markedly elevated (58 mmHg).      Current medications:  Scheduled Meds:empagliflozin, 10 mg, Oral, Daily  enoxaparin, 40 mg, Subcutaneous, Daily  furosemide, 40 mg, Intravenous, Q12H  guaiFENesin, 1,200 mg, Oral, Q12H  insulin lispro, 2-9 Units, Subcutaneous, 4x Daily AC & at Bedtime  levothyroxine, 175 mcg, Oral, Daily  metoprolol tartrate, 25 mg, Oral, Q12H  potassium chloride ER, 40 mEq, Oral, Q4H  pravastatin, 20 mg, Oral, Nightly  propafenone SR, 225 mg, Oral, Q12H  sodium chloride, 10 mL, Intravenous, Q12H      Continuous Infusions:   PRN Meds:.  Calcium Replacement - Follow Nurse / BPA Driven Protocol    dextrose    dextrose    glucagon (human recombinant)    Magnesium Standard Dose Replacement - Follow Nurse / BPA Driven Protocol    nitroglycerin    Phosphorus Replacement - Follow Nurse / BPA Driven Protocol    Potassium Replacement - Follow Nurse / BPA Driven Protocol    sodium chloride    sodium chloride    sodium chloride    Assessment & Plan   Assessment & Plan     Active Hospital Problems    Diagnosis  POA    **Acute on chronic heart failure with preserved ejection fraction due to valvular heart disease [I50.33]  Yes    Pulmonary hypertension [I27.20]  Yes    Rhinovirus [B34.8]  Yes    Hypoxia [R09.02]  Yes    Type 2 diabetes mellitus [E11.9]  Yes    Prosthetic aortic valve stenosis [T82.857A]  Yes    Hyperlipidemia LDL goal <70 [E78.5]  Yes    Paroxysmal atrial fibrillation [I48.0]  Yes    Essential hypertension [I10]  Yes      Resolved Hospital Problems   No resolved problems to display.        Brief Hospital Course to date:  Jill Miller is a 64 y.o. female with past medical history of lung cancer with lobectomy, history of multiple cardiac valve  replacements, hypertension, hypothyroidism, hyperlipidemia, heart failure preserved ejection fraction, type 2 diabetes, COPD, tobacco user.     Hypoxia  Rhinovirus infection  --Continue supportive care with tylenol, mucinex, ics/flutter. Has requested no nebs.    Acute exacerbation of heart failure  VHD  --Lasix IV BID as tolerated - BP on lower side.  --Cardiology consultation given history of multiple valve replacements.     Chronic: History lung cancer with lobectomy, history of multiple cardiac valve replacements, hypertension, hypothyroidism, hyperlipidemia, heart failure preserved ejection fraction, type 2 diabetes, COPD, tobacco user.  --Subcutaneous insulin protocol.  Check A1c.  --Continue other home medications.    Expected Discharge Location and Transportation:   Expected Discharge   Expected Discharge Date: 9/28/2024; Expected Discharge Time:      VTE Prophylaxis:  Pharmacologic VTE prophylaxis orders are present.         AM-PAC 6 Clicks Score (PT): 24 (09/26/24 0105)    CODE STATUS:   Code Status and Medical Interventions: No CPR (Do Not Attempt to Resuscitate); Full Support   Ordered at: 09/25/24 3457     Code Status (Patient has no pulse and is not breathing):    No CPR (Do Not Attempt to Resuscitate)     Medical Interventions (Patient has pulse or is breathing):    Full Support       Argenis Malin II, DO  09/26/24

## 2024-09-26 NOTE — H&P
"    [facility] Medicine Services  HISTORY AND PHYSICAL    Patient Name: Jill Miller  : 1960  MRN: [id number]  Primary Care Physician: Marisa Lynch PA  Date of admission: 2024      Subjective   Subjective     Chief Complaint:  Shortness of air    HPI:  Jill Miller is a 64 y.o. female with past medical history of lung cancer with lobectomy, history of multiple cardiac valve replacements, hypertension, hypothyroidism, hyperlipidemia, heart failure preserved ejection fraction, type 2 diabetes, COPD, tobacco user.  Presented to [facility] ED on 2024 with concern for shortness of air onset 2 days prior.  Said that she checked her oxygen at home and it was low at 72% on room air.  No previous home O2 requirement.  Says she has had a cough but no fever.  She is a \"casual smoker\".  No benefit from albuterol rescue inhaler.    ED summary: Afebrile, vital signs stable on 3 L.  ABG pH 7.35, pCO2 57, pO2 65, bicarb 32.  EKG sinus rhythm, nonspecific ST-T wave changes.  High-sensitivity troponin not elevated.  proBNP over 4700.  Blood glucose 169.  Procalcitonin not elevated.  No leukocytosis.  Hemoglobin 16.9.  Rhinovirus positive.  CT angio chest no PE, does show enlarged main pulmonary artery measuring 4 cm in width, may be secondary to pulmonary to hypertension or possibly CHF, does show mild cardiomegaly, does show prior sternotomy, aortic, tricuspid, and mitral valve replacement.  She was provided Lasix 80 mg IV.      Personal History     Past Medical History:   Diagnosis Date    Atrial fibrillation and flutter     Chronic diastolic congestive heart failure     Chronically Abnormal CXR (Left pleural disease post op) 2017    COPD exacerbation 2022    Essential hypertension 04/10/2017    Target blood pressure <130/80 mmHg    Graves disease     Hyperlipidemia LDL goal <70     Hypertension     Hyperthyroidism     Lung cancer     MRSA (methicillin resistant staph " aureus) culture positive 2014    under left breast, incision and debridement, treated with wound packing and po abx     Prolonged QTC interval on ECG 06/01/2022    Rheumatic mitral stenosis     Type 2 diabetes mellitus 07/17/2018    Valvular heart disease          Oncology Problem List:  Lung cancer; S/P Bronch/Thor w/ ALBINA lobectomy/mediastinal lymph node   resec (5/12/17, Dr. Cheatham) (05/12/2017; Status: Active)  Lung cancer; S/P Bronch/Thor w/ ALBINA lobectomy/mediastinal lymph node   resec (5/12/17, Dr. Cheatham) (04/10/2017; Status: Resolved)  Oncology/Hematology History     Past Surgical History:   Procedure Laterality Date    ABLATION OF DYSRHYTHMIC FOCUS  07/16/2018    ABSCESS DRAINAGE      under left breast d/t mrsa     AORTIC VALVE REPAIR/REPLACEMENT N/A 07/16/2018    Procedure: MEDIAN STERNOTOMY, AORTIC VALVE REPLACEMENT WITH TIFFANIE AND MAZE, TRICUSPID RING, LEFT ATRIAL OCCLUSION;  Surgeon: Ajay Cheatham MD;  Location:  [facility];  Service: Cardiothoracic    BRONCHOSCOPY Left 05/12/2017    Procedure: BRONCHOSCOPY;  Surgeon: Ajay Cheatham MD;  Location:  [facility];  Service:     BRONCHOSCOPY N/A 05/18/2017    Procedure: BRONCHOSCOPY BIOPSY AT BEDSIDE;  Surgeon: Ajay Cheatham MD;  Location:  [facility];  Service:     CARDIAC CATHETERIZATION N/A 09/28/2017    Procedure: Left Heart Cath;  Surgeon: Marco Lay MD;  Location:  [facility];  Service:     CARDIAC CATHETERIZATION N/A 09/28/2017    Procedure: Right Heart Cath;  Surgeon: Marco Lay MD;  Location:  [facility];  Service:     CARDIAC ELECTROPHYSIOLOGY PROCEDURE N/A 6/1/2022    Procedure: Ablation atrial fibrillation (atypical A flutter). Hold Rythmol x5 days;  Surgeon: Nigel Huber MD;  Location:  [facility] INVASIVE [facility];  Service: Cardiovascular;  Laterality: N/A;    LUNG BIOPSY  04/2017    LYMPH NODE DISSECTION Left 05/12/2017    Procedure: MEDIASTINAL LYMPH NODE DISSECTION;  Surgeon:  jAay Cheatham MD;  Location: [facility];  Service:     MAZE PROCEDURE      MITRAL VALVE REPAIR/REPLACEMENT N/A 07/16/2018    Procedure: MITRAL VALVE REPLACEMENT;  Surgeon: Ajay Cheatham MD;  Location:  [facility];  Service: Cardiothoracic    MITRAL VALVE REPLACEMENT  07/16/2018    TEETH EXTRACTION      THORACOSCOPY VIDEO ASSISTED WITH LOBECTOMY Left 05/12/2017    Procedure: THORACOSCOPY VIDEO ASSISTED WITH OPEN LOBECTOMY;  Surgeon: Ajay Cheatham MD;  Location:  [facility];  Service:     TOTAL THYROIDECTOMY  approx 2012    TRICUSPID VALVE SURGERY  07/16/2018       Family History: family history includes Breast cancer in her mother; Diabetes in her father; Heart disease in her son.     Social History:  reports that she quit smoking about 16 months ago. Her smoking use included cigarettes. She started smoking about 46 years ago. She has been exposed to tobacco smoke. She has never used smokeless tobacco. She reports that she does not drink alcohol and does not use drugs.  Social History     Social History Narrative    Lives with her . Caffeine: 0-1 serving per day.       Medications:  Available home medication information reviewed.  Albuterol Sulfate, Cholecalciferol, aspirin, empagliflozin, furosemide, ibuprofen, levothyroxine, metoprolol tartrate, multivitamin, potassium chloride, pravastatin, and propafenone SR    Allergies   Allergen Reactions    Lortab [Hydrocodone-Acetaminophen] Nausea And Vomiting and Dizziness    Morphine And Codeine Nausea Only       Objective   Objective     Vital Signs:   Temp:  [98 °F (36.7 °C)] 98 °F (36.7 °C)  Heart Rate:  [51-80] 54  Resp:  [18] 18  BP: ()/(51-69) 118/68       Physical Exam  Constitutional:       General: She is not in acute distress.     Appearance: She is ill-appearing. She is not toxic-appearing.   HENT:      Mouth/Throat:      Mouth: Mucous membranes are moist.   Eyes:      Extraocular Movements: Extraocular movements intact.   Cardiovascular:      Rate  and Rhythm: Normal rate and regular rhythm.      Pulses: Normal pulses.      Heart sounds: Normal heart sounds.   Pulmonary:      Effort: Pulmonary effort is normal.      Breath sounds: Wheezing present.   Abdominal:      Palpations: Abdomen is soft.      Tenderness: There is no abdominal tenderness.   Musculoskeletal:      Right lower leg: No edema.      Left lower leg: No edema.   Skin:     General: Skin is warm.      Capillary Refill: Capillary refill takes less than 2 seconds.   Neurological:      General: No focal deficit present.      Mental Status: She is alert and oriented to person, place, and time.   Psychiatric:         Mood and Affect: Mood normal.         Thought Content: Thought content normal.            Result Review:  I have personally reviewed the results from the time of this admission to 9/25/2024 23:36 EDT and agree with these findings:  [x]  Laboratory list / accordion  [x]  Microbiology  [x]  Radiology  [x]  EKG/Telemetry   [x]  Cardiology/Vascular   []  Pathology  [x]  Old records  []  Other:        LAB RESULTS:      Lab 09/25/24 1928   WBC 9.80   HEMOGLOBIN 16.9*   HEMATOCRIT 48.1*   PLATELETS 168   NEUTROS ABS 7.92*   IMMATURE GRANS (ABS) 0.07*   LYMPHS ABS 0.99   MONOS ABS 0.74   EOS ABS 0.04   .1*   PROCALCITONIN 0.07         Lab 09/25/24 1928   SODIUM 136   POTASSIUM 3.7   CHLORIDE 94*   CO2 28.0   ANION GAP 14.0   BUN 17   CREATININE 0.69   EGFR 98.3   GLUCOSE 169*   CALCIUM 9.1         Lab 09/25/24 1928   TOTAL PROTEIN 7.4   ALBUMIN 4.2   GLOBULIN 3.2   ALT (SGPT) 8   AST (SGOT) 21   BILIRUBIN 0.7   ALK PHOS 94         Lab 09/25/24 1928   PROBNP 4,795.0*   HSTROP T 9                 Lab 09/25/24 2119   PH, ARTERIAL 7.355   PCO2, ARTERIAL 57.3*   PO2 ART 65.7*   FIO2 32   HCO3 ART 32.0*   BASE EXCESS ART 4.4*   CARBOXYHEMOGLOBIN 6.0*         Microbiology Results (last 10 days)       Procedure Component Value - Date/Time    COVID PRE-OP / PRE-PROCEDURE SCREENING ORDER (NO  ISOLATION) - Swab, Nasopharynx [[id number]]  (Abnormal) Collected: 09/25/24 1928    Lab Status: Final result Specimen: Swab from Nasopharynx Updated: 09/25/24 2028    Narrative:      The following orders were created for panel order COVID PRE-OP / PRE-PROCEDURE SCREENING ORDER (NO ISOLATION) - Swab, Nasopharynx.  Procedure                               Abnormality         Status                     ---------                               -----------         ------                     Respiratory Panel PCR w/...[[id number]]  Abnormal            Final result                 Please view results for these tests on the individual orders.    Respiratory Panel PCR w/COVID-19(SARS-CoV-2) [facility] In-House, NP Swab in UTM/VTM, 2 HR TAT - Swab, Nasopharynx [[id number]]  (Abnormal) Collected: 09/25/24 1928    Lab Status: Final result Specimen: Swab from Nasopharynx Updated: 09/25/24 2028     ADENOVIRUS, PCR Not Detected     Coronavirus 229E Not Detected     Coronavirus HKU1 Not Detected     Coronavirus NL63 Not Detected     Coronavirus OC43 Not Detected     COVID19 Not Detected     Human Metapneumovirus Not Detected     Human Rhinovirus/Enterovirus Detected     Influenza A PCR Not Detected     Influenza B PCR Not Detected     Parainfluenza Virus 1 Not Detected     Parainfluenza Virus 2 Not Detected     Parainfluenza Virus 3 Not Detected     Parainfluenza Virus 4 Not Detected     RSV, PCR Not Detected     Bordetella pertussis pcr Not Detected     Bordetella parapertussis PCR Not Detected     Chlamydophila pneumoniae PCR Not Detected     Mycoplasma pneumo by PCR Not Detected    Narrative:      In the setting of a positive respiratory panel with a viral infection PLUS a negative procalcitonin without other underlying concern for bacterial infection, consider observing off antibiotics or discontinuation of antibiotics and continue supportive care. If the respiratory panel is positive for atypical bacterial infection  (Bordetella pertussis, Chlamydophila pneumoniae, or Mycoplasma pneumoniae), consider antibiotic de-escalation to target atypical bacterial infection.            CT Angiogram Chest    Result Date: 9/25/2024  CT ANGIOGRAM CHEST Date of Exam: 9/25/2024 8:05 PM EDT Indication: Hypoxia. Comparison: Chest CTA performed on April 17, 2022 Technique: CTA of the chest was performed after the uneventful intravenous administration of Isovue-370, 90 mL. Reconstructed coronal and sagittal images were also obtained. In addition, a 3-D volume rendered image was created for interpretation. Automated exposure control and iterative reconstruction methods were used. Findings: Pulmonary arteries: Adequate opacification of the pulmonary arteries. No evidence of acute pulmonary embolism. The main pulmonary artery is enlarged measuring 4 cm. This may be secondary to CHF versus pulmonary arterial hypertension. Thoracic aorta: The thoracic aorta is normal in caliber and contour. Small scattered atheromatous plaques are visualized. Cardiovascular: The heart is mildly enlarged. No evidence of pericardial effusion. Patient is post sternotomy, tricuspid valve, mitral valve, and aortic valve replacement. There is mild to moderate pulmonary vascular congestion. Thyroid: The patient is post thyroidectomy. Lungs and Pleura: No focal consolidation or effusion. Focal airspace opacity in the right upper lobe near the apex measures 1.2 cm and appears stable from prior study performed in 2022. Finding may represent focal scarring. Patient is post left upper lobectomy. There is mild linear scarring in the left lung base, stable. No pneumothorax. Central airways are patent. Mediastinum/Jennifer: No significant mediastinal or hilar lymphadenopathy. Lymph nodes: No axillary or supraclavicular lymphadenopathy. Bones and Soft Tissue:No acute fracture, aggressive osseous lesions, or soft tissue process. Upper Abdomen: No acute process in the upper abdomen.      Impression: Impression: No evidence of pulmonary embolism. Enlarged main pulmonary artery measuring 4 cm in width. Finding may be secondary to pulmonary arterial hypertension or possibly secondary to CHF. No evidence of thoracic aortic aneurysm or dissection. Mild cardiomegaly. Findings compatible with mild to moderate CHF. Prior sternotomy, aortic, tricuspid, and mitral valve replacement. Prior left upper lobectomy. Electronically Signed: Anthony Ramirez MD  9/25/2024 8:41 PM EDT  Workstation ID: [id number]     Results for orders placed during the hospital encounter of 04/07/23    Adult Transthoracic Echo Complete W/ Cont if Necessary Per Protocol    Interpretation Summary    Left ventricular ejection fraction appears to be 61 - 65%.    Left ventricular wall thickness is consistent with mild concentric hypertrophy.    The right ventricular cavity is mildly dilated.    Left atrial volume is moderately increased.    The right atrial cavity is mildly  dilated.    Mild aortic valve stenosis is present. Aortic valve area is 1.3 cm2.    Peak velocity of the flow distal to the aortic valve is 294.6 cm/s. Aortic valve mean pressure gradient is 20 mmHg.    There is a bioprosthetic aortic valve present.    Moderate mitral valve stenosis is present. The mitral valve mean gradient is 13 mmHg. The mitral valve area (PHT) is 1.3 cm2.    There is a bioprosthetic mitral valve present.    Moderate tricuspid valve regurgitation is present.    Estimated right ventricular systolic pressure from tricuspid regurgitation is moderately elevated (45-55 mmHg).      Assessment & Plan   Assessment & Plan       Hypoxia    Observation general floor admission 9/25/2024 with hypoxia secondary to heart failure exacerbation with history of multiple valve replacements perhaps triggered by rhinovirus infection.    At time of admission sitting up in bed, on 3 L with no respiratory distress.  Speaking in complete sentences.    Hypoxia  Supplemental  oxygen as needed keep saturation above 90%.  No previous home O2 requirement.  Rhinovirus infection  She has requested no DuoNebs.  Acute exacerbation of heart failure  Lasix.  Echocardiogram.  Cardiology consultation given history of multiple valve replacements.    Chronic: History lung cancer with lobectomy, history of multiple cardiac valve replacements, hypertension, hypothyroidism, hyperlipidemia, heart failure preserved ejection fraction, type 2 diabetes, COPD, tobacco user.  Subcutaneous insulin protocol.  Check A1c.  Continue other home medications.      VTE Prophylaxis: Lovenox  Pharmacologic VTE prophylaxis orders are signed & held.            CODE STATUS:    Code Status and Medical Interventions: No CPR (Do Not Attempt to Resuscitate); Full Support   Ordered at: 09/25/24 2323     Code Status (Patient has no pulse and is not breathing):    No CPR (Do Not Attempt to Resuscitate)     Medical Interventions (Patient has pulse or is breathing):    Full Support       Expected Discharge   Expected discharge date/ time has not been documented.     Brian Joseph Kerley, DO  09/25/24

## 2024-09-26 NOTE — CONSULTS
Inpatient Cardiology Consult  Consult performed by: Marco Lay IV, MD  Consult ordered by: Kerley, Brian Joseph, DO  Reason for consult: Acute on chronic HFpEF/Valvular heart disease          [facility]  Cardiovascular Consultation Note    Active Hospital Problems    Diagnosis  POA    **Acute on chronic heart failure with preserved ejection fraction due to valvular heart disease [I50.33]  Yes     Priority: High     Echo (6/2/2022): LVEF 65%.  Bioprosthetic aortic valve present with mean gradient 18 mmHg.  Bioprosthetic mitral valve present with mean gradient 13 mmHg. RVSP > 55 mmHg.  Echo (4/7/2023): LVEF 62%.  Mild LVH.  Mild bioprosthetic aortic valve stenosis.  Bioprosthetic mitral valve stenosis with mean gradient 13 mmHg).  Moderate TR.  RVSP 45-55 mmHg  [facility] admission for hypoxic respiratory failure and CHF, 9/25/2024  Echo (9/26/2024): LVEF 60%.  LVH present.  Severe bioprosthetic aortic valve stenosis (mean gradient 52 mmHg).  Severe bioprosthetic mitral valve stenosis (mean gradient 13 mmHg).  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 Moderate pulmonary hypertension.  Echo (4/3/18): LVEF 70%. Severe LA dilatation. Moderate AI. Severe MS and severe MR. RVSP 56 mmHg.  AVR/MVR/tricuspid annuloplasty (27 mm Magna Ease mitral, 21 mm Magna Ease aortic) and left atrial appendage ligation performed by Ajay Cheatham, 7/16/18  Echo (08/22/2018): LVEF 65%. The bioprosthetic aortic and mitral valves are well seated and functioning normally. RVSP<35 mmHg.  TIFFANIE (9/17/2018): Normal functioning MVR.  ANGELA completely occluded.  Echo (4/20/2022): LVEF 50%.  Bioprosthetic aortic valve functions normally.  Bioprosthetic mitral valve with mild MR.  Mild to moderate TR.  Echo (6/2/2022): LVEF 65%.   Bioprosthetic aortic valve present with mean gradient 18 mmHg.  Bioprosthetic mitral valve present with mean gradient 13 mmHg. RVSP > 55 mmHg.  Echo (4/7/2023): LVEF 62%.  Mild LVH.  Mild bioprosthetic aortic valve stenosis.  Bioprosthetic mitral valve stenosis with mean gradient 13 mmHg).  Moderate TR.  RVSP 45-55 mmHg  Echo (9/26/2024): LVEF 60%.  LVH present.  Severe bioprosthetic aortic valve stenosis (mean gradient 52 mmHg).  Severe bioprosthetic mitral valve stenosis (mean gradient 13 mmHg).      Pulmonary hypertension [I27.20]  Yes     Priority: Medium     Echo (6/2/2022): LVEF 65%.  Bioprosthetic aortic valve present with mean gradient 18 mmHg.  Bioprosthetic mitral valve present with mean gradient 13 mmHg. RVSP > 55 mmHg.  Echo (4/7/2023): LVEF 62%.  Mild LVH.  Mild bioprosthetic aortic valve stenosis.  Bioprosthetic mitral valve stenosis with mean gradient 13 mmHg).  Moderate TR.  RVSP 45-55 mmHg  Echo (9/26/2024): LVEF 60%.  LVH present.  Severe bioprosthetic aortic valve stenosis (mean gradient 52 mmHg).  Severe bioprosthetic mitral valve stenosis (mean gradient 13 mmHg).  RVSP 58 mmHg      Type 2 diabetes mellitus [E11.9]  Yes     Priority: Low    Paroxysmal atrial fibrillation [I48.0]  Yes     Priority: Low     Postoperative atrial fibrillation with RVR in the setting of left upper lobectomy, 5/15/2017  Echo (8/25/17): LVEF 60%.  Moderate to severe mitral stenosis (mean gradient 9 mmHg).  Moderate aortic insufficiency.  RVSP 50 mmHg  Chads Vasc = 2 (female, HTN)  Left atrial appendage ligation and MAZE procedure by Ajay Cheatham, 7/16/18  TIFFANIE (9/17/2018):  ANGELA completely occluded.  Successful external cardioversion for atypical atrial flutter after initiation of propafenone, 4/28/2022  PVA for atrial flutter by Nigel Huber, 6/1/2022      Rhinovirus [B34.8]  Yes    Hyperlipidemia LDL goal <70 [E78.5]  Yes     High intensity statin indicated given the presence of diabetes and mild CAD      Essential  "hypertension [I10]  Yes     Target blood pressure <130/80 mmHg              64-year-old female with history of rheumatic heart disease status post bioprosthetic AVR/MVR and tricuspid annuloplasty in 2018 by Dr. Ajay Cheatham.  Patient also has a history of left upper lobectomy and lymph node dissection for lung cancer in 2017.    The patient presented to [facility] emergency room on the evening of 9/25/2024 with worsening shortness of breath, productive cough and congestion with headache.  At home, oxygen saturation registered 72% which prompted her to come to the ER.  On arrival, oxygen saturation 83%.  Supplemental oxygen was administered.  CT chest suggested pulmonary vascular congestion.  proBNP elevated.  She was given a single dose of furosemide 80 mg IV with improvement in shortness of breath symptoms.  She received another bolus of 40 mg IV this morning.  Respiratory PCR was positive for rhinovirus.    Presently she is sitting in her chair and appears comfortable.  She still has cough and notices her oxygen saturation to be 93% despite supplemental oxygen.    Echo performed this morning showed preserved LV systolic function.  Bioprosthetic aortic valve exhibited an elevated mean gradient across the valve of 50 mmHg, consistent with severe aortic stenosis.  There was moderate aortic regurgitation as well.  The bioprosthetic mitral valve exhibited a mean gradient of 13 mmHg, consistent with moderate to severe mitral stenosis.  RVSP 58 mmHg, indicative of severe pulmonary hypertension.    The patient reports that she does not desire to undergo repeat surgery for her valvular heart disease.  She states that she was not treated for pain sufficiently during her first operation and \"suffered\".      Cardiac risk factors: Hypertension, hyperlipidemia    Past medical and surgical history, social and family history reviewed in EMR.    REVIEW OF SYSTEMS:   H&P ROS reviewed and pertinent CV ROS as noted in HPI.     "     Vital Sign Min/Max for last 24 hours  Temp  Min: 97.8 °F (36.6 °C)  Max: 98.3 °F (36.8 °C)   BP  Min: 96/71  Max: 122/69   Pulse  Min: 51  Max: 80   Resp  Min: 18  Max: 18   SpO2  Min: 83 %  Max: 98 %   No data recorded    No intake or output data in the 24 hours ending 24 1423        Constitutional:       Appearance: Healthy appearance.   Eyes:      General: No scleral icterus.  Neck:      Thyroid: No thyroid mass.      Vascular: No carotid bruit or JVD. JVD normal.   Pulmonary:      Effort: Pulmonary effort is normal.      Breath sounds: Normal breath sounds.   Cardiovascular:      Normal rate. Regular rhythm.      Murmurs: There is a grade 2/6 harsh midsystolic murmur at the URSB, radiating to the neck. There is a grade 2/4 low frequency crescendo, presystolic diastolic murmur at the apex.      No gallop.    Edema:     Peripheral edema absent.   Skin:     General: Skin is warm. There is no cyanosis.   Neurological:      General: No focal deficit present.      Mental Status: Alert.   Psychiatric:         Attention and Perception: Attention normal.          [id number] normal sinus rhythm    Lab Review:   Labs reviewed in the electronic medical record.  Pertinent findings include:  Lab Results   Component Value Date    GLUCOSE 118 (H) 2024    BUN 16 2024    CREATININE 0.73 2024     2024    K 3.6 2024    CL 93 (L) 2024    CALCIUM 8.8 2024    PROTEINTOT 7.4 2024    ALBUMIN 4.2 2024    ALT 8 2024    AST 21 2024    ALKPHOS 94 2024    BILITOT 0.7 2024    GLOB 3.2 2024    AGRATIO 1.3 2024    BCR 21.9 2024    ANIONGAP 18.0 (H) 2024    EGFR 93.1 2024     Lab Results   Component Value Date    WBC 8.81 2024    HGB 16.9 (H) 2024    HCT 48.6 (H) 2024    .8 (H) 2024     2024     Lab Results   Component Value Date    CHOL 133 2022    TRIG 176 (H)  04/18/2022    HDL 46 04/18/2022    LDL 58 04/18/2022     Results from last 7 days   Lab Units 09/26/24  0013   HEMOGLOBIN A1C % 7.30*              Acute on chronic HFpEF due to valvular heart disease  proBNP elevated and CT angiogram of the chest suggest mild to moderate CHF.  Echo this admission shows severe bioprosthetic aortic valve stenosis/regurgitation and severe bioprosthetic mitral valve stenosis  Will consult Dr. Cheatham and the heart valve team to discuss options.  I suspect transcatheter aortic valve replacement and mitral valve replacement would be most suitable for this patient, but will leave this determination to the heart valve team  Continue IV Lasix    Severe bioprosthetic aortic valve stenosis/regurgitation and severe bioprosthetic mitral valve stenosis  Per above    Severe pulmonary hypertension  Continue IV furosemide    Rhinovirus  Management per hospitalist    Paroxysmal atrial fibrillation  Maintaining sinus rhythm on propafenone  No NOAC due to left atrial appendage closure at the time of last surgery    Hyperlipidemia  Well-controlled  Continue pravastatin    Type 2 diabetes mellitus  Reasonably controlled based on A1c 7.3  Continue empagliflozin 10 mg daily       Consult Dr. Cheatham and Dr. Pickett for heart team approach to treatment of bioprosthetic aortic and mitral valve stenosis  Continue scheduled IV furosemide      H. C. Nelson Lay MD [facility]  Interventional and General Cardiology

## 2024-09-26 NOTE — PLAN OF CARE
Problem: Adult Inpatient Plan of Care  Goal: Plan of Care Review  Outcome: Progressing  Flowsheets (Taken 9/26/2024 1855)  Progress: improving  Plan of Care Reviewed With: patient  Outcome Evaluation: Pt AO x4/VSS with low BP, provider aware, not symptomatic/sinus Yosi to NSR/ 3L H NC. Pt is having productive cough, incentive spirometer used. Pt here with SOA/Acute on Chr. HF w pEF, no LE edema. Pt denies pain/N/V/SOA. Continue monitoring.  Goal: Patient-Specific Goal (Individualized)  Outcome: Progressing  Goal: Absence of Hospital-Acquired Illness or Injury  Outcome: Progressing  Intervention: Identify and Manage Fall Risk  Description: Perform standard risk assessment on admission using a validated tool or comprehensive approach appropriate to the patient; reassess fall risk frequently, with change in status or transfer to another level of care.  Communicate fall injury risk to interprofessional healthcare team.  Determine need for increased observation, equipment and environmental modification, such as low bed, signage and supportive, nonskid footwear.  Adjust safety measures to individual developmental age, stage and identified risk factors.  Reinforce the importance of safety and physical activity with patient and family.  Perform regular intentional rounding to assess need for position change, pain assessment and personal needs, including assistance with toileting.  Recent Flowsheet Documentation  Taken 9/26/2024 1820 by Samantha Robbins, RN  Safety Promotion/Fall Prevention:   assistive device/personal items within reach   clutter free environment maintained   fall prevention program maintained   nonskid shoes/slippers when out of bed   room organization consistent   safety round/check completed   toileting scheduled  Taken 9/26/2024 1600 by Samantha Robbins RN  Safety Promotion/Fall Prevention:   assistive device/personal items within reach   clutter free environment maintained   fall prevention program  maintained   nonskid shoes/slippers when out of bed   room organization consistent   safety round/check completed   toileting scheduled  Taken 9/26/2024 1405 by Samantha Robbins RN  Safety Promotion/Fall Prevention:   assistive device/personal items within reach   clutter free environment maintained   fall prevention program maintained   nonskid shoes/slippers when out of bed   room organization consistent   safety round/check completed   toileting scheduled  Taken 9/26/2024 1208 by Samantha Robbins RN  Safety Promotion/Fall Prevention:   assistive device/personal items within reach   clutter free environment maintained   fall prevention program maintained   nonskid shoes/slippers when out of bed   room organization consistent   safety round/check completed   toileting scheduled  Taken 9/26/2024 1040 by Samantha Robbins RN  Safety Promotion/Fall Prevention:   assistive device/personal items within reach   clutter free environment maintained   fall prevention program maintained   nonskid shoes/slippers when out of bed   room organization consistent   safety round/check completed   toileting scheduled  Taken 9/26/2024 0855 by Samantha Robbins RN  Safety Promotion/Fall Prevention:   assistive device/personal items within reach   clutter free environment maintained   fall prevention program maintained   nonskid shoes/slippers when out of bed   room organization consistent   safety round/check completed   toileting scheduled  Intervention: Prevent Skin Injury  Description: Perform a screening for skin injury risk, such as pressure or moisture associated skin damage on admission and at regular intervals throughout hospital stay.  Keep all areas of skin (especially folds) clean and dry.  Maintain adequate skin hydration.  Relieve and redistribute pressure and protect bony prominences; implement measures based on patient-specific risk factors.  Match turning and repositioning schedule to clinical condition.  Encourage weight  shift frequently; assist with reposition if unable to complete independently.  Float heels off bed; avoid pressure on the Achilles tendon.  Keep skin free from extended contact with medical devices.  Encourage functional activity and mobility, as early as tolerated.  Use aids (e.g., slide boards, mechanical lift) during transfer.  Recent Flowsheet Documentation  Taken 9/26/2024 1820 by Samantha Robbins RN  Body Position: position changed independently  Skin Protection:   adhesive use limited   skin sealant/moisture barrier applied   skin-to-device areas padded   skin-to-skin areas padded   transparent dressing maintained   tubing/devices free from skin contact  Taken 9/26/2024 1600 by Samantha Robbins RN  Body Position: position changed independently  Skin Protection:   adhesive use limited   skin sealant/moisture barrier applied   skin-to-device areas padded   skin-to-skin areas padded   transparent dressing maintained   tubing/devices free from skin contact  Taken 9/26/2024 1405 by Samantha Robbins RN  Body Position: position changed independently  Skin Protection:   adhesive use limited   skin sealant/moisture barrier applied   skin-to-device areas padded   skin-to-skin areas padded   transparent dressing maintained   tubing/devices free from skin contact  Taken 9/26/2024 1208 by Samantha Robbins RN  Body Position: position changed independently  Skin Protection:   adhesive use limited   skin sealant/moisture barrier applied   skin-to-device areas padded   skin-to-skin areas padded   transparent dressing maintained   tubing/devices free from skin contact  Taken 9/26/2024 1040 by Samantha Robbins RN  Body Position: position changed independently  Skin Protection:   adhesive use limited   skin sealant/moisture barrier applied   skin-to-device areas padded   skin-to-skin areas padded   transparent dressing maintained   tubing/devices free from skin contact  Taken 9/26/2024 0855 by Samantha Robbins RN  Body Position:  position changed independently  Skin Protection:   adhesive use limited   skin sealant/moisture barrier applied   skin-to-device areas padded   skin-to-skin areas padded   transparent dressing maintained   tubing/devices free from skin contact  Intervention: Prevent and Manage VTE (Venous Thromboembolism) Risk  Description: Assess for VTE (venous thromboembolism) risk.  Encourage and assist with early ambulation.  Initiate and maintain compression or other therapy, as indicated, based on identified risk in accordance with organizational protocol and provider order.  Encourage both active and passive leg exercises while in bed, if unable to ambulate.  Recent Flowsheet Documentation  Taken 9/26/2024 1820 by Samantha Robbins RN  Activity Management: up ad breonna  Taken 9/26/2024 1600 by Samantha Robbins RN  Activity Management: up ad breonna  Taken 9/26/2024 1405 by Samantha Robbins RN  Activity Management: up ad breonna  Taken 9/26/2024 1208 by Samantha Robbins RN  Activity Management: up ad breonna  Taken 9/26/2024 1040 by Samantha Robbins RN  Activity Management: up ad breonna  Taken 9/26/2024 0855 by Samantha Robbins RN  Activity Management: up ad breonna  VTE Prevention/Management: (see MAR) other (see comments)  Range of Motion: active ROM (range of motion) encouraged  Intervention: Prevent Infection  Description: Maintain skin and mucous membrane integrity; promote hand, oral and pulmonary hygiene.  Optimize fluid balance, nutrition, sleep and glycemic control to maximize infection resistance.  Identify potential sources of infection early to prevent or mitigate progression of infection (e.g., wound, lines, devices).  Evaluate ongoing need for invasive devices; remove promptly when no longer indicated.  Recent Flowsheet Documentation  Taken 9/26/2024 1820 by Samantha Robbins RN  Infection Prevention:   rest/sleep promoted   single patient room provided  Taken 9/26/2024 1600 by Samantha Robbins RN  Infection Prevention:   rest/sleep promoted    single patient room provided  Taken 9/26/2024 1405 by Samantha Robbins RN  Infection Prevention:   rest/sleep promoted   single patient room provided  Taken 9/26/2024 1208 by Samantha Robbins RN  Infection Prevention:   rest/sleep promoted   single patient room provided  Taken 9/26/2024 1040 by Samantha Robbins RN  Infection Prevention:   rest/sleep promoted   single patient room provided  Taken 9/26/2024 0855 by Samantha Robbins RN  Infection Prevention:   rest/sleep promoted   single patient room provided  Goal: Optimal Comfort and Wellbeing  Outcome: Progressing  Intervention: Monitor Pain and Promote Comfort  Description: Assess pain level, treatment efficacy and patient response at regular intervals using a consistent pain scale.  Consider the presence and impact of preexisting chronic pain.  Encourage patient and caregiver involvement in pain assessment, interventions and safety measures.  Recent Flowsheet Documentation  Taken 9/26/2024 1820 by Samantha Robbins RN  Pain Management Interventions:   quiet environment facilitated   care clustered  Taken 9/26/2024 1600 by Samantha Robbins RN  Pain Management Interventions:   quiet environment facilitated   care clustered  Taken 9/26/2024 1405 by Samantha Robbins RN  Pain Management Interventions:   quiet environment facilitated   care clustered  Taken 9/26/2024 1208 by Samantha Robbins RN  Pain Management Interventions:   quiet environment facilitated   care clustered  Taken 9/26/2024 1040 by Samantha Robbins RN  Pain Management Interventions:   quiet environment facilitated   care clustered  Taken 9/26/2024 0855 by Samantha Robbins RN  Pain Management Interventions:   quiet environment facilitated   care clustered  Intervention: Provide Person-Centered Care  Description: Use a family-focused approach to care.  Develop trust and rapport by proactively providing information, encouraging questions, addressing concerns and offering reassurance.  Acknowledge emotional response  to hospitalization.  Recognize and utilize personal coping strategies.  Honor spiritual and cultural preferences.  Recent Flowsheet Documentation  Taken 9/26/2024 1820 by Samantha Robbins RN  Trust Relationship/Rapport:   care explained   choices provided   emotional support provided   empathic listening provided   questions answered   questions encouraged   reassurance provided   thoughts/feelings acknowledged  Taken 9/26/2024 1600 by Samantha Robbins RN  Trust Relationship/Rapport:   care explained   choices provided   emotional support provided   empathic listening provided   questions answered   questions encouraged   reassurance provided   thoughts/feelings acknowledged  Taken 9/26/2024 1405 by Samantha Robbins RN  Trust Relationship/Rapport:   care explained   choices provided   emotional support provided   empathic listening provided   questions answered   questions encouraged   reassurance provided   thoughts/feelings acknowledged  Taken 9/26/2024 1208 by Samantha Robbins RN  Trust Relationship/Rapport:   care explained   choices provided   emotional support provided   empathic listening provided   questions answered   questions encouraged   reassurance provided   thoughts/feelings acknowledged  Taken 9/26/2024 1040 by Samantha Robbins RN  Trust Relationship/Rapport:   care explained   choices provided   emotional support provided   empathic listening provided   questions answered   questions encouraged   reassurance provided   thoughts/feelings acknowledged  Taken 9/26/2024 0855 by Samantha Robbins RN  Trust Relationship/Rapport:   care explained   choices provided   emotional support provided   empathic listening provided   questions answered   questions encouraged   reassurance provided   thoughts/feelings acknowledged  Goal: Readiness for Transition of Care  Outcome: Progressing     Problem: Fall Injury Risk  Goal: Absence of Fall and Fall-Related Injury  Outcome: Progressing  Intervention: Identify and Manage  Contributors  Description: Develop a fall prevention plan with the patient and caregiver/family.  Provide reorientation, appropriate sensory stimulation and routines with changes in mental status to decrease risk of fall.  Promote use of personal vision and auditory aids.  Assess assistance level required for safe and effective self-care; provide support as needed, such as toileting, mobilization. For age 65 and older, implement timed toileting with assistance.  Encourage physical activity, such as performance of mobility and self-care at highest level of patient ability, multicomponent exercise program and provision of appropriate assistive devices.  If fall occurs, assess the severity of injury; implement fall injury protocol. Determine the cause and revise fall injury prevention plan.  Regularly review medication contribution to fall risk; adjust medication administration times to minimize risk of falling.  Consider risk related to polypharmacy and age.  Balance adequate pain management with potential for oversedation.  Recent Flowsheet Documentation  Taken 9/26/2024 1820 by Samantha Robbins RN  Medication Review/Management: medications reviewed  Self-Care Promotion: independence encouraged  Taken 9/26/2024 1600 by Samantha Robbins RN  Medication Review/Management: medications reviewed  Self-Care Promotion: independence encouraged  Taken 9/26/2024 1405 by Samantha Robbins RN  Medication Review/Management: medications reviewed  Self-Care Promotion: independence encouraged  Taken 9/26/2024 1208 by Samantha Robbins RN  Medication Review/Management: medications reviewed  Self-Care Promotion: independence encouraged  Taken 9/26/2024 1040 by Samantha Robbins RN  Medication Review/Management: medications reviewed  Self-Care Promotion: independence encouraged  Taken 9/26/2024 0855 by Samantha Robbins RN  Medication Review/Management: medications reviewed  Self-Care Promotion: independence encouraged  Intervention: Promote  Injury-Free Environment  Description: Provide a safe, barrier-free environment that encourages independent activity.  Keep care area uncluttered and well-lighted.  Determine need for increased observation or monitoring.  Avoid use of devices that minimize mobility, such as restraints or indwelling urinary catheter.  Recent Flowsheet Documentation  Taken 9/26/2024 1820 by Samantha Robbins RN  Safety Promotion/Fall Prevention:   assistive device/personal items within reach   clutter free environment maintained   fall prevention program maintained   nonskid shoes/slippers when out of bed   room organization consistent   safety round/check completed   toileting scheduled  Taken 9/26/2024 1600 by Samantha Robbins RN  Safety Promotion/Fall Prevention:   assistive device/personal items within reach   clutter free environment maintained   fall prevention program maintained   nonskid shoes/slippers when out of bed   room organization consistent   safety round/check completed   toileting scheduled  Taken 9/26/2024 1405 by Samantha Robbins RN  Safety Promotion/Fall Prevention:   assistive device/personal items within reach   clutter free environment maintained   fall prevention program maintained   nonskid shoes/slippers when out of bed   room organization consistent   safety round/check completed   toileting scheduled  Taken 9/26/2024 1208 by Samantha Robbins RN  Safety Promotion/Fall Prevention:   assistive device/personal items within reach   clutter free environment maintained   fall prevention program maintained   nonskid shoes/slippers when out of bed   room organization consistent   safety round/check completed   toileting scheduled  Taken 9/26/2024 1040 by Samantha Robbins RN  Safety Promotion/Fall Prevention:   assistive device/personal items within reach   clutter free environment maintained   fall prevention program maintained   nonskid shoes/slippers when out of bed   room organization consistent   safety round/check  completed   toileting scheduled  Taken 9/26/2024 0855 by Samantha Robbins RN  Safety Promotion/Fall Prevention:   assistive device/personal items within reach   clutter free environment maintained   fall prevention program maintained   nonskid shoes/slippers when out of bed   room organization consistent   safety round/check completed   toileting scheduled     Problem: Adjustment to Illness (Heart Failure)  Goal: Optimal Coping  Outcome: Progressing  Intervention: Support Psychosocial Response  Description: Acknowledge, normalize and validate the emotional response to current condition and unpredictable nature of disease progression.  Assess and monitor patient and caregiver perspective on quality of life and personal wellbeing; consider palliative care consult to enhance symptom relief and quality of life.  Engage patient in early and ongoing discussion about goals of care. Facilitate shared decision-making regarding goals and advanced care planning.  Assist patient and family with life transitions associated with heart failure (e.g., adherence to medical regimens, impact on family dynamics/roles, end-of-life care); acknowledge caregiver stress.  Recognize current coping strategies and assist in developing new strategies (problem-solving, mind-body techniques).  Assess and monitor for signs and symptoms of anxiety and depression.  Recent Flowsheet Documentation  Taken 9/26/2024 1820 by Samantha Robbins RN  Family/Support System Care:   self-care encouraged   support provided  Taken 9/26/2024 1600 by Samantha Robbins RN  Supportive Measures:   active listening utilized   positive reinforcement provided   self-care encouraged   self-responsibility promoted   self-reflection promoted   verbalization of feelings encouraged  Family/Support System Care:   self-care encouraged   support provided  Taken 9/26/2024 1405 by Samantha Robbins RN  Supportive Measures:   active listening utilized   decision-making supported    self-care encouraged   self-reflection promoted   self-responsibility promoted   verbalization of feelings encouraged  Family/Support System Care:   self-care encouraged   support provided  Taken 9/26/2024 1208 by Samantha Robbins RN  Family/Support System Care:   self-care encouraged   support provided  Taken 9/26/2024 1040 by Samantha Robbins RN  Supportive Measures:   active listening utilized   counseling provided   decision-making supported   positive reinforcement provided   self-care encouraged   self-reflection promoted   self-responsibility promoted   verbalization of feelings encouraged  Family/Support System Care:   self-care encouraged   support provided  Taken 9/26/2024 0855 by Samantha Robbins RN  Supportive Measures:   active listening utilized   counseling provided   decision-making supported   positive reinforcement provided   relaxation techniques promoted   self-care encouraged   self-reflection promoted   self-responsibility promoted   verbalization of feelings encouraged  Family/Support System Care:   self-care encouraged   support provided     Problem: Cardiac Output Decreased (Heart Failure)  Goal: Optimal Cardiac Output  Outcome: Progressing  Intervention: Optimize Cardiac Output  Description: Closely monitor fluid balance; advocate for adjustment if balance is consistently positive.  Optimize preload, afterload and contractility with pharmacologic therapy (e.g., diuretic, angiotensin-converting enzyme inhibitor, angiotensin receptor blocker, beta-blocker, aldosterone antagonist, phosphodiesterase-5 inhibitor, inodilator, inotrope  Implement pharmacologic and nonpharmacologic interventions for pain, comfort and anxiety to avoid cardiac and respiratory compromise.  Maintain optimal position to promote venous return (e.g., elevate head of bed, avoid dependent extremity position).  Facilitate sleep/rest patterns that support or enhance activity tolerance.  Decrease energy expenditure (e.g., preplan  activity, cluster care considering patient preference).  Maintain normothermia to enhance oxygenation and perfusion.  Facilitate use of additional therapy, such as ultrafiltration, intra-aortic balloon pump, ventricular assist device or extracorporeal membrane oxygenation to improve perfusion and hemodynamic stability.  Anticipate surgical intervention, such as heart surgery or heart transplantation, with refractory symptoms.  Recent Flowsheet Documentation  Taken 9/26/2024 1600 by Samantha Robbins RN  Environmental Support: environmental consistency promoted  Taken 9/26/2024 1405 by Samantha Robbins RN  Environmental Support:   rest periods encouraged   environmental consistency promoted   calm environment promoted  Taken 9/26/2024 1040 by Samantha Robbins RN  Environmental Support:   environmental consistency promoted   rest periods encouraged  Taken 9/26/2024 0855 by Samantha Robbins RN  Environmental Support:   rest periods encouraged   environmental consistency promoted     Problem: Dysrhythmia (Heart Failure)  Goal: Stable Heart Rate and Rhythm  Outcome: Progressing     Problem: Fluid Imbalance (Heart Failure)  Goal: Fluid Balance  Outcome: Progressing  Intervention: Monitor and Manage Fluid Balance  Description: Assess fluid requirements and deficit to determine goal-directed fluid therapy.  Keep accurate intake, output and daily weight; monitor trends.  Monitor laboratory value trends and need for treatment adjustment.  Assess need for ongoing intravenous fluid therapy; encourage oral intake when able.  Evaluate causes and potential sources that may lead to imbalance, such as illness severity, organ failure, mental status, mobility limitations, swallowing difficulties, intravenous fluid and medication side effects.  Anticipate need for diuretic agent; monitor effects if administered (e.g., blood pressure change, dysrhythmia, electrolyte alteration).  Avoid excessive fluid intake or delivery; evaluate need for,  and response to, fluid restriction in individualized clinical situations.  Monitor and maintain skin integrity; avoid constrictive devices and clothing if edema is present.  Maintain optimal position to relieve discomfort, breathlessness and ventilation-perfusion mismatch.  Recent Flowsheet Documentation  Taken 9/26/2024 1820 by Samantha Robbins RN  Fluid/Electrolyte Management: fluids provided  Taken 9/26/2024 1600 by Samantha Robbins RN  Fluid/Electrolyte Management: fluids provided  Taken 9/26/2024 1405 by Samantha Robbins RN  Fluid/Electrolyte Management: fluids provided  Taken 9/26/2024 1208 by Samantha Robbins RN  Fluid/Electrolyte Management: fluids provided  Taken 9/26/2024 1040 by Samantha Robbins RN  Fluid/Electrolyte Management: fluids provided  Taken 9/26/2024 0855 by Samantha Robbins RN  Fluid/Electrolyte Management: fluids provided     Problem: Functional Ability Impaired (Heart Failure)  Goal: Optimal Functional Ability  Outcome: Progressing  Intervention: Optimize Functional Ability  Description: Assess functional ability and cognition; consider social history, including living environment, roles and social engagement, to identify risk or presence of functional decline; routinely reassess during hospitalization to identify any change.  Facilitate physical activity and exercise to improve functional ability, cognition and quality of life, as well as minimize functional decline associated with inactivity; encourage optimal functional mobility.  Consider NMES (neuromuscular electrical stimulation) of the lower extremities for patients with limitations in ability to participate in active exercise.  Monitor physiologic response to activity or exercise and adjust accordingly; provide a warm-up and cool-down with activity.  Cluster, coordinate and organize care schedule per patient preference, priorities and tolerance.  Preplan and pace activity; balance activity with periods of rest; incorporate  energy-conservation techniques.  Maintain an accessible environment to facilitate safe activity; position for optimal comfort and activity tolerance (e.g., sitting for self-care).  Encourage self-care performance to promote maximum independence in daily activity; provide adaptive equipment and rest periods if needed.  Offer personal aids, such as glasses, hearing aids and dentures; encourage familiar home items and neurosensory stimulation activities to prevent isolation and sensory deprivation.  Recent Flowsheet Documentation  Taken 9/26/2024 1820 by Samantha Robbins RN  Activity Management: up ad breonna  Self-Care Promotion: independence encouraged  Taken 9/26/2024 1600 by Samantha Robbins RN  Activity Management: up ad breonna  Self-Care Promotion: independence encouraged  Taken 9/26/2024 1405 by Samantha Robbins RN  Activity Management: up ad breonna  Self-Care Promotion: independence encouraged  Taken 9/26/2024 1208 by Samantha Robbins RN  Activity Management: up ad breonna  Self-Care Promotion: independence encouraged  Taken 9/26/2024 1040 by Samantha Robbins RN  Activity Management: up ad breonna  Self-Care Promotion: independence encouraged  Taken 9/26/2024 0855 by Samantha Robbins RN  Activity Management: up ad breonna  Self-Care Promotion: independence encouraged     Problem: Oral Intake Inadequate (Heart Failure)  Goal: Optimal Nutrition Intake  Outcome: Progressing     Problem: Respiratory Compromise (Heart Failure)  Goal: Effective Oxygenation and Ventilation  Outcome: Progressing  Intervention: Promote Airway Secretion Clearance  Description: Assess the effectiveness of pulmonary hygiene and ability to perform airway-clearance techniques.  Promote early mobility or ambulation; match activity to ability and tolerance.  Encourage deep breathing and lung expansion therapy to prevent atelectasis; adjust treatment to patient’s response.  Initiate cough-enhancement and airway-clearance techniques with instruction (e.g., active cycle  breathing, positive expiratory pressure, suction).  Consider pharmacologic therapy that may improve mucus clearance, cough response and air flow.  Consider inspiratory muscle training to decrease the risk of pulmonary complications.  Recent Flowsheet Documentation  Taken 9/26/2024 0855 by Samantha Robbins RN  Breathing Techniques/Airway Clearance:   deep/controlled cough encouraged   diaphragmatic breathing promoted  Cough And Deep Breathing: done independently per patient  Intervention: Optimize Oxygenation and Ventilation  Description: Assess and monitor airway, breathing and circulation for effective oxygenation and ventilation; consider oxygenation and ventilation parameters and goal.  Anticipate noninvasive and invasive monitoring (e.g., pulse oximetry, end-tidal carbon dioxide, blood gases, cardiovascular).  Promote head of bed elevation with regular position changes to minimize ventilation/perfusion mismatch and breathlessness.  Provide oxygen therapy judiciously to avoid hyperoxemia; adjust to achieve oxygenation goal.  Monitor fluid balance closely to minimize the risk of fluid overload.  Consider noninvasive or invasive positive pressure ventilation to enhance oxygenation and ventilation, as well as reduce work of breathing.  Recent Flowsheet Documentation  Taken 9/26/2024 0855 by Samantha Robbins RN  Airway/Ventilation Management: airway patency maintained     Problem: Sleep Disordered Breathing (Heart Failure)  Goal: Effective Breathing Pattern During Sleep  Outcome: Progressing   Goal Outcome Evaluation:  Plan of Care Reviewed With: patient        Progress: improving  Outcome Evaluation: Pt AO x4/VSS with low BP, provider aware, not symptomatic/sinus Yosi to NSR/ 3L H NC. Pt is having productive cough, incentive spirometer used. Pt here with SOA/Acute on Chr. HF w pEF, no LE edema. Pt denies pain/N/V/SOA. Continue monitoring.

## 2024-09-26 NOTE — ED NOTES
Jill Miller    Nursing Report ED to Floor:  Mental status: AXO4  Ambulatory status: aSSISTX1  Oxygen Therapy:  2L  Cardiac Rhythm: nsr  Admitted from: home  Safety Concerns:  fall risk  Social Issues: none  ED Room #:  10    ED Nurse Phone Extension - [phone] or may call [phone].      HPI:   Chief Complaint   Patient presents with    Shortness of Breath       Past Medical History:  Past Medical History:   Diagnosis Date    Atrial fibrillation and flutter     Chronic diastolic congestive heart failure     Chronically Abnormal CXR (Left pleural disease post op) 09/25/2017    COPD exacerbation 04/17/2022    Essential hypertension 04/10/2017    Target blood pressure <130/80 mmHg    Graves disease     Hyperlipidemia LDL goal <70     Hypertension     Hyperthyroidism     Lung cancer     MRSA (methicillin resistant staph aureus) culture positive 2014    under left breast, incision and debridement, treated with wound packing and po abx     Prolonged QTC interval on ECG 06/01/2022    Rheumatic mitral stenosis     Type 2 diabetes mellitus 07/17/2018    Valvular heart disease         Past Surgical History:  Past Surgical History:   Procedure Laterality Date    ABLATION OF DYSRHYTHMIC FOCUS  07/16/2018    ABSCESS DRAINAGE      under left breast d/t mrsa     AORTIC VALVE REPAIR/REPLACEMENT N/A 07/16/2018    Procedure: MEDIAN STERNOTOMY, AORTIC VALVE REPLACEMENT WITH TIFFANIE AND MAZE, TRICUSPID RING, LEFT ATRIAL OCCLUSION;  Surgeon: Ajay Cheatham MD;  Location:  [facility];  Service: Cardiothoracic    BRONCHOSCOPY Left 05/12/2017    Procedure: BRONCHOSCOPY;  Surgeon: Ajay Cheatham MD;  Location:  [facility];  Service:     BRONCHOSCOPY N/A 05/18/2017    Procedure: BRONCHOSCOPY BIOPSY AT BEDSIDE;  Surgeon: Ajay Cheatham MD;  Location:  [facility];  Service:     CARDIAC CATHETERIZATION N/A 09/28/2017    Procedure: Left Heart Cath;  Surgeon: Marco Lay MD;  Location:  [facility];  Service:      CARDIAC CATHETERIZATION N/A 09/28/2017    Procedure: Right Heart Cath;  Surgeon: Marco Lay MD;  Location:  [facility];  Service:     CARDIAC ELECTROPHYSIOLOGY PROCEDURE N/A 6/1/2022    Procedure: Ablation atrial fibrillation (atypical A flutter). Hold Rythmol x5 days;  Surgeon: Nigel Huber MD;  Location:  [facility];  Service: Cardiovascular;  Laterality: N/A;    LUNG BIOPSY  04/2017    LYMPH NODE DISSECTION Left 05/12/2017    Procedure: MEDIASTINAL LYMPH NODE DISSECTION;  Surgeon: Ajay Cheatham MD;  Location:  [facility];  Service:     MAZE PROCEDURE      MITRAL VALVE REPAIR/REPLACEMENT N/A 07/16/2018    Procedure: MITRAL VALVE REPLACEMENT;  Surgeon: Ajay Cheatham MD;  Location:  [facility];  Service: Cardiothoracic    MITRAL VALVE REPLACEMENT  07/16/2018    TEETH EXTRACTION      THORACOSCOPY VIDEO ASSISTED WITH LOBECTOMY Left 05/12/2017    Procedure: THORACOSCOPY VIDEO ASSISTED WITH OPEN LOBECTOMY;  Surgeon: Ajay Cheatham MD;  Location:  [facility];  Service:     TOTAL THYROIDECTOMY  approx 2012    TRICUSPID VALVE SURGERY  07/16/2018        Admitting Doctor:   Ajay Goff MD    Consulting Provider(s):  Consults       No orders found from 8/27/2024 to 9/26/2024.             Admitting Diagnosis:   The primary encounter diagnosis was Acute respiratory failure with hypoxia. Diagnoses of Acute viral syndrome, Rhinovirus infection, and Acute on chronic diastolic congestive heart failure were also pertinent to this visit.    Most Recent Vitals:   Vitals:    09/25/24 2032 09/25/24 2132 09/25/24 2202 09/25/24 2300   BP: 96/51 108/63 116/55 118/68   BP Location:       Patient Position:       Pulse: 54 53 51 54   Resp:       Temp:       TempSrc:       SpO2: 93% 92% 93% 93%   Weight:       Height:           Active LDAs/IV Access:   Lines, Drains & Airways       Active LDAs       Name Placement date Placement time Site Days    Peripheral IV 09/25/24 1930 Anterior;Right  Forearm 09/25/24  1930  Forearm  less than 1                    Labs (abnormal labs have a star):   Labs Reviewed   RESPIRATORY PANEL PCR W/ COVID-19 (SARS-COV-2), NP SWAB IN UTM/VTP, 2 HR TAT - Abnormal; Notable for the following components:       Result Value    Human Rhinovirus/Enterovirus Detected (*)     All other components within normal limits    Narrative:     In the setting of a positive respiratory panel with a viral infection PLUS a negative procalcitonin without other underlying concern for bacterial infection, consider observing off antibiotics or discontinuation of antibiotics and continue supportive care. If the respiratory panel is positive for atypical bacterial infection (Bordetella pertussis, Chlamydophila pneumoniae, or Mycoplasma pneumoniae), consider antibiotic de-escalation to target atypical bacterial infection.   COMPREHENSIVE METABOLIC PANEL - Abnormal; Notable for the following components:    Glucose 169 (*)     Chloride 94 (*)     All other components within normal limits    Narrative:     GFR Normal >60  Chronic Kidney Disease <60  Kidney Failure <15     BNP (IN-HOUSE) - Abnormal; Notable for the following components:    proBNP 4,795.0 (*)     All other components within normal limits    Narrative:     This assay is used as an aid in the diagnosis of individuals suspected of having heart failure. It can be used as an aid in the diagnosis of acute decompensated heart failure (ADHF) in patients presenting with signs and symptoms of ADHF to the emergency department (ED). In addition, NT-proBNP of <300 pg/mL indicates ADHF is not likely.    Age Range Result Interpretation  NT-proBNP Concentration (pg/mL:      <50             Positive            >450                   Gray                 300-450                    Negative             <300    50-75           Positive            >900                  Gray                300-900                  Negative            <300      >75              Positive            >1800                  Gray                300-1800                  Negative            <300   CBC WITH AUTO DIFFERENTIAL - Abnormal; Notable for the following components:    Hemoglobin 16.9 (*)     Hematocrit 48.1 (*)     .1 (*)     MCH 38.0 (*)     RDW 12.0 (*)     Neutrophil % 80.8 (*)     Lymphocyte % 10.1 (*)     Immature Grans % 0.7 (*)     Neutrophils, Absolute 7.92 (*)     Immature Grans, Absolute 0.07 (*)     nRBC 0.5 (*)     All other components within normal limits   BLOOD GAS, ARTERIAL W/CO-OXIMETRY - Abnormal; Notable for the following components:    pCO2, Arterial 57.3 (*)     pO2, Arterial 65.7 (*)     HCO3, Arterial 32.0 (*)     Base Excess, Arterial 4.4 (*)     Hematocrit, Blood Gas 51.3 (*)     Oxyhemoglobin 88.0 (*)     Methemoglobin -0.10 (*)     Carboxyhemoglobin 6.0 (*)     CO2 Content 33.8 (*)     pCO2, Temperature Corrected 57.3 (*)     pO2, Temperature Corrected 65.7 (*)     All other components within normal limits   SINGLE HS TROPONIN T - Normal    Narrative:     High Sensitive Troponin T Reference Range:  <14.0 ng/L- Negative Female for AMI  <22.0 ng/L- Negative Male for AMI  >=14 - Abnormal Female indicating possible myocardial injury.  >=22 - Abnormal Male indicating possible myocardial injury.   Clinicians would have to utilize clinical acumen, EKG, Troponin, and serial changes to determine if it is an Acute Myocardial Infarction or myocardial injury due to an underlying chronic condition.        PROCALCITONIN - Normal    Narrative:     As a Marker for Sepsis (Non-Neonates):    1. <0.5 ng/mL represents a low risk of severe sepsis and/or septic shock.  2. >2 ng/mL represents a high risk of severe sepsis and/or septic shock.    As a Marker for Lower Respiratory Tract Infections that require antibiotic therapy:    PCT on Admission    Antibiotic Therapy       6-12 Hrs later    >0.5                Strongly Recommended  >0.25 - <0.5        Recommended   0.1 - 0.25  "         Discouraged              Remeasure/reassess PCT  <0.1                Strongly Discouraged     Remeasure/reassess PCT    As 28 day mortality risk marker: \"Change in Procalcitonin Result\" (>80% or <=80%) if Day 0 (or Day 1) and Day 4 values are available. Refer to http://www.[employer]-pct-calculator.com    Change in PCT <=80%  A decrease of PCT levels below or equal to 80% defines a positive change in PCT test result representing a higher risk for 28-day all-cause mortality of patients diagnosed with severe sepsis for septic shock.    Change in PCT >80%  A decrease of PCT levels of more than 80% defines a negative change in PCT result representing a lower risk for 28-day all-cause mortality of patients diagnosed with severe sepsis or septic shock.      COVID PRE-OP / PRE-PROCEDURE SCREENING ORDER (NO ISOLATION)    Narrative:     The following orders were created for panel order COVID PRE-OP / PRE-PROCEDURE SCREENING ORDER (NO ISOLATION) - Swab, Nasopharynx.  Procedure                               Abnormality         Status                     ---------                               -----------         ------                     Respiratory Panel PCR w/...[[id number]]  Abnormal            Final result                 Please view results for these tests on the individual orders.   RAINBOW DRAW    Narrative:     The following orders were created for panel order [place] Draw.  Procedure                               Abnormality         Status                     ---------                               -----------         ------                     Green Top (Gel)[[id number]]                                  Final result               Lavender Top[[id number]]                                     Final result               Gold Top - SST[[id number]]                                   Final result               Lynch Top[[id number]]                                         Final result               Light Blue Top[[id number]]   "                                 Final result                 Please view results for these tests on the individual orders.   BLOOD GAS, ARTERIAL   CBC AND DIFFERENTIAL    Narrative:     The following orders were created for panel order CBC & Differential.  Procedure                               Abnormality         Status                     ---------                               -----------         ------                     CBC Auto Differential[[id number]]        Abnormal            Final result               Scan Slide[[id number]]                                                                    Please view results for these tests on the individual orders.   GREEN TOP   LAVENDER TOP   GOLD TOP - SST   GRAY TOP   LIGHT BLUE TOP       Meds Given in ED:   Medications   sodium chloride 0.9 % flush 10 mL (has no administration in time range)   iopamidol (ISOVUE-370) 76 % injection 90 mL (90 mL Intravenous Given 9/25/24 2020)   furosemide (LASIX) injection 80 mg (80 mg Intravenous Given 9/25/24 2317)           Last NIH score:                                                          Dysphagia screening results:  Patient Factors Component (Dysphagia:Stroke or Rule-out)  Best Eye Response: 4-->(E4) spontaneous (09/25/24 1931)  Best Motor Response: 6-->(M6) obeys commands (09/25/24 1931)  Best Verbal Response: 5-->(V5) oriented (09/25/24 1931)  [place] Coma Scale Score: 15 (09/25/24 1931)     [place] Coma Scale:  No data recorded     CIWA:        Restraint Type:            Isolation Status:  No active isolations

## 2024-09-26 NOTE — CASE MANAGEMENT/SOCIAL WORK
Discharge Planning Assessment  [facility]     Patient Name: Jill Miller  MRN: [id number]  Today's Date: 9/26/2024    Admit Date: 9/25/2024    Plan: Home   Discharge Needs Assessment       Row Name 09/26/24 1455       Living Environment    People in Home spouse    Name(s) of People in Home Zen Miller, spouse [phone]    Current Living Arrangements apartment    Potentially Unsafe Housing Conditions none    In the past 12 months has the electric, gas, oil, or water company threatened to shut off services in your home? No    Primary Care Provided by self    Provides Primary Care For no one    Family Caregiver if Needed spouse    Family Caregiver Names Zen Miller, spouse [phone]    Quality of Family Relationships unable to assess    Able to Return to Prior Arrangements yes       Resource/Environmental Concerns    Resource/Environmental Concerns none    Transportation Concerns none       Transportation Needs    In the past 12 months, has lack of transportation kept you from medical appointments or from getting medications? no    In the past 12 months, has lack of transportation kept you from meetings, work, or from getting things needed for daily living? No       Food Insecurity    Within the past 12 months, you worried that your food would run out before you got the money to buy more. Never true    Within the past 12 months, the food you bought just didn't last and you didn't have money to get more. Never true       Transition Planning    Patient/Family Anticipates Transition to home with family    Patient/Family Anticipated Services at Transition none    Transportation Anticipated family or friend will provide       Discharge Needs Assessment    Readmission Within the Last 30 Days no previous admission in last 30 days    Equipment Currently Used at Home none    Concerns to be Addressed no discharge needs identified    Anticipated Changes Related to Illness none    Equipment Needed After Discharge  none    Discharge Coordination/Progress I spoke with Pt, in room, to initiate discharge plan. Pt is admitted with HF. She lives with spouse in [facility]. There are 15 steps leading to the apartment. She ambulates independently and is independent with ADLs. Her PCP is YANETH Collins. She has [employer] insurance which has Rx coverage. She uses [facility] on [address] and [facility] for mail order. She states prescriptions are affordable. She denies DME or prior HH. She has previously used home O2 supplied by [facility]. Her goal is to return home at discharge. Family will provide transportation. CM will continue to follow hospital course.                   Discharge Plan       Row Name 09/26/24 1504       Plan    Plan Home    Final Discharge Disposition Code 01 - home or self-care                  Continued Care and Services - Admitted Since 9/25/2024    No active coordination exists for this encounter.       Expected Discharge Date and Time       Expected Discharge Date Expected Discharge Time    Sep 28, 2024            Demographic Summary       Row Name 09/26/24 1454       General Information    Arrived From home    Reason for Consult discharge planning    Preferred Language English    General Information Comments PCP YANETH Collins       Contact Information    Permission Granted to Share Info With     Contact Information Comments Zen Miller, spouse [phone]                   Functional Status       Row Name 09/26/24 1454       Functional Status    Usual Activity Tolerance moderate    Current Activity Tolerance moderate       Functional Status, IADL    Medications independent    Meal Preparation independent    Housekeeping independent    Laundry independent    Shopping independent                   Psychosocial    No documentation.                  Abuse/Neglect    No documentation.                  Legal    No documentation.                  Substance Abuse    No  documentation.                  Patient Forms    No documentation.                     Lexii Guerin RN

## 2024-09-26 NOTE — CONSULTS
Cardiothoracic Surgery History & Physical           Chief complaint: Bioprosthetic aortic and mitral valve stenosis    HPI:   Ms. Jill Miller is a 65yo female well known to our service, having undergone left upper lobectomy on 5/12/17 with Dr. Cheatham for [id number] stage IA squamous cell carcinoma. In addition, she underwent MV replacement, AVR, and TV repair, MAZE, LAAL on 7/16/18 with Dr. Cheatham. She was last seen in our clinic on 3/29/22, where she was noted to still be smoking and not willing to quit. She was released to 1 year follow up.    She presented to the ED on 9/25 with complaints of shortness of air. She was found to have acute on chronic heart failure with elevated proBNP. Echo today showed EF =60%, severe stenosis of the prosthetic aortic valve (21 mm Magna Ease), severe stenosis of the prosthetic mitral valve (27 mm Magna Ease), and tricuspid regurgitation (tricuspid ring 32 mm MC3 annuloplasty ring). She reports dyspnea x 3 months, fatigue, and lightheadedness. Our service has been consulted for possible TAVR and TMVR.         Past Medical History:   Diagnosis Date    Atrial fibrillation and flutter     Chronic diastolic congestive heart failure     Chronically Abnormal CXR (Left pleural disease post op) 09/25/2017    COPD exacerbation 04/17/2022    Essential hypertension 04/10/2017    Target blood pressure <130/80 mmHg    Graves disease     Hyperlipidemia LDL goal <70     Hypertension     Hyperthyroidism     Lung cancer     MRSA (methicillin resistant staph aureus) culture positive 2014    under left breast, incision and debridement, treated with wound packing and po abx     Prolonged QTC interval on ECG 06/01/2022    Rheumatic mitral stenosis     Type 2 diabetes mellitus 07/17/2018    Valvular heart disease      Past Surgical History:   Procedure Laterality Date    ABLATION OF DYSRHYTHMIC FOCUS  07/16/2018    ABSCESS DRAINAGE      under left breast d/t mrsa     AORTIC VALVE REPAIR/REPLACEMENT N/A  07/16/2018    Procedure: MEDIAN STERNOTOMY, AORTIC VALVE REPLACEMENT WITH TIFFANIE AND MAZE, TRICUSPID RING, LEFT ATRIAL OCCLUSION;  Surgeon: Ajay Cheatham MD;  Location:  [facility];  Service: Cardiothoracic    BRONCHOSCOPY Left 05/12/2017    Procedure: BRONCHOSCOPY;  Surgeon: Ajay Cheatham MD;  Location:  [facility];  Service:     BRONCHOSCOPY N/A 05/18/2017    Procedure: BRONCHOSCOPY BIOPSY AT BEDSIDE;  Surgeon: Ajay Cheatham MD;  Location:  [facility];  Service:     CARDIAC CATHETERIZATION N/A 09/28/2017    Procedure: Left Heart Cath;  Surgeon: Marco Lay MD;  Location:  [facility];  Service:     CARDIAC CATHETERIZATION N/A 09/28/2017    Procedure: Right Heart Cath;  Surgeon: Marco Lay MD;  Location:  [facility];  Service:     CARDIAC ELECTROPHYSIOLOGY PROCEDURE N/A 6/1/2022    Procedure: Ablation atrial fibrillation (atypical A flutter). Hold Rythmol x5 days;  Surgeon: Nigel Huber MD;  Location:  [facility];  Service: Cardiovascular;  Laterality: N/A;    LUNG BIOPSY  04/2017    LYMPH NODE DISSECTION Left 05/12/2017    Procedure: MEDIASTINAL LYMPH NODE DISSECTION;  Surgeon: Ajay Cheatham MD;  Location:  [facility];  Service:     MAZE PROCEDURE      MITRAL VALVE REPAIR/REPLACEMENT N/A 07/16/2018    Procedure: MITRAL VALVE REPLACEMENT;  Surgeon: Ajay Cheatham MD;  Location:  [facility];  Service: Cardiothoracic    MITRAL VALVE REPLACEMENT  07/16/2018    TEETH EXTRACTION      THORACOSCOPY VIDEO ASSISTED WITH LOBECTOMY Left 05/12/2017    Procedure: THORACOSCOPY VIDEO ASSISTED WITH OPEN LOBECTOMY;  Surgeon: Ajay Cheatham MD;  Location:  [facility];  Service:     TOTAL THYROIDECTOMY  approx 2012    TRICUSPID VALVE SURGERY  07/16/2018     Family History   Problem Relation Age of Onset    Breast cancer Mother     Diabetes Father     Heart disease Son      Social History     Tobacco Use    Smoking status: Some Days     Current packs/day:  0.00     Types: Cigarettes     Start date: 1978     Last attempt to quit: 2023     Years since quittin.4     Passive exposure: Current    Smokeless tobacco: Never    Tobacco comments:     Couldn't remember exact dates.   Vaping Use    Vaping status: Never Used   Substance Use Topics    Alcohol use: No    Drug use: Not Currently     Types: Marijuana       Medications Prior to Admission   Medication Sig Dispense Refill Last Dose    albuterol sulfate  (90 Base) MCG/ACT inhaler Inhale 2 puffs 2 (Two) Times a Day.   2024 at 1530    ALBUTEROL SULFATE IN Inhale 2 puffs Every 6 (Six) Hours As Needed.   2024 at 1530    aspirin 81 MG EC tablet Take 1 tablet by mouth Daily.   2024 at 0500    Cholecalciferol 10 MCG (400 UNIT) capsule Take 5,000 Units by mouth.   2024 at 2100    furosemide (LASIX) 40 MG tablet TAKE 1 TABLET DAILY 90 tablet 1 2024 at 0500    Jardiance 10 MG tablet tablet TAKE 1 TABLET DAILY 90 tablet 3 2024 at 0500    levothyroxine (SYNTHROID, LEVOTHROID) 175 MCG tablet Take 1 tablet by mouth Daily.   2024 at 0500    metoprolol tartrate (LOPRESSOR) 25 MG tablet TAKE 1 TABLET EVERY 12 HOURS 180 tablet 3 2024 at 0700    multivitamin (THERAGRAN) tablet tablet Take 1 tablet by mouth Daily.   2024 at 0500    nystatin (MYCOSTATIN) 375352 UNIT/GM powder Apply 1 Application topically to the appropriate area as directed 2 (Two) Times a Day.   Past Month    potassium chloride (MICRO-K) 10 MEQ CR capsule TAKE 2 CAPSULES DAILY 180 capsule 1 2024 at 0500    pravastatin (PRAVACHOL) 20 MG tablet Take 1 tablet by mouth Every Night.   2024 at 2100    propafenone SR (RYTHMOL SR) 225 MG 12 hr capsule TAKE 1 CAPSULE TWICE A DAY (DOSE CHANGE) 180 capsule 3 2024 at 2100    Synthroid 200 MCG tablet Take 1 tablet by mouth Every Morning.   2024 at 0500    ibuprofen (ADVIL,MOTRIN) 800 MG tablet 1 tablet As Needed.   More than a month     Allergies:  Lortab  "[hydrocodone-acetaminophen] and Morphine and codeine    Review of Systems:  A comprehensive review of systems was negative except for:   Constitutional: Fatigue  Respiratory: Shortness of breath  Neurological: Lightheadedness  All other systems were reviewed and were negative.    Vital Signs:  Temp:  [97.6 °F (36.4 °C)-98.3 °F (36.8 °C)] 98 °F (36.7 °C)  Heart Rate:  [] 109  Resp:  [18-20] 20  BP: (101-121)/(51-68) 121/68    Physical Exam:  Physical Exam  Vitals reviewed.   Constitutional:       Appearance: Normal appearance.   HENT:      Head: Normocephalic.      Mouth/Throat:      Pharynx: Oropharynx is clear.   Eyes:      Pupils: Pupils are equal, round, and reactive to light.   Cardiovascular:      Rate and Rhythm: Tachycardia present.      Heart sounds: Murmur heard.   Pulmonary:      Effort: Pulmonary effort is normal.   Abdominal:      General: Abdomen is flat.   Musculoskeletal:      Right lower leg: Edema present.      Left lower leg: Edema present.   Skin:     General: Skin is warm.   Neurological:      General: No focal deficit present.      Mental Status: She is alert.   Psychiatric:         Mood and Affect: Mood normal.         Behavior: Behavior normal.         Labs:  Results from last 7 days   Lab Units 09/27/24  0436   WBC 10*3/mm3 7.21   HEMOGLOBIN g/dL 17.3*   HEMATOCRIT % 49.3*   PLATELETS 10*3/mm3 179     Results from last 7 days   Lab Units 09/27/24  0436   SODIUM mmol/L 137   POTASSIUM mmol/L 4.4   CHLORIDE mmol/L 97*   CO2 mmol/L 31.0*   BUN mg/dL 20   CREATININE mg/dL 0.70   GLUCOSE mg/dL 127*   CALCIUM mg/dL 9.2     Results from last 7 days   Lab Units 09/25/24  2119   PH, ARTERIAL pH units 7.355   PO2 ART mm Hg 65.7*   PCO2, ARTERIAL mm Hg 57.3*   HCO3 ART mmol/L 32.0*     Coagulation: No results found for: \"INR\", \"APTT\"  Cardiac markers:   Results from last 7 days   Lab Units 09/25/24  1928   HSTROP T ng/L 9     ABGs:   Results from last 7 days   Lab Units 09/25/24 2119   BASE " EXCESS ART mmol/L 4.4*       Imaging:   Echocardiogram Findings    Left Ventricle Left ventricular systolic function is normal. Estimated left ventricular EF = 60%     Normal left ventricular cavity size noted. Left ventricular wall thickness is consistent with mild concentric hypertrophy.   Right Ventricle Normal right ventricular cavity size and systolic function noted.   Left Atrium The left atrial cavity is moderately dilated.   Right Atrium The right atrial cavity is dilated.   Aortic Valve Aortic valve mean pressure gradient is 52 mmHg. There is a 21 mm, bovine, Magna Ease bioprosthetic aortic valve present. The aortic valve peak and mean gradients are elevated. Moderate transvalvular regurgitation is present in the prosthetic aortic valve. There is severe stenosis or obstruction of the prosthetic aortic valve. Aortic valve replacement (21 mm Magna Ease tissue bioprosthetic valve) placed on 7/16/2018   Mitral Valve The mitral valve mean gradient is 13.00 mmHg. There is a 27 mm, Magna Ease bioprosthetic mitral valve present. The mitral valve peak and mean gradients are elevated. Mild transvalvular regurgitation is present in the prosthetic mitral valve. There is severe stenosis or obstruction of the prosthetic mitral valve. There is a Mitral valve replacement (27 mm Magna Ease tissue bioprosthetic valve) placed on 7/16/2018   Tricuspid Valve There is evidence of previous repair of the tricuspid valve. Estimated right ventricular systolic pressure from tricuspid regurgitation is markedly elevated (>55 mmHg). There is a tricuspid ring valve present.  Tricuspid valve repair (32 mm MC3 annuloplasty ring) placed on 7/16/2018   Pulmonic Valve The pulmonic valve is structurally normal. There is mild pulmonic valve regurgitation present.   Greater Vessels No dilation of the aortic root is present. The inferior vena cava is normally sized. Normal IVC inspiratory collapse of greater than 50% noted.   Pericardium The  pericardium is normal. There is no evidence of pericardial effusion.         Assessment:     Acute on chronic heart failure with preserved ejection fraction due to valvular heart disease    Essential hypertension    Paroxysmal atrial fibrillation    Hyperlipidemia LDL goal <70    Prosthetic aortic valve stenosis    Type 2 diabetes mellitus    Acute hypoxic respiratory failure    Pulmonary hypertension    Rhinovirus     Ms. Jill Miller is a 65yo female well known to our service, having undergone left upper lobectomy on 5/12/17 with Dr. Cheatham for [id number] stage IA squamous cell carcinoma. In addition, she underwent MV replacement, AVR, and TV repair, MAZE, LAAL on 7/16/18 with Dr. Cheatham. She was last seen in our clinic on 3/29/22, where she was noted to still be smoking and not willing to quit. She was released to 1 year follow up.    She presented to the ED on 9/25 with complaints of shortness of air. She was found to have acute on chronic heart failure with elevated proBNP. Echo today showed EF =60%, severe stenosis of the prosthetic aortic valve (21 mm Magna Ease), severe stenosis of the prosthetic mitral valve (27 mm Magna Ease), and tricuspid regurgitation (tricuspid ring 32 mm MC3 annuloplasty ring). She reports dyspnea x 3 months, fatigue, and lightheadedness. Our service has been consulted for possible TAVR and TMVR.     Plan:   Continue with workup for possible TAVR/TMVR  Diuresis per cardiology       Kiana Smith, APRN  09/27/24  08:31 EDT

## 2024-09-26 NOTE — NURSING NOTE
Pt was brought up from ED at around 0100 tonight. Droplet precautions for rhinovirus. Pt is alert and oriented. 3 L NC with humidity. NSR. Pt is up ad breonna and independent. Bed is in lowest position, call light within reach, and no other requests at this time.

## 2024-09-27 ENCOUNTER — APPOINTMENT (OUTPATIENT)
Dept: CT IMAGING | Facility: HOSPITAL | Age: 64
End: 2024-09-27
Payer: COMMERCIAL

## 2024-09-27 ENCOUNTER — READMISSION MANAGEMENT (OUTPATIENT)
Dept: CALL CENTER | Facility: HOSPITAL | Age: 64
End: 2024-09-27
Payer: COMMERCIAL

## 2024-09-27 VITALS
TEMPERATURE: 98 F | BODY MASS INDEX: 29.08 KG/M2 | HEART RATE: 107 BPM | HEIGHT: 62 IN | RESPIRATION RATE: 18 BRPM | DIASTOLIC BLOOD PRESSURE: 59 MMHG | SYSTOLIC BLOOD PRESSURE: 94 MMHG | WEIGHT: 158 LBS | OXYGEN SATURATION: 91 %

## 2024-09-27 PROBLEM — B34.8 RHINOVIRUS: Status: RESOLVED | Noted: 2024-09-26 | Resolved: 2024-09-27

## 2024-09-27 PROBLEM — J96.01 ACUTE HYPOXIC RESPIRATORY FAILURE: Status: RESOLVED | Noted: 2024-09-25 | Resolved: 2024-09-27

## 2024-09-27 PROBLEM — I50.33 ACUTE ON CHRONIC HEART FAILURE WITH PRESERVED EJECTION FRACTION: Status: RESOLVED | Noted: 2018-04-02 | Resolved: 2024-09-27

## 2024-09-27 LAB
ANION GAP SERPL CALCULATED.3IONS-SCNC: 9 MMOL/L (ref 5–15)
BASOPHILS # BLD AUTO: 0.06 10*3/MM3 (ref 0–0.2)
BASOPHILS NFR BLD AUTO: 0.8 % (ref 0–1.5)
BUN SERPL-MCNC: 20 MG/DL (ref 8–23)
BUN/CREAT SERPL: 28.6 (ref 7–25)
CA-I SERPL ISE-MCNC: 1.13 MMOL/L (ref 1.15–1.3)
CALCIUM SPEC-SCNC: 9.2 MG/DL (ref 8.6–10.5)
CHLORIDE SERPL-SCNC: 97 MMOL/L (ref 98–107)
CO2 SERPL-SCNC: 31 MMOL/L (ref 22–29)
CREAT SERPL-MCNC: 0.7 MG/DL (ref 0.57–1)
DEPRECATED RDW RBC AUTO: 46.9 FL (ref 37–54)
EGFRCR SERPLBLD CKD-EPI 2021: 97.9 ML/MIN/1.73
EOSINOPHIL # BLD AUTO: 0.07 10*3/MM3 (ref 0–0.4)
EOSINOPHIL NFR BLD AUTO: 1 % (ref 0.3–6.2)
ERYTHROCYTE [DISTWIDTH] IN BLOOD BY AUTOMATED COUNT: 12.1 % (ref 12.3–15.4)
GLUCOSE BLDC GLUCOMTR-MCNC: 125 MG/DL (ref 70–130)
GLUCOSE BLDC GLUCOMTR-MCNC: 138 MG/DL (ref 70–130)
GLUCOSE SERPL-MCNC: 127 MG/DL (ref 65–99)
HCT VFR BLD AUTO: 49.3 % (ref 34–46.6)
HGB BLD-MCNC: 17.3 G/DL (ref 12–15.9)
IMM GRANULOCYTES # BLD AUTO: 0.05 10*3/MM3 (ref 0–0.05)
IMM GRANULOCYTES NFR BLD AUTO: 0.7 % (ref 0–0.5)
LYMPHOCYTES # BLD AUTO: 1.1 10*3/MM3 (ref 0.7–3.1)
LYMPHOCYTES NFR BLD AUTO: 15.3 % (ref 19.6–45.3)
MAGNESIUM SERPL-MCNC: 2.3 MG/DL (ref 1.6–2.4)
MCH RBC QN AUTO: 37.8 PG (ref 26.6–33)
MCHC RBC AUTO-ENTMCNC: 35.1 G/DL (ref 31.5–35.7)
MCV RBC AUTO: 107.6 FL (ref 79–97)
MONOCYTES # BLD AUTO: 0.56 10*3/MM3 (ref 0.1–0.9)
MONOCYTES NFR BLD AUTO: 7.8 % (ref 5–12)
NEUTROPHILS NFR BLD AUTO: 5.37 10*3/MM3 (ref 1.7–7)
NEUTROPHILS NFR BLD AUTO: 74.4 % (ref 42.7–76)
NRBC BLD AUTO-RTO: 0 /100 WBC (ref 0–0.2)
PHOSPHATE SERPL-MCNC: 3.4 MG/DL (ref 2.5–4.5)
PLATELET # BLD AUTO: 179 10*3/MM3 (ref 140–450)
PMV BLD AUTO: 10.1 FL (ref 6–12)
POTASSIUM SERPL-SCNC: 4.4 MMOL/L (ref 3.5–5.2)
RBC # BLD AUTO: 4.58 10*6/MM3 (ref 3.77–5.28)
SODIUM SERPL-SCNC: 137 MMOL/L (ref 136–145)
WBC NRBC COR # BLD AUTO: 7.21 10*3/MM3 (ref 3.4–10.8)

## 2024-09-27 PROCEDURE — 25510000001 IOPAMIDOL PER 1 ML: Performed by: INTERNAL MEDICINE

## 2024-09-27 PROCEDURE — 74174 CTA ABD&PLVS W/CONTRAST: CPT

## 2024-09-27 PROCEDURE — 85025 COMPLETE CBC W/AUTO DIFF WBC: CPT | Performed by: STUDENT IN AN ORGANIZED HEALTH CARE EDUCATION/TRAINING PROGRAM

## 2024-09-27 PROCEDURE — 80048 BASIC METABOLIC PNL TOTAL CA: CPT | Performed by: STUDENT IN AN ORGANIZED HEALTH CARE EDUCATION/TRAINING PROGRAM

## 2024-09-27 PROCEDURE — 25010000002 ENOXAPARIN PER 10 MG: Performed by: STUDENT IN AN ORGANIZED HEALTH CARE EDUCATION/TRAINING PROGRAM

## 2024-09-27 PROCEDURE — 99232 SBSQ HOSP IP/OBS MODERATE 35: CPT

## 2024-09-27 PROCEDURE — 84100 ASSAY OF PHOSPHORUS: CPT | Performed by: INTERNAL MEDICINE

## 2024-09-27 PROCEDURE — 83735 ASSAY OF MAGNESIUM: CPT | Performed by: INTERNAL MEDICINE

## 2024-09-27 PROCEDURE — 82948 REAGENT STRIP/BLOOD GLUCOSE: CPT

## 2024-09-27 PROCEDURE — 99239 HOSP IP/OBS DSCHRG MGMT >30: CPT | Performed by: INTERNAL MEDICINE

## 2024-09-27 PROCEDURE — 99232 SBSQ HOSP IP/OBS MODERATE 35: CPT | Performed by: INTERNAL MEDICINE

## 2024-09-27 PROCEDURE — 25010000002 FUROSEMIDE PER 20 MG: Performed by: STUDENT IN AN ORGANIZED HEALTH CARE EDUCATION/TRAINING PROGRAM

## 2024-09-27 PROCEDURE — 71275 CT ANGIOGRAPHY CHEST: CPT

## 2024-09-27 PROCEDURE — 82330 ASSAY OF CALCIUM: CPT | Performed by: INTERNAL MEDICINE

## 2024-09-27 RX ORDER — FUROSEMIDE 40 MG
40 TABLET ORAL 2 TIMES DAILY
Qty: 60 TABLET | Refills: 0 | Status: SHIPPED | OUTPATIENT
Start: 2024-09-27

## 2024-09-27 RX ORDER — ALBUTEROL SULFATE 90 UG/1
2 INHALANT RESPIRATORY (INHALATION) EVERY 4 HOURS PRN
Start: 2024-09-27 | End: 2025-09-27

## 2024-09-27 RX ORDER — METOPROLOL TARTRATE 1 MG/ML
5 INJECTION, SOLUTION INTRAVENOUS ONCE
Status: COMPLETED | OUTPATIENT
Start: 2024-09-27 | End: 2024-09-27

## 2024-09-27 RX ORDER — ACETAMINOPHEN 325 MG/1
650 TABLET ORAL EVERY 6 HOURS PRN
Status: DISCONTINUED | OUTPATIENT
Start: 2024-09-27 | End: 2024-09-27 | Stop reason: HOSPADM

## 2024-09-27 RX ORDER — GUAIFENESIN 600 MG/1
1200 TABLET, EXTENDED RELEASE ORAL EVERY 12 HOURS SCHEDULED
Qty: 40 TABLET | Refills: 0 | Status: SHIPPED | OUTPATIENT
Start: 2024-09-27

## 2024-09-27 RX ORDER — IOPAMIDOL 755 MG/ML
100 INJECTION, SOLUTION INTRAVASCULAR
Status: COMPLETED | OUTPATIENT
Start: 2024-09-27 | End: 2024-09-27

## 2024-09-27 RX ADMIN — METOPROLOL TARTRATE 25 MG: 25 TABLET, FILM COATED ORAL at 09:48

## 2024-09-27 RX ADMIN — LEVOTHYROXINE SODIUM 175 MCG: 0.17 TABLET ORAL at 05:56

## 2024-09-27 RX ADMIN — ENOXAPARIN SODIUM 40 MG: 100 INJECTION SUBCUTANEOUS at 09:47

## 2024-09-27 RX ADMIN — ACETAMINOPHEN 650 MG: 325 TABLET ORAL at 06:20

## 2024-09-27 RX ADMIN — Medication 10 ML: at 09:48

## 2024-09-27 RX ADMIN — FUROSEMIDE 40 MG: 10 INJECTION, SOLUTION INTRAMUSCULAR; INTRAVENOUS at 05:56

## 2024-09-27 RX ADMIN — METOPROLOL TARTRATE 5 MG: 5 INJECTION INTRAVENOUS at 13:51

## 2024-09-27 RX ADMIN — GUAIFENESIN 1200 MG: 600 TABLET, EXTENDED RELEASE ORAL at 09:45

## 2024-09-27 RX ADMIN — EMPAGLIFLOZIN 10 MG: 10 TABLET, FILM COATED ORAL at 09:47

## 2024-09-27 RX ADMIN — IOPAMIDOL 100 ML: 755 INJECTION, SOLUTION INTRAVENOUS at 15:24

## 2024-09-27 RX ADMIN — PROPAFENONE HYDROCHLORIDE 225 MG: 225 CAPSULE, EXTENDED RELEASE ORAL at 09:52

## 2024-09-27 NOTE — PROGRESS NOTES
[facility] Cardiothoracic Surgery In-Patient Progress Note     LOS: 1 day     Chief Complaint: Dyspnea    Subjective  Sitting up in chair.  On 3 L saturations 91%.      Objective  Vital Signs  Temp:  [97.6 °F (36.4 °C)-98.3 °F (36.8 °C)] 98 °F (36.7 °C)  Heart Rate:  [] 109  Resp:  [18-20] 20  BP: (101-121)/(51-68) 121/68        Physical Exam:   General Appearance: alert, appears stated age and cooperative   Lungs: Bibasilar crackles   Heart: tachycardia; murmur noted     Results     Results from last 7 days   Lab Units 09/27/24  0436   WBC 10*3/mm3 7.21   HEMOGLOBIN g/dL 17.3*   HEMATOCRIT % 49.3*   PLATELETS 10*3/mm3 179     Results from last 7 days   Lab Units 09/27/24  0436   SODIUM mmol/L 137   POTASSIUM mmol/L 4.4   CHLORIDE mmol/L 97*   CO2 mmol/L 31.0*   BUN mg/dL 20   CREATININE mg/dL 0.70   GLUCOSE mg/dL 127*   CALCIUM mg/dL 9.2     Assessment    Acute on chronic heart failure with preserved ejection fraction due to valvular heart disease    Essential hypertension    Paroxysmal atrial fibrillation    Hyperlipidemia LDL goal <70    Prosthetic aortic valve stenosis    Type 2 diabetes mellitus    Acute hypoxic respiratory failure    Pulmonary hypertension    Rhinovirus      Plan   Ambulate  PT/OT  Wean oxygen as tolerated  Diuresis  Awaiting CT TAVR  Will need heart cath      Kiana Smith, GUCCI  09/27/24  08:37 EDT

## 2024-09-27 NOTE — DISCHARGE INSTR - ACTIVITY
Restrictions or Other Recommendations:  -Take all medications as directed  -Follow a low salt diet, goal = 2g or less daily  -Weigh yourself every morning before breakfast, write it down and compare to yesterday’s weight. If you have weight gain greater than 3lbs in 1 day or 5 lbs or more in 1 week , call your PCP or Heart Failure Clinic.  -If you experience any of the following symptoms please contact your PCP or Heart Failure Clinic:               Increased shortness of breath              Increased swelling of feet or stomach              Dry hacking cough              Shortness of breath when laying down flat     - [facility] Heart Failure [facility]:  [phone]      [facility] [place] 1 week for TIFFANIE/LHC

## 2024-09-27 NOTE — PROGRESS NOTES
LOS: 1 day   Patient Care Team:  Marisa Lynch PA as PCP - General (Family Medicine)  Ajay Cheatham MD as Surgeon (Cardiothoracic Surgery)  Marco Lay IV, MD as Cardiologist (Interventional Cardiology)  Nigel Huber MD as Consulting Physician (Cardiology)  Marisa Suarez APRN as Nurse Practitioner (Interventional Cardiology)    Chief Complaint: Dyspnea    Subjective     Interval History:     Patient not up much in room.  Patient still with a cough that is productive.  Otherwise no events noted overnight.  Review of Systems:    12 point review of systems reviewed pertinent for those mentioned HPI    Objective     Vital Signs  Temp:  [97.6 °F (36.4 °C)-98.3 °F (36.8 °C)] 98 °F (36.7 °C)  Heart Rate:  [] 109  Resp:  [18-20] 20  BP: (101-121)/(51-68) 121/68  Body mass index is 28.89 kg/m².    Intake/Output Summary (Last 24 hours) at 9/27/2024 0719  Last data filed at 9/26/2024 1820  Gross per 24 hour   Intake 720 ml   Output --   Net 720 ml     No intake/output data recorded.    Physical Exam:     General Appearance:  Alert, cooperative, in no acute distress   Head:  Normocephalic, without obvious abnormality, atraumatic   Eyes:  Lids and lashes normal, conjunctivae and sclerae normal, no icterus, no pallor, corneas clear, PERRLA   Ears:  Ears appear intact with no abnormalities noted   Throat:  No oral lesions, no thrush, oral mucosa moist   Neck:  No adenopathy, supple, trachea midline, no thyromegaly, no carotid bruit, no JVD   Back:  No kyphosis present, no scoliosis present, no skin lesions, erythema or scars, no tenderness to percussion, or palpation, range of motion normal   Lungs:  Clear to auscultation,respirations regular, even and unlabored    Heart:  Regular rhythm and normal rate, normal S1 and S2, no murmur, no gallop, no rub, no click   Breast Exam:  Deferred   Abdomen:  Normal bowel sounds, no masses, no organomegaly, soft non-tender, non-distended, no guarding, no  Size Of Lesion In Cm: 0.6 "rebound tenderness   Genitalia:  Deferred   Extremities:  Moves all extremities well, no edema, no cyanosis, no redness   Pulses:  Pulses palpable and equal bilaterally   Skin:  No bleeding, bruising or rash   Lymph nodes:  No palpable adenopathy   Neurologic:  Cranial nerves 2 - 12 grossly intact, sensation intact, DTR present and equal bilaterally        Results Review:     I reviewed the patient's new clinical results.      WBC WBC   Date Value Ref Range Status   09/27/2024 7.21 3.40 - 10.80 10*3/mm3 Final   09/26/2024 8.81 3.40 - 10.80 10*3/mm3 Final   09/25/2024 9.80 3.40 - 10.80 10*3/mm3 Final      HGB Hemoglobin   Date Value Ref Range Status   09/27/2024 17.3 (H) 12.0 - 15.9 g/dL Final   09/26/2024 16.9 (H) 12.0 - 15.9 g/dL Final   09/25/2024 16.9 (H) 12.0 - 15.9 g/dL Final      HCT Hematocrit   Date Value Ref Range Status   09/27/2024 49.3 (H) 34.0 - 46.6 % Final   09/26/2024 48.6 (H) 34.0 - 46.6 % Final   09/25/2024 48.1 (H) 34.0 - 46.6 % Final      Platlets No results found for: \"LABPLAT\"     PT/INR:  No results found for: \"PROTIME\"/No results found for: \"INR\"    Sodium Sodium   Date Value Ref Range Status   09/27/2024 137 136 - 145 mmol/L Final   09/26/2024 139 136 - 145 mmol/L Final   09/25/2024 136 136 - 145 mmol/L Final      Potassium Potassium   Date Value Ref Range Status   09/27/2024 4.4 3.5 - 5.2 mmol/L Final     Comment:     Slight hemolysis detected by analyzer. Result may be falsely elevated.   09/26/2024 4.7 3.5 - 5.2 mmol/L Final     Comment:     Slight hemolysis detected by analyzer. Result may be falsely elevated.   09/26/2024 3.6 3.5 - 5.2 mmol/L Final   09/25/2024 3.7 3.5 - 5.2 mmol/L Final     Comment:     Slight hemolysis detected by analyzer. Result may be falsely elevated.      Chloride Chloride   Date Value Ref Range Status   09/27/2024 97 (L) 98 - 107 mmol/L Final   09/26/2024 93 (L) 98 - 107 mmol/L Final   09/25/2024 94 (L) 98 - 107 mmol/L Final      Bicarbonate No results found " Bill As A Line Item Or As Units: Line Item "for: \"PLASMABICARB\"   BUN BUN   Date Value Ref Range Status   09/27/2024 20 8 - 23 mg/dL Final   09/26/2024 16 8 - 23 mg/dL Final   09/25/2024 17 8 - 23 mg/dL Final      Creatinine Creatinine   Date Value Ref Range Status   09/27/2024 0.70 0.57 - 1.00 mg/dL Final   09/26/2024 0.73 0.57 - 1.00 mg/dL Final   09/25/2024 0.69 0.57 - 1.00 mg/dL Final      Calcium Calcium   Date Value Ref Range Status   09/27/2024 9.2 8.6 - 10.5 mg/dL Final   09/26/2024 8.8 8.6 - 10.5 mg/dL Final   09/25/2024 9.1 8.6 - 10.5 mg/dL Final      Magnesium Magnesium   Date Value Ref Range Status   09/27/2024 2.3 1.6 - 2.4 mg/dL Final   09/26/2024 2.2 1.6 - 2.4 mg/dL Final            pH pH, Arterial   Date Value Ref Range Status   09/25/2024 7.355 7.350 - 7.450 pH units Final      pO2 pO2, Arterial   Date Value Ref Range Status   09/25/2024 65.7 (L) 83.0 - 108.0 mm Hg Final     Comment:     84 Value below reference range      pCO2 pCO2, Arterial   Date Value Ref Range Status   09/25/2024 57.3 (H) 35.0 - 45.0 mm Hg Final     Comment:     83 Value above reference range      HCO3 HCO3, Arterial   Date Value Ref Range Status   09/25/2024 32.0 (H) 20.0 - 26.0 mmol/L Final        albuterol, 2.5 mg, Nebulization, Q4H - RT  empagliflozin, 10 mg, Oral, Daily  enoxaparin, 40 mg, Subcutaneous, Daily  furosemide, 40 mg, Intravenous, Q12H  guaiFENesin, 1,200 mg, Oral, Q12H  insulin lispro, 2-9 Units, Subcutaneous, 4x Daily AC & at Bedtime  levothyroxine, 175 mcg, Oral, Q AM  metoprolol tartrate, 25 mg, Oral, Q12H  pravastatin, 20 mg, Oral, Nightly  propafenone SR, 225 mg, Oral, Q12H  sodium chloride, 10 mL, Intravenous, Q12H           Medication Review: Reviewed    Assessment & Plan     Patient Active Problem List   Diagnosis Code    Essential hypertension I10    Acquired hypothyroidism E03.9    Lung cancer; S/P Bronch/Thor w/ ALBINA lobectomy/mediastinal lymph node resec (5/12/17, Dr. Cheatham) C34.90    Paroxysmal atrial fibrillation I48.0    Hyperlipidemia LDL " Anesthesia Volume In Cc: 0.7 goal <70 E78.5    Acute on chronic heart failure with preserved ejection fraction due to valvular heart disease I50.33    Prosthetic aortic valve stenosis T82.857A    Type 2 diabetes mellitus E11.9    COPD (chronic obstructive pulmonary disease) J44.9    Obesity (BMI 30-39.9) E66.9    Tobacco user Z72.0    Acute hypoxic respiratory failure J96.01    Pulmonary hypertension I27.20    Rhinovirus B34.8       Severe aortic and mitral stenosis with structural valve degeneration  Acute on chronic diastolic heart failure  Rhinovirus  Paroxysmal atrial fibrillation  Lung cancer, history of    Plan: Patient undergo CTA to evaluate feasibility for T MVR and TAVR.  This is ordered for today. Surgical redo AVR/MVR  amy be patient's third chest surgery.  Will discuss with Dr. Cheatham however unlikely patient to be optimal candidate for such open procedure.  Patient would prefer transcatheter techniques.  This if done would be done as an outpatient.  This was discussed with the patient.  Ultimately will need left heart catheterization and TIFFANIE however we will do CT first to evaluate her NEOLVOT for TMVR.  Continue to nidia.         Jeffry Pickett MD  09/27/24  07:19 EDT           Detail Level: Detailed Total Time In Minutes: 2 minutes Post-Care Instructions: I reviewed with the patient the post-care instructions. Patient is to keep the area dry for 48 hours, and not to engage in any heavy lifting, exercise, or swimming for the next 14 days. Should the patient develop any fevers, chills, bleeding, severe pain patient will contact the office immediately. Consent was obtained from the patient. The risks and benefits to therapy were discussed in detail. Specifically, the risks of infection, scarring, bleeding, prolonged wound healing, incomplete removal, allergy to anesthesia, nerve injury and recurrence were addressed. Alternatives to liquid nitrogen, such as ED&C, surgical removal, XRT were also discussed.  Prior to the procedure, the treatment site was clearly identified and confirmed by the patient. All components of Universal Protocol/PAUSE Rule completed. Number Of Freeze-Thaw Cycles: 2 freeze-thaw cycles Additional Information: (Optional): The patient received post-op instructions.

## 2024-09-27 NOTE — PROGRESS NOTES
Discussed with hospitalist.  Can discharge home on Lasix 40 mg twice daily.  Can follow in heart and valve in 1 week with lab work.  Will need to have outpatient left heart cath possible right heart cath and TIFFANIE.  Respiratory status with virus is not optimal for TIFFANIE thus does not have to have as an inpatient.

## 2024-09-27 NOTE — PAYOR COMM NOTE
"MillardJoshi Smita (64 y.o. Female)     [id number]     Mercedes Randall, RN  Utilization Review  [phone]  [phone]        Date of Birth   1960    Social Security Number       Address   [address] [place] [address]    Home Phone   [phone]    MRN   [id number]       Faith   None    Marital Status                               Admission Date   24    Admission Type   Emergency    Admitting Provider   Argenis Malin II, DO    Attending Provider   Argenis Malin II, DO    Department, Room/Bed   [facility] 6A, N622/1       Discharge Date       Discharge Disposition   Home or Self Care    Discharge Destination                                 Attending Provider: Argenis Malin II, DO    Allergies: Lortab [Hydrocodone-acetaminophen], Morphine And Codeine    Isolation: Droplet   Infection: Rhinovirus  (24)   Code Status: No CPR    Ht: 157.5 cm (62.01\")   Wt: 71.7 kg (158 lb)    Admission Cmt: None   Principal Problem: Acute on chronic heart failure with preserved ejection fraction due to valvular heart disease [I50.33]                   Active Insurance as of 2024       Primary Coverage       Payor Plan Insurance Group Employer/Plan Group    [facility] [employer] [facility] [employer] [facility] PPO [id number]       Payor Plan Address Payor Plan Phone Number Payor Plan Fax Number Effective Dates    [address] [phone]  2017 - None Entered    [place] [address]         Subscriber Name Subscriber Birth Date Member ID       JILL MILLER 1960 [id number]                     Emergency Contacts        (Rel.) Home Phone Work Phone Mobile Phone    PaulZen (Spouse) [phone] -- [phone]                 Discharge Summary        Argenis Malin II, DO at 24 0754              [facility] Medicine Services  DISCHARGE SUMMARY    Patient Name: Jill Miller  : " 1960  MRN: [id number]    Date of Admission: 9/25/2024  7:08 PM  Date of Discharge:  9/27/2024  Primary Care Physician: Marisa Lynch PA    Consults       Date and Time Order Name Status Description    9/26/2024  2:30 PM Inpatient Cardiothoracic Surgery Consult      9/26/2024  1:47 AM Inpatient Cardiology Consult Completed             Hospital Course     Presenting Problem: SOA    Active Hospital Problems    Diagnosis  POA    Pulmonary hypertension [I27.20]  Yes    Type 2 diabetes mellitus [E11.9]  Yes    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 female with past medical history of lung cancer with lobectomy, history of multiple cardiac valve replacements, hypertension, hypothyroidism, hyperlipidemia, heart failure preserved ejection fraction, type 2 diabetes, COPD, tobacco user.      Hypoxia resolved  Rhinovirus infection  --Patient treated supportively w/ tylenol and pulmonary toilet with mucinex, ics/flutter. Has requested no nebs. Had some improvement in her hypoxia/dyspnea.     Acute exacerbation of heart failure  VHD w/ prosthetic valve degeneration.  --Cardiology followed throughout stay. She was diuresed aggressively with Lasix IV BID w/ some improvement in her symptoms. 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 CTS was consulted who ordered a CT TAVR prior to d/c. Dr. Pickett evaluated as well who recommends [facility] +  TIFFANIE prior to further discussion of valve replacement which can be done as outpt.    Discharge Follow Up Recommendations for outpatient labs/diagnostics:   [facility] [place] [facility] 1 week for TIFFANIE/[facility]    Day of Discharge     HPI: Up in chair. Still SOA but feels more back to her baseline. Coughing less. Wants to go home.    Review of Systems  Gen- No fevers, chills  CV- No chest pain, palpitations  Resp- No cough, dyspnea  GI- No N/V/D, abd pain    Vital Signs:   Temp:  [97.6 °F (36.4 °C)-98.3 °F (36.8 °C)] 98 °F (36.7 °C)  Heart Rate:  [] 109  Resp:  [18-20] 20  BP: (101-121)/(51-68) 121/68  Flow (L/min):  [3-4] 3      Physical Exam:  Constitutional: No acute distress, awake, alert  HENT: NCAT, mucous membranes moist  Respiratory: Clear to auscultation bilaterally, respiratory effort normal   Cardiovascular: RRR, no murmurs, rubs, or gallops  Gastrointestinal: Positive bowel sounds, soft, nontender, nondistended  Musculoskeletal: No bilateral ankle edema  Psychiatric: Appropriate affect, cooperative  Neurologic: Oriented x 3, strength symmetric in all extremities, Cranial Nerves grossly intact to confrontation, speech clear  Skin: No rashes     Pertinent  and/or Most Recent Results     LAB RESULTS:      Lab 09/27/24  0436 09/26/24 0614 09/25/24 1928   WBC 7.21 8.81 9.80   HEMOGLOBIN 17.3* 16.9* 16.9*   HEMATOCRIT 49.3* 48.6* 48.1*   PLATELETS 179 164 168   NEUTROS ABS 5.37 6.97 7.92*   IMMATURE GRANS (ABS) 0.05 0.06* 0.07*   LYMPHS ABS 1.10 1.01 0.99   MONOS ABS 0.56 0.66 0.74   EOS ABS 0.07 0.07 0.04   .6* 107.8* 108.1*   PROCALCITONIN  --   --  0.07         Lab 09/27/24  0436 09/26/24  1845 09/26/24  1121 09/26/24  0614 09/26/24  0013 09/25/24 1928   SODIUM 137  --   --  139  --  136   POTASSIUM 4.4 4.7  --  3.6  --  3.7   CHLORIDE 97*  --   --  93*  --  94*   CO2 31.0*  --   --  28.0  --  28.0   ANION GAP 9.0  --   --  18.0*  --  14.0   BUN 20  --   --  16  --  17   CREATININE 0.70  --   --  0.73  --  0.69    EGFR 97.9  --   --  93.1  --  98.3   GLUCOSE 127*  --   --  118*  --  169*   CALCIUM 9.2  --   --  8.8  --  9.1   IONIZED CALCIUM 1.13*  --  1.07*  --   --   --    MAGNESIUM 2.3  --  2.2  --   --   --    PHOSPHORUS 3.4  --  3.3  --   --   --    HEMOGLOBIN A1C  --   --   --   --  7.30*  --          Lab 09/25/24 1928   TOTAL PROTEIN 7.4   ALBUMIN 4.2   GLOBULIN 3.2   ALT (SGPT) 8   AST (SGOT) 21   BILIRUBIN 0.7   ALK PHOS 94         Lab 09/25/24 1928   PROBNP 4,795.0*   HSTROP T 9                 Lab 09/25/24 2119   PH, ARTERIAL 7.355   PCO2, ARTERIAL 57.3*   PO2 ART 65.7*   FIO2 32   HCO3 ART 32.0*   BASE EXCESS ART 4.4*   CARBOXYHEMOGLOBIN 6.0*     Brief Urine Lab Results       None          Microbiology Results (last 10 days)       Procedure Component Value - Date/Time    COVID PRE-OP / PRE-PROCEDURE SCREENING ORDER (NO ISOLATION) - Swab, Nasopharynx [[id number]]  (Abnormal) Collected: 09/25/24 1928    Lab Status: Final result Specimen: Swab from Nasopharynx Updated: 09/25/24 2028    Narrative:      The following orders were created for panel order COVID PRE-OP / PRE-PROCEDURE SCREENING ORDER (NO ISOLATION) - Swab, Nasopharynx.  Procedure                               Abnormality         Status                     ---------                               -----------         ------                     Respiratory Panel PCR w/...[[id number]]  Abnormal            Final result                 Please view results for these tests on the individual orders.    Respiratory Panel PCR w/COVID-19(SARS-CoV-2) TAMI/FELICITY/JENELLE/PAD/COR/SANGEETA In-House, NP Swab in [facility], 2 HR TAT - Swab, Nasopharynx [[id number]]  (Abnormal) Collected: 09/25/24 1928    Lab Status: Final result Specimen: Swab from Nasopharynx Updated: 09/25/24 2028     ADENOVIRUS, PCR Not Detected     Coronavirus 229E Not Detected     Coronavirus HKU1 Not Detected     Coronavirus NL63 Not Detected     Coronavirus OC43 Not Detected     COVID19 Not Detected     Human  Metapneumovirus Not Detected     Human Rhinovirus/Enterovirus Detected     Influenza A PCR Not Detected     Influenza B PCR Not Detected     Parainfluenza Virus 1 Not Detected     Parainfluenza Virus 2 Not Detected     Parainfluenza Virus 3 Not Detected     Parainfluenza Virus 4 Not Detected     RSV, PCR Not Detected     Bordetella pertussis pcr Not Detected     Bordetella parapertussis PCR Not Detected     Chlamydophila pneumoniae PCR Not Detected     Mycoplasma pneumo by PCR Not Detected    Narrative:      In the setting of a positive respiratory panel with a viral infection PLUS a negative procalcitonin without other underlying concern for bacterial infection, consider observing off antibiotics or discontinuation of antibiotics and continue supportive care. If the respiratory panel is positive for atypical bacterial infection (Bordetella pertussis, Chlamydophila pneumoniae, or Mycoplasma pneumoniae), consider antibiotic de-escalation to target atypical bacterial infection.            Adult Transthoracic Echo Complete w/ Color, Spectral and Contrast if necessary per protocol    Result Date: 9/26/2024    Left ventricular systolic function is normal. Estimated left ventricular EF = 60%   Left ventricular wall thickness is consistent with mild concentric hypertrophy.   The left atrial cavity is moderately dilated.   Bioprosthetic aortic valve present.  Severe bioprosthetic aortic valve stenosis (mean gradient 52 mmHg).  Moderate aortic valve insufficiency.   There is a bioprosthetic mitral valve present.  Elevated gradient across mitral valve consistent with moderate to severe mitral stenosis (mean gradient 13 mmHg)   There is  a tricuspid valve ring present.   Estimated right ventricular systolic pressure from tricuspid regurgitation is markedly elevated (58 mmHg).     CT Angiogram Chest    Result Date: 9/25/2024  CT ANGIOGRAM CHEST Date of Exam: 9/25/2024 8:05 PM EDT Indication: Hypoxia. Comparison: Chest CTA  performed on April 17, 2022 Technique: CTA of the chest was performed after the uneventful intravenous administration of Isovue-370, 90 mL. Reconstructed coronal and sagittal images were also obtained. In addition, a 3-D volume rendered image was created for interpretation. Automated exposure control and iterative reconstruction methods were used. Findings: Pulmonary arteries: Adequate opacification of the pulmonary arteries. No evidence of acute pulmonary embolism. The main pulmonary artery is enlarged measuring 4 cm. This may be secondary to CHF versus pulmonary arterial hypertension. Thoracic aorta: The thoracic aorta is normal in caliber and contour. Small scattered atheromatous plaques are visualized. Cardiovascular: The heart is mildly enlarged. No evidence of pericardial effusion. Patient is post sternotomy, tricuspid valve, mitral valve, and aortic valve replacement. There is mild to moderate pulmonary vascular congestion. Thyroid: The patient is post thyroidectomy. Lungs and Pleura: No focal consolidation or effusion. Focal airspace opacity in the right upper lobe near the apex measures 1.2 cm and appears stable from prior study performed in 2022. Finding may represent focal scarring. Patient is post left upper lobectomy. There is mild linear scarring in the left lung base, stable. No pneumothorax. Central airways are patent. Mediastinum/Jennifer: No significant mediastinal or hilar lymphadenopathy. Lymph nodes: No axillary or supraclavicular lymphadenopathy. Bones and Soft Tissue:No acute fracture, aggressive osseous lesions, or soft tissue process. Upper Abdomen: No acute process in the upper abdomen.     Impression: No evidence of pulmonary embolism. Enlarged main pulmonary artery measuring 4 cm in width. Finding may be secondary to pulmonary arterial hypertension or possibly secondary to CHF. No evidence of thoracic aortic aneurysm or dissection. Mild cardiomegaly. Findings compatible with mild to moderate  CHF. Prior sternotomy, aortic, tricuspid, and mitral valve replacement. Prior left upper lobectomy. Electronically Signed: Anthony Ramirez MD  9/25/2024 8:41 PM EDT  Workstation ID: [id number]             Results for orders placed during the hospital encounter of 09/25/24    Adult Transthoracic Echo Complete w/ Color, Spectral and Contrast if necessary per protocol    Interpretation Summary    Left ventricular systolic function is normal. Estimated left ventricular EF = 60%    Left ventricular wall thickness is consistent with mild concentric hypertrophy.    The left atrial cavity is moderately dilated.    Bioprosthetic aortic valve present.  Severe bioprosthetic aortic valve stenosis (mean gradient 52 mmHg).  Moderate aortic valve insufficiency.    There is a bioprosthetic mitral valve present.  Elevated gradient across mitral valve consistent with moderate to severe mitral stenosis (mean gradient 13 mmHg)    There is  a tricuspid valve ring present.    Estimated right ventricular systolic pressure from tricuspid regurgitation is markedly elevated (58 mmHg).      Plan for Follow-up of Pending Labs/Results:     Discharge Details        Discharge Medications        New Medications        Instructions Start Date   guaiFENesin 600 MG 12 hr tablet  Commonly known as: MUCINEX   1,200 mg, Oral, Every 12 Hours Scheduled             Changes to Medications        Instructions Start Date   albuterol sulfate  (90 Base) MCG/ACT inhaler  Commonly known as: PROVENTIL HFA;VENTOLIN HFA;PROAIR HFA  What changed:   when to take this  reasons to take this  Another medication with the same name was removed. Continue taking this medication, and follow the directions you see here.   2 puffs, Inhalation, Every 4 Hours PRN      levothyroxine 175 MCG tablet  Commonly known as: SYNTHROID, LEVOTHROID  What changed: Another medication with the same name was removed. Continue taking this medication, and follow the directions you see here.    175 mcg, Oral, Daily             Continue These Medications        Instructions Start Date   aspirin 81 MG EC tablet   81 mg, Oral, Daily      Cholecalciferol 10 MCG (400 UNIT) capsule   5,000 Units, Oral      furosemide 40 MG tablet  Commonly known as: LASIX   40 mg, Oral, Daily      ibuprofen 800 MG tablet  Commonly known as: ADVIL,MOTRIN   800 mg, As Needed      Jardiance 10 MG tablet tablet  Generic drug: empagliflozin   10 mg, Oral, Daily      metoprolol tartrate 25 MG tablet  Commonly known as: LOPRESSOR   25 mg, Oral, Every 12 Hours      multivitamin tablet tablet   1 tablet, Oral, Daily      nystatin 439840 UNIT/GM powder  Commonly known as: MYCOSTATIN   1 Application, Topical, 2 Times Daily      potassium chloride 10 MEQ CR capsule  Commonly known as: MICRO-K   20 mEq, Oral, Daily      pravastatin 20 MG tablet  Commonly known as: PRAVACHOL   20 mg, Oral, Nightly      propafenone  MG 12 hr capsule  Commonly known as: RYTHMOL SR   TAKE 1 CAPSULE TWICE A DAY (DOSE CHANGE)               Allergies   Allergen Reactions    Lortab [Hydrocodone-Acetaminophen] Nausea And Vomiting and Dizziness    Morphine And Codeine Nausea Only         Discharge Disposition:  Home or Self Care    Diet:  Hospital:  Diet Order   Procedures    Diet: Cardiac, Diabetic, Fluid Restriction (240 mL/tray); Healthy Heart (2-3 Na+); Consistent Carbohydrate; 1500 mL/day; Fluid Consistency: Thin (IDDSI 0)            Activity:      Restrictions or Other Recommendations:  -Take all medications as directed  -Follow a low salt diet, goal = 2g or less daily  -Weigh yourself every morning before breakfast, write it down and compare to yesterday’s weight. If you have weight gain greater than 3lbs in 1 day or 5 lbs or more in 1 week , call your PCP or Heart Failure Clinic.  -If you experience any of the following symptoms please contact your PCP or Heart Failure Clinic:    Increased shortness of breath   Increased swelling of feet or stomach   Dry  hacking cough   Shortness of breath when laying down flat    [facility]:  [phone]         CODE STATUS:    Code Status and Medical Interventions: No CPR (Do Not Attempt to Resuscitate); Full Support   Ordered at: 09/25/24 1322     Code Status (Patient has no pulse and is not breathing):    No CPR (Do Not Attempt to Resuscitate)     Medical Interventions (Patient has pulse or is breathing):    Full Support       Future Appointments   Date Time Provider Department Center   10/22/2024  9:45 AM Nigel Huber MD [facility]   11/19/2024  3:15 PM Marco Lay IV, MD [facility]       Additional Instructions for the Follow-ups that You Need to Schedule       Discharge Follow-up with PCP   As directed       Currently Documented PCP:    Marisa Lynch PA    PCP Phone Number:    [phone]     Follow Up Details: 1 week hospital follow up                      Argenis Malin II, DO  09/27/24      Time Spent on Discharge:  I spent  33  minutes on this discharge activity which included: face-to-face encounter with the patient, reviewing the data in the system, coordination of the care with the nursing staff as well as consultants, documentation, and entering orders.           Electronically signed by Argenis Malin II, DO at 09/27/24 4320

## 2024-09-27 NOTE — DISCHARGE SUMMARY
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 female with past medical history of lung cancer with lobectomy, history of multiple cardiac valve replacements, hypertension, hypothyroidism, hyperlipidemia, heart failure preserved ejection fraction, type 2 diabetes, COPD, tobacco user.      Hypoxia resolved  Rhinovirus infection  --Patient treated supportively w/ tylenol and pulmonary toilet with mucinex, ics/flutter. Has requested no nebs. Had some improvement in her hypoxia/dyspnea.     Acute exacerbation of heart failure  VHD w/ prosthetic valve degeneration.  --Cardiology followed throughout stay. She was diuresed aggressively with Lasix IV BID w/ some improvement in her symptoms. Unfortunately her echo showed presence of severe bioprosthetic aortic valve and mitral valve degeneration which is likely etiology for her ongoing  sx x 3 months. CTS was consulted who ordered a CT TAVR prior to d/c. Dr. Pickett evaluated as well who recommends LHC + TIFFANIE prior to further discussion of valve replacement which can be done as outpt.    Discharge Follow Up Recommendations for outpatient labs/diagnostics:   [facility] [place] for TIFFANIE/LHC    Day of Discharge     HPI: Up in chair. Still SOA but feels more back to her baseline. Coughing less. Wants to go home.    Review of Systems  Gen- No fevers, chills  CV- No chest pain, palpitations  Resp- No cough, dyspnea  GI- No N/V/D, abd pain    Vital Signs:   Temp:  [97.6 °F (36.4 °C)-98.3 °F (36.8 °C)] 98 °F (36.7 °C)  Heart Rate:  [] 109  Resp:  [18-20] 20  BP: (101-121)/(51-68) 121/68  Flow (L/min):  [3-4] 3      Physical Exam:  Constitutional: No acute distress, awake, alert  HENT: NCAT, mucous membranes moist  Respiratory: Clear to auscultation bilaterally, respiratory effort normal   Cardiovascular: RRR, no murmurs, rubs, or gallops  Gastrointestinal: Positive bowel sounds, soft, nontender, nondistended  Musculoskeletal: No bilateral ankle edema  Psychiatric: Appropriate affect, cooperative  Neurologic: Oriented x 3, strength symmetric in all extremities, Cranial Nerves grossly intact to confrontation, speech clear  Skin: No rashes     Pertinent  and/or Most Recent Results     LAB RESULTS:      Lab 09/27/24  0436 09/26/24  0614 09/25/24  1928   WBC 7.21 8.81 9.80   HEMOGLOBIN 17.3* 16.9* 16.9*   HEMATOCRIT 49.3* 48.6* 48.1*   PLATELETS 179 164 168   NEUTROS ABS 5.37 6.97 7.92*   IMMATURE GRANS (ABS) 0.05 0.06* 0.07*   LYMPHS ABS 1.10 1.01 0.99   MONOS ABS 0.56 0.66 0.74   EOS ABS 0.07 0.07 0.04   .6* 107.8* 108.1*   PROCALCITONIN  --   --  0.07         Lab 09/27/24  0436 09/26/24  1845 09/26/24  1121 09/26/24  0614 09/26/24  0013 09/25/24  1928   SODIUM 137  --   --  139  --  136   POTASSIUM 4.4 4.7  --  3.6  --  3.7   CHLORIDE 97*  --   --  93*  --  94*   CO2 31.0*  --   --  28.0  --   28.0   ANION GAP 9.0  --   --  18.0*  --  14.0   BUN 20  --   --  16  --  17   CREATININE 0.70  --   --  0.73  --  0.69   EGFR 97.9  --   --  93.1  --  98.3   GLUCOSE 127*  --   --  118*  --  169*   CALCIUM 9.2  --   --  8.8  --  9.1   IONIZED CALCIUM 1.13*  --  1.07*  --   --   --    MAGNESIUM 2.3  --  2.2  --   --   --    PHOSPHORUS 3.4  --  3.3  --   --   --    HEMOGLOBIN A1C  --   --   --   --  7.30*  --          Lab 09/25/24 1928   TOTAL PROTEIN 7.4   ALBUMIN 4.2   GLOBULIN 3.2   ALT (SGPT) 8   AST (SGOT) 21   BILIRUBIN 0.7   ALK PHOS 94         Lab 09/25/24 1928   PROBNP 4,795.0*   HSTROP T 9                 Lab 09/25/24 2119   PH, ARTERIAL 7.355   PCO2, ARTERIAL 57.3*   PO2 ART 65.7*   FIO2 32   HCO3 ART 32.0*   BASE EXCESS ART 4.4*   CARBOXYHEMOGLOBIN 6.0*     Brief Urine Lab Results       None          Microbiology Results (last 10 days)       Procedure Component Value - Date/Time    COVID PRE-OP / PRE-PROCEDURE SCREENING ORDER (NO ISOLATION) - Swab, Nasopharynx [[id number]]  (Abnormal) Collected: 09/25/24 1928    Lab Status: Final result Specimen: Swab from Nasopharynx Updated: 09/25/24 2028    Narrative:      The following orders were created for panel order COVID PRE-OP / PRE-PROCEDURE SCREENING ORDER (NO ISOLATION) - Swab, Nasopharynx.  Procedure                               Abnormality         Status                     ---------                               -----------         ------                     Respiratory Panel PCR w/...[[id number]]  Abnormal            Final result                 Please view results for these tests on the individual orders.    Respiratory Panel PCR w/COVID-19(SARS-CoV-2) TAMI/FELICITY/JENELLE/PAD/COR/SANGEETA In-House, NP Swab in [facility], 2 HR TAT - Swab, Nasopharynx [[id number]]  (Abnormal) Collected: 09/25/24 1928    Lab Status: Final result Specimen: Swab from Nasopharynx Updated: 09/25/24 2028     ADENOVIRUS, PCR Not Detected     Coronavirus 229E Not Detected     Coronavirus HKU1  Not Detected     Coronavirus NL63 Not Detected     Coronavirus OC43 Not Detected     COVID19 Not Detected     Human Metapneumovirus Not Detected     Human Rhinovirus/Enterovirus Detected     Influenza A PCR Not Detected     Influenza B PCR Not Detected     Parainfluenza Virus 1 Not Detected     Parainfluenza Virus 2 Not Detected     Parainfluenza Virus 3 Not Detected     Parainfluenza Virus 4 Not Detected     RSV, PCR Not Detected     Bordetella pertussis pcr Not Detected     Bordetella parapertussis PCR Not Detected     Chlamydophila pneumoniae PCR Not Detected     Mycoplasma pneumo by PCR Not Detected    Narrative:      In the setting of a positive respiratory panel with a viral infection PLUS a negative procalcitonin without other underlying concern for bacterial infection, consider observing off antibiotics or discontinuation of antibiotics and continue supportive care. If the respiratory panel is positive for atypical bacterial infection (Bordetella pertussis, Chlamydophila pneumoniae, or Mycoplasma pneumoniae), consider antibiotic de-escalation to target atypical bacterial infection.            Adult Transthoracic Echo Complete w/ Color, Spectral and Contrast if necessary per protocol    Result Date: 9/26/2024    Left ventricular systolic function is normal. Estimated left ventricular EF = 60%   Left ventricular wall thickness is consistent with mild concentric hypertrophy.   The left atrial cavity is moderately dilated.   Bioprosthetic aortic valve present.  Severe bioprosthetic aortic valve stenosis (mean gradient 52 mmHg).  Moderate aortic valve insufficiency.   There is a bioprosthetic mitral valve present.  Elevated gradient across mitral valve consistent with moderate to severe mitral stenosis (mean gradient 13 mmHg)   There is  a tricuspid valve ring present.   Estimated right ventricular systolic pressure from tricuspid regurgitation is markedly elevated (58 mmHg).     CT Angiogram Chest    Result  Date: 9/25/2024  CT ANGIOGRAM CHEST Date of Exam: 9/25/2024 8:05 PM EDT Indication: Hypoxia. Comparison: Chest CTA performed on April 17, 2022 Technique: CTA of the chest was performed after the uneventful intravenous administration of Isovue-370, 90 mL. Reconstructed coronal and sagittal images were also obtained. In addition, a 3-D volume rendered image was created for interpretation. Automated exposure control and iterative reconstruction methods were used. Findings: Pulmonary arteries: Adequate opacification of the pulmonary arteries. No evidence of acute pulmonary embolism. The main pulmonary artery is enlarged measuring 4 cm. This may be secondary to CHF versus pulmonary arterial hypertension. Thoracic aorta: The thoracic aorta is normal in caliber and contour. Small scattered atheromatous plaques are visualized. Cardiovascular: The heart is mildly enlarged. No evidence of pericardial effusion. Patient is post sternotomy, tricuspid valve, mitral valve, and aortic valve replacement. There is mild to moderate pulmonary vascular congestion. Thyroid: The patient is post thyroidectomy. Lungs and Pleura: No focal consolidation or effusion. Focal airspace opacity in the right upper lobe near the apex measures 1.2 cm and appears stable from prior study performed in 2022. Finding may represent focal scarring. Patient is post left upper lobectomy. There is mild linear scarring in the left lung base, stable. No pneumothorax. Central airways are patent. Mediastinum/Jennifer: No significant mediastinal or hilar lymphadenopathy. Lymph nodes: No axillary or supraclavicular lymphadenopathy. Bones and Soft Tissue:No acute fracture, aggressive osseous lesions, or soft tissue process. Upper Abdomen: No acute process in the upper abdomen.     Impression: No evidence of pulmonary embolism. Enlarged main pulmonary artery measuring 4 cm in width. Finding may be secondary to pulmonary arterial hypertension or possibly secondary to CHF.  No evidence of thoracic aortic aneurysm or dissection. Mild cardiomegaly. Findings compatible with mild to moderate CHF. Prior sternotomy, aortic, tricuspid, and mitral valve replacement. Prior left upper lobectomy. Electronically Signed: Anthony Ramirez MD  9/25/2024 8:41 PM EDT  Workstation ID: [id number]             Results for orders placed during the hospital encounter of 09/25/24    Adult Transthoracic Echo Complete w/ Color, Spectral and Contrast if necessary per protocol    Interpretation Summary    Left ventricular systolic function is normal. Estimated left ventricular EF = 60%    Left ventricular wall thickness is consistent with mild concentric hypertrophy.    The left atrial cavity is moderately dilated.    Bioprosthetic aortic valve present.  Severe bioprosthetic aortic valve stenosis (mean gradient 52 mmHg).  Moderate aortic valve insufficiency.    There is a bioprosthetic mitral valve present.  Elevated gradient across mitral valve consistent with moderate to severe mitral stenosis (mean gradient 13 mmHg)    There is  a tricuspid valve ring present.    Estimated right ventricular systolic pressure from tricuspid regurgitation is markedly elevated (58 mmHg).      Plan for Follow-up of Pending Labs/Results:     Discharge Details        Discharge Medications        New Medications        Instructions Start Date   guaiFENesin 600 MG 12 hr tablet  Commonly known as: MUCINEX   1,200 mg, Oral, Every 12 Hours Scheduled             Changes to Medications        Instructions Start Date   albuterol sulfate  (90 Base) MCG/ACT inhaler  Commonly known as: PROVENTIL HFA;VENTOLIN HFA;PROAIR HFA  What changed:   when to take this  reasons to take this  Another medication with the same name was removed. Continue taking this medication, and follow the directions you see here.   2 puffs, Inhalation, Every 4 Hours PRN      levothyroxine 175 MCG tablet  Commonly known as: SYNTHROID, LEVOTHROID  What changed: Another  medication with the same name was removed. Continue taking this medication, and follow the directions you see here.   175 mcg, Oral, Daily             Continue These Medications        Instructions Start Date   aspirin 81 MG EC tablet   81 mg, Oral, Daily      Cholecalciferol 10 MCG (400 UNIT) capsule   5,000 Units, Oral      furosemide 40 MG tablet  Commonly known as: LASIX   40 mg, Oral, Daily      ibuprofen 800 MG tablet  Commonly known as: ADVIL,MOTRIN   800 mg, As Needed      Jardiance 10 MG tablet tablet  Generic drug: empagliflozin   10 mg, Oral, Daily      metoprolol tartrate 25 MG tablet  Commonly known as: LOPRESSOR   25 mg, Oral, Every 12 Hours      multivitamin tablet tablet   1 tablet, Oral, Daily      nystatin 053025 UNIT/GM powder  Commonly known as: MYCOSTATIN   1 Application, Topical, 2 Times Daily      potassium chloride 10 MEQ CR capsule  Commonly known as: MICRO-K   20 mEq, Oral, Daily      pravastatin 20 MG tablet  Commonly known as: PRAVACHOL   20 mg, Oral, Nightly      propafenone  MG 12 hr capsule  Commonly known as: RYTHMOL SR   TAKE 1 CAPSULE TWICE A DAY (DOSE CHANGE)               Allergies   Allergen Reactions    Lortab [Hydrocodone-Acetaminophen] Nausea And Vomiting and Dizziness    Morphine And Codeine Nausea Only         Discharge Disposition:  Home or Self Care    Diet:  Hospital:  Diet Order   Procedures    Diet: Cardiac, Diabetic, Fluid Restriction (240 mL/tray); Healthy Heart (2-3 Na+); Consistent Carbohydrate; 1500 mL/day; Fluid Consistency: Thin (IDDSI 0)            Activity:      Restrictions or Other Recommendations:  -Take all medications as directed  -Follow a low salt diet, goal = 2g or less daily  -Weigh yourself every morning before breakfast, write it down and compare to yesterday’s weight. If you have weight gain greater than 3lbs in 1 day or 5 lbs or more in 1 week , call your PCP or Heart Failure Clinic.  -If you experience any of the following symptoms please  contact your PCP or Heart Failure Clinic:    Increased shortness of breath   Increased swelling of feet or stomach   Dry hacking cough   Shortness of breath when laying down flat    [facility]:  [phone]         CODE STATUS:    Code Status and Medical Interventions: No CPR (Do Not Attempt to Resuscitate); Full Support   Ordered at: 09/25/24 6462     Code Status (Patient has no pulse and is not breathing):    No CPR (Do Not Attempt to Resuscitate)     Medical Interventions (Patient has pulse or is breathing):    Full Support       Future Appointments   Date Time Provider Department Center   10/22/2024  9:45 AM Nigel Huber MD [facility]   11/19/2024  3:15 PM Marco Lay IV, MD [facility]       Additional Instructions for the Follow-ups that You Need to Schedule       Discharge Follow-up with PCP   As directed       Currently Documented PCP:    Marisa Lynch PA    PCP Phone Number:    [phone]     Follow Up Details: 1 week hospital follow up                      Argenis Malin II, DO  09/27/24      Time Spent on Discharge:  I spent  33  minutes on this discharge activity which included: face-to-face encounter with the patient, reviewing the data in the system, coordination of the care with the nursing staff as well as consultants, documentation, and entering orders.

## 2024-09-30 ENCOUNTER — READMISSION MANAGEMENT (OUTPATIENT)
Dept: CALL CENTER | Facility: HOSPITAL | Age: 64
End: 2024-09-30
Payer: COMMERCIAL

## 2024-09-30 ENCOUNTER — DOCUMENTATION (OUTPATIENT)
Dept: CARDIOLOGY | Facility: HOSPITAL | Age: 64
End: 2024-09-30
Payer: COMMERCIAL

## 2024-09-30 RX ORDER — PROPAFENONE HYDROCHLORIDE 225 MG/1
CAPSULE, EXTENDED RELEASE ORAL
Qty: 180 CAPSULE | Refills: 3 | Status: SHIPPED | OUTPATIENT
Start: 2024-09-30

## 2024-09-30 NOTE — OUTREACH NOTE
CHF Week 1 Survey      Flowsheet Row Responses   [facility] patient discharged from? [place]   Does the patient have one of the following disease processes/diagnoses(primary or secondary)? CHF   CHF Week 1 attempt successful? Yes   Call start time 1609   Call end time 1617   Discharge diagnosis Acute on chronic heart failure with preserved ejection fraction due to valvular heart disease   Meds reviewed with patient/caregiver? Yes   Is the patient having any side effects they believe may be caused by any medication additions or changes? No   Does the patient have all medications ordered at discharge? Yes   Is the patient taking all medications as directed (includes completed medication regime)? Yes   Comments regarding appointments Follow up appt with [facility] on 10/7   Does the patient have a primary care provider?  Yes   Does the patient have an appointment with their PCP within 7 days of discharge? Yes   Comments regarding PCP Hospital follow up appt with PCP on 10/2   Has the patient kept scheduled appointments due by today? N/A   Comments Hospital follow up appt with cardiologist Dr. Huber on 10/22   Has home health visited the patient within 72 hours of discharge? N/A   Has all DME been delivered? Yes   DME comments 2L of O2 continuous   Pulse Ox monitoring Intermittent   Pulse Ox device source Patient   O2 Sat comments 94% on 2L of O2   O2 Sat: education provided Sat levels, Monitoring frequency, When to seek care   O2 Sat education comments Educated that if O2 sats are below 90% while wearing her O2 to go to the ED to be seen,  Educated on the importance of not smoking while using her O2, patient verbalized understanding.   Psychosocial issues? No   Did the patient receive a copy of their discharge instructions? Yes   Nursing interventions Reviewed instructions with patient   What is the patient's perception of their health status since discharge? Improving   Nursing interventions Nurse provided  patient education   Is the patient able to teach back signs and symptoms of worsening condition? (i.e. weight gain, shortness of air, etc.) Yes   If the patient is a current smoker, are they able to teach back resources for cessation? --  [states she is trying to cut]   Is the patient/caregiver able to teach back the hierarchy of who to call/visit for symptoms/problems? PCP, Specialist, Home health nurse, Urgent Care, ED, 911 Yes   Additional teach back comments states she is checking her weight daily   CHF Zone this Call Green Zone   Green Zone Patient reports doing well, No new or worsening shortness of breath, Physical activity level is normal for you, No new swelling -  feet, ankles and legs look normal for you, Weight check stable, No chest pain   Green Zone Interventions Daily weight check, Meds as directed, Low sodium diet, Follow up visits planned    CHF Week 1 call completed? Yes   Is the patient interested in additional calls from an ambulatory ? No   Would this patient benefit from a Referral to [facility] Social Work? No   Wrap up additional comments Doing well, all questiosn addressed, states she is wearing her O2 and has cut back on smoking, advised her to not smoke while wearing the O2, patient verbalized understanding, confirmed upcoming follow up appts, patient states she is checking her weight daily.   Call end time 1617            Natalia GERONIMO - Registered Nurse

## 2024-09-30 NOTE — PROGRESS NOTES
CTA completed on 9/27. Requested that images be uploaded for [facility] valve specialists to review, also requested over[place] review for TAVR & TMVR.

## 2024-10-01 ENCOUNTER — TELEPHONE (OUTPATIENT)
Dept: CARDIOLOGY | Facility: CLINIC | Age: 64
End: 2024-10-01

## 2024-10-01 NOTE — TELEPHONE ENCOUNTER
Caller: Jill Miller    Relationship: Self    Best call back number: [phone]    What is the best time to reach you: ANYTIME    Who are you requesting to speak with (clinical staff, provider,  specific staff member): ANYONE     What was the call regarding: PATIENT CALLING TO INFORM THAT [employer] WILL BE FAXING OVER A FORM FOR  TO FILL OUT IN ORDER FOR HER TO GET SHORT TERM DISABILITY. WAS SENT HOME WITH OXYGEN AND CANNOT WORK.

## 2024-10-07 ENCOUNTER — OFFICE VISIT (OUTPATIENT)
Dept: CARDIOLOGY | Facility: HOSPITAL | Age: 64
End: 2024-10-07
Payer: COMMERCIAL

## 2024-10-07 VITALS
WEIGHT: 167 LBS | RESPIRATION RATE: 15 BRPM | TEMPERATURE: 97.6 F | DIASTOLIC BLOOD PRESSURE: 55 MMHG | OXYGEN SATURATION: 96 % | HEIGHT: 62 IN | HEART RATE: 52 BPM | BODY MASS INDEX: 30.73 KG/M2 | SYSTOLIC BLOOD PRESSURE: 114 MMHG

## 2024-10-07 DIAGNOSIS — I38 VALVULAR HEART DISEASE: ICD-10-CM

## 2024-10-07 DIAGNOSIS — I50.32 CHRONIC DIASTOLIC HEART FAILURE: Primary | ICD-10-CM

## 2024-10-07 DIAGNOSIS — I48.0 PAROXYSMAL ATRIAL FIBRILLATION: ICD-10-CM

## 2024-10-07 DIAGNOSIS — T82.857D STENOSIS OF PROSTHETIC MITRAL VALVE, SUBSEQUENT ENCOUNTER: ICD-10-CM

## 2024-10-07 DIAGNOSIS — T82.857D STENOSIS OF PROSTHETIC AORTIC VALVE, SUBSEQUENT ENCOUNTER: Primary | ICD-10-CM

## 2024-10-07 DIAGNOSIS — I10 ESSENTIAL HYPERTENSION: ICD-10-CM

## 2024-10-07 LAB
ANION GAP SERPL CALCULATED.3IONS-SCNC: 11 MMOL/L (ref 5–15)
BUN SERPL-MCNC: 17 MG/DL (ref 8–23)
BUN/CREAT SERPL: 25 (ref 7–25)
CALCIUM SPEC-SCNC: 10 MG/DL (ref 8.6–10.5)
CHLORIDE SERPL-SCNC: 100 MMOL/L (ref 98–107)
CO2 SERPL-SCNC: 27 MMOL/L (ref 22–29)
CREAT SERPL-MCNC: 0.68 MG/DL (ref 0.57–1)
EGFRCR SERPLBLD CKD-EPI 2021: 98.6 ML/MIN/1.73
GLUCOSE SERPL-MCNC: 123 MG/DL (ref 65–99)
NT-PROBNP SERPL-MCNC: 906 PG/ML (ref 0–900)
POTASSIUM SERPL-SCNC: 3.6 MMOL/L (ref 3.5–5.2)
SODIUM SERPL-SCNC: 138 MMOL/L (ref 136–145)

## 2024-10-07 PROCEDURE — 83880 ASSAY OF NATRIURETIC PEPTIDE: CPT | Performed by: NURSE PRACTITIONER

## 2024-10-07 PROCEDURE — 80048 BASIC METABOLIC PNL TOTAL CA: CPT | Performed by: NURSE PRACTITIONER

## 2024-10-07 PROCEDURE — 99214 OFFICE O/P EST MOD 30 MIN: CPT | Performed by: NURSE PRACTITIONER

## 2024-10-07 NOTE — PROGRESS NOTES
Met with patient in clinic. We discussed stenosis of prosthetic aortic and mitral valves, TAVR/TMVR and need for TIFFANIE and LHC/RHC prior to scheduling procedure. We discussed hospitalization and recovery expectations and cardiac rehab. Educational folder reviewed and provided today, my contact information is included. She requested that testing be done on a Friday. Dr. Lay is in clinic on Fridays, but is agreeable for Dr. Pickett to complete necessary testing prior to TAVR/TMVR. Scheduling notified of need for TIFFANIE and cath, will call with apt information.       CTA images have been sent for review by [facility] [employer] clinical specialists. Will follow up after TIFFANIE and L/RHC.

## 2024-10-08 ENCOUNTER — PREP FOR SURGERY (OUTPATIENT)
Dept: OTHER | Facility: HOSPITAL | Age: 64
End: 2024-10-08
Payer: COMMERCIAL

## 2024-10-08 PROBLEM — T82.857A STENOSIS OF PROSTHETIC MITRAL VALVE: Status: ACTIVE | Noted: 2024-10-07

## 2024-10-08 RX ORDER — ASPIRIN 81 MG/1
81 TABLET ORAL DAILY
Status: CANCELLED | OUTPATIENT
Start: 2024-10-09

## 2024-10-08 RX ORDER — SODIUM CHLORIDE 9 MG/ML
40 INJECTION, SOLUTION INTRAVENOUS AS NEEDED
Status: CANCELLED | OUTPATIENT
Start: 2024-10-08 | End: 2024-10-08

## 2024-10-08 RX ORDER — SODIUM CHLORIDE 0.9 % (FLUSH) 0.9 %
10 SYRINGE (ML) INJECTION EVERY 12 HOURS SCHEDULED
Status: CANCELLED | OUTPATIENT
Start: 2024-10-08

## 2024-10-08 RX ORDER — SODIUM CHLORIDE 0.9 % (FLUSH) 0.9 %
10 SYRINGE (ML) INJECTION AS NEEDED
Status: CANCELLED | OUTPATIENT
Start: 2024-10-08

## 2024-10-08 RX ORDER — ASPIRIN 81 MG/1
324 TABLET, CHEWABLE ORAL ONCE
Status: CANCELLED | OUTPATIENT
Start: 2024-10-08 | End: 2024-10-08

## 2024-10-08 RX ORDER — ACETAMINOPHEN 325 MG/1
650 TABLET ORAL EVERY 4 HOURS PRN
Status: CANCELLED | OUTPATIENT
Start: 2024-10-08

## 2024-10-08 NOTE — PROGRESS NOTES
"Chief Complaint  Congestive Heart Failure    Subjective    History of Present Illness {  Problem List  Visit  Diagnosis   Encounters  Notes  Medications  Labs  Result Review Imaging  Media :23}       History of Present Illness   64-year-old female presents the office today for ongoing evaluation of her HFrEF and valvular heart disease.  Recently hospitalized at [facility] with acute respiratory failure, hypoxia and rhinovirus.Past medical history of lung cancer with lobectomy, multiple cardiac valve replacements, hypertension, hypothyroidism, hyperlipidemia, heart failure with preserved EF, type 2 diabetes, COPD and tobacco use, PAF.  Notes that she had an episode of possible atrial fibs since discharge from the hospital and heart rate was running high.  She was evaluated by her PCP however no EKG was done.  She reports she has converted back to normal sinus rhythm and heart rate has been in the 60s.  Reports she is feeling significantly better.  Currently denies chest pain, dyspnea, dizziness, presyncope or syncope.  Is scheduled for TIFFANIE/left heart cath in the near future for ongoing workup of tavr valve in valve.   Objective     Vital Signs:   Vitals:    10/07/24 1312   BP: 114/55   BP Location: Left arm   Patient Position: Sitting   Cuff Size: Adult   Pulse: 52   Resp: 15   Temp: 97.6 °F (36.4 °C)   TempSrc: Temporal   SpO2: 96%   Weight: 75.8 kg (167 lb)   Height: 157.5 cm (62.01\")     Body mass index is 30.53 kg/m².  Physical Exam  Vitals and nursing note reviewed.   Constitutional:       Appearance: Normal appearance.   HENT:      Head: Normocephalic.   Eyes:      Pupils: Pupils are equal, round, and reactive to light.   Cardiovascular:      Rate and Rhythm: Normal rate and regular rhythm.      Pulses: Normal pulses.      Heart sounds: Murmur heard.   Pulmonary:      Effort: Pulmonary effort is normal.      Breath sounds: Normal breath sounds.   Abdominal:      General: Bowel sounds are normal.      " Palpations: Abdomen is soft.   Musculoskeletal:         General: Normal range of motion.      Cervical back: Normal range of motion.      Right lower leg: No edema.      Left lower leg: No edema.   Skin:     General: Skin is warm and dry.      Capillary Refill: Capillary refill takes less than 2 seconds.   Neurological:      Mental Status: She is alert and oriented to person, place, and time.   Psychiatric:         Mood and Affect: Mood normal.         Thought Content: Thought content normal.              Result Review  Data Reviewed:{ Labs  Result Review  Imaging  Med Tab  Media :23}   ECG 12 Lead ED Triage Standing Order; SOA (09/25/2024 19:01)  Adult Transthoracic Echo Complete w/ Color, Spectral and Contrast if necessary per protocol (09/26/2024 11:26)  Basic Metabolic Panel (10/07/2024 13:58)  proBNP (10/07/2024 13:58)           Assessment and Plan {CC Problem List  Visit Diagnosis  ROS  Review (Popup)  Health Maintenance  Quality  BestPractice  Medications  SmartSets  SnapShot Encounters  Media :23}   1. Chronic diastolic heart failure  Euvolemic  Heart failure education today including signs and symptoms, the role of the heart failure center, daily weights, low sodium diet (less than 1500 mg per day), and daily physical activity. Reviewed HF Zones with patient and family.  Patient to continue current medications as previously ordered.   - Basic Metabolic Panel; Future  - proBNP; Future  - Basic Metabolic Panel  - proBNP    2. Paroxysmal atrial fibrillation  Continue Lopressor, Multaq  CHADS-VASc Risk Assessment               4 Total Score    1 CHF    1 Hypertension    1 DM    1 Sex: Female    Criteria that do not apply:    Age >/= 75    PRIOR STROKE/TIA/THROMBO    Vascular Disease    Age 65-74          Status post ANGELA closure and maze 2018  3. Valvular heart disease  Undergoing workup for TAVR valve in valve     4. Essential hypertension  Stable on lopressor           Follow Up {Instructions  Charge Capture  Follow-up Communications :23}   Return if symptoms worsen or fail to improve.    Patient was given instructions and counseling regarding her condition or for health maintenance advice. Please see specific information pulled into the AVS if appropriate.  Patient was instructed to call the [facility] with any questions, concerns, or worsening symptoms.

## 2024-10-09 ENCOUNTER — READMISSION MANAGEMENT (OUTPATIENT)
Dept: CALL CENTER | Facility: HOSPITAL | Age: 64
End: 2024-10-09
Payer: COMMERCIAL

## 2024-10-09 NOTE — OUTREACH NOTE
CHF Week 2 Survey      Flowsheet Row Responses   [facility] patient discharged from? [facility]   Does the patient have one of the following disease processes/diagnoses(primary or secondary)? CHF   Week 2 attempt successful? Yes   Call start time 1618   Call end time 1630   Discharge diagnosis Acute on chronic heart failure with preserved ejection fraction due to valvular heart disease   Meds reviewed with patient/caregiver? Yes   Does the patient have all medications ordered at discharge? Yes   Prescription comments Pt taking Synthroid 200mcg daily per pt report not 175mcg listed on AVS. Pt reports that she has been taking Lasix once daily as she was unaware of the AVS documenting change in dose to twice daily, pt v/u and will follow AVS instructions she reports.   Is the patient taking all medications as directed (includes completed medication regime)? No   What is preventing the patient from taking all medications as directed? Medication regime   Nursing Interventions Nurse provided patient education   Has the patient kept scheduled appointments due by today? Yes   DME comments 2L of O2 continuous   Pulse Ox monitoring Intermittent   Pulse Ox device source Patient   O2 Sat comments 90-94% on 2L of O2   O2 Sat: education provided Sat levels   O2 Sat education comments Educated pt on the importance of not smoking while using her O2, patient verbalized understanding.   Comments Pt reports doing well and denies SOA or chest pain at this time. Pt reports feeling better than she has in a long time.   What is the patient's perception of their health status since discharge? Improving   Nursing interventions Nurse provided patient education   If the patient is a current smoker, are they able to teach back resources for cessation? --  [Pt continues to smoke despite O2 use. RN educated pt on dangers of smoking in home with O2, pt v/u]   Is the patient/caregiver able to teach back the hierarchy of who to call/visit for  symptoms/problems? PCP, Specialist, Home health nurse, Urgent Care, ED, 911 Yes   Additional teach back comments states she is checking her weight daily   CHF Zone this Call Green Zone   Green Zone Patient reports doing well, No new or worsening shortness of breath, Physical activity level is normal for you, Weight check stable   Green Zone Interventions Daily weight check, Meds as directed, Follow up visits planned   CHF Week 2 call completed? Yes   Revoked No further contact(revokes)-requires comment   Call end time 1630            Bianka ARANA - Registered Nurse

## 2024-10-11 ENCOUNTER — HOSPITAL ENCOUNTER (OUTPATIENT)
Dept: CARDIOLOGY | Facility: HOSPITAL | Age: 64
Discharge: HOME | End: 2024-10-11
Payer: COMMERCIAL

## 2024-10-11 ENCOUNTER — HOSPITAL ENCOUNTER (OUTPATIENT)
Facility: HOSPITAL | Age: 64
Setting detail: HOSPITAL OUTPATIENT SURGERY
Discharge: HOME | End: 2024-10-11
Attending: INTERNAL MEDICINE | Admitting: INTERNAL MEDICINE
Payer: COMMERCIAL

## 2024-10-11 ENCOUNTER — APPOINTMENT (OUTPATIENT)
Dept: CARDIOLOGY | Facility: HOSPITAL | Age: 64
End: 2024-10-11
Payer: COMMERCIAL

## 2024-10-11 VITALS
RESPIRATION RATE: 14 BRPM | WEIGHT: 160.05 LBS | TEMPERATURE: 97.3 F | HEIGHT: 62 IN | HEART RATE: 51 BPM | SYSTOLIC BLOOD PRESSURE: 116 MMHG | BODY MASS INDEX: 29.45 KG/M2 | OXYGEN SATURATION: 97 % | DIASTOLIC BLOOD PRESSURE: 55 MMHG

## 2024-10-11 DIAGNOSIS — T82.857D STENOSIS OF PROSTHETIC AORTIC VALVE, SUBSEQUENT ENCOUNTER: ICD-10-CM

## 2024-10-11 DIAGNOSIS — T82.857D STENOSIS OF PROSTHETIC MITRAL VALVE, SUBSEQUENT ENCOUNTER: ICD-10-CM

## 2024-10-11 DIAGNOSIS — I50.32 CHRONIC DIASTOLIC CONGESTIVE HEART FAILURE: ICD-10-CM

## 2024-10-11 LAB
BH CV ECHO MEAS - MV MAX PG: 16.9 MMHG
BH CV ECHO MEAS - MV MEAN PG: 5.3 MMHG
BH CV ECHO MEAS - MV V2 VTI: 75.5 CM
CHOLEST SERPL-MCNC: 145 MG/DL (ref 0–200)
GLUCOSE BLDC GLUCOMTR-MCNC: 152 MG/DL (ref 70–130)
HDLC SERPL-MCNC: 44 MG/DL (ref 40–60)
LDLC SERPL CALC-MCNC: 77 MG/DL (ref 0–100)
LDLC/HDLC SERPL: 1.68 {RATIO}
TRIGL SERPL-MCNC: 135 MG/DL (ref 0–150)
VLDLC SERPL-MCNC: 24 MG/DL (ref 5–40)

## 2024-10-11 PROCEDURE — 25510000001 IOPAMIDOL PER 1 ML: Performed by: INTERNAL MEDICINE

## 2024-10-11 PROCEDURE — 93325 DOPPLER ECHO COLOR FLOW MAPG: CPT

## 2024-10-11 PROCEDURE — 25010000002 FENTANYL CITRATE (PF) 50 MCG/ML SOLUTION: Performed by: INTERNAL MEDICINE

## 2024-10-11 PROCEDURE — 25010000002 MIDAZOLAM PER 1 MG: Performed by: INTERNAL MEDICINE

## 2024-10-11 PROCEDURE — 93458 L HRT ARTERY/VENTRICLE ANGIO: CPT | Performed by: INTERNAL MEDICINE

## 2024-10-11 PROCEDURE — 93312 ECHO TRANSESOPHAGEAL: CPT

## 2024-10-11 PROCEDURE — 25810000003 SODIUM CHLORIDE 0.9 % SOLUTION: Performed by: INTERNAL MEDICINE

## 2024-10-11 PROCEDURE — 93320 DOPPLER ECHO COMPLETE: CPT

## 2024-10-11 PROCEDURE — C1894 INTRO/SHEATH, NON-LASER: HCPCS | Performed by: INTERNAL MEDICINE

## 2024-10-11 PROCEDURE — 25010000002 LIDOCAINE 1 % SOLUTION: Performed by: INTERNAL MEDICINE

## 2024-10-11 PROCEDURE — C1769 GUIDE WIRE: HCPCS | Performed by: INTERNAL MEDICINE

## 2024-10-11 PROCEDURE — 99153 MOD SED SAME PHYS/QHP EA: CPT | Performed by: INTERNAL MEDICINE

## 2024-10-11 PROCEDURE — 80061 LIPID PANEL: CPT | Performed by: PHYSICIAN ASSISTANT

## 2024-10-11 PROCEDURE — 99152 MOD SED SAME PHYS/QHP 5/>YRS: CPT | Performed by: INTERNAL MEDICINE

## 2024-10-11 PROCEDURE — 36415 COLL VENOUS BLD VENIPUNCTURE: CPT

## 2024-10-11 PROCEDURE — S0260 H&P FOR SURGERY: HCPCS | Performed by: INTERNAL MEDICINE

## 2024-10-11 PROCEDURE — 82948 REAGENT STRIP/BLOOD GLUCOSE: CPT

## 2024-10-11 PROCEDURE — 25010000002 PHENYLEPHRINE 10 MG/ML SOLUTION: Performed by: INTERNAL MEDICINE

## 2024-10-11 PROCEDURE — C1760 CLOSURE DEV, VASC: HCPCS | Performed by: INTERNAL MEDICINE

## 2024-10-11 PROCEDURE — C1887 CATHETER, GUIDING: HCPCS | Performed by: INTERNAL MEDICINE

## 2024-10-11 PROCEDURE — 76376 3D RENDER W/INTRP POSTPROCES: CPT

## 2024-10-11 RX ORDER — SODIUM CHLORIDE 9 MG/ML
40 INJECTION, SOLUTION INTRAVENOUS AS NEEDED
Status: DISCONTINUED | OUTPATIENT
Start: 2024-10-11 | End: 2024-10-11 | Stop reason: HOSPADM

## 2024-10-11 RX ORDER — LIDOCAINE HYDROCHLORIDE 10 MG/ML
INJECTION, SOLUTION INFILTRATION; PERINEURAL
Status: DISCONTINUED | OUTPATIENT
Start: 2024-10-11 | End: 2024-10-11 | Stop reason: HOSPADM

## 2024-10-11 RX ORDER — ETOMIDATE 2 MG/ML
.05-.2 INJECTION INTRAVENOUS ONCE
Status: DISCONTINUED | OUTPATIENT
Start: 2024-10-11 | End: 2024-10-11 | Stop reason: HOSPADM

## 2024-10-11 RX ORDER — MIDAZOLAM HYDROCHLORIDE 1 MG/ML
INJECTION INTRAMUSCULAR; INTRAVENOUS
Status: COMPLETED | OUTPATIENT
Start: 2024-10-11 | End: 2024-10-11

## 2024-10-11 RX ORDER — PHENYLEPHRINE HYDROCHLORIDE 10 MG/ML
INJECTION INTRAVENOUS
Status: DISCONTINUED | OUTPATIENT
Start: 2024-10-11 | End: 2024-10-11 | Stop reason: HOSPADM

## 2024-10-11 RX ORDER — SODIUM CHLORIDE 0.9 % (FLUSH) 0.9 %
10 SYRINGE (ML) INJECTION AS NEEDED
Status: DISCONTINUED | OUTPATIENT
Start: 2024-10-11 | End: 2024-10-11 | Stop reason: HOSPADM

## 2024-10-11 RX ORDER — ACETAMINOPHEN 325 MG/1
650 TABLET ORAL EVERY 4 HOURS PRN
Status: DISCONTINUED | OUTPATIENT
Start: 2024-10-11 | End: 2024-10-11 | Stop reason: HOSPADM

## 2024-10-11 RX ORDER — SODIUM CHLORIDE 9 MG/ML
INJECTION, SOLUTION INTRAVENOUS
Status: COMPLETED | OUTPATIENT
Start: 2024-10-11 | End: 2024-10-11

## 2024-10-11 RX ORDER — FENTANYL CITRATE 50 UG/ML
INJECTION, SOLUTION INTRAMUSCULAR; INTRAVENOUS
Status: DISCONTINUED | OUTPATIENT
Start: 2024-10-11 | End: 2024-10-11 | Stop reason: HOSPADM

## 2024-10-11 RX ORDER — NALOXONE HCL 0.4 MG/ML
0.4 VIAL (ML) INJECTION ONCE AS NEEDED
Status: DISCONTINUED | OUTPATIENT
Start: 2024-10-11 | End: 2024-10-11 | Stop reason: HOSPADM

## 2024-10-11 RX ORDER — IOPAMIDOL 755 MG/ML
INJECTION, SOLUTION INTRAVASCULAR
Status: DISCONTINUED | OUTPATIENT
Start: 2024-10-11 | End: 2024-10-11 | Stop reason: HOSPADM

## 2024-10-11 RX ORDER — MIDAZOLAM HYDROCHLORIDE 1 MG/ML
2-20 INJECTION INTRAMUSCULAR; INTRAVENOUS ONCE AS NEEDED
Status: DISCONTINUED | OUTPATIENT
Start: 2024-10-11 | End: 2024-10-11 | Stop reason: HOSPADM

## 2024-10-11 RX ORDER — LEVOTHYROXINE SODIUM 200 UG/1
200 TABLET ORAL
COMMUNITY

## 2024-10-11 RX ORDER — ASPIRIN 81 MG/1
324 TABLET, CHEWABLE ORAL ONCE
Status: COMPLETED | OUTPATIENT
Start: 2024-10-11 | End: 2024-10-11

## 2024-10-11 RX ORDER — MIDAZOLAM HYDROCHLORIDE 1 MG/ML
INJECTION INTRAMUSCULAR; INTRAVENOUS
Status: DISCONTINUED | OUTPATIENT
Start: 2024-10-11 | End: 2024-10-11 | Stop reason: HOSPADM

## 2024-10-11 RX ORDER — POTASSIUM CHLORIDE 750 MG/1
10 CAPSULE, EXTENDED RELEASE ORAL 2 TIMES DAILY
Qty: 60 CAPSULE | Refills: 11 | Status: SHIPPED | OUTPATIENT
Start: 2024-10-11

## 2024-10-11 RX ORDER — FENTANYL CITRATE 50 UG/ML
50-100 INJECTION, SOLUTION INTRAMUSCULAR; INTRAVENOUS ONCE AS NEEDED
Status: DISCONTINUED | OUTPATIENT
Start: 2024-10-11 | End: 2024-10-11

## 2024-10-11 RX ORDER — ASPIRIN 81 MG/1
81 TABLET ORAL DAILY
Status: DISCONTINUED | OUTPATIENT
Start: 2024-10-12 | End: 2024-10-11 | Stop reason: HOSPADM

## 2024-10-11 RX ORDER — FLUMAZENIL 0.1 MG/ML
0.5 INJECTION INTRAVENOUS ONCE AS NEEDED
Status: DISCONTINUED | OUTPATIENT
Start: 2024-10-11 | End: 2024-10-11 | Stop reason: HOSPADM

## 2024-10-11 RX ORDER — FENTANYL CITRATE 50 UG/ML
INJECTION, SOLUTION INTRAMUSCULAR; INTRAVENOUS
Status: COMPLETED | OUTPATIENT
Start: 2024-10-11 | End: 2024-10-11

## 2024-10-11 RX ORDER — FENTANYL CITRATE 50 UG/ML
50-200 INJECTION, SOLUTION INTRAMUSCULAR; INTRAVENOUS ONCE AS NEEDED
Status: DISCONTINUED | OUTPATIENT
Start: 2024-10-11 | End: 2024-10-11 | Stop reason: HOSPADM

## 2024-10-11 RX ORDER — SODIUM CHLORIDE 0.9 % (FLUSH) 0.9 %
10 SYRINGE (ML) INJECTION EVERY 12 HOURS SCHEDULED
Status: DISCONTINUED | OUTPATIENT
Start: 2024-10-11 | End: 2024-10-11 | Stop reason: HOSPADM

## 2024-10-11 RX ORDER — NITROGLYCERIN 0.4 MG/1
0.4 TABLET SUBLINGUAL
Status: DISCONTINUED | OUTPATIENT
Start: 2024-10-11 | End: 2024-10-11 | Stop reason: HOSPADM

## 2024-10-11 RX ADMIN — MIDAZOLAM HYDROCHLORIDE 2 MG: 1 INJECTION, SOLUTION INTRAMUSCULAR; INTRAVENOUS at 11:16

## 2024-10-11 RX ADMIN — FENTANYL CITRATE 50 MCG: 50 INJECTION, SOLUTION INTRAMUSCULAR; INTRAVENOUS at 11:02

## 2024-10-11 RX ADMIN — ASPIRIN 81 MG 324 MG: 81 TABLET ORAL at 10:20

## 2024-10-11 RX ADMIN — MIDAZOLAM HYDROCHLORIDE 4 MG: 1 INJECTION, SOLUTION INTRAMUSCULAR; INTRAVENOUS at 11:02

## 2024-10-11 NOTE — Clinical Note
Prepped: right neck and Right Wrist. Prepped with: ChloraPrep. The patient was draped in a sterile fashion.

## 2024-10-11 NOTE — H&P
Referring Provider: Nelson Lay  Reason for Consultation: Dyspnea    Patient Care Team:  Marisa Lynch PA as PCP - General (Family Medicine)  Ajay Cheatham MD as Surgeon (Cardiothoracic Surgery)  Marco Lay IV, MD as Cardiologist (Interventional Cardiology)  Nigel Huber MD as Consulting Physician (Cardiology)  Marisa Suarez APRN as Nurse Practitioner (Interventional Cardiology)    Chief complaint dyspnea    Subjective .     History of present illness: This is a 62-year-old female that is undergoing evaluation for TAVR and TMVR.  Patient had surgical intervention in 2017 by Dr. Cheatham.  Patient also had tricuspid valve repair.  Patient was lost to follow-up due to having to take care of her .  Patient presented back was found to be dyspneic.  Patient and echocardiogram was found to have severe aortic and mitral stenosis.  Patient underwent planning CTA with valvular anatomy being conducive to transcatheter techniques.  Patient is brought here today for transesophageal echocardiogram and left and right heart catheterization.  Patient with shortness of breath on exertion and episodes of tachycardia.  UA today.  Regular rate and rhythm    Review of Systems   Pertinent items are noted in HPI, all other systems reviewed and negative    History  Past Medical History:   Diagnosis Date    Atrial fibrillation and flutter     Chronic diastolic congestive heart failure     Chronically Abnormal CXR (Left pleural disease post op) 09/25/2017    COPD exacerbation 04/17/2022    Essential hypertension 04/10/2017    Target blood pressure <130/80 mmHg    Graves disease     Hyperlipidemia LDL goal <70     Hypertension     Hyperthyroidism     Lung cancer     MRSA (methicillin resistant staph aureus) culture positive 2014    under left breast, incision and debridement, treated with wound packing and po abx     Prolonged QTC interval on ECG 06/01/2022    Rheumatic mitral stenosis     Type 2  diabetes mellitus 07/17/2018    Valvular heart disease    ,   Past Surgical History:   Procedure Laterality Date    ABLATION OF DYSRHYTHMIC FOCUS  07/16/2018    ABSCESS DRAINAGE      under left breast d/t mrsa     AORTIC VALVE REPAIR/REPLACEMENT N/A 07/16/2018    Procedure: MEDIAN STERNOTOMY, AORTIC VALVE REPLACEMENT WITH TIFFANIE AND MAZE, TRICUSPID RING, LEFT ATRIAL OCCLUSION;  Surgeon: Ajay Cheatham MD;  Location:  [facility];  Service: Cardiothoracic    BRONCHOSCOPY Left 05/12/2017    Procedure: BRONCHOSCOPY;  Surgeon: Ajay Cheatham MD;  Location:  [facility];  Service:     BRONCHOSCOPY N/A 05/18/2017    Procedure: BRONCHOSCOPY BIOPSY AT BEDSIDE;  Surgeon: Ajay Cheatham MD;  Location:  [facility];  Service:     CARDIAC CATHETERIZATION N/A 09/28/2017    Procedure: Left Heart Cath;  Surgeon: Marco Lay MD;  Location:  [facility];  Service:     CARDIAC CATHETERIZATION N/A 09/28/2017    Procedure: Right Heart Cath;  Surgeon: Marco Lay MD;  Location:  [facility];  Service:     CARDIAC ELECTROPHYSIOLOGY PROCEDURE N/A 6/1/2022    Procedure: Ablation atrial fibrillation (atypical A flutter). Hold Rythmol x5 days;  Surgeon: Nigle Huber MD;  Location:  [facility];  Service: Cardiovascular;  Laterality: N/A;    LUNG BIOPSY  04/2017    LYMPH NODE DISSECTION Left 05/12/2017    Procedure: MEDIASTINAL LYMPH NODE DISSECTION;  Surgeon: Ajay Cheatham MD;  Location:  [facility];  Service:     MAZE PROCEDURE      MITRAL VALVE REPAIR/REPLACEMENT N/A 07/16/2018    Procedure: MITRAL VALVE REPLACEMENT;  Surgeon: Ajay Cheatham MD;  Location:  [facility];  Service: Cardiothoracic    MITRAL VALVE REPLACEMENT  07/16/2018    TEETH EXTRACTION      THORACOSCOPY VIDEO ASSISTED WITH LOBECTOMY Left 05/12/2017    Procedure: THORACOSCOPY VIDEO ASSISTED WITH OPEN LOBECTOMY;  Surgeon: Ajay Cheatham MD;  Location:  [facility];  Service:     TOTAL THYROIDECTOMY  approx      TRICUSPID VALVE SURGERY  2018   ,   Family History   Problem Relation Age of Onset    Breast cancer Mother     Diabetes Father     Heart disease Son    ,   Social History     Socioeconomic History    Marital status:     Number of children: 3   Tobacco Use    Smoking status: Some Days     Current packs/day: 0.00     Types: Cigarettes     Start date: 1978     Last attempt to quit: 2023     Years since quittin.4     Passive exposure: Current    Smokeless tobacco: Never    Tobacco comments:     Couldn't remember exact dates.   Vaping Use    Vaping status: Never Used   Substance and Sexual Activity    Alcohol use: No    Drug use: Not Currently     Types: Marijuana    Sexual activity: Not Currently     Partners: Male     Birth control/protection: None     E-cigarette/Vaping    E-cigarette/Vaping Use Never User     Passive Exposure No     Counseling Given No      E-cigarette/Vaping Substances    Nicotine No     THC No     CBD No     Flavoring No      E-cigarette/Vaping Devices    Disposable No     Pre-filled or Refillable Cartridge No     Refillable Tank No     Pre-filled Pod No          ,   Medications Prior to Admission   Medication Sig Dispense Refill Last Dose    albuterol sulfate  (90 Base) MCG/ACT inhaler Inhale 2 puffs Every 4 (Four) Hours As Needed for Wheezing.       aspirin 81 MG EC tablet Take 1 tablet by mouth Daily.       Cholecalciferol 10 MCG (400 UNIT) capsule Take 5,000 Units by mouth.       furosemide (Lasix) 40 MG tablet Take 1 tablet by mouth 2 (Two) Times a Day. 60 tablet 0     guaiFENesin (MUCINEX) 600 MG 12 hr tablet Take 2 tablets by mouth Every 12 (Twelve) Hours. 40 tablet 0     ibuprofen (ADVIL,MOTRIN) 800 MG tablet 1 tablet As Needed.       Jardiance 10 MG tablet tablet TAKE 1 TABLET DAILY 90 tablet 3     levothyroxine (SYNTHROID, LEVOTHROID) 175 MCG tablet Take 1 tablet by mouth Daily.       metoprolol tartrate (LOPRESSOR) 25 MG tablet TAKE 1 TABLET EVERY  12 HOURS 180 tablet 3     multivitamin (THERAGRAN) tablet tablet Take 1 tablet by mouth Daily.       nystatin (MYCOSTATIN) 481782 UNIT/GM powder Apply 1 Application topically to the appropriate area as directed 2 (Two) Times a Day.       potassium chloride (MICRO-K) 10 MEQ CR capsule TAKE 2 CAPSULES DAILY 180 capsule 1     pravastatin (PRAVACHOL) 20 MG tablet Take 1 tablet by mouth Every Night.       propafenone SR (RYTHMOL SR) 225 MG 12 hr capsule TAKE 1 CAPSULE TWICE A DAY (DOSE CHANGE) 180 capsule 3    , Scheduled Meds:  , Continuous Infusions:  No current facility-administered medications for this encounter. , PRN Meds:  , and Allergies:  Lortab [hydrocodone-acetaminophen] and Morphine and codeine    Objective     Vital Signs     There is no height or weight on file to calculate BMI.  No intake or output data in the 24 hours ending 10/11/24 0936  No intake/output data recorded.    Physical Exam:      General Appearance:  Alert, cooperative, in no acute distress   Head:  Normocephalic, without obvious abnormality, atraumatic   Eyes:  Lids and lashes normal, conjunctivae and sclerae normal, no icterus, no pallor, corneas clear, PERRLA   Ears:  Ears appear intact with no abnormalities noted   Throat:  No oral lesions, no thrush, oral mucosa moist   Neck:  No adenopathy, supple, trachea midline, no thyromegaly, no carotid bruit, no JVD   Back:  No kyphosis present, no scoliosis present, no skin lesions, erythema or scars, no tenderness to percussion, or palpation, range of motion normal   Lungs:  Clear to auscultation,respirations regular, even and unlabored    Heart:  3/6 systolic diastolic murmur   Breast Exam:  Deferred   Abdomen:  Normal bowel sounds, no masses, no organomegaly, soft non-tender, non-distended, no guarding, no rebound tenderness   Genitalia:  Deferred   Extremities:  Moves all extremities well, no edema, no cyanosis, no redness   Pulses:  Pulses palpable and equal bilaterally   Skin:  No  "bleeding, bruising or rash   Lymph nodes:  No palpable adenopathy   Neurologic:  Cranial nerves 2 - 12 grossly intact, sensation intact, DTR present and equal bilaterally       Results Review:    I reviewed the patient's new clinical results.  I reviewed the patient's new imaging results and agree with the interpretation.  I reviewed the patient's other test results and agree with the interpretation  I personally viewed and interpreted the patient's EKG/Telemetry data       WBC No results found for: \"WBC\"   HGB No results found for: \"HGB\"   HCT No results found for: \"HCT\"   Platlets No results found for: \"LABPLAT\"     PT/INR:    No results found for: \"PROTIME\"/No results found for: \"INR\"    Sodium No results found for: \"NA\"   Potassium No results found for: \"K\"   Chloride No results found for: \"CL\"   Bicarbonate No results found for: \"PLASMABICARB\"   BUN No results found for: \"BUN\"   Creatinine No results found for: \"CREATININE\"   Calcium No results found for: \"CALCIUM\"   Magnesium No results found for: \"MG\"     pH No results found for: \"PHART\"   pO2 No results found for: \"PO2ART\"   pCO2 No results found for: \"ICU7CNH\"   HCO3 No results found for: \"RHK7ZBG\"     Assessment & Plan     Patient Active Problem List   Diagnosis Code    Essential hypertension I10    Acquired hypothyroidism E03.9    Lung cancer; S/P Bronch/Thor w/ ALBINA lobectomy/mediastinal lymph node resec (5/12/17, Dr. Cheatham) C34.90    Paroxysmal atrial fibrillation I48.0    Hyperlipidemia LDL goal <70 E78.5    Prosthetic aortic valve stenosis T82.857A    Type 2 diabetes mellitus E11.9    COPD (chronic obstructive pulmonary disease) J44.9    Obesity (BMI 30-39.9) E66.9    Tobacco user Z72.0    Pulmonary hypertension I27.20    Stenosis of prosthetic mitral valve T82.857A       Severe Aortic and Mitral Stenosis  Atrial fibrillation  Lung Cancer, history of     Plan: Risk and benefits of transesophageal echocardiogram along with left and right heart " catheterization discussed with patient.  Patient will have such later today.  Patient agreed to proceed with such.  Patient continue to follow with structural team for transcatheter therapy.    I discussed the patients findings and my recommendations with patient and nursing staff.     Jeffry Pickett MD  10/11/24  09:36 EDT

## 2024-10-14 ENCOUNTER — DOCUMENTATION (OUTPATIENT)
Dept: CARDIOLOGY | Facility: HOSPITAL | Age: 64
End: 2024-10-14
Payer: COMMERCIAL

## 2024-10-14 NOTE — PROGRESS NOTES
Imaging reviewed with [facility] team today. Plan for TAVR [place], MR improved on TIFFANIE images- defer intervention for mitral valve at this time. Discussed with patient, agreeable. Tentative TAVR [place] date 11/11/2024 with Dr. Cheatham and Dr. Pickett. Will call closer to that date to confirm scheduling details. She has our contact information for questions in the interim.

## 2024-10-22 DIAGNOSIS — T82.857A STENOSIS OF PROSTHETIC AORTIC VALVE, INITIAL ENCOUNTER: Primary | ICD-10-CM

## 2024-10-25 ENCOUNTER — DOCUMENTATION (OUTPATIENT)
Dept: CARDIOLOGY | Facility: HOSPITAL | Age: 64
End: 2024-10-25
Payer: COMMERCIAL

## 2024-10-25 NOTE — PROGRESS NOTES
Message received that patient called about upcoming TAVR date. Called patient,no answer. LVM for call back

## 2024-10-29 ENCOUNTER — TELEPHONE (OUTPATIENT)
Dept: CARDIAC SURGERY | Facility: CLINIC | Age: 64
End: 2024-10-29
Payer: COMMERCIAL

## 2024-10-29 ENCOUNTER — PREP FOR SURGERY (OUTPATIENT)
Dept: OTHER | Facility: HOSPITAL | Age: 64
End: 2024-10-29
Payer: COMMERCIAL

## 2024-10-29 DIAGNOSIS — I35.9 AORTIC VALVE DISORDER: Primary | ICD-10-CM

## 2024-10-29 RX ORDER — CHLORHEXIDINE GLUCONATE ORAL RINSE 1.2 MG/ML
15 SOLUTION DENTAL ONCE
OUTPATIENT
Start: 2024-11-11 | End: 2024-11-11

## 2024-10-29 RX ORDER — ASPIRIN 325 MG
325 TABLET ORAL NIGHTLY
OUTPATIENT
Start: 2024-11-08 | End: 2024-11-09

## 2024-10-29 RX ORDER — NITROGLYCERIN 0.4 MG/1
0.4 TABLET SUBLINGUAL
OUTPATIENT
Start: 2024-11-11

## 2024-10-29 RX ORDER — CHLORHEXIDINE GLUCONATE 500 MG/1
1 CLOTH TOPICAL EVERY 12 HOURS PRN
OUTPATIENT
Start: 2024-11-08

## 2024-10-29 RX ORDER — CHLORHEXIDINE GLUCONATE 500 MG/1
CLOTH TOPICAL EVERY 12 HOURS PRN
OUTPATIENT
Start: 2024-11-11

## 2024-10-29 NOTE — TELEPHONE ENCOUNTER
Caller: Jill Miller    Relationship: Self    Best call back number: [phone]    What is the best time to reach you: ANYTIME    Who are you requesting to speak with (clinical staff, provider,  specific staff member): CLINICAL    Do you know the name of the person who called: PT    What was the call regarding: PT WAS CALLING BECAUSE PAPERWORK WAS FAXED OVER BY HER EMPLOYER FOR SHORT TERM DISABILITY YESTERDAY. SHE IS HAVING SURGERY ON 11.11.24. SHE IS JUST WANTING A CONFIRMATION THAT THIS WAS RECEIVED. PLEASE CALL PT TO CONFIRM OR INFORM HER IF IT NEEDS TO BE SENT OVER AGAIN. PT IS NEEDING THIS TO BE TAKEN CARE OF BECAUSE HER SHORT TERM DISABILITY IS RUNNING OUT ON 11.7 AND SHE NEEDS HER JOB  TO GET THE EXTENSION INFORMATION DUE TO HER SURGERY NOT BEING UNTIL THE 11TH.     Is it okay if the provider responds through [employer]: NO

## 2024-10-29 NOTE — TELEPHONE ENCOUNTER
Patient will be getting extension of short term disability from Dr. Lay. Dr. Lay has filled out paperwork for patient.  I explained to patient that once she has surgery, our office may fill out any paperwork needed for short term disability.

## 2024-11-08 ENCOUNTER — PRE-ADMISSION TESTING (OUTPATIENT)
Dept: PREADMISSION TESTING | Facility: HOSPITAL | Age: 64
DRG: 266 | End: 2024-11-08
Payer: COMMERCIAL

## 2024-11-08 ENCOUNTER — HOSPITAL ENCOUNTER (OUTPATIENT)
Dept: GENERAL RADIOLOGY | Facility: HOSPITAL | Age: 64
Discharge: HOME | End: 2024-11-08
Payer: COMMERCIAL

## 2024-11-08 ENCOUNTER — HOSPITAL ENCOUNTER (OUTPATIENT)
Dept: PULMONOLOGY | Facility: HOSPITAL | Age: 64
Discharge: HOME | End: 2024-11-08
Payer: COMMERCIAL

## 2024-11-08 VITALS — BODY MASS INDEX: 29.37 KG/M2 | OXYGEN SATURATION: 92 % | WEIGHT: 159.61 LBS | HEIGHT: 62 IN

## 2024-11-08 DIAGNOSIS — I35.9 AORTIC VALVE DISORDER: ICD-10-CM

## 2024-11-08 LAB
ABO GROUP BLD: NORMAL
ALBUMIN SERPL-MCNC: 4.3 G/DL (ref 3.5–5.2)
ALBUMIN/GLOB SERPL: 1.3 G/DL
ALP SERPL-CCNC: 96 U/L (ref 39–117)
ALT SERPL W P-5'-P-CCNC: 8 U/L (ref 1–33)
AMPHET+METHAMPHET UR QL: NEGATIVE
AMPHETAMINES UR QL: NEGATIVE
ANION GAP SERPL CALCULATED.3IONS-SCNC: 11 MMOL/L (ref 5–15)
APTT PPP: 26.3 SECONDS (ref 22–39)
AST SERPL-CCNC: 26 U/L (ref 1–32)
BARBITURATES UR QL SCN: NEGATIVE
BASOPHILS # BLD AUTO: 0.03 10*3/MM3 (ref 0–0.2)
BASOPHILS NFR BLD AUTO: 0.4 % (ref 0–1.5)
BENZODIAZ UR QL SCN: NEGATIVE
BILIRUB SERPL-MCNC: 0.5 MG/DL (ref 0–1.2)
BLD GP AB SCN SERPL QL: NEGATIVE
BUN SERPL-MCNC: 13 MG/DL (ref 8–23)
BUN/CREAT SERPL: 19.4 (ref 7–25)
BUPRENORPHINE SERPL-MCNC: NEGATIVE NG/ML
CALCIUM SPEC-SCNC: 9.5 MG/DL (ref 8.6–10.5)
CANNABINOIDS SERPL QL: NEGATIVE
CHLORIDE SERPL-SCNC: 101 MMOL/L (ref 98–107)
CO2 SERPL-SCNC: 28 MMOL/L (ref 22–29)
COCAINE UR QL: NEGATIVE
CREAT SERPL-MCNC: 0.67 MG/DL (ref 0.57–1)
DEPRECATED RDW RBC AUTO: 46.8 FL (ref 37–54)
EGFRCR SERPLBLD CKD-EPI 2021: 99 ML/MIN/1.73
EOSINOPHIL # BLD AUTO: 0.07 10*3/MM3 (ref 0–0.4)
EOSINOPHIL NFR BLD AUTO: 0.9 % (ref 0.3–6.2)
ERYTHROCYTE [DISTWIDTH] IN BLOOD BY AUTOMATED COUNT: 11.8 % (ref 12.3–15.4)
FENTANYL UR-MCNC: NEGATIVE NG/ML
GLOBULIN UR ELPH-MCNC: 3.2 GM/DL
GLUCOSE SERPL-MCNC: 164 MG/DL (ref 65–99)
HBA1C MFR BLD: 7.3 % (ref 4.8–5.6)
HCT VFR BLD AUTO: 47.2 % (ref 34–46.6)
HGB BLD-MCNC: 16.5 G/DL (ref 12–15.9)
IMM GRANULOCYTES # BLD AUTO: 0.02 10*3/MM3 (ref 0–0.05)
IMM GRANULOCYTES NFR BLD AUTO: 0.3 % (ref 0–0.5)
INR PPP: 0.93 (ref 0.89–1.12)
LYMPHOCYTES # BLD AUTO: 1.11 10*3/MM3 (ref 0.7–3.1)
LYMPHOCYTES NFR BLD AUTO: 14 % (ref 19.6–45.3)
MAGNESIUM SERPL-MCNC: 2.4 MG/DL (ref 1.6–2.4)
MCH RBC QN AUTO: 37.6 PG (ref 26.6–33)
MCHC RBC AUTO-ENTMCNC: 35 G/DL (ref 31.5–35.7)
MCV RBC AUTO: 107.5 FL (ref 79–97)
METHADONE UR QL SCN: NEGATIVE
MONOCYTES # BLD AUTO: 0.43 10*3/MM3 (ref 0.1–0.9)
MONOCYTES NFR BLD AUTO: 5.4 % (ref 5–12)
NEUTROPHILS NFR BLD AUTO: 6.25 10*3/MM3 (ref 1.7–7)
NEUTROPHILS NFR BLD AUTO: 79 % (ref 42.7–76)
NRBC BLD AUTO-RTO: 0 /100 WBC (ref 0–0.2)
OPIATES UR QL: NEGATIVE
OXYCODONE UR QL SCN: NEGATIVE
PA ADP PRP-ACNC: 174 PRU
PCP UR QL SCN: NEGATIVE
PLATELET # BLD AUTO: 192 10*3/MM3 (ref 140–450)
PMV BLD AUTO: 10.7 FL (ref 6–12)
POTASSIUM SERPL-SCNC: 4.3 MMOL/L (ref 3.5–5.2)
PROT SERPL-MCNC: 7.5 G/DL (ref 6–8.5)
PROTHROMBIN TIME: 12.6 SECONDS (ref 12.2–14.5)
RBC # BLD AUTO: 4.39 10*6/MM3 (ref 3.77–5.28)
RH BLD: POSITIVE
SODIUM SERPL-SCNC: 140 MMOL/L (ref 136–145)
T&S EXPIRATION DATE: NORMAL
TRICYCLICS UR QL SCN: NEGATIVE
WBC NRBC COR # BLD AUTO: 7.91 10*3/MM3 (ref 3.4–10.8)

## 2024-11-08 PROCEDURE — 71046 X-RAY EXAM CHEST 2 VIEWS: CPT

## 2024-11-08 PROCEDURE — 85730 THROMBOPLASTIN TIME PARTIAL: CPT

## 2024-11-08 PROCEDURE — 86923 COMPATIBILITY TEST ELECTRIC: CPT

## 2024-11-08 PROCEDURE — 86850 RBC ANTIBODY SCREEN: CPT

## 2024-11-08 PROCEDURE — 80053 COMPREHEN METABOLIC PANEL: CPT

## 2024-11-08 PROCEDURE — 85025 COMPLETE CBC W/AUTO DIFF WBC: CPT

## 2024-11-08 PROCEDURE — 85576 BLOOD PLATELET AGGREGATION: CPT

## 2024-11-08 PROCEDURE — 36415 COLL VENOUS BLD VENIPUNCTURE: CPT

## 2024-11-08 PROCEDURE — 85610 PROTHROMBIN TIME: CPT

## 2024-11-08 PROCEDURE — 83735 ASSAY OF MAGNESIUM: CPT

## 2024-11-08 PROCEDURE — 83036 HEMOGLOBIN GLYCOSYLATED A1C: CPT

## 2024-11-08 PROCEDURE — 80307 DRUG TEST PRSMV CHEM ANLYZR: CPT

## 2024-11-08 PROCEDURE — 94010 BREATHING CAPACITY TEST: CPT

## 2024-11-08 PROCEDURE — 86900 BLOOD TYPING SEROLOGIC ABO: CPT

## 2024-11-08 PROCEDURE — 86901 BLOOD TYPING SEROLOGIC RH(D): CPT

## 2024-11-08 RX ORDER — CHLORHEXIDINE GLUCONATE 500 MG/1
1 CLOTH TOPICAL EVERY 12 HOURS PRN
Status: ACTIVE | OUTPATIENT
Start: 2024-11-08

## 2024-11-08 RX ORDER — ASPIRIN 325 MG
325 TABLET ORAL NIGHTLY
Status: ACTIVE | OUTPATIENT
Start: 2024-11-08 | End: 2024-11-09

## 2024-11-08 NOTE — PAT
An arrival time for procedure was not provided during PAT visit. If patient had any questions or concerns about their arrival time, they were instructed to contact their surgeon/physician. Additionally, if the patient referred to an arrival time that was acquired from their my chart account, patient was encouraged to verify that time with their surgeon/physician. Arrival times are NOT provided in Pre Admission Testing Department.    Patient viewed general PAT education video as instructed in their preoperative information received from their surgeon.  Patient stated the general PAT education video was viewed in its entirety and survey completed.  Copies of PAT general education handouts (Incentive Spirometry, Meds to Beds Program, Patient Belongings, Pre-op skin preparation instructions, Blood Glucose testing, Visitor policy, Surgery FAQ, Code H) distributed to patient if not printed. Education related to the PAT pass and skin preparation for surgery (if applicable) completed in PAT as a reinforcement to PAT education video. Patient instructed to return PAT pass provided today as well as completed skin preparation sheet (if applicable) on the day of procedure.     Additionally if patient had not viewed video yet but intended to view it at home or in our waiting area, then referred them to the handout with QR code/link provided during PAT visit.  Instructed patient to complete survey after viewing the video in its entirety.  Encouraged patient/family to read PAT general education handouts thoroughly and notify PAT staff with any questions or concerns. Patient verbalized understanding of all information and priority content.    Patient denies any current skin issues.     Patient to apply Chlorhexadine wipes  to surgical area (as instructed) the night before procedure and the AM of procedure. Wipes provided.    Patient instructed to drink 20 ounces of Gatorade or Gatorlyte (if diabetic) and it needs to be completed 1  hour (for Main OR patients) or 2 hours (scheduled  section & [facility] patients) before given arrival time for procedure (NO RED Gatorade and NO Gatorade Zero).    Patient verbalized understanding.    Bactroban to be applied to each nostril during PAT visit if surgery the following day.  If surgery NOT the following day, then Bactroban supplied to patient with instructions both written and verbally to insert Bactroban into each nares the night before surgery.    Per Anesthesia Request, patient instructed not to take their ACE/ARB medications on the AM of surgery.    TAVR Education during PAT visit included general perioperative instructions that are routine for all surgical patients (PAT PASS, wipes, directions to pre-op,etc.). Patient and/or family verbalized understanding of education and reinforcement was provided as needed. TAVR: What to Expect After Discharge instruction sheet provided in PAT.     Instructed patient to take 325 mg of Aspirin the night before heart surgery as per CT surgeon's order. Patient and/or family verbalized understanding.    Blood bank bracelet applied to patient during Pre Admission Testing visit.  Patient instructed not to remove from arm until after procedure and they are discharged from the hospital. Explained to patient that they may shower and get the bracelet wet, but not to immerse under water for longer periods (bathing, swimming, hand dishwashing, etc). Patient verbalized understanding.    Patient directed to Radiology Department for CXR after Pre Admission Testing Appointment.     Patient directed to Respiratory Department after Pre Admission Testing visit for Pulmonary Function Test.

## 2024-11-10 ENCOUNTER — ANESTHESIA EVENT (OUTPATIENT)
Dept: PERIOP | Facility: HOSPITAL | Age: 64
End: 2024-11-10
Payer: COMMERCIAL

## 2024-11-10 RX ORDER — FAMOTIDINE 10 MG/ML
20 INJECTION, SOLUTION INTRAVENOUS ONCE
Status: CANCELLED | OUTPATIENT
Start: 2024-11-10 | End: 2024-11-10

## 2024-11-11 ENCOUNTER — APPOINTMENT (OUTPATIENT)
Dept: GENERAL RADIOLOGY | Facility: HOSPITAL | Age: 64
End: 2024-11-11
Payer: COMMERCIAL

## 2024-11-11 ENCOUNTER — ANESTHESIA (OUTPATIENT)
Dept: PERIOP | Facility: HOSPITAL | Age: 64
End: 2024-11-11
Payer: COMMERCIAL

## 2024-11-11 ENCOUNTER — HOSPITAL ENCOUNTER (INPATIENT)
Facility: HOSPITAL | Age: 64
LOS: 9 days | Discharge: HOME HEALTH | End: 2024-11-20
Attending: THORACIC SURGERY (CARDIOTHORACIC VASCULAR SURGERY) | Admitting: THORACIC SURGERY (CARDIOTHORACIC VASCULAR SURGERY)
Payer: COMMERCIAL

## 2024-11-11 ENCOUNTER — APPOINTMENT (OUTPATIENT)
Dept: CT IMAGING | Facility: HOSPITAL | Age: 64
End: 2024-11-11
Payer: COMMERCIAL

## 2024-11-11 ENCOUNTER — ANCILLARY PROCEDURE (OUTPATIENT)
Dept: PERIOP | Facility: HOSPITAL | Age: 64
End: 2024-11-11
Payer: COMMERCIAL

## 2024-11-11 ENCOUNTER — ANESTHESIA EVENT CONVERTED (OUTPATIENT)
Dept: ANESTHESIOLOGY | Facility: HOSPITAL | Age: 64
End: 2024-11-11
Payer: COMMERCIAL

## 2024-11-11 DIAGNOSIS — E11.9 TYPE 2 DIABETES MELLITUS WITHOUT COMPLICATION, WITHOUT LONG-TERM CURRENT USE OF INSULIN: Chronic | ICD-10-CM

## 2024-11-11 DIAGNOSIS — T82.857D STENOSIS OF PROSTHETIC AORTIC VALVE, SUBSEQUENT ENCOUNTER: ICD-10-CM

## 2024-11-11 DIAGNOSIS — R91.8 PULMONARY INFILTRATE: Primary | ICD-10-CM

## 2024-11-11 DIAGNOSIS — I27.20 PULMONARY HYPERTENSION: Chronic | ICD-10-CM

## 2024-11-11 DIAGNOSIS — E78.5 HYPERLIPIDEMIA LDL GOAL <70: Chronic | ICD-10-CM

## 2024-11-11 DIAGNOSIS — T82.857A STENOSIS OF PROSTHETIC AORTIC VALVE, INITIAL ENCOUNTER: ICD-10-CM

## 2024-11-11 DIAGNOSIS — Z72.0 TOBACCO USER: ICD-10-CM

## 2024-11-11 DIAGNOSIS — Z95.3 S/P TAVR (TRANSCATHETER AORTIC VALVE REPLACEMENT), BIOPROSTHETIC: ICD-10-CM

## 2024-11-11 DIAGNOSIS — I48.0 PAROXYSMAL ATRIAL FIBRILLATION: ICD-10-CM

## 2024-11-11 DIAGNOSIS — C34.12 MALIGNANT NEOPLASM OF UPPER LOBE OF LEFT LUNG: ICD-10-CM

## 2024-11-11 DIAGNOSIS — J96.22 ACUTE ON CHRONIC RESPIRATORY FAILURE WITH HYPOXIA AND HYPERCAPNIA: ICD-10-CM

## 2024-11-11 DIAGNOSIS — I35.9 AORTIC VALVE DISORDER: ICD-10-CM

## 2024-11-11 DIAGNOSIS — J44.9 CHRONIC OBSTRUCTIVE PULMONARY DISEASE, UNSPECIFIED COPD TYPE: Chronic | ICD-10-CM

## 2024-11-11 DIAGNOSIS — T82.857A STENOSIS OF PROSTHETIC AORTIC VALVE, INITIAL ENCOUNTER: Primary | ICD-10-CM

## 2024-11-11 DIAGNOSIS — J96.21 ACUTE ON CHRONIC RESPIRATORY FAILURE WITH HYPOXIA AND HYPERCAPNIA: ICD-10-CM

## 2024-11-11 DIAGNOSIS — E66.9 OBESITY (BMI 30-39.9): ICD-10-CM

## 2024-11-11 DIAGNOSIS — E03.9 ACQUIRED HYPOTHYROIDISM: Chronic | ICD-10-CM

## 2024-11-11 DIAGNOSIS — I10 ESSENTIAL HYPERTENSION: Chronic | ICD-10-CM

## 2024-11-11 LAB
ACT BLD: 128 SECONDS (ref 82–152)
ALBUMIN SERPL-MCNC: 3.7 G/DL (ref 3.5–5.2)
ALBUMIN/GLOB SERPL: 1.5 G/DL
ALP SERPL-CCNC: 74 U/L (ref 39–117)
ALT SERPL W P-5'-P-CCNC: 14 U/L (ref 1–33)
ANION GAP SERPL CALCULATED.3IONS-SCNC: 18 MMOL/L (ref 5–15)
ANION GAP SERPL CALCULATED.3IONS-SCNC: 9 MMOL/L (ref 5–15)
APTT PPP: 34.5 SECONDS (ref 22–39)
ARTERIAL PATENCY WRIST A: ABNORMAL
ARTERIAL PATENCY WRIST A: ABNORMAL
AST SERPL-CCNC: 32 U/L (ref 1–32)
ATMOSPHERIC PRESS: ABNORMAL MM[HG]
ATMOSPHERIC PRESS: ABNORMAL MM[HG]
B-OH-BUTYR SERPL-SCNC: 0.35 MMOL/L (ref 0.02–0.27)
BASE EXCESS BLDA CALC-SCNC: -11.7 MMOL/L (ref 0–2)
BASE EXCESS BLDA CALC-SCNC: -8.2 MMOL/L (ref 0–2)
BASOPHILS # BLD AUTO: 0.04 10*3/MM3 (ref 0–0.2)
BASOPHILS NFR BLD AUTO: 0.2 % (ref 0–1.5)
BDY SITE: ABNORMAL
BDY SITE: ABNORMAL
BILIRUB SERPL-MCNC: 0.5 MG/DL (ref 0–1.2)
BODY TEMPERATURE: 37
BODY TEMPERATURE: 37
BUN SERPL-MCNC: 10 MG/DL (ref 8–23)
BUN SERPL-MCNC: 18 MG/DL (ref 8–23)
BUN/CREAT SERPL: 16.2 (ref 7–25)
BUN/CREAT SERPL: 17.5 (ref 7–25)
CALCIUM SPEC-SCNC: 8.1 MG/DL (ref 8.6–10.5)
CALCIUM SPEC-SCNC: 8.7 MG/DL (ref 8.6–10.5)
CHLORIDE SERPL-SCNC: 103 MMOL/L (ref 98–107)
CHLORIDE SERPL-SCNC: 106 MMOL/L (ref 98–107)
CO2 BLDA-SCNC: 17.4 MMOL/L (ref 22–33)
CO2 BLDA-SCNC: 21.6 MMOL/L (ref 22–33)
CO2 SERPL-SCNC: 18 MMOL/L (ref 22–29)
CO2 SERPL-SCNC: 26 MMOL/L (ref 22–29)
COHGB MFR BLD: 1.4 % (ref 0–2)
COHGB MFR BLD: 1.9 % (ref 0–2)
CREAT SERPL-MCNC: 0.57 MG/DL (ref 0.57–1)
CREAT SERPL-MCNC: 1.11 MG/DL (ref 0.57–1)
D-LACTATE SERPL-SCNC: 6.1 MMOL/L (ref 0.5–2)
DEPRECATED RDW RBC AUTO: 46.5 FL (ref 37–54)
DEPRECATED RDW RBC AUTO: 48 FL (ref 37–54)
DEPRECATED RDW RBC AUTO: 54.7 FL (ref 37–54)
DEPRECATED RDW RBC AUTO: 56.4 FL (ref 37–54)
EGFRCR SERPLBLD CKD-EPI 2021: 102.9 ML/MIN/1.73
EGFRCR SERPLBLD CKD-EPI 2021: 56.3 ML/MIN/1.73
EOSINOPHIL # BLD AUTO: 0.01 10*3/MM3 (ref 0–0.4)
EOSINOPHIL NFR BLD AUTO: 0.1 % (ref 0.3–6.2)
EPAP: 0
EPAP: 6
ERYTHROCYTE [DISTWIDTH] IN BLOOD BY AUTOMATED COUNT: 11.8 % (ref 12.3–15.4)
ERYTHROCYTE [DISTWIDTH] IN BLOOD BY AUTOMATED COUNT: 11.9 % (ref 12.3–15.4)
ERYTHROCYTE [DISTWIDTH] IN BLOOD BY AUTOMATED COUNT: 13.7 % (ref 12.3–15.4)
ERYTHROCYTE [DISTWIDTH] IN BLOOD BY AUTOMATED COUNT: 14.1 % (ref 12.3–15.4)
GLOBULIN UR ELPH-MCNC: 2.4 GM/DL
GLUCOSE BLDC GLUCOMTR-MCNC: 135 MG/DL (ref 70–130)
GLUCOSE BLDC GLUCOMTR-MCNC: 449 MG/DL (ref 70–130)
GLUCOSE SERPL-MCNC: 150 MG/DL (ref 65–99)
GLUCOSE SERPL-MCNC: 449 MG/DL (ref 65–99)
HBA1C MFR BLD: 6.9 % (ref 4.8–5.6)
HCO3 BLDA-SCNC: 16.1 MMOL/L (ref 20–26)
HCO3 BLDA-SCNC: 20 MMOL/L (ref 20–26)
HCT VFR BLD AUTO: 33.6 % (ref 34–46.6)
HCT VFR BLD AUTO: 34.9 % (ref 34–46.6)
HCT VFR BLD AUTO: 36.9 % (ref 34–46.6)
HCT VFR BLD AUTO: 41.8 % (ref 34–46.6)
HCT VFR BLD CALC: 32.1 % (ref 38–51)
HCT VFR BLD CALC: 36.7 % (ref 38–51)
HGB BLD-MCNC: 11.6 G/DL (ref 12–15.9)
HGB BLD-MCNC: 11.7 G/DL (ref 12–15.9)
HGB BLD-MCNC: 13.1 G/DL (ref 12–15.9)
HGB BLD-MCNC: 14.3 G/DL (ref 12–15.9)
HGB BLDA-MCNC: 10.5 G/DL (ref 14–18)
HGB BLDA-MCNC: 12 G/DL (ref 14–18)
IMM GRANULOCYTES # BLD AUTO: 0.26 10*3/MM3 (ref 0–0.05)
IMM GRANULOCYTES NFR BLD AUTO: 1.5 % (ref 0–0.5)
INHALED O2 CONCENTRATION: 32 %
INHALED O2 CONCENTRATION: 32 %
IPAP: 0
IPAP: 14
LYMPHOCYTES # BLD AUTO: 0.54 10*3/MM3 (ref 0.7–3.1)
LYMPHOCYTES NFR BLD AUTO: 3.1 % (ref 19.6–45.3)
Lab: ABNORMAL
Lab: ABNORMAL
MCH RBC QN AUTO: 36.4 PG (ref 26.6–33)
MCH RBC QN AUTO: 37.4 PG (ref 26.6–33)
MCH RBC QN AUTO: 37.5 PG (ref 26.6–33)
MCH RBC QN AUTO: 38.3 PG (ref 26.6–33)
MCHC RBC AUTO-ENTMCNC: 33.5 G/DL (ref 31.5–35.7)
MCHC RBC AUTO-ENTMCNC: 34.2 G/DL (ref 31.5–35.7)
MCHC RBC AUTO-ENTMCNC: 34.5 G/DL (ref 31.5–35.7)
MCHC RBC AUTO-ENTMCNC: 35.5 G/DL (ref 31.5–35.7)
MCV RBC AUTO: 107.9 FL (ref 79–97)
MCV RBC AUTO: 108.4 FL (ref 79–97)
MCV RBC AUTO: 108.7 FL (ref 79–97)
MCV RBC AUTO: 109.7 FL (ref 79–97)
METHGB BLD QL: 0.3 % (ref 0–1.5)
METHGB BLD QL: ABNORMAL
MODALITY: ABNORMAL
MODALITY: ABNORMAL
MONOCYTES # BLD AUTO: 0.74 10*3/MM3 (ref 0.1–0.9)
MONOCYTES NFR BLD AUTO: 4.2 % (ref 5–12)
NEUTROPHILS NFR BLD AUTO: 15.88 10*3/MM3 (ref 1.7–7)
NEUTROPHILS NFR BLD AUTO: 90.9 % (ref 42.7–76)
NOTIFIED BY: ABNORMAL
NOTIFIED BY: ABNORMAL
NOTIFIED WHO: ABNORMAL
NOTIFIED WHO: ABNORMAL
NRBC BLD AUTO-RTO: 1.3 /100 WBC (ref 0–0.2)
OXYHGB MFR BLDV: 91.8 % (ref 94–99)
OXYHGB MFR BLDV: 97.1 % (ref 94–99)
PAW @ PEAK INSP FLOW SETTING VENT: 0 CMH2O
PAW @ PEAK INSP FLOW SETTING VENT: 0 CMH2O
PCO2 BLDA: 43.2 MM HG (ref 35–45)
PCO2 BLDA: 51.3 MM HG (ref 35–45)
PCO2 TEMP ADJ BLD: 43.2 MM HG (ref 35–45)
PCO2 TEMP ADJ BLD: 51.3 MM HG (ref 35–45)
PH BLDA: 7.18 PH UNITS (ref 7.35–7.45)
PH BLDA: 7.2 PH UNITS (ref 7.35–7.45)
PH, TEMP CORRECTED: 7.18 PH UNITS
PH, TEMP CORRECTED: 7.2 PH UNITS
PLATELET # BLD AUTO: 129 10*3/MM3 (ref 140–450)
PLATELET # BLD AUTO: 189 10*3/MM3 (ref 140–450)
PLATELET # BLD AUTO: 317 10*3/MM3 (ref 140–450)
PLATELET # BLD AUTO: 322 10*3/MM3 (ref 140–450)
PMV BLD AUTO: 10.3 FL (ref 6–12)
PMV BLD AUTO: 10.3 FL (ref 6–12)
PMV BLD AUTO: 10.7 FL (ref 6–12)
PMV BLD AUTO: 11.1 FL (ref 6–12)
PO2 BLDA: 121 MM HG (ref 83–108)
PO2 BLDA: 75.3 MM HG (ref 83–108)
PO2 TEMP ADJ BLD: 121 MM HG (ref 83–108)
PO2 TEMP ADJ BLD: 75.3 MM HG (ref 83–108)
POTASSIUM SERPL-SCNC: 3.9 MMOL/L (ref 3.5–5.2)
POTASSIUM SERPL-SCNC: 4.4 MMOL/L (ref 3.5–5.2)
PROT SERPL-MCNC: 6.1 G/DL (ref 6–8.5)
RBC # BLD AUTO: 3.1 10*6/MM3 (ref 3.77–5.28)
RBC # BLD AUTO: 3.21 10*6/MM3 (ref 3.77–5.28)
RBC # BLD AUTO: 3.42 10*6/MM3 (ref 3.77–5.28)
RBC # BLD AUTO: 3.81 10*6/MM3 (ref 3.77–5.28)
SODIUM SERPL-SCNC: 139 MMOL/L (ref 136–145)
SODIUM SERPL-SCNC: 141 MMOL/L (ref 136–145)
TOTAL RATE: 0 BREATHS/MINUTE
TOTAL RATE: 14 BREATHS/MINUTE
WBC NRBC COR # BLD AUTO: 17.47 10*3/MM3 (ref 3.4–10.8)
WBC NRBC COR # BLD AUTO: 17.98 10*3/MM3 (ref 3.4–10.8)
WBC NRBC COR # BLD AUTO: 21.71 10*3/MM3 (ref 3.4–10.8)
WBC NRBC COR # BLD AUTO: 4.47 10*3/MM3 (ref 3.4–10.8)

## 2024-11-11 PROCEDURE — P9037 PLATE PHERES LEUKOREDU IRRAD: HCPCS

## 2024-11-11 PROCEDURE — 63710000001 INSULIN GLARGINE PER 5 UNITS: Performed by: NURSE PRACTITIONER

## 2024-11-11 PROCEDURE — C1760 CLOSURE DEV, VASC: HCPCS | Performed by: THORACIC SURGERY (CARDIOTHORACIC VASCULAR SURGERY)

## 2024-11-11 PROCEDURE — 25510000001 IOPAMIDOL PER 1 ML: Performed by: THORACIC SURGERY (CARDIOTHORACIC VASCULAR SURGERY)

## 2024-11-11 PROCEDURE — 71045 X-RAY EXAM CHEST 1 VIEW: CPT

## 2024-11-11 PROCEDURE — P9041 ALBUMIN (HUMAN),5%, 50ML: HCPCS | Performed by: NURSE PRACTITIONER

## 2024-11-11 PROCEDURE — 93355 ECHO TRANSESOPHAGEAL (TEE): CPT

## 2024-11-11 PROCEDURE — 25010000002 VANCOMYCIN 1 G RECONSTITUTED SOLUTION 1 EACH VIAL: Performed by: THORACIC SURGERY (CARDIOTHORACIC VASCULAR SURGERY)

## 2024-11-11 PROCEDURE — 25010000002 PROTAMINE SULFATE PER 10 MG: Performed by: NURSE ANESTHETIST, CERTIFIED REGISTERED

## 2024-11-11 PROCEDURE — 63710000001 INSULIN LISPRO (HUMAN) PER 5 UNITS: Performed by: NURSE PRACTITIONER

## 2024-11-11 PROCEDURE — 25010000002 NICARDIPINE 2.5 MG/ML SOLUTION: Performed by: NURSE ANESTHETIST, CERTIFIED REGISTERED

## 2024-11-11 PROCEDURE — 86900 BLOOD TYPING SEROLOGIC ABO: CPT

## 2024-11-11 PROCEDURE — 94660 CPAP INITIATION&MGMT: CPT

## 2024-11-11 PROCEDURE — B246ZZ4 ULTRASONOGRAPHY OF RIGHT AND LEFT HEART, TRANSESOPHAGEAL: ICD-10-PCS | Performed by: THORACIC SURGERY (CARDIOTHORACIC VASCULAR SURGERY)

## 2024-11-11 PROCEDURE — 83050 HGB METHEMOGLOBIN QUAN: CPT

## 2024-11-11 PROCEDURE — 33361 REPLACE AORTIC VALVE PERQ: CPT | Performed by: INTERNAL MEDICINE

## 2024-11-11 PROCEDURE — C1769 GUIDE WIRE: HCPCS | Performed by: THORACIC SURGERY (CARDIOTHORACIC VASCULAR SURGERY)

## 2024-11-11 PROCEDURE — 36430 TRANSFUSION BLD/BLD COMPNT: CPT

## 2024-11-11 PROCEDURE — 25810000003 SODIUM CHLORIDE 0.9 % SOLUTION: Performed by: NURSE ANESTHETIST, CERTIFIED REGISTERED

## 2024-11-11 PROCEDURE — 93010 ELECTROCARDIOGRAM REPORT: CPT | Performed by: INTERNAL MEDICINE

## 2024-11-11 PROCEDURE — 63710000001 ONDANSETRON ODT 4 MG TABLET DISPERSIBLE: Performed by: PHYSICIAN ASSISTANT

## 2024-11-11 PROCEDURE — 74174 CTA ABD&PLVS W/CONTRAST: CPT

## 2024-11-11 PROCEDURE — 82948 REAGENT STRIP/BLOOD GLUCOSE: CPT

## 2024-11-11 PROCEDURE — 25010000002 LIDOCAINE PF 1% 1 % SOLUTION: Performed by: ANESTHESIOLOGY

## 2024-11-11 PROCEDURE — C1725 CATH, TRANSLUMIN NON-LASER: HCPCS | Performed by: THORACIC SURGERY (CARDIOTHORACIC VASCULAR SURGERY)

## 2024-11-11 PROCEDURE — 25010000002 ESMOLOL 100 MG/10ML SOLUTION: Performed by: NURSE ANESTHETIST, CERTIFIED REGISTERED

## 2024-11-11 PROCEDURE — 82805 BLOOD GASES W/O2 SATURATION: CPT

## 2024-11-11 PROCEDURE — 85347 COAGULATION TIME ACTIVATED: CPT

## 2024-11-11 PROCEDURE — 25810000003 SODIUM CHLORIDE PER 500 ML: Performed by: THORACIC SURGERY (CARDIOTHORACIC VASCULAR SURGERY)

## 2024-11-11 PROCEDURE — 25010000002 HYDROMORPHONE PER 4 MG: Performed by: THORACIC SURGERY (CARDIOTHORACIC VASCULAR SURGERY)

## 2024-11-11 PROCEDURE — 25010000002 THIAMINE HCL 200 MG/2ML SOLUTION 2 ML VIAL: Performed by: NURSE PRACTITIONER

## 2024-11-11 PROCEDURE — C1894 INTRO/SHEATH, NON-LASER: HCPCS | Performed by: THORACIC SURGERY (CARDIOTHORACIC VASCULAR SURGERY)

## 2024-11-11 PROCEDURE — 25810000003 SODIUM CHLORIDE 0.9 % SOLUTION 250 ML FLEX CONT: Performed by: NURSE ANESTHETIST, CERTIFIED REGISTERED

## 2024-11-11 PROCEDURE — 94640 AIRWAY INHALATION TREATMENT: CPT

## 2024-11-11 PROCEDURE — 83605 ASSAY OF LACTIC ACID: CPT | Performed by: NURSE PRACTITIONER

## 2024-11-11 PROCEDURE — 99232 SBSQ HOSP IP/OBS MODERATE 35: CPT | Performed by: INTERNAL MEDICINE

## 2024-11-11 PROCEDURE — 25010000002 EPINEPHRINE 1 MG/ML SOLUTION: Performed by: PHYSICIAN ASSISTANT

## 2024-11-11 PROCEDURE — 82330 ASSAY OF CALCIUM: CPT

## 2024-11-11 PROCEDURE — 25810000003 LACTATED RINGERS PER 1000 ML: Performed by: ANESTHESIOLOGY

## 2024-11-11 PROCEDURE — 85014 HEMATOCRIT: CPT

## 2024-11-11 PROCEDURE — 25010000002 PHENYLEPHRINE 10 MG/ML SOLUTION 5 ML VIAL: Performed by: PHYSICIAN ASSISTANT

## 2024-11-11 PROCEDURE — 94799 UNLISTED PULMONARY SVC/PX: CPT

## 2024-11-11 PROCEDURE — P9035 PLATELET PHERES LEUKOREDUCED: HCPCS

## 2024-11-11 PROCEDURE — P9100 PATHOGEN TEST FOR PLATELETS: HCPCS

## 2024-11-11 PROCEDURE — 82803 BLOOD GASES ANY COMBINATION: CPT

## 2024-11-11 PROCEDURE — 93005 ELECTROCARDIOGRAM TRACING: CPT | Performed by: PHYSICIAN ASSISTANT

## 2024-11-11 PROCEDURE — 33361 REPLACE AORTIC VALVE PERQ: CPT | Performed by: THORACIC SURGERY (CARDIOTHORACIC VASCULAR SURGERY)

## 2024-11-11 PROCEDURE — C1889 IMPLANT/INSERT DEVICE, NOC: HCPCS | Performed by: THORACIC SURGERY (CARDIOTHORACIC VASCULAR SURGERY)

## 2024-11-11 PROCEDURE — 82010 KETONE BODYS QUAN: CPT | Performed by: NURSE PRACTITIONER

## 2024-11-11 PROCEDURE — 83036 HEMOGLOBIN GLYCOSYLATED A1C: CPT | Performed by: NURSE PRACTITIONER

## 2024-11-11 PROCEDURE — 93355 ECHO TRANSESOPHAGEAL (TEE): CPT | Performed by: INTERNAL MEDICINE

## 2024-11-11 PROCEDURE — 80053 COMPREHEN METABOLIC PANEL: CPT | Performed by: PHYSICIAN ASSISTANT

## 2024-11-11 PROCEDURE — 25010000002 HEPARIN (PORCINE) PER 1000 UNITS: Performed by: THORACIC SURGERY (CARDIOTHORACIC VASCULAR SURGERY)

## 2024-11-11 PROCEDURE — 25810000003 LACTATED RINGERS SOLUTION: Performed by: NURSE PRACTITIONER

## 2024-11-11 PROCEDURE — 25010000002 DEXAMETHASONE PER 1 MG: Performed by: THORACIC SURGERY (CARDIOTHORACIC VASCULAR SURGERY)

## 2024-11-11 PROCEDURE — 84295 ASSAY OF SERUM SODIUM: CPT

## 2024-11-11 PROCEDURE — P9016 RBC LEUKOCYTES REDUCED: HCPCS

## 2024-11-11 PROCEDURE — 02RF38Z REPLACEMENT OF AORTIC VALVE WITH ZOOPLASTIC TISSUE, PERCUTANEOUS APPROACH: ICD-10-PCS | Performed by: THORACIC SURGERY (CARDIOTHORACIC VASCULAR SURGERY)

## 2024-11-11 PROCEDURE — 25010000002 PHENYLEPHRINE 10 MG/ML SOLUTION 1 ML VIAL: Performed by: NURSE ANESTHETIST, CERTIFIED REGISTERED

## 2024-11-11 PROCEDURE — 25010000002 ALBUMIN HUMAN 5% PER 50 ML: Performed by: NURSE PRACTITIONER

## 2024-11-11 PROCEDURE — 25810000003 SODIUM CHLORIDE 0.9 % SOLUTION: Performed by: PHYSICIAN ASSISTANT

## 2024-11-11 PROCEDURE — 25010000002 HEPARIN (PORCINE) PER 1000 UNITS: Performed by: NURSE ANESTHETIST, CERTIFIED REGISTERED

## 2024-11-11 PROCEDURE — 25810000003 LACTATED RINGERS PER 1000 ML: Performed by: NURSE PRACTITIONER

## 2024-11-11 PROCEDURE — 82375 ASSAY CARBOXYHB QUANT: CPT

## 2024-11-11 PROCEDURE — 85027 COMPLETE CBC AUTOMATED: CPT | Performed by: PHYSICIAN ASSISTANT

## 2024-11-11 PROCEDURE — 25810000003 SODIUM CHLORIDE 0.9 % SOLUTION 250 ML FLEX CONT: Performed by: PHYSICIAN ASSISTANT

## 2024-11-11 PROCEDURE — C1887 CATHETER, GUIDING: HCPCS | Performed by: THORACIC SURGERY (CARDIOTHORACIC VASCULAR SURGERY)

## 2024-11-11 PROCEDURE — 25810000003 SODIUM CHLORIDE 0.9 % SOLUTION 250 ML FLEX CONT: Performed by: THORACIC SURGERY (CARDIOTHORACIC VASCULAR SURGERY)

## 2024-11-11 PROCEDURE — 85027 COMPLETE CBC AUTOMATED: CPT | Performed by: THORACIC SURGERY (CARDIOTHORACIC VASCULAR SURGERY)

## 2024-11-11 PROCEDURE — 25010000002 SUGAMMADEX 200 MG/2ML SOLUTION: Performed by: NURSE ANESTHETIST, CERTIFIED REGISTERED

## 2024-11-11 PROCEDURE — 85025 COMPLETE CBC W/AUTO DIFF WBC: CPT | Performed by: THORACIC SURGERY (CARDIOTHORACIC VASCULAR SURGERY)

## 2024-11-11 PROCEDURE — 82947 ASSAY GLUCOSE BLOOD QUANT: CPT

## 2024-11-11 PROCEDURE — 85730 THROMBOPLASTIN TIME PARTIAL: CPT | Performed by: PHYSICIAN ASSISTANT

## 2024-11-11 PROCEDURE — 84132 ASSAY OF SERUM POTASSIUM: CPT

## 2024-11-11 PROCEDURE — 25010000002 ONDANSETRON PER 1 MG: Performed by: NURSE ANESTHETIST, CERTIFIED REGISTERED

## 2024-11-11 DEVICE — VLV HEART TRNSCATH SAPIEN/COMMANDER 23MM: Type: IMPLANTABLE DEVICE | Site: HEART | Status: FUNCTIONAL

## 2024-11-11 RX ORDER — NITROGLYCERIN 0.4 MG/1
0.4 TABLET SUBLINGUAL
Status: DISCONTINUED | OUTPATIENT
Start: 2024-11-11 | End: 2024-11-20 | Stop reason: HOSPADM

## 2024-11-11 RX ORDER — PROTAMINE SULFATE 10 MG/ML
INJECTION, SOLUTION INTRAVENOUS AS NEEDED
Status: DISCONTINUED | OUTPATIENT
Start: 2024-11-11 | End: 2024-11-11 | Stop reason: SURG

## 2024-11-11 RX ORDER — ESMOLOL HYDROCHLORIDE 10 MG/ML
INJECTION INTRAVENOUS AS NEEDED
Status: DISCONTINUED | OUTPATIENT
Start: 2024-11-11 | End: 2024-11-11 | Stop reason: SURG

## 2024-11-11 RX ORDER — PROPAFENONE HYDROCHLORIDE 225 MG/1
225 CAPSULE, EXTENDED RELEASE ORAL EVERY 12 HOURS SCHEDULED
Status: DISCONTINUED | OUTPATIENT
Start: 2024-11-11 | End: 2024-11-12

## 2024-11-11 RX ORDER — ONDANSETRON 2 MG/ML
4 INJECTION INTRAMUSCULAR; INTRAVENOUS EVERY 6 HOURS PRN
Status: DISCONTINUED | OUTPATIENT
Start: 2024-11-11 | End: 2024-11-20 | Stop reason: HOSPADM

## 2024-11-11 RX ORDER — SODIUM CHLORIDE 9 MG/ML
100 INJECTION, SOLUTION INTRAVENOUS CONTINUOUS
Status: DISCONTINUED | OUTPATIENT
Start: 2024-11-11 | End: 2024-11-11

## 2024-11-11 RX ORDER — HYDRALAZINE HYDROCHLORIDE 20 MG/ML
10 INJECTION INTRAMUSCULAR; INTRAVENOUS EVERY 6 HOURS PRN
Status: DISCONTINUED | OUTPATIENT
Start: 2024-11-11 | End: 2024-11-20 | Stop reason: HOSPADM

## 2024-11-11 RX ORDER — CHLORHEXIDINE GLUCONATE ORAL RINSE 1.2 MG/ML
15 SOLUTION DENTAL ONCE
Status: COMPLETED | OUTPATIENT
Start: 2024-11-11 | End: 2024-11-11

## 2024-11-11 RX ORDER — HYDROMORPHONE HYDROCHLORIDE 1 MG/ML
0.5 INJECTION, SOLUTION INTRAMUSCULAR; INTRAVENOUS; SUBCUTANEOUS
Status: DISCONTINUED | OUTPATIENT
Start: 2024-11-11 | End: 2024-11-12

## 2024-11-11 RX ORDER — IBUPROFEN 600 MG/1
1 TABLET ORAL
Status: DISCONTINUED | OUTPATIENT
Start: 2024-11-11 | End: 2024-11-20 | Stop reason: HOSPADM

## 2024-11-11 RX ORDER — HYDROCODONE BITARTRATE AND ACETAMINOPHEN 7.5; 325 MG/1; MG/1
1 TABLET ORAL EVERY 6 HOURS PRN
Status: DISCONTINUED | OUTPATIENT
Start: 2024-11-11 | End: 2024-11-12

## 2024-11-11 RX ORDER — HYDROMORPHONE HYDROCHLORIDE 1 MG/ML
0.5 INJECTION, SOLUTION INTRAMUSCULAR; INTRAVENOUS; SUBCUTANEOUS
Status: DISCONTINUED | OUTPATIENT
Start: 2024-11-11 | End: 2024-11-11

## 2024-11-11 RX ORDER — POTASSIUM CHLORIDE 750 MG/1
10 CAPSULE, EXTENDED RELEASE ORAL 2 TIMES DAILY
Status: DISCONTINUED | OUTPATIENT
Start: 2024-11-11 | End: 2024-11-20 | Stop reason: HOSPADM

## 2024-11-11 RX ORDER — LIDOCAINE HYDROCHLORIDE 10 MG/ML
0.5 INJECTION, SOLUTION EPIDURAL; INFILTRATION; INTRACAUDAL; PERINEURAL ONCE AS NEEDED
Status: COMPLETED | OUTPATIENT
Start: 2024-11-11 | End: 2024-11-11

## 2024-11-11 RX ORDER — HEPARIN SODIUM 1000 [USP'U]/ML
INJECTION, SOLUTION INTRAVENOUS; SUBCUTANEOUS AS NEEDED
Status: DISCONTINUED | OUTPATIENT
Start: 2024-11-11 | End: 2024-11-11 | Stop reason: SURG

## 2024-11-11 RX ORDER — IOPAMIDOL 755 MG/ML
INJECTION, SOLUTION INTRAVASCULAR AS NEEDED
Status: DISCONTINUED | OUTPATIENT
Start: 2024-11-11 | End: 2024-11-11 | Stop reason: HOSPADM

## 2024-11-11 RX ORDER — LABETALOL HYDROCHLORIDE 5 MG/ML
10 INJECTION, SOLUTION INTRAVENOUS
Status: DISCONTINUED | OUTPATIENT
Start: 2024-11-11 | End: 2024-11-16

## 2024-11-11 RX ORDER — LEVOTHYROXINE SODIUM 100 UG/1
200 TABLET ORAL
Status: DISCONTINUED | OUTPATIENT
Start: 2024-11-12 | End: 2024-11-20 | Stop reason: HOSPADM

## 2024-11-11 RX ORDER — SODIUM CHLORIDE 9 MG/ML
250 INJECTION, SOLUTION INTRAVENOUS ONCE AS NEEDED
Status: ACTIVE | OUTPATIENT
Start: 2024-11-11 | End: 2024-11-12

## 2024-11-11 RX ORDER — PRAVASTATIN SODIUM 20 MG
20 TABLET ORAL NIGHTLY
Status: DISCONTINUED | OUTPATIENT
Start: 2024-11-11 | End: 2024-11-12

## 2024-11-11 RX ORDER — INSULIN LISPRO 100 [IU]/ML
2-9 INJECTION, SOLUTION INTRAVENOUS; SUBCUTANEOUS
Status: DISCONTINUED | OUTPATIENT
Start: 2024-11-11 | End: 2024-11-12

## 2024-11-11 RX ORDER — SODIUM CHLORIDE 9 MG/ML
INJECTION, SOLUTION INTRAVENOUS AS NEEDED
Status: DISCONTINUED | OUTPATIENT
Start: 2024-11-11 | End: 2024-11-11 | Stop reason: HOSPADM

## 2024-11-11 RX ORDER — ONDANSETRON 4 MG/1
4 TABLET, ORALLY DISINTEGRATING ORAL EVERY 6 HOURS PRN
Status: DISCONTINUED | OUTPATIENT
Start: 2024-11-11 | End: 2024-11-20 | Stop reason: HOSPADM

## 2024-11-11 RX ORDER — METOPROLOL TARTRATE 25 MG/1
25 TABLET, FILM COATED ORAL EVERY 12 HOURS
Status: DISCONTINUED | OUTPATIENT
Start: 2024-11-11 | End: 2024-11-12

## 2024-11-11 RX ORDER — ASPIRIN 81 MG/1
81 TABLET ORAL DAILY
Status: DISCONTINUED | OUTPATIENT
Start: 2024-11-12 | End: 2024-11-20 | Stop reason: HOSPADM

## 2024-11-11 RX ORDER — ONDANSETRON 2 MG/ML
INJECTION INTRAMUSCULAR; INTRAVENOUS AS NEEDED
Status: DISCONTINUED | OUTPATIENT
Start: 2024-11-11 | End: 2024-11-11 | Stop reason: SURG

## 2024-11-11 RX ORDER — CHLORHEXIDINE GLUCONATE 500 MG/1
CLOTH TOPICAL EVERY 12 HOURS PRN
Status: DISCONTINUED | OUTPATIENT
Start: 2024-11-11 | End: 2024-11-11 | Stop reason: HOSPADM

## 2024-11-11 RX ORDER — NICARDIPINE HYDROCHLORIDE 2.5 MG/ML
INJECTION INTRAVENOUS CONTINUOUS PRN
Status: DISCONTINUED | OUTPATIENT
Start: 2024-11-11 | End: 2024-11-11 | Stop reason: SURG

## 2024-11-11 RX ORDER — ROCURONIUM BROMIDE 10 MG/ML
INJECTION, SOLUTION INTRAVENOUS AS NEEDED
Status: DISCONTINUED | OUTPATIENT
Start: 2024-11-11 | End: 2024-11-11 | Stop reason: SURG

## 2024-11-11 RX ORDER — FAMOTIDINE 20 MG/1
20 TABLET, FILM COATED ORAL ONCE
Status: COMPLETED | OUTPATIENT
Start: 2024-11-11 | End: 2024-11-11

## 2024-11-11 RX ORDER — NICOTINE POLACRILEX 4 MG
15 LOZENGE BUCCAL
Status: DISCONTINUED | OUTPATIENT
Start: 2024-11-11 | End: 2024-11-20 | Stop reason: HOSPADM

## 2024-11-11 RX ORDER — MIDAZOLAM HYDROCHLORIDE 1 MG/ML
1 INJECTION, SOLUTION INTRAMUSCULAR; INTRAVENOUS
Status: DISCONTINUED | OUTPATIENT
Start: 2024-11-11 | End: 2024-11-11 | Stop reason: HOSPADM

## 2024-11-11 RX ORDER — SODIUM CHLORIDE 0.9 % (FLUSH) 0.9 %
10 SYRINGE (ML) INJECTION EVERY 12 HOURS SCHEDULED
Status: DISCONTINUED | OUTPATIENT
Start: 2024-11-11 | End: 2024-11-11 | Stop reason: HOSPADM

## 2024-11-11 RX ORDER — ASPIRIN 81 MG/1
81 TABLET ORAL DAILY
Status: DISCONTINUED | OUTPATIENT
Start: 2024-11-12 | End: 2024-11-11 | Stop reason: SDUPTHER

## 2024-11-11 RX ORDER — SODIUM CHLORIDE, SODIUM LACTATE, POTASSIUM CHLORIDE, CALCIUM CHLORIDE 600; 310; 30; 20 MG/100ML; MG/100ML; MG/100ML; MG/100ML
9 INJECTION, SOLUTION INTRAVENOUS CONTINUOUS
Status: ACTIVE | OUTPATIENT
Start: 2024-11-12 | End: 2024-11-12

## 2024-11-11 RX ORDER — ETOMIDATE 2 MG/ML
INJECTION INTRAVENOUS AS NEEDED
Status: DISCONTINUED | OUTPATIENT
Start: 2024-11-11 | End: 2024-11-11 | Stop reason: SURG

## 2024-11-11 RX ORDER — POTASSIUM CHLORIDE 1.5 G/1.58G
20 POWDER, FOR SOLUTION ORAL ONCE
Status: DISCONTINUED | OUTPATIENT
Start: 2024-11-11 | End: 2024-11-16 | Stop reason: ALTCHOICE

## 2024-11-11 RX ORDER — DEXTROSE MONOHYDRATE 25 G/50ML
25 INJECTION, SOLUTION INTRAVENOUS
Status: DISCONTINUED | OUTPATIENT
Start: 2024-11-11 | End: 2024-11-20 | Stop reason: HOSPADM

## 2024-11-11 RX ORDER — POLYETHYLENE GLYCOL 3350 17 G/17G
17 POWDER, FOR SOLUTION ORAL DAILY
Status: DISCONTINUED | OUTPATIENT
Start: 2024-11-11 | End: 2024-11-20 | Stop reason: HOSPADM

## 2024-11-11 RX ORDER — EPHEDRINE SULFATE 50 MG/ML
INJECTION INTRAVENOUS AS NEEDED
Status: DISCONTINUED | OUTPATIENT
Start: 2024-11-11 | End: 2024-11-11 | Stop reason: SURG

## 2024-11-11 RX ORDER — SODIUM CHLORIDE, SODIUM LACTATE, POTASSIUM CHLORIDE, CALCIUM CHLORIDE 600; 310; 30; 20 MG/100ML; MG/100ML; MG/100ML; MG/100ML
100 INJECTION, SOLUTION INTRAVENOUS CONTINUOUS
Status: DISCONTINUED | OUTPATIENT
Start: 2024-11-12 | End: 2024-11-12

## 2024-11-11 RX ORDER — ALBUTEROL SULFATE 90 UG/1
2 INHALANT RESPIRATORY (INHALATION) EVERY 4 HOURS PRN
Status: DISCONTINUED | OUTPATIENT
Start: 2024-11-11 | End: 2024-11-20 | Stop reason: HOSPADM

## 2024-11-11 RX ORDER — SODIUM CHLORIDE 9 MG/ML
INJECTION, SOLUTION INTRAVENOUS CONTINUOUS PRN
Status: DISCONTINUED | OUTPATIENT
Start: 2024-11-11 | End: 2024-11-11 | Stop reason: SURG

## 2024-11-11 RX ORDER — SODIUM CHLORIDE, SODIUM LACTATE, POTASSIUM CHLORIDE, CALCIUM CHLORIDE 600; 310; 30; 20 MG/100ML; MG/100ML; MG/100ML; MG/100ML
50 INJECTION, SOLUTION INTRAVENOUS CONTINUOUS
Status: DISCONTINUED | OUTPATIENT
Start: 2024-11-11 | End: 2024-11-11

## 2024-11-11 RX ORDER — DEXAMETHASONE SODIUM PHOSPHATE 10 MG/ML
10 INJECTION INTRAMUSCULAR; INTRAVENOUS ONCE
Status: COMPLETED | OUTPATIENT
Start: 2024-11-11 | End: 2024-11-11

## 2024-11-11 RX ORDER — IOPAMIDOL 755 MG/ML
85 INJECTION, SOLUTION INTRAVASCULAR
Status: COMPLETED | OUTPATIENT
Start: 2024-11-11 | End: 2024-11-11

## 2024-11-11 RX ORDER — MAGNESIUM HYDROXIDE 1200 MG/15ML
LIQUID ORAL AS NEEDED
Status: DISCONTINUED | OUTPATIENT
Start: 2024-11-11 | End: 2024-11-11 | Stop reason: HOSPADM

## 2024-11-11 RX ORDER — FUROSEMIDE 40 MG/1
40 TABLET ORAL 2 TIMES DAILY
Status: DISCONTINUED | OUTPATIENT
Start: 2024-11-11 | End: 2024-11-12

## 2024-11-11 RX ORDER — DOCUSATE SODIUM 100 MG/1
100 CAPSULE, LIQUID FILLED ORAL 2 TIMES DAILY
Status: DISCONTINUED | OUTPATIENT
Start: 2024-11-11 | End: 2024-11-20 | Stop reason: HOSPADM

## 2024-11-11 RX ORDER — ALBUMIN, HUMAN INJ 5% 5 %
500 SOLUTION INTRAVENOUS ONCE
Status: COMPLETED | OUTPATIENT
Start: 2024-11-11 | End: 2024-11-11

## 2024-11-11 RX ORDER — NYSTATIN 100000 [USP'U]/G
1 POWDER TOPICAL 2 TIMES DAILY
Status: DISCONTINUED | OUTPATIENT
Start: 2024-11-11 | End: 2024-11-20 | Stop reason: HOSPADM

## 2024-11-11 RX ORDER — AMOXICILLIN 250 MG
2 CAPSULE ORAL NIGHTLY
Status: DISCONTINUED | OUTPATIENT
Start: 2024-11-11 | End: 2024-11-20 | Stop reason: HOSPADM

## 2024-11-11 RX ORDER — SODIUM CHLORIDE 0.9 % (FLUSH) 0.9 %
10 SYRINGE (ML) INJECTION AS NEEDED
Status: DISCONTINUED | OUTPATIENT
Start: 2024-11-11 | End: 2024-11-11 | Stop reason: HOSPADM

## 2024-11-11 RX ORDER — NITROGLYCERIN 0.4 MG/1
0.4 TABLET SUBLINGUAL
Status: DISCONTINUED | OUTPATIENT
Start: 2024-11-11 | End: 2024-11-11 | Stop reason: HOSPADM

## 2024-11-11 RX ADMIN — HYDROMORPHONE HYDROCHLORIDE 0.5 MG: 1 INJECTION, SOLUTION INTRAMUSCULAR; INTRAVENOUS; SUBCUTANEOUS at 14:44

## 2024-11-11 RX ADMIN — INSULIN LISPRO 9 UNITS: 100 INJECTION, SOLUTION INTRAVENOUS; SUBCUTANEOUS at 21:44

## 2024-11-11 RX ADMIN — NICARDIPINE HYDROCHLORIDE 5 MG/HR: 25 INJECTION INTRAVENOUS at 10:50

## 2024-11-11 RX ADMIN — FAMOTIDINE 20 MG: 20 TABLET, FILM COATED ORAL at 09:12

## 2024-11-11 RX ADMIN — SODIUM CHLORIDE 1000 MG: 9 INJECTION, SOLUTION INTRAVENOUS at 09:11

## 2024-11-11 RX ADMIN — PHENYLEPHRINE HYDROCHLORIDE 1.8 MCG/KG/MIN: 10 INJECTION INTRAVENOUS at 18:46

## 2024-11-11 RX ADMIN — INSULIN GLARGINE 10 UNITS: 100 INJECTION, SOLUTION SUBCUTANEOUS at 21:44

## 2024-11-11 RX ADMIN — EPHEDRINE SULFATE 7.5 MG: 50 INJECTION INTRAVENOUS at 10:44

## 2024-11-11 RX ADMIN — SODIUM CHLORIDE, SODIUM LACTATE, POTASSIUM CHLORIDE, CALCIUM CHLORIDE 500 ML: 20; 30; 600; 310 INJECTION, SOLUTION INTRAVENOUS at 21:35

## 2024-11-11 RX ADMIN — MUPIROCIN 1 APPLICATION: 20 OINTMENT TOPICAL at 22:04

## 2024-11-11 RX ADMIN — PROPAFENONE HYDROCHLORIDE 225 MG: 225 CAPSULE, EXTENDED RELEASE ORAL at 21:06

## 2024-11-11 RX ADMIN — POTASSIUM CHLORIDE 10 MEQ: 750 CAPSULE, EXTENDED RELEASE ORAL at 21:06

## 2024-11-11 RX ADMIN — ROCURONIUM BROMIDE 50 MG: 10 INJECTION INTRAVENOUS at 10:13

## 2024-11-11 RX ADMIN — DEXAMETHASONE SODIUM PHOSPHATE 10 MG: 10 INJECTION INTRAMUSCULAR; INTRAVENOUS at 12:09

## 2024-11-11 RX ADMIN — SODIUM CHLORIDE, SODIUM LACTATE, POTASSIUM CHLORIDE, CALCIUM CHLORIDE 50 ML/HR: 20; 30; 600; 310 INJECTION, SOLUTION INTRAVENOUS at 21:54

## 2024-11-11 RX ADMIN — ALBUMIN (HUMAN) 500 ML: 12.5 INJECTION, SOLUTION INTRAVENOUS at 21:52

## 2024-11-11 RX ADMIN — THIAMINE HYDROCHLORIDE 500 MG: 100 INJECTION, SOLUTION INTRAMUSCULAR; INTRAVENOUS at 22:04

## 2024-11-11 RX ADMIN — SENNOSIDES AND DOCUSATE SODIUM 2 TABLET: 50; 8.6 TABLET ORAL at 21:05

## 2024-11-11 RX ADMIN — SODIUM CHLORIDE, POTASSIUM CHLORIDE, SODIUM LACTATE AND CALCIUM CHLORIDE 9 ML/HR: 600; 310; 30; 20 INJECTION, SOLUTION INTRAVENOUS at 09:00

## 2024-11-11 RX ADMIN — NYSTATIN 1 APPLICATION: 100000 POWDER TOPICAL at 21:06

## 2024-11-11 RX ADMIN — IOPAMIDOL 85 ML: 755 INJECTION, SOLUTION INTRAVENOUS at 14:29

## 2024-11-11 RX ADMIN — DOCUSATE SODIUM 100 MG: 100 CAPSULE, LIQUID FILLED ORAL at 21:06

## 2024-11-11 RX ADMIN — HYDROMORPHONE HYDROCHLORIDE 0.5 MG: 1 INJECTION, SOLUTION INTRAMUSCULAR; INTRAVENOUS; SUBCUTANEOUS at 15:30

## 2024-11-11 RX ADMIN — PHENYLEPHRINE HYDROCHLORIDE 0.5 MCG/KG/MIN: 10 INJECTION INTRAVENOUS at 10:31

## 2024-11-11 RX ADMIN — PHENYLEPHRINE HYDROCHLORIDE 0.5 MCG/KG/MIN: 10 INJECTION INTRAVENOUS at 12:20

## 2024-11-11 RX ADMIN — ONDANSETRON 4 MG: 4 TABLET, ORALLY DISINTEGRATING ORAL at 14:50

## 2024-11-11 RX ADMIN — ESMOLOL HYDROCHLORIDE 50 MG: 10 INJECTION, SOLUTION INTRAVENOUS at 10:13

## 2024-11-11 RX ADMIN — RACEPINEPHRINE HYDROCHLORIDE 0.5 ML: 11.25 SOLUTION RESPIRATORY (INHALATION) at 12:03

## 2024-11-11 RX ADMIN — PRAVASTATIN SODIUM 20 MG: 20 TABLET ORAL at 21:06

## 2024-11-11 RX ADMIN — PROTAMINE SULFATE 120 MG: 10 INJECTION, SOLUTION INTRAVENOUS at 11:03

## 2024-11-11 RX ADMIN — SUGAMMADEX 200 MG: 100 INJECTION, SOLUTION INTRAVENOUS at 11:03

## 2024-11-11 RX ADMIN — FUROSEMIDE 40 MG: 40 TABLET ORAL at 21:06

## 2024-11-11 RX ADMIN — ONDANSETRON 4 MG: 2 INJECTION INTRAMUSCULAR; INTRAVENOUS at 10:55

## 2024-11-11 RX ADMIN — LIDOCAINE HYDROCHLORIDE 0.5 ML: 10 INJECTION, SOLUTION EPIDURAL; INFILTRATION; INTRACAUDAL; PERINEURAL at 09:00

## 2024-11-11 RX ADMIN — ETOMIDATE 14 MG: 2 INJECTION INTRAVENOUS at 10:13

## 2024-11-11 RX ADMIN — CHLORHEXIDINE GLUCONATE 15 ML: 1.2 SOLUTION ORAL at 09:11

## 2024-11-11 RX ADMIN — SODIUM CHLORIDE: 9 INJECTION, SOLUTION INTRAVENOUS at 10:32

## 2024-11-11 RX ADMIN — MUPIROCIN 1 APPLICATION: 20 OINTMENT TOPICAL at 09:12

## 2024-11-11 RX ADMIN — EPINEPHRINE 0.02 MCG/KG/MIN: 1 INJECTION INTRAMUSCULAR; INTRAVENOUS; SUBCUTANEOUS at 14:30

## 2024-11-11 RX ADMIN — HEPARIN SODIUM 12000 UNITS: 1000 INJECTION INTRAVENOUS; SUBCUTANEOUS at 10:39

## 2024-11-11 RX ADMIN — HYDROCODONE BITARTRATE AND ACETAMINOPHEN 1 TABLET: 7.5; 325 TABLET ORAL at 16:57

## 2024-11-11 NOTE — PROGRESS NOTES
Intensive Care Follow-up     Hospital:  LOS: 0 days   Ms. Jill Miller, 64 y.o. female is followed for:   Prosthetic aortic valve stenosis        Subjective   Interval History:  Jill Miller is a 64 year-old female that presents to [facility] ICU postoperatively from scheduled TAVR today.     Patient has a PMH of HFpEF, valvular disease s/p prosthetic MVR/AVR, pulmonary HTN, lung cancer s/p ALBINA lobectomy (2017), multiple cardiac valve replacements, HTN, hypothyroidism, hyperlipidemia, HFpEF, T2DM, COPD, tobacco use, and PAF s/p ANGELA/Maze (2018), cardioversion and PVA (2022).   Patient recently admitted to [facility] in September with hypoxic respiratory failure 2* rhinovirus and exacerbation of heart failure. 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 Cardiology/CTS consulted and patient underwent work-up for TAVR.     TIFFANIE showed LVEF 51-55%, moderate to severe AS, moderate MS, tricuspid ring noted, and bi-atrial cavity dilation. CTA TAVR negative for acute abnormality / noted emphysema.     Time spent: 4 minutes  Electronically signed by GUCCI Cedeno, 11/11/24, 8:56 AM EST.    Patient was seen postoperatively from TAVR.  She arrives to the intensive care unit in an extubated state.  She has been complaining of a feeling of shortness of breath and inability to get a good deep breath.  She was seen at the bedside and was noted to have occasional stridor.  She has already been given Decadron as well as had an order placed for racemic epinephrine.    The patient's past medical, surgical and social history were reviewed and updated in Epic as appropriate.        Objective     Infusions:  niCARdipine, 5-15 mg/hr  phenylephrine, 0.5-3 mcg/kg/min      Medications:       Vital Sign Min/Max for  last 24 hours  No data recorded   No data recorded   No data recorded   No data recorded   No data recorded   No data recorded       Input/Output for last 24 hour shift  No intake/output data recorded.      Objective:  General Appearance:  Uncomfortable and ill-appearing.    Vital signs: (most recent): Blood pressure 100/48, pulse 54, temperature 97.6 °F (36.4 °C), temperature source Oral, resp. rate 16, SpO2 100%.    HEENT: (Occasional inspiratory stridor.  This is not consistent with each breath.)    Lungs:  Normal respiratory rate and increased effort.  No rales, decreased breath sounds, wheezes or rhonchi.    Heart: Normal rate.  Regular rhythm.  S1 normal and S2 normal.    Abdomen: Abdomen is soft.  Bowel sounds are normal.   There is no abdominal tenderness.     Extremities: Normal range of motion.    Neurological: Patient is alert and oriented to person, place and time.    Pupils:  Pupils are equal, round, and reactive to light.                Results from last 7 days   Lab Units 11/08/24  1031   WBC 10*3/mm3 7.91   HEMOGLOBIN g/dL 16.5*   PLATELETS 10*3/mm3 192     Results from last 7 days   Lab Units 11/08/24  1031   SODIUM mmol/L 140   POTASSIUM mmol/L 4.3   CO2 mmol/L 28.0   BUN mg/dL 13   CREATININE mg/dL 0.67   MAGNESIUM mg/dL 2.4   GLUCOSE mg/dL 164*     Estimated Creatinine Clearance: 81.1 mL/min (by C-G formula based on SCr of 0.67 mg/dL).            I reviewed the patient's results and images.     Assessment & Plan   Impression        Prosthetic aortic valve stenosis  Shortness of breath     Plan        Continue to monitor for any worsening of her breathing.  It does not appear to be fixed stridor and may have been a bit volitional though we will monitor closely.  Depending upon how she does we may need to redose her on Decadron tomorrow.  Wean oxygen as tolerated.    Plan of care and goals reviewed with mulitdisciplinary/antibiotic stewardship team during rounds.   I discussed the patient's  findings and my recommendations with patient and nursing staff         Omid León MD, [facility]  Pulmonology and Critical Care Medicine

## 2024-11-11 NOTE — OP NOTE
PROCEDURE(S):   1.  Right femoral arterial sheath placement.  2.  Right femoral venous sheath placement.  3. Temporary transvenous pacemaker insertion.  4. Catheter placement in the aortic root.  5. Multiple intraprocedural aortograms.   6.  Valve in valve transcatheter aortic valve replacement with 23 mm Aragon NIKKI III tissue  valve.     INDICATIONS:   Severe/symptomatic bioprosthetic valve aortic stenosis.    INTERVENTIONAL CARDIOLOGISTS:  Jeaneth Carreon MD.  Jeffry Pickett MD    CARDIAC SURGEONS:   Ajay Cheatham M.D.    EBL: Less than 50 mL    DESCRIPTION OF PROCEDURE:   After informed consent, the patient was brought to the OR in a fasting condition, prepped and draped from level of chin to knees by standard surgical technique.  Right femoral arterial access was obtained by percutaneous anterior wall puncture technique and a 6-Danish arterial sheath was placed. Venous access was obtained in similar fashion and an 8-Danish venous sheath was placed. Temporary transvenous pacing electrode was inserted via the femoral venous access and advanced to the right ventricular apex and excellent pacing/sensing was noted. A pigtail catheter was advanced from the femoral arterial access and this was advanced to the aortic root, and multiple intraprocedural aortograms were performed.   At this time, left femoral arterial access was obtained by the surgeons and a 14 Danish Aragon sheath was placed(see separate note). Using this access, AL1 catheter, and straight wire, the aortic stenosis was crossed. The catheter was advanced into the left ventricle and the wire was exchanged for a Safari wire. Subsequently, a 23 mm Aragon NIKKI III valve was advanced using standard delivery system. This was positioned under fluoroscopy. After the satisfactory position was confirmed, the valve was expanded under rapid pacing protocol. Satisfactory position was noted.  Subsequently post implant BAV was performed to further expand the  valve with a 22 mm balloon.  No significant regurgitation was noted on post implant imaging. Subsequently the delivery system was removed. The arterial sheath was removed and the access site was closed by the surgeons (see separate note). The patient tolerated the procedure well and without complications.    FINAL IMPRESSION:   Successful transcatheter aortic valve replacement with 23 mm Aragon NIKKI tissue valve.   No acute procedure-related complications.     Jeaneth Carreno MD, FACC, [facility]

## 2024-11-11 NOTE — ANESTHESIA PROCEDURE NOTES
Structural Heart TIFFANIE    Procedure Performed: Structural Heart TIFFANIE       Start Time:        End Time:        General Procedure Information  Diagnostic Indications for Echo:  assessment of surgical repair  Physician Requesting Echo: Ajay Cheatham MD  Intubated  Bite block not placed  Heart visualized  Probe Insertion:  Easy  Probe Type:  Multiplane  Modalities:  Color flow mapping, continuous wave Doppler and pulse wave Doppler    Echocardiographic and Doppler Measurements    Ventricles    Left Ventricle:  Global Function normal.          Valves    Aortic Valve:  Annulus bioprosthetic.  Stenosis severe.  Area: 0.9 cm².  Regurgitation moderate.  Leaflet motions restricted.      Mitral Valve:  Annulus bioprosthetic.  Mean Gradient: 11 mmHg.  Leaflet motions restricted.              Atria      Left Atrium:  Size dilated.                  Anesthesia Information      Echocardiogram Comments:       This is a limited exam for TAVr procedure.   Posr AVR, valve in valve , and unable to get a gradient across. AVA2 by planimetry is 1.2 cm2.  No pericardial effusion is seen.

## 2024-11-11 NOTE — PROGRESS NOTES
Structural Heart Team Meeting Summary       Patient Name: Jill Miller  YOB: 1960     Referring Physician: Dr. Lay  Admission Status: Inpatient     Attendees: Ajay Cheatham MD, Siria Estrada MD, Jeaneth Carreon MD, Mahesh Clinical Specialist, Dani Zapata, Justino Sanders MD, Isaac Eng MD, and GUCCI Boyd  Primary presentation of AS: Heart Failure   Heart Failure:   HFpEF   NYHA Functional Class: III   LVEF:  51-55%     Major Organ Compromise: Pulmonary (FEV1 < 50%)    Procedure Specific Impediment: N/A    Other Factors:   Major Nutritional Deficit: No  Cognitive Impairment: No  Oxygen dependent: No    STS Risk Score:   Mortality Risk: 5.07%  Mortality and Morbidity Risk: 22.3%    TAVR/TMVR Rationale: Frail, elderly male with severe symptomatic AS and intermediate STS risk score for TAVR. Structural heart team in agreement to proceed with TAVR     Diagnostic Studies Discussed/ Reviewed:  TTE, TIFFANIE, LHC, CTA, carotid ultrasound, EKG. PAT labs- A1C 7.3%    Procedure planning details:   Valve Size: 23mm  Cardiology sheath access: Right femoral  CT Surgery sheath access: Left femoral    Post Procedure Considerations: Bleeding at groin sites, monitor for bleeding and s/s of CVA, encourage early ambulation and pulmonary hygiene

## 2024-11-11 NOTE — ANESTHESIA POSTPROCEDURE EVALUATION
Patient: Jill Miller    Procedure Summary       Date: 11/11/24 Room / Location:  [facility] /  [facility]    Anesthesia Start: 1007 Anesthesia Stop: 1125    Procedures:       TRANSCATHETER AORTIC VALVE REPLACEMENT (Chest)      TRANSESOPHAGEAL ECHOCARDIOGRAM WITH ANESTHESIA (Chest)      Transfemoral Transcatheter Aortic Valve Replacement Diagnosis:       Stenosis of prosthetic aortic valve, initial encounter      (Stenosis of prosthetic aortic valve, initial encounter [T82.944K])    Surgeons: Ajay Cheatham MD; Jeaneth Carreon MD Provider: Justino Sanders MD    Anesthesia Type: general, [place] ASA Status: 4            Anesthesia Type: general, Payton    Vitals  No vitals data found for the desired time range.          Post Anesthesia Care and Evaluation    Patient location during evaluation: ICU  Patient participation: complete - patient participated  Level of consciousness: responsive to verbal stimuli  Pain score: 0  Pain management: adequate    Airway patency: patent  Anesthetic complications: No anesthetic complications  PONV Status: none  Cardiovascular status: hemodynamically stable and acceptable  Respiratory status: nonlabored ventilation, acceptable, nasal cannula and spontaneous ventilation  Hydration status: acceptable    Comments: VS monitored en route to ICU and pt spont ventilating. VSS upon arrival. Report given  No anesthesia care post op

## 2024-11-11 NOTE — ANESTHESIA PROCEDURE NOTES
Arterial Line      Patient reassessed immediately prior to procedure    Patient location during procedure: pre-op   Line placed for hemodynamic monitoring.  Performed By   Anesthesiologist: Rani Acosta DO   Preanesthetic Checklist  Completed: patient identified, IV checked, site marked, risks and benefits discussed, surgical consent, monitors and equipment checked, pre-op evaluation and timeout performed  Arterial Line Prep    Sterile Tech: cap, gloves and sterile barriers  Prep: ChloraPrep  Patient monitoring: blood pressure monitoring, continuous pulse oximetry and EKG  Arterial Line Procedure   Laterality:left  Location:  radial artery  Catheter size: 20 G   Guidance: ultrasound guided and palpation technique  Number of attempts: 1  Successful placement: yes Images: still images not obtained  Post Assessment   Dressing Type: line sutured, occlusive dressing applied, secured with tape and wrist guard applied.   Complications no  Circ/Move/Sens Assessment: normal and unchanged.   Patient Tolerance: patient tolerated the procedure well with no apparent complications

## 2024-11-11 NOTE — OP NOTE
DATE OF PROCEDURE: 11/11/24      PREOPERATIVE DIAGNOSES:  1. Severe symptomatic prosthetic aortic valve stenosis      POSTOPERATIVE DIAGNOSES:    1. Severe symptomatic prosthetic aortic valve stenosis      PROCEDURE:    1. Percutaneous right common femoral arterial sheath placement  2. Aortogram  3. Transcatheter aortic valve in valve replacement (23 mm Letha 3 Ultra tissue valve)  4. Completion aortogram      SURGEON: Ajay Cheatham MD        Assistant: Carlos Hebert PA was responsible for performing the following activities: Retraction and Placing Dressing and their skilled assistance was necessary for the success of this case.    CARDIOLOGISTS:  1. Haroon Pickett MD  2. Jeaneth Carreon MD     ANESTHESIA: General endotracheal anesthesia with Dr Justino Sanders MD      ESTIMATED BLOOD LOSS: Less than 25 mL      FLUOROSCOPY TIME: 6:36 with an exposure of 110 mGy      CONTRAST: 40 mL       INDICATIONS:  64-year-old  female with a history of hypertension, chronic atrial fibrillation, tobacco abuse, lung cancer and valvular heart disease status post AVR, MVR, tricuspid repair and MAZE on 7/16/18 by myself who presented with fatigue, lightheadedness and shortness of breath.  She was found to have severe prosthetic aortic valve stenosis.  We discussed options including redo surgical aortic valve replacement, transcatheter aortic valve replacement and medical management.  After a shared decision making discussion, the patient wished to proceed with transcatheter aortic valve in valve replacement.  The risks and benefits of surgery were discussed with the patient including pain, bleeding, infection, renal failure, stroke, heart block requiring a pacemaker, myocardial infarction and death. The patient understood these risks and wished to proceed with surgery.       DESCRIPTION OF PROCEDURE: The patient was taken to the operating room and placed under general endotracheal anesthesia. She was prepped and draped in the  usual sterile fashion. A timeout was performed including the patient’s name, procedure, and antibiotic administration. Right common femoral arterial and venous lines were placed by the cardiologists for placement of a pigtail catheter and temporary venous pacer. Needle access of the left common femoral artery over the femoral head was obtained and the incision was enlarged using a #11 blade. Systemic heparin was administered. A 7-Moldovan dilator was then placed over the wire using modified Seldinger technique and 2 Perclose devices were deployed. A 7-Moldovan sheath was then placed followed by a 14-Moldovan sheath within the abdominal aorta. This was secured using a silk suture. An AL-1 catheter was utilized to cross the aortic valve with placement of a Safari wire in the left ventricle. The 23 mm NIKKI 3 valve was placed in the correct position at the aortic annulus. The valve was deployed after rapid pacing and was found to be in satisfactory position.  The valve was post dilated with a 22 mm balloon and the prosthetic valve was fractured.  Post transesophageal echocardiogram revealed no paravalvular leak. Aortogram was performed that revealed no aortic regurgitation. The sheath was removed and wire left in place.  The two Perclose devices were deployed with satisfactory hemostasis.  An aortogram was then performed that revealed intact vasculature.  The groin incision was then sealed with skin glue and the right groin pigtail and pacing wire were removed. The sheaths were left in place and the patient was extubated in the operating room and transported to the cardiac ICU in stable condition.

## 2024-11-11 NOTE — NURSING NOTE
Pt. Referred for Phase II Cardiac Rehab. Staff goes to consult in patient's room and she is with nursing staff. Staff will follow up.

## 2024-11-11 NOTE — ANESTHESIA PREPROCEDURE EVALUATION
Anesthesia Evaluation     Patient summary reviewed and Nursing notes reviewed   no history of anesthetic complications:   NPO Solid Status: > 8 hours  NPO Liquid Status: > 2 hours           Airway   Mallampati: II  TM distance: >3 FB  Neck ROM: full  Small opening and No difficulty expected  Dental      Pulmonary - normal exam   (+) a smoker, lung cancer, COPD,  Cardiovascular   Exercise tolerance: good (4-7 METS)    ECG reviewed  PT is on anticoagulation therapy  Rhythm: regular  Rate: normal    (+) hypertension, valvular problems/murmurs AS and MS, dysrhythmias Atrial Fib, CHF , murmur, hyperlipidemia    ROS comment: · 10/11/24 - Known severe aortic and mitral stenosis. ·  Minimal coronary artery disease  ·  LV pressures (S/D/E) : 103/14/10 mmHg      9/26/24-tte - EF = 60%.  Mild cLVH.  bAV present with severe stenosis  (mean gradient 52 mmHg).  Moderate aortic valve insufficiency.  bMV  moderate to severe mitral stenosis (mean gradient 13 mmHg).   ·  There is  a tricuspid valve ring present.  ·  RVSP is markedly elevated (58 mmHg).      10/8/24-ria-·  Left ventricular ejection fraction appears to be 51 - 55%.  ·  The right ventricular cavity is mildly dilated.  ·  The left atrial cavity is dilated.  ·  The right atrial cavity is dilated.  ·  Moderate to severe aortic valve stenosis is present.  ·  There is a bioprosthetic aortic valve present.  ·  Moderate mitral valve stenosis is present.  ·  There is a bioprosthetic mitral valve present.  ·  Moderate tricuspid valve regurgitation is present.  ·  There is  a tricuspid valve ring present.      Neuro/Psych  (-) seizures, CVA  GI/Hepatic/Renal/Endo    (+) obesity, diabetes mellitus, thyroid problem hypothyroidism and hyperthyroidism  (-) GERD    Musculoskeletal     Abdominal    Substance History - negative use     OB/GYN          Other      history of cancer (lung ca s/p bronch thor ALBINA lobectomy 2017)    ROS/Med Hx Other: 6/2022- [id number]  7/18  - [id number]  Hgb  16.5 k 4.3   Propenafone this AM                Anesthesia Plan    ASA 4     general and [place]     (Risks and benefits of general anesthesia discussed with patient (including MI, CVA, death, recall, aspiration, oropharyngeal/dental damage), questions answered, agreeable to proceed.    )  intravenous induction     Anesthetic plan, risks, benefits, and alternatives have been provided, discussed and informed consent has been obtained with: patient.    Use of blood products discussed with patient  Consented to blood products.    Plan discussed with CRNA.    CODE STATUS:

## 2024-11-11 NOTE — H&P
Pre-Op H&P  Jill Miller  [id number]  1960      Chief complaint: Shortness of breath      Subjective:  Patient is a 64 y.o.female presents for scheduled surgery by Dr. Cheatham. She anticipates a TRANSCATHETER AORTIC VALVE REPLACEMENT; TRANSESOPHAGEAL ECHOCARDIOGRAM WITH ANESTHESIA  today. She underwent MV replacement, AVR, and TV repair, MAZE, LAAL on 7/16/18 with Dr. Cheatham. 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 She reports dyspnea x 3 months, fatigue, and lightheadedness.       Review of Systems:  Constitutional-- No fever, chills or sweats. + fatigue.  CV-- No chest pain, palpitation or syncope. +HTN, HLD, CHF, afib  Resp-- No cough, hemoptysis. +SOB, COPD  Skin--No rashes or lesions      Allergies:   Allergies   Allergen Reactions    Lortab [Hydrocodone-Acetaminophen] Nausea And Vomiting, Dizziness and Headache    Morphine And Codeine Nausea Only, GI Intolerance and Headache         Home Meds:  Medications Prior to Admission   Medication Sig Dispense Refill Last Dose/Taking    albuterol sulfate  (90 Base) MCG/ACT inhaler Inhale 2 puffs Every 4 (Four) Hours As Needed for Wheezing.   11/10/2024    aspirin 81 MG EC tablet Take 1 tablet by mouth Daily.   11/11/2024 at  8:00 AM    Cholecalciferol 10 MCG (400 UNIT) capsule Take 5,000 Units by mouth.   11/10/2024    furosemide (Lasix) 40 MG tablet Take 1 tablet by mouth 2 (Two) Times a Day. 60 tablet 0 11/10/2024    Jardiance 10 MG tablet tablet TAKE 1 TABLET DAILY 90 tablet 3 11/10/2024    levothyroxine (SYNTHROID, LEVOTHROID) 200 MCG tablet Take 1 tablet by mouth Every Morning.   11/11/2024 at  8:00 AM    metoprolol tartrate (LOPRESSOR) 25 MG tablet TAKE 1 TABLET EVERY 12 HOURS 180 tablet 3 11/11/2024 at  8:00 AM    multivitamin (THERAGRAN) tablet  tablet Take 1 tablet by mouth Daily.   11/11/2024 at  8:00 AM    nystatin (MYCOSTATIN) 745756 UNIT/GM powder Apply 1 Application topically to the appropriate area as directed 2 (Two) Times a Day.   11/10/2024    potassium chloride (MICRO-K) 10 MEQ CR capsule Take 1 capsule by mouth 2 (Two) Times a Day. 60 capsule 11 11/10/2024    pravastatin (PRAVACHOL) 20 MG tablet Take 1 tablet by mouth Every Night.   11/10/2024    propafenone SR (RYTHMOL SR) 225 MG 12 hr capsule TAKE 1 CAPSULE TWICE A DAY (DOSE CHANGE) 180 capsule 3 11/11/2024 Morning    ibuprofen (ADVIL,MOTRIN) 800 MG tablet 1 tablet As Needed.   More than a month         PMH:   Past Medical History:   Diagnosis Date    Atrial fibrillation and flutter     Chronic diastolic congestive heart failure     Chronically Abnormal CXR (Left pleural disease post op) 09/25/2017    COPD exacerbation 04/17/2022    Elevated cholesterol     Essential hypertension 04/10/2017    Target blood pressure <130/80 mmHg    Graves disease     Hyperlipidemia LDL goal <70     Hypertension     Hyperthyroidism     Lung cancer     MRSA (methicillin resistant staph aureus) culture positive 2014    under left breast, incision and debridement, treated with wound packing and po abx     Prolonged QTC interval on ECG 06/01/2022    Rheumatic mitral stenosis     Type 2 diabetes mellitus 07/17/2018    Valvular heart disease      PSH:    Past Surgical History:   Procedure Laterality Date    ABLATION OF DYSRHYTHMIC FOCUS  07/16/2018    ABSCESS DRAINAGE      under left breast d/t mrsa     AORTIC VALVE REPAIR/REPLACEMENT N/A 07/16/2018    Procedure: MEDIAN STERNOTOMY, AORTIC VALVE REPLACEMENT WITH TIFFANIE AND MAZE, TRICUSPID RING, LEFT ATRIAL OCCLUSION;  Surgeon: Ajay Cheatham MD;  Location:  [facility];  Service: Cardiothoracic    BRONCHOSCOPY Left 05/12/2017    Procedure: BRONCHOSCOPY;  Surgeon: Ajay Cheatham MD;  Location:  [facility];  Service:     BRONCHOSCOPY N/A 05/18/2017    Procedure: BRONCHOSCOPY  BIOPSY AT BEDSIDE;  Surgeon: Ajay Cheatham MD;  Location:  [facility];  Service:     CARDIAC CATHETERIZATION N/A 2017    Procedure: Left Heart Cath;  Surgeon: Marco Lay MD;  Location:  [facility];  Service:     CARDIAC CATHETERIZATION N/A 2017    Procedure: Right Heart Cath;  Surgeon: Marco Lay MD;  Location:  [facility];  Service:     CARDIAC CATHETERIZATION N/A 10/11/2024    Procedure: Right and Left Heart Cath;  Surgeon: Jeffry Pickett MD;  Location:  [facility];  Service: Cardiology;  Laterality: N/A;    CARDIAC ELECTROPHYSIOLOGY PROCEDURE N/A 2022    Procedure: Ablation atrial fibrillation (atypical A flutter). Hold Rythmol x5 days;  Surgeon: Nigel Huber MD;  Location:  [facility];  Service: Cardiovascular;  Laterality: N/A;    LUNG BIOPSY  2017    LYMPH NODE DISSECTION Left 2017    Procedure: MEDIASTINAL LYMPH NODE DISSECTION;  Surgeon: Ajay Cheatham MD;  Location:  [facility];  Service:     MAZE PROCEDURE      MITRAL VALVE REPAIR/REPLACEMENT N/A 2018    Procedure: MITRAL VALVE REPLACEMENT;  Surgeon: Ajay Cheatham MD;  Location:  [facility];  Service: Cardiothoracic    MITRAL VALVE REPLACEMENT  2018    TEETH EXTRACTION      THORACOSCOPY VIDEO ASSISTED WITH LOBECTOMY Left 2017    Procedure: THORACOSCOPY VIDEO ASSISTED WITH OPEN LOBECTOMY;  Surgeon: Ajay Cheatham MD;  Location:  [facility];  Service:     TOTAL THYROIDECTOMY  approx     TRICUSPID VALVE SURGERY  2018     Social History:   Tobacco:   Social History     Tobacco Use   Smoking Status Some Days    Current packs/day: 0.00    Types: Cigarettes    Start date: 1978    Last attempt to quit: 2023    Years since quittin.5    Passive exposure: Current   Smokeless Tobacco Never   Tobacco Comments    Couldn't remember exact dates.      Alcohol:     Social History     Substance and  Sexual Activity   Alcohol Use No         Physical Exam:/48 (BP Location: Right arm, Patient Position: Lying)   Pulse 50   Temp 97.6 °F (36.4 °C) (Temporal)   Resp 16   SpO2 94%       General Appearance:    Alert, cooperative, no distress, appears stated age   Head:    Normocephalic, without obvious abnormality, atraumatic   Lungs:     Clear to auscultation bilaterally, respirations unlabored    Heart:   Regular rate and rhythm, S1 and S2 normal    Abdomen:    Soft without tenderness   Extremities:   Extremities normal, atraumatic, no cyanosis or edema   Skin:   Skin color, texture, turgor normal, no rashes or lesions   Neurologic:   Grossly intact     Results Review:     LABS:  Lab Results   Component Value Date    WBC 7.91 11/08/2024    HGB 16.5 (H) 11/08/2024    HCT 47.2 (H) 11/08/2024    .5 (H) 11/08/2024     11/08/2024    NEUTROABS 6.25 11/08/2024    GLUCOSE 164 (H) 11/08/2024    BUN 13 11/08/2024    CREATININE 0.67 11/08/2024    EGFRIFNONA 98 08/01/2018     11/08/2024    K 4.3 11/08/2024     11/08/2024    CO2 28.0 11/08/2024    MG 2.4 11/08/2024    PHOS 3.4 09/27/2024    CALCIUM 9.5 11/08/2024    ALBUMIN 4.3 11/08/2024    AST 26 11/08/2024    ALT 8 11/08/2024    BILITOT 0.5 11/08/2024       RADIOLOGY:  Study Result    Narrative & Impression   CT ANGIO TAVR CHEST ABDOMEN PELVIS     Date of Exam: 9/27/2024 3:16 PM EDT     Indication: Severe AS/Severe  MS.     Comparison: None available.     Technique: CTA of the chest was performed after the uneventful intravenous administration of 100 mL . Reconstructed coronal and sagittal images were also obtained. In addition, a 3-D volume rendered image was created for interpretation. Automated   exposure control and iterative reconstruction methods were used.        Findings:  status post aortic and mitral valve repair. The aortic root measures 3 cm in diameter the ascending aorta is normal in caliber measuring 3.1 cm in diameter. The  transverse and descending thoracic aorta demonstrates scattered plaquing but no   hemodynamically significant stenosis. The visualized proximal great vessels are unremarkable. The celiac and SMA are widely patent. There is an accessory left renal artery. The renal arteries are widely patent. The ANALI is patent. There is mild plaquing   of the infrarenal abdominal aorta but no hemodynamically significant stenosis. Bilateral common iliac arteries are widely patent. Bilateral internal and bilateral external iliac arteries are patent. There is mild plaquing of the bilateral external iliac   arteries. Visualized infrainguinal vasculature is unremarkable.     Central airways are patent. There are changes of emphysema. There is prominent right hilar adenopathy. The patient is status post left upper lobectomy. Prominent mediastinal lymph nodes are also identified similar compared to prior exam. Pulmonary artery   is enlarged consistent with pulmonary artery hypertension. No aggressive appearing lytic or sclerotic bone lesions.     The liver is unremarkable. The gallbladder is normal. Pancreas and spleen are unremarkable. Bilateral adrenal glands are normal. Bilateral kidneys are normal. Bladder is unremarkable. Uterus is unremarkable. There is diverticulosis without evidence of   diverticulitis. Small bowel is unremarkable. No adenopathy is identified. No aggressive appearing lytic or sclerotic bone lesions are identified.     IMPRESSION:  Impression:     1. No acute intrathoracic or intra-abdominal pathology identified.     10/11/24 heart cath:  Indications    Stenosis of prosthetic aortic valve, subsequent encounter [T82.857D (ICD-10-CM)]   Stenosis of prosthetic mitral valve, subsequent encounter [T82.857D (ICD-10-CM)]     Pre Procedure Diagnosis    Aortic and mitral valve disease    Post Procedure Diagnosis    Aortic and mitral valve disease      Conclusion         Known severe aortic and mitral stenosis    Minimal  coronary artery disease    LV pressures (S/D/E) : 103/14/10 mmHg    9/26/24 ECHO:  Clinical Indication    Dyspnea     Interpretation Summary         Left ventricular systolic function is normal. Estimated left ventricular EF = 60%    Left ventricular wall thickness is consistent with mild concentric hypertrophy.    The left atrial cavity is moderately dilated.    Bioprosthetic aortic valve present.  Severe bioprosthetic aortic valve stenosis (mean gradient 52 mmHg).  Moderate aortic valve insufficiency.    There is a bioprosthetic mitral valve present.  Elevated gradient across mitral valve consistent with moderate to severe mitral stenosis (mean gradient 13 mmHg)    There is  a tricuspid valve ring present.    Estimated right ventricular systolic pressure from tricuspid regurgitation is markedly elevated (58 mmHg).    I reviewed the patient's new clinical results.    Cancer Staging (if applicable)  Cancer Patient: __ yes __no __unknown; If yes, clinical stage T:__ N:__M:__, stage group or __N/A      Impression: Prosthetic aortic valve stenosis       Plan: TRANSCATHETER AORTIC VALVE REPLACEMENT; TRANSESOPHAGEAL ECHOCARDIOGRAM WITH ANESTHESIA       GUCCI Dias   11/11/2024   09:24 EST

## 2024-11-12 ENCOUNTER — ANESTHESIA EVENT CONVERTED (OUTPATIENT)
Dept: ANESTHESIOLOGY | Facility: HOSPITAL | Age: 64
End: 2024-11-12
Payer: COMMERCIAL

## 2024-11-12 ENCOUNTER — APPOINTMENT (OUTPATIENT)
Dept: CT IMAGING | Facility: HOSPITAL | Age: 64
End: 2024-11-12
Payer: COMMERCIAL

## 2024-11-12 ENCOUNTER — ANESTHESIA (OUTPATIENT)
Dept: PERIOP | Facility: HOSPITAL | Age: 64
End: 2024-11-12
Payer: COMMERCIAL

## 2024-11-12 ENCOUNTER — APPOINTMENT (OUTPATIENT)
Dept: CARDIOLOGY | Facility: HOSPITAL | Age: 64
End: 2024-11-12
Payer: COMMERCIAL

## 2024-11-12 ENCOUNTER — APPOINTMENT (OUTPATIENT)
Dept: GENERAL RADIOLOGY | Facility: HOSPITAL | Age: 64
End: 2024-11-12
Payer: COMMERCIAL

## 2024-11-12 ENCOUNTER — ANESTHESIA EVENT (OUTPATIENT)
Dept: PERIOP | Facility: HOSPITAL | Age: 64
End: 2024-11-12
Payer: COMMERCIAL

## 2024-11-12 LAB
ABO GROUP BLD: NORMAL
ALBUMIN SERPL-MCNC: 3.9 G/DL (ref 3.5–5.2)
ALBUMIN SERPL-MCNC: 4 G/DL (ref 3.5–5.2)
ALBUMIN/GLOB SERPL: 1.9 G/DL
ALBUMIN/GLOB SERPL: 2.1 G/DL
ALP SERPL-CCNC: 47 U/L (ref 39–117)
ALP SERPL-CCNC: 54 U/L (ref 39–117)
ALT SERPL W P-5'-P-CCNC: 18 U/L (ref 1–33)
ALT SERPL W P-5'-P-CCNC: 20 U/L (ref 1–33)
ANION GAP SERPL CALCULATED.3IONS-SCNC: 12 MMOL/L (ref 5–15)
ANION GAP SERPL CALCULATED.3IONS-SCNC: 16 MMOL/L (ref 5–15)
ANION GAP SERPL CALCULATED.3IONS-SCNC: 18 MMOL/L (ref 5–15)
ANION GAP SERPL CALCULATED.3IONS-SCNC: 24 MMOL/L (ref 5–15)
ARTERIAL PATENCY WRIST A: ABNORMAL
AST SERPL-CCNC: 42 U/L (ref 1–32)
AST SERPL-CCNC: 46 U/L (ref 1–32)
ATMOSPHERIC PRESS: ABNORMAL MM[HG]
BASE EXCESS BLDA CALC-SCNC: -0.2 MMOL/L (ref 0–2)
BASE EXCESS BLDA CALC-SCNC: -10 MMOL/L (ref 0–2)
BASE EXCESS BLDA CALC-SCNC: -2.2 MMOL/L (ref 0–2)
BASE EXCESS BLDA CALC-SCNC: -5 MMOL/L (ref -5–5)
BASE EXCESS BLDA CALC-SCNC: -6.3 MMOL/L (ref 0–2)
BASE EXCESS BLDA CALC-SCNC: -8 MMOL/L (ref -5–5)
BASE EXCESS BLDA CALC-SCNC: 2.1 MMOL/L (ref 0–2)
BASE EXCESS BLDA CALC-SCNC: 4.2 MMOL/L (ref 0–2)
BDY SITE: ABNORMAL
BH BB BLOOD EXPIRATION DATE: NORMAL
BH BB BLOOD TYPE BARCODE: 5100
BH BB BLOOD TYPE BARCODE: 6200
BH BB DISPENSE STATUS: NORMAL
BH BB PRODUCT CODE: NORMAL
BH BB UNIT NUMBER: NORMAL
BH CV ECHO MEAS - AO MAX PG: 67.9 MMHG
BH CV ECHO MEAS - AO MEAN PG: 34 MMHG
BH CV ECHO MEAS - AO V2 MAX: 388.2 CM/SEC
BH CV ECHO MEAS - AO V2 VTI: 72.8 CM
BH CV ECHO MEAS - AVA(I,D): 1.04 CM2
BH CV ECHO MEAS - EDV(CUBED): 110.6 ML
BH CV ECHO MEAS - EDV(MOD-SP2): 133 ML
BH CV ECHO MEAS - EDV(MOD-SP4): 106 ML
BH CV ECHO MEAS - EF(MOD-BP): 60.5 %
BH CV ECHO MEAS - EF(MOD-SP2): 59.3 %
BH CV ECHO MEAS - EF(MOD-SP4): 62.3 %
BH CV ECHO MEAS - ESV(CUBED): 29.8 ML
BH CV ECHO MEAS - ESV(MOD-SP2): 54.1 ML
BH CV ECHO MEAS - ESV(MOD-SP4): 40 ML
BH CV ECHO MEAS - FS: 35.4 %
BH CV ECHO MEAS - IVS/LVPW: 1 CM
BH CV ECHO MEAS - IVSD: 1 CM
BH CV ECHO MEAS - LV DIASTOLIC VOL/BSA (35-75): 61.1 CM2
BH CV ECHO MEAS - LV MASS(C)D: 170.2 GRAMS
BH CV ECHO MEAS - LV MAX PG: 8.3 MMHG
BH CV ECHO MEAS - LV MEAN PG: 5 MMHG
BH CV ECHO MEAS - LV SYSTOLIC VOL/BSA (12-30): 23.1 CM2
BH CV ECHO MEAS - LV V1 MAX: 144 CM/SEC
BH CV ECHO MEAS - LV V1 VTI: 29.7 CM
BH CV ECHO MEAS - LVIDD: 4.8 CM
BH CV ECHO MEAS - LVIDS: 3.1 CM
BH CV ECHO MEAS - LVOT AREA: 2.5 CM2
BH CV ECHO MEAS - LVOT DIAM: 1.8 CM
BH CV ECHO MEAS - LVPWD: 1 CM
BH CV ECHO MEAS - SV(LVOT): 75.6 ML
BH CV ECHO MEAS - SV(MOD-SP2): 78.9 ML
BH CV ECHO MEAS - SV(MOD-SP4): 66 ML
BH CV ECHO MEAS - SVI(LVOT): 43.6 ML/M2
BH CV ECHO MEAS - SVI(MOD-SP2): 45.5 ML/M2
BH CV ECHO MEAS - SVI(MOD-SP4): 38.1 ML/M2
BILIRUB SERPL-MCNC: 0.3 MG/DL (ref 0–1.2)
BILIRUB SERPL-MCNC: 0.3 MG/DL (ref 0–1.2)
BLD GP AB SCN SERPL QL: NEGATIVE
BODY TEMPERATURE: 37
BUN SERPL-MCNC: 16 MG/DL (ref 8–23)
BUN SERPL-MCNC: 16 MG/DL (ref 8–23)
BUN SERPL-MCNC: 17 MG/DL (ref 8–23)
BUN SERPL-MCNC: 17 MG/DL (ref 8–23)
BUN/CREAT SERPL: 13.7 (ref 7–25)
BUN/CREAT SERPL: 16.7 (ref 7–25)
BUN/CREAT SERPL: 17.2 (ref 7–25)
BUN/CREAT SERPL: 18 (ref 7–25)
CA-I BLDA-SCNC: 1 MMOL/L (ref 1.2–1.32)
CA-I BLDA-SCNC: 1.15 MMOL/L (ref 1.2–1.32)
CALCIUM SPEC-SCNC: 7.8 MG/DL (ref 8.6–10.5)
CALCIUM SPEC-SCNC: 7.9 MG/DL (ref 8.6–10.5)
CALCIUM SPEC-SCNC: 8 MG/DL (ref 8.6–10.5)
CALCIUM SPEC-SCNC: 8.2 MG/DL (ref 8.6–10.5)
CHLORIDE SERPL-SCNC: 101 MMOL/L (ref 98–107)
CHLORIDE SERPL-SCNC: 102 MMOL/L (ref 98–107)
CHLORIDE SERPL-SCNC: 103 MMOL/L (ref 98–107)
CHLORIDE SERPL-SCNC: 105 MMOL/L (ref 98–107)
CO2 BLDA-SCNC: 19.1 MMOL/L (ref 22–33)
CO2 BLDA-SCNC: 20 MMOL/L (ref 24–29)
CO2 BLDA-SCNC: 22 MMOL/L (ref 24–29)
CO2 BLDA-SCNC: 22.2 MMOL/L (ref 22–33)
CO2 BLDA-SCNC: 25.9 MMOL/L (ref 22–33)
CO2 BLDA-SCNC: 28.7 MMOL/L (ref 22–33)
CO2 BLDA-SCNC: 31 MMOL/L (ref 22–33)
CO2 BLDA-SCNC: 33.2 MMOL/L (ref 22–33)
CO2 SERPL-SCNC: 16 MMOL/L (ref 22–29)
CO2 SERPL-SCNC: 22 MMOL/L (ref 22–29)
CO2 SERPL-SCNC: 23 MMOL/L (ref 22–29)
CO2 SERPL-SCNC: 27 MMOL/L (ref 22–29)
COHGB MFR BLD: 1 % (ref 0–2)
COHGB MFR BLD: 1.1 % (ref 0–2)
COHGB MFR BLD: 1.1 % (ref 0–2)
COHGB MFR BLD: 1.2 % (ref 0–2)
CREAT SERPL-MCNC: 0.89 MG/DL (ref 0.57–1)
CREAT SERPL-MCNC: 0.96 MG/DL (ref 0.57–1)
CREAT SERPL-MCNC: 0.99 MG/DL (ref 0.57–1)
CREAT SERPL-MCNC: 1.24 MG/DL (ref 0.57–1)
CROSSMATCH INTERPRETATION: NORMAL
CROSSMATCH INTERPRETATION: NORMAL
D-LACTATE SERPL-SCNC: 6 MMOL/L (ref 0.5–2)
D-LACTATE SERPL-SCNC: 6.8 MMOL/L (ref 0.5–2)
D-LACTATE SERPL-SCNC: 6.9 MMOL/L (ref 0.5–2)
D-LACTATE SERPL-SCNC: 9.5 MMOL/L (ref 0.5–2)
D-LACTATE SERPL-SCNC: 9.9 MMOL/L (ref 0.5–2)
DEPRECATED RDW RBC AUTO: 59.5 FL (ref 37–54)
DEPRECATED RDW RBC AUTO: 68.7 FL (ref 37–54)
EGFRCR SERPLBLD CKD-EPI 2021: 49.3 ML/MIN/1.73
EGFRCR SERPLBLD CKD-EPI 2021: 64.6 ML/MIN/1.73
EGFRCR SERPLBLD CKD-EPI 2021: 67 ML/MIN/1.73
EGFRCR SERPLBLD CKD-EPI 2021: 73.4 ML/MIN/1.73
EPAP: 0
EPAP: 6
ERYTHROCYTE [DISTWIDTH] IN BLOOD BY AUTOMATED COUNT: 14.4 % (ref 12.3–15.4)
ERYTHROCYTE [DISTWIDTH] IN BLOOD BY AUTOMATED COUNT: 19.8 % (ref 12.3–15.4)
GLOBULIN UR ELPH-MCNC: 1.9 GM/DL
GLOBULIN UR ELPH-MCNC: 2.1 GM/DL
GLUCOSE BLDC GLUCOMTR-MCNC: 166 MG/DL (ref 70–130)
GLUCOSE BLDC GLUCOMTR-MCNC: 254 MG/DL (ref 70–130)
GLUCOSE BLDC GLUCOMTR-MCNC: 268 MG/DL (ref 70–130)
GLUCOSE BLDC GLUCOMTR-MCNC: 271 MG/DL (ref 70–130)
GLUCOSE BLDC GLUCOMTR-MCNC: 285 MG/DL (ref 70–130)
GLUCOSE BLDC GLUCOMTR-MCNC: 295 MG/DL (ref 70–130)
GLUCOSE BLDC GLUCOMTR-MCNC: 303 MG/DL (ref 70–130)
GLUCOSE BLDC GLUCOMTR-MCNC: 310 MG/DL (ref 70–130)
GLUCOSE BLDC GLUCOMTR-MCNC: 314 MG/DL (ref 70–130)
GLUCOSE SERPL-MCNC: 271 MG/DL (ref 65–99)
GLUCOSE SERPL-MCNC: 289 MG/DL (ref 65–99)
GLUCOSE SERPL-MCNC: 299 MG/DL (ref 65–99)
GLUCOSE SERPL-MCNC: 364 MG/DL (ref 65–99)
HCO3 BLDA-SCNC: 17.7 MMOL/L (ref 20–26)
HCO3 BLDA-SCNC: 18.6 MMOL/L (ref 22–26)
HCO3 BLDA-SCNC: 20.8 MMOL/L (ref 20–26)
HCO3 BLDA-SCNC: 21.2 MMOL/L (ref 22–26)
HCO3 BLDA-SCNC: 24.3 MMOL/L (ref 20–26)
HCO3 BLDA-SCNC: 26.9 MMOL/L (ref 20–26)
HCO3 BLDA-SCNC: 29.2 MMOL/L (ref 20–26)
HCO3 BLDA-SCNC: 31.3 MMOL/L (ref 20–26)
HCT VFR BLD AUTO: 29.3 % (ref 34–46.6)
HCT VFR BLD AUTO: 30 % (ref 34–46.6)
HCT VFR BLD CALC: 27.9 % (ref 38–51)
HCT VFR BLD CALC: 29.2 % (ref 38–51)
HCT VFR BLD CALC: 29.2 % (ref 38–51)
HCT VFR BLD CALC: 29.4 % (ref 38–51)
HCT VFR BLD CALC: 29.5 % (ref 38–51)
HCT VFR BLD CALC: 30.7 % (ref 38–51)
HCT VFR BLDA CALC: 19 % (ref 38–51)
HCT VFR BLDA CALC: 24 % (ref 38–51)
HGB BLD-MCNC: 10.3 G/DL (ref 12–15.9)
HGB BLD-MCNC: 9.6 G/DL (ref 12–15.9)
HGB BLDA-MCNC: 10 G/DL (ref 14–18)
HGB BLDA-MCNC: 6.5 G/DL (ref 12–17)
HGB BLDA-MCNC: 8.2 G/DL (ref 12–17)
HGB BLDA-MCNC: 9.1 G/DL (ref 14–18)
HGB BLDA-MCNC: 9.5 G/DL (ref 14–18)
HGB BLDA-MCNC: 9.5 G/DL (ref 14–18)
HGB BLDA-MCNC: 9.6 G/DL (ref 14–18)
HGB BLDA-MCNC: 9.6 G/DL (ref 14–18)
INHALED O2 CONCENTRATION: 32 %
INHALED O2 CONCENTRATION: 36 %
INHALED O2 CONCENTRATION: 44 %
INHALED O2 CONCENTRATION: 48 %
INHALED O2 CONCENTRATION: 50 %
INHALED O2 CONCENTRATION: 70 %
INR PPP: 1.25 (ref 0.89–1.12)
IPAP: 0
IPAP: 16
IPAP: 16
IPAP: 20
Lab: ABNORMAL
MAGNESIUM SERPL-MCNC: 1.9 MG/DL (ref 1.6–2.4)
MAGNESIUM SERPL-MCNC: 2 MG/DL (ref 1.6–2.4)
MAGNESIUM SERPL-MCNC: 2.9 MG/DL (ref 1.6–2.4)
MCH RBC QN AUTO: 33.6 PG (ref 26.6–33)
MCH RBC QN AUTO: 36.6 PG (ref 26.6–33)
MCHC RBC AUTO-ENTMCNC: 32.8 G/DL (ref 31.5–35.7)
MCHC RBC AUTO-ENTMCNC: 34.3 G/DL (ref 31.5–35.7)
MCV RBC AUTO: 111.8 FL (ref 79–97)
MCV RBC AUTO: 97.7 FL (ref 79–97)
METHGB BLD QL: 0.1 % (ref 0–1.5)
METHGB BLD QL: 0.2 % (ref 0–1.5)
METHGB BLD QL: 0.5 % (ref 0–1.5)
METHGB BLD QL: 0.6 % (ref 0–1.5)
MODALITY: ABNORMAL
NOTIFIED BY: ABNORMAL
NOTIFIED WHO: ABNORMAL
OXYHGB MFR BLDV: 86.5 % (ref 94–99)
OXYHGB MFR BLDV: 88.8 % (ref 94–99)
OXYHGB MFR BLDV: 91.2 % (ref 94–99)
OXYHGB MFR BLDV: 93.2 % (ref 94–99)
OXYHGB MFR BLDV: 96.5 % (ref 94–99)
OXYHGB MFR BLDV: 98.3 % (ref 94–99)
PAW @ PEAK INSP FLOW SETTING VENT: 0 CMH2O
PCO2 BLDA: 41.2 MM HG (ref 35–45)
PCO2 BLDA: 42.4 MM HG (ref 35–45)
PCO2 BLDA: 46.7 MM HG (ref 35–45)
PCO2 BLDA: 47.5 MM HG (ref 35–45)
PCO2 BLDA: 49.3 MM HG (ref 35–45)
PCO2 BLDA: 56.2 MM HG (ref 35–45)
PCO2 BLDA: 58.2 MM HG (ref 35–45)
PCO2 BLDA: 60.3 MM HG (ref 35–45)
PCO2 TEMP ADJ BLD: 46.7 MM HG (ref 35–45)
PCO2 TEMP ADJ BLD: 47.5 MM HG (ref 35–45)
PCO2 TEMP ADJ BLD: 49.3 MM HG (ref 35–45)
PCO2 TEMP ADJ BLD: 56.2 MM HG (ref 35–45)
PCO2 TEMP ADJ BLD: 58.2 MM HG (ref 35–45)
PCO2 TEMP ADJ BLD: 60.3 MM HG (ref 35–45)
PH BLDA: 7.19 PH UNITS (ref 7.35–7.45)
PH BLDA: 7.25 PH UNITS (ref 7.35–7.45)
PH BLDA: 7.26 PH UNITS (ref 7.35–7.6)
PH BLDA: 7.29 PH UNITS (ref 7.35–7.45)
PH BLDA: 7.3 PH UNITS (ref 7.35–7.45)
PH BLDA: 7.31 PH UNITS (ref 7.35–7.45)
PH BLDA: 7.31 PH UNITS (ref 7.35–7.6)
PH BLDA: 7.32 PH UNITS (ref 7.35–7.45)
PH, TEMP CORRECTED: 7.19 PH UNITS
PH, TEMP CORRECTED: 7.25 PH UNITS
PH, TEMP CORRECTED: 7.29 PH UNITS
PH, TEMP CORRECTED: 7.3 PH UNITS
PH, TEMP CORRECTED: 7.31 PH UNITS
PH, TEMP CORRECTED: 7.32 PH UNITS
PHOSPHATE SERPL-MCNC: 4.9 MG/DL (ref 2.5–4.5)
PLATELET # BLD AUTO: 140 10*3/MM3 (ref 140–450)
PLATELET # BLD AUTO: 227 10*3/MM3 (ref 140–450)
PMV BLD AUTO: 10.4 FL (ref 6–12)
PMV BLD AUTO: 10.8 FL (ref 6–12)
PO2 BLDA: 166 MM HG (ref 83–108)
PO2 BLDA: 341 MMHG (ref 80–105)
PO2 BLDA: 354 MMHG (ref 80–105)
PO2 BLDA: 54.8 MM HG (ref 83–108)
PO2 BLDA: 57 MM HG (ref 83–108)
PO2 BLDA: 64.9 MM HG (ref 83–108)
PO2 BLDA: 71.6 MM HG (ref 83–108)
PO2 BLDA: 96.6 MM HG (ref 83–108)
PO2 TEMP ADJ BLD: 166 MM HG (ref 83–108)
PO2 TEMP ADJ BLD: 54.8 MM HG (ref 83–108)
PO2 TEMP ADJ BLD: 57 MM HG (ref 83–108)
PO2 TEMP ADJ BLD: 64.9 MM HG (ref 83–108)
PO2 TEMP ADJ BLD: 71.6 MM HG (ref 83–108)
PO2 TEMP ADJ BLD: 96.6 MM HG (ref 83–108)
POTASSIUM BLDA-SCNC: 3.3 MMOL/L (ref 3.5–4.9)
POTASSIUM BLDA-SCNC: 3.4 MMOL/L (ref 3.5–4.9)
POTASSIUM SERPL-SCNC: 3.7 MMOL/L (ref 3.5–5.2)
POTASSIUM SERPL-SCNC: 3.7 MMOL/L (ref 3.5–5.2)
POTASSIUM SERPL-SCNC: 4 MMOL/L (ref 3.5–5.2)
POTASSIUM SERPL-SCNC: 4.1 MMOL/L (ref 3.5–5.2)
PROT SERPL-MCNC: 5.9 G/DL (ref 6–8.5)
PROT SERPL-MCNC: 6 G/DL (ref 6–8.5)
PROTHROMBIN TIME: 15.8 SECONDS (ref 12.2–14.5)
RBC # BLD AUTO: 2.62 10*6/MM3 (ref 3.77–5.28)
RBC # BLD AUTO: 3.07 10*6/MM3 (ref 3.77–5.28)
RH BLD: POSITIVE
SAO2 % BLDA: 100 % (ref 95–98)
SAO2 % BLDA: 100 % (ref 95–98)
SODIUM BLD-SCNC: 142 MMOL/L (ref 138–146)
SODIUM BLD-SCNC: 142 MMOL/L (ref 138–146)
SODIUM SERPL-SCNC: 141 MMOL/L (ref 136–145)
SODIUM SERPL-SCNC: 141 MMOL/L (ref 136–145)
SODIUM SERPL-SCNC: 143 MMOL/L (ref 136–145)
SODIUM SERPL-SCNC: 144 MMOL/L (ref 136–145)
T&S EXPIRATION DATE: NORMAL
TOTAL RATE: 0 BREATHS/MINUTE
TOTAL RATE: 16 BREATHS/MINUTE
TOTAL RATE: 16 BREATHS/MINUTE
TOTAL RATE: 18 BREATHS/MINUTE
UNIT  ABO: NORMAL
UNIT  RH: NORMAL
VENTILATOR MODE: ABNORMAL
VENTILATOR MODE: ABNORMAL
WBC NRBC COR # BLD AUTO: 17.73 10*3/MM3 (ref 3.4–10.8)
WBC NRBC COR # BLD AUTO: 17.93 10*3/MM3 (ref 3.4–10.8)

## 2024-11-12 PROCEDURE — 85027 COMPLETE CBC AUTOMATED: CPT | Performed by: THORACIC SURGERY (CARDIOTHORACIC VASCULAR SURGERY)

## 2024-11-12 PROCEDURE — 25010000003 DEXTROSE 5 % SOLUTION 1,000 ML FLEX CONT: Performed by: NURSE PRACTITIONER

## 2024-11-12 PROCEDURE — P9041 ALBUMIN (HUMAN),5%, 50ML: HCPCS | Performed by: INTERNAL MEDICINE

## 2024-11-12 PROCEDURE — 93321 DOPPLER ECHO F-UP/LMTD STD: CPT | Performed by: INTERNAL MEDICINE

## 2024-11-12 PROCEDURE — 74178 CT ABD&PLV WO CNTR FLWD CNTR: CPT

## 2024-11-12 PROCEDURE — 71045 X-RAY EXAM CHEST 1 VIEW: CPT

## 2024-11-12 PROCEDURE — 82947 ASSAY GLUCOSE BLOOD QUANT: CPT

## 2024-11-12 PROCEDURE — 25010000002 AMIODARONE IN DEXTROSE 5% 150-4.21 MG/100ML-% SOLUTION: Performed by: HOSPITALIST

## 2024-11-12 PROCEDURE — 37617 LIGATION MAJOR ARTERY ABD: CPT | Performed by: PHYSICIAN ASSISTANT

## 2024-11-12 PROCEDURE — 25010000002 EPINEPHRINE 1 MG/ML SOLUTION 30 ML VIAL: Performed by: PHYSICIAN ASSISTANT

## 2024-11-12 PROCEDURE — 25010000003 VASOPRESSIN 0.2 UNITS/ML SOLUTION: Performed by: PHYSICIAN ASSISTANT

## 2024-11-12 PROCEDURE — 25810000003 SODIUM CHLORIDE 0.9 % SOLUTION: Performed by: NURSE ANESTHETIST, CERTIFIED REGISTERED

## 2024-11-12 PROCEDURE — 86901 BLOOD TYPING SEROLOGIC RH(D): CPT | Performed by: THORACIC SURGERY (CARDIOTHORACIC VASCULAR SURGERY)

## 2024-11-12 PROCEDURE — 37617 LIGATION MAJOR ARTERY ABD: CPT | Performed by: THORACIC SURGERY (CARDIOTHORACIC VASCULAR SURGERY)

## 2024-11-12 PROCEDURE — 25010000002 AMIODARONE IN DEXTROSE 5% 150-4.21 MG/100ML-% SOLUTION

## 2024-11-12 PROCEDURE — 25010000002 AMIODARONE IN DEXTROSE 5% 360-4.14 MG/200ML-% SOLUTION: Performed by: HOSPITALIST

## 2024-11-12 PROCEDURE — 84100 ASSAY OF PHOSPHORUS: CPT | Performed by: NURSE PRACTITIONER

## 2024-11-12 PROCEDURE — 25010000002 ONDANSETRON PER 1 MG: Performed by: NURSE ANESTHETIST, CERTIFIED REGISTERED

## 2024-11-12 PROCEDURE — C1751 CATH, INF, PER/CENT/MIDLINE: HCPCS

## 2024-11-12 PROCEDURE — 85014 HEMATOCRIT: CPT

## 2024-11-12 PROCEDURE — 25010000002 MIDAZOLAM PER 1 MG: Performed by: NURSE PRACTITIONER

## 2024-11-12 PROCEDURE — 25010000002 PHENYLEPHRINE 10 MG/ML SOLUTION 5 ML VIAL: Performed by: PHYSICIAN ASSISTANT

## 2024-11-12 PROCEDURE — 25010000002 HYDROMORPHONE PER 4 MG: Performed by: THORACIC SURGERY (CARDIOTHORACIC VASCULAR SURGERY)

## 2024-11-12 PROCEDURE — 94799 UNLISTED PULMONARY SVC/PX: CPT

## 2024-11-12 PROCEDURE — 80053 COMPREHEN METABOLIC PANEL: CPT | Performed by: NURSE PRACTITIONER

## 2024-11-12 PROCEDURE — P9041 ALBUMIN (HUMAN),5%, 50ML: HCPCS

## 2024-11-12 PROCEDURE — 25010000003 VASOPRESSIN 0.2 UNITS/ML SOLUTION: Performed by: NURSE PRACTITIONER

## 2024-11-12 PROCEDURE — 93308 TTE F-UP OR LMTD: CPT

## 2024-11-12 PROCEDURE — 25810000003 SODIUM CHLORIDE 0.9 % SOLUTION 250 ML FLEX CONT: Performed by: PHYSICIAN ASSISTANT

## 2024-11-12 PROCEDURE — 25010000002 CEFAZOLIN PER 500 MG: Performed by: NURSE ANESTHETIST, CERTIFIED REGISTERED

## 2024-11-12 PROCEDURE — 25010000002 HEPARIN (PORCINE) PER 1000 UNITS: Performed by: THORACIC SURGERY (CARDIOTHORACIC VASCULAR SURGERY)

## 2024-11-12 PROCEDURE — 25510000001 IOPAMIDOL 61 % SOLUTION: Performed by: THORACIC SURGERY (CARDIOTHORACIC VASCULAR SURGERY)

## 2024-11-12 PROCEDURE — 86900 BLOOD TYPING SEROLOGIC ABO: CPT | Performed by: THORACIC SURGERY (CARDIOTHORACIC VASCULAR SURGERY)

## 2024-11-12 PROCEDURE — 85027 COMPLETE CBC AUTOMATED: CPT | Performed by: PHYSICIAN ASSISTANT

## 2024-11-12 PROCEDURE — 76937 US GUIDE VASCULAR ACCESS: CPT | Performed by: NURSE PRACTITIONER

## 2024-11-12 PROCEDURE — 86900 BLOOD TYPING SEROLOGIC ABO: CPT

## 2024-11-12 PROCEDURE — 99233 SBSQ HOSP IP/OBS HIGH 50: CPT | Performed by: INTERNAL MEDICINE

## 2024-11-12 PROCEDURE — 83050 HGB METHEMOGLOBIN QUAN: CPT

## 2024-11-12 PROCEDURE — 83735 ASSAY OF MAGNESIUM: CPT | Performed by: THORACIC SURGERY (CARDIOTHORACIC VASCULAR SURGERY)

## 2024-11-12 PROCEDURE — 86850 RBC ANTIBODY SCREEN: CPT | Performed by: THORACIC SURGERY (CARDIOTHORACIC VASCULAR SURGERY)

## 2024-11-12 PROCEDURE — 85610 PROTHROMBIN TIME: CPT | Performed by: NURSE PRACTITIONER

## 2024-11-12 PROCEDURE — 25010000002 ALBUMIN HUMAN 5% PER 50 ML: Performed by: INTERNAL MEDICINE

## 2024-11-12 PROCEDURE — 63710000001 INSULIN LISPRO (HUMAN) PER 5 UNITS: Performed by: INTERNAL MEDICINE

## 2024-11-12 PROCEDURE — 84295 ASSAY OF SERUM SODIUM: CPT

## 2024-11-12 PROCEDURE — 02HV33Z INSERTION OF INFUSION DEVICE INTO SUPERIOR VENA CAVA, PERCUTANEOUS APPROACH: ICD-10-PCS | Performed by: THORACIC SURGERY (CARDIOTHORACIC VASCULAR SURGERY)

## 2024-11-12 PROCEDURE — P9016 RBC LEUKOCYTES REDUCED: HCPCS

## 2024-11-12 PROCEDURE — 25010000002 THIAMINE PER 100 MG: Performed by: NURSE PRACTITIONER

## 2024-11-12 PROCEDURE — 25810000003 SODIUM CHLORIDE 0.9 % SOLUTION: Performed by: PHYSICIAN ASSISTANT

## 2024-11-12 PROCEDURE — C1894 INTRO/SHEATH, NON-LASER: HCPCS | Performed by: THORACIC SURGERY (CARDIOTHORACIC VASCULAR SURGERY)

## 2024-11-12 PROCEDURE — 04LH0ZZ OCCLUSION OF RIGHT EXTERNAL ILIAC ARTERY, OPEN APPROACH: ICD-10-PCS | Performed by: THORACIC SURGERY (CARDIOTHORACIC VASCULAR SURGERY)

## 2024-11-12 PROCEDURE — 25010000002 ALBUMIN HUMAN 5% PER 50 ML

## 2024-11-12 PROCEDURE — 83735 ASSAY OF MAGNESIUM: CPT | Performed by: NURSE PRACTITIONER

## 2024-11-12 PROCEDURE — 93005 ELECTROCARDIOGRAM TRACING: CPT | Performed by: THORACIC SURGERY (CARDIOTHORACIC VASCULAR SURGERY)

## 2024-11-12 PROCEDURE — 04HY32Z INSERTION OF MONITORING DEVICE INTO LOWER ARTERY, PERCUTANEOUS APPROACH: ICD-10-PCS | Performed by: THORACIC SURGERY (CARDIOTHORACIC VASCULAR SURGERY)

## 2024-11-12 PROCEDURE — 93005 ELECTROCARDIOGRAM TRACING: CPT | Performed by: HOSPITALIST

## 2024-11-12 PROCEDURE — 63710000001 INSULIN GLARGINE PER 5 UNITS: Performed by: INTERNAL MEDICINE

## 2024-11-12 PROCEDURE — 93321 DOPPLER ECHO F-UP/LMTD STD: CPT

## 2024-11-12 PROCEDURE — 83605 ASSAY OF LACTIC ACID: CPT | Performed by: NURSE PRACTITIONER

## 2024-11-12 PROCEDURE — 36430 TRANSFUSION BLD/BLD COMPNT: CPT

## 2024-11-12 PROCEDURE — 82803 BLOOD GASES ANY COMBINATION: CPT

## 2024-11-12 PROCEDURE — 25010000002 MAGNESIUM SULFATE 4 GM/100ML SOLUTION: Performed by: THORACIC SURGERY (CARDIOTHORACIC VASCULAR SURGERY)

## 2024-11-12 PROCEDURE — 25010000002 AMIODARONE IN DEXTROSE 5% 360-4.14 MG/200ML-% SOLUTION

## 2024-11-12 PROCEDURE — 93325 DOPPLER ECHO COLOR FLOW MAPG: CPT

## 2024-11-12 PROCEDURE — 82330 ASSAY OF CALCIUM: CPT

## 2024-11-12 PROCEDURE — 99232 SBSQ HOSP IP/OBS MODERATE 35: CPT | Performed by: INTERNAL MEDICINE

## 2024-11-12 PROCEDURE — 63710000001 INSULIN LISPRO (HUMAN) PER 5 UNITS: Performed by: NURSE PRACTITIONER

## 2024-11-12 PROCEDURE — 25010000002 SUGAMMADEX 200 MG/2ML SOLUTION: Performed by: NURSE ANESTHETIST, CERTIFIED REGISTERED

## 2024-11-12 PROCEDURE — 86923 COMPATIBILITY TEST ELECTRIC: CPT

## 2024-11-12 PROCEDURE — 36556 INSERT NON-TUNNEL CV CATH: CPT | Performed by: NURSE PRACTITIONER

## 2024-11-12 PROCEDURE — C1769 GUIDE WIRE: HCPCS | Performed by: THORACIC SURGERY (CARDIOTHORACIC VASCULAR SURGERY)

## 2024-11-12 PROCEDURE — 63710000001 INSULIN LISPRO (HUMAN) PER 5 UNITS: Performed by: PHYSICIAN ASSISTANT

## 2024-11-12 PROCEDURE — 25510000002 IODIXANOL PER 1 ML: Performed by: THORACIC SURGERY (CARDIOTHORACIC VASCULAR SURGERY)

## 2024-11-12 PROCEDURE — 82375 ASSAY CARBOXYHB QUANT: CPT

## 2024-11-12 PROCEDURE — 84132 ASSAY OF SERUM POTASSIUM: CPT

## 2024-11-12 PROCEDURE — 94660 CPAP INITIATION&MGMT: CPT

## 2024-11-12 PROCEDURE — 36620 INSERTION CATHETER ARTERY: CPT | Performed by: NURSE PRACTITIONER

## 2024-11-12 PROCEDURE — 93325 DOPPLER ECHO COLOR FLOW MAPG: CPT | Performed by: INTERNAL MEDICINE

## 2024-11-12 PROCEDURE — 75625 CONTRAST EXAM ABDOMINL AORTA: CPT | Performed by: THORACIC SURGERY (CARDIOTHORACIC VASCULAR SURGERY)

## 2024-11-12 PROCEDURE — 93308 TTE F-UP OR LMTD: CPT | Performed by: INTERNAL MEDICINE

## 2024-11-12 PROCEDURE — 82805 BLOOD GASES W/O2 SATURATION: CPT

## 2024-11-12 PROCEDURE — 25010000002 HYDROMORPHONE PER 4 MG: Performed by: NURSE ANESTHETIST, CERTIFIED REGISTERED

## 2024-11-12 RX ORDER — ALBUMIN, HUMAN INJ 5% 5 %
500 SOLUTION INTRAVENOUS ONCE
Status: COMPLETED | OUTPATIENT
Start: 2024-11-12 | End: 2024-11-12

## 2024-11-12 RX ORDER — ROCURONIUM BROMIDE 10 MG/ML
INJECTION, SOLUTION INTRAVENOUS AS NEEDED
Status: DISCONTINUED | OUTPATIENT
Start: 2024-11-12 | End: 2024-11-12 | Stop reason: SURG

## 2024-11-12 RX ORDER — MAGNESIUM SULFATE HEPTAHYDRATE 40 MG/ML
4 INJECTION, SOLUTION INTRAVENOUS ONCE
Status: COMPLETED | OUTPATIENT
Start: 2024-11-12 | End: 2024-11-12

## 2024-11-12 RX ORDER — CALCIUM CHLORIDE 100 MG/ML
INJECTION INTRAVENOUS; INTRAVENTRICULAR AS NEEDED
Status: DISCONTINUED | OUTPATIENT
Start: 2024-11-12 | End: 2024-11-12 | Stop reason: SURG

## 2024-11-12 RX ORDER — ONDANSETRON 2 MG/ML
4 INJECTION INTRAMUSCULAR; INTRAVENOUS ONCE AS NEEDED
Status: DISCONTINUED | OUTPATIENT
Start: 2024-11-12 | End: 2024-11-13

## 2024-11-12 RX ORDER — NALOXONE HCL 0.4 MG/ML
0.4 VIAL (ML) INJECTION AS NEEDED
Status: DISCONTINUED | OUTPATIENT
Start: 2024-11-12 | End: 2024-11-16

## 2024-11-12 RX ORDER — AMIODARONE HYDROCHLORIDE 200 MG/1
200 TABLET ORAL EVERY 12 HOURS
Status: DISCONTINUED | OUTPATIENT
Start: 2024-11-20 | End: 2024-11-12

## 2024-11-12 RX ORDER — AMIODARONE HYDROCHLORIDE 200 MG/1
200 TABLET ORAL ONCE
Status: DISCONTINUED | OUTPATIENT
Start: 2024-11-13 | End: 2024-11-12

## 2024-11-12 RX ORDER — SODIUM CHLORIDE 9 MG/ML
INJECTION, SOLUTION INTRAVENOUS CONTINUOUS PRN
Status: DISCONTINUED | OUTPATIENT
Start: 2024-11-12 | End: 2024-11-12 | Stop reason: SURG

## 2024-11-12 RX ORDER — HYDRALAZINE HYDROCHLORIDE 20 MG/ML
5 INJECTION INTRAMUSCULAR; INTRAVENOUS
Status: DISCONTINUED | OUTPATIENT
Start: 2024-11-12 | End: 2024-11-16

## 2024-11-12 RX ORDER — AMIODARONE HYDROCHLORIDE 200 MG/1
200 TABLET ORAL DAILY
Status: DISCONTINUED | OUTPATIENT
Start: 2024-12-04 | End: 2024-11-12

## 2024-11-12 RX ORDER — AMIODARONE HYDROCHLORIDE 200 MG/1
200 TABLET ORAL EVERY 12 HOURS
Status: DISCONTINUED | OUTPATIENT
Start: 2024-11-20 | End: 2024-11-13

## 2024-11-12 RX ORDER — LIDOCAINE HYDROCHLORIDE 10 MG/ML
INJECTION, SOLUTION EPIDURAL; INFILTRATION; INTRACAUDAL; PERINEURAL
Status: ACTIVE
Start: 2024-11-12 | End: 2024-11-12

## 2024-11-12 RX ORDER — ALBUMIN, HUMAN INJ 5% 5 %
SOLUTION INTRAVENOUS
Status: COMPLETED
Start: 2024-11-12 | End: 2024-11-12

## 2024-11-12 RX ORDER — IODIXANOL 320 MG/ML
INJECTION, SOLUTION INTRAVASCULAR AS NEEDED
Status: DISCONTINUED | OUTPATIENT
Start: 2024-11-12 | End: 2024-11-12 | Stop reason: HOSPADM

## 2024-11-12 RX ORDER — CEFAZOLIN SODIUM 1 G/3ML
INJECTION, POWDER, FOR SOLUTION INTRAMUSCULAR; INTRAVENOUS AS NEEDED
Status: DISCONTINUED | OUTPATIENT
Start: 2024-11-12 | End: 2024-11-12 | Stop reason: SURG

## 2024-11-12 RX ORDER — ONDANSETRON 2 MG/ML
INJECTION INTRAMUSCULAR; INTRAVENOUS AS NEEDED
Status: DISCONTINUED | OUTPATIENT
Start: 2024-11-12 | End: 2024-11-12 | Stop reason: SURG

## 2024-11-12 RX ORDER — SODIUM CHLORIDE 0.9 % (FLUSH) 0.9 %
3-10 SYRINGE (ML) INJECTION AS NEEDED
Status: DISCONTINUED | OUTPATIENT
Start: 2024-11-12 | End: 2024-11-19

## 2024-11-12 RX ORDER — INSULIN LISPRO 100 [IU]/ML
3-14 INJECTION, SOLUTION INTRAVENOUS; SUBCUTANEOUS
Status: DISCONTINUED | OUTPATIENT
Start: 2024-11-12 | End: 2024-11-20 | Stop reason: HOSPADM

## 2024-11-12 RX ORDER — OXYCODONE AND ACETAMINOPHEN 5; 325 MG/1; MG/1
1 TABLET ORAL EVERY 4 HOURS PRN
Status: DISPENSED | OUTPATIENT
Start: 2024-11-12 | End: 2024-11-17

## 2024-11-12 RX ORDER — IOPAMIDOL 612 MG/ML
95 INJECTION, SOLUTION INTRAVASCULAR
Status: COMPLETED | OUTPATIENT
Start: 2024-11-12 | End: 2024-11-12

## 2024-11-12 RX ORDER — LABETALOL HYDROCHLORIDE 5 MG/ML
5 INJECTION, SOLUTION INTRAVENOUS
Status: DISCONTINUED | OUTPATIENT
Start: 2024-11-12 | End: 2024-11-13

## 2024-11-12 RX ORDER — HYDROMORPHONE HYDROCHLORIDE 1 MG/ML
0.5 INJECTION, SOLUTION INTRAMUSCULAR; INTRAVENOUS; SUBCUTANEOUS
Status: DISCONTINUED | OUTPATIENT
Start: 2024-11-12 | End: 2024-11-13

## 2024-11-12 RX ORDER — SODIUM CHLORIDE 9 MG/ML
9 INJECTION, SOLUTION INTRAVENOUS AS NEEDED
Status: ACTIVE | OUTPATIENT
Start: 2024-11-12 | End: 2024-11-13

## 2024-11-12 RX ORDER — SODIUM CHLORIDE 0.9 % (FLUSH) 0.9 %
3 SYRINGE (ML) INJECTION EVERY 12 HOURS SCHEDULED
Status: DISCONTINUED | OUTPATIENT
Start: 2024-11-12 | End: 2024-11-16

## 2024-11-12 RX ORDER — PROMETHAZINE HYDROCHLORIDE 25 MG/1
25 TABLET ORAL ONCE AS NEEDED
Status: DISCONTINUED | OUTPATIENT
Start: 2024-11-12 | End: 2024-11-13

## 2024-11-12 RX ORDER — AMIODARONE HYDROCHLORIDE 200 MG/1
200 TABLET ORAL DAILY
Status: DISCONTINUED | OUTPATIENT
Start: 2024-12-04 | End: 2024-11-13

## 2024-11-12 RX ORDER — SODIUM CHLORIDE, SODIUM LACTATE, POTASSIUM CHLORIDE, CALCIUM CHLORIDE 600; 310; 30; 20 MG/100ML; MG/100ML; MG/100ML; MG/100ML
9 INJECTION, SOLUTION INTRAVENOUS CONTINUOUS
Status: ACTIVE | OUTPATIENT
Start: 2024-11-12 | End: 2024-11-13

## 2024-11-12 RX ORDER — PROMETHAZINE HYDROCHLORIDE 25 MG/1
25 SUPPOSITORY RECTAL ONCE AS NEEDED
Status: DISCONTINUED | OUTPATIENT
Start: 2024-11-12 | End: 2024-11-13

## 2024-11-12 RX ORDER — INSULIN LISPRO 100 [IU]/ML
10 INJECTION, SOLUTION INTRAVENOUS; SUBCUTANEOUS ONCE
Status: COMPLETED | OUTPATIENT
Start: 2024-11-12 | End: 2024-11-12

## 2024-11-12 RX ORDER — METOPROLOL TARTRATE 1 MG/ML
5 INJECTION, SOLUTION INTRAVENOUS EVERY 6 HOURS
Status: COMPLETED | OUTPATIENT
Start: 2024-11-12 | End: 2024-11-12

## 2024-11-12 RX ORDER — FENTANYL CITRATE 50 UG/ML
50 INJECTION, SOLUTION INTRAMUSCULAR; INTRAVENOUS
Status: DISCONTINUED | OUTPATIENT
Start: 2024-11-12 | End: 2024-11-16

## 2024-11-12 RX ORDER — DROPERIDOL 2.5 MG/ML
0.62 INJECTION, SOLUTION INTRAMUSCULAR; INTRAVENOUS
Status: DISCONTINUED | OUTPATIENT
Start: 2024-11-12 | End: 2024-11-13

## 2024-11-12 RX ORDER — AMIODARONE HYDROCHLORIDE 200 MG/1
200 TABLET ORAL ONCE
Status: DISCONTINUED | OUTPATIENT
Start: 2024-11-13 | End: 2024-11-13

## 2024-11-12 RX ORDER — AMIODARONE HYDROCHLORIDE 200 MG/1
200 TABLET ORAL EVERY 8 HOURS
Status: DISCONTINUED | OUTPATIENT
Start: 2024-11-14 | End: 2024-11-13

## 2024-11-12 RX ORDER — ETOMIDATE 2 MG/ML
INJECTION INTRAVENOUS AS NEEDED
Status: DISCONTINUED | OUTPATIENT
Start: 2024-11-12 | End: 2024-11-12 | Stop reason: SURG

## 2024-11-12 RX ORDER — DROPERIDOL 2.5 MG/ML
0.62 INJECTION, SOLUTION INTRAMUSCULAR; INTRAVENOUS ONCE AS NEEDED
Status: DISCONTINUED | OUTPATIENT
Start: 2024-11-12 | End: 2024-11-13

## 2024-11-12 RX ORDER — IPRATROPIUM BROMIDE AND ALBUTEROL SULFATE 2.5; .5 MG/3ML; MG/3ML
3 SOLUTION RESPIRATORY (INHALATION) ONCE AS NEEDED
Status: DISCONTINUED | OUTPATIENT
Start: 2024-11-12 | End: 2024-11-16

## 2024-11-12 RX ORDER — LIDOCAINE HYDROCHLORIDE 10 MG/ML
5 INJECTION, SOLUTION INFILTRATION; PERINEURAL ONCE
Status: DISCONTINUED | OUTPATIENT
Start: 2024-11-12 | End: 2024-11-16

## 2024-11-12 RX ORDER — POTASSIUM CHLORIDE 750 MG/1
20 CAPSULE, EXTENDED RELEASE ORAL ONCE
Status: COMPLETED | OUTPATIENT
Start: 2024-11-12 | End: 2024-11-12

## 2024-11-12 RX ORDER — HYDROCODONE BITARTRATE AND ACETAMINOPHEN 5; 325 MG/1; MG/1
1 TABLET ORAL ONCE AS NEEDED
Status: DISCONTINUED | OUTPATIENT
Start: 2024-11-12 | End: 2024-11-13

## 2024-11-12 RX ORDER — AMIODARONE HYDROCHLORIDE 200 MG/1
200 TABLET ORAL EVERY 8 HOURS
Status: DISCONTINUED | OUTPATIENT
Start: 2024-11-13 | End: 2024-11-12

## 2024-11-12 RX ORDER — MIDAZOLAM HYDROCHLORIDE 1 MG/ML
1 INJECTION, SOLUTION INTRAMUSCULAR; INTRAVENOUS
Status: DISPENSED | OUTPATIENT
Start: 2024-11-12 | End: 2024-11-12

## 2024-11-12 RX ADMIN — ALBUMIN, HUMAN INJ 5% 500 ML: 5 SOLUTION at 13:43

## 2024-11-12 RX ADMIN — SODIUM BICARBONATE 100 MEQ: 84 INJECTION INTRAVENOUS at 05:50

## 2024-11-12 RX ADMIN — POTASSIUM CHLORIDE 20 MEQ: 750 CAPSULE, EXTENDED RELEASE ORAL at 15:14

## 2024-11-12 RX ADMIN — VASOPRESSIN IN 0.9% SODIUM CHLORIDE 0.03 UNITS/MIN: 20 INJECTION INTRAVENOUS at 05:07

## 2024-11-12 RX ADMIN — AMIODARONE HYDROCHLORIDE 1 MG/MIN: 1.8 INJECTION, SOLUTION INTRAVENOUS at 03:14

## 2024-11-12 RX ADMIN — AMIODARONE HYDROCHLORIDE 1 MG/MIN: 1.8 INJECTION, SOLUTION INTRAVENOUS at 19:50

## 2024-11-12 RX ADMIN — MUPIROCIN 1 APPLICATION: 20 OINTMENT TOPICAL at 20:38

## 2024-11-12 RX ADMIN — VASOPRESSIN IN 0.9% SODIUM CHLORIDE 0.03 UNITS/MIN: 20 INJECTION INTRAVENOUS at 20:35

## 2024-11-12 RX ADMIN — SUGAMMADEX 200 MG: 100 INJECTION, SOLUTION INTRAVENOUS at 09:01

## 2024-11-12 RX ADMIN — INSULIN LISPRO 10 UNITS: 100 INJECTION, SOLUTION INTRAVENOUS; SUBCUTANEOUS at 18:13

## 2024-11-12 RX ADMIN — ALBUMIN (HUMAN) 500 ML: 12.5 INJECTION, SOLUTION INTRAVENOUS at 13:43

## 2024-11-12 RX ADMIN — ONDANSETRON 4 MG: 2 INJECTION INTRAMUSCULAR; INTRAVENOUS at 08:58

## 2024-11-12 RX ADMIN — VASOPRESSIN IN 0.9% SODIUM CHLORIDE 0.03 UNITS/MIN: 20 INJECTION INTRAVENOUS at 10:00

## 2024-11-12 RX ADMIN — INSULIN LISPRO 6 UNITS: 100 INJECTION, SOLUTION INTRAVENOUS; SUBCUTANEOUS at 12:50

## 2024-11-12 RX ADMIN — AMIODARONE HYDROCHLORIDE 150 MG: 1.5 INJECTION, SOLUTION INTRAVENOUS at 17:55

## 2024-11-12 RX ADMIN — MAGNESIUM SULFATE IN WATER FOR 4 G: 40 INJECTION INTRAVENOUS at 12:36

## 2024-11-12 RX ADMIN — MIDAZOLAM HYDROCHLORIDE 1 MG: 1 INJECTION, SOLUTION INTRAMUSCULAR; INTRAVENOUS at 03:33

## 2024-11-12 RX ADMIN — SODIUM BICARBONATE 50 MEQ: 84 INJECTION INTRAVENOUS at 07:45

## 2024-11-12 RX ADMIN — EPINEPHRINE 0.1 MCG/KG/MIN: 1 INJECTION INTRAMUSCULAR; INTRAVENOUS; SUBCUTANEOUS at 16:53

## 2024-11-12 RX ADMIN — AMIODARONE HYDROCHLORIDE 0.5 MG/MIN: 1.8 INJECTION, SOLUTION INTRAVENOUS at 10:00

## 2024-11-12 RX ADMIN — IOPAMIDOL 95 ML: 612 INJECTION, SOLUTION INTRAVENOUS at 06:48

## 2024-11-12 RX ADMIN — SODIUM CHLORIDE: 9 INJECTION, SOLUTION INTRAVENOUS at 07:12

## 2024-11-12 RX ADMIN — PHENYLEPHRINE HYDROCHLORIDE 2.5 MCG/KG/MIN: 10 INJECTION INTRAVENOUS at 01:19

## 2024-11-12 RX ADMIN — AMIODARONE HYDROCHLORIDE 150 MG: 1.5 INJECTION, SOLUTION INTRAVENOUS at 03:00

## 2024-11-12 RX ADMIN — INSULIN GLARGINE 15 UNITS: 100 INJECTION, SOLUTION SUBCUTANEOUS at 20:38

## 2024-11-12 RX ADMIN — PHENYLEPHRINE HYDROCHLORIDE 3 MCG/KG/MIN: 10 INJECTION INTRAVENOUS at 05:54

## 2024-11-12 RX ADMIN — ASPIRIN 81 MG: 81 TABLET, COATED ORAL at 12:36

## 2024-11-12 RX ADMIN — CEFAZOLIN 2 G: 1 INJECTION, POWDER, FOR SOLUTION INTRAMUSCULAR; INTRAVENOUS at 07:24

## 2024-11-12 RX ADMIN — THIAMINE HYDROCHLORIDE 500 MG: 100 INJECTION, SOLUTION INTRAMUSCULAR; INTRAVENOUS at 22:14

## 2024-11-12 RX ADMIN — POTASSIUM CHLORIDE 10 MEQ: 750 CAPSULE, EXTENDED RELEASE ORAL at 20:38

## 2024-11-12 RX ADMIN — ETOMIDATE INJECTION 14 MG: 2 SOLUTION INTRAVENOUS at 07:18

## 2024-11-12 RX ADMIN — SENNOSIDES AND DOCUSATE SODIUM 2 TABLET: 50; 8.6 TABLET ORAL at 20:38

## 2024-11-12 RX ADMIN — OXYCODONE HYDROCHLORIDE AND ACETAMINOPHEN 1 TABLET: 5; 325 TABLET ORAL at 12:36

## 2024-11-12 RX ADMIN — HYDROMORPHONE HYDROCHLORIDE 0.5 MG: 1 INJECTION, SOLUTION INTRAMUSCULAR; INTRAVENOUS; SUBCUTANEOUS at 18:12

## 2024-11-12 RX ADMIN — METOPROLOL TARTRATE 5 MG: 5 INJECTION INTRAVENOUS at 02:09

## 2024-11-12 RX ADMIN — CALCIUM CHLORIDE 0.5 G: 100 INJECTION, SOLUTION INTRAVENOUS at 08:43

## 2024-11-12 RX ADMIN — PHENYLEPHRINE HYDROCHLORIDE 1 MCG/KG/MIN: 10 INJECTION INTRAVENOUS at 19:15

## 2024-11-12 RX ADMIN — Medication 3 ML: at 20:38

## 2024-11-12 RX ADMIN — NYSTATIN 1 APPLICATION: 100000 POWDER TOPICAL at 10:03

## 2024-11-12 RX ADMIN — HYDROMORPHONE HYDROCHLORIDE 0.5 MG: 1 INJECTION, SOLUTION INTRAMUSCULAR; INTRAVENOUS; SUBCUTANEOUS at 04:22

## 2024-11-12 RX ADMIN — ALBUMIN (HUMAN) 500 ML: 12.5 INJECTION, SOLUTION INTRAVENOUS at 05:00

## 2024-11-12 RX ADMIN — ROCURONIUM BROMIDE 50 MG: 10 INJECTION INTRAVENOUS at 07:18

## 2024-11-12 RX ADMIN — SODIUM BICARBONATE 150 MEQ: 84 INJECTION, SOLUTION INTRAVENOUS at 06:11

## 2024-11-12 RX ADMIN — DOCUSATE SODIUM 100 MG: 100 CAPSULE, LIQUID FILLED ORAL at 20:38

## 2024-11-12 RX ADMIN — SODIUM BICARBONATE 50 MEQ: 84 INJECTION INTRAVENOUS at 00:51

## 2024-11-12 RX ADMIN — INSULIN LISPRO 10 UNITS: 100 INJECTION, SOLUTION INTRAVENOUS; SUBCUTANEOUS at 20:38

## 2024-11-12 RX ADMIN — AMIODARONE HYDROCHLORIDE 1 MG/MIN: 1.8 INJECTION, SOLUTION INTRAVENOUS at 03:15

## 2024-11-12 RX ADMIN — NYSTATIN 1 APPLICATION: 100000 POWDER TOPICAL at 20:39

## 2024-11-12 NOTE — CASE MANAGEMENT/SOCIAL WORK
Continued Stay Note  [facility]     Patient Name: Jill Miller  MRN: [id number]  Today's Date: 11/12/2024    Admit Date: 11/11/2024    Plan: TBD   Discharge Plan       Row Name 11/12/24 1040       Plan    Plan TBD    Patient/Family in Agreement with Plan yes    Plan Comments Discussed in MDR. Pt was in procedure during am rounds. Will follow up at more appropriate time for IDP.    Final Discharge Disposition Code 01 - home or self-care                   Discharge Codes    No documentation.                       Jo-Ann Gomez RN

## 2024-11-12 NOTE — ANESTHESIA PREPROCEDURE EVALUATION
Anesthesia Evaluation     Patient summary reviewed and Nursing notes reviewed   no history of anesthetic complications:   NPO Solid Status: > 8 hours  NPO Liquid Status: > 8 hours           Airway   Mallampati: II  TM distance: >3 FB  Neck ROM: full  No difficulty expected  Dental    (+) edentulous    Pulmonary    (+) a smoker Current, lung cancer, COPD,decreased breath sounds  Cardiovascular     ECG reviewed  Rhythm: regular  Rate: normal    (+) hypertension, valvular problems/murmurs (s/p TAVR 11/11/24) AS, dysrhythmias Atrial Fib, CHF , murmur, hyperlipidemia    ROS comment: Echo 9/2024  Left ventricular systolic function is normal. Estimated left ventricular EF = 60%  ·  Left ventricular wall thickness is consistent with mild concentric hypertrophy.  ·  The left atrial cavity is moderately dilated.  ·  Bioprosthetic aortic valve present.  Severe bioprosthetic aortic valve stenosis (mean gradient 52 mmHg).  Moderate aortic valve insufficiency.  ·  There is a bioprosthetic mitral valve present.  Elevated gradient across mitral valve consistent with moderate to severe mitral stenosis (mean gradient 13 mmHg)  ·  There is  a tricuspid valve ring present.  ·  Estimated right ventricular systolic pressure from tricuspid regurgitation is markedly elevated (58 mmHg).     S/p TAVR 11/11/24    Neuro/Psych  (-) seizures, TIA, CVA  GI/Hepatic/Renal/Endo    (+) obesity, diabetes mellitus type 2, thyroid problem (h/o graves disease) hypothyroidism  (-) GERD    Musculoskeletal     Abdominal    Substance History      OB/GYN          Other   blood dyscrasia anemia,   history of cancer    ROS/Med Hx Other: S/p TAVR 11/11/24  Now with groin hematoma; bring back for exploration  Pt on pressors and had volume resuscitation overnight              Anesthesia Plan    ASA 4 - emergent     general     intravenous induction     Anesthetic plan, risks, benefits, and alternatives have been provided, discussed and informed consent has been  obtained with: patient.    Plan discussed with CRNA.    CODE STATUS:

## 2024-11-12 NOTE — SIGNIFICANT NOTE
Overnight BP had remained stable and UOP was WNL, however LA has continued to climb.       Volume resuscitation given w/o improvement.  Arterial line stopped functioning and there was difficulty w/ obtaining new arterial access.  A new CVC was placed given the patient's worsening clinical status.      New arterial lines were attempted in both upper extremities w/o success.  A R Fem arterial line was placed.      Labs show a drop in HGB    Results from last 7 days   Lab Units 11/12/24  0450 11/11/24  2054 11/11/24  1702   WBC 10*3/mm3 17.93* 17.47* 21.71*   HEMOGLOBIN g/dL 9.6* 11.7* 11.6*   HEMATOCRIT % 29.3* 34.9 33.6*   PLATELETS 10*3/mm3 227 317 322   1 unti was ordered    Dr. Cheatham has been notified of events and ordered an additional 1 u prbc, is planning on taking patient to the OR.    The patient's acidosis has persisted and we are treating w/ bicarbonate pushes and gtt.  ABG q 4

## 2024-11-12 NOTE — PROCEDURES
Insert Arterial Line    Date/Time: 11/12/2024 5:42 AM    Performed by: Cipriano Cardoza APRN  Authorized by: Cipriano Cardoza APRN    Universal Protocol:     Emergent situation      Required items: Required blood products, implants, devices and special equipment available      Patient identity confirmed:  Hospital-assigned identification number, provided demographic data, arm band and verbally with patient    Time out: Immediately prior to the procedure a time out was called    A time out verifies correct patient, procedure, equipment, support staff and site/side marked as required:   Preparation:     Preparation: Patient was prepped and draped in usual sterile fashion    Indications:     Indications: multiple ABGs and hemodynamic monitoring    Location:     Location:  Right femoral  Anesthesia:     Anesthesia:  Local infiltration    Local anesthetic:  Lidocaine 1% without epinephrine    Anesthetic total (ml):  5    Patient sedated: Yes      Sedation:  Midazolam    Analgesia:  Hydromorphone    Vital signs: Vital signs monitored during sedation    Procedure Details:     Ultrasound Guidance: yes      Zen's test normal?: Yes      Needle gauge:  18    Seldinger technique: Seldinger technique used      Number of attempts:  2  Post-procedure:     Post-procedure:  Dressing applied and line sutured    Post-procedure CMS:  Normal     patient tolerated the procedure well with no immediate complications     Attempt previously @ l radial unsuccessful.  Was able to access vessel, but catheter would not advance down wire.  Dr. Pendleton had similar issues w/ a right radial approach and so a femoral approach was made

## 2024-11-12 NOTE — ANESTHESIA PROCEDURE NOTES
Arterial Line      Patient reassessed immediately prior to procedure    Patient location during procedure: OR  Stop Time:11/12/2024 8:08 AM       Line placed for hemodynamic monitoring.  Performed By   ALEX/CAA: Dani Ricardo CRNA   Preanesthetic Checklist  Completed: patient identified, IV checked, site marked, risks and benefits discussed, surgical consent, monitors and equipment checked, pre-op evaluation and timeout performed  Arterial Line Prep    Sterile Tech: cap, gloves and sterile barriers  Prep: ChloraPrep  Patient monitoring: blood pressure monitoring, continuous pulse oximetry and EKG  Arterial Line Procedure   Laterality:left  Location:  ulnar artery  Catheter size: 20 G   Guidance: ultrasound guided  PROCEDURE NOTE/ULTRASOUND INTERPRETATION.  Using ultrasound guidance the potential vascular sites for insertion of the catheter were visualized to determine the patency of the vessel to be used for vascular access.  After selecting the appropriate site for insertion, the needle was visualized under ultrasound being inserted into the ulnar artery, followed by ultrasound confirmation of wire and catheter placement. There were no abnormalities seen on ultrasound; an image was taken; and the patient tolerated the procedure with no complications.   Number of attempts: 1  Successful placement: yes Images: still images not obtained  Post Assessment   Dressing Type: line sutured, occlusive dressing applied, secured with tape and wrist guard applied.   Complications no  Circ/Move/Sens Assessment: normal and unchanged.   Patient Tolerance: patient tolerated the procedure well with no apparent complications

## 2024-11-12 NOTE — PROGRESS NOTES
Cardiothoracic Surgery Progress Note      POD # 1 s/p TAVR     LOS: 1 day      Subjective:  Placed on Harshil 3, Epi 0.05 and Vaso 0.03 overnight.  Notified of change this morning.  Patient on Bipap.  She only complains of back pain and thirst.    Objective:  Vital Signs  Temp:  [97.5 °F (36.4 °C)-99.3 °F (37.4 °C)] 99 °F (37.2 °C)  Heart Rate:  [] 103  Resp:  [16-28] 22  BP: ()/(28-75) 86/55  Arterial Line BP: ()/() 133/63    Physical Exam:   General Appearance: alert, appears stated age and cooperative   Lungs: clear to auscultation, respirations regular, respirations even, and respirations unlabored   Heart: normal S1, S2 and no murmur, no gallop, no rub, tachycardia   Skin: Incision c/d/i   Ab: soft, NT, ND.  No guarding or rebound  Results:    Results from last 7 days   Lab Units 11/12/24  0450   WBC 10*3/mm3 17.93*   HEMOGLOBIN g/dL 9.6*   HEMATOCRIT % 29.3*   PLATELETS 10*3/mm3 227     Results from last 7 days   Lab Units 11/12/24  0450   SODIUM mmol/L 141   POTASSIUM mmol/L 4.0   CHLORIDE mmol/L 101   CO2 mmol/L 16.0*   BUN mg/dL 17   CREATININE mg/dL 1.24*   GLUCOSE mg/dL 364*   CALCIUM mg/dL 7.8*       Assessment:  POD # 1 s/p TAVR  Hypovolemic shock from acute blood loss anemia    Plan:  Repeat CT scan of the abdomen and pelvis demonstrates a worsening pelvic collection and faint extravasation medially that I suspect is from the venous sheath given it does not communicate with the femoral or external iliac artery.    Proceed to the operating room emergently for right groin exploration.    Transfuse 2 units pRBCs and 3 amps bicarb  I discussed this with the patient in person and with her  over the phone.      Ajay Cheatham MD  11/12/24  07:04 EST

## 2024-11-12 NOTE — PROGRESS NOTES
Intensive Care Follow-up     Hospital:  LOS: 1 day   Ms. Jill Miller, 64 y.o. female is followed for:   Prosthetic aortic valve stenosis        Subjective     Jill Miller is a 64 year-old female that presents to [facility] ICU postoperatively from scheduled TAVR today.      Patient has a PMH of HFpEF, valvular disease s/p prosthetic MVR/AVR, pulmonary HTN, lung cancer s/p ALBINA lobectomy (2017), multiple cardiac valve replacements, HTN, hypothyroidism, hyperlipidemia, HFpEF, T2DM, COPD, tobacco use, and PAF s/p ANGELA/Maze (2018), cardioversion and PVA (2022).   Patient recently admitted to [facility] in September with hypoxic respiratory failure 2* rhinovirus and exacerbation of heart failure. 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 Cardiology/CTS consulted and patient underwent work-up for TAVR.      TIFFANIE showed LVEF 51-55%, moderate to severe AS, moderate MS, tricuspid ring noted, and bi-atrial cavity dilation. CTA TAVR negative for acute abnormality / noted emphysema.   Interval History:  The chart has been reviewed.  The events of the night been reviewed as has the operative note from today.  The patient has been weaning from pressors.  Acidosis continues to improve.  The patient is awake postoperatively though very drowsy.    The patient's past medical, surgical and social history were reviewed and updated in Epic as appropriate.        Objective     Infusions:  amiodarone, 0.5 mg/min, Last Rate: 0.5 mg/min (11/12/24 1000)  EPINEPHrine, 0.02-0.3 mcg/kg/min, Last Rate: 0.05 mcg/kg/min (11/12/24 0930)  niCARdipine, 5-15 mg/hr  phenylephrine, 0.5-3 mcg/kg/min, Last Rate: 0.5 mcg/kg/min (11/12/24 1029)  sodium bicarbonate 8.4 % 150 mEq in dextrose (D5W) 5 % 1,000 mL infusion (greater than 100  "mEq), 150 mEq  vasopressin, 0.03 Units/min, Last Rate: 0.03 Units/min (11/12/24 1000)      Medications:  [START ON 11/13/2024] amiodarone, 200 mg, Oral, Once   Followed by  [START ON 11/13/2024] amiodarone, 200 mg, Oral, Q8H   Followed by  [START ON 11/20/2024] amiodarone, 200 mg, Oral, Q12H   Followed by  [START ON 12/4/2024] amiodarone, 200 mg, Oral, Daily  aspirin, 81 mg, Oral, Daily  docusate sodium, 100 mg, Oral, BID  [Held by provider] empagliflozin, 10 mg, Oral, Daily  [Held by provider] furosemide, 40 mg, Oral, BID  [Transfer Hold] insulin glargine, 10 Units, Subcutaneous, Nightly  [Transfer Hold] insulin lispro, 2-9 Units, Subcutaneous, 4x Daily AC & at Bedtime  levothyroxine, 200 mcg, Oral, Q AM  [Transfer Hold] lidocaine, 5 mL, Infiltration, Once  metoprolol tartrate, 25 mg, Oral, Q12H  [Transfer Hold] mupirocin, 1 Application, Each Nare, BID  nystatin, 1 Application, Topical, BID  polyethylene glycol, 17 g, Oral, Daily  potassium chloride, 20 mEq, Oral, Once  potassium chloride, 10 mEq, Oral, BID  pravastatin, 20 mg, Oral, Nightly  senna-docusate sodium, 2 tablet, Oral, Nightly  [Transfer Hold] sodium bicarbonate, 50 mEq, Intravenous, Once  [Transfer Hold] thiamine (B-1) IV, 500 mg, Intravenous, Q8H        Vital Sign Min/Max for last 24 hours  Temp  Min: 98.1 °F (36.7 °C)  Max: 99.3 °F (37.4 °C)   BP  Min: 55/33  Max: 149/95   Pulse  Min: 47  Max: 160   Resp  Min: 16  Max: 28   SpO2  Min: 90 %  Max: 100 %   Flow (L/min) (Oxygen Therapy)  Min: 4  Max: 6       Input/Output for last 24 hour shift  11/11 0701 - 11/12 0700  In: 3231.4 [I.V.:2187.4]  Out: 1160 [Urine:1160]   S RR:  [7-13] 7  Objective:  General Appearance:  Uncomfortable, ill-appearing and in no acute distress.    Vital signs: (most recent): Blood pressure 149/95, pulse 75, temperature 98.4 °F (36.9 °C), temperature source Bladder, resp. rate 16, height 157.5 cm (62\"), weight 72.1 kg (159 lb), SpO2 95%.    Lungs:  Normal effort and normal " respiratory rate.  No rales, decreased breath sounds, wheezes or rhonchi.    Heart: Normal rate.  Regular rhythm.  S1 normal and S2 normal.    Abdomen: Abdomen is soft.  Bowel sounds are normal.   There is generalized tenderness.  There is no rebound tenderness.     Extremities: Normal range of motion.    Neurological: (Drowsy, oriented).    Pupils:  Pupils are equal, round, and reactive to light.                Results from last 7 days   Lab Units 11/12/24  0946 11/12/24  0854 11/12/24  0735 11/12/24 0450 11/11/24 2054   WBC 10*3/mm3 17.73*  --   --  17.93* 17.47*   HEMOGLOBIN g/dL 10.3*  --   --  9.6* 11.7*   HEMOGLOBIN, POC g/dL  --  8.2* 6.5*  --   --    PLATELETS 10*3/mm3 140  --   --  227 317     Results from last 7 days   Lab Units 11/12/24  0946 11/12/24  0450 11/11/24 2054 11/11/24  1154 11/08/24  1031   SODIUM mmol/L 143 141 139   < > 140   POTASSIUM mmol/L 4.1 4.0 4.4   < > 4.3   CO2 mmol/L 22.0 16.0* 18.0*   < > 28.0   BUN mg/dL 16 17 18   < > 13   CREATININE mg/dL 0.96 1.24* 1.11*   < > 0.67   MAGNESIUM mg/dL 1.9 2.0  --   --  2.4   PHOSPHORUS mg/dL  --  4.9*  --   --   --    GLUCOSE mg/dL 271* 364* 449*   < > 164*    < > = values in this interval not displayed.     Estimated Creatinine Clearance: 56.5 mL/min (by C-G formula based on SCr of 0.96 mg/dL).    Results from last 7 days   Lab Units 11/12/24  1020   PH, ARTERIAL pH units 7.249*   PCO2, ARTERIAL mm Hg 47.5*   PO2 ART mm Hg 71.6*         I reviewed the patient's results and images.     Assessment & Plan   Impression        Prosthetic aortic valve stenosis    Paroxysmal atrial fibrillation    Type 2 diabetes mellitus    COPD (chronic obstructive pulmonary disease)       Plan        We will continue to follow the patient significant acidosis.  Wean pressors as able.  Recheck metabolic panel at approximately 1300 hrs. and if acidosis has resolved we will switch off bicarbonate for now.  This will also depend upon pressor need as well as the  arterial blood gas.  We will try to enhance glucose control as it is quite out of control given the current stress level she is under.  Monitor for any signs of hemodynamic instability in terms of atrial fibrillation.  Bronchodilator therapy as necessary.  Continue to follow hemoglobin closely.  This patient remains at extremely high risk of worsening.    Plan of care and goals reviewed with mulitdisciplinary/antibiotic stewardship team during rounds.   I discussed the patient's findings and my recommendations with patient and nursing staff     High level of risk due to:  drug(s) requiring intensive monitoring for toxicity and parenteral controlled substances.    Omid León MD, [facility]  Pulmonology and Critical Care Medicine

## 2024-11-12 NOTE — ANESTHESIA PROCEDURE NOTES
Airway  Urgency: elective    Date/Time: 11/12/2024 7:20 AM  Airway not difficult    General Information and Staff    Patient location during procedure: OR  SRNA: Blanca Perez SRNA  Indications and Patient Condition  Indications for airway management: airway protection    Preoxygenated: yes  MILS not maintained throughout  Mask difficulty assessment: 1 - vent by mask    Final Airway Details  Final airway type: endotracheal airway      Successful airway: ETT  Cuffed: yes   Successful intubation technique: direct laryngoscopy  Endotracheal tube insertion site: oral  Blade: Filomena  Blade size: 3  ETT size (mm): 7.0  Cormack-Lehane Classification: grade I - full view of glottis  Placement verified by: chest auscultation and capnometry   Measured from: lips  ETT/EBT  to lips (cm): 20  Number of attempts at approach: 1  Assessment: lips, teeth, and gum same as pre-op and atraumatic intubation    Additional Comments  Negative epigastric sounds, Breath sound equal bilaterally with symmetric chest rise and fall

## 2024-11-12 NOTE — OP NOTE
DATE OF PROCEDURE: 11/12/2024      PREOPERATIVE DIAGNOSES:  1. Hemorrhagic shock  2. Pelvic hematoma      POSTOPERATIVE DIAGNOSES:    1. Hemorrhagic shock  2. Pelvic hematoma    PROCEDURES PERFORMED:    1. Right groin exploration  2. Ligation of inferior epigastric artery  3. Aortogram     SURGEON: Ajay Cheatham MD        ASSISTANT: Dixon March PA was responsible for performing the following activities: Retraction, Suction, Closing, and Placing Dressing and their skilled assistance was necessary for the success of this case.      ANESTHESIA: General endotracheal anesthesia with Dani Ricardo CRNA     ESTIMATED BLOOD LOSS:  Less than 100 mL     TOTAL FLUOROSCOPY TIME: 0:06     EXPOSURE: 191 mGy     CONTRAST:  30 mL      INDICATIONS:  64-year-old  female with a history of hypertension, chronic atrial fibrillation, tobacco abuse, lung cancer and valvular heart disease status post AVR, MVR, tricuspid repair and MAZE on 7/16/18 by myself who underwent transcatheter aortic valve replacement on 11/11/2024.  Overnight, the patient was placed on 3 pressors and was found to have a worsening pelvic hematoma from sheath placement in the right groin.  Extravasation was seen in the right groin with concerns for bleeding from the inferior epigastric artery on CT.  The patient was felt to warrant right groin exploration with possible aortogram/stent placement.  The risks and benefits of surgery were discussed with the patient including pain, bleeding, infection, myocardial infarction and death. The patient understood these risks and wished to proceed with surgery.       DESCRIPTION OF PROCEDURE:  The patient was taken to the operating room and placed under general endotracheal anesthesia.  She was prepped and draped in the usual sterile fashion and a timeout was performed including the patient's name, procedure and antibiotic administration.  A vertical right groin incision was made and electrocautery utilized to  expose the right common femoral artery, external iliac artery, common femoral vein and iliac vein.  Two Perclose sutures were seen outside the right common femoral artery from previous cardiac catheterization.  There was significant inflammation around the right common femoral artery.  No active bleeding was appreciated from the vessels.  At least 6 cm of external iliac vasculature was exposed under the inguinal ligament.  Due to the patient's body habitus, I could not expose the vessel more proximal.  Unfortunately, the patient had a right femoral arterial line placed overnight.  This catheter was exposed on the distal common femoral artery and was wired for placement of a 4 Hungarian sheath.  Multiple sheath shots were performed that failed to demonstrate any significant extravasation of contrast consistent with bleeding.  FELIPE and NESS views were obtained to rule out bleeding from the inferior epigastric artery.  The sheath was removed and arteriotomy oversewn with two 6-0 Prolene figure-of-eight sutures.  The inferior epigastric artery on the external iliac artery was ligated with 2 hemoclips.  At this juncture, given no bleeding was found decision was made to close.  Both Dr. Pickett with cardiology and Dr. Castro of interventional radiology were in the room and agreed with this plan.  If the patient were to have any further bleeding, access may need to be obtained from the left femoral artery or vein and any source of bleeding coil embolized.  Bounding palpable pulses were present in the right common femoral artery.  The incision was then closed with a running 2-0 Vicryl suture in two layers, 3-0 Vicryl suture in two layers and 4-0 Monocryl subcuticular stitch.  Overlying skin staples were applied and an aquacel dressing placed over the incision.  The patient was then extubated in the operating room and transported to the recovery room in stable condition.

## 2024-11-12 NOTE — PROCEDURES
"Insert Central Line At Bedside    Date/Time: 11/12/2024 5:40 AM    Performed by: Cipriano Cardoza APRN  Authorized by: Cipriano Cardoza APRN  Consent: The procedure was performed in an emergent situation. Verbal consent obtained.  Required items: required blood products, implants, devices, and special equipment available  Patient identity confirmed: hospital-assigned identification number, arm band, provided demographic data and verbally with patient  Time out: Immediately prior to procedure a \"time out\" was called to verify the correct patient, procedure, equipment, support staff and site/side marked as required.  Indications: vascular access  Anesthesia: local infiltration    Anesthesia:  Local Anesthetic: lidocaine 1% without epinephrine  Anesthetic total: 5 mL    Sedation:  Patient sedated: yes  Sedation type: anxiolysis  Sedatives: midazolam  Vitals: Vital signs were monitored during sedation.    Preparation: skin prepped with 2% chlorhexidine  Skin prep agent dried: skin prep agent completely dried prior to procedure  Sterile barriers: all five maximum sterile barriers used - cap, mask, sterile gown, sterile gloves, and large sterile sheet  Hand hygiene: hand hygiene performed prior to central venous catheter insertion  Location details: left internal jugular  Patient position: Trendelenburg  Catheter type: triple lumen  Catheter size: 7 Fr  Pre-procedure: landmarks identified  Ultrasound guidance: yes  Sterile ultrasound techniques: sterile gel and sterile probe covers were used  Number of attempts: 1  Successful placement: yes  Post-procedure: line sutured and dressing applied  Assessment: blood return through all ports, free fluid flow, placement verified by x-ray and no pneumothorax on x-ray  Patient tolerance: patient tolerated the procedure well with no immediate complications  Comments: Line would not draw blood until retracted to 16 cm @ the skin.          "

## 2024-11-12 NOTE — BRIEF OP NOTE
FEMORAL THROMBECTOMY/EMBOLECTOMY  Progress Note    Jill Miller  11/12/2024    Pre-op Diagnosis:   Hemorrhagic shock       Post-Op Diagnosis Codes:  Hemorrhagic shock    Procedure/CPT® Codes:        Procedure(s):  RIGHT GROIN EXPLORATION, LIGATION OF INFERIOR EPIGASTRIC ARTERY    Surgeon(s):  Ajay Cheatham MD Earle, Gary F, MD    Anesthesia: Choice    Staff:   Circulator: Bijal Peguero, RN; Marya Moreno RN; Manda Barahona RN  Scrub Person: Nanette Mendez  Nursing Assistant: Jackelin Contreras  Assistant: Dixon March PA    Estimated Blood Loss: minimal    Urine Voided: * No values recorded between 11/12/2024  7:12 AM and 11/12/2024  8:57 AM *    Specimens:                None          Drains:   Urethral Catheter Temperature probe (Active)   Daily Indications Hourly Output in Critical Unstable Patient requiring Frequent Intervention (hemodynamics, titration or life supportive therapy) 11/12/24 0600   Site Assessment Clean;Skin intact 11/12/24 0600   Collection Container Standard drainage bag 11/12/24 0600   Securement Method Securing device 11/12/24 0600   Catheter care complete Yes 11/11/24 2000   Output (mL) 200 mL 11/12/24 0600       Findings: No active bleeding from right femoral or external iliac arteries or veins.  Angiogram with no specific bleeding.  Inferior epigastric artery ligated based on CT scan findings    Assistant: Dixon March PA  was responsible for performing the following activities: Retraction, Suction, Closing, and Placing Dressing and their skilled assistance was necessary for the success of this case.    Ajay Cheatham MD     Date: 11/12/2024  Time: 09:10 EST        
Left Deviation

## 2024-11-12 NOTE — ANESTHESIA POSTPROCEDURE EVALUATION
Patient: Jill Miller    Procedure Summary       Date: 11/12/24 Room / Location:  [facility] /  [facility]    Anesthesia Start: 0712 Anesthesia Stop: 0929    Procedure: RIGHT GROIN EXPLORATION, LIGATION OF INFERIOR EPIGASTRIC ARTERY (Right: Thigh) Diagnosis:     Surgeons: Ajay Cheatham MD Provider: Stephanie Galindo MD    Anesthesia Type: general ASA Status: 4 - Emergent            Anesthesia Type: general    Vitals  Vitals Value Taken Time   BP     Temp     Pulse 77 11/12/24 0932   Resp     SpO2 92 % 11/12/24 0932   Vitals shown include unfiled device data.        Post Anesthesia Care and Evaluation    Patient location during evaluation: ICU  Patient participation: complete - patient participated  Level of consciousness: awake and alert  Pain score: 0  Pain management: adequate    Airway patency: patent  Anesthetic complications: No anesthetic complications  PONV Status: none  Cardiovascular status: hemodynamically stable and acceptable  Respiratory status: nonlabored ventilation, acceptable, nasal cannula and spontaneous ventilation  Hydration status: acceptable    Comments: VS monitored en route to ICU and pt spont ventilating throughout. VSS upon arrival. Report given  No anesthesia care post op

## 2024-11-12 NOTE — PROGRESS NOTES
LOS: 1 day   Patient Care Team:  Marisa Lynch PA as PCP - General (Family Medicine)  Ajay Cheatham MD as Surgeon (Cardiothoracic Surgery)  Marco Lay IV, MD as Cardiologist (Interventional Cardiology)  Nigel Huber MD as Consulting Physician (Cardiology)  Marisa Suarez APRN as Nurse Practitioner (Interventional Cardiology)    Chief Complaint: Dyspnea    Subjective     Interval History:     Events overnight reviewed.  Patient multiple vasopressors.  Patient on BiPAP at this time.  Right femoral arterial line in place.  Review of Systems:    12 point review of systems reviewed pertinent for those mentioned HPI    Objective     Vital Signs  Temp:  [97.5 °F (36.4 °C)-99.3 °F (37.4 °C)] 99 °F (37.2 °C)  Heart Rate:  [] 103  Resp:  [16-28] 22  BP: ()/(28-75) 86/55  Arterial Line BP: ()/() 133/63  There is no height or weight on file to calculate BMI.    Intake/Output Summary (Last 24 hours) at 11/12/2024 0730  Last data filed at 11/12/2024 0400  Gross per 24 hour   Intake 2758.29 ml   Output 960 ml   Net 1798.29 ml     No intake/output data recorded.    Physical Exam:     General Appearance:  Alert, cooperative, in no acute distress   Head:  Normocephalic, without obvious abnormality, atraumatic   Eyes:  Lids and lashes normal, conjunctivae and sclerae normal, no icterus, no pallor, corneas clear, PERRLA   Ears:  Ears appear intact with no abnormalities noted   Throat:  No oral lesions, no thrush, oral mucosa moist   Neck:  No adenopathy, supple, trachea midline, no thyromegaly, no carotid bruit, no JVD   Back:  No kyphosis present, no scoliosis present, no skin lesions, erythema or scars, no tenderness to percussion, or palpation, range of motion normal   Lungs:  Clear to auscultation,respirations regular, even and unlabored    Heart:  Regular rhythm and normal rate, normal S1 and S2, no murmur, no gallop, no rub, no click   Breast Exam:  Deferred   Abdomen:   "Normal bowel sounds, no masses, no organomegaly, soft non-tender, non-distended, no guarding, no rebound tenderness   Genitalia:  Deferred   Extremities:  Moves all extremities well, no edema, no cyanosis, no redness   Pulses:  Pulses palpable and equal bilaterally   Skin:  No bleeding, bruising or rash   Lymph nodes:  No palpable adenopathy   Neurologic:  Cranial nerves 2 - 12 grossly intact, sensation intact, DTR present and equal bilaterally        Results Review:     I reviewed the patient's new clinical results.  I reviewed the patient's new imaging results and agree with the interpretation.  I reviewed the patient's other test results and agree with the interpretation  I personally viewed and interpreted the patient's EKG/Telemetry data      WBC WBC   Date Value Ref Range Status   11/12/2024 17.93 (H) 3.40 - 10.80 10*3/mm3 Final   11/11/2024 17.47 (H) 3.40 - 10.80 10*3/mm3 Final   11/11/2024 21.71 (H) 3.40 - 10.80 10*3/mm3 Final   11/11/2024 17.98 (H) 3.40 - 10.80 10*3/mm3 Final   11/11/2024 4.47 3.40 - 10.80 10*3/mm3 Final      HGB Hemoglobin   Date Value Ref Range Status   11/12/2024 9.6 (L) 12.0 - 15.9 g/dL Final     Comment:     Result checked   11/11/2024 11.7 (L) 12.0 - 15.9 g/dL Final   11/11/2024 11.6 (L) 12.0 - 15.9 g/dL Final   11/11/2024 13.1 12.0 - 15.9 g/dL Final   11/11/2024 14.3 12.0 - 15.9 g/dL Final      HCT Hematocrit   Date Value Ref Range Status   11/12/2024 29.3 (L) 34.0 - 46.6 % Final   11/11/2024 34.9 34.0 - 46.6 % Final   11/11/2024 33.6 (L) 34.0 - 46.6 % Final   11/11/2024 36.9 34.0 - 46.6 % Final   11/11/2024 41.8 34.0 - 46.6 % Final      Platlets No results found for: \"LABPLAT\"     PT/INR:    Protime   Date Value Ref Range Status   11/12/2024 15.8 (H) 12.2 - 14.5 Seconds Final   /  INR   Date Value Ref Range Status   11/12/2024 1.25 (H) 0.89 - 1.12 Final       Sodium Sodium   Date Value Ref Range Status   11/12/2024 141 136 - 145 mmol/L Final   11/11/2024 139 136 - 145 mmol/L Final " "  11/11/2024 141 136 - 145 mmol/L Final      Potassium Potassium   Date Value Ref Range Status   11/12/2024 4.0 3.5 - 5.2 mmol/L Final   11/11/2024 4.4 3.5 - 5.2 mmol/L Final   11/11/2024 3.9 3.5 - 5.2 mmol/L Final     Comment:     Slight hemolysis detected by analyzer. Result may be falsely elevated.      Chloride Chloride   Date Value Ref Range Status   11/12/2024 101 98 - 107 mmol/L Final   11/11/2024 103 98 - 107 mmol/L Final   11/11/2024 106 98 - 107 mmol/L Final      Bicarbonate No results found for: \"PLASMABICARB\"   BUN BUN   Date Value Ref Range Status   11/12/2024 17 8 - 23 mg/dL Final   11/11/2024 18 8 - 23 mg/dL Final   11/11/2024 10 8 - 23 mg/dL Final      Creatinine Creatinine   Date Value Ref Range Status   11/12/2024 1.24 (H) 0.57 - 1.00 mg/dL Final   11/11/2024 1.11 (H) 0.57 - 1.00 mg/dL Final   11/11/2024 0.57 0.57 - 1.00 mg/dL Final      Calcium Calcium   Date Value Ref Range Status   11/12/2024 7.8 (L) 8.6 - 10.5 mg/dL Final   11/11/2024 8.1 (L) 8.6 - 10.5 mg/dL Final   11/11/2024 8.7 8.6 - 10.5 mg/dL Final      Magnesium Magnesium   Date Value Ref Range Status   11/12/2024 2.0 1.6 - 2.4 mg/dL Final            pH pH, Arterial   Date Value Ref Range Status   11/12/2024 7.188 (C) 7.350 - 7.450 pH units Final     Comment:     85 Value below critical limit   11/11/2024 7.179 (C) 7.350 - 7.450 pH units Final     Comment:     85 Value below critical limit   11/11/2024 7.199 (C) 7.350 - 7.450 pH units Final     Comment:     85 Value below critical limit      pO2 pO2, Arterial   Date Value Ref Range Status   11/12/2024 96.6 83.0 - 108.0 mm Hg Final   11/11/2024 121.0 (H) 83.0 - 108.0 mm Hg Final   11/11/2024 75.3 (L) 83.0 - 108.0 mm Hg Final     Comment:     84 Value below reference range      pCO2 pCO2, Arterial   Date Value Ref Range Status   11/12/2024 46.7 (H) 35.0 - 45.0 mm Hg Final     Comment:     83 Value above reference range   11/11/2024 43.2 35.0 - 45.0 mm Hg Final   11/11/2024 51.3 (H) 35.0 - " 45.0 mm Hg Final     Comment:     83 Value above reference range      HCO3 HCO3, Arterial   Date Value Ref Range Status   11/12/2024 17.7 (L) 20.0 - 26.0 mmol/L Final   11/11/2024 16.1 (L) 20.0 - 26.0 mmol/L Final   11/11/2024 20.0 20.0 - 26.0 mmol/L Final        [START ON 11/13/2024] amiodarone, 200 mg, Oral, Once   Followed by  [START ON 11/13/2024] amiodarone, 200 mg, Oral, Q8H   Followed by  [START ON 11/20/2024] amiodarone, 200 mg, Oral, Q12H   Followed by  [START ON 12/4/2024] amiodarone, 200 mg, Oral, Daily  aspirin, 81 mg, Oral, Daily  docusate sodium, 100 mg, Oral, BID  [Held by provider] empagliflozin, 10 mg, Oral, Daily  [Held by provider] furosemide, 40 mg, Oral, BID  insulin glargine, 10 Units, Subcutaneous, Nightly  insulin lispro, 2-9 Units, Subcutaneous, 4x Daily AC & at Bedtime  levothyroxine, 200 mcg, Oral, Q AM  lidocaine, 5 mL, Infiltration, Once  metoprolol tartrate, 25 mg, Oral, Q12H  mupirocin, 1 Application, Each Nare, BID  nystatin, 1 Application, Topical, BID  polyethylene glycol, 17 g, Oral, Daily  potassium chloride, 20 mEq, Oral, Once  potassium chloride, 10 mEq, Oral, BID  pravastatin, 20 mg, Oral, Nightly  propafenone SR, 225 mg, Oral, Q12H  senna-docusate sodium, 2 tablet, Oral, Nightly  sodium bicarbonate, 50 mEq, Intravenous, Once  thiamine (B-1) IV, 500 mg, Intravenous, Q8H      amiodarone, 1 mg/min, Last Rate: 1 mg/min (11/12/24 7797)   Followed by  amiodarone, 0.5 mg/min  EPINEPHrine, 0.02-0.3 mcg/kg/min, Last Rate: 0.05 mcg/kg/min (11/12/24 0540)  lactated ringers, 9 mL/hr, Last Rate: 9 mL/hr (11/11/24 1007)  niCARdipine, 5-15 mg/hr  phenylephrine, 0.5-3 mcg/kg/min, Last Rate: 3 mcg/kg/min (11/12/24 0554)  sodium bicarbonate 8.4 % 150 mEq in dextrose (D5W) 5 % 1,000 mL infusion (greater than 100 mEq), 150 mEq, Last Rate: 150 mEq (11/12/24 0611)  vasopressin, 0.03 Units/min, Last Rate: 0.03 Units/min (11/12/24 0511)        Medication Review: Reviewed    Assessment & Plan      Patient Active Problem List   Diagnosis Code    Essential hypertension I10    Acquired hypothyroidism E03.9    Lung cancer; S/P Bronch/Thor w/ ALBINA lobectomy/mediastinal lymph node resec (5/12/17, Dr. 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 Cheatham to discuss case.  CT images also reviewed.  Agree with concerns that vascular access site.  Will continue to follow patient post procedure.        Jeffry Pickett MD  11/12/24  07:30 EST

## 2024-11-13 ENCOUNTER — APPOINTMENT (OUTPATIENT)
Dept: GENERAL RADIOLOGY | Facility: HOSPITAL | Age: 64
End: 2024-11-13
Payer: COMMERCIAL

## 2024-11-13 ENCOUNTER — APPOINTMENT (OUTPATIENT)
Dept: CARDIOLOGY | Facility: HOSPITAL | Age: 64
End: 2024-11-13
Payer: COMMERCIAL

## 2024-11-13 ENCOUNTER — APPOINTMENT (OUTPATIENT)
Dept: CT IMAGING | Facility: HOSPITAL | Age: 64
End: 2024-11-13
Payer: COMMERCIAL

## 2024-11-13 PROBLEM — J96.21 ACUTE ON CHRONIC RESPIRATORY FAILURE WITH HYPOXIA AND HYPERCAPNIA: Status: ACTIVE | Noted: 2024-11-13

## 2024-11-13 PROBLEM — J96.22 ACUTE ON CHRONIC RESPIRATORY FAILURE WITH HYPOXIA AND HYPERCAPNIA: Status: ACTIVE | Noted: 2024-11-13

## 2024-11-13 LAB
ANION GAP SERPL CALCULATED.3IONS-SCNC: 10 MMOL/L (ref 5–15)
ARTERIAL PATENCY WRIST A: ABNORMAL
ARTERIAL PATENCY WRIST A: ABNORMAL
ATMOSPHERIC PRESS: ABNORMAL MM[HG]
ATMOSPHERIC PRESS: ABNORMAL MM[HG]
BASE EXCESS BLDA CALC-SCNC: 4.4 MMOL/L (ref 0–2)
BASE EXCESS BLDA CALC-SCNC: 5 MMOL/L (ref 0–2)
BDY SITE: ABNORMAL
BDY SITE: ABNORMAL
BH BB BLOOD EXPIRATION DATE: NORMAL
BH BB BLOOD TYPE BARCODE: 5100
BH BB DISPENSE STATUS: NORMAL
BH BB PRODUCT CODE: NORMAL
BH BB UNIT NUMBER: NORMAL
BH CV ECHO MEAS - MV MEAN PG: 7 MMHG
BH CV LEFT GROIN PSA PROCEDURE SCRIPTING LRR: 1
BH CV VAS LEFT PSA ULNAR SYS: 121.9 CM/SEC
BH CV VAS PRELIMINARY FINDINGS SCRIPTING: 1
BODY TEMPERATURE: 37
BODY TEMPERATURE: 37
BUN SERPL-MCNC: 15 MG/DL (ref 8–23)
BUN/CREAT SERPL: 23.8 (ref 7–25)
CA-I SERPL ISE-MCNC: 1.06 MMOL/L (ref 1.15–1.3)
CALCIUM SPEC-SCNC: 7.5 MG/DL (ref 8.6–10.5)
CHLORIDE SERPL-SCNC: 102 MMOL/L (ref 98–107)
CO2 BLDA-SCNC: 33.2 MMOL/L (ref 22–33)
CO2 BLDA-SCNC: 34.6 MMOL/L (ref 22–33)
CO2 SERPL-SCNC: 29 MMOL/L (ref 22–29)
COHGB MFR BLD: 1.1 % (ref 0–2)
COHGB MFR BLD: 1.3 % (ref 0–2)
CORTIS SERPL-MCNC: 14.48 MCG/DL
CREAT SERPL-MCNC: 0.63 MG/DL (ref 0.57–1)
CROSSMATCH INTERPRETATION: NORMAL
D-LACTATE SERPL-SCNC: 1.4 MMOL/L (ref 0.5–2)
DEPRECATED RDW RBC AUTO: 74.4 FL (ref 37–54)
EGFRCR SERPLBLD CKD-EPI 2021: 100.4 ML/MIN/1.73
EPAP: 6
EPAP: 6
ERYTHROCYTE [DISTWIDTH] IN BLOOD BY AUTOMATED COUNT: 21.5 % (ref 12.3–15.4)
GLUCOSE BLDC GLUCOMTR-MCNC: 257 MG/DL (ref 70–130)
GLUCOSE SERPL-MCNC: 226 MG/DL (ref 65–99)
HCO3 BLDA-SCNC: 31.4 MMOL/L (ref 20–26)
HCO3 BLDA-SCNC: 32.6 MMOL/L (ref 20–26)
HCT VFR BLD AUTO: 26.2 % (ref 34–46.6)
HCT VFR BLD CALC: 28.8 % (ref 38–51)
HCT VFR BLD CALC: 28.9 % (ref 38–51)
HGB BLD-MCNC: 9.1 G/DL (ref 12–15.9)
HGB BLDA-MCNC: 9.4 G/DL (ref 14–18)
HGB BLDA-MCNC: 9.4 G/DL (ref 14–18)
INHALED O2 CONCENTRATION: 60 %
INHALED O2 CONCENTRATION: 65 %
IPAP: 18
IPAP: 18
LV EF 2D ECHO EST: 50 %
MAGNESIUM SERPL-MCNC: 3.2 MG/DL (ref 1.6–2.4)
MCH RBC QN AUTO: 34 PG (ref 26.6–33)
MCHC RBC AUTO-ENTMCNC: 34.7 G/DL (ref 31.5–35.7)
MCV RBC AUTO: 97.8 FL (ref 79–97)
METHGB BLD QL: -0.1 % (ref 0–1.5)
METHGB BLD QL: ABNORMAL
MODALITY: ABNORMAL
MODALITY: ABNORMAL
OXYHGB MFR BLDV: 90.4 % (ref 94–99)
OXYHGB MFR BLDV: 93.1 % (ref 94–99)
PAW @ PEAK INSP FLOW SETTING VENT: 0 CMH2O
PAW @ PEAK INSP FLOW SETTING VENT: 0 CMH2O
PCO2 BLDA: 59.8 MM HG (ref 35–45)
PCO2 BLDA: 65.2 MM HG (ref 35–45)
PCO2 TEMP ADJ BLD: 59.8 MM HG (ref 35–45)
PCO2 TEMP ADJ BLD: 65.2 MM HG (ref 35–45)
PH BLDA: 7.31 PH UNITS (ref 7.35–7.45)
PH BLDA: 7.33 PH UNITS (ref 7.35–7.45)
PH, TEMP CORRECTED: 7.31 PH UNITS
PH, TEMP CORRECTED: 7.33 PH UNITS
PHOSPHATE SERPL-MCNC: 3 MG/DL (ref 2.5–4.5)
PLATELET # BLD AUTO: 98 10*3/MM3 (ref 140–450)
PMV BLD AUTO: 11.1 FL (ref 6–12)
PO2 BLDA: 61.5 MM HG (ref 83–108)
PO2 BLDA: 66.4 MM HG (ref 83–108)
PO2 TEMP ADJ BLD: 61.5 MM HG (ref 83–108)
PO2 TEMP ADJ BLD: 66.4 MM HG (ref 83–108)
POTASSIUM SERPL-SCNC: 3.8 MMOL/L (ref 3.5–5.2)
PROCALCITONIN SERPL-MCNC: 1.77 NG/ML (ref 0–0.25)
QT INTERVAL: 420 MS
QT INTERVAL: 422 MS
QT INTERVAL: 460 MS
QT INTERVAL: 548 MS
QTC INTERVAL: 472 MS
QTC INTERVAL: 493 MS
QTC INTERVAL: 528 MS
QTC INTERVAL: 550 MS
RBC # BLD AUTO: 2.68 10*6/MM3 (ref 3.77–5.28)
SODIUM SERPL-SCNC: 141 MMOL/L (ref 136–145)
TOTAL RATE: 12 BREATHS/MINUTE
TOTAL RATE: 18 BREATHS/MINUTE
UFH PPP CHRO-ACNC: 0.1 IU/ML (ref 0.3–0.7)
UNIT  ABO: NORMAL
UNIT  RH: NORMAL
VENTILATOR MODE: ABNORMAL
VENTILATOR MODE: ABNORMAL
WBC NRBC COR # BLD AUTO: 18.27 10*3/MM3 (ref 3.4–10.8)

## 2024-11-13 PROCEDURE — 25010000002 AMIODARONE IN DEXTROSE 5% 360-4.14 MG/200ML-% SOLUTION: Performed by: INTERNAL MEDICINE

## 2024-11-13 PROCEDURE — 82805 BLOOD GASES W/O2 SATURATION: CPT

## 2024-11-13 PROCEDURE — 93931 UPPER EXTREMITY STUDY: CPT | Performed by: INTERNAL MEDICINE

## 2024-11-13 PROCEDURE — 94799 UNLISTED PULMONARY SVC/PX: CPT

## 2024-11-13 PROCEDURE — 83605 ASSAY OF LACTIC ACID: CPT | Performed by: NURSE PRACTITIONER

## 2024-11-13 PROCEDURE — 82948 REAGENT STRIP/BLOOD GLUCOSE: CPT

## 2024-11-13 PROCEDURE — 25010000002 HEPARIN (PORCINE) 25000-0.45 UT/250ML-% SOLUTION: Performed by: THORACIC SURGERY (CARDIOTHORACIC VASCULAR SURGERY)

## 2024-11-13 PROCEDURE — 80048 BASIC METABOLIC PNL TOTAL CA: CPT | Performed by: NURSE PRACTITIONER

## 2024-11-13 PROCEDURE — 25010000002 THIAMINE PER 100 MG: Performed by: NURSE PRACTITIONER

## 2024-11-13 PROCEDURE — 82533 TOTAL CORTISOL: CPT | Performed by: INTERNAL MEDICINE

## 2024-11-13 PROCEDURE — 25010000002 HYDROCORTISONE SOD SUC (PF) 100 MG RECONSTITUTED SOLUTION: Performed by: INTERNAL MEDICINE

## 2024-11-13 PROCEDURE — 25010000002 PIPERACILLIN SOD-TAZOBACTAM PER 1 G: Performed by: INTERNAL MEDICINE

## 2024-11-13 PROCEDURE — 71275 CT ANGIOGRAPHY CHEST: CPT

## 2024-11-13 PROCEDURE — 85520 HEPARIN ASSAY: CPT | Performed by: THORACIC SURGERY (CARDIOTHORACIC VASCULAR SURGERY)

## 2024-11-13 PROCEDURE — 99024 POSTOP FOLLOW-UP VISIT: CPT | Performed by: PHYSICIAN ASSISTANT

## 2024-11-13 PROCEDURE — 85027 COMPLETE CBC AUTOMATED: CPT | Performed by: NURSE PRACTITIONER

## 2024-11-13 PROCEDURE — 94761 N-INVAS EAR/PLS OXIMETRY MLT: CPT

## 2024-11-13 PROCEDURE — 63710000001 INSULIN GLARGINE PER 5 UNITS: Performed by: INTERNAL MEDICINE

## 2024-11-13 PROCEDURE — 84100 ASSAY OF PHOSPHORUS: CPT | Performed by: NURSE PRACTITIONER

## 2024-11-13 PROCEDURE — 63710000001 INSULIN LISPRO (HUMAN) PER 5 UNITS: Performed by: NURSE PRACTITIONER

## 2024-11-13 PROCEDURE — 93005 ELECTROCARDIOGRAM TRACING: CPT | Performed by: HOSPITALIST

## 2024-11-13 PROCEDURE — 82375 ASSAY CARBOXYHB QUANT: CPT

## 2024-11-13 PROCEDURE — 99233 SBSQ HOSP IP/OBS HIGH 50: CPT | Performed by: INTERNAL MEDICINE

## 2024-11-13 PROCEDURE — 25810000003 SODIUM CHLORIDE 0.9 % SOLUTION 250 ML FLEX CONT: Performed by: PHYSICIAN ASSISTANT

## 2024-11-13 PROCEDURE — 25010000002 ONDANSETRON PER 1 MG: Performed by: PHYSICIAN ASSISTANT

## 2024-11-13 PROCEDURE — 83735 ASSAY OF MAGNESIUM: CPT | Performed by: THORACIC SURGERY (CARDIOTHORACIC VASCULAR SURGERY)

## 2024-11-13 PROCEDURE — 25010000002 ALBUMIN HUMAN 25% PER 50 ML: Performed by: INTERNAL MEDICINE

## 2024-11-13 PROCEDURE — 63710000001 INSULIN LISPRO (HUMAN) PER 5 UNITS: Performed by: INTERNAL MEDICINE

## 2024-11-13 PROCEDURE — 93005 ELECTROCARDIOGRAM TRACING: CPT

## 2024-11-13 PROCEDURE — 93931 UPPER EXTREMITY STUDY: CPT

## 2024-11-13 PROCEDURE — 97162 PT EVAL MOD COMPLEX 30 MIN: CPT

## 2024-11-13 PROCEDURE — 25810000003 SODIUM CHLORIDE 0.9 % SOLUTION: Performed by: PHYSICIAN ASSISTANT

## 2024-11-13 PROCEDURE — 25010000002 HYDROMORPHONE PER 4 MG: Performed by: NURSE ANESTHETIST, CERTIFIED REGISTERED

## 2024-11-13 PROCEDURE — 83050 HGB METHEMOGLOBIN QUAN: CPT

## 2024-11-13 PROCEDURE — 25010000002 AMIODARONE IN DEXTROSE 5% 360-4.14 MG/200ML-% SOLUTION: Performed by: HOSPITALIST

## 2024-11-13 PROCEDURE — 84145 PROCALCITONIN (PCT): CPT | Performed by: INTERNAL MEDICINE

## 2024-11-13 PROCEDURE — 82330 ASSAY OF CALCIUM: CPT | Performed by: THORACIC SURGERY (CARDIOTHORACIC VASCULAR SURGERY)

## 2024-11-13 PROCEDURE — 25010000003 VASOPRESSIN 0.2 UNITS/ML SOLUTION: Performed by: PHYSICIAN ASSISTANT

## 2024-11-13 PROCEDURE — 25010000002 EPINEPHRINE 1 MG/ML SOLUTION 30 ML VIAL: Performed by: PHYSICIAN ASSISTANT

## 2024-11-13 PROCEDURE — 94660 CPAP INITIATION&MGMT: CPT

## 2024-11-13 PROCEDURE — 99232 SBSQ HOSP IP/OBS MODERATE 35: CPT | Performed by: INTERNAL MEDICINE

## 2024-11-13 PROCEDURE — 97530 THERAPEUTIC ACTIVITIES: CPT

## 2024-11-13 PROCEDURE — 25010000002 PHENYLEPHRINE 10 MG/ML SOLUTION 5 ML VIAL: Performed by: PHYSICIAN ASSISTANT

## 2024-11-13 PROCEDURE — 25010000002 ACETAZOLAMIDE PER 500 MG: Performed by: INTERNAL MEDICINE

## 2024-11-13 PROCEDURE — P9047 ALBUMIN (HUMAN), 25%, 50ML: HCPCS | Performed by: INTERNAL MEDICINE

## 2024-11-13 PROCEDURE — 36600 WITHDRAWAL OF ARTERIAL BLOOD: CPT

## 2024-11-13 PROCEDURE — 25010000002 BUMETANIDE PER 0.5 MG: Performed by: INTERNAL MEDICINE

## 2024-11-13 PROCEDURE — 71045 X-RAY EXAM CHEST 1 VIEW: CPT

## 2024-11-13 RX ORDER — ALBUMIN (HUMAN) 12.5 G/50ML
25 SOLUTION INTRAVENOUS ONCE
Status: COMPLETED | OUTPATIENT
Start: 2024-11-13 | End: 2024-11-13

## 2024-11-13 RX ORDER — BUMETANIDE 0.25 MG/ML
1 INJECTION, SOLUTION INTRAMUSCULAR; INTRAVENOUS ONCE
Status: COMPLETED | OUTPATIENT
Start: 2024-11-13 | End: 2024-11-13

## 2024-11-13 RX ORDER — ACETAZOLAMIDE SODIUM 500 MG/5ML
500 INJECTION, POWDER, LYOPHILIZED, FOR SOLUTION INTRAVENOUS ONCE
Status: COMPLETED | OUTPATIENT
Start: 2024-11-13 | End: 2024-11-13

## 2024-11-13 RX ORDER — HEPARIN SODIUM 10000 [USP'U]/100ML
20 INJECTION, SOLUTION INTRAVENOUS
Status: DISCONTINUED | OUTPATIENT
Start: 2024-11-13 | End: 2024-11-18

## 2024-11-13 RX ORDER — HEPARIN SODIUM 1000 [USP'U]/ML
25 INJECTION, SOLUTION INTRAVENOUS; SUBCUTANEOUS AS NEEDED
Status: DISCONTINUED | OUTPATIENT
Start: 2024-11-13 | End: 2024-11-13

## 2024-11-13 RX ORDER — INSULIN LISPRO 100 [IU]/ML
5 INJECTION, SOLUTION INTRAVENOUS; SUBCUTANEOUS ONCE
Status: COMPLETED | OUTPATIENT
Start: 2024-11-13 | End: 2024-11-13

## 2024-11-13 RX ORDER — HEPARIN SODIUM 1000 [USP'U]/ML
50 INJECTION, SOLUTION INTRAVENOUS; SUBCUTANEOUS AS NEEDED
Status: DISCONTINUED | OUTPATIENT
Start: 2024-11-13 | End: 2024-11-13

## 2024-11-13 RX ADMIN — HYDROMORPHONE HYDROCHLORIDE 0.5 MG: 1 INJECTION, SOLUTION INTRAMUSCULAR; INTRAVENOUS; SUBCUTANEOUS at 06:20

## 2024-11-13 RX ADMIN — INSULIN LISPRO 5 UNITS: 100 INJECTION, SOLUTION INTRAVENOUS; SUBCUTANEOUS at 10:44

## 2024-11-13 RX ADMIN — INSULIN GLARGINE 20 UNITS: 100 INJECTION, SOLUTION SUBCUTANEOUS at 20:30

## 2024-11-13 RX ADMIN — MUPIROCIN 1 APPLICATION: 20 OINTMENT TOPICAL at 20:29

## 2024-11-13 RX ADMIN — NYSTATIN 1 APPLICATION: 100000 POWDER TOPICAL at 20:29

## 2024-11-13 RX ADMIN — POTASSIUM CHLORIDE 10 MEQ: 750 CAPSULE, EXTENDED RELEASE ORAL at 08:56

## 2024-11-13 RX ADMIN — VASOPRESSIN IN 0.9% SODIUM CHLORIDE 0.03 UNITS/MIN: 20 INJECTION INTRAVENOUS at 05:41

## 2024-11-13 RX ADMIN — AMIODARONE HYDROCHLORIDE 1 MG/MIN: 1.8 INJECTION, SOLUTION INTRAVENOUS at 02:08

## 2024-11-13 RX ADMIN — ALBUMIN (HUMAN) 25 G: 0.25 INJECTION, SOLUTION INTRAVENOUS at 10:42

## 2024-11-13 RX ADMIN — VASOPRESSIN IN 0.9% SODIUM CHLORIDE 0.03 UNITS/MIN: 20 INJECTION INTRAVENOUS at 16:15

## 2024-11-13 RX ADMIN — INSULIN LISPRO 5 UNITS: 100 INJECTION, SOLUTION INTRAVENOUS; SUBCUTANEOUS at 12:51

## 2024-11-13 RX ADMIN — AMIODARONE HYDROCHLORIDE 1 MG/MIN: 1.8 INJECTION, SOLUTION INTRAVENOUS at 08:24

## 2024-11-13 RX ADMIN — PHENYLEPHRINE HYDROCHLORIDE 1 MCG/KG/MIN: 10 INJECTION INTRAVENOUS at 05:42

## 2024-11-13 RX ADMIN — PIPERACILLIN AND TAZOBACTAM 3.38 G: 3; .375 INJECTION, POWDER, LYOPHILIZED, FOR SOLUTION INTRAVENOUS at 18:21

## 2024-11-13 RX ADMIN — PIPERACILLIN AND TAZOBACTAM 3.38 G: 3; .375 INJECTION, POWDER, LYOPHILIZED, FOR SOLUTION INTRAVENOUS at 11:24

## 2024-11-13 RX ADMIN — PHENYLEPHRINE HYDROCHLORIDE 1 MCG/KG/MIN: 10 INJECTION INTRAVENOUS at 20:30

## 2024-11-13 RX ADMIN — ALBUMIN (HUMAN) 25 G: 0.25 INJECTION, SOLUTION INTRAVENOUS at 14:40

## 2024-11-13 RX ADMIN — ACETAZOLAMIDE 500 MG: 500 INJECTION, POWDER, LYOPHILIZED, FOR SOLUTION INTRAVENOUS at 11:27

## 2024-11-13 RX ADMIN — POTASSIUM CHLORIDE 10 MEQ: 750 CAPSULE, EXTENDED RELEASE ORAL at 20:29

## 2024-11-13 RX ADMIN — THIAMINE HYDROCHLORIDE 500 MG: 100 INJECTION, SOLUTION INTRAMUSCULAR; INTRAVENOUS at 05:30

## 2024-11-13 RX ADMIN — AMIODARONE HYDROCHLORIDE 1 MG/MIN: 1.8 INJECTION, SOLUTION INTRAVENOUS at 14:10

## 2024-11-13 RX ADMIN — HYDROCORTISONE SODIUM SUCCINATE 100 MG: 100 INJECTION, POWDER, FOR SOLUTION INTRAMUSCULAR; INTRAVENOUS at 20:29

## 2024-11-13 RX ADMIN — INSULIN LISPRO 3 UNITS: 100 INJECTION, SOLUTION INTRAVENOUS; SUBCUTANEOUS at 20:30

## 2024-11-13 RX ADMIN — OXYCODONE HYDROCHLORIDE AND ACETAMINOPHEN 1 TABLET: 5; 325 TABLET ORAL at 10:44

## 2024-11-13 RX ADMIN — OXYCODONE HYDROCHLORIDE AND ACETAMINOPHEN 1 TABLET: 5; 325 TABLET ORAL at 18:54

## 2024-11-13 RX ADMIN — INSULIN LISPRO 8 UNITS: 100 INJECTION, SOLUTION INTRAVENOUS; SUBCUTANEOUS at 08:56

## 2024-11-13 RX ADMIN — HYDROCORTISONE SODIUM SUCCINATE 100 MG: 100 INJECTION, POWDER, FOR SOLUTION INTRAMUSCULAR; INTRAVENOUS at 11:52

## 2024-11-13 RX ADMIN — THIAMINE HYDROCHLORIDE 500 MG: 100 INJECTION, SOLUTION INTRAMUSCULAR; INTRAVENOUS at 17:03

## 2024-11-13 RX ADMIN — BUMETANIDE 1 MG: 0.25 INJECTION INTRAMUSCULAR; INTRAVENOUS at 11:26

## 2024-11-13 RX ADMIN — INSULIN LISPRO 3 UNITS: 100 INJECTION, SOLUTION INTRAVENOUS; SUBCUTANEOUS at 18:22

## 2024-11-13 RX ADMIN — ASPIRIN 81 MG: 81 TABLET, COATED ORAL at 08:56

## 2024-11-13 RX ADMIN — ONDANSETRON 4 MG: 2 INJECTION INTRAMUSCULAR; INTRAVENOUS at 05:20

## 2024-11-13 RX ADMIN — MUPIROCIN 1 APPLICATION: 20 OINTMENT TOPICAL at 08:56

## 2024-11-13 RX ADMIN — EPINEPHRINE 0.1 MCG/KG/MIN: 1 INJECTION INTRAMUSCULAR; INTRAVENOUS; SUBCUTANEOUS at 16:10

## 2024-11-13 RX ADMIN — LEVOTHYROXINE SODIUM 200 MCG: 0.1 TABLET ORAL at 05:27

## 2024-11-13 RX ADMIN — HYDROMORPHONE HYDROCHLORIDE 0.5 MG: 1 INJECTION, SOLUTION INTRAMUSCULAR; INTRAVENOUS; SUBCUTANEOUS at 01:10

## 2024-11-13 RX ADMIN — AMIODARONE HYDROCHLORIDE 1 MG/MIN: 1.8 INJECTION, SOLUTION INTRAVENOUS at 20:19

## 2024-11-13 RX ADMIN — HEPARIN SODIUM 18 UNITS/KG/HR: 10000 INJECTION, SOLUTION INTRAVENOUS at 18:24

## 2024-11-13 NOTE — PROGRESS NOTES
LOS: 2 days   Patient Care Team:  Marisa Lynch PA as PCP - General (Family Medicine)  Ajay Cheatham MD as Surgeon (Cardiothoracic Surgery)  Marco Lay IV, MD as Cardiologist (Interventional Cardiology)  Nigel Huber MD as Consulting Physician (Cardiology)  Marisa Suarez APRN as Nurse Practitioner (Interventional Cardiology)    Chief Complaint: Dyspnea    Subjective     Interval History:     Events overnight reviewed.  Patient on BiPAP.  Vasopressors still ongoing however reduced demands overnight.  Ulnar left A-line removed with significant edema with decreased sensation of left hand this morning.  Patient with A-fib with RVR overnight with additional amiodarone given.  Review of Systems:    12 point view of systems reviewed pertinent for those mentioned HPI    Objective     Vital Signs  Temp:  [98.4 °F (36.9 °C)-99.5 °F (37.5 °C)] 99.5 °F (37.5 °C)  Heart Rate:  [] 77  Resp:  [16-29] 22  BP: ()/(42-95) 108/46  Arterial Line BP: ()/(-) 121/53  Body mass index is 29.08 kg/m².    Intake/Output Summary (Last 24 hours) at 11/13/2024 0658  Last data filed at 11/13/2024 0600  Gross per 24 hour   Intake 4256.17 ml   Output 2415 ml   Net 1841.17 ml     I/O this shift:  In: 880.6 [I.V.:880.6]  Out: 975 [Urine:975]    Physical Exam:     General Appearance:  Alert, cooperative, in no acute distress   Head:  Normocephalic, without obvious abnormality, atraumatic   Eyes:  Lids and lashes normal, conjunctivae and sclerae normal, no icterus, no pallor, corneas clear, PERRLA   Ears:  Ears appear intact with no abnormalities noted   Throat:  No oral lesions, no thrush, oral mucosa moist   Neck:  No adenopathy, supple, trachea midline, no thyromegaly, no carotid bruit, no JVD   Back:  No kyphosis present, no scoliosis present, no skin lesions, erythema or scars, no tenderness to percussion, or palpation, range of motion normal   Lungs:  Clear to auscultation,respirations  regular, even and unlabored    Heart:  Regular rhythm and normal rate, normal S1 and S2, no murmur, no gallop, no rub, no click   Breast Exam:  Deferred   Abdomen:  Normal bowel sounds, no masses, no organomegaly, soft non-tender, non-distended, no guarding, no rebound tenderness   Genitalia:  Deferred   Extremities:  Moves all extremities well, no edema, no cyanosis, no redness   Pulses:  Pulses palpable and equal bilaterally   Skin:  No bleeding, bruising or rash   Lymph nodes:  No palpable adenopathy   Neurologic:  Cranial nerves 2 - 12 grossly intact, sensation intact, DTR present and equal bilaterally        Results Review:     I reviewed the patient's new clinical results.  I reviewed the patient's new imaging results and agree with the interpretation.  I reviewed the patient's other test results and agree with the interpretation  I personally viewed and interpreted the patient's EKG/Telemetry data      WBC WBC   Date Value Ref Range Status   11/13/2024 18.27 (H) 3.40 - 10.80 10*3/mm3 Final   11/12/2024 17.73 (H) 3.40 - 10.80 10*3/mm3 Final   11/12/2024 17.93 (H) 3.40 - 10.80 10*3/mm3 Final   11/11/2024 17.47 (H) 3.40 - 10.80 10*3/mm3 Final   11/11/2024 21.71 (H) 3.40 - 10.80 10*3/mm3 Final   11/11/2024 17.98 (H) 3.40 - 10.80 10*3/mm3 Final   11/11/2024 4.47 3.40 - 10.80 10*3/mm3 Final      HGB Hemoglobin   Date Value Ref Range Status   11/13/2024 9.1 (L) 12.0 - 15.9 g/dL Final   11/12/2024 10.3 (L) 12.0 - 15.9 g/dL Final   11/12/2024 8.2 (L) 12.0 - 17.0 g/dL Final   11/12/2024 6.5 (C) 12.0 - 17.0 g/dL Final   11/12/2024 9.6 (L) 12.0 - 15.9 g/dL Final     Comment:     Result checked   11/11/2024 11.7 (L) 12.0 - 15.9 g/dL Final   11/11/2024 11.6 (L) 12.0 - 15.9 g/dL Final   11/11/2024 13.1 12.0 - 15.9 g/dL Final   11/11/2024 14.3 12.0 - 15.9 g/dL Final      HCT Hematocrit   Date Value Ref Range Status   11/13/2024 26.2 (L) 34.0 - 46.6 % Final   11/12/2024 30.0 (L) 34.0 - 46.6 % Final   11/12/2024 24 (L) 38 - 51  "% Final   11/12/2024 19 (L) 38 - 51 % Final   11/12/2024 29.3 (L) 34.0 - 46.6 % Final   11/11/2024 34.9 34.0 - 46.6 % Final   11/11/2024 33.6 (L) 34.0 - 46.6 % Final   11/11/2024 36.9 34.0 - 46.6 % Final   11/11/2024 41.8 34.0 - 46.6 % Final      Platlets No results found for: \"LABPLAT\"     PT/INR:    Protime   Date Value Ref Range Status   11/12/2024 15.8 (H) 12.2 - 14.5 Seconds Final   /  INR   Date Value Ref Range Status   11/12/2024 1.25 (H) 0.89 - 1.12 Final       Sodium Sodium   Date Value Ref Range Status   11/13/2024 141 136 - 145 mmol/L Final   11/12/2024 141 136 - 145 mmol/L Final   11/12/2024 144 136 - 145 mmol/L Final   11/12/2024 143 136 - 145 mmol/L Final   11/12/2024 141 136 - 145 mmol/L Final   11/11/2024 139 136 - 145 mmol/L Final   11/11/2024 141 136 - 145 mmol/L Final      Potassium Potassium   Date Value Ref Range Status   11/13/2024 3.8 3.5 - 5.2 mmol/L Final     Comment:     Slight hemolysis detected by analyzer. Result may be falsely elevated.   11/12/2024 3.7 3.5 - 5.2 mmol/L Final   11/12/2024 3.7 3.5 - 5.2 mmol/L Final   11/12/2024 4.1 3.5 - 5.2 mmol/L Final   11/12/2024 4.0 3.5 - 5.2 mmol/L Final   11/11/2024 4.4 3.5 - 5.2 mmol/L Final   11/11/2024 3.9 3.5 - 5.2 mmol/L Final     Comment:     Slight hemolysis detected by analyzer. Result may be falsely elevated.      Chloride Chloride   Date Value Ref Range Status   11/13/2024 102 98 - 107 mmol/L Final   11/12/2024 102 98 - 107 mmol/L Final   11/12/2024 103 98 - 107 mmol/L Final   11/12/2024 105 98 - 107 mmol/L Final   11/12/2024 101 98 - 107 mmol/L Final   11/11/2024 103 98 - 107 mmol/L Final   11/11/2024 106 98 - 107 mmol/L Final      Bicarbonate No results found for: \"PLASMABICARB\"   BUN BUN   Date Value Ref Range Status   11/13/2024 15 8 - 23 mg/dL Final   11/12/2024 16 8 - 23 mg/dL Final   11/12/2024 17 8 - 23 mg/dL Final   11/12/2024 16 8 - 23 mg/dL Final   11/12/2024 17 8 - 23 mg/dL Final   11/11/2024 18 8 - 23 mg/dL Final "   11/11/2024 10 8 - 23 mg/dL Final      Creatinine Creatinine   Date Value Ref Range Status   11/13/2024 0.63 0.57 - 1.00 mg/dL Final   11/12/2024 0.89 0.57 - 1.00 mg/dL Final   11/12/2024 0.99 0.57 - 1.00 mg/dL Final   11/12/2024 0.96 0.57 - 1.00 mg/dL Final   11/12/2024 1.24 (H) 0.57 - 1.00 mg/dL Final   11/11/2024 1.11 (H) 0.57 - 1.00 mg/dL Final   11/11/2024 0.57 0.57 - 1.00 mg/dL Final      Calcium Calcium   Date Value Ref Range Status   11/13/2024 7.5 (L) 8.6 - 10.5 mg/dL Final   11/12/2024 7.9 (L) 8.6 - 10.5 mg/dL Final   11/12/2024 8.0 (L) 8.6 - 10.5 mg/dL Final   11/12/2024 8.2 (L) 8.6 - 10.5 mg/dL Final   11/12/2024 7.8 (L) 8.6 - 10.5 mg/dL Final   11/11/2024 8.1 (L) 8.6 - 10.5 mg/dL Final   11/11/2024 8.7 8.6 - 10.5 mg/dL Final      Magnesium Magnesium   Date Value Ref Range Status   11/13/2024 3.2 (H) 1.6 - 2.4 mg/dL Final   11/12/2024 2.9 (H) 1.6 - 2.4 mg/dL Final   11/12/2024 1.9 1.6 - 2.4 mg/dL Final   11/12/2024 2.0 1.6 - 2.4 mg/dL Final            pH pH, Arterial   Date Value Ref Range Status   11/13/2024 7.329 (L) 7.350 - 7.450 pH units Final     Comment:     84 Value below reference range   11/12/2024 7.324 (L) 7.350 - 7.450 pH units Final     Comment:     84 Value below reference range   11/12/2024 7.309 (L) 7.350 - 7.450 pH units Final     Comment:     84 Value below reference range   11/12/2024 7.289 (L) 7.350 - 7.450 pH units Final     Comment:     84 Value below reference range   11/12/2024 7.302 (L) 7.350 - 7.450 pH units Final     Comment:     84 Value below reference range   11/12/2024 7.249 (L) 7.350 - 7.450 pH units Final     Comment:     84 Value below reference range   11/12/2024 7.31 (L) 7.35 - 7.6 pH units Final   11/12/2024 7.26 (L) 7.35 - 7.6 pH units Final   11/12/2024 7.188 (C) 7.350 - 7.450 pH units Final     Comment:     85 Value below critical limit   11/11/2024 7.179 (C) 7.350 - 7.450 pH units Final     Comment:     85 Value below critical limit   11/11/2024 7.199 (C) 7.350 -  7.450 pH units Final     Comment:     85 Value below critical limit      pO2 pO2, Arterial   Date Value Ref Range Status   11/13/2024 66.4 (L) 83.0 - 108.0 mm Hg Final     Comment:     84 Value below reference range   11/12/2024 64.9 (L) 83.0 - 108.0 mm Hg Final     Comment:     84 Value below reference range   11/12/2024 166.0 (H) 83.0 - 108.0 mm Hg Final     Comment:     83 Value above reference range   11/12/2024 54.8 (L) 83.0 - 108.0 mm Hg Final     Comment:     84 Value below reference range   11/12/2024 57.0 (L) 83.0 - 108.0 mm Hg Final     Comment:     84 Value below reference range   11/12/2024 71.6 (L) 83.0 - 108.0 mm Hg Final     Comment:     84 Value below reference range   11/12/2024 96.6 83.0 - 108.0 mm Hg Final   11/11/2024 121.0 (H) 83.0 - 108.0 mm Hg Final   11/11/2024 75.3 (L) 83.0 - 108.0 mm Hg Final     Comment:     84 Value below reference range      pCO2 pCO2, Arterial   Date Value Ref Range Status   11/13/2024 59.8 (H) 35.0 - 45.0 mm Hg Final     Comment:     83 Value above reference range   11/12/2024 60.3 (H) 35.0 - 45.0 mm Hg Final     Comment:     83 Value above reference range   11/12/2024 58.2 (H) 35.0 - 45.0 mm Hg Final     Comment:     83 Value above reference range   11/12/2024 56.2 (H) 35.0 - 45.0 mm Hg Final     Comment:     83 Value above reference range   11/12/2024 49.3 (H) 35.0 - 45.0 mm Hg Final     Comment:     83 Value above reference range   11/12/2024 47.5 (H) 35.0 - 45.0 mm Hg Final     Comment:     83 Value above reference range   11/12/2024 46.7 (H) 35.0 - 45.0 mm Hg Final     Comment:     83 Value above reference range   11/11/2024 43.2 35.0 - 45.0 mm Hg Final   11/11/2024 51.3 (H) 35.0 - 45.0 mm Hg Final     Comment:     83 Value above reference range      HCO3 HCO3, Arterial   Date Value Ref Range Status   11/13/2024 31.4 (H) 20.0 - 26.0 mmol/L Final   11/12/2024 31.3 (H) 20.0 - 26.0 mmol/L Final   11/12/2024 29.2 (H) 20.0 - 26.0 mmol/L Final   11/12/2024 26.9 (H) 20.0  - 26.0 mmol/L Final   11/12/2024 24.3 20.0 - 26.0 mmol/L Final   11/12/2024 20.8 20.0 - 26.0 mmol/L Final   11/12/2024 17.7 (L) 20.0 - 26.0 mmol/L Final   11/11/2024 16.1 (L) 20.0 - 26.0 mmol/L Final   11/11/2024 20.0 20.0 - 26.0 mmol/L Final        amiodarone, 200 mg, Oral, Once   Followed by  [START ON 11/14/2024] amiodarone, 200 mg, Oral, Q8H   Followed by  [START ON 11/20/2024] amiodarone, 200 mg, Oral, Q12H   Followed by  [START ON 12/4/2024] amiodarone, 200 mg, Oral, Daily  amiodarone, 150 mg, Intravenous, Once  aspirin, 81 mg, Oral, Daily  docusate sodium, 100 mg, Oral, BID  insulin glargine, 15 Units, Subcutaneous, Nightly  insulin lispro, 3-14 Units, Subcutaneous, 4x Daily AC & at Bedtime  levothyroxine, 200 mcg, Oral, Q AM  lidocaine, 5 mL, Infiltration, Once  mupirocin, 1 Application, Each Nare, BID  nystatin, 1 Application, Topical, BID  polyethylene glycol, 17 g, Oral, Daily  potassium chloride, 20 mEq, Oral, Once  potassium chloride, 10 mEq, Oral, BID  senna-docusate sodium, 2 tablet, Oral, Nightly  sodium bicarbonate, 50 mEq, Intravenous, Once  sodium chloride, 3 mL, Intravenous, Q12H  thiamine (B-1) IV, 500 mg, Intravenous, Q8H      amiodarone, 0.5 mg/min, Last Rate: 1 mg/min (11/13/24 0208)  EPINEPHrine, 0.02-0.3 mcg/kg/min, Last Rate: 0.1 mcg/kg/min (11/12/24 7703)  lactated ringers, 9 mL/hr  phenylephrine, 0.5-3 mcg/kg/min, Last Rate: 1 mcg/kg/min (11/13/24 5177)  vasopressin, 0.03 Units/min, Last Rate: 0.03 Units/min (11/13/24 9264)        Medication Review: Reviewed    Assessment & Plan     Patient Active Problem List   Diagnosis Code    Essential hypertension I10    Acquired hypothyroidism E03.9    Lung cancer; S/P Bronch/Thor w/ ALBINA lobectomy/mediastinal lymph node resec (5/12/17, Dr. 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  Keep elevated for now.  For her atrial fibrillation continue amiodarone at 1 mg/min.  Will decrease tomorrow.  Important for patient's rate to be controlled given mitral valve disease.  Would attempt to stop vasopressin then epinephrine in that order.      Jeffry Pickett MD  11/13/24  06:58 EST

## 2024-11-13 NOTE — CASE MANAGEMENT/SOCIAL WORK
Discharge Planning Assessment  [facility]     Patient Name: Jill Miller  MRN: [id number]  Today's Date: 11/13/2024    Admit Date: 11/11/2024    Plan: IDP   Discharge Needs Assessment       Row Name 11/13/24 1125       Living Environment    People in Home spouse    Name(s) of People in Home Zen    Current Living Arrangements apartment    Potentially Unsafe Housing Conditions none    In the past 12 months has the electric, gas, oil, or water company threatened to shut off services in your home? No    Primary Care Provided by self    Provides Primary Care For no one    Family Caregiver if Needed spouse    Family Caregiver Names Zen    Quality of Family Relationships supportive;involved;helpful    Able to Return to Prior Arrangements yes       Resource/Environmental Concerns    Resource/Environmental Concerns home accessibility    Home Accessibility Concerns stairs to enter home    Transportation Concerns none       Transportation Needs    In the past 12 months, has lack of transportation kept you from medical appointments or from getting medications? no    In the past 12 months, has lack of transportation kept you from meetings, work, or from getting things needed for daily living? No       Food Insecurity    Within the past 12 months, you worried that your food would run out before you got the money to buy more. Never true    Within the past 12 months, the food you bought just didn't last and you didn't have money to get more. Never true       Transition Planning    Patient/Family Anticipates Transition to home with family    Patient/Family Anticipated Services at Transition none    Transportation Anticipated family or friend will provide       Discharge Needs Assessment    Readmission Within the Last 30 Days no previous admission in last 30 days    Equipment Currently Used at Home oxygen    Concerns to be Addressed no discharge needs identified;denies needs/concerns at this time    Do you want help  finding or keeping work or a job? I do not need or want help    Do you want help with school or training? For example, starting or completing job training or getting a high school diploma, GED or equivalent No    Concerns Comments currently employed    Anticipated Changes Related to Illness none    Equipment Needed After Discharge none                   Discharge Plan       Row Name 11/13/24 1127       Plan    Plan IDP    Patient/Family in Agreement with Plan yes    Plan Comments Spoke with spouse via phone r/t pt resting with eyes closed requiring BiPAP. [place] live in [place] in [place]. Pt is employed. Has O2 thru [employer] and [employer]. No HH/OPPT. PCP Marisa Lynch. [place] [facility] with scripts filled at [facility]. Plan is home at discharge. CM will cont to follow.    Final Discharge Disposition Code 01 - home or self-care                  Continued Care and Services - Admitted Since 11/11/2024    No active coordination exists for this encounter.       Selected Continued Care - Prior Encounters Includes continued care and service providers with selected services from prior encounters from 8/13/2024 to 11/13/2024      Discharged on 9/27/2024 Admission date: 9/25/2024 - Discharge disposition: Home or Self Care      Durable Medical Equipment       Service Provider Services Address Phone Fax Patient Preferred    [facility] [address] [place] [address] [phone] --                             Demographic Summary       Row Name 11/13/24 1124       General Information    Admission Type inpatient    Arrived From home    Referral Source admission list    Reason for Consult decision-making    Preferred Language English                   Functional Status       Row Name 11/13/24 1124       Functional Status    Usual Activity Tolerance good    Current Activity Tolerance good       Physical Activity    On average, how many days per week do you engage in moderate to  strenuous exercise (like a brisk walk)? 0 days    On average, how many minutes do you engage in exercise at this level? 0 min    Number of minutes of exercise per week 0       Functional Status, IADL    Medications independent    Meal Preparation independent    Housekeeping independent    Laundry independent    Shopping independent    If for any reason you need help with day-to-day activities such as bathing, preparing meals, shopping, managing finances, etc., do you get the help you need? I don't need any help                   Psychosocial    No documentation.                  Abuse/Neglect    No documentation.                  Legal    No documentation.                  Substance Abuse    No documentation.                  Patient Forms    No documentation.                     Jo-Ann Gomez RN

## 2024-11-13 NOTE — THERAPY EVALUATION
Patient Name: Jill Miller  : 1960    MRN: [id number]                              Today's Date: 2024       Admit Date: 2024    Visit Dx:     ICD-10-CM ICD-9-CM   1. Aortic valve disorder  I35.9 424.1   2. Stenosis of prosthetic aortic valve, initial encounter  T82.857A 996.71     Patient Active Problem List   Diagnosis    Essential hypertension    Acquired hypothyroidism    Lung cancer; S/P Bronch/Thor w/ ALBINA lobectomy/mediastinal lymph node resec (17, Dr. Cheatham)    Paroxysmal atrial fibrillation    Hyperlipidemia LDL goal <70    Prosthetic aortic valve stenosis s/p TAVR 24, Right groin exploration for hematoma 24    Type 2 diabetes mellitus    COPD (chronic obstructive pulmonary disease)    Obesity (BMI 30-39.9)    Tobacco user    Pulmonary hypertension    Stenosis of prosthetic mitral valve    Acute on chronic respiratory failure with hypoxia and hypercapnia     Past Medical History:   Diagnosis Date    Atrial fibrillation and flutter     Chronic diastolic congestive heart failure     Chronically Abnormal CXR (Left pleural disease post op) 2017    COPD exacerbation 2022    Elevated cholesterol     Essential hypertension 04/10/2017    Target blood pressure <130/80 mmHg    Graves disease     Hyperlipidemia LDL goal <70     Hypertension     Hyperthyroidism     Lung cancer     MRSA (methicillin resistant staph aureus) culture positive     under left breast, incision and debridement, treated with wound packing and po abx     Prolonged QTC interval on ECG 2022    Rheumatic mitral stenosis     Type 2 diabetes mellitus 2018    Valvular heart disease      Past Surgical History:   Procedure Laterality Date    ABLATION OF DYSRHYTHMIC FOCUS  2018    ABSCESS DRAINAGE      under left breast d/t mrsa     AORTIC VALVE REPAIR/REPLACEMENT N/A 2018    Procedure: MEDIAN STERNOTOMY, AORTIC VALVE REPLACEMENT WITH TIFFANIE AND MAZE, TRICUSPID RING, LEFT  ATRIAL OCCLUSION;  Surgeon: Ajay Cheatham MD;  Location:  [facility];  Service: Cardiothoracic    AORTIC VALVE REPAIR/REPLACEMENT N/A 11/11/2024    Procedure: TRANSCATHETER AORTIC VALVE REPLACEMENT;  Surgeon: Ajay Cheatham MD;  Location:  [facility];  Service: Cardiothoracic;  Laterality: N/A;  FL- 6 min 36 sec  DOSE-  110 MGY  CONTRAST- 40 mL    AORTIC VALVE REPAIR/REPLACEMENT N/A 11/11/2024    Procedure: Transfemoral Transcatheter Aortic Valve Replacement;  Surgeon: Jeaneth Carreon MD;  Location:  [facility];  Service: Cardiovascular;  Laterality: N/A;    BRONCHOSCOPY Left 05/12/2017    Procedure: BRONCHOSCOPY;  Surgeon: Ajay Cheatham MD;  Location:  [facility];  Service:     BRONCHOSCOPY N/A 05/18/2017    Procedure: BRONCHOSCOPY BIOPSY AT BEDSIDE;  Surgeon: Ajay Cheatham MD;  Location: [facility];  Service:     CARDIAC CATHETERIZATION N/A 09/28/2017    Procedure: Left Heart Cath;  Surgeon: Marco Lay MD;  Location:  [facility];  Service:     CARDIAC CATHETERIZATION N/A 09/28/2017    Procedure: Right Heart Cath;  Surgeon: Marco Lay MD;  Location:  [facility];  Service:     CARDIAC CATHETERIZATION N/A 10/11/2024    Procedure: Right and Left Heart Cath;  Surgeon: Jeffry Pickett MD;  Location:  [facility];  Service: Cardiology;  Laterality: N/A;    CARDIAC ELECTROPHYSIOLOGY PROCEDURE N/A 6/1/2022    Procedure: Ablation atrial fibrillation (atypical A flutter). Hold Rythmol x5 days;  Surgeon: Nigel Huber MD;  Location: [facility] INVASIVE LOCATION;  Service: Cardiovascular;  Laterality: N/A;    FEMORAL THROMBECTOMY/EMBOLECTOMY Right 11/12/2024    Procedure: GROIN EXPLORATION, LIGATION OF INFERIOR EPIGASTRIC ARTERY RIGHT;  Surgeon: Ajay Cheatham MD;  Location:  [facility];  Service: Cardiothoracic;  Laterality: Right;  FL: 6 SECONDS  DOSE: 191MGY  CONTRAST: 30ML VISIPAQUE    LUNG BIOPSY  04/2017     LYMPH NODE DISSECTION Left 05/12/2017    Procedure: MEDIASTINAL LYMPH NODE DISSECTION;  Surgeon: Ajay Cheatham MD;  Location:  [facility];  Service:     MAZE PROCEDURE      MITRAL VALVE REPAIR/REPLACEMENT N/A 07/16/2018    Procedure: MITRAL VALVE REPLACEMENT;  Surgeon: Ajay Cheahtam MD;  Location:  [facility];  Service: Cardiothoracic    MITRAL VALVE REPLACEMENT  07/16/2018    TEETH EXTRACTION      THORACOSCOPY VIDEO ASSISTED WITH LOBECTOMY Left 05/12/2017    Procedure: THORACOSCOPY VIDEO ASSISTED WITH OPEN LOBECTOMY;  Surgeon: Ajay Cheatham MD;  Location:  [facility];  Service:     TOTAL THYROIDECTOMY  approx 2012    TRANSESOPHAGEAL ECHOCARDIOGRAM (TIFFANIE) N/A 11/11/2024    Procedure: TRANSESOPHAGEAL ECHOCARDIOGRAM WITH ANESTHESIA;  Surgeon: Ajay Cheatham MD;  Location:  [facility];  Service: Cardiothoracic;  Laterality: N/A;    TRICUSPID VALVE SURGERY  07/16/2018      General Information       Row Name 11/13/24 1544          Physical Therapy Time and Intention    Document Type evaluation  -KG     Mode of Treatment physical therapy  -KG       Row Name 11/13/24 1546          General Information    Patient Profile Reviewed yes  -KG     Prior Level of Function independent:;all household mobility;gait;transfer;ADL's;dressing;bathing  -KG     Existing Precautions/Restrictions cardiac;fall;oxygen therapy device and L/min;other (see comments)  bipap dependent  -KG     Barriers to Rehab medically complex  -KG       Row Name 11/13/24 1545          Living Environment    People in Home spouse  -KG       Row Name 11/13/24 1544          Home Main Entrance    Number of Stairs, Main Entrance --  TBA later  -KG       Row Name 11/13/24 1545          Stairs Within Home, Primary    Number of Stairs, Within Home, Primary --  TBA later  -KG       Row Name 11/13/24 1547          Cognition    Orientation Status (Cognition) oriented x 3  -KG       Row Name 11/13/24 1541          Safety Issues/Impairments Affecting Functional Mobility     Safety Issues Affecting Function (Mobility) awareness of need for assistance;insight into deficits/self-awareness;safety precaution awareness;safety precautions follow-through/compliance  -KG     Impairments Affecting Function (Mobility) balance;coordination;endurance/activity tolerance;postural/trunk control;shortness of breath;strength  -KG               User Key  (r) = Recorded By, (t) = Taken By, (c) = Cosigned By      Initials Name Provider Type    KG Onelia Casper, PT Physical Therapist                   Mobility       Row Name 11/13/24 1547          Bed Mobility    Bed Mobility supine-sit  -KG     Supine-Sit Branch (Bed Mobility) moderate assist (50% patient effort);2 person assist;verbal cues  -KG     Assistive Device (Bed Mobility) bed rails;head of bed elevated;repositioning sheet  -KG     Comment, (Bed Mobility) VC's for sequencing and technique. Pt required assistance at trunk and BLEs.  -KG       [facility] Name 11/13/24 1547          Transfers    Comment, (Transfers) VC's for sequencing and safe hand placement. Pt able to perform MIP and weight shifting L to R while standing. Performed SPT from bed to chair.  -KG       Row Name 11/13/24 1547          Bed-Chair Transfer    Bed-Chair Branch (Transfers) minimum assist (75% patient effort);2 person assist;verbal cues  -KG     Assistive Device (Bed-Chair Transfers) other (see comments)  B UE support  -KG       [facility] Name 11/13/24 1547          Sit-Stand Transfer    Sit-Stand Branch (Transfers) minimum assist (75% patient effort);2 person assist;verbal cues  -KG     Assistive Device (Sit-Stand Transfers) other (see comments)  B UE support  -KG       Row Name 11/13/24 1547          Gait/Stairs (Locomotion)    Branch Level (Gait) unable to assess  -KG     Comment, (Gait/Stairs) Ambulation deferred. Pt is bipap dependent this session.  -KG               User Key  (r) = Recorded By, (t) = Taken By, (c) = Cosigned By      Initials Name  Provider Type    KG Onelia Casper PT Physical Therapist                   Obj/Interventions       Row Name 11/13/24 1548          Range of Motion Comprehensive    General Range of Motion no range of motion deficits identified  -KG     Comment, General Range of Motion B LE WFL  -KG       Row Name 11/13/24 1548          Strength Comprehensive (MMT)    Comment, General Manual Muscle Testing (MMT) Assessment B LE grossly 3+/5  -KG       [facility] Name 11/13/24 1548          Balance    Balance Assessment sitting static balance;standing static balance;standing dynamic balance  -KG     Static Sitting Balance minimal assist  -KG     Position, Sitting Balance supported;sitting edge of bed  -KG     Static Standing Balance minimal assist;2-person assist  -KG     Dynamic Standing Balance minimal assist;2-person assist  -KG     Position/Device Used, Standing Balance supported  -KG       [facility] Name 11/13/24 1548          Sensory Assessment (Somatosensory)    Sensory Assessment (Somatosensory) LE sensation intact  -KG               User Key  (r) = Recorded By, (t) = Taken By, (c) = Cosigned By      Initials Name Provider Type    KG Onelia Casper PT Physical Therapist                   Goals/Plan       Row Name 11/13/24 1550          Bed Mobility Goal 1 (PT)    Activity/Assistive Device (Bed Mobility Goal 1, PT) sit to supine;supine to sit  -KG     [place] Level/Cues Needed (Bed Mobility Goal 1, PT) minimum assist (75% or more patient effort)  -KG     Time Frame (Bed Mobility Goal 1, PT) short term goal (STG);5 days  -KG     Progress/Outcomes (Bed Mobility Goal 1, PT) goal ongoing  -KG       Row Name 11/13/24 1550          Transfer Goal 1 (PT)    Activity/Assistive Device (Transfer Goal 1, PT) sit-to-stand/stand-to-sit;bed-to-chair/chair-to-bed  -KG     [place] Level/Cues Needed (Transfer Goal 1, PT) contact guard required  -KG     Time Frame (Transfer Goal 1, PT) long term goal (LTG);1 week  -KG      Progress/Outcome (Transfer Goal 1, PT) goal ongoing  -KG       Row Name 11/13/24 1550          Gait Training Goal 1 (PT)    Activity/Assistive Device (Gait Training Goal 1, PT) gait (walking locomotion)  -KG     Agency Level (Gait Training Goal 1, PT) minimum assist (75% or more patient effort)  -KG     Distance (Gait Training Goal 1, PT) 100 feet  -KG     Time Frame (Gait Training Goal 1, PT) long term goal (LTG);1 week  -KG     Progress/Outcome (Gait Training Goal 1, PT) goal ongoing  -KG       Row Name 11/13/24 1550          Therapy Assessment/Plan (PT)    Planned Therapy Interventions (PT) balance training;bed mobility training;gait training;strengthening;transfer training  -KG               User Key  (r) = Recorded By, (t) = Taken By, (c) = Cosigned By      Initials Name Provider Type    KG Onelia Casper N, PT Physical Therapist                   Clinical Impression       Row Name 11/13/24 1548          Pain    Pretreatment Pain Rating 0/10 - no pain  -KG     Posttreatment Pain Rating 0/10 - no pain  -KG       Row Name 11/13/24 1548          Plan of Care Review    Plan of Care Reviewed With patient  -KG     Outcome Evaluation PT initial evaluation completed for pt s/p TAVR and groin exploration presenting with generalized weakness, impaired balance, increased SOA, and decreased functional mobility. Pt required Stefano x2 for STS transfers and SPT from bed to chair. Pt's decreased independence warrants PT skilled care. Recommend D/C to IP rehab facility.  -KG       Row Name 11/13/24 1541          Therapy Assessment/Plan (PT)    Patient/Family Therapy Goals Statement (PT) return to PLOF  -KG     Rehab Potential (PT) good  -KG     Criteria for Skilled Interventions Met (PT) yes;skilled treatment is necessary  -KG     Therapy Frequency (PT) daily  -KG     Predicted Duration of Therapy Intervention (PT) 7 days  -KG       Row Name 11/13/24 1548          Vital Signs    Pre Systolic BP Rehab 111  -KG     Pre  Treatment Diastolic BP 52  -KG     Intra Systolic BP Rehab 84  -KG     Intra Treatment Diastolic BP 50  -KG     Post Systolic BP Rehab 112  -KG     Post Treatment Diastolic BP 61  -KG     Pretreatment Heart Rate (beats/min) 69  -KG     Intratreatment Heart Rate (beats/min) 87  -KG     Posttreatment Heart Rate (beats/min) 130  -KG     Pre SpO2 (%) 91  -KG     O2 Delivery Pre Treatment other (see comments)  bipap  -KG     Intra SpO2 (%) 97  -KG     O2 Delivery Intra Treatment other (see comments)  bipap  -KG     Post SpO2 (%) 99  -KG     O2 Delivery Post Treatment other (see comments)  bipap  -KG     Pre Patient Position Supine  -KG     Intra Patient Position Standing  -KG     Post Patient Position Sitting  -KG       Row Name 11/13/24 6088          Positioning and Restraints    Pre-Treatment Position in bed  -KG     Post Treatment Position chair  -KG     In Chair reclined;call light within reach;encouraged to call for assist;with family/caregiver;with nsg;RUE elevated;LUE elevated;legs elevated  -KG               User Key  (r) = Recorded By, (t) = Taken By, (c) = Cosigned By      Initials Name Provider Type    KG Onelia Casper N, PT Physical Therapist                   Outcome Measures       Row Name 11/13/24 7640          How much help from another person do you currently need...    Turning from your back to your side while in flat bed without using bedrails? 2  -KG     Moving from lying on back to sitting on the side of a flat bed without bedrails? 2  -KG     Moving to and from a bed to a chair (including a wheelchair)? 2  -KG     Standing up from a chair using your arms (e.g., wheelchair, bedside chair)? 3  -KG     Climbing 3-5 steps with a railing? 1  -KG     To walk in hospital room? 1  -KG     AM-PAC 6 Clicks Score (PT) 11  -KG     Highest Level of Mobility Goal 4 --> Transfer to chair/commode  -KG       Row Name 11/13/24 2868          Functional Assessment    Outcome Measure Options AM-PAC 6 Clicks Basic  Mobility (PT)  -KG               User Key  (r) = Recorded By, (t) = Taken By, (c) = Cosigned By      Initials Name Provider Type    KG Onelia Casper PT Physical Therapist                                 Physical Therapy Education       Title: PT OT SLP Therapies (In Progress)       Topic: Physical Therapy (In Progress)       Point: Mobility training (In Progress)       Learning Progress Summary            Patient Acceptance, E, NR by KG at 11/13/2024 1430                      Point: Home exercise program (Not Started)       Learner Progress:  Not documented in this visit.              Point: Body mechanics (In Progress)       Learning Progress Summary            Patient Acceptance, E, NR by KG at 11/13/2024 1430                      Point: Precautions (In Progress)       Learning Progress Summary            Patient Acceptance, E, NR by KG at 11/13/2024 1430                                      User Key       Initials Effective Dates Name Provider Type Discipline    S[place]TON 05/22/20 -  Onelia Casper PT Physical Therapist PT                  PT Recommendation and Plan  Planned Therapy Interventions (PT): balance training, bed mobility training, gait training, strengthening, transfer training  Outcome Evaluation: PT initial evaluation completed for pt s/p TAVR and groin exploration presenting with generalized weakness, impaired balance, increased SOA, and decreased functional mobility. Pt required Stefano x2 for STS transfers and SPT from bed to chair. Pt's decreased independence warrants PT skilled care. Recommend D/C to IP rehab facility.     Time Calculation:   PT Evaluation Complexity  History, PT Evaluation Complexity: 3 or more personal factors and/or comorbidities  Examination of Body Systems (PT Eval Complexity): total of 3 or more elements  Clinical Presentation (PT Evaluation Complexity): evolving  Clinical Decision Making (PT Evaluation Complexity): moderate complexity  Overall Complexity (PT  Evaluation Complexity): moderate complexity     PT Charges       Row Name 11/13/24 1430             Time Calculation    Start Time 1430  -KG      PT Received On 11/13/24  -KG      PT Goal Re-Cert Due Date 11/23/24  -KG         Time Calculation- PT    Total Timed Code Minutes- PT 12 minute(s)  -KG         Timed Charges    86732 - PT Therapeutic Activity Minutes 12  -KG         Untimed Charges    PT Eval/Re-eval Minutes 46  -KG         Total Minutes    Timed Charges Total Minutes 12  -KG      Untimed Charges Total Minutes 46  -KG       Total Minutes 58  -KG                User Key  (r) = Recorded By, (t) = Taken By, (c) = Cosigned By      Initials Name Provider Type    KG Onelia Casper, PT Physical Therapist                  Therapy Charges for Today       Code Description Service Date Service Provider Modifiers Qty    85974502615 HC PT THERAPEUTIC ACT EA 15 MIN 11/13/2024 Onelia Casper, PT GP 1    33583379277 HC PT EVAL MOD COMPLEXITY 4 11/13/2024 Onelia Casper, PT GP 1            PT G-Codes  Outcome Measure Options: AM-PAC 6 Clicks Basic Mobility (PT)  AM-PAC 6 Clicks Score (PT): 11  PT Discharge Summary  Anticipated Discharge Disposition (PT): inpatient rehabilitation facility    Radha Casper PT  11/13/2024

## 2024-11-13 NOTE — PROGRESS NOTES
Pharmacy to Dose Heparin Infusion Note    Jlil Miller is a 64 y.o. female receiving heparin infusion.     Therapy for (VTE/Cardiac): VTE - PE  Patient Weight: 72.1 kg  Initial Bolus (Y/N): No  Any Bolus (Y/N): Yes     Signs or Symptoms of Bleeding: No, recent hemorrhagic shock followed by ligation of inferior epigastric artery and aortogram 11/12.    Cardiac or Other (Not VTE)   Initial Bolus: 60 units/kg (Max 4,000 units)  Initial rate: 12 units/kg/hr (Max 1,000 units/hr)   Anti-Xa Bolus   Dose Infusion Hold   Time Infusion Rate Change (units/kg/hr) Repeat  Anti-Xa    < 0.11 50 units/kg  (4000 units Max) None Increase by  3 units/kg/hr 6 hours   0.11- 0.19 25 units/kg  (2000 units Max) None Increase by  2 units/kg/hr 6 hours   0.2 - 0.29 0 None Increase by  1 units/kg/hr 6 hours   0.3 - 0.5 0 None No Change 6 hours (after 2 consecutive levels in range check qAM)   0.51 - 0.6 0 None Decrease by  1 units/kg/hr 6 hours   0.61 - 0.8 0 30 minutes Decrease by  2 units/kg/hr 6 hours   0.81 - 1 0 60 minutes Decrease by  3 units/kg/hr 6 hours   >1 0 Hold  After Anti-Xa less than 0.5 decrease previous rate by  4 units/kg/hr  Every 2 hours until Anti-Xa  less than 0.5 then when infusion restarts in 6 hours     VTE (PE/DVT)   Initial Bolus: 80 units/kg (Max 10,000 units)  Initial rate: 18 units/kg/hr (Max 1,500 units/hr)    Anti-Xa Bolus   Dose Infusion Hold   Time Infusion Rate Change (units/kg/hr) Repeat  Anti-Xa   < 0.11 50 units/kg  (4000 units Max) None Increase by  4 units/kg/hr 6 hours   0.11 - 0.19 25 units/kg  (2000 units Max) None Increase by  3 units/kg/hr 6 hours   0.2 - 0.29 0 None Increase by  2 units/kg/hr 6 hours   0.3 - 0.7 0 None No Change 6 hours (after 2 consecutive levels in range check qAM)   0.71 - 0.8 0 None Decrease by  1 units/kg/hr 6 hours   0.81 - 0.9 0 None Decrease by  2 units/kg/hr 6 hours   0.91 - 1 0 60 minutes Decrease by  3 units/kg/hr 6 hours   >1 0 Hold  After Anti-Xa less than 0.7  decrease previous rate by  4 units/kg/hr  Every 2 hours until Anti-Xa less than 0.7 then when infusion restarts in 6 hours     Results from last 7 days   Lab Units 11/13/24  0320 11/12/24  0946 11/12/24  0854 11/12/24  0735 11/12/24  0450 11/11/24  1154 11/08/24  1031   INR   --   --   --   --  1.25*  --  0.93   HEMOGLOBIN g/dL 9.1* 10.3*  --   --  9.6*   < > 16.5*   HEMOGLOBIN, POC g/dL  --   --  8.2*   < >  --   --   --    HEMATOCRIT % 26.2* 30.0*  --   --  29.3*   < > 47.2*   HEMATOCRIT POC %  --   --  24*   < >  --   --   --    PLATELETS 10*3/mm3 98* 140  --   --  227   < > 192    < > = values in this interval not displayed.       Date   Time   Anti-Xa Current Rate (units/kg/hr) Bolus   (units) Rate Change   (units/kg/hr) New Rate (units/kg/hr) Repeat  Anti-Xa Comments /  Pump Check    11/13 1820 pending -- -- +18 18 0000 DW ARIE Hansen [facility]  11/13/2024  18:17 EST

## 2024-11-13 NOTE — PROGRESS NOTES
Intensive Care Follow-up     Hospital:  LOS: 2 days   Ms. Jill Miller, 64 y.o. female is followed for:   Prosthetic aortic valve stenosis        Maycol Miller is a 64 year-old female that presents to [facility] ICU postoperatively from scheduled TAVR today.      Patient has a PMH of HFpEF, valvular disease s/p prosthetic MVR/AVR, pulmonary HTN, lung cancer s/p ALBINA lobectomy (2017), multiple cardiac valve replacements, HTN, hypothyroidism, hyperlipidemia, HFpEF, T2DM, COPD, tobacco use, and PAF s/p ANGELA/Maze (2018), cardioversion and PVA (2022).   Patient recently admitted to [facility] in September with hypoxic respiratory failure 2* rhinovirus and exacerbation of heart failure. 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 Cardiology/CTS consulted and patient underwent work-up for TAVR.      TIFFANIE showed LVEF 51-55%, moderate to severe AS, moderate MS, tricuspid ring noted, and bi-atrial cavity dilation. CTA TAVR negative for acute abnormality / noted emphysema.   Interval History:  The chart has been reviewed.  The patient remains on multiple pressors.  Overnight she has had increased oxygen demand and has begun to retain carbon dioxide.  She is currently on BiPAP with an FiO2 of 65%.  Arterial blood gas suggests both hypoxemia and hypercarbia.  Metabolic acidosis however appears to have resolved.  Last evening there was some difficulty with swelling of her left upper extremity.  The left ulnar radial artery catheter was removed without difficulty.  Patient denies any pain there currently.  She also denies any sputum expectoration.  She has remained afebrile.  She has been in and out of atrial fibrillation and remains on amiodarone.    The patient's past medical, surgical and social  "history were reviewed and updated in Epic as appropriate.        Objective     Infusions:  amiodarone, 1 mg/min, Last Rate: 1 mg/min (11/13/24 0824)  EPINEPHrine, 0.02-0.3 mcg/kg/min, Last Rate: 0.1 mcg/kg/min (11/12/24 2103)  lactated ringers, 9 mL/hr  phenylephrine, 0.5-3 mcg/kg/min, Last Rate: 1 mcg/kg/min (11/13/24 0542)  vasopressin, 0.03 Units/min, Last Rate: 0.03 Units/min (11/13/24 0541)      Medications:  acetaZOLAMIDE 500 mg injection, 500 mg, Intravenous, Once  albumin human, 25 g, Intravenous, Once  aspirin, 81 mg, Oral, Daily  bumetanide, 1 mg, Intravenous, Once  docusate sodium, 100 mg, Oral, BID  insulin glargine, 20 Units, Subcutaneous, Nightly  insulin lispro, 3-14 Units, Subcutaneous, 4x Daily AC & at Bedtime  Insulin Lispro, 5 Units, Subcutaneous, Once  levothyroxine, 200 mcg, Oral, Q AM  lidocaine, 5 mL, Infiltration, Once  mupirocin, 1 Application, Each Nare, BID  nystatin, 1 Application, Topical, BID  polyethylene glycol, 17 g, Oral, Daily  potassium chloride, 20 mEq, Oral, Once  potassium chloride, 10 mEq, Oral, BID  senna-docusate sodium, 2 tablet, Oral, Nightly  sodium bicarbonate, 50 mEq, Intravenous, Once  sodium chloride, 3 mL, Intravenous, Q12H  thiamine (B-1) IV, 500 mg, Intravenous, Q8H        Vital Sign Min/Max for last 24 hours  Temp  Min: 98.8 °F (37.1 °C)  Max: 99.5 °F (37.5 °C)   BP  Min: 83/42  Max: 117/73   Pulse  Min: 59  Max: 141   Resp  Min: 16  Max: 29   SpO2  Min: 84 %  Max: 98 %   Flow (L/min) (Oxygen Therapy)  Min: 5  Max: 6       Input/Output for last 24 hour shift  11/12 0701 - 11/13 0700  In: 4256.2 [P.O.:200; I.V.:2836.2]  Out: 2415 [Urine:2415]      Objective:  General Appearance:  Uncomfortable, ill-appearing, in no acute distress and not in pain.    Vital signs: (most recent): Blood pressure 108/46, pulse 69, temperature 99.5 °F (37.5 °C), temperature source Bladder, resp. rate 23, height 157.5 cm (62\"), weight 72.1 kg (159 lb), SpO2 94%.    HEENT: (Currently " wearing BiPAP)    Lungs:  Normal effort and normal respiratory rate.  There are rales and rhonchi.  No decreased breath sounds or wheezes.    Heart: Normal rate.  Regular rhythm.  S1 normal and S2 normal.    Abdomen: Abdomen is soft.  Bowel sounds are normal.   There is generalized tenderness.  There is no rebound tenderness.     Extremities: Normal range of motion.  (Edema and mild coolness to the left hand.  No cyanosis.)  Neurological: (Alert, oriented).    Pupils:  Pupils are equal, round, and reactive to light.    Skin:  Warm.                Results from last 7 days   Lab Units 11/13/24  0320 11/12/24  0946 11/12/24  0854 11/12/24  0735 11/12/24  0450   WBC 10*3/mm3 18.27* 17.73*  --   --  17.93*   HEMOGLOBIN g/dL 9.1* 10.3*  --   --  9.6*   HEMOGLOBIN, POC g/dL  --   --  8.2*   < >  --    PLATELETS 10*3/mm3 98* 140  --   --  227    < > = values in this interval not displayed.     Results from last 7 days   Lab Units 11/13/24  0320 11/12/24  1659 11/12/24  1255 11/12/24  0946 11/12/24  0450   SODIUM mmol/L 141 141 144 143 141   POTASSIUM mmol/L 3.8 3.7 3.7 4.1 4.0   CO2 mmol/L 29.0 27.0 23.0 22.0 16.0*   BUN mg/dL 15 16 17 16 17   CREATININE mg/dL 0.63 0.89 0.99 0.96 1.24*   MAGNESIUM mg/dL 3.2* 2.9*  --  1.9 2.0   PHOSPHORUS mg/dL 3.0  --   --   --  4.9*   GLUCOSE mg/dL 226* 299* 289* 271* 364*     Estimated Creatinine Clearance: 86.1 mL/min (by C-G formula based on SCr of 0.63 mg/dL).    Results from last 7 days   Lab Units 11/13/24  0807   PH, ARTERIAL pH units 7.307*   PCO2, ARTERIAL mm Hg 65.2*   PO2 ART mm Hg 61.5*         I reviewed the patient's results and images.     Assessment & Plan   Impression        Prosthetic aortic valve stenosis s/p TAVR 11/11/24, Right groin exploration for hematoma 11/12/24    Paroxysmal atrial fibrillation    Type 2 diabetes mellitus    COPD (chronic obstructive pulmonary disease)    Acute on chronic respiratory failure with hypoxia and hypercapnia       Plan        Patient  is currently requiring elevated FiO2 as well as ventilatory support with BiPAP.  Chest x-ray does show bilateral infiltrates.  I suspect that this is most likely related to volume resuscitation and blood product administration.  No current evidence for pneumonia however I will go ahead and check a procalcitonin and watch for any sign of fever or sputum production.  Hold on antimicrobial therapy for now.  I suspect that she likely has been a bit overcorrected on her bicarbonate and this is likely contributing to retention of carbon dioxide in addition to her underlying COPD.  I believe that she would benefit from continuing with BiPAP for now and weaning as tolerated.  I will also give her diuresis with Bumex as well as Diamox in addition to a 25% 25 g dose of albumin.  She still remains on multiple pressors and we will wean these as tolerated.  Continue with oral intake as tolerated.  Increase Lantus for now.  Continue on with vascular checks of her left upper extremity.  We will check spot ABGs as necessary.  Continue with amiodarone for atrial fibrillation.  Bronchodilator therapy as necessary.  This patient remains at very high risk of worsening secondary to multiple problems as detailed above.    Plan of care and goals reviewed with mulitdisciplinary/antibiotic stewardship team during rounds.   I discussed the patient's findings and my recommendations with patient, family, and nursing staff     High level of risk due to:  drug(s) requiring intensive monitoring for toxicity and parenteral controlled substances.        Omid León MD, [facility]  Pulmonology and Critical Care Medicine

## 2024-11-13 NOTE — PROGRESS NOTES
CTS Progress Note      POD # 2 s/p TAVR, # 1 Right groin exploration    Subjective  In bed. On Bipap.  Remains on 0.1 Epi gtt, Harshil 1, Vasopressin 0.03.  On Amiodarone gtt for A.fib.    Objective    Physical Exam:   Vital Signs   Temp:  [98.4 °F (36.9 °C)-99.5 °F (37.5 °C)] 99.5 °F (37.5 °C)  Heart Rate:  [] 69  Resp:  [16-29] 23  BP: ()/(42-95) 108/46  Arterial Line BP: ()/(-) 121/53   GEN: NAD   CV: Irregular irregular with systolic murmur    RESP: Decreased throughout   ABD: Soft nontender    EXT: Left upper extremity with moderate swelling.  Motor function and sensation intact.    Incision: Right groin is soft with no hematoma or surrounding erythema.  Aquacel bandage in place     Results     Results from last 7 days   Lab Units 11/13/24  0320   WBC 10*3/mm3 18.27*   HEMOGLOBIN g/dL 9.1*   HEMATOCRIT % 26.2*   PLATELETS 10*3/mm3 98*     Results from last 7 days   Lab Units 11/13/24  0320   SODIUM mmol/L 141   POTASSIUM mmol/L 3.8   CHLORIDE mmol/L 102   CO2 mmol/L 29.0   BUN mg/dL 15   CREATININE mg/dL 0.63   GLUCOSE mg/dL 226*   CALCIUM mg/dL 7.5*     Results from last 7 days   Lab Units 11/12/24  0450 11/11/24  1154   INR  1.25*  --    APTT seconds  --  34.5       CXR: Pending      Assessment & Plan   POD # 2 s/p TAVR, # 1 Right groin exploration      Prosthetic aortic valve stenosis s/p TAVR 11/11/24, Right groin exploration for hematoma 11/12/24    Paroxysmal atrial fibrillation    Type 2 diabetes mellitus    COPD (chronic obstructive pulmonary disease)    Plan   Left hand improved with elevation and removing ulnar A-line  CT of chest  Wean O2 supplementation as tolerated  Wean IV pressors as tolerated    Ajay Cheatham MD  11/13/24  08:12 EST

## 2024-11-13 NOTE — PROGRESS NOTES
Contacted about concern of amiodarone possibly causing pulmonary issues.  Usually amiodarone toxicity is on chronic therapy not when used acutely.  Discussed also with electrophysiology colleagues.  Discussed with pulmonary critical care also.  Believe that volume patient has received between blood products and IV fluids has complicated her hemodynamics given mitral valve stenosis and tricuspid regurgitation.  Would diurese patient and control heart rate.  Keeping patient's heart rate within normal range and sinus optimally would be best for her valvular heart disease.  Await CT scan

## 2024-11-13 NOTE — PLAN OF CARE
Goal Outcome Evaluation:  Plan of Care Reviewed With: patient, spouse        Progress: no change  Outcome Evaluation: Pt went to OR this morning for a groin exploration and ligation of the epigastric artery. 2 u PRBCs given. Out to 2H around 10:30. 500 albumin. ABG drawn, bicarb gtt started. On epi at .1, rick at 1, vaso at .03. Bicarb gtt stopped at 1800. FSBG 310- 10 units subQ insulin given. Pt going in and out of afib with RVR and SBP dropped to 79/, rebolused amio and started the protocol over at 1800. Occasional junctional beats now and occasional afib. ABG redrawn at 1600 on 6L NC, pH 7.28, CO2 56, pO2 54, HCO3 26, base -.2, bipap put back on at 70%. CT Sx PA aware.

## 2024-11-13 NOTE — PLAN OF CARE
Goal Outcome Evaluation:  Plan of Care Reviewed With: patient           Outcome Evaluation: PT initial evaluation completed for pt s/p TAVR and groin exploration presenting with generalized weakness, impaired balance, increased SOA, and decreased functional mobility. Pt required Stefano x2 for STS transfers and SPT from bed to chair. Pt's decreased independence warrants PT skilled care. Recommend D/C to IP rehab facility.    Anticipated Discharge Disposition (PT): inpatient rehabilitation facility

## 2024-11-14 ENCOUNTER — APPOINTMENT (OUTPATIENT)
Dept: CARDIOLOGY | Facility: HOSPITAL | Age: 64
End: 2024-11-14
Payer: COMMERCIAL

## 2024-11-14 ENCOUNTER — APPOINTMENT (OUTPATIENT)
Dept: GENERAL RADIOLOGY | Facility: HOSPITAL | Age: 64
End: 2024-11-14
Payer: COMMERCIAL

## 2024-11-14 PROBLEM — R91.8 PULMONARY INFILTRATE: Status: ACTIVE | Noted: 2024-11-14

## 2024-11-14 PROBLEM — I26.99 ACUTE PULMONARY EMBOLISM WITHOUT ACUTE COR PULMONALE: Status: ACTIVE | Noted: 2024-11-14

## 2024-11-14 LAB
ANION GAP SERPL CALCULATED.3IONS-SCNC: 12 MMOL/L (ref 5–15)
ANISOCYTOSIS BLD QL: NORMAL
BASOPHILS # BLD AUTO: 0.02 10*3/MM3 (ref 0–0.2)
BASOPHILS NFR BLD AUTO: 0.1 % (ref 0–1.5)
BH CV LOW VAS RIGHT DISTAL FEMORAL SPONT: 1
BH CV LOW VAS RIGHT MID FEMORAL SPONT: 1
BH CV LOW VAS RIGHT PERONEAL VESSEL: 1
BH CV LOW VAS RIGHT PROFUNDA FEMORAL SPONT: 1
BH CV LOW VAS RIGHT PROXIMAL FEMORAL SPONT: 1
BH CV LOWER VASCULAR LEFT COMMON FEMORAL AUGMENT: NORMAL
BH CV LOWER VASCULAR LEFT COMMON FEMORAL COMPETENT: NORMAL
BH CV LOWER VASCULAR LEFT COMMON FEMORAL COMPRESS: NORMAL
BH CV LOWER VASCULAR LEFT COMMON FEMORAL PHASIC: NORMAL
BH CV LOWER VASCULAR LEFT COMMON FEMORAL SPONT: NORMAL
BH CV LOWER VASCULAR LEFT DISTAL FEMORAL COMPRESS: NORMAL
BH CV LOWER VASCULAR LEFT GASTRONEMIUS COMPRESS: NORMAL
BH CV LOWER VASCULAR LEFT GREATER SAPH AK COMPRESS: NORMAL
BH CV LOWER VASCULAR LEFT GREATER SAPH BK COMPRESS: NORMAL
BH CV LOWER VASCULAR LEFT MID FEMORAL AUGMENT: NORMAL
BH CV LOWER VASCULAR LEFT MID FEMORAL COMPETENT: NORMAL
BH CV LOWER VASCULAR LEFT MID FEMORAL COMPRESS: NORMAL
BH CV LOWER VASCULAR LEFT MID FEMORAL PHASIC: NORMAL
BH CV LOWER VASCULAR LEFT MID FEMORAL SPONT: NORMAL
BH CV LOWER VASCULAR LEFT PERONEAL COMPRESS: NORMAL
BH CV LOWER VASCULAR LEFT POPLITEAL AUGMENT: NORMAL
BH CV LOWER VASCULAR LEFT POPLITEAL COMPETENT: NORMAL
BH CV LOWER VASCULAR LEFT POPLITEAL COMPRESS: NORMAL
BH CV LOWER VASCULAR LEFT POPLITEAL PHASIC: NORMAL
BH CV LOWER VASCULAR LEFT POPLITEAL SPONT: NORMAL
BH CV LOWER VASCULAR LEFT POSTERIOR TIBIAL COMPRESS: NORMAL
BH CV LOWER VASCULAR LEFT PROXIMAL FEMORAL COMPRESS: NORMAL
BH CV LOWER VASCULAR LEFT SAPHENOFEMORAL JUNCTION COMPRESS: NORMAL
BH CV LOWER VASCULAR RIGHT DISTAL FEMORAL AUGMENT: NORMAL
BH CV LOWER VASCULAR RIGHT DISTAL FEMORAL COMPETENT: NORMAL
BH CV LOWER VASCULAR RIGHT DISTAL FEMORAL COMPRESS: NORMAL
BH CV LOWER VASCULAR RIGHT DISTAL FEMORAL PHASIC: NORMAL
BH CV LOWER VASCULAR RIGHT DISTAL FEMORAL SPONT: NORMAL
BH CV LOWER VASCULAR RIGHT GASTRONEMIUS COMPRESS: NORMAL
BH CV LOWER VASCULAR RIGHT GREATER SAPH AK COMPRESS: NORMAL
BH CV LOWER VASCULAR RIGHT GREATER SAPH BK COMPRESS: NORMAL
BH CV LOWER VASCULAR RIGHT MID FEMORAL AUGMENT: NORMAL
BH CV LOWER VASCULAR RIGHT MID FEMORAL COMPETENT: NORMAL
BH CV LOWER VASCULAR RIGHT MID FEMORAL COMPRESS: NORMAL
BH CV LOWER VASCULAR RIGHT MID FEMORAL PHASIC: NORMAL
BH CV LOWER VASCULAR RIGHT MID FEMORAL SPONT: NORMAL
BH CV LOWER VASCULAR RIGHT PERONEAL COMPRESS: NORMAL
BH CV LOWER VASCULAR RIGHT POPLITEAL AUGMENT: NORMAL
BH CV LOWER VASCULAR RIGHT POPLITEAL COMPETENT: NORMAL
BH CV LOWER VASCULAR RIGHT POPLITEAL COMPRESS: NORMAL
BH CV LOWER VASCULAR RIGHT POPLITEAL PHASIC: NORMAL
BH CV LOWER VASCULAR RIGHT POPLITEAL SPONT: NORMAL
BH CV LOWER VASCULAR RIGHT POSTERIOR TIBIAL COMPRESS: NORMAL
BH CV LOWER VASCULAR RIGHT PROXIMAL FEMORAL AUGMENT: NORMAL
BH CV LOWER VASCULAR RIGHT PROXIMAL FEMORAL COMPETENT: NORMAL
BH CV LOWER VASCULAR RIGHT PROXIMAL FEMORAL COMPRESS: NORMAL
BH CV LOWER VASCULAR RIGHT PROXIMAL FEMORAL PHASIC: NORMAL
BH CV LOWER VASCULAR RIGHT PROXIMAL FEMORAL SPONT: NORMAL
BH CV UPPER VENOUS LEFT AXILLARY AUGMENT: NORMAL
BH CV UPPER VENOUS LEFT AXILLARY COMPRESS: NORMAL
BH CV UPPER VENOUS LEFT AXILLARY PHASIC: NORMAL
BH CV UPPER VENOUS LEFT AXILLARY SPONT: NORMAL
BH CV UPPER VENOUS LEFT BASILIC FOREARM COLOR: 1
BH CV UPPER VENOUS LEFT BASILIC FOREARM COMPRESS: NORMAL
BH CV UPPER VENOUS LEFT BASILIC UPPER COMPRESS: NORMAL
BH CV UPPER VENOUS LEFT BRACHIAL COMPRESS: NORMAL
BH CV UPPER VENOUS LEFT CEPHALIC FOREARM COLOR: 1
BH CV UPPER VENOUS LEFT CEPHALIC FOREARM COMPRESS: NORMAL
BH CV UPPER VENOUS LEFT RADIAL COMPRESS: NORMAL
BH CV UPPER VENOUS LEFT SUBCLAVIAN AUGMENT: NORMAL
BH CV UPPER VENOUS LEFT SUBCLAVIAN COMPRESS: NORMAL
BH CV UPPER VENOUS LEFT SUBCLAVIAN PHASIC: NORMAL
BH CV UPPER VENOUS LEFT SUBCLAVIAN SPONT: NORMAL
BH CV UPPER VENOUS LEFT ULNAR COMPRESS: NORMAL
BH CV UPPER VENOUS RIGHT AXILLARY AUGMENT: NORMAL
BH CV UPPER VENOUS RIGHT AXILLARY COMPRESS: NORMAL
BH CV UPPER VENOUS RIGHT AXILLARY PHASIC: NORMAL
BH CV UPPER VENOUS RIGHT AXILLARY SPONT: NORMAL
BH CV UPPER VENOUS RIGHT BASILIC FOREARM COMPRESS: NORMAL
BH CV UPPER VENOUS RIGHT BASILIC UPPER COMPRESS: NORMAL
BH CV UPPER VENOUS RIGHT BRACHIAL COMPRESS: NORMAL
BH CV UPPER VENOUS RIGHT CEPHALIC FOREARM COLOR: 1
BH CV UPPER VENOUS RIGHT CEPHALIC FOREARM COMPRESS: NORMAL
BH CV UPPER VENOUS RIGHT CEPHALIC UPPER COMPRESS: NORMAL
BH CV UPPER VENOUS RIGHT INTERNAL JUGULAR AUGMENT: NORMAL
BH CV UPPER VENOUS RIGHT INTERNAL JUGULAR COMPRESS: NORMAL
BH CV UPPER VENOUS RIGHT INTERNAL JUGULAR PHASIC: NORMAL
BH CV UPPER VENOUS RIGHT INTERNAL JUGULAR SPONT: NORMAL
BH CV UPPER VENOUS RIGHT RADIAL COLOR: 1
BH CV UPPER VENOUS RIGHT RADIAL COMPRESS: NORMAL
BH CV UPPER VENOUS RIGHT SUBCLAVIAN AUGMENT: NORMAL
BH CV UPPER VENOUS RIGHT SUBCLAVIAN COMPRESS: NORMAL
BH CV UPPER VENOUS RIGHT SUBCLAVIAN PHASIC: NORMAL
BH CV UPPER VENOUS RIGHT SUBCLAVIAN SPONT: NORMAL
BH CV UPPER VENOUS RIGHT ULNAR COMPRESS: NORMAL
BH CV VAS PRELIMINARY FINDINGS SCRIPTING: 1
BH CV VAS PRELIMINARY FINDINGS SCRIPTING: 1
BUN SERPL-MCNC: 13 MG/DL (ref 8–23)
BUN/CREAT SERPL: 21.7 (ref 7–25)
CALCIUM SPEC-SCNC: 8.1 MG/DL (ref 8.6–10.5)
CHLORIDE SERPL-SCNC: 100 MMOL/L (ref 98–107)
CO2 SERPL-SCNC: 26 MMOL/L (ref 22–29)
CREAT SERPL-MCNC: 0.6 MG/DL (ref 0.57–1)
DEPRECATED RDW RBC AUTO: 78.8 FL (ref 37–54)
DEPRECATED RDW RBC AUTO: 80.2 FL (ref 37–54)
EGFRCR SERPLBLD CKD-EPI 2021: 101.6 ML/MIN/1.73
EOSINOPHIL # BLD AUTO: 0 10*3/MM3 (ref 0–0.4)
EOSINOPHIL NFR BLD AUTO: 0 % (ref 0.3–6.2)
ERYTHROCYTE [DISTWIDTH] IN BLOOD BY AUTOMATED COUNT: 21.3 % (ref 12.3–15.4)
ERYTHROCYTE [DISTWIDTH] IN BLOOD BY AUTOMATED COUNT: 21.7 % (ref 12.3–15.4)
GLUCOSE BLDC GLUCOMTR-MCNC: 142 MG/DL (ref 70–130)
GLUCOSE BLDC GLUCOMTR-MCNC: 152 MG/DL (ref 70–130)
GLUCOSE BLDC GLUCOMTR-MCNC: 158 MG/DL (ref 70–130)
GLUCOSE BLDC GLUCOMTR-MCNC: 202 MG/DL (ref 70–130)
GLUCOSE SERPL-MCNC: 197 MG/DL (ref 65–99)
HCT VFR BLD AUTO: 26.6 % (ref 34–46.6)
HCT VFR BLD AUTO: 27.6 % (ref 34–46.6)
HGB BLD-MCNC: 8.7 G/DL (ref 12–15.9)
HGB BLD-MCNC: 8.8 G/DL (ref 12–15.9)
IMM GRANULOCYTES # BLD AUTO: 0.33 10*3/MM3 (ref 0–0.05)
IMM GRANULOCYTES NFR BLD AUTO: 1.6 % (ref 0–0.5)
LYMPHOCYTES # BLD AUTO: 0.42 10*3/MM3 (ref 0.7–3.1)
LYMPHOCYTES NFR BLD AUTO: 2.1 % (ref 19.6–45.3)
MCH RBC QN AUTO: 33.2 PG (ref 26.6–33)
MCH RBC QN AUTO: 33.6 PG (ref 26.6–33)
MCHC RBC AUTO-ENTMCNC: 31.9 G/DL (ref 31.5–35.7)
MCHC RBC AUTO-ENTMCNC: 32.7 G/DL (ref 31.5–35.7)
MCV RBC AUTO: 102.7 FL (ref 79–97)
MCV RBC AUTO: 104.2 FL (ref 79–97)
MONOCYTES # BLD AUTO: 1.14 10*3/MM3 (ref 0.1–0.9)
MONOCYTES NFR BLD AUTO: 5.6 % (ref 5–12)
MRSA DNA SPEC QL NAA+PROBE: NEGATIVE
NEUTROPHILS NFR BLD AUTO: 18.53 10*3/MM3 (ref 1.7–7)
NEUTROPHILS NFR BLD AUTO: 90.6 % (ref 42.7–76)
NRBC BLD AUTO-RTO: 0.6 /100 WBC (ref 0–0.2)
PLAT MORPH BLD: NORMAL
PLATELET # BLD AUTO: 76 10*3/MM3 (ref 140–450)
PLATELET # BLD AUTO: 85 10*3/MM3 (ref 140–450)
PMV BLD AUTO: 11.8 FL (ref 6–12)
PMV BLD AUTO: 12 FL (ref 6–12)
POTASSIUM SERPL-SCNC: 3.7 MMOL/L (ref 3.5–5.2)
QT INTERVAL: 472 MS
QTC INTERVAL: 472 MS
RBC # BLD AUTO: 2.59 10*6/MM3 (ref 3.77–5.28)
RBC # BLD AUTO: 2.65 10*6/MM3 (ref 3.77–5.28)
SODIUM SERPL-SCNC: 138 MMOL/L (ref 136–145)
UFH PPP CHRO-ACNC: 0.25 IU/ML (ref 0.3–0.7)
UFH PPP CHRO-ACNC: 0.37 IU/ML (ref 0.3–0.7)
UFH PPP CHRO-ACNC: 0.37 IU/ML (ref 0.3–0.7)
WBC MORPH BLD: NORMAL
WBC NRBC COR # BLD AUTO: 16.9 10*3/MM3 (ref 3.4–10.8)
WBC NRBC COR # BLD AUTO: 20.44 10*3/MM3 (ref 3.4–10.8)

## 2024-11-14 PROCEDURE — 25010000002 HEPARIN (PORCINE) 25000-0.45 UT/250ML-% SOLUTION

## 2024-11-14 PROCEDURE — 63710000001 INSULIN LISPRO (HUMAN) PER 5 UNITS: Performed by: NURSE PRACTITIONER

## 2024-11-14 PROCEDURE — 25810000003 SODIUM CHLORIDE 0.9 % SOLUTION 250 ML FLEX CONT: Performed by: PHYSICIAN ASSISTANT

## 2024-11-14 PROCEDURE — 94761 N-INVAS EAR/PLS OXIMETRY MLT: CPT

## 2024-11-14 PROCEDURE — 93970 EXTREMITY STUDY: CPT

## 2024-11-14 PROCEDURE — 99233 SBSQ HOSP IP/OBS HIGH 50: CPT | Performed by: INTERNAL MEDICINE

## 2024-11-14 PROCEDURE — 94660 CPAP INITIATION&MGMT: CPT

## 2024-11-14 PROCEDURE — 94799 UNLISTED PULMONARY SVC/PX: CPT

## 2024-11-14 PROCEDURE — 25010000002 PHENYLEPHRINE 10 MG/ML SOLUTION 5 ML VIAL: Performed by: PHYSICIAN ASSISTANT

## 2024-11-14 PROCEDURE — 85025 COMPLETE CBC W/AUTO DIFF WBC: CPT | Performed by: INTERNAL MEDICINE

## 2024-11-14 PROCEDURE — 25010000002 AMIODARONE IN DEXTROSE 5% 150-4.21 MG/100ML-% SOLUTION: Performed by: PHYSICIAN ASSISTANT

## 2024-11-14 PROCEDURE — 93970 EXTREMITY STUDY: CPT | Performed by: INTERNAL MEDICINE

## 2024-11-14 PROCEDURE — 80048 BASIC METABOLIC PNL TOTAL CA: CPT | Performed by: INTERNAL MEDICINE

## 2024-11-14 PROCEDURE — 85007 BL SMEAR W/DIFF WBC COUNT: CPT | Performed by: INTERNAL MEDICINE

## 2024-11-14 PROCEDURE — 87641 MR-STAPH DNA AMP PROBE: CPT | Performed by: INTERNAL MEDICINE

## 2024-11-14 PROCEDURE — 85520 HEPARIN ASSAY: CPT

## 2024-11-14 PROCEDURE — 63710000001 INSULIN GLARGINE PER 5 UNITS: Performed by: INTERNAL MEDICINE

## 2024-11-14 PROCEDURE — 25010000002 AMIODARONE IN DEXTROSE 5% 360-4.14 MG/200ML-% SOLUTION: Performed by: INTERNAL MEDICINE

## 2024-11-14 PROCEDURE — 71045 X-RAY EXAM CHEST 1 VIEW: CPT

## 2024-11-14 PROCEDURE — 25010000002 HYDROCORTISONE SOD SUC (PF) 100 MG RECONSTITUTED SOLUTION: Performed by: INTERNAL MEDICINE

## 2024-11-14 PROCEDURE — 82948 REAGENT STRIP/BLOOD GLUCOSE: CPT

## 2024-11-14 PROCEDURE — 99232 SBSQ HOSP IP/OBS MODERATE 35: CPT | Performed by: INTERNAL MEDICINE

## 2024-11-14 PROCEDURE — 25010000003 VASOPRESSIN 0.2 UNITS/ML SOLUTION: Performed by: PHYSICIAN ASSISTANT

## 2024-11-14 PROCEDURE — 97530 THERAPEUTIC ACTIVITIES: CPT

## 2024-11-14 PROCEDURE — 85027 COMPLETE CBC AUTOMATED: CPT | Performed by: NURSE PRACTITIONER

## 2024-11-14 PROCEDURE — 25010000002 PIPERACILLIN SOD-TAZOBACTAM PER 1 G: Performed by: INTERNAL MEDICINE

## 2024-11-14 PROCEDURE — 93005 ELECTROCARDIOGRAM TRACING: CPT | Performed by: HOSPITALIST

## 2024-11-14 RX ORDER — LINEZOLID 600 MG/1
600 TABLET, FILM COATED ORAL EVERY 12 HOURS SCHEDULED
Status: DISCONTINUED | OUTPATIENT
Start: 2024-11-14 | End: 2024-11-15

## 2024-11-14 RX ORDER — METOPROLOL TARTRATE 1 MG/ML
2.5 INJECTION, SOLUTION INTRAVENOUS
Status: DISCONTINUED | OUTPATIENT
Start: 2024-11-14 | End: 2024-11-20 | Stop reason: HOSPADM

## 2024-11-14 RX ADMIN — MUPIROCIN 1 APPLICATION: 20 OINTMENT TOPICAL at 20:41

## 2024-11-14 RX ADMIN — PIPERACILLIN AND TAZOBACTAM 3.38 G: 3; .375 INJECTION, POWDER, LYOPHILIZED, FOR SOLUTION INTRAVENOUS at 01:17

## 2024-11-14 RX ADMIN — NYSTATIN 1 APPLICATION: 100000 POWDER TOPICAL at 20:28

## 2024-11-14 RX ADMIN — PHENYLEPHRINE HYDROCHLORIDE 0.75 MCG/KG/MIN: 10 INJECTION INTRAVENOUS at 08:50

## 2024-11-14 RX ADMIN — SENNOSIDES AND DOCUSATE SODIUM 2 TABLET: 50; 8.6 TABLET ORAL at 20:41

## 2024-11-14 RX ADMIN — ASPIRIN 81 MG: 81 TABLET, COATED ORAL at 08:17

## 2024-11-14 RX ADMIN — DOCUSATE SODIUM 100 MG: 100 CAPSULE, LIQUID FILLED ORAL at 20:41

## 2024-11-14 RX ADMIN — OXYCODONE HYDROCHLORIDE AND ACETAMINOPHEN 1 TABLET: 5; 325 TABLET ORAL at 01:15

## 2024-11-14 RX ADMIN — Medication 3 ML: at 20:42

## 2024-11-14 RX ADMIN — HYDROCORTISONE SODIUM SUCCINATE 100 MG: 100 INJECTION, POWDER, FOR SOLUTION INTRAMUSCULAR; INTRAVENOUS at 04:30

## 2024-11-14 RX ADMIN — INSULIN LISPRO 3 UNITS: 100 INJECTION, SOLUTION INTRAVENOUS; SUBCUTANEOUS at 08:17

## 2024-11-14 RX ADMIN — HYDROCORTISONE SODIUM SUCCINATE 100 MG: 100 INJECTION, POWDER, FOR SOLUTION INTRAMUSCULAR; INTRAVENOUS at 20:41

## 2024-11-14 RX ADMIN — LINEZOLID 600 MG: 600 TABLET, FILM COATED ORAL at 20:41

## 2024-11-14 RX ADMIN — LEVOTHYROXINE SODIUM 200 MCG: 0.1 TABLET ORAL at 05:59

## 2024-11-14 RX ADMIN — AMIODARONE HYDROCHLORIDE 150 MG: 1.5 INJECTION, SOLUTION INTRAVENOUS at 17:33

## 2024-11-14 RX ADMIN — POTASSIUM CHLORIDE 10 MEQ: 750 CAPSULE, EXTENDED RELEASE ORAL at 08:17

## 2024-11-14 RX ADMIN — AMIODARONE HYDROCHLORIDE 1 MG/MIN: 1.8 INJECTION, SOLUTION INTRAVENOUS at 02:16

## 2024-11-14 RX ADMIN — INSULIN LISPRO 3 UNITS: 100 INJECTION, SOLUTION INTRAVENOUS; SUBCUTANEOUS at 20:40

## 2024-11-14 RX ADMIN — LINEZOLID 600 MG: 600 TABLET, FILM COATED ORAL at 12:09

## 2024-11-14 RX ADMIN — PIPERACILLIN AND TAZOBACTAM 3.38 G: 3; .375 INJECTION, POWDER, LYOPHILIZED, FOR SOLUTION INTRAVENOUS at 10:26

## 2024-11-14 RX ADMIN — HEPARIN SODIUM 20 UNITS/KG/HR: 10000 INJECTION, SOLUTION INTRAVENOUS at 11:31

## 2024-11-14 RX ADMIN — PIPERACILLIN AND TAZOBACTAM 3.38 G: 3; .375 INJECTION, POWDER, LYOPHILIZED, FOR SOLUTION INTRAVENOUS at 17:33

## 2024-11-14 RX ADMIN — VASOPRESSIN IN 0.9% SODIUM CHLORIDE 0.03 UNITS/MIN: 20 INJECTION INTRAVENOUS at 02:19

## 2024-11-14 RX ADMIN — Medication 3 ML: at 08:17

## 2024-11-14 RX ADMIN — AMIODARONE HYDROCHLORIDE 0.5 MG/MIN: 1.8 INJECTION, SOLUTION INTRAVENOUS at 19:28

## 2024-11-14 RX ADMIN — INSULIN GLARGINE 20 UNITS: 100 INJECTION, SOLUTION SUBCUTANEOUS at 20:41

## 2024-11-14 RX ADMIN — HYDROCORTISONE SODIUM SUCCINATE 100 MG: 100 INJECTION, POWDER, FOR SOLUTION INTRAMUSCULAR; INTRAVENOUS at 12:37

## 2024-11-14 RX ADMIN — MUPIROCIN 1 APPLICATION: 20 OINTMENT TOPICAL at 08:17

## 2024-11-14 RX ADMIN — INSULIN LISPRO 3 UNITS: 100 INJECTION, SOLUTION INTRAVENOUS; SUBCUTANEOUS at 17:33

## 2024-11-14 RX ADMIN — OXYCODONE HYDROCHLORIDE AND ACETAMINOPHEN 1 TABLET: 5; 325 TABLET ORAL at 08:41

## 2024-11-14 RX ADMIN — OXYCODONE HYDROCHLORIDE AND ACETAMINOPHEN 1 TABLET: 5; 325 TABLET ORAL at 17:56

## 2024-11-14 RX ADMIN — AMIODARONE HYDROCHLORIDE 0.5 MG/MIN: 1.8 INJECTION, SOLUTION INTRAVENOUS at 08:50

## 2024-11-14 RX ADMIN — NYSTATIN 1 APPLICATION: 100000 POWDER TOPICAL at 08:18

## 2024-11-14 RX ADMIN — POTASSIUM CHLORIDE 10 MEQ: 750 CAPSULE, EXTENDED RELEASE ORAL at 20:41

## 2024-11-14 RX ADMIN — VASOPRESSIN IN 0.9% SODIUM CHLORIDE 0.03 UNITS/MIN: 20 INJECTION INTRAVENOUS at 08:50

## 2024-11-14 NOTE — PROGRESS NOTES
LOS: 3 days   Patient Care Team:  Marisa Lynch PA as PCP - General (Family Medicine)  Ajay Cheatham MD as Surgeon (Cardiothoracic Surgery)  Marco Lay IV, MD as Cardiologist (Interventional Cardiology)  Nigel Huber MD as Consulting Physician (Cardiology)  Marisa Suarez APRN as Nurse Practitioner (Interventional Cardiology)    Chief Complaint: Dyspnea    Subjective     Interval History:     Sinus/junctional rhythm on monitor.  No chest pain.  Shortness of breath improved.  Left hand swelling improved also.  Vasopressor was able to be decreased overnight.    Review of Systems:    12 point review of systems reviewed pertinent for those mentioned HPI    Objective     Vital Signs  Temp:  [98.8 °F (37.1 °C)-99.7 °F (37.6 °C)] 98.8 °F (37.1 °C)  Heart Rate:  [] 59  Resp:  [20-31] 20  BP: ()/(42-75) 104/53  Body mass index is 29.08 kg/m².    Intake/Output Summary (Last 24 hours) at 11/14/2024 0722  Last data filed at 11/14/2024 0600  Gross per 24 hour   Intake 3404.46 ml   Output 2225 ml   Net 1179.46 ml     No intake/output data recorded.    Physical Exam:     General Appearance:  Alert, cooperative, in no acute distress   Head:  Normocephalic, without obvious abnormality, atraumatic   Eyes:  Lids and lashes normal, conjunctivae and sclerae normal, no icterus, no pallor, corneas clear, PERRLA   Ears:  Ears appear intact with no abnormalities noted   Throat:  No oral lesions, no thrush, oral mucosa moist   Neck:  No adenopathy, supple, trachea midline, no thyromegaly, no carotid bruit, no JVD   Back:  No kyphosis present, no scoliosis present, no skin lesions, erythema or scars, no tenderness to percussion, or palpation, range of motion normal   Lungs:  Clear to auscultation,respirations regular, even and unlabored    Heart:  Regular rhythm and normal rate, normal S1 and S2, no murmur, no gallop, no rub, no click   Breast Exam:  Deferred   Abdomen:  Normal bowel sounds, no  masses, no organomegaly, soft non-tender, non-distended, no guarding, no rebound tenderness   Genitalia:  Deferred   Extremities:  Moves all extremities well, no edema, no cyanosis, no redness   Pulses:  Pulses palpable and equal bilaterally   Skin:  No bleeding, bruising or rash   Lymph nodes:  No palpable adenopathy   Neurologic:  Cranial nerves 2 - 12 grossly intact, sensation intact, DTR present and equal bilaterally        Results Review:     I reviewed the patient's new clinical results.  I reviewed the patient's new imaging results and agree with the interpretation.  I reviewed the patient's other test results and agree with the interpretation  I personally viewed and interpreted the patient's EKG/Telemetry data      WBC WBC   Date Value Ref Range Status   11/14/2024 20.44 (H) 3.40 - 10.80 10*3/mm3 Final   11/13/2024 18.27 (H) 3.40 - 10.80 10*3/mm3 Final   11/12/2024 17.73 (H) 3.40 - 10.80 10*3/mm3 Final   11/12/2024 17.93 (H) 3.40 - 10.80 10*3/mm3 Final   11/11/2024 17.47 (H) 3.40 - 10.80 10*3/mm3 Final   11/11/2024 21.71 (H) 3.40 - 10.80 10*3/mm3 Final   11/11/2024 17.98 (H) 3.40 - 10.80 10*3/mm3 Final   11/11/2024 4.47 3.40 - 10.80 10*3/mm3 Final      HGB Hemoglobin   Date Value Ref Range Status   11/14/2024 8.7 (L) 12.0 - 15.9 g/dL Final   11/13/2024 9.1 (L) 12.0 - 15.9 g/dL Final   11/12/2024 10.3 (L) 12.0 - 15.9 g/dL Final   11/12/2024 8.2 (L) 12.0 - 17.0 g/dL Final   11/12/2024 6.5 (C) 12.0 - 17.0 g/dL Final   11/12/2024 9.6 (L) 12.0 - 15.9 g/dL Final     Comment:     Result checked   11/11/2024 11.7 (L) 12.0 - 15.9 g/dL Final   11/11/2024 11.6 (L) 12.0 - 15.9 g/dL Final   11/11/2024 13.1 12.0 - 15.9 g/dL Final   11/11/2024 14.3 12.0 - 15.9 g/dL Final      HCT Hematocrit   Date Value Ref Range Status   11/14/2024 26.6 (L) 34.0 - 46.6 % Final   11/13/2024 26.2 (L) 34.0 - 46.6 % Final   11/12/2024 30.0 (L) 34.0 - 46.6 % Final   11/12/2024 24 (L) 38 - 51 % Final   11/12/2024 19 (L) 38 - 51 % Final  "  11/12/2024 29.3 (L) 34.0 - 46.6 % Final   11/11/2024 34.9 34.0 - 46.6 % Final   11/11/2024 33.6 (L) 34.0 - 46.6 % Final   11/11/2024 36.9 34.0 - 46.6 % Final   11/11/2024 41.8 34.0 - 46.6 % Final      Platlets No results found for: \"LABPLAT\"     PT/INR:    Protime   Date Value Ref Range Status   11/12/2024 15.8 (H) 12.2 - 14.5 Seconds Final   /  INR   Date Value Ref Range Status   11/12/2024 1.25 (H) 0.89 - 1.12 Final       Sodium Sodium   Date Value Ref Range Status   11/14/2024 138 136 - 145 mmol/L Final   11/13/2024 141 136 - 145 mmol/L Final   11/12/2024 141 136 - 145 mmol/L Final   11/12/2024 144 136 - 145 mmol/L Final   11/12/2024 143 136 - 145 mmol/L Final   11/12/2024 141 136 - 145 mmol/L Final   11/11/2024 139 136 - 145 mmol/L Final   11/11/2024 141 136 - 145 mmol/L Final      Potassium Potassium   Date Value Ref Range Status   11/14/2024 3.7 3.5 - 5.2 mmol/L Final   11/13/2024 3.8 3.5 - 5.2 mmol/L Final     Comment:     Slight hemolysis detected by analyzer. Result may be falsely elevated.   11/12/2024 3.7 3.5 - 5.2 mmol/L Final   11/12/2024 3.7 3.5 - 5.2 mmol/L Final   11/12/2024 4.1 3.5 - 5.2 mmol/L Final   11/12/2024 4.0 3.5 - 5.2 mmol/L Final   11/11/2024 4.4 3.5 - 5.2 mmol/L Final   11/11/2024 3.9 3.5 - 5.2 mmol/L Final     Comment:     Slight hemolysis detected by analyzer. Result may be falsely elevated.      Chloride Chloride   Date Value Ref Range Status   11/14/2024 100 98 - 107 mmol/L Final   11/13/2024 102 98 - 107 mmol/L Final   11/12/2024 102 98 - 107 mmol/L Final   11/12/2024 103 98 - 107 mmol/L Final   11/12/2024 105 98 - 107 mmol/L Final   11/12/2024 101 98 - 107 mmol/L Final   11/11/2024 103 98 - 107 mmol/L Final   11/11/2024 106 98 - 107 mmol/L Final      Bicarbonate No results found for: \"PLASMABICARB\"   BUN BUN   Date Value Ref Range Status   11/14/2024 13 8 - 23 mg/dL Final   11/13/2024 15 8 - 23 mg/dL Final   11/12/2024 16 8 - 23 mg/dL Final   11/12/2024 17 8 - 23 mg/dL Final "   11/12/2024 16 8 - 23 mg/dL Final   11/12/2024 17 8 - 23 mg/dL Final   11/11/2024 18 8 - 23 mg/dL Final   11/11/2024 10 8 - 23 mg/dL Final      Creatinine Creatinine   Date Value Ref Range Status   11/14/2024 0.60 0.57 - 1.00 mg/dL Final   11/13/2024 0.63 0.57 - 1.00 mg/dL Final   11/12/2024 0.89 0.57 - 1.00 mg/dL Final   11/12/2024 0.99 0.57 - 1.00 mg/dL Final   11/12/2024 0.96 0.57 - 1.00 mg/dL Final   11/12/2024 1.24 (H) 0.57 - 1.00 mg/dL Final   11/11/2024 1.11 (H) 0.57 - 1.00 mg/dL Final   11/11/2024 0.57 0.57 - 1.00 mg/dL Final      Calcium Calcium   Date Value Ref Range Status   11/14/2024 8.1 (L) 8.6 - 10.5 mg/dL Final   11/13/2024 7.5 (L) 8.6 - 10.5 mg/dL Final   11/12/2024 7.9 (L) 8.6 - 10.5 mg/dL Final   11/12/2024 8.0 (L) 8.6 - 10.5 mg/dL Final   11/12/2024 8.2 (L) 8.6 - 10.5 mg/dL Final   11/12/2024 7.8 (L) 8.6 - 10.5 mg/dL Final   11/11/2024 8.1 (L) 8.6 - 10.5 mg/dL Final   11/11/2024 8.7 8.6 - 10.5 mg/dL Final      Magnesium Magnesium   Date Value Ref Range Status   11/13/2024 3.2 (H) 1.6 - 2.4 mg/dL Final   11/12/2024 2.9 (H) 1.6 - 2.4 mg/dL Final   11/12/2024 1.9 1.6 - 2.4 mg/dL Final   11/12/2024 2.0 1.6 - 2.4 mg/dL Final            pH pH, Arterial   Date Value Ref Range Status   11/13/2024 7.307 (L) 7.350 - 7.450 pH units Final     Comment:     84 Value below reference range   11/13/2024 7.329 (L) 7.350 - 7.450 pH units Final     Comment:     84 Value below reference range   11/12/2024 7.324 (L) 7.350 - 7.450 pH units Final     Comment:     84 Value below reference range   11/12/2024 7.309 (L) 7.350 - 7.450 pH units Final     Comment:     84 Value below reference range   11/12/2024 7.289 (L) 7.350 - 7.450 pH units Final     Comment:     84 Value below reference range   11/12/2024 7.302 (L) 7.350 - 7.450 pH units Final     Comment:     84 Value below reference range   11/12/2024 7.249 (L) 7.350 - 7.450 pH units Final     Comment:     84 Value below reference range   11/12/2024 7.31 (L) 7.35 - 7.6  pH units Final   11/12/2024 7.26 (L) 7.35 - 7.6 pH units Final   11/12/2024 7.188 (C) 7.350 - 7.450 pH units Final     Comment:     85 Value below critical limit   11/11/2024 7.179 (C) 7.350 - 7.450 pH units Final     Comment:     85 Value below critical limit   11/11/2024 7.199 (C) 7.350 - 7.450 pH units Final     Comment:     85 Value below critical limit      pO2 pO2, Arterial   Date Value Ref Range Status   11/13/2024 61.5 (L) 83.0 - 108.0 mm Hg Final     Comment:     84 Value below reference range   11/13/2024 66.4 (L) 83.0 - 108.0 mm Hg Final     Comment:     84 Value below reference range   11/12/2024 64.9 (L) 83.0 - 108.0 mm Hg Final     Comment:     84 Value below reference range   11/12/2024 166.0 (H) 83.0 - 108.0 mm Hg Final     Comment:     83 Value above reference range   11/12/2024 54.8 (L) 83.0 - 108.0 mm Hg Final     Comment:     84 Value below reference range   11/12/2024 57.0 (L) 83.0 - 108.0 mm Hg Final     Comment:     84 Value below reference range   11/12/2024 71.6 (L) 83.0 - 108.0 mm Hg Final     Comment:     84 Value below reference range   11/12/2024 96.6 83.0 - 108.0 mm Hg Final   11/11/2024 121.0 (H) 83.0 - 108.0 mm Hg Final   11/11/2024 75.3 (L) 83.0 - 108.0 mm Hg Final     Comment:     84 Value below reference range      pCO2 pCO2, Arterial   Date Value Ref Range Status   11/13/2024 65.2 (H) 35.0 - 45.0 mm Hg Final     Comment:     83 Value above reference range   11/13/2024 59.8 (H) 35.0 - 45.0 mm Hg Final     Comment:     83 Value above reference range   11/12/2024 60.3 (H) 35.0 - 45.0 mm Hg Final     Comment:     83 Value above reference range   11/12/2024 58.2 (H) 35.0 - 45.0 mm Hg Final     Comment:     83 Value above reference range   11/12/2024 56.2 (H) 35.0 - 45.0 mm Hg Final     Comment:     83 Value above reference range   11/12/2024 49.3 (H) 35.0 - 45.0 mm Hg Final     Comment:     83 Value above reference range   11/12/2024 47.5 (H) 35.0 - 45.0 mm Hg Final     Comment:     83  Value above reference range   11/12/2024 46.7 (H) 35.0 - 45.0 mm Hg Final     Comment:     83 Value above reference range   11/11/2024 43.2 35.0 - 45.0 mm Hg Final   11/11/2024 51.3 (H) 35.0 - 45.0 mm Hg Final     Comment:     83 Value above reference range      HCO3 HCO3, Arterial   Date Value Ref Range Status   11/13/2024 32.6 (H) 20.0 - 26.0 mmol/L Final   11/13/2024 31.4 (H) 20.0 - 26.0 mmol/L Final   11/12/2024 31.3 (H) 20.0 - 26.0 mmol/L Final   11/12/2024 29.2 (H) 20.0 - 26.0 mmol/L Final   11/12/2024 26.9 (H) 20.0 - 26.0 mmol/L Final   11/12/2024 24.3 20.0 - 26.0 mmol/L Final   11/12/2024 20.8 20.0 - 26.0 mmol/L Final   11/12/2024 17.7 (L) 20.0 - 26.0 mmol/L Final   11/11/2024 16.1 (L) 20.0 - 26.0 mmol/L Final   11/11/2024 20.0 20.0 - 26.0 mmol/L Final        aspirin, 81 mg, Oral, Daily  docusate sodium, 100 mg, Oral, BID  insulin glargine, 20 Units, Subcutaneous, Nightly  insulin lispro, 3-14 Units, Subcutaneous, 4x Daily AC & at Bedtime  levothyroxine, 200 mcg, Oral, Q AM  lidocaine, 5 mL, Infiltration, Once  mupirocin, 1 Application, Each Nare, BID  nystatin, 1 Application, Topical, BID  piperacillin-tazobactam, 3.375 g, Intravenous, Q8H  polyethylene glycol, 17 g, Oral, Daily  potassium chloride, 20 mEq, Oral, Once  potassium chloride, 10 mEq, Oral, BID  senna-docusate sodium, 2 tablet, Oral, Nightly  sodium bicarbonate, 50 mEq, Intravenous, Once  sodium chloride, 3 mL, Intravenous, Q12H      amiodarone, 0.5 mg/min, Last Rate: 0.5 mg/min (11/14/24 0628)  EPINEPHrine, 0.02-0.3 mcg/kg/min, Last Rate: 0.02 mcg/kg/min (11/14/24 0434)  heparin, 20 Units/kg/hr, Last Rate: 20 Units/kg/hr (11/14/24 0217)  Pharmacy to Dose Heparin,   phenylephrine, 0.5-3 mcg/kg/min, Last Rate: 0.75 mcg/kg/min (11/14/24 0600)  vasopressin, 0.03 Units/min, Last Rate: 0.03 Units/min (11/14/24 0219)        Medication Review: Reviewed     Assessment & Plan     Patient Active Problem List   Diagnosis Code    Essential hypertension I10     Acquired hypothyroidism E03.9    Lung cancer; S/P Bronch/Thor w/ ALBINA lobectomy/mediastinal lymph node resec (5/12/17, Dr. 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  Would leave on phenylephrine as primary pressor.  Continue supportive care overall.  Patient improving slowly.    Jeffry Pickett MD  11/14/24  07:22 EST

## 2024-11-14 NOTE — PLAN OF CARE
Plan of care reviewed with patient/family    Neuro  A&O  DEL CID  Able to get OOB to chair with assist x 2, unable to walk at this time due to continued need for NIPPV    Resp  BIPAP 20/6, 70%, cont. use required throughout the day  Coarse rhonchi t/o  RR 20-31  Sats > 80%, patient did not tolerate BIPAP removal for any length of time  CT of chest completed  Zosyn started    CV  A fib, ; 130-40's with any exertion  SBP 's  Unable to wean pressors (Vasopressin 0.03, Epi 0.1, Harshil 1), Amio 1, Hep 18 units/kg/hr    GI  Patient only tolerating occasional drinks of liquids due to continued need for BIPAP, unable to remove BIPAP for meals  No BM  Hypoactive BS      Erwin 1375 ml, Albumin 25% 100 ml x 2, diamox/bumex    MISC  Insulin adjusted  Left arm swelling decreased with elevation      MiscGoal Outcome Evaluation:

## 2024-11-14 NOTE — THERAPY TREATMENT NOTE
Patient Name: Jill Miller  : 1960    MRN: [id number]                              Today's Date: 2024       Admit Date: 2024    Visit Dx:     ICD-10-CM ICD-9-CM   1. Aortic valve disorder  I35.9 424.1   2. Stenosis of prosthetic aortic valve, initial encounter  T82.857A 996.71     Patient Active Problem List   Diagnosis    Essential hypertension    Acquired hypothyroidism    Lung cancer; S/P Bronch/Thor w/ ALBINA lobectomy/mediastinal lymph node resec (17, Dr. Cheatham)    Paroxysmal atrial fibrillation    Hyperlipidemia LDL goal <70    Prosthetic aortic valve stenosis s/p TAVR 24, Right groin exploration for hematoma 24    Type 2 diabetes mellitus    COPD (chronic obstructive pulmonary disease)    Obesity (BMI 30-39.9)    Tobacco user    Pulmonary hypertension    Stenosis of prosthetic mitral valve    Acute on chronic respiratory failure with hypoxia and hypercapnia    Pulmonary infiltrate, likely right-sided pneumonia    Acute pulmonary embolism without acute cor pulmonale     Past Medical History:   Diagnosis Date    Acute pulmonary embolism without acute cor pulmonale 2024    Atrial fibrillation and flutter     Chronic diastolic congestive heart failure     Chronically Abnormal CXR (Left pleural disease post op) 2017    COPD exacerbation 2022    Elevated cholesterol     Essential hypertension 04/10/2017    Target blood pressure <130/80 mmHg    Graves disease     Hyperlipidemia LDL goal <70     Hypertension     Hyperthyroidism     Lung cancer     MRSA (methicillin resistant staph aureus) culture positive     under left breast, incision and debridement, treated with wound packing and po abx     Prolonged QTC interval on ECG 2022    Rheumatic mitral stenosis     Type 2 diabetes mellitus 2018    Valvular heart disease      Past Surgical History:   Procedure Laterality Date    ABLATION OF DYSRHYTHMIC FOCUS  2018    ABSCESS DRAINAGE       under left breast d/t mrsa     AORTIC VALVE REPAIR/REPLACEMENT N/A 07/16/2018    Procedure: MEDIAN STERNOTOMY, AORTIC VALVE REPLACEMENT WITH TIFFANIE AND MAZE, TRICUSPID RING, LEFT ATRIAL OCCLUSION;  Surgeon: Ajay Cheatham MD;  Location:  [facility];  Service: Cardiothoracic    AORTIC VALVE REPAIR/REPLACEMENT N/A 11/11/2024    Procedure: TRANSCATHETER AORTIC VALVE REPLACEMENT;  Surgeon: Ajay Cheatham MD;  Location:  [facility];  Service: Cardiothoracic;  Laterality: N/A;  FL- 6 min 36 sec  DOSE-  110 MGY  CONTRAST- 40 mL    AORTIC VALVE REPAIR/REPLACEMENT N/A 11/11/2024    Procedure: Transfemoral Transcatheter Aortic Valve Replacement;  Surgeon: Jeaneth Carreon MD;  Location:  [facility];  Service: Cardiovascular;  Laterality: N/A;    BRONCHOSCOPY Left 05/12/2017    Procedure: BRONCHOSCOPY;  Surgeon: Ajay Cheatham MD;  Location:  [facility];  Service:     BRONCHOSCOPY N/A 05/18/2017    Procedure: BRONCHOSCOPY BIOPSY AT BEDSIDE;  Surgeon: Ajay Cheatham MD;  Location:  [facility];  Service:     CARDIAC CATHETERIZATION N/A 09/28/2017    Procedure: Left Heart Cath;  Surgeon: Marco Lay MD;  Location:  [facility];  Service:     CARDIAC CATHETERIZATION N/A 09/28/2017    Procedure: Right Heart Cath;  Surgeon: Marco Lay MD;  Location:  [facility];  Service:     CARDIAC CATHETERIZATION N/A 10/11/2024    Procedure: Right and Left Heart Cath;  Surgeon: Jeffry Pickett MD;  Location:  [facility];  Service: Cardiology;  Laterality: N/A;    CARDIAC ELECTROPHYSIOLOGY PROCEDURE N/A 6/1/2022    Procedure: Ablation atrial fibrillation (atypical A flutter). Hold Rythmol x5 days;  Surgeon: Nigel Huber MD;  Location: [facility] INVASIVE LOC[facility];  Service: Cardiovascular;  Laterality: N/A;    FEMORAL THROMBECTOMY/EMBOLECTOMY Right 11/12/2024    Procedure: GROIN EXPLORATION, LIGATION OF INFERIOR EPIGASTRIC ARTERY RIGHT;  Surgeon:  Ajay Cheatham MD;  Location:  [email or web address];  Service: Cardiothoracic;  Laterality: Right;  FL: 6 SECONDS  DOSE: 191MGY  CONTRAST: 30ML VISIPAQUE    LUNG BIOPSY  04/2017    LYMPH NODE DISSECTION Left 05/12/2017    Procedure: MEDIASTINAL LYMPH NODE DISSECTION;  Surgeon: Ajay Cheatham MD;  Location:  [email or web address];  Service:     MAZE PROCEDURE      MITRAL VALVE REPAIR/REPLACEMENT N/A 07/16/2018    Procedure: MITRAL VALVE REPLACEMENT;  Surgeon: Ajay Cheatham MD;  Location:  [email or web address];  Service: Cardiothoracic    MITRAL VALVE REPLACEMENT  07/16/2018    TEETH EXTRACTION      THORACOSCOPY VIDEO ASSISTED WITH LOBECTOMY Left 05/12/2017    Procedure: THORACOSCOPY VIDEO ASSISTED WITH OPEN LOBECTOMY;  Surgeon: Ajay Cheatham MD;  Location:  [email or web address];  Service:     TOTAL THYROIDECTOMY  approx 2012    TRANSESOPHAGEAL ECHOCARDIOGRAM (TIFFANIE) N/A 11/11/2024    Procedure: TRANSESOPHAGEAL ECHOCARDIOGRAM WITH ANESTHESIA;  Surgeon: Ajay Cheatham MD;  Location:  [email or web address];  Service: Cardiothoracic;  Laterality: N/A;    TRICUSPID VALVE SURGERY  07/16/2018      General Information       Row Name 11/14/24 1447          Physical Therapy Time and Intention    Document Type therapy note (daily note)  -KG     Mode of Treatment physical therapy  -KG       Row Name 11/14/24 1447          General Information    Existing Precautions/Restrictions cardiac;fall;oxygen therapy device and L/min;other (see comments)  bipap  -KG     Barriers to Rehab medically complex  -KG       Row Name 11/14/24 1447          Cognition    Orientation Status (Cognition) oriented to;person;place  -KG       Row Name 11/14/24 1447          Safety Issues/Impairments Affecting Functional Mobility    Safety Issues Affecting Function (Mobility) awareness of need for assistance;insight into deficits/self-awareness;safety precaution awareness;safety precautions follow-through/compliance  -KG     Impairments Affecting Function (Mobility)  balance;coordination;endurance/activity tolerance;postural/trunk control;shortness of breath;strength  -KG               User Key  (r) = Recorded By, (t) = Taken By, (c) = Cosigned By      Initials Name Provider Type    Onelia Jasso PT Physical Therapist                   Mobility       Row Name 11/14/24 1448          Bed Mobility    Bed Mobility rolling left;rolling right  -KG     Rolling Left [place] (Bed Mobility) maximum assist (25% patient effort);2 person assist;verbal cues  -KG     Rolling Right [place] (Bed Mobility) maximum assist (25% patient effort);2 person assist;verbal cues  -KG     Assistive Device (Bed Mobility) repositioning sheet  -KG     Comment, (Bed Mobility) Pt rolled L and R for placement of sling.  -KG       [facility] Name 11/14/24 1448          Transfers    Comment, (Transfers) Pt transferred from bed to chair with mechanical lift. STS transfers not appropriate to assess this date.  -KG       [facility] Name 11/14/24 1448          Bed-Chair Transfer    Bed-Chair [place] (Transfers) dependent (less than 25% patient effort);2 person assist;verbal cues  -KG     Assistive Device (Bed-Chair Transfers) lift device  -KG       Row Name 11/14/24 1448          Sit-Stand Transfer    Sit-Stand [place] (Transfers) unable to assess  -KG       [facility] Name 11/14/24 1448          Gait/Stairs (Locomotion)    [place] Level (Gait) unable to assess  -KG     Comment, (Gait/Stairs) Ambulation deferred. Not appropriate to assess.  -KG               User Key  (r) = Recorded By, (t) = Taken By, (c) = Cosigned By      Initials Name Provider Type    Onelia Jasso PT Physical Therapist                   Obj/Interventions       Row Name 11/14/24 1448          Balance    Balance Assessment sitting static balance  -KG     Static Sitting Balance minimal assist  -KG     Position, Sitting Balance supported;sitting in chair  -KG               User Key  (r) = Recorded By, (t) = Taken By, (c) =  Cosigned By      Initials Name Provider Type    KG Onelia Casper, PT Physical Therapist                   Goals/Plan    No documentation.                  Clinical Impression       Row Name 11/14/24 1449          Pain    Additional Documentation Pain Scale: FACES Pre/Post-Treatment (Group)  -KG       Row Name 11/14/24 1449          Pain Scale: FACES Pre/Post-Treatment    Pain: FACES Scale, Pretreatment 0-->no hurt  -KG     Posttreatment Pain Rating 0-->no hurt  -KG       Row Name 11/14/24 1449          Plan of Care Review    Plan of Care Reviewed With patient  -KG     Progress no change  -KG     Outcome Evaluation Pt required maxA x2 for rolling L and R. Transferred from bed to chair with mechanical lift. All other OOB mobility deferred this date, not appropriate to assess. Continue to recommend PT skilled care and D/C to  rehab facility.  -KG       Row Name 11/14/24 1443          Vital Signs    Pre Systolic BP Rehab 107  -KG     Pre Treatment Diastolic BP 57  -KG     Post Systolic BP Rehab 102  -KG     Post Treatment Diastolic BP 47  -KG     Pretreatment Heart Rate (beats/min) 61  -KG     Posttreatment Heart Rate (beats/min) 64  -KG     Pre SpO2 (%) 94  -KG     O2 Delivery Pre Treatment other (see comments)  bipap  -KG     Post SpO2 (%) 94  -KG     O2 Delivery Post Treatment other (see comments)  bipap  -KG     Pre Patient Position Supine  -KG     Intra Patient Position Side Lying  -KG     Post Patient Position Sitting  -KG       Row Name 11/14/24 1441          Positioning and Restraints    Pre-Treatment Position in bed  -KG     Post Treatment Position chair  -KG     In Chair reclined;call light within reach;encouraged to call for assist;with nsg;RUE elevated;LUE elevated;waffle cushion;on mechanical lift sling;legs elevated  -KG               User Key  (r) = Recorded By, (t) = Taken By, (c) = Cosigned By      Initials Name Provider Type    Onelia Jasso, PT Physical Therapist                    Outcome Measures       Row Name 11/14/24 1450          How much help from another person do you currently need...    Turning from your back to your side while in flat bed without using bedrails? 2  -KG     Moving from lying on back to sitting on the side of a flat bed without bedrails? 2  -KG     Moving to and from a bed to a chair (including a wheelchair)? 2  -KG     Standing up from a chair using your arms (e.g., wheelchair, bedside chair)? 2  -KG     Climbing 3-5 steps with a railing? 1  -KG     To walk in hospital room? 1  -KG     AM-PAC 6 Clicks Score (PT) 10  -KG     Highest Level of Mobility Goal 4 --> Transfer to chair/commode  -KG       Row Name 11/14/24 1450          Functional Assessment    Outcome Measure Options AM-PAC 6 Clicks Basic Mobility (PT)  -KG               User Key  (r) = Recorded By, (t) = Taken By, (c) = Cosigned By      Initials Name Provider Type    KG Onelia Casper, PT Physical Therapist                                 Physical Therapy Education       Title: PT OT SLP Therapies (In Progress)       Topic: Physical Therapy (In Progress)       Point: Mobility training (In Progress)       Learning Progress Summary            Patient Acceptance, E, NR by KG at 11/14/2024 1119    Acceptance, E, NR by KG at 11/13/2024 1430                      Point: Home exercise program (In Progress)       Learning Progress Summary            Patient Acceptance, E, NR by KG at 11/14/2024 1119                      Point: Body mechanics (In Progress)       Learning Progress Summary            Patient Acceptance, E, NR by KG at 11/14/2024 1119    Acceptance, E, NR by KG at 11/13/2024 1430                      Point: Precautions (In Progress)       Learning Progress Summary            Patient Acceptance, E, NR by KG at 11/14/2024 1119    Acceptance, E, NR by KG at 11/13/2024 1430                                      User Key       Initials Effective Dates Name Provider Type Discipline    KG 05/22/20 -   Onelia Casper, PT Physical Therapist PT                  PT Recommendation and Plan  Planned Therapy Interventions (PT): balance training, bed mobility training, gait training, strengthening, transfer training  Progress: no change  Outcome Evaluation: Pt required maxA x2 for rolling L and R. Transferred from bed to chair with mechanical lift. All other OOB mobility deferred this date, not appropriate to assess. Continue to recommend PT skilled care and D/C to IP rehab facility.     Time Calculation:   PT Evaluation Complexity  History, PT Evaluation Complexity: 3 or more personal factors and/or comorbidities  Examination of Body Systems (PT Eval Complexity): total of 3 or more elements  Clinical Presentation (PT Evaluation Complexity): evolving  Clinical Decision Making (PT Evaluation Complexity): moderate complexity  Overall Complexity (PT Evaluation Complexity): moderate complexity     PT Charges       Row Name 11/14/24 1119             Time Calculation    Start Time 1119  -KG      PT Received On 11/14/24  -KG      PT Goal Re-Cert Due Date 11/23/24  -KG         Time Calculation- PT    Total Timed Code Minutes- PT 11 minute(s)  -KG         Timed Charges    35921 - PT Therapeutic Activity Minutes 11  -KG         Total Minutes    Timed Charges Total Minutes 11  -KG       Total Minutes 11  -KG                User Key  (r) = Recorded By, (t) = Taken By, (c) = Cosigned By      Initials Name Provider Type    KG Onelia Casper, PT Physical Therapist                  Therapy Charges for Today       Code Description Service Date Service Provider Modifiers Qty    83350511812 HC PT THERAPEUTIC ACT EA 15 MIN 11/13/2024 Onelia Casper, PT GP 1    92561983233 HC PT EVAL MOD COMPLEXITY 4 11/13/2024 Onelia Casper, PT GP 1    79693632809 HC PT THERAPEUTIC ACT EA 15 MIN 11/14/2024 Onelia Casper, PT GP 1            PT G-Codes  Outcome Measure Options: AM-PAC 6 Clicks Basic Mobility (PT)  AM-PAC 6  Clicks Score (PT): 10  PT Discharge Summary  Anticipated Discharge Disposition (PT): inpatient rehabilitation facility    Radha Casper, PT  11/14/2024

## 2024-11-14 NOTE — PROGRESS NOTES
Intensive Care Follow-up     Hospital:  LOS: 3 days   Ms. Jill Miller, 64 y.o. female is followed for:   Prosthetic aortic valve stenosis        Maycol Miller is a 64 year-old female that presents to [facility] ICU postoperatively from scheduled TAVR today.      Patient has a PMH of HFpEF, valvular disease s/p prosthetic MVR/AVR, pulmonary HTN, lung cancer s/p ALBINA lobectomy (2017), multiple cardiac valve replacements, HTN, hypothyroidism, hyperlipidemia, HFpEF, T2DM, COPD, tobacco use, and PAF s/p ANGELA/Maze (2018), cardioversion and PVA (2022).   Patient recently admitted to [facility] in September with hypoxic respiratory failure 2* rhinovirus and exacerbation of heart failure. 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 Cardiology/CTS consulted and patient underwent work-up for TAVR.      TIFFANIE showed LVEF 51-55%, moderate to severe AS, moderate MS, tricuspid ring noted, and bi-atrial cavity dilation. CTA TAVR negative for acute abnormality / noted emphysema.   Interval History:  Yesterday it was discovered on CT angiography that there are probable right-sided pulmonary emboli.  In addition to this, extensive right-sided infiltrates consistent with pneumonia were seen.  The patient has been on Zosyn and was given a dose of Zyvox this morning pending and MRSA probe.  The patient has remained on 65 to 75% FiO2 on BiPAP.  She also remains on epinephrine, vasopressin, and Harshil-Synephrine infusions at this time.  We have been able to decrease the somewhat.  Duplex ultrasound of the lower extremities is currently pending.  The patient is alert and interactive.  She states that she is very hungry.  She has not been coughing up any secretions.    The patient's past medical, surgical and  "social history were reviewed and updated in Epic as appropriate.        Objective     Infusions:  amiodarone, 0.5 mg/min, Last Rate: 0.5 mg/min (11/14/24 0850)  EPINEPHrine, 0.02-0.3 mcg/kg/min, Last Rate: 0.02 mcg/kg/min (11/14/24 0434)  heparin, 20 Units/kg/hr, Last Rate: 20 Units/kg/hr (11/14/24 0217)  Pharmacy to Dose Heparin,   phenylephrine, 0.5-3 mcg/kg/min, Last Rate: 0.75 mcg/kg/min (11/14/24 0850)  vasopressin, 0.03 Units/min, Last Rate: 0.02 Units/min (11/14/24 1030)      Medications:  aspirin, 81 mg, Oral, Daily  docusate sodium, 100 mg, Oral, BID  hydrocortisone sodium succinate, 100 mg, Intravenous, Q8H  insulin glargine, 20 Units, Subcutaneous, Nightly  insulin lispro, 3-14 Units, Subcutaneous, 4x Daily AC & at Bedtime  levothyroxine, 200 mcg, Oral, Q AM  lidocaine, 5 mL, Infiltration, Once  linezolid, 600 mg, Oral, Q12H  mupirocin, 1 Application, Each Nare, BID  nystatin, 1 Application, Topical, BID  piperacillin-tazobactam, 3.375 g, Intravenous, Q8H  polyethylene glycol, 17 g, Oral, Daily  potassium chloride, 20 mEq, Oral, Once  potassium chloride, 10 mEq, Oral, BID  senna-docusate sodium, 2 tablet, Oral, Nightly  sodium bicarbonate, 50 mEq, Intravenous, Once  sodium chloride, 3 mL, Intravenous, Q12H        Vital Sign Min/Max for last 24 hours  Temp  Min: 98.8 °F (37.1 °C)  Max: 99.7 °F (37.6 °C)   BP  Min: 83/65  Max: 119/54   Pulse  Min: 58  Max: 147   Resp  Min: 20  Max: 35   SpO2  Min: 89 %  Max: 99 %   No data recorded       Input/Output for last 24 hour shift  11/13 0701 - 11/14 0700  In: 3404.5 [P.O.:920; I.V.:1884.5]  Out: 2225 [Urine:2225]      Objective:  General Appearance:  Uncomfortable, ill-appearing, in no acute distress and not in pain.    Vital signs: (most recent): Blood pressure 102/50, pulse 59, temperature 98.8 °F (37.1 °C), temperature source Bladder, resp. rate (!) 35, height 157.5 cm (62\"), weight 72.1 kg (159 lb), SpO2 95%.    HEENT: (Currently wearing BiPAP set at 75% FiO2) "    Lungs:  Normal effort and normal respiratory rate.  There are rales and rhonchi.  No decreased breath sounds or wheezes.    Heart: Normal rate.  Regular rhythm.  S1 normal and S2 normal.    Abdomen: Abdomen is soft.  Bowel sounds are normal.   There is generalized tenderness.  There is no rebound tenderness.     Extremities: Normal range of motion.  (Edema and mild coolness to the left hand.  No cyanosis.)  Neurological: (Alert, oriented).    Pupils:  Pupils are equal, round, and reactive to light.    Skin:  Warm.                Results from last 7 days   Lab Units 11/14/24  0109 11/13/24  0320 11/12/24  0946   WBC 10*3/mm3 20.44* 18.27* 17.73*   HEMOGLOBIN g/dL 8.7* 9.1* 10.3*   PLATELETS 10*3/mm3 85* 98* 140     Results from last 7 days   Lab Units 11/14/24  0109 11/13/24  0320 11/12/24  1659 11/12/24  1255 11/12/24  0946 11/12/24  0450   SODIUM mmol/L 138 141 141   < > 143 141   POTASSIUM mmol/L 3.7 3.8 3.7   < > 4.1 4.0   CO2 mmol/L 26.0 29.0 27.0   < > 22.0 16.0*   BUN mg/dL 13 15 16   < > 16 17   CREATININE mg/dL 0.60 0.63 0.89   < > 0.96 1.24*   MAGNESIUM mg/dL  --  3.2* 2.9*  --  1.9 2.0   PHOSPHORUS mg/dL  --  3.0  --   --   --  4.9*   GLUCOSE mg/dL 197* 226* 299*   < > 271* 364*    < > = values in this interval not displayed.     Estimated Creatinine Clearance: 90.4 mL/min (by C-G formula based on SCr of 0.6 mg/dL).    Results from last 7 days   Lab Units 11/13/24  0807   PH, ARTERIAL pH units 7.307*   PCO2, ARTERIAL mm Hg 65.2*   PO2 ART mm Hg 61.5*         I reviewed the patient's results and images.     Assessment & Plan   Impression        Prosthetic aortic valve stenosis s/p TAVR 11/11/24, Right groin exploration for hematoma 11/12/24    Paroxysmal atrial fibrillation    Type 2 diabetes mellitus    COPD (chronic obstructive pulmonary disease)    Acute on chronic respiratory failure with hypoxia and hypercapnia    Pulmonary infiltrate, likely right-sided pneumonia    Acute pulmonary embolism without  acute cor pulmonale       Plan        We will assume that she has infectious pneumonia, possibly aspiration during the last several days.  We will continue on with Zosyn for now and continue Zyvox.  If MRSA probe is negative we will discontinue Zyvox.  We will try to get a culture if possible.  Continue with anticoagulation for now and we will follow-up on the duplex ultrasounds of the lower extremities.  Wean pressors as able.  I would prefer to try to get the vasopressin off completely and then titrate the other 2 pressors as able.  Wean BiPAP as able.  I am hopeful that we can get her to at least a high flow cannula so that we can begin to give her some nutrition.  I would be reluctant to give her much while she is on noninvasive positive pressure ventilation as she has been unable to come off this for longer than about 10 minutes.  Bronchodilator therapy as necessary.  Continue with current glucose control.  Given possible relative adrenal insufficiency, continue with Solu-Cortef for now.      Plan of care and goals reviewed with mulitdisciplinary/antibiotic stewardship team during rounds.   I discussed the patient's findings and my recommendations with patient and nursing staff     High level of risk due to:  drug(s) requiring intensive monitoring for toxicity, parenteral controlled substances, and decision to de-escalate care.        Omid León MD, [facility]  Pulmonology and Critical Care Medicine

## 2024-11-14 NOTE — PROGRESS NOTES
CTS Progress Note      POD # 3 s/p TAVR, # 2 Right groin exploration    Subjective  No acute events overnight.  States she is feeling better this morning.  Remains on BiPAP.  Titrating down off pressors on Amiodarone gtt for A.fib.    Objective    Physical Exam:   Vital Signs   Temp:  [98.8 °F (37.1 °C)-99.7 °F (37.6 °C)] 98.8 °F (37.1 °C)  Heart Rate:  [] 59  Resp:  [20-31] 20  BP: ()/(42-75) 104/53   GEN: NAD   CV: Irregular rate with her rhythm no murmurs, rubs or gallops    RESP: Respirations even and unlabored with diminished bibasilar breath sounds   ABD: Soft with mild lower quadrant tenderness with hypoactive bowel    EXT: Warm with good color well-perfused with no bilateral lower extremity edema, left upper extremity edema has improved, no motor or sensory deficits appreciated    Incision: Right groin is soft with no hematoma or surrounding erythema.  Aquacel bandage in place     Results     Results from last 7 days   Lab Units 11/14/24  0109   WBC 10*3/mm3 20.44*   HEMOGLOBIN g/dL 8.7*   HEMATOCRIT % 26.6*   PLATELETS 10*3/mm3 85*     Results from last 7 days   Lab Units 11/14/24  0109   SODIUM mmol/L 138   POTASSIUM mmol/L 3.7   CHLORIDE mmol/L 100   CO2 mmol/L 26.0   BUN mg/dL 13   CREATININE mg/dL 0.60   GLUCOSE mg/dL 197*   CALCIUM mg/dL 8.1*     Results from last 7 days   Lab Units 11/12/24  0450 11/11/24  1154   INR  1.25*  --    APTT seconds  --  34.5         Assessment & Plan   POD # 3 s/p TAVR, # 2 Right groin exploration      Prosthetic aortic valve stenosis s/p TAVR 11/11/24, Right groin exploration for hematoma 11/12/24    Paroxysmal atrial fibrillation    Type 2 diabetes mellitus    COPD (chronic obstructive pulmonary disease)    Acute on chronic respiratory failure with hypoxia and hypercapnia    Plan   Monitor H&H and leukocytosis   Currently performing left upper extremity and bilateral lower extremity venous duplex   RML/RLL pulmonary emboli, on heparin drip  Left hand  improved with elevation and removing ulnar A-line  Up to chair  Wean O2 supplementation as tolerated  Wean IV pressors as tolerated    Ajay Cheatham MD  11/14/24  07:34 EST

## 2024-11-14 NOTE — PLAN OF CARE
Goal Outcome Evaluation:  Plan of Care Reviewed With: patient        Progress: no change  Outcome Evaluation: Pt required maxA x2 for rolling L and R. Transferred from bed to chair with mechanical lift. All other OOB mobility deferred this date, not appropriate to assess. Continue to recommend PT skilled care and D/C to IP rehab facility.    Anticipated Discharge Disposition (PT): inpatient rehabilitation facility

## 2024-11-14 NOTE — CASE MANAGEMENT/SOCIAL WORK
Continued Stay Note  [facility]     Patient Name: Jill Miller  MRN: [id number]  Today's Date: 11/14/2024    Admit Date: 11/11/2024    Plan: TBD   Discharge Plan       Row Name 11/14/24 1340       Plan    Plan TBD    Patient/Family in Agreement with Plan yes    Plan Comments Discussed in MDR. Pt not medically ready for discharge at this time as she remains on Epi, Harshil, Vaso, Heparin and Amio gtts. No family at bedside during roundings and pt on Bipap. CM will cont to follow.    Final Discharge Disposition Code 03 - skilled nursing facility (SNF)                   Discharge Codes    No documentation.                       Jo-Ann Gomez RN

## 2024-11-14 NOTE — PROGRESS NOTES
CTA personally reviewed.  Agree with small segmental pulmonary  .  Likely right upper lobe and middle lobe pneumonia.  Discussed with pulmonary about starting anticoagulation if okay with CT surgery given bleeding.  Likely  explains patient's hypoxia at this point 38 minutes total critical care time spent evaluation and treatment of this patient throughout today

## 2024-11-15 ENCOUNTER — APPOINTMENT (OUTPATIENT)
Dept: GENERAL RADIOLOGY | Facility: HOSPITAL | Age: 64
End: 2024-11-15
Payer: COMMERCIAL

## 2024-11-15 ENCOUNTER — ANCILLARY PROCEDURE (OUTPATIENT)
Dept: SPEECH THERAPY | Facility: HOSPITAL | Age: 64
End: 2024-11-15
Payer: COMMERCIAL

## 2024-11-15 LAB
ANION GAP SERPL CALCULATED.3IONS-SCNC: 11 MMOL/L (ref 5–15)
ARTERIAL PATENCY WRIST A: ABNORMAL
ATMOSPHERIC PRESS: ABNORMAL MM[HG]
BASE EXCESS BLDA CALC-SCNC: 2.4 MMOL/L (ref 0–2)
BASOPHILS # BLD AUTO: 0.01 10*3/MM3 (ref 0–0.2)
BASOPHILS NFR BLD AUTO: 0.1 % (ref 0–1.5)
BDY SITE: ABNORMAL
BODY TEMPERATURE: 37
BUN SERPL-MCNC: 14 MG/DL (ref 8–23)
BUN/CREAT SERPL: 30.4 (ref 7–25)
CALCIUM SPEC-SCNC: 8.4 MG/DL (ref 8.6–10.5)
CHLORIDE SERPL-SCNC: 101 MMOL/L (ref 98–107)
CO2 BLDA-SCNC: 30 MMOL/L (ref 22–33)
CO2 SERPL-SCNC: 28 MMOL/L (ref 22–29)
COHGB MFR BLD: 1.2 % (ref 0–2)
CREAT SERPL-MCNC: 0.46 MG/DL (ref 0.57–1)
DEPRECATED RDW RBC AUTO: 78.9 FL (ref 37–54)
EGFRCR SERPLBLD CKD-EPI 2021: 108.4 ML/MIN/1.73
EOSINOPHIL # BLD AUTO: 0 10*3/MM3 (ref 0–0.4)
EOSINOPHIL NFR BLD AUTO: 0 % (ref 0.3–6.2)
EPAP: 6
ERYTHROCYTE [DISTWIDTH] IN BLOOD BY AUTOMATED COUNT: 21 % (ref 12.3–15.4)
GLUCOSE BLDC GLUCOMTR-MCNC: 109 MG/DL (ref 70–130)
GLUCOSE BLDC GLUCOMTR-MCNC: 135 MG/DL (ref 70–130)
GLUCOSE BLDC GLUCOMTR-MCNC: 144 MG/DL (ref 70–130)
GLUCOSE BLDC GLUCOMTR-MCNC: 166 MG/DL (ref 70–130)
GLUCOSE BLDC GLUCOMTR-MCNC: 166 MG/DL (ref 70–130)
GLUCOSE BLDC GLUCOMTR-MCNC: 177 MG/DL (ref 70–130)
GLUCOSE BLDC GLUCOMTR-MCNC: 223 MG/DL (ref 70–130)
GLUCOSE SERPL-MCNC: 108 MG/DL (ref 65–99)
HCO3 BLDA-SCNC: 28.4 MMOL/L (ref 20–26)
HCT VFR BLD AUTO: 28.7 % (ref 34–46.6)
HCT VFR BLD CALC: 28 % (ref 38–51)
HGB BLD-MCNC: 9.1 G/DL (ref 12–15.9)
HGB BLDA-MCNC: 9.1 G/DL (ref 14–18)
IMM GRANULOCYTES # BLD AUTO: 0.12 10*3/MM3 (ref 0–0.05)
IMM GRANULOCYTES NFR BLD AUTO: 0.8 % (ref 0–0.5)
INHALED O2 CONCENTRATION: 50 %
IPAP: 20
LYMPHOCYTES # BLD AUTO: 0.34 10*3/MM3 (ref 0.7–3.1)
LYMPHOCYTES NFR BLD AUTO: 2.3 % (ref 19.6–45.3)
MAGNESIUM SERPL-MCNC: 2.4 MG/DL (ref 1.6–2.4)
MCH RBC QN AUTO: 33.2 PG (ref 26.6–33)
MCHC RBC AUTO-ENTMCNC: 31.7 G/DL (ref 31.5–35.7)
MCV RBC AUTO: 104.7 FL (ref 79–97)
METHGB BLD QL: ABNORMAL
MODALITY: ABNORMAL
MONOCYTES # BLD AUTO: 0.76 10*3/MM3 (ref 0.1–0.9)
MONOCYTES NFR BLD AUTO: 5.1 % (ref 5–12)
NEUTROPHILS NFR BLD AUTO: 13.76 10*3/MM3 (ref 1.7–7)
NEUTROPHILS NFR BLD AUTO: 91.7 % (ref 42.7–76)
NRBC BLD AUTO-RTO: 1.6 /100 WBC (ref 0–0.2)
OXYHGB MFR BLDV: 91.8 % (ref 94–99)
PAW @ PEAK INSP FLOW SETTING VENT: 0 CMH2O
PCO2 BLDA: 50.7 MM HG (ref 35–45)
PCO2 TEMP ADJ BLD: 50.7 MM HG (ref 35–45)
PH BLDA: 7.36 PH UNITS (ref 7.35–7.45)
PH, TEMP CORRECTED: 7.36 PH UNITS
PLATELET # BLD AUTO: 76 10*3/MM3 (ref 140–450)
PMV BLD AUTO: 11.8 FL (ref 6–12)
PO2 BLDA: 60 MM HG (ref 83–108)
PO2 TEMP ADJ BLD: 60 MM HG (ref 83–108)
POTASSIUM SERPL-SCNC: 4.1 MMOL/L (ref 3.5–5.2)
QT INTERVAL: 428 MS
QT INTERVAL: 598 MS
QTC INTERVAL: 465 MS
QTC INTERVAL: 572 MS
RBC # BLD AUTO: 2.74 10*6/MM3 (ref 3.77–5.28)
SODIUM SERPL-SCNC: 140 MMOL/L (ref 136–145)
TOTAL RATE: 20 BREATHS/MINUTE
UFH PPP CHRO-ACNC: 0.36 IU/ML (ref 0.3–0.7)
UFH PPP CHRO-ACNC: 0.36 IU/ML (ref 0.3–0.7)
VENTILATOR MODE: ABNORMAL
WBC NRBC COR # BLD AUTO: 14.99 10*3/MM3 (ref 3.4–10.8)

## 2024-11-15 PROCEDURE — 85025 COMPLETE CBC W/AUTO DIFF WBC: CPT | Performed by: INTERNAL MEDICINE

## 2024-11-15 PROCEDURE — 85520 HEPARIN ASSAY: CPT

## 2024-11-15 PROCEDURE — 99233 SBSQ HOSP IP/OBS HIGH 50: CPT | Performed by: INTERNAL MEDICINE

## 2024-11-15 PROCEDURE — 82805 BLOOD GASES W/O2 SATURATION: CPT

## 2024-11-15 PROCEDURE — 83050 HGB METHEMOGLOBIN QUAN: CPT

## 2024-11-15 PROCEDURE — 25010000002 PHENYLEPHRINE 10 MG/ML SOLUTION 5 ML VIAL: Performed by: PHYSICIAN ASSISTANT

## 2024-11-15 PROCEDURE — 82375 ASSAY CARBOXYHB QUANT: CPT

## 2024-11-15 PROCEDURE — 92612 ENDOSCOPY SWALLOW (FEES) VID: CPT | Performed by: SPEECH-LANGUAGE PATHOLOGIST

## 2024-11-15 PROCEDURE — 25810000003 SODIUM CHLORIDE 0.9 % SOLUTION 250 ML FLEX CONT: Performed by: PHYSICIAN ASSISTANT

## 2024-11-15 PROCEDURE — 93005 ELECTROCARDIOGRAM TRACING: CPT | Performed by: HOSPITALIST

## 2024-11-15 PROCEDURE — 92610 EVALUATE SWALLOWING FUNCTION: CPT | Performed by: SPEECH-LANGUAGE PATHOLOGIST

## 2024-11-15 PROCEDURE — 25010000002 PIPERACILLIN SOD-TAZOBACTAM PER 1 G: Performed by: INTERNAL MEDICINE

## 2024-11-15 PROCEDURE — 80048 BASIC METABOLIC PNL TOTAL CA: CPT | Performed by: INTERNAL MEDICINE

## 2024-11-15 PROCEDURE — 99024 POSTOP FOLLOW-UP VISIT: CPT | Performed by: PHYSICIAN ASSISTANT

## 2024-11-15 PROCEDURE — 99232 SBSQ HOSP IP/OBS MODERATE 35: CPT | Performed by: INTERNAL MEDICINE

## 2024-11-15 PROCEDURE — 25010000002 AMIODARONE IN DEXTROSE 5% 360-4.14 MG/200ML-% SOLUTION: Performed by: INTERNAL MEDICINE

## 2024-11-15 PROCEDURE — 97166 OT EVAL MOD COMPLEX 45 MIN: CPT

## 2024-11-15 PROCEDURE — 94799 UNLISTED PULMONARY SVC/PX: CPT

## 2024-11-15 PROCEDURE — 25010000002 HEPARIN (PORCINE) 25000-0.45 UT/250ML-% SOLUTION

## 2024-11-15 PROCEDURE — 82948 REAGENT STRIP/BLOOD GLUCOSE: CPT

## 2024-11-15 PROCEDURE — 97530 THERAPEUTIC ACTIVITIES: CPT

## 2024-11-15 PROCEDURE — 71045 X-RAY EXAM CHEST 1 VIEW: CPT

## 2024-11-15 PROCEDURE — 25010000002 FUROSEMIDE PER 20 MG: Performed by: INTERNAL MEDICINE

## 2024-11-15 PROCEDURE — 83735 ASSAY OF MAGNESIUM: CPT | Performed by: INTERNAL MEDICINE

## 2024-11-15 PROCEDURE — 63710000001 INSULIN GLARGINE PER 5 UNITS: Performed by: INTERNAL MEDICINE

## 2024-11-15 PROCEDURE — 25010000002 HYDROCORTISONE SOD SUC (PF) 100 MG RECONSTITUTED SOLUTION: Performed by: INTERNAL MEDICINE

## 2024-11-15 PROCEDURE — 63710000001 INSULIN LISPRO (HUMAN) PER 5 UNITS: Performed by: NURSE PRACTITIONER

## 2024-11-15 PROCEDURE — 36600 WITHDRAWAL OF ARTERIAL BLOOD: CPT

## 2024-11-15 RX ORDER — FUROSEMIDE 10 MG/ML
60 INJECTION INTRAMUSCULAR; INTRAVENOUS EVERY 12 HOURS SCHEDULED
Status: DISCONTINUED | OUTPATIENT
Start: 2024-11-15 | End: 2024-11-20 | Stop reason: HOSPADM

## 2024-11-15 RX ADMIN — FUROSEMIDE 60 MG: 10 INJECTION, SOLUTION INTRAMUSCULAR; INTRAVENOUS at 17:57

## 2024-11-15 RX ADMIN — INSULIN LISPRO 3 UNITS: 100 INJECTION, SOLUTION INTRAVENOUS; SUBCUTANEOUS at 18:00

## 2024-11-15 RX ADMIN — PIPERACILLIN AND TAZOBACTAM 3.38 G: 3; .375 INJECTION, POWDER, LYOPHILIZED, FOR SOLUTION INTRAVENOUS at 17:57

## 2024-11-15 RX ADMIN — OXYCODONE HYDROCHLORIDE AND ACETAMINOPHEN 1 TABLET: 5; 325 TABLET ORAL at 06:22

## 2024-11-15 RX ADMIN — HYDROCORTISONE SODIUM SUCCINATE 100 MG: 100 INJECTION, POWDER, FOR SOLUTION INTRAMUSCULAR; INTRAVENOUS at 11:58

## 2024-11-15 RX ADMIN — DOCUSATE SODIUM 100 MG: 100 CAPSULE, LIQUID FILLED ORAL at 20:36

## 2024-11-15 RX ADMIN — OXYCODONE HYDROCHLORIDE AND ACETAMINOPHEN 1 TABLET: 5; 325 TABLET ORAL at 01:09

## 2024-11-15 RX ADMIN — DOCUSATE SODIUM 100 MG: 100 CAPSULE, LIQUID FILLED ORAL at 08:07

## 2024-11-15 RX ADMIN — POLYETHYLENE GLYCOL 3350 17 G: 17 POWDER, FOR SOLUTION ORAL at 08:07

## 2024-11-15 RX ADMIN — MUPIROCIN 1 APPLICATION: 20 OINTMENT TOPICAL at 21:00

## 2024-11-15 RX ADMIN — HEPARIN SODIUM 20 UNITS/KG/HR: 10000 INJECTION, SOLUTION INTRAVENOUS at 06:21

## 2024-11-15 RX ADMIN — MUPIROCIN 1 APPLICATION: 20 OINTMENT TOPICAL at 08:07

## 2024-11-15 RX ADMIN — NYSTATIN 1 APPLICATION: 100000 POWDER TOPICAL at 08:07

## 2024-11-15 RX ADMIN — LEVOTHYROXINE SODIUM 200 MCG: 0.1 TABLET ORAL at 06:14

## 2024-11-15 RX ADMIN — PHENYLEPHRINE HYDROCHLORIDE 0.5 MCG/KG/MIN: 10 INJECTION INTRAVENOUS at 19:02

## 2024-11-15 RX ADMIN — HYDROCORTISONE SODIUM SUCCINATE 100 MG: 100 INJECTION, POWDER, FOR SOLUTION INTRAMUSCULAR; INTRAVENOUS at 04:24

## 2024-11-15 RX ADMIN — POTASSIUM CHLORIDE 10 MEQ: 750 CAPSULE, EXTENDED RELEASE ORAL at 20:36

## 2024-11-15 RX ADMIN — ASPIRIN 81 MG: 81 TABLET, COATED ORAL at 08:07

## 2024-11-15 RX ADMIN — FUROSEMIDE 60 MG: 10 INJECTION, SOLUTION INTRAMUSCULAR; INTRAVENOUS at 06:39

## 2024-11-15 RX ADMIN — INSULIN GLARGINE 20 UNITS: 100 INJECTION, SOLUTION SUBCUTANEOUS at 20:36

## 2024-11-15 RX ADMIN — SENNOSIDES AND DOCUSATE SODIUM 2 TABLET: 50; 8.6 TABLET ORAL at 20:36

## 2024-11-15 RX ADMIN — Medication 3 ML: at 08:19

## 2024-11-15 RX ADMIN — HYDROCORTISONE SODIUM SUCCINATE 100 MG: 100 INJECTION, POWDER, FOR SOLUTION INTRAMUSCULAR; INTRAVENOUS at 20:36

## 2024-11-15 RX ADMIN — Medication 3 ML: at 20:37

## 2024-11-15 RX ADMIN — POTASSIUM CHLORIDE 10 MEQ: 750 CAPSULE, EXTENDED RELEASE ORAL at 08:07

## 2024-11-15 RX ADMIN — AMIODARONE HYDROCHLORIDE 0.5 MG/MIN: 1.8 INJECTION, SOLUTION INTRAVENOUS at 08:12

## 2024-11-15 RX ADMIN — AMIODARONE HYDROCHLORIDE 0.5 MG/MIN: 1.8 INJECTION, SOLUTION INTRAVENOUS at 18:21

## 2024-11-15 RX ADMIN — NYSTATIN 1 APPLICATION: 100000 POWDER TOPICAL at 20:36

## 2024-11-15 RX ADMIN — PIPERACILLIN AND TAZOBACTAM 3.38 G: 3; .375 INJECTION, POWDER, LYOPHILIZED, FOR SOLUTION INTRAVENOUS at 10:18

## 2024-11-15 RX ADMIN — OXYCODONE HYDROCHLORIDE AND ACETAMINOPHEN 1 TABLET: 5; 325 TABLET ORAL at 18:21

## 2024-11-15 RX ADMIN — PIPERACILLIN AND TAZOBACTAM 3.38 G: 3; .375 INJECTION, POWDER, LYOPHILIZED, FOR SOLUTION INTRAVENOUS at 01:46

## 2024-11-15 NOTE — PAYOR COMM NOTE
"Aleta Miller (64 y.o. Female)       Date of Birth   1960    Social Security Number       Address   [address] [address]    Home Phone   [phone]    MRN   [id number]       Jew   None    Marital Status                               Admission Date   11/11/24    Admission Type   Elective    Admitting Provider   Ajay Cheatham MD    Attending Provider   Ajay Cheatham MD    Department, Room/Bed   [facility] 2HSI, S255/1       Discharge Date       Discharge Disposition       Discharge Destination                                 Attending Provider: Ajay Cheatham MD    Allergies: Lortab [Hydrocodone-acetaminophen], Morphine And Codeine    Isolation: None   Infection: None   Code Status: Prior    Ht: 157.5 cm (62\")   Wt: 72.1 kg (159 lb)    Admission Cmt: None   Principal Problem: Prosthetic aortic valve stenosis s/p TAVR 11/11/24, Right groin exploration for hematoma 11/12/24 [T82.857A]                   Active Insurance as of 11/11/2024       Primary Coverage       Payor Plan Insurance Group Employer/Plan Group    [employer] PPO [id number]       Payor Plan Address Payor Plan Phone Number Payor Plan Fax Number Effective Dates    [id number] [phone]  1/1/2017 - None Entered    [place] [address]         Subscriber Name Subscriber Birth Date Member ID       ALETA MILLER 1960 [id number]                     Emergency Contacts        (Rel.) Home Phone Work Phone Mobile Phone    Zen Miller (Spouse) [phone] -- [phone]              Lab Results (last 48 hours)       Procedure Component Value Units Date/Time    POC Glucose Once [[id number]]  (Abnormal) Collected: 11/15/24 1643    Specimen: Blood Updated: 11/15/24 1645     Glucose 166 mg/dL     Heparin Anti-Xa [[id number]]  (Normal) Collected: 11/15/24 1558    Specimen: Blood Updated: 11/15/24 1630     Heparin Anti-Xa " (UFH) 0.36 IU/ml     POC Glucose Once [[id number]]  (Abnormal) Collected: 11/15/24 1110    Specimen: Blood Updated: 11/15/24 1549     Glucose 135 mg/dL     POC Glucose Once [[id number]]  (Normal) Collected: 11/15/24 0719    Specimen: Blood Updated: 11/15/24 0833     Glucose 109 mg/dL     Blood Gas, Arterial With Co-Ox [[id number]]  (Abnormal) Collected: 11/15/24 0443    Specimen: Arterial Blood Updated: 11/15/24 0443     Site Right Radial     Zen's Test N/A     pH, Arterial 7.357 pH units      pCO2, Arterial 50.7 mm Hg      Comment: 83 Value above reference range        pO2, Arterial 60.0 mm Hg      Comment: 84 Value below reference range        HCO3, Arterial 28.4 mmol/L      Base Excess, Arterial 2.4 mmol/L      Hemoglobin, Blood Gas 9.1 g/dL      Comment: 84 Value below reference range        Hematocrit, Blood Gas 28.0 %      Oxyhemoglobin 91.8 %      Comment: 84 Value below reference range        Methemoglobin --     Comment: 94 Value below reportable range < _0.1        Carboxyhemoglobin 1.2 %      CO2 Content 30.0 mmol/L      Temperature 37.0     Barometric Pressure for Blood Gas --     Comment: N/A        Modality BiPap     FIO2 50 %      Ventilator Mode BiPAP     Rate 20 Breaths/minute      PIP 0 cmH2O      Comment: Meter: [id number]     :  [id number]        IPAP 20     EPAP 6     pH, Temp Corrected 7.357 pH Units      pCO2, Temperature Corrected 50.7 mm Hg      pO2, Temperature Corrected 60.0 mm Hg     Magnesium [[id number]]  (Normal) Collected: 11/15/24 0346    Specimen: Blood Updated: 11/15/24 0429     Magnesium 2.4 mg/dL     Basic Metabolic Panel [[id number]]  (Abnormal) Collected: 11/15/24 0346    Specimen: Blood Updated: 11/15/24 0416     Glucose 108 mg/dL      BUN 14 mg/dL      Creatinine 0.46 mg/dL      Sodium 140 mmol/L      Potassium 4.1 mmol/L      Chloride 101 mmol/L      CO2 28.0 mmol/L      Calcium 8.4 mg/dL      BUN/Creatinine Ratio 30.4     Anion Gap 11.0 mmol/L      eGFR 108.4  mL/min/1.73     Narrative:      GFR Normal >60  Chronic Kidney Disease <60  Kidney Failure <15      Heparin Anti-Xa [[id number]]  (Normal) Collected: 11/15/24 0346    Specimen: Blood Updated: 11/15/24 0406     Heparin Anti-Xa (UFH) 0.36 IU/ml     CBC & Differential [[id number]]  (Abnormal) Collected: 11/15/24 0346    Specimen: Blood Updated: 11/15/24 0400    Narrative:      The following orders were created for panel order CBC & Differential.  Procedure                               Abnormality         Status                     ---------                               -----------         ------                     CBC Auto Differential[[id number]]        Abnormal            Final result               Scan Slide[[id number]]                                                                    Please view results for these tests on the individual orders.    CBC Auto Differential [[id number]]  (Abnormal) Collected: 11/15/24 0346    Specimen: Blood Updated: 11/15/24 0400     WBC 14.99 10*3/mm3      RBC 2.74 10*6/mm3      Hemoglobin 9.1 g/dL      Hematocrit 28.7 %      .7 fL      MCH 33.2 pg      MCHC 31.7 g/dL      RDW 21.0 %      RDW-SD 78.9 fl      MPV 11.8 fL      Platelets 76 10*3/mm3      Neutrophil % 91.7 %      Lymphocyte % 2.3 %      Monocyte % 5.1 %      Eosinophil % 0.0 %      Basophil % 0.1 %      Immature Grans % 0.8 %      Neutrophils, Absolute 13.76 10*3/mm3      Lymphocytes, Absolute 0.34 10*3/mm3      Monocytes, Absolute 0.76 10*3/mm3      Eosinophils, Absolute 0.00 10*3/mm3      Basophils, Absolute 0.01 10*3/mm3      Immature Grans, Absolute 0.12 10*3/mm3      nRBC 1.6 /100 WBC     POC Glucose Once [[id number]]  (Abnormal) Collected: 11/13/24 2016    Specimen: Blood Updated: 11/15/24 0144     Glucose 177 mg/dL     POC Glucose Once [[id number]]  (Abnormal) Collected: 11/13/24 1615    Specimen: Blood Updated: 11/15/24 0143     Glucose 166 mg/dL     POC Glucose Once [[id number]]  (Abnormal) Collected: 11/13/24  1154    Specimen: Blood Updated: 11/15/24 0142     Glucose 223 mg/dL     POC Glucose Once [[id number]]  (Abnormal) Collected: 11/14/24 2015    Specimen: Blood Updated: 11/14/24 2017     Glucose 152 mg/dL     Heparin Anti-Xa [[id number]]  (Normal) Collected: 11/14/24 1642    Specimen: Blood Updated: 11/14/24 1712     Heparin Anti-Xa (UFH) 0.37 IU/ml     POC Glucose Once [[id number]]  (Abnormal) Collected: 11/14/24 1616    Specimen: Blood Updated: 11/14/24 1649     Glucose 158 mg/dL     CBC (No Diff) [[id number]]  (Abnormal) Collected: 11/14/24 1401    Specimen: Blood Updated: 11/14/24 1430     WBC 16.90 10*3/mm3      RBC 2.65 10*6/mm3      Hemoglobin 8.8 g/dL      Hematocrit 27.6 %      .2 fL      MCH 33.2 pg      MCHC 31.9 g/dL      RDW 21.3 %      RDW-SD 80.2 fl      MPV 11.8 fL      Platelets 76 10*3/mm3     MRSA Screen, PCR (Inpatient) - Swab, Nares [[id number]]  (Normal) Collected: 11/14/24 1209    Specimen: Swab from Nares Updated: 11/14/24 1341     MRSA PCR Negative    Narrative:      The negative predictive value of this diagnostic test is high and should only be used to consider de-escalating anti-MRSA therapy. A positive result may indicate colonization with MRSA and must be correlated clinically.  MRSA Negative    POC Glucose Once [[id number]]  (Abnormal) Collected: 11/14/24 1137    Specimen: Blood Updated: 11/14/24 1145     Glucose 142 mg/dL     Heparin Anti-Xa [[id number]]  (Normal) Collected: 11/14/24 1022    Specimen: Blood Updated: 11/14/24 1058     Heparin Anti-Xa (UFH) 0.37 IU/ml     POC Glucose Once [[id number]]  (Abnormal) Collected: 11/14/24 0724    Specimen: Blood Updated: 11/14/24 0750     Glucose 202 mg/dL     Basic Metabolic Panel [[id number]]  (Abnormal) Collected: 11/14/24 0109    Specimen: Blood Updated: 11/14/24 0141     Glucose 197 mg/dL      BUN 13 mg/dL      Creatinine 0.60 mg/dL      Sodium 138 mmol/L      Potassium 3.7 mmol/L      Chloride 100 mmol/L      CO2 26.0 mmol/L      Calcium  8.1 mg/dL      BUN/Creatinine Ratio 21.7     Anion Gap 12.0 mmol/L      eGFR 101.6 mL/min/1.73     Narrative:      GFR Normal >60  Chronic Kidney Disease <60  Kidney Failure <15      CBC & Differential [[id number]]  (Abnormal) Collected: 11/14/24 0109    Specimen: Blood Updated: 11/14/24 0141    Narrative:      The following orders were created for panel order CBC & Differential.  Procedure                               Abnormality         Status                     ---------                               -----------         ------                     CBC Auto Differential[[id number]]        Abnormal            Final result               Scan Slide[[id number]]                                       Final result                 Please view results for these tests on the individual orders.    CBC Auto Differential [[id number]]  (Abnormal) Collected: 11/14/24 0109    Specimen: Blood Updated: 11/14/24 0141     WBC 20.44 10*3/mm3      RBC 2.59 10*6/mm3      Hemoglobin 8.7 g/dL      Hematocrit 26.6 %      .7 fL      MCH 33.6 pg      MCHC 32.7 g/dL      RDW 21.7 %      RDW-SD 78.8 fl      MPV 12.0 fL      Platelets 85 10*3/mm3      Neutrophil % 90.6 %      Lymphocyte % 2.1 %      Monocyte % 5.6 %      Eosinophil % 0.0 %      Basophil % 0.1 %      Immature Grans % 1.6 %      Neutrophils, Absolute 18.53 10*3/mm3      Lymphocytes, Absolute 0.42 10*3/mm3      Monocytes, Absolute 1.14 10*3/mm3      Eosinophils, Absolute 0.00 10*3/mm3      Basophils, Absolute 0.02 10*3/mm3      Immature Grans, Absolute 0.33 10*3/mm3      nRBC 0.6 /100 WBC     Narrative:      Appended report. These results have been appended to a previously verified report.    Scan Slide [[id number]] Collected: 11/14/24 0109    Specimen: Blood Updated: 11/14/24 0141     Anisocytosis Slight/1+     WBC Morphology Normal     Platelet Morphology Normal    Heparin Anti-Xa [[id number]]  (Abnormal) Collected: 11/14/24 0109    Specimen: Blood Updated: 11/14/24 0138      Heparin Anti-Xa (UFH) 0.25 IU/ml     Heparin Anti-Xa [[id number]]  (Abnormal) Collected: 11/13/24 1815    Specimen: Blood Updated: 11/13/24 1840     Heparin Anti-Xa (UFH) 0.10 IU/ml           Imaging Results (Last 48 Hours)       Procedure Component Value Units Date/Time    SLP FEES - Fiberoptic Endo Eval Swallow [[id number]] Resulted: 11/15/24 1413     Updated: 11/15/24 1413    Narrative:      This procedure was auto-finalized with no dictation required.    XR Chest 1 View [[id number]] Collected: 11/15/24 0700     Updated: 11/15/24 0704    Narrative:      XR CHEST 1 VW    Date of Exam: 11/15/2024 3:56 AM EST    Indication: resp failure    Comparison: Chest x-ray 11/14/2024, CT angiogram chest 11/13/2024    Findings:  Centimeters catheter tip terminates at brachiocephalic SVC junction. Position unchanged. There is cardiomegaly with postoperative change of the heart. There is groundglass opacity central predominant right lung greater than left. This is unchanged. Small   bilateral pleural effusions. No significant pneumothorax.      Impression:      Impression:  No change from previous exam.      Electronically Signed: Viji Brewer MD    11/15/2024 7:01 AM EST    Workstation ID: [id number]    XR Chest 1 View [[id number]] Collected: 11/14/24 0854     Updated: 11/14/24 0859    Narrative:      XR CHEST 1 VW    Date of Exam: 11/14/2024 3:38 AM EST    Indication: resp failure    Comparison: 11/13/2024 CT and radiograph    Findings:  Cardiac surgical changes with median sternotomy wires. The cardiomediastinal silhouette is partially obscured. There are persistent bilateral airspace opacities, greater in the right lung. Trace bilateral pleural effusions. No pneumothorax. Left approach   central venous catheter distal tip overlies the brachiocephalic confluence. Osseous structures are unchanged.      Impression:      Impression:  No significant interval change.      Electronically Signed: Vinny Argueta MD    11/14/2024 8:55 AM EST     Workstation ID: [id number]             Physician Progress Notes (last 48 hours)        Omid León MD at 11/15/24 2414            Intensive Care Follow-up     Hospital:  LOS: 4 days   Ms. Jill Miller, 64 y.o. female is followed for:   Prosthetic aortic valve stenosis     Subjective   Subjective     Jill Miller is a 64 year-old female that presents to [facility] ICU postoperatively from scheduled TAVR today.      Patient has a PMH of HFpEF, valvular disease s/p prosthetic MVR/AVR, pulmonary HTN, lung cancer s/p ALBINA lobectomy (2017), multiple cardiac valve replacements, HTN, hypothyroidism, hyperlipidemia, HFpEF, T2DM, COPD, tobacco use, and PAF s/p ANGELA/Maze (2018), cardioversion and PVA (2022).   Patient recently admitted to [facility] in September with hypoxic respiratory failure 2* rhinovirus and exacerbation of heart failure. 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 Cardiology/CTS consulted and patient underwent work-up for TAVR.      TIFFANIE showed LVEF 51-55%, moderate to severe AS, moderate MS, tricuspid ring noted, and bi-atrial cavity dilation. CTA TAVR negative for acute abnormality / noted emphysema.   Interval History:  The chart has been reviewed.  The patient has remained afebrile overnight.  Pressors have been successfully discontinued.  The patient states that she feels as though she is breathing easier.  She is currently on 50% FiO2 with high flow.  She has been taking sips of clear liquids without problems.  MRSA probe is negative.  She has had some rhythm changes through the night but is currently in sinus rhythm.  Chest x-ray continues to show right-sided infiltrates which may be marginally better compared with yesterday.    The patient's past medical, surgical and  "social history were reviewed and updated in Epic as appropriate.     Objective   Objective     Infusions:  amiodarone, 0.5 mg/min, Last Rate: 0.5 mg/min (11/15/24 0812)  EPINEPHrine, 0.02-0.3 mcg/kg/min, Last Rate: Stopped (11/15/24 0147)  heparin, 20 Units/kg/hr, Last Rate: 20 Units/kg/hr (11/15/24 0621)  Pharmacy to Dose Heparin,   phenylephrine, 0.5-3 mcg/kg/min, Last Rate: Stopped (11/15/24 0447)  vasopressin, 0.03 Units/min, Last Rate: Stopped (11/14/24 1400)      Medications:  aspirin, 81 mg, Oral, Daily  docusate sodium, 100 mg, Oral, BID  furosemide, 60 mg, Intravenous, Q12H  hydrocortisone sodium succinate, 100 mg, Intravenous, Q8H  insulin glargine, 20 Units, Subcutaneous, Nightly  insulin lispro, 3-14 Units, Subcutaneous, 4x Daily AC & at Bedtime  levothyroxine, 200 mcg, Oral, Q AM  lidocaine, 5 mL, Infiltration, Once  mupirocin, 1 Application, Each Nare, BID  nystatin, 1 Application, Topical, BID  piperacillin-tazobactam, 3.375 g, Intravenous, Q8H  polyethylene glycol, 17 g, Oral, Daily  potassium chloride, 20 mEq, Oral, Once  potassium chloride, 10 mEq, Oral, BID  senna-docusate sodium, 2 tablet, Oral, Nightly  sodium bicarbonate, 50 mEq, Intravenous, Once  sodium chloride, 3 mL, Intravenous, Q12H        Vital Sign Min/Max for last 24 hours  Temp  Min: 98.1 °F (36.7 °C)  Max: 98.8 °F (37.1 °C)   BP  Min: 88/54  Max: 118/71   Pulse  Min: 55  Max: 122   Resp  Min: 20  Max: 32   SpO2  Min: 89 %  Max: 97 %   Flow (L/min) (Oxygen Therapy)  Min: 40  Max: 50       Input/Output for last 24 hour shift  11/14 0701 - 11/15 0700  In: 1842.6 [P.O.:240; I.V.:1302.6]  Out: 1625 [Urine:1625]      Objective:  General Appearance:  Uncomfortable, in no acute distress and not in pain.    Vital signs: (most recent): Blood pressure 95/51, pulse 68, temperature 98.2 °F (36.8 °C), temperature source Bladder, resp. rate 22, height 157.5 cm (62\"), weight 72.1 kg (159 lb), SpO2 95%.    HEENT: (High flow nasal cannula 50%)  "   Lungs:  Normal effort and normal respiratory rate.  She is not in respiratory distress.  There are rales, wheezes and rhonchi.  No decreased breath sounds.    Heart: Normal rate.  Regular rhythm.  S1 normal and S2 normal.    Abdomen: Abdomen is soft.  Bowel sounds are normal.   There is generalized tenderness.  There is no rebound tenderness.     Extremities: Normal range of motion.  (Edema and mild coolness to the left hand.  No cyanosis.)  Neurological: Patient is alert and oriented to person, place and time.    Pupils:  Pupils are equal, round, and reactive to light.    Skin:  Warm.                Results from last 7 days   Lab Units 11/15/24  0346 11/14/24  1401 11/14/24  0109   WBC 10*3/mm3 14.99* 16.90* 20.44*   HEMOGLOBIN g/dL 9.1* 8.8* 8.7*   PLATELETS 10*3/mm3 76* 76* 85*     Results from last 7 days   Lab Units 11/15/24  0346 11/14/24  0109 11/13/24  0320 11/12/24  1659 11/12/24  0946 11/12/24  0450   SODIUM mmol/L 140 138 141 141   < > 141   POTASSIUM mmol/L 4.1 3.7 3.8 3.7   < > 4.0   CO2 mmol/L 28.0 26.0 29.0 27.0   < > 16.0*   BUN mg/dL 14 13 15 16   < > 17   CREATININE mg/dL 0.46* 0.60 0.63 0.89   < > 1.24*   MAGNESIUM mg/dL 2.4  --  3.2* 2.9*   < > 2.0   PHOSPHORUS mg/dL  --   --  3.0  --   --  4.9*   GLUCOSE mg/dL 108* 197* 226* 299*   < > 364*    < > = values in this interval not displayed.     Estimated Creatinine Clearance: 117.9 mL/min (A) (by C-G formula based on SCr of 0.46 mg/dL (L)).    Results from last 7 days   Lab Units 11/15/24  0443   PH, ARTERIAL pH units 7.357   PCO2, ARTERIAL mm Hg 50.7*   PO2 ART mm Hg 60.0*         I reviewed the patient's results and images.     Assessment & Plan   Impression        Prosthetic aortic valve stenosis s/p TAVR 11/11/24, Right groin exploration for hematoma 11/12/24    Paroxysmal atrial fibrillation    Type 2 diabetes mellitus    COPD (chronic obstructive pulmonary disease)    Acute on chronic respiratory failure with hypoxia and hypercapnia     Pulmonary infiltrate, likely right-sided pneumonia    Acute pulmonary embolism without acute cor pulmonale       Plan        We will continue with Zosyn for now to cover probable right-sided pneumonia.  MRSA probe is negative so we will discontinue Zyvox for now.  Continue with anticoagulation as before for underlying pulmonary emboli.  Bronchodilator therapy as before.  Continue with current glucose control.  This may need to be adjusted when she is beginning to eat a bit more.  Nutritional support as before.  Wean oxygen as tolerated.  I agree with diuresis for now.  We will watch renal function closely.      Plan of care and goals reviewed with mulitdisciplinary/antibiotic stewardship team during rounds.   I discussed the patient's findings and my recommendations with patient, family, and nursing staff     High level of risk due to:  drug(s) requiring intensive monitoring for toxicity and parenteral controlled substances.        Omid León MD, [facility]  Pulmonology and Critical Care Medicine       Electronically signed by Omid León MD at 11/15/24 1003       Liza Guzman PA-C at 11/15/24 0725            CTS Progress Note      POD # 4 s/p TAVR, POD # 3 Right groin exploration    Subjective  No acute events overnight. Patient resting comfortably in chair on IV heparin and amiodarone gtt. Complaining of some R groin incisional pain. Denies chest pain. Breathing improved, on high flow NC. Reports sleeping well.    Objective    Physical Exam:   Vital Signs   Temp:  [98.1 °F (36.7 °C)-98.8 °F (37.1 °C)] 98.1 °F (36.7 °C)  Heart Rate:  [] 62  Resp:  [20-35] 22  BP: ()/(42-81) 92/49   GEN: NAD   CV: Regular rate and rhythm, NSR on monitor, Normal S1,S2.    RESP: Respirations even and unlabored, clear to auscultation   ABD: Soft, non-distended, mild tenderness to lower quadrants, hypoactive bowel sounds   EXT: Bilateral upper extremities warm, pink with capillary refill bilaterally.  Gross motor and sensory function intact. Mild edema of the L hand and forearm. No right upper extremity edema.    Bilateral lower extremities gross motor and sensory function intact. Mild right lower extremity edema. Calves soft. No tenderness to palpation. Toes pink, warm.   Incision: Right groin is soft. No palpable hematoma or surrounding erythema.  Aquacel bandage clean and intact. Left groin soft, no hematoma.     Results     Results from last 7 days   Lab Units 11/15/24  0346   WBC 10*3/mm3 14.99*   HEMOGLOBIN g/dL 9.1*   HEMATOCRIT % 28.7*   PLATELETS 10*3/mm3 76*     Results from last 7 days   Lab Units 11/15/24  0346   SODIUM mmol/L 140   POTASSIUM mmol/L 4.1   CHLORIDE mmol/L 101   CO2 mmol/L 28.0   BUN mg/dL 14   CREATININE mg/dL 0.46*   GLUCOSE mg/dL 108*   CALCIUM mg/dL 8.4*     Results from last 7 days   Lab Units 11/12/24  0450 11/11/24  1154   INR  1.25*  --    APTT seconds  --  34.5     Imaging:   XR Chest 1 View    Result Date: 11/15/2024  XR CHEST 1 VW Date of Exam: 11/15/2024 3:56 AM EST Indication: resp failure Comparison: Chest x-ray 11/14/2024, CT angiogram chest 11/13/2024 Findings: Centimeters catheter tip terminates at brachiocephalic SVC junction. Position unchanged. There is cardiomegaly with postoperative change of the heart. There is groundglass opacity central predominant right lung greater than left. This is unchanged. Small  bilateral pleural effusions. No significant pneumothorax.     Impression: No change from previous exam. Electronically Signed: Viji Brewer MD  11/15/2024 7:01 AM EST  Workstation ID: [id number]    Duplex Venous Upper Extremity - Bilateral CAR    Result Date: 11/14/2024    Acute right upper extremity deep vein thrombosis noted in the radial.   Chronic right upper extremity superficial thrombophlebitis noted in the cephalic (forearm).   Acute left upper extremity superficial thrombophlebitis noted in the basilic (forearm) and cephalic (forearm).   All other vessels  appear normal.     Duplex Venous Lower Extremity - Bilateral CAR    Result Date: 11/14/2024    Acute right lower extremity deep vein thrombosis noted in the proximal femoral, mid femoral, distal femoral and peroneal.   All other veins appeared normal bilaterally.     Assessment & Plan   POD # 4 s/p TAVR, POD # 3 s/p Right groin exploration      Prosthetic aortic valve stenosis s/p TAVR 11/11/24, Right groin exploration for hematoma 11/12/24    Paroxysmal atrial fibrillation    Type 2 diabetes mellitus    COPD (chronic obstructive pulmonary disease)    Acute on chronic respiratory failure with hypoxia and hypercapnia    Pulmonary infiltrate, likely right-sided pneumonia    Acute pulmonary embolism without acute cor pulmonale  Right lower extremity DVT  Right radial vein DVT  Left upper extremity superficial vein thrombosis      Plan   H&H stable, Leukocytosis improving  Up to chair. Continue PT, ambulate  Wean supplemental O2 as tolerated  IV Zosyn for suspected pneumonia, with concern for possible aspiration per pulmonary  SLP evaluation prior to resuming diet  Continue IV heparin drip for RML/RLL pulmonary emboli, RLE DVT  Avoid dependent positioning of the lower extremities at rest    Liza Guzman PA-C  11/15/24  07:25 EST      Electronically signed by Liza Guzman PA-C at 11/15/24 0848       Jeffry Pickett MD at 11/15/24 0602             LOS: 4 days   Patient Care Team:  Marisa Lynch PA as PCP - General (Family Medicine)  Ajay Cheatham MD as Surgeon (Cardiothoracic Surgery)  Marco Lay IV, MD as Cardiologist (Interventional Cardiology)  Nigel Huber MD as Consulting Physician (Cardiology)  Marisa Suarez APRN as Nurse Practitioner (Interventional Cardiology)    Chief Complaint: Dyspnea    Subjective     Interval History:     Sinus on monitor.  No chest pain.  Breathing improved.  Still requiring high levels of oxygen however off BiPAP.  Patient in chair  getting hair washed.  Patient has had episodes of atrial fibrillation overnight.  Off vasopressor therapy    Review of Systems:    Review of systems reviewed pertinent for those mentioned HPI    Objective     Vital Signs  Temp:  [98.3 °F (36.8 °C)-98.8 °F (37.1 °C)] 98.6 °F (37 °C)  Heart Rate:  [] 56  Resp:  [20-35] 20  BP: ()/(44-81) 104/49  Body mass index is 29.08 kg/m².    Intake/Output Summary (Last 24 hours) at 11/15/2024 0603  Last data filed at 11/15/2024 0400  Gross per 24 hour   Intake 1044.1 ml   Output 1540 ml   Net -495.9 ml     I/O this shift:  In: 340 [P.O.:240; IV Piggyback:100]  Out: 650 [Urine:650]    Physical Exam:     General Appearance:  Alert, cooperative, in no acute distress   Head:  Normocephalic, without obvious abnormality, atraumatic   Eyes:  Lids and lashes normal, conjunctivae and sclerae normal, no icterus, no pallor, corneas clear, PERRLA   Ears:  Ears appear intact with no abnormalities noted   Throat:  No oral lesions, no thrush, oral mucosa moist   Neck:  No adenopathy, supple, trachea midline, no thyromegaly, no carotid bruit, no JVD   Back:  No kyphosis present, no scoliosis present, no skin lesions, erythema or scars, no tenderness to percussion, or palpation, range of motion normal   Lungs:  Clear to auscultation,respirations regular, even and unlabored    Heart:  Regular rhythm and normal rate, normal S1 and S2, no murmur, no gallop, no rub, no click   Breast Exam:  Deferred   Abdomen:  Normal bowel sounds, no masses, no organomegaly, soft non-tender, non-distended, no guarding, no rebound tenderness   Genitalia:  Deferred   Extremities:  Moves all extremities well, no edema, no cyanosis, no redness   Pulses:  Pulses palpable and equal bilaterally   Skin:  No bleeding, bruising or rash   Lymph nodes:  No palpable adenopathy   Neurologic:  Cranial nerves 2 - 12 grossly intact, sensation intact, DTR present and equal bilaterally        Results Review:     I reviewed  "the patient's new clinical results.  I reviewed the patient's new imaging results and agree with the interpretation.  I reviewed the patient's other test results and agree with the interpretation  I personally viewed and interpreted the patient's EKG/Telemetry data      WBC WBC   Date Value Ref Range Status   11/15/2024 14.99 (H) 3.40 - 10.80 10*3/mm3 Final   11/14/2024 16.90 (H) 3.40 - 10.80 10*3/mm3 Final   11/14/2024 20.44 (H) 3.40 - 10.80 10*3/mm3 Final   11/13/2024 18.27 (H) 3.40 - 10.80 10*3/mm3 Final   11/12/2024 17.73 (H) 3.40 - 10.80 10*3/mm3 Final      HGB Hemoglobin   Date Value Ref Range Status   11/15/2024 9.1 (L) 12.0 - 15.9 g/dL Final   11/14/2024 8.8 (L) 12.0 - 15.9 g/dL Final   11/14/2024 8.7 (L) 12.0 - 15.9 g/dL Final   11/13/2024 9.1 (L) 12.0 - 15.9 g/dL Final   11/12/2024 10.3 (L) 12.0 - 15.9 g/dL Final   11/12/2024 8.2 (L) 12.0 - 17.0 g/dL Final   11/12/2024 6.5 (C) 12.0 - 17.0 g/dL Final      HCT Hematocrit   Date Value Ref Range Status   11/15/2024 28.7 (L) 34.0 - 46.6 % Final   11/14/2024 27.6 (L) 34.0 - 46.6 % Final   11/14/2024 26.6 (L) 34.0 - 46.6 % Final   11/13/2024 26.2 (L) 34.0 - 46.6 % Final   11/12/2024 30.0 (L) 34.0 - 46.6 % Final   11/12/2024 24 (L) 38 - 51 % Final   11/12/2024 19 (L) 38 - 51 % Final      Platlets No results found for: \"LABPLAT\"     PT/INR:  No results found for: \"PROTIME\"/No results found for: \"INR\"    Sodium Sodium   Date Value Ref Range Status   11/15/2024 140 136 - 145 mmol/L Final   11/14/2024 138 136 - 145 mmol/L Final   11/13/2024 141 136 - 145 mmol/L Final   11/12/2024 141 136 - 145 mmol/L Final   11/12/2024 144 136 - 145 mmol/L Final   11/12/2024 143 136 - 145 mmol/L Final      Potassium Potassium   Date Value Ref Range Status   11/15/2024 4.1 3.5 - 5.2 mmol/L Final   11/14/2024 3.7 3.5 - 5.2 mmol/L Final   11/13/2024 3.8 3.5 - 5.2 mmol/L Final     Comment:     Slight hemolysis detected by analyzer. Result may be falsely elevated.   11/12/2024 3.7 3.5 - " "5.2 mmol/L Final   11/12/2024 3.7 3.5 - 5.2 mmol/L Final   11/12/2024 4.1 3.5 - 5.2 mmol/L Final      Chloride Chloride   Date Value Ref Range Status   11/15/2024 101 98 - 107 mmol/L Final   11/14/2024 100 98 - 107 mmol/L Final   11/13/2024 102 98 - 107 mmol/L Final   11/12/2024 102 98 - 107 mmol/L Final   11/12/2024 103 98 - 107 mmol/L Final   11/12/2024 105 98 - 107 mmol/L Final      Bicarbonate No results found for: \"PLASMABICARB\"   BUN BUN   Date Value Ref Range Status   11/15/2024 14 8 - 23 mg/dL Final   11/14/2024 13 8 - 23 mg/dL Final   11/13/2024 15 8 - 23 mg/dL Final   11/12/2024 16 8 - 23 mg/dL Final   11/12/2024 17 8 - 23 mg/dL Final   11/12/2024 16 8 - 23 mg/dL Final      Creatinine Creatinine   Date Value Ref Range Status   11/15/2024 0.46 (L) 0.57 - 1.00 mg/dL Final   11/14/2024 0.60 0.57 - 1.00 mg/dL Final   11/13/2024 0.63 0.57 - 1.00 mg/dL Final   11/12/2024 0.89 0.57 - 1.00 mg/dL Final   11/12/2024 0.99 0.57 - 1.00 mg/dL Final   11/12/2024 0.96 0.57 - 1.00 mg/dL Final      Calcium Calcium   Date Value Ref Range Status   11/15/2024 8.4 (L) 8.6 - 10.5 mg/dL Final   11/14/2024 8.1 (L) 8.6 - 10.5 mg/dL Final   11/13/2024 7.5 (L) 8.6 - 10.5 mg/dL Final   11/12/2024 7.9 (L) 8.6 - 10.5 mg/dL Final   11/12/2024 8.0 (L) 8.6 - 10.5 mg/dL Final   11/12/2024 8.2 (L) 8.6 - 10.5 mg/dL Final      Magnesium Magnesium   Date Value Ref Range Status   11/15/2024 2.4 1.6 - 2.4 mg/dL Final   11/13/2024 3.2 (H) 1.6 - 2.4 mg/dL Final   11/12/2024 2.9 (H) 1.6 - 2.4 mg/dL Final   11/12/2024 1.9 1.6 - 2.4 mg/dL Final            pH pH, Arterial   Date Value Ref Range Status   11/15/2024 7.357 7.350 - 7.450 pH units Final   11/13/2024 7.307 (L) 7.350 - 7.450 pH units Final     Comment:     84 Value below reference range   11/13/2024 7.329 (L) 7.350 - 7.450 pH units Final     Comment:     84 Value below reference range   11/12/2024 7.324 (L) 7.350 - 7.450 pH units Final     Comment:     84 Value below reference range "   11/12/2024 7.309 (L) 7.350 - 7.450 pH units Final     Comment:     84 Value below reference range   11/12/2024 7.289 (L) 7.350 - 7.450 pH units Final     Comment:     84 Value below reference range   11/12/2024 7.302 (L) 7.350 - 7.450 pH units Final     Comment:     84 Value below reference range   11/12/2024 7.249 (L) 7.350 - 7.450 pH units Final     Comment:     84 Value below reference range   11/12/2024 7.31 (L) 7.35 - 7.6 pH units Final   11/12/2024 7.26 (L) 7.35 - 7.6 pH units Final      pO2 pO2, Arterial   Date Value Ref Range Status   11/15/2024 60.0 (L) 83.0 - 108.0 mm Hg Final     Comment:     84 Value below reference range   11/13/2024 61.5 (L) 83.0 - 108.0 mm Hg Final     Comment:     84 Value below reference range   11/13/2024 66.4 (L) 83.0 - 108.0 mm Hg Final     Comment:     84 Value below reference range   11/12/2024 64.9 (L) 83.0 - 108.0 mm Hg Final     Comment:     84 Value below reference range   11/12/2024 166.0 (H) 83.0 - 108.0 mm Hg Final     Comment:     83 Value above reference range   11/12/2024 54.8 (L) 83.0 - 108.0 mm Hg Final     Comment:     84 Value below reference range   11/12/2024 57.0 (L) 83.0 - 108.0 mm Hg Final     Comment:     84 Value below reference range   11/12/2024 71.6 (L) 83.0 - 108.0 mm Hg Final     Comment:     84 Value below reference range      pCO2 pCO2, Arterial   Date Value Ref Range Status   11/15/2024 50.7 (H) 35.0 - 45.0 mm Hg Final     Comment:     83 Value above reference range   11/13/2024 65.2 (H) 35.0 - 45.0 mm Hg Final     Comment:     83 Value above reference range   11/13/2024 59.8 (H) 35.0 - 45.0 mm Hg Final     Comment:     83 Value above reference range   11/12/2024 60.3 (H) 35.0 - 45.0 mm Hg Final     Comment:     83 Value above reference range   11/12/2024 58.2 (H) 35.0 - 45.0 mm Hg Final     Comment:     83 Value above reference range   11/12/2024 56.2 (H) 35.0 - 45.0 mm Hg Final     Comment:     83 Value above reference range   11/12/2024 49.3  (H) 35.0 - 45.0 mm Hg Final     Comment:     83 Value above reference range   11/12/2024 47.5 (H) 35.0 - 45.0 mm Hg Final     Comment:     83 Value above reference range      HCO3 HCO3, Arterial   Date Value Ref Range Status   11/15/2024 28.4 (H) 20.0 - 26.0 mmol/L Final   11/13/2024 32.6 (H) 20.0 - 26.0 mmol/L Final   11/13/2024 31.4 (H) 20.0 - 26.0 mmol/L Final   11/12/2024 31.3 (H) 20.0 - 26.0 mmol/L Final   11/12/2024 29.2 (H) 20.0 - 26.0 mmol/L Final   11/12/2024 26.9 (H) 20.0 - 26.0 mmol/L Final   11/12/2024 24.3 20.0 - 26.0 mmol/L Final   11/12/2024 20.8 20.0 - 26.0 mmol/L Final        aspirin, 81 mg, Oral, Daily  docusate sodium, 100 mg, Oral, BID  hydrocortisone sodium succinate, 100 mg, Intravenous, Q8H  insulin glargine, 20 Units, Subcutaneous, Nightly  insulin lispro, 3-14 Units, Subcutaneous, 4x Daily AC & at Bedtime  levothyroxine, 200 mcg, Oral, Q AM  lidocaine, 5 mL, Infiltration, Once  linezolid, 600 mg, Oral, Q12H  mupirocin, 1 Application, Each Nare, BID  nystatin, 1 Application, Topical, BID  piperacillin-tazobactam, 3.375 g, Intravenous, Q8H  polyethylene glycol, 17 g, Oral, Daily  potassium chloride, 20 mEq, Oral, Once  potassium chloride, 10 mEq, Oral, BID  senna-docusate sodium, 2 tablet, Oral, Nightly  sodium bicarbonate, 50 mEq, Intravenous, Once  sodium chloride, 3 mL, Intravenous, Q12H      amiodarone, 0.5 mg/min, Last Rate: 0.5 mg/min (11/14/24 1928)  EPINEPHrine, 0.02-0.3 mcg/kg/min, Last Rate: Stopped (11/15/24 0147)  heparin, 20 Units/kg/hr, Last Rate: 20 Units/kg/hr (11/14/24 1131)  Pharmacy to Dose Heparin,   phenylephrine, 0.5-3 mcg/kg/min, Last Rate: 0.5 mcg/kg/min (11/14/24 0900)  vasopressin, 0.03 Units/min, Last Rate: Stopped (11/14/24 1400)        Medication Review: Reviewed    Assessment & Plan     Patient Active Problem List   Diagnosis Code    Essential hypertension I10    Acquired hypothyroidism E03.9    Lung cancer; S/P Bronch/Thor w/ ALBINA lobectomy/mediastinal lymph node  resec (5/12/17, Dr. 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  Continue IV amiodarone for now will likely change over to oral therapy tomorrow.    Jeffry Pickett MD  11/15/24  06:03 EST            Electronically signed by Jeffry Pickett MD at 11/15/24 0604       Omid León MD at 11/14/24 1055            Intensive Care Follow-up     Hospital:  LOS: 3 days   Ms. Jill Miller, 64 y.o. female is followed for:   Prosthetic aortic valve stenosis     Subjective   Subjective     Jill Miller is a 64 year-old female that presents to [facility] ICU postoperatively from scheduled TAVR today.      Patient has a PMH of HFpEF, valvular disease s/p prosthetic MVR/AVR, pulmonary HTN, lung cancer s/p ALBINA lobectomy (2017), multiple cardiac valve replacements, HTN, hypothyroidism, hyperlipidemia, HFpEF, T2DM, COPD, tobacco use, and PAF s/p ANGELA/Maze (2018), cardioversion and PVA (2022).   Patient recently admitted to [facility] in September with hypoxic respiratory failure 2* rhinovirus and exacerbation of heart failure. Etiology of  heart failure found to be degeneration of prosthetic valves with ECHO showing severe bioprosthetic AS and moderate to severe bioprosthetic MS along with mild concentric left ventricular wall hypertrophy, dilated left atrial cavity, and elevated RVSP at 58 mmHg. Cardiology/CTS consulted and patient underwent work-up for TAVR.      TIFFANIE showed LVEF 51-55%, moderate to severe AS, moderate MS, tricuspid ring noted, and bi-atrial cavity dilation. CTA TAVR negative for acute abnormality / noted emphysema.   Interval History:  Yesterday it was discovered on CT angiography that there are probable right-sided pulmonary emboli.  In addition to this, extensive right-sided infiltrates consistent with pneumonia were seen.  The patient has been on Zosyn and was given a dose of Zyvox this morning pending and MRSA probe.  The patient has remained on 65 to 75% FiO2 on BiPAP.  She also remains on epinephrine, vasopressin, and Harshil-Synephrine infusions at this time.  We have been able to decrease the somewhat.  Duplex ultrasound of the lower extremities is currently pending.  The patient is alert and interactive.  She states that she is very hungry.  She has not been coughing up any secretions.    The patient's past medical, surgical and social history were reviewed and updated in Epic as appropriate.     Objective   Objective     Infusions:  amiodarone, 0.5 mg/min, Last Rate: 0.5 mg/min (11/14/24 0850)  EPINEPHrine, 0.02-0.3 mcg/kg/min, Last Rate: 0.02 mcg/kg/min (11/14/24 0434)  heparin, 20 Units/kg/hr, Last Rate: 20 Units/kg/hr (11/14/24 0217)  Pharmacy to Dose Heparin,   phenylephrine, 0.5-3 mcg/kg/min, Last Rate: 0.75 mcg/kg/min (11/14/24 0850)  vasopressin, 0.03 Units/min, Last Rate: 0.02 Units/min (11/14/24 1030)      Medications:  aspirin, 81 mg, Oral, Daily  docusate sodium, 100 mg, Oral, BID  hydrocortisone sodium succinate, 100 mg, Intravenous, Q8H  insulin glargine, 20 Units, Subcutaneous, Nightly  insulin lispro, 3-14  "Units, Subcutaneous, 4x Daily AC & at Bedtime  levothyroxine, 200 mcg, Oral, Q AM  lidocaine, 5 mL, Infiltration, Once  linezolid, 600 mg, Oral, Q12H  mupirocin, 1 Application, Each Nare, BID  nystatin, 1 Application, Topical, BID  piperacillin-tazobactam, 3.375 g, Intravenous, Q8H  polyethylene glycol, 17 g, Oral, Daily  potassium chloride, 20 mEq, Oral, Once  potassium chloride, 10 mEq, Oral, BID  senna-docusate sodium, 2 tablet, Oral, Nightly  sodium bicarbonate, 50 mEq, Intravenous, Once  sodium chloride, 3 mL, Intravenous, Q12H        Vital Sign Min/Max for last 24 hours  Temp  Min: 98.8 °F (37.1 °C)  Max: 99.7 °F (37.6 °C)   BP  Min: 83/65  Max: 119/54   Pulse  Min: 58  Max: 147   Resp  Min: 20  Max: 35   SpO2  Min: 89 %  Max: 99 %   No data recorded       Input/Output for last 24 hour shift  11/13 0701 - 11/14 0700  In: 3404.5 [P.O.:920; I.V.:1884.5]  Out: 2225 [Urine:2225]      Objective:  General Appearance:  Uncomfortable, ill-appearing, in no acute distress and not in pain.    Vital signs: (most recent): Blood pressure 102/50, pulse 59, temperature 98.8 °F (37.1 °C), temperature source Bladder, resp. rate (!) 35, height 157.5 cm (62\"), weight 72.1 kg (159 lb), SpO2 95%.    HEENT: (Currently wearing BiPAP set at 75% FiO2)    Lungs:  Normal effort and normal respiratory rate.  There are rales and rhonchi.  No decreased breath sounds or wheezes.    Heart: Normal rate.  Regular rhythm.  S1 normal and S2 normal.    Abdomen: Abdomen is soft.  Bowel sounds are normal.   There is generalized tenderness.  There is no rebound tenderness.     Extremities: Normal range of motion.  (Edema and mild coolness to the left hand.  No cyanosis.)  Neurological: (Alert, oriented).    Pupils:  Pupils are equal, round, and reactive to light.    Skin:  Warm.                Results from last 7 days   Lab Units 11/14/24  0109 11/13/24  0320 11/12/24  0946   WBC 10*3/mm3 20.44* 18.27* 17.73*   HEMOGLOBIN g/dL 8.7* 9.1* 10.3* "   PLATELETS 10*3/mm3 85* 98* 140     Results from last 7 days   Lab Units 11/14/24  0109 11/13/24  0320 11/12/24  1659 11/12/24  1255 11/12/24  0946 11/12/24  0450   SODIUM mmol/L 138 141 141   < > 143 141   POTASSIUM mmol/L 3.7 3.8 3.7   < > 4.1 4.0   CO2 mmol/L 26.0 29.0 27.0   < > 22.0 16.0*   BUN mg/dL 13 15 16   < > 16 17   CREATININE mg/dL 0.60 0.63 0.89   < > 0.96 1.24*   MAGNESIUM mg/dL  --  3.2* 2.9*  --  1.9 2.0   PHOSPHORUS mg/dL  --  3.0  --   --   --  4.9*   GLUCOSE mg/dL 197* 226* 299*   < > 271* 364*    < > = values in this interval not displayed.     Estimated Creatinine Clearance: 90.4 mL/min (by C-G formula based on SCr of 0.6 mg/dL).    Results from last 7 days   Lab Units 11/13/24  0807   PH, ARTERIAL pH units 7.307*   PCO2, ARTERIAL mm Hg 65.2*   PO2 ART mm Hg 61.5*         I reviewed the patient's results and images.     Assessment & Plan   Impression        Prosthetic aortic valve stenosis s/p TAVR 11/11/24, Right groin exploration for hematoma 11/12/24    Paroxysmal atrial fibrillation    Type 2 diabetes mellitus    COPD (chronic obstructive pulmonary disease)    Acute on chronic respiratory failure with hypoxia and hypercapnia    Pulmonary infiltrate, likely right-sided pneumonia    Acute pulmonary embolism without acute cor pulmonale       Plan        We will assume that she has infectious pneumonia, possibly aspiration during the last several days.  We will continue on with Zosyn for now and continue Zyvox.  If MRSA probe is negative we will discontinue Zyvox.  We will try to get a culture if possible.  Continue with anticoagulation for now and we will follow-up on the duplex ultrasounds of the lower extremities.  Wean pressors as able.  I would prefer to try to get the vasopressin off completely and then titrate the other 2 pressors as able.  Wean BiPAP as able.  I am hopeful that we can get her to at least a high flow cannula so that we can begin to give her some nutrition.  I would be  reluctant to give her much while she is on noninvasive positive pressure ventilation as she has been unable to come off this for longer than about 10 minutes.  Bronchodilator therapy as necessary.  Continue with current glucose control.  Given possible relative adrenal insufficiency, continue with Solu-Cortef for now.      Plan of care and goals reviewed with mulitdisciplinary/antibiotic stewardship team during rounds.   I discussed the patient's findings and my recommendations with patient and nursing staff     High level of risk due to:  drug(s) requiring intensive monitoring for toxicity, parenteral controlled substances, and decision to de-escalate care.        Omid León MD, [facility]  Pulmonology and Critical Care Medicine       Electronically signed by Omid León MD at 11/14/24 6696       Ajay Cheatham MD at 11/14/24 3054            [facility] Progress Note      POD # 3 s/p TAVR, # 2 Right groin exploration    Subjective  No acute events overnight.  States she is feeling better this morning.  Remains on BiPAP.  Titrating down off pressors on Amiodarone gtt for A.fib.    Objective    Physical Exam:   Vital Signs   Temp:  [98.8 °F (37.1 °C)-99.7 °F (37.6 °C)] 98.8 °F (37.1 °C)  Heart Rate:  [] 59  Resp:  [20-31] 20  BP: ()/(42-75) 104/53   GEN: NAD   CV: Irregular rate with her rhythm no murmurs, rubs or gallops    RESP: Respirations even and unlabored with diminished bibasilar breath sounds   ABD: Soft with mild lower quadrant tenderness with hypoactive bowel    EXT: Warm with good color well-perfused with no bilateral lower extremity edema, left upper extremity edema has improved, no motor or sensory deficits appreciated    Incision: Right groin is soft with no hematoma or surrounding erythema.  Aquacel bandage in place     Results     Results from last 7 days   Lab Units 11/14/24  0109   WBC 10*3/mm3 20.44*   HEMOGLOBIN g/dL 8.7*   HEMATOCRIT % 26.6*   PLATELETS 10*3/mm3 85*     Results  from last 7 days   Lab Units 11/14/24  0109   SODIUM mmol/L 138   POTASSIUM mmol/L 3.7   CHLORIDE mmol/L 100   CO2 mmol/L 26.0   BUN mg/dL 13   CREATININE mg/dL 0.60   GLUCOSE mg/dL 197*   CALCIUM mg/dL 8.1*     Results from last 7 days   Lab Units 11/12/24  0450 11/11/24  1154   INR  1.25*  --    APTT seconds  --  34.5         Assessment & Plan   POD # 3 s/p TAVR, # 2 Right groin exploration      Prosthetic aortic valve stenosis s/p TAVR 11/11/24, Right groin exploration for hematoma 11/12/24    Paroxysmal atrial fibrillation    Type 2 diabetes mellitus    COPD (chronic obstructive pulmonary disease)    Acute on chronic respiratory failure with hypoxia and hypercapnia    Plan   Monitor H&H and leukocytosis   Currently performing left upper extremity and bilateral lower extremity venous duplex   RML/RLL pulmonary emboli, on heparin drip  Left hand improved with elevation and removing ulnar A-line  Up to chair  Wean O2 supplementation as tolerated  Wean IV pressors as tolerated    Ajay Cheatham MD  11/14/24  07:34 EST                    Electronically signed by Ajay Cheatham MD at 11/14/24 0834       Jeffry Pickett MD at 11/14/24 0722             LOS: 3 days   Patient Care Team:  Marisa Lynch PA as PCP - General (Family Medicine)  Ajay Cheatham MD as Surgeon (Cardiothoracic Surgery)  Marco Lay IV, MD as Cardiologist (Interventional Cardiology)  Nigel Huber MD as Consulting Physician (Cardiology)  Marisa Suarez APRN as Nurse Practitioner (Interventional Cardiology)    Chief Complaint: Dyspnea    Subjective     Interval History:     Sinus/junctional rhythm on monitor.  No chest pain.  Shortness of breath improved.  Left hand swelling improved also.  Vasopressor was able to be decreased overnight.    Review of Systems:    12 point review of systems reviewed pertinent for those mentioned HPI    Objective     Vital Signs  Temp:  [98.8 °F (37.1 °C)-99.7 °F (37.6 °C)] 98.8 °F  (37.1 °C)  Heart Rate:  [] 59  Resp:  [20-31] 20  BP: ()/(42-75) 104/53  Body mass index is 29.08 kg/m².    Intake/Output Summary (Last 24 hours) at 11/14/2024 0722  Last data filed at 11/14/2024 0600  Gross per 24 hour   Intake 3404.46 ml   Output 2225 ml   Net 1179.46 ml     No intake/output data recorded.    Physical Exam:     General Appearance:  Alert, cooperative, in no acute distress   Head:  Normocephalic, without obvious abnormality, atraumatic   Eyes:  Lids and lashes normal, conjunctivae and sclerae normal, no icterus, no pallor, corneas clear, PERRLA   Ears:  Ears appear intact with no abnormalities noted   Throat:  No oral lesions, no thrush, oral mucosa moist   Neck:  No adenopathy, supple, trachea midline, no thyromegaly, no carotid bruit, no JVD   Back:  No kyphosis present, no scoliosis present, no skin lesions, erythema or scars, no tenderness to percussion, or palpation, range of motion normal   Lungs:  Clear to auscultation,respirations regular, even and unlabored    Heart:  Regular rhythm and normal rate, normal S1 and S2, no murmur, no gallop, no rub, no click   Breast Exam:  Deferred   Abdomen:  Normal bowel sounds, no masses, no organomegaly, soft non-tender, non-distended, no guarding, no rebound tenderness   Genitalia:  Deferred   Extremities:  Moves all extremities well, no edema, no cyanosis, no redness   Pulses:  Pulses palpable and equal bilaterally   Skin:  No bleeding, bruising or rash   Lymph nodes:  No palpable adenopathy   Neurologic:  Cranial nerves 2 - 12 grossly intact, sensation intact, DTR present and equal bilaterally        Results Review:     I reviewed the patient's new clinical results.  I reviewed the patient's new imaging results and agree with the interpretation.  I reviewed the patient's other test results and agree with the interpretation  I personally viewed and interpreted the patient's EKG/Telemetry data      WBC WBC   Date Value Ref Range Status  "  11/14/2024 20.44 (H) 3.40 - 10.80 10*3/mm3 Final   11/13/2024 18.27 (H) 3.40 - 10.80 10*3/mm3 Final   11/12/2024 17.73 (H) 3.40 - 10.80 10*3/mm3 Final   11/12/2024 17.93 (H) 3.40 - 10.80 10*3/mm3 Final   11/11/2024 17.47 (H) 3.40 - 10.80 10*3/mm3 Final   11/11/2024 21.71 (H) 3.40 - 10.80 10*3/mm3 Final   11/11/2024 17.98 (H) 3.40 - 10.80 10*3/mm3 Final   11/11/2024 4.47 3.40 - 10.80 10*3/mm3 Final      HGB Hemoglobin   Date Value Ref Range Status   11/14/2024 8.7 (L) 12.0 - 15.9 g/dL Final   11/13/2024 9.1 (L) 12.0 - 15.9 g/dL Final   11/12/2024 10.3 (L) 12.0 - 15.9 g/dL Final   11/12/2024 8.2 (L) 12.0 - 17.0 g/dL Final   11/12/2024 6.5 (C) 12.0 - 17.0 g/dL Final   11/12/2024 9.6 (L) 12.0 - 15.9 g/dL Final     Comment:     Result checked   11/11/2024 11.7 (L) 12.0 - 15.9 g/dL Final   11/11/2024 11.6 (L) 12.0 - 15.9 g/dL Final   11/11/2024 13.1 12.0 - 15.9 g/dL Final   11/11/2024 14.3 12.0 - 15.9 g/dL Final      HCT Hematocrit   Date Value Ref Range Status   11/14/2024 26.6 (L) 34.0 - 46.6 % Final   11/13/2024 26.2 (L) 34.0 - 46.6 % Final   11/12/2024 30.0 (L) 34.0 - 46.6 % Final   11/12/2024 24 (L) 38 - 51 % Final   11/12/2024 19 (L) 38 - 51 % Final   11/12/2024 29.3 (L) 34.0 - 46.6 % Final   11/11/2024 34.9 34.0 - 46.6 % Final   11/11/2024 33.6 (L) 34.0 - 46.6 % Final   11/11/2024 36.9 34.0 - 46.6 % Final   11/11/2024 41.8 34.0 - 46.6 % Final      Platlets No results found for: \"LABPLAT\"     PT/INR:    Protime   Date Value Ref Range Status   11/12/2024 15.8 (H) 12.2 - 14.5 Seconds Final   /  INR   Date Value Ref Range Status   11/12/2024 1.25 (H) 0.89 - 1.12 Final       Sodium Sodium   Date Value Ref Range Status   11/14/2024 138 136 - 145 mmol/L Final   11/13/2024 141 136 - 145 mmol/L Final   11/12/2024 141 136 - 145 mmol/L Final   11/12/2024 144 136 - 145 mmol/L Final   11/12/2024 143 136 - 145 mmol/L Final   11/12/2024 141 136 - 145 mmol/L Final   11/11/2024 139 136 - 145 mmol/L Final   11/11/2024 141 136 - " "145 mmol/L Final      Potassium Potassium   Date Value Ref Range Status   11/14/2024 3.7 3.5 - 5.2 mmol/L Final   11/13/2024 3.8 3.5 - 5.2 mmol/L Final     Comment:     Slight hemolysis detected by analyzer. Result may be falsely elevated.   11/12/2024 3.7 3.5 - 5.2 mmol/L Final   11/12/2024 3.7 3.5 - 5.2 mmol/L Final   11/12/2024 4.1 3.5 - 5.2 mmol/L Final   11/12/2024 4.0 3.5 - 5.2 mmol/L Final   11/11/2024 4.4 3.5 - 5.2 mmol/L Final   11/11/2024 3.9 3.5 - 5.2 mmol/L Final     Comment:     Slight hemolysis detected by analyzer. Result may be falsely elevated.      Chloride Chloride   Date Value Ref Range Status   11/14/2024 100 98 - 107 mmol/L Final   11/13/2024 102 98 - 107 mmol/L Final   11/12/2024 102 98 - 107 mmol/L Final   11/12/2024 103 98 - 107 mmol/L Final   11/12/2024 105 98 - 107 mmol/L Final   11/12/2024 101 98 - 107 mmol/L Final   11/11/2024 103 98 - 107 mmol/L Final   11/11/2024 106 98 - 107 mmol/L Final      Bicarbonate No results found for: \"PLASMABICARB\"   BUN BUN   Date Value Ref Range Status   11/14/2024 13 8 - 23 mg/dL Final   11/13/2024 15 8 - 23 mg/dL Final   11/12/2024 16 8 - 23 mg/dL Final   11/12/2024 17 8 - 23 mg/dL Final   11/12/2024 16 8 - 23 mg/dL Final   11/12/2024 17 8 - 23 mg/dL Final   11/11/2024 18 8 - 23 mg/dL Final   11/11/2024 10 8 - 23 mg/dL Final      Creatinine Creatinine   Date Value Ref Range Status   11/14/2024 0.60 0.57 - 1.00 mg/dL Final   11/13/2024 0.63 0.57 - 1.00 mg/dL Final   11/12/2024 0.89 0.57 - 1.00 mg/dL Final   11/12/2024 0.99 0.57 - 1.00 mg/dL Final   11/12/2024 0.96 0.57 - 1.00 mg/dL Final   11/12/2024 1.24 (H) 0.57 - 1.00 mg/dL Final   11/11/2024 1.11 (H) 0.57 - 1.00 mg/dL Final   11/11/2024 0.57 0.57 - 1.00 mg/dL Final      Calcium Calcium   Date Value Ref Range Status   11/14/2024 8.1 (L) 8.6 - 10.5 mg/dL Final   11/13/2024 7.5 (L) 8.6 - 10.5 mg/dL Final   11/12/2024 7.9 (L) 8.6 - 10.5 mg/dL Final   11/12/2024 8.0 (L) 8.6 - 10.5 mg/dL Final   11/12/2024 " 8.2 (L) 8.6 - 10.5 mg/dL Final   11/12/2024 7.8 (L) 8.6 - 10.5 mg/dL Final   11/11/2024 8.1 (L) 8.6 - 10.5 mg/dL Final   11/11/2024 8.7 8.6 - 10.5 mg/dL Final      Magnesium Magnesium   Date Value Ref Range Status   11/13/2024 3.2 (H) 1.6 - 2.4 mg/dL Final   11/12/2024 2.9 (H) 1.6 - 2.4 mg/dL Final   11/12/2024 1.9 1.6 - 2.4 mg/dL Final   11/12/2024 2.0 1.6 - 2.4 mg/dL Final            pH pH, Arterial   Date Value Ref Range Status   11/13/2024 7.307 (L) 7.350 - 7.450 pH units Final     Comment:     84 Value below reference range   11/13/2024 7.329 (L) 7.350 - 7.450 pH units Final     Comment:     84 Value below reference range   11/12/2024 7.324 (L) 7.350 - 7.450 pH units Final     Comment:     84 Value below reference range   11/12/2024 7.309 (L) 7.350 - 7.450 pH units Final     Comment:     84 Value below reference range   11/12/2024 7.289 (L) 7.350 - 7.450 pH units Final     Comment:     84 Value below reference range   11/12/2024 7.302 (L) 7.350 - 7.450 pH units Final     Comment:     84 Value below reference range   11/12/2024 7.249 (L) 7.350 - 7.450 pH units Final     Comment:     84 Value below reference range   11/12/2024 7.31 (L) 7.35 - 7.6 pH units Final   11/12/2024 7.26 (L) 7.35 - 7.6 pH units Final   11/12/2024 7.188 (C) 7.350 - 7.450 pH units Final     Comment:     85 Value below critical limit   11/11/2024 7.179 (C) 7.350 - 7.450 pH units Final     Comment:     85 Value below critical limit   11/11/2024 7.199 (C) 7.350 - 7.450 pH units Final     Comment:     85 Value below critical limit      pO2 pO2, Arterial   Date Value Ref Range Status   11/13/2024 61.5 (L) 83.0 - 108.0 mm Hg Final     Comment:     84 Value below reference range   11/13/2024 66.4 (L) 83.0 - 108.0 mm Hg Final     Comment:     84 Value below reference range   11/12/2024 64.9 (L) 83.0 - 108.0 mm Hg Final     Comment:     84 Value below reference range   11/12/2024 166.0 (H) 83.0 - 108.0 mm Hg Final     Comment:     83 Value above  reference range   11/12/2024 54.8 (L) 83.0 - 108.0 mm Hg Final     Comment:     84 Value below reference range   11/12/2024 57.0 (L) 83.0 - 108.0 mm Hg Final     Comment:     84 Value below reference range   11/12/2024 71.6 (L) 83.0 - 108.0 mm Hg Final     Comment:     84 Value below reference range   11/12/2024 96.6 83.0 - 108.0 mm Hg Final   11/11/2024 121.0 (H) 83.0 - 108.0 mm Hg Final   11/11/2024 75.3 (L) 83.0 - 108.0 mm Hg Final     Comment:     84 Value below reference range      pCO2 pCO2, Arterial   Date Value Ref Range Status   11/13/2024 65.2 (H) 35.0 - 45.0 mm Hg Final     Comment:     83 Value above reference range   11/13/2024 59.8 (H) 35.0 - 45.0 mm Hg Final     Comment:     83 Value above reference range   11/12/2024 60.3 (H) 35.0 - 45.0 mm Hg Final     Comment:     83 Value above reference range   11/12/2024 58.2 (H) 35.0 - 45.0 mm Hg Final     Comment:     83 Value above reference range   11/12/2024 56.2 (H) 35.0 - 45.0 mm Hg Final     Comment:     83 Value above reference range   11/12/2024 49.3 (H) 35.0 - 45.0 mm Hg Final     Comment:     83 Value above reference range   11/12/2024 47.5 (H) 35.0 - 45.0 mm Hg Final     Comment:     83 Value above reference range   11/12/2024 46.7 (H) 35.0 - 45.0 mm Hg Final     Comment:     83 Value above reference range   11/11/2024 43.2 35.0 - 45.0 mm Hg Final   11/11/2024 51.3 (H) 35.0 - 45.0 mm Hg Final     Comment:     83 Value above reference range      HCO3 HCO3, Arterial   Date Value Ref Range Status   11/13/2024 32.6 (H) 20.0 - 26.0 mmol/L Final   11/13/2024 31.4 (H) 20.0 - 26.0 mmol/L Final   11/12/2024 31.3 (H) 20.0 - 26.0 mmol/L Final   11/12/2024 29.2 (H) 20.0 - 26.0 mmol/L Final   11/12/2024 26.9 (H) 20.0 - 26.0 mmol/L Final   11/12/2024 24.3 20.0 - 26.0 mmol/L Final   11/12/2024 20.8 20.0 - 26.0 mmol/L Final   11/12/2024 17.7 (L) 20.0 - 26.0 mmol/L Final   11/11/2024 16.1 (L) 20.0 - 26.0 mmol/L Final   11/11/2024 20.0 20.0 - 26.0 mmol/L Final         aspirin, 81 mg, Oral, Daily  docusate sodium, 100 mg, Oral, BID  insulin glargine, 20 Units, Subcutaneous, Nightly  insulin lispro, 3-14 Units, Subcutaneous, 4x Daily AC & at Bedtime  levothyroxine, 200 mcg, Oral, Q AM  lidocaine, 5 mL, Infiltration, Once  mupirocin, 1 Application, Each Nare, BID  nystatin, 1 Application, Topical, BID  piperacillin-tazobactam, 3.375 g, Intravenous, Q8H  polyethylene glycol, 17 g, Oral, Daily  potassium chloride, 20 mEq, Oral, Once  potassium chloride, 10 mEq, Oral, BID  senna-docusate sodium, 2 tablet, Oral, Nightly  sodium bicarbonate, 50 mEq, Intravenous, Once  sodium chloride, 3 mL, Intravenous, Q12H      amiodarone, 0.5 mg/min, Last Rate: 0.5 mg/min (11/14/24 0628)  EPINEPHrine, 0.02-0.3 mcg/kg/min, Last Rate: 0.02 mcg/kg/min (11/14/24 0434)  heparin, 20 Units/kg/hr, Last Rate: 20 Units/kg/hr (11/14/24 0217)  Pharmacy to Dose Heparin,   phenylephrine, 0.5-3 mcg/kg/min, Last Rate: 0.75 mcg/kg/min (11/14/24 0600)  vasopressin, 0.03 Units/min, Last Rate: 0.03 Units/min (11/14/24 0219)        Medication Review: Reviewed     Assessment & Plan     Patient Active Problem List   Diagnosis Code    Essential hypertension I10    Acquired hypothyroidism E03.9    Lung cancer; S/P Bronch/Thor w/ ALBINA lobectomy/mediastinal lymph node resec (5/12/17, Dr. 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  Would leave on phenylephrine as primary pressor.  Continue supportive care overall.  Patient improving slowly.    Jeffry Pickett MD  11/14/24  07:22 EST            Electronically signed by Jeffry Pickett MD at 11/14/24 0723       Jeffry Pickett MD at 11/13/24 2012          CTA personally reviewed.  Agree with small segmental pulmonary  .  Likely right upper lobe and middle lobe pneumonia.  Discussed with pulmonary about starting anticoagulation if okay with CT surgery given bleeding.  Likely  explains patient's hypoxia at this point 38 minutes total critical care time spent evaluation and treatment of this patient throughout today  Electronically signed by Jeffry Pickett MD at 11/13/24 2013       Consult Notes (last 48 hours)  Notes from 11/13/24 1718 through 11/15/24 1718   No notes of this type exist for this encounter.

## 2024-11-15 NOTE — MBS/VFSS/FEES
Acute Care - Speech Language Pathology   Swallow Initial Evaluation  [facility]  Clinical Swallow Evaluation  +Fiberoptic Endoscopic Evaluation of Swallowing (FEES)       Patient Name: Jill Miller  : 1960  MRN: [id number]  Today's Date: 11/15/2024               Admit Date: 2024    Visit Dx:     ICD-10-CM ICD-9-CM   1. Aortic valve disorder  I35.9 424.1   2. Stenosis of prosthetic aortic valve, initial encounter  T82.857A 996.71     Patient Active Problem List   Diagnosis    Essential hypertension    Acquired hypothyroidism    Lung cancer; S/P Bronch/Thor w/ ALBINA lobectomy/mediastinal lymph node resec (17, Dr. Cheatham)    Paroxysmal atrial fibrillation    Hyperlipidemia LDL goal <70    Prosthetic aortic valve stenosis s/p TAVR 24, Right groin exploration for hematoma 24    Type 2 diabetes mellitus    COPD (chronic obstructive pulmonary disease)    Obesity (BMI 30-39.9)    Tobacco user    Pulmonary hypertension    Stenosis of prosthetic mitral valve    Acute on chronic respiratory failure with hypoxia and hypercapnia    Pulmonary infiltrate, likely right-sided pneumonia    Acute pulmonary embolism without acute cor pulmonale     Past Medical History:   Diagnosis Date    Acute pulmonary embolism without acute cor pulmonale 2024    Atrial fibrillation and flutter     Chronic diastolic congestive heart failure     Chronically Abnormal CXR (Left pleural disease post op) 2017    COPD exacerbation 2022    Elevated cholesterol     Essential hypertension 04/10/2017    Target blood pressure <130/80 mmHg    Graves disease     Hyperlipidemia LDL goal <70     Hypertension     Hyperthyroidism     Lung cancer     MRSA (methicillin resistant staph aureus) culture positive     under left breast, incision and debridement, treated with wound packing and po abx     Prolonged QTC interval on ECG 2022    Rheumatic mitral stenosis     Type 2 diabetes mellitus 2018     Valvular heart disease      Past Surgical History:   Procedure Laterality Date    ABLATION OF DYSRHYTHMIC FOCUS  07/16/2018    ABSCESS DRAINAGE      under left breast d/t mrsa     AORTIC VALVE REPAIR/REPLACEMENT N/A 07/16/2018    Procedure: MEDIAN STERNOTOMY, AORTIC VALVE REPLACEMENT WITH TIFFANIE AND MAZE, TRICUSPID RING, LEFT ATRIAL OCCLUSION;  Surgeon: Ajay Cheatham MD;  Location:  [facility];  Service: Cardiothoracic    AORTIC VALVE REPAIR/REPLACEMENT N/A 11/11/2024    Procedure: TRANSCATHETER AORTIC VALVE REPLACEMENT;  Surgeon: Ajay Cheatham MD;  Location:  [facility];  Service: Cardiothoracic;  Laterality: N/A;  FL- 6 min 36 sec  DOSE-  110 MGY  CONTRAST- 40 mL    AORTIC VALVE REPAIR/REPLACEMENT N/A 11/11/2024    Procedure: Transfemoral Transcatheter Aortic Valve Replacement;  Surgeon: Jeaneth Carreon MD;  Location:  [facility];  Service: Cardiovascular;  Laterality: N/A;    BRONCHOSCOPY Left 05/12/2017    Procedure: BRONCHOSCOPY;  Surgeon: Ajay Cheatham MD;  Location:  [facility];  Service:     BRONCHOSCOPY N/A 05/18/2017    Procedure: BRONCHOSCOPY BIOPSY AT BEDSIDE;  Surgeon: Ajay Cheatham MD;  Location:  [facility];  Service:     CARDIAC CATHETERIZATION N/A 09/28/2017    Procedure: Left Heart Cath;  Surgeon: Marco Lay MD;  Location:  [facility];  Service:     CARDIAC CATHETERIZATION N/A 09/28/2017    Procedure: Right Heart Cath;  Surgeon: Marco Lay MD;  Location:  [facility];  Service:     CARDIAC CATHETERIZATION N/A 10/11/2024    Procedure: Right and Left Heart Cath;  Surgeon: Jeffry Pickett MD;  Location:  [facility];  Service: Cardiology;  Laterality: N/A;    CARDIAC ELECTROPHYSIOLOGY PROCEDURE N/A 6/1/2022    Procedure: Ablation atrial fibrillation (atypical A flutter). Hold Rythmol x5 days;  Surgeon: Nigel Huber MD;  Location:  [facility];  Service: Cardiovascular;  Laterality:  N/A;    FEMORAL THROMBECTOMY/EMBOLECTOMY Right 11/12/2024    Procedure: GROIN EXPLORATION, LIGATION OF INFERIOR EPIGASTRIC ARTERY RIGHT;  Surgeon: Ajay Cheatham MD;  Location:  [facility];  Service: Cardiothoracic;  Laterality: Right;  FL: 6 SECONDS  DOSE: 191MGY  CONTRAST: 30ML VISIPAQUE    LUNG BIOPSY  04/2017    LYMPH NODE DISSECTION Left 05/12/2017    Procedure: MEDIASTINAL LYMPH NODE DISSECTION;  Surgeon: Ajay Cheatham MD;  Location:  [facility];  Service:     MAZE PROCEDURE      MITRAL VALVE REPAIR/REPLACEMENT N/A 07/16/2018    Procedure: MITRAL VALVE REPLACEMENT;  Surgeon: Ajay Cheatham MD;  Location:  [facility];  Service: Cardiothoracic    MITRAL VALVE REPLACEMENT  07/16/2018    TEETH EXTRACTION      THORACOSCOPY VIDEO ASSISTED WITH LOBECTOMY Left 05/12/2017    Procedure: THORACOSCOPY VIDEO ASSISTED WITH OPEN LOBECTOMY;  Surgeon: Ajay Cheatham MD;  Location:  [facility];  Service:     TOTAL THYROIDECTOMY  approx 2012    TRANSESOPHAGEAL ECHOCARDIOGRAM (TIFFANIE) N/A 11/11/2024    Procedure: TRANSESOPHAGEAL ECHOCARDIOGRAM WITH ANESTHESIA;  Surgeon: Ajay Cheatham MD;  Location:  [facility];  Service: Cardiothoracic;  Laterality: N/A;    TRICUSPID VALVE SURGERY  07/16/2018       SLP Recommendation and Plan  SLP Swallowing Diagnosis: mild, oral dysphagia, pharyngeal dysphagia (11/15/24 1230)  SLP Diet Recommendation: regular textures, thin liquids (11/15/24 1230)  Recommended Precautions and Strategies: upright posture during/after eating, small bites of food and sips of liquid, general aspiration precautions, fatigue precautions (11/15/24 1230)  SLP Rec. for Method of Medication Administration: meds whole, with thin liquids, with puree, as tolerated (11/15/24 1230)     Monitor for Signs of Aspiration: yes, notify SLP if any concerns (11/15/24 1230)  Recommended Diagnostics: other (see comments) (diet tolerance) (11/15/24 1230)  Swallow Criteria for Skilled Therapeutic Interventions Met: demonstrates  skilled criteria (11/15/24 1230)  Anticipated Discharge Disposition (SLP): inpatient rehabilitation facility (11/15/24 1230)  Rehab Potential/Prognosis, Swallowing: adequate, monitor progress closely (11/15/24 1230)  Therapy Frequency (Swallow): PRN, 3 days per week (11/15/24 1230)  Predicted Duration Therapy Intervention (Days): 1 week (11/15/24 1230)  Oral Care Recommendations: Oral Care BID/PRN, Toothbrush (11/15/24 1230)                                        Progress:  (initial eval)      SWALLOW EVALUATION (Last 72 Hours)       SLP Adult Swallow Evaluation       Row Name 11/15/24 1230       Rehab Evaluation    Document Type evaluation  -CJ    Subjective Information no complaints  -CJ    Patient Observations alert;cooperative  -CJ    Patient/Family/Caregiver Comments/Observations son present  -CJ    Patient Effort good  -CJ    Comment CSE completed @ 1230, FEES @ 1310  -CJ    Symptoms Noted During/After Treatment none  -CJ       General Information    Patient Profile Reviewed yes  -CJ    Pertinent History Of Current Problem Pt adm after TAVR and R groin exploration for hematoma; sig h/o afib, DM2, COPD, resp failure, HLD, Lung cancer. Now has pulmonary infiltrate w/ likely PNA  -CJ    Current Method of Nutrition NPO  -CJ    Precautions/Limitations, Vision WFL;for purposes of eval  -CJ    Precautions/Limitations, Hearing WFL;for purposes of eval  -CJ    Prior Level of Function-Communication unknown  -CJ    Prior Level of Function-Swallowing no diet consistency restrictions  -CJ    Plans/Goals Discussed with patient;family  -CJ    Barriers to Rehab medically complex  -CJ    Patient's Goals for Discharge return to PO diet  -CJ    Family Goals for Discharge patient able to return to PO diet  -CJ       Pain Scale: FACES Pre/Post-Treatment    Pain: FACES Scale, Pretreatment 0-->no hurt  -CJ    Posttreatment Pain Rating 0-->no hurt  -CJ       Oral Motor Structure and Function    Dentition Assessment missing teeth  -CJ     Secretion Management WNL/WFL  -CJ    Mucosal Quality moist, healthy  -CJ       Oral Musculature and Cranial Nerve Assessment    Oral Motor General Assessment generalized oral motor weakness  -CJ       General Eating/Swallowing Observations    Respiratory Support Currently in Use high-flow nasal cannula  -CJ    Eating/Swallowing Skills fed by SLP;self-fed  -CJ    Positioning During Eating upright in chair  -CJ    Utensils Used spoon;cup;straw  -CJ    Consistencies Trialed pureed;ice chips;thin liquids  -CJ       Clinical Swallow Eval    Oral Prep Phase impaired  -CJ    Oral Transit impaired  -CJ    Oral Residue impaired  -CJ    Pharyngeal Phase suspected pharyngeal impairment  -CJ       Oral Prep Concerns    Oral Prep Concerns increased prep time  -CJ       Oral Transit Concerns    Oral Transit Concerns delayed initiation of bolus transit  -CJ    Delayed Intiation of Bolus Transit all consistencies  -CJ       Pharyngeal Phase Concerns    Pharyngeal Phase Concerns cough  -CJ    Cough other (see comments)  intermittently w/ all  -CJ       Fiberoptic Endoscopic Evaluation of Swallowing (FEES)    Risks/Benefits Reviewed risks/benefits explained;family;patient;agreed to eval  -CJ    Nasal Entry right:  -CJ    Special Considerations HFNC  -CJ       Anatomy and Physiology    Anatomic Considerations erythema;edema  -CJ    Velopharyngeal WFL  -CJ    Base of Tongue symmetrical  -CJ    Epiglottis WFL  -CJ    Laryngeal Function Breathing symmetrical  -CJ    Laryngeal Function Phonation symmetrical  -CJ    Laryngeal Function to Breath Hold TVF contact  -CJ    Secretion Rating Scale (Ernie et al. 1996) 1- secretions present around the laryngeal vestibule  -CJ    Secretion Description thin;clear  -CJ    Ice Chips elicited swallow  -CJ    Spontaneous Swallow frequency reduced  -CJ    Sensory sensed scope  -CJ    Utensils Used Spoon;Cup;Straw  -CJ    Consistencies Trialed thin liquids;regular  textures;spoon;cup;straw;pudding/puree  -       FEES Interpretation    Oral Phase increased A-P transit time;with solids only  -       Initiation of Pharyngeal Swallow    Initiation of Pharyngeal Swallow bolus in pyriform sinuses  -    Pharyngeal Phase impaired pharyngeal phase of swallowing  -    Rosenbek's Scale thin:;pudding/puree:;regular textures:;1-->Level 1  -    Residue thin liquids;diffuse within pharynx;secondary to reduced posterior pharyngeal wall stripping  -CJ    Response to Residue cleared residue;with spontaneous subsequent swallow  -CJ    Attempted Compensatory Maneuvers bolus size;bolus presentation style  -CJ    Response to Attempted Compensatory Maneuvers reduced residue  -CJ    Successful Compensatory Maneuver Competency patient able to;demonstrate compensations  -CJ    Pharyngeal Phase, Comment No penetration or aspiration evidenced. Diffuse residue w/ thins that pt is usually able to sense and clear. Pt noted to cough throughout study however no aspiration observed. Will f/u for diet tolerance to ensure no further difficulties.  -       SLP Evaluation Clinical Impression    SLP Swallowing Diagnosis mild;oral dysphagia;pharyngeal dysphagia  -    Functional Impact risk of aspiration/pneumonia  -    Rehab Potential/Prognosis, Swallowing adequate, monitor progress closely  -    Swallow Criteria for Skilled Therapeutic Interventions Met demonstrates skilled criteria  -       Recommendations    Therapy Frequency (Swallow) PRN;3 days per week  -    Predicted Duration Therapy Intervention (Days) 1 week  -    SLP Diet Recommendation regular textures;thin liquids  -    Recommended Diagnostics other (see comments)  diet tolerance  -    Recommended Precautions and Strategies upright posture during/after eating;small bites of food and sips of liquid;general aspiration precautions;fatigue precautions  -    Oral Care Recommendations Oral Care BID/PRN;Toothbrush  -    SLP Rec.  for Method of Medication Administration meds whole;with thin liquids;with puree;as tolerated  -CJ    Monitor for Signs of Aspiration yes;notify SLP if any concerns  -CJ    Anticipated Discharge Disposition (SLP) inpatient rehabilitation facility  -CJ              User Key  (r) = Recorded By, (t) = Taken By, (c) = Cosigned By      Initials Name Effective Dates    Ruth Gordon MS CCC-SLP 10/22/24 -                     EDUCATION  The patient has been educated in the following areas:   Dysphagia (Swallowing Impairment) Oral Care/Hydration Modified Diet Instruction.        SLP GOALS       Row Name 11/15/24 1230             (LTG) Patient will demonstrate functional swallow for    Diet Texture (Demonstrate functional swallow) regular textures  -CJ      Liquid viscosity (Demonstrate functional swallow) thin liquids  -CJ      [place] (Demonstrate functional swallow) independently (over 90% accuracy)  -CJ      Time Frame (Demonstrate functional swallow) 1 week  -CJ      Progress/Outcomes (Demonstrate functional swallow) new goal  -CJ         (STG) Patient will tolerate trials of    Consistencies Trialed (Tolerate trials) regular textures;thin liquids  -CJ      Desired Outcome (Tolerate trials) without signs/symptoms of aspiration;without signs of distress;with adequate oral prep/transit/clearance  -CJ      [place] (Tolerate trials) with minimal cues (75-90% accuracy)  -CJ      Time Frame (Tolerate trials) 1 week  -CJ      Progress/Outcomes (Tolerate trials) new goal  -                User Key  (r) = Recorded By, (t) = Taken By, (c) = Cosigned By      Initials Name Provider Type    Ruth Gordon MS CCC-SLP Speech and Language Pathologist                         Time Calculation:    Time Calculation- SLP       Row Name 11/15/24 1533             Time Calculation- SLP    SLP Start Time 1230  -      SLP Received On 11/15/24  -         Untimed Charges    83048-LB Eval Oral Pharyng Swallow Minutes 23   -      10574-BI Fiberoptic Endo Eval Swallow Minutes 86  -         Total Minutes    Untimed Charges Total Minutes 109  -       Total Minutes 109  -CJ                User Key  (r) = Recorded By, (t) = Taken By, (c) = Cosigned By      Initials Name Provider Type    Ruth Gordon, MS CCC-SLP Speech and Language Pathologist                    Therapy Charges for Today       Code Description Service Date Service Provider Modifiers Qty    04231917782 HC ST FIBEROPTIC ENDO EVAL SWALL 6 11/15/2024 Ruth Quintanilla, MS CCC-SLP GN 1    [id number] HC ST EVAL ORAL PHARYNG SWALLOW 2 11/15/2024 Ruth Quintanilla, MS LOREDO-SLP GN 1                 Ruth Quintanilla MS CCC-HARLEY  11/15/2024

## 2024-11-15 NOTE — PLAN OF CARE
Goal Outcome Evaluation:  Plan of Care Reviewed With: patient, family        Progress:  (initial eval)       Anticipated Discharge Disposition (SLP): inpatient rehabilitation facility          SLP Swallowing Diagnosis: mild, oral dysphagia, pharyngeal dysphagia (11/15/24 3847)

## 2024-11-15 NOTE — PROGRESS NOTES
Pt. Referred for Phase II Cardiac Rehab. Staff discussed with patient and family the benefits of exercise, program protocol, and educational material provided. Teach back verified.  Patient refused cardiac rehab at this time. Patient states she will call to schedule if later interested in program. Pt given brochure for [facility] if they have any further questions about cardiac rehab.

## 2024-11-15 NOTE — PROGRESS NOTES
LOS: 4 days   Patient Care Team:  Marisa Lynch PA as PCP - General (Family Medicine)  Ajay Cheatham MD as Surgeon (Cardiothoracic Surgery)  Marco Lay IV, MD as Cardiologist (Interventional Cardiology)  Nigel Huber MD as Consulting Physician (Cardiology)  Marisa Suarez APRN as Nurse Practitioner (Interventional Cardiology)    Chief Complaint: Dyspnea    Subjective     Interval History:     Sinus on monitor.  No chest pain.  Breathing improved.  Still requiring high levels of oxygen however off BiPAP.  Patient in chair getting hair washed.  Patient has had episodes of atrial fibrillation overnight.  Off vasopressor therapy    Review of Systems:    Review of systems reviewed pertinent for those mentioned HPI    Objective     Vital Signs  Temp:  [98.3 °F (36.8 °C)-98.8 °F (37.1 °C)] 98.6 °F (37 °C)  Heart Rate:  [] 56  Resp:  [20-35] 20  BP: ()/(44-81) 104/49  Body mass index is 29.08 kg/m².    Intake/Output Summary (Last 24 hours) at 11/15/2024 0603  Last data filed at 11/15/2024 0400  Gross per 24 hour   Intake 1044.1 ml   Output 1540 ml   Net -495.9 ml     I/O this shift:  In: 340 [P.O.:240; IV Piggyback:100]  Out: 650 [Urine:650]    Physical Exam:     General Appearance:  Alert, cooperative, in no acute distress   Head:  Normocephalic, without obvious abnormality, atraumatic   Eyes:  Lids and lashes normal, conjunctivae and sclerae normal, no icterus, no pallor, corneas clear, PERRLA   Ears:  Ears appear intact with no abnormalities noted   Throat:  No oral lesions, no thrush, oral mucosa moist   Neck:  No adenopathy, supple, trachea midline, no thyromegaly, no carotid bruit, no JVD   Back:  No kyphosis present, no scoliosis present, no skin lesions, erythema or scars, no tenderness to percussion, or palpation, range of motion normal   Lungs:  Clear to auscultation,respirations regular, even and unlabored    Heart:  Regular rhythm and normal rate, normal S1 and S2, no  "murmur, no gallop, no rub, no click   Breast Exam:  Deferred   Abdomen:  Normal bowel sounds, no masses, no organomegaly, soft non-tender, non-distended, no guarding, no rebound tenderness   Genitalia:  Deferred   Extremities:  Moves all extremities well, no edema, no cyanosis, no redness   Pulses:  Pulses palpable and equal bilaterally   Skin:  No bleeding, bruising or rash   Lymph nodes:  No palpable adenopathy   Neurologic:  Cranial nerves 2 - 12 grossly intact, sensation intact, DTR present and equal bilaterally        Results Review:     I reviewed the patient's new clinical results.  I reviewed the patient's new imaging results and agree with the interpretation.  I reviewed the patient's other test results and agree with the interpretation  I personally viewed and interpreted the patient's EKG/Telemetry data      WBC WBC   Date Value Ref Range Status   11/15/2024 14.99 (H) 3.40 - 10.80 10*3/mm3 Final   11/14/2024 16.90 (H) 3.40 - 10.80 10*3/mm3 Final   11/14/2024 20.44 (H) 3.40 - 10.80 10*3/mm3 Final   11/13/2024 18.27 (H) 3.40 - 10.80 10*3/mm3 Final   11/12/2024 17.73 (H) 3.40 - 10.80 10*3/mm3 Final      HGB Hemoglobin   Date Value Ref Range Status   11/15/2024 9.1 (L) 12.0 - 15.9 g/dL Final   11/14/2024 8.8 (L) 12.0 - 15.9 g/dL Final   11/14/2024 8.7 (L) 12.0 - 15.9 g/dL Final   11/13/2024 9.1 (L) 12.0 - 15.9 g/dL Final   11/12/2024 10.3 (L) 12.0 - 15.9 g/dL Final   11/12/2024 8.2 (L) 12.0 - 17.0 g/dL Final   11/12/2024 6.5 (C) 12.0 - 17.0 g/dL Final      HCT Hematocrit   Date Value Ref Range Status   11/15/2024 28.7 (L) 34.0 - 46.6 % Final   11/14/2024 27.6 (L) 34.0 - 46.6 % Final   11/14/2024 26.6 (L) 34.0 - 46.6 % Final   11/13/2024 26.2 (L) 34.0 - 46.6 % Final   11/12/2024 30.0 (L) 34.0 - 46.6 % Final   11/12/2024 24 (L) 38 - 51 % Final   11/12/2024 19 (L) 38 - 51 % Final      Platlets No results found for: \"LABPLAT\"     PT/INR:  No results found for: \"PROTIME\"/No results found for: \"INR\"    Sodium " "Sodium   Date Value Ref Range Status   11/15/2024 140 136 - 145 mmol/L Final   11/14/2024 138 136 - 145 mmol/L Final   11/13/2024 141 136 - 145 mmol/L Final   11/12/2024 141 136 - 145 mmol/L Final   11/12/2024 144 136 - 145 mmol/L Final   11/12/2024 143 136 - 145 mmol/L Final      Potassium Potassium   Date Value Ref Range Status   11/15/2024 4.1 3.5 - 5.2 mmol/L Final   11/14/2024 3.7 3.5 - 5.2 mmol/L Final   11/13/2024 3.8 3.5 - 5.2 mmol/L Final     Comment:     Slight hemolysis detected by analyzer. Result may be falsely elevated.   11/12/2024 3.7 3.5 - 5.2 mmol/L Final   11/12/2024 3.7 3.5 - 5.2 mmol/L Final   11/12/2024 4.1 3.5 - 5.2 mmol/L Final      Chloride Chloride   Date Value Ref Range Status   11/15/2024 101 98 - 107 mmol/L Final   11/14/2024 100 98 - 107 mmol/L Final   11/13/2024 102 98 - 107 mmol/L Final   11/12/2024 102 98 - 107 mmol/L Final   11/12/2024 103 98 - 107 mmol/L Final   11/12/2024 105 98 - 107 mmol/L Final      Bicarbonate No results found for: \"PLASMABICARB\"   BUN BUN   Date Value Ref Range Status   11/15/2024 14 8 - 23 mg/dL Final   11/14/2024 13 8 - 23 mg/dL Final   11/13/2024 15 8 - 23 mg/dL Final   11/12/2024 16 8 - 23 mg/dL Final   11/12/2024 17 8 - 23 mg/dL Final   11/12/2024 16 8 - 23 mg/dL Final      Creatinine Creatinine   Date Value Ref Range Status   11/15/2024 0.46 (L) 0.57 - 1.00 mg/dL Final   11/14/2024 0.60 0.57 - 1.00 mg/dL Final   11/13/2024 0.63 0.57 - 1.00 mg/dL Final   11/12/2024 0.89 0.57 - 1.00 mg/dL Final   11/12/2024 0.99 0.57 - 1.00 mg/dL Final   11/12/2024 0.96 0.57 - 1.00 mg/dL Final      Calcium Calcium   Date Value Ref Range Status   11/15/2024 8.4 (L) 8.6 - 10.5 mg/dL Final   11/14/2024 8.1 (L) 8.6 - 10.5 mg/dL Final   11/13/2024 7.5 (L) 8.6 - 10.5 mg/dL Final   11/12/2024 7.9 (L) 8.6 - 10.5 mg/dL Final   11/12/2024 8.0 (L) 8.6 - 10.5 mg/dL Final   11/12/2024 8.2 (L) 8.6 - 10.5 mg/dL Final      Magnesium Magnesium   Date Value Ref Range Status   11/15/2024 " 2.4 1.6 - 2.4 mg/dL Final   11/13/2024 3.2 (H) 1.6 - 2.4 mg/dL Final   11/12/2024 2.9 (H) 1.6 - 2.4 mg/dL Final   11/12/2024 1.9 1.6 - 2.4 mg/dL Final            pH pH, Arterial   Date Value Ref Range Status   11/15/2024 7.357 7.350 - 7.450 pH units Final   11/13/2024 7.307 (L) 7.350 - 7.450 pH units Final     Comment:     84 Value below reference range   11/13/2024 7.329 (L) 7.350 - 7.450 pH units Final     Comment:     84 Value below reference range   11/12/2024 7.324 (L) 7.350 - 7.450 pH units Final     Comment:     84 Value below reference range   11/12/2024 7.309 (L) 7.350 - 7.450 pH units Final     Comment:     84 Value below reference range   11/12/2024 7.289 (L) 7.350 - 7.450 pH units Final     Comment:     84 Value below reference range   11/12/2024 7.302 (L) 7.350 - 7.450 pH units Final     Comment:     84 Value below reference range   11/12/2024 7.249 (L) 7.350 - 7.450 pH units Final     Comment:     84 Value below reference range   11/12/2024 7.31 (L) 7.35 - 7.6 pH units Final   11/12/2024 7.26 (L) 7.35 - 7.6 pH units Final      pO2 pO2, Arterial   Date Value Ref Range Status   11/15/2024 60.0 (L) 83.0 - 108.0 mm Hg Final     Comment:     84 Value below reference range   11/13/2024 61.5 (L) 83.0 - 108.0 mm Hg Final     Comment:     84 Value below reference range   11/13/2024 66.4 (L) 83.0 - 108.0 mm Hg Final     Comment:     84 Value below reference range   11/12/2024 64.9 (L) 83.0 - 108.0 mm Hg Final     Comment:     84 Value below reference range   11/12/2024 166.0 (H) 83.0 - 108.0 mm Hg Final     Comment:     83 Value above reference range   11/12/2024 54.8 (L) 83.0 - 108.0 mm Hg Final     Comment:     84 Value below reference range   11/12/2024 57.0 (L) 83.0 - 108.0 mm Hg Final     Comment:     84 Value below reference range   11/12/2024 71.6 (L) 83.0 - 108.0 mm Hg Final     Comment:     84 Value below reference range      pCO2 pCO2, Arterial   Date Value Ref Range Status   11/15/2024 50.7 (H) 35.0 -  45.0 mm Hg Final     Comment:     83 Value above reference range   11/13/2024 65.2 (H) 35.0 - 45.0 mm Hg Final     Comment:     83 Value above reference range   11/13/2024 59.8 (H) 35.0 - 45.0 mm Hg Final     Comment:     83 Value above reference range   11/12/2024 60.3 (H) 35.0 - 45.0 mm Hg Final     Comment:     83 Value above reference range   11/12/2024 58.2 (H) 35.0 - 45.0 mm Hg Final     Comment:     83 Value above reference range   11/12/2024 56.2 (H) 35.0 - 45.0 mm Hg Final     Comment:     83 Value above reference range   11/12/2024 49.3 (H) 35.0 - 45.0 mm Hg Final     Comment:     83 Value above reference range   11/12/2024 47.5 (H) 35.0 - 45.0 mm Hg Final     Comment:     83 Value above reference range      HCO3 HCO3, Arterial   Date Value Ref Range Status   11/15/2024 28.4 (H) 20.0 - 26.0 mmol/L Final   11/13/2024 32.6 (H) 20.0 - 26.0 mmol/L Final   11/13/2024 31.4 (H) 20.0 - 26.0 mmol/L Final   11/12/2024 31.3 (H) 20.0 - 26.0 mmol/L Final   11/12/2024 29.2 (H) 20.0 - 26.0 mmol/L Final   11/12/2024 26.9 (H) 20.0 - 26.0 mmol/L Final   11/12/2024 24.3 20.0 - 26.0 mmol/L Final   11/12/2024 20.8 20.0 - 26.0 mmol/L Final        aspirin, 81 mg, Oral, Daily  docusate sodium, 100 mg, Oral, BID  hydrocortisone sodium succinate, 100 mg, Intravenous, Q8H  insulin glargine, 20 Units, Subcutaneous, Nightly  insulin lispro, 3-14 Units, Subcutaneous, 4x Daily AC & at Bedtime  levothyroxine, 200 mcg, Oral, Q AM  lidocaine, 5 mL, Infiltration, Once  linezolid, 600 mg, Oral, Q12H  mupirocin, 1 Application, Each Nare, BID  nystatin, 1 Application, Topical, BID  piperacillin-tazobactam, 3.375 g, Intravenous, Q8H  polyethylene glycol, 17 g, Oral, Daily  potassium chloride, 20 mEq, Oral, Once  potassium chloride, 10 mEq, Oral, BID  senna-docusate sodium, 2 tablet, Oral, Nightly  sodium bicarbonate, 50 mEq, Intravenous, Once  sodium chloride, 3 mL, Intravenous, Q12H      amiodarone, 0.5 mg/min, Last Rate: 0.5 mg/min (11/14/24  1928)  EPINEPHrine, 0.02-0.3 mcg/kg/min, Last Rate: Stopped (11/15/24 0147)  heparin, 20 Units/kg/hr, Last Rate: 20 Units/kg/hr (11/14/24 1131)  Pharmacy to Dose Heparin,   phenylephrine, 0.5-3 mcg/kg/min, Last Rate: 0.5 mcg/kg/min (11/14/24 0900)  vasopressin, 0.03 Units/min, Last Rate: Stopped (11/14/24 1400)        Medication Review: Reviewed    Assessment & Plan     Patient Active Problem List   Diagnosis Code    Essential hypertension I10    Acquired hypothyroidism E03.9    Lung cancer; S/P Bronch/Thor w/ ALBINA lobectomy/mediastinal lymph node resec (5/12/17, Dr. 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  Continue IV amiodarone for now will likely change over to oral therapy tomorrow.    Jeffry Pickett MD  11/15/24  06:03 EST

## 2024-11-15 NOTE — PLAN OF CARE
Goal Outcome Evaluation:  Plan of Care Reviewed With: patient        Progress: improving  Outcome Evaluation: Pt ambulated 25ft with modA x2 +1 and BUE support on tripod monitor. Chair follow for safety. Pt demonstrated slow sri with forward flexed posture and short steps. Required multiple standing rest breaks and returned to room in chair. Continue to recommend PT skilled care and D/C to IP rehab facility.    Anticipated Discharge Disposition (PT): inpatient rehabilitation facility

## 2024-11-15 NOTE — THERAPY TREATMENT NOTE
Patient Name: Jill Miller  : 1960    MRN: [id number]                              Today's Date: 11/15/2024       Admit Date: 2024    Visit Dx:     ICD-10-CM ICD-9-CM   1. Aortic valve disorder  I35.9 424.1   2. Stenosis of prosthetic aortic valve, initial encounter  T82.857A 996.71     Patient Active Problem List   Diagnosis    Essential hypertension    Acquired hypothyroidism    Lung cancer; S/P Bronch/Thor w/ ALBINA lobectomy/mediastinal lymph node resec (17, Dr. Cheatham)    Paroxysmal atrial fibrillation    Hyperlipidemia LDL goal <70    Prosthetic aortic valve stenosis s/p TAVR 24, Right groin exploration for hematoma 24    Type 2 diabetes mellitus    COPD (chronic obstructive pulmonary disease)    Obesity (BMI 30-39.9)    Tobacco user    Pulmonary hypertension    Stenosis of prosthetic mitral valve    Acute on chronic respiratory failure with hypoxia and hypercapnia    Pulmonary infiltrate, likely right-sided pneumonia    Acute pulmonary embolism without acute cor pulmonale     Past Medical History:   Diagnosis Date    Acute pulmonary embolism without acute cor pulmonale 2024    Atrial fibrillation and flutter     Chronic diastolic congestive heart failure     Chronically Abnormal CXR (Left pleural disease post op) 2017    COPD exacerbation 2022    Elevated cholesterol     Essential hypertension 04/10/2017    Target blood pressure <130/80 mmHg    Graves disease     Hyperlipidemia LDL goal <70     Hypertension     Hyperthyroidism     Lung cancer     MRSA (methicillin resistant staph aureus) culture positive     under left breast, incision and debridement, treated with wound packing and po abx     Prolonged QTC interval on ECG 2022    Rheumatic mitral stenosis     Type 2 diabetes mellitus 2018    Valvular heart disease      Past Surgical History:   Procedure Laterality Date    ABLATION OF DYSRHYTHMIC FOCUS  2018    ABSCESS DRAINAGE       under left breast d/t mrsa     AORTIC VALVE REPAIR/REPLACEMENT N/A 07/16/2018    Procedure: MEDIAN STERNOTOMY, AORTIC VALVE REPLACEMENT WITH TIFFANIE AND MAZE, TRICUSPID RING, LEFT ATRIAL OCCLUSION;  Surgeon: Ajay Cheatham MD;  Location:  [facility];  Service: Cardiothoracic    AORTIC VALVE REPAIR/REPLACEMENT N/A 11/11/2024    Procedure: TRANSCATHETER AORTIC VALVE REPLACEMENT;  Surgeon: Ajay Cheatham MD;  Location:  [facility];  Service: Cardiothoracic;  Laterality: N/A;  FL- 6 min 36 sec  DOSE-  110 MGY  CONTRAST- 40 mL    AORTIC VALVE REPAIR/REPLACEMENT N/A 11/11/2024    Procedure: Transfemoral Transcatheter Aortic Valve Replacement;  Surgeon: Jeaneth Carreon MD;  Location:  [facility];  Service: Cardiovascular;  Laterality: N/A;    BRONCHOSCOPY Left 05/12/2017    Procedure: BRONCHOSCOPY;  Surgeon: Ajay Cheatham MD;  Location:  [facility];  Service:     BRONCHOSCOPY N/A 05/18/2017    Procedure: BRONCHOSCOPY BIOPSY AT BEDSIDE;  Surgeon: Ajay Cheatham MD;  Location:  [facility];  Service:     CARDIAC CATHETERIZATION N/A 09/28/2017    Procedure: Left Heart Cath;  Surgeon: Marco Lay MD;  Location:  [facility];  Service:     CARDIAC CATHETERIZATION N/A 09/28/2017    Procedure: Right Heart Cath;  Surgeon: Marco Lay MD;  Location:  [facility];  Service:     CARDIAC CATHETERIZATION N/A 10/11/2024    Procedure: Right and Left Heart Cath;  Surgeon: Jeffry Pickett MD;  Location:  [facility];  Service: Cardiology;  Laterality: N/A;    CARDIAC ELECTROPHYSIOLOGY PROCEDURE N/A 6/1/2022    Procedure: Ablation atrial fibrillation (atypical A flutter). Hold Rythmol x5 days;  Surgeon: Nigel Huber MD;  Location: [facility] INVASIVE LOCATION;  Service: Cardiovascular;  Laterality: N/A;    FEMORAL THROMBECTOMY/EMBOLECTOMY Right 11/12/2024    Procedure: GROIN EXPLORATION, LIGATION OF INFERIOR EPIGASTRIC ARTERY RIGHT;  Surgeon:  Ajay Cheatham MD;  Location:  [facility];  Service: Cardiothoracic;  Laterality: Right;  FL: 6 SECONDS  DOSE: 191MGY  CONTRAST: 30ML VISIPAQUE    LUNG BIOPSY  04/2017    LYMPH NODE DISSECTION Left 05/12/2017    Procedure: MEDIASTINAL LYMPH NODE DISSECTION;  Surgeon: Ajay Cheatham MD;  Location:  [facility];  Service:     MAZE PROCEDURE      MITRAL VALVE REPAIR/REPLACEMENT N/A 07/16/2018    Procedure: MITRAL VALVE REPLACEMENT;  Surgeon: Ajay Cheatham MD;  Location:  [facility];  Service: Cardiothoracic    MITRAL VALVE REPLACEMENT  07/16/2018    TEETH EXTRACTION      THORACOSCOPY VIDEO ASSISTED WITH LOBECTOMY Left 05/12/2017    Procedure: THORACOSCOPY VIDEO ASSISTED WITH OPEN LOBECTOMY;  Surgeon: Ajay Cheatham MD;  Location:  [facility];  Service:     TOTAL THYROIDECTOMY  approx 2012    TRANSESOPHAGEAL ECHOCARDIOGRAM (TIFFANIE) N/A 11/11/2024    Procedure: TRANSESOPHAGEAL ECHOCARDIOGRAM WITH ANESTHESIA;  Surgeon: Ajay Cheatham MD;  Location:  [facility];  Service: Cardiothoracic;  Laterality: N/A;    TRICUSPID VALVE SURGERY  07/16/2018      General Information       Row Name 11/15/24 1435          Physical Therapy Time and Intention    Document Type therapy note (daily note)  -KG     Mode of Treatment physical therapy  -KG       Row Name 11/15/24 1438          General Information    Existing Precautions/Restrictions cardiac;fall;oxygen therapy device and L/min;sternal;other (see comments)  HFNC; non-rebreather for mobility  -KG     Barriers to Rehab medically complex  -KG       Row Name 11/15/24 1435          Cognition    Orientation Status (Cognition) oriented to;person;place  -KG       Row Name 11/15/24 143          Safety Issues/Impairments Affecting Functional Mobility    Safety Issues Affecting Function (Mobility) awareness of need for assistance;insight into deficits/self-awareness;safety precaution awareness;safety precautions follow-through/compliance  -KG     Impairments Affecting Function (Mobility)  balance;coordination;endurance/activity tolerance;postural/trunk control;pain;shortness of breath;strength  -KG               User Key  (r) = Recorded By, (t) = Taken By, (c) = Cosigned By      Initials Name Provider Type    Onelia Jasso PT Physical Therapist                   Mobility       Row Name 11/15/24 1435          Bed Mobility    Comment, (Bed Mobility) UIC  -KG       Row Name 11/15/24 1435          Transfers    Comment, (Transfers) VC's for sequencing and safe hand placement. Cues to push up from chair prior to reaching for monitor.  -KG       Row Name 11/15/24 1435          Sit-Stand Transfer    Sit-Stand Baldwin (Transfers) moderate assist (50% patient effort);2 person assist;verbal cues  -KG       Row Name 11/15/24 1435          Gait/Stairs (Locomotion)    Baldwin Level (Gait) moderate assist (50% patient effort);2 person assist;1 person to manage equipment;verbal cues  chair follow  -KG     Assistive Device (Gait) other (see comments)  B UE support on tripod monitor  -KG     Distance in Feet (Gait) 25  -KG     Deviations/Abnormal Patterns (Gait) bilateral deviations;sri decreased;stride length decreased  -KG     Bilateral Gait Deviations forward flexed posture;heel strike decreased  -KG     Comment, (Gait/Stairs) Pt demonstrated step to gait pattern with very slow sri and decreased step length. Frequent cues for upright posture and to keep monitor close with feet inside middle. Pt required multiple standing rest breaks. Increased encouragement for continued forward ambulation. Pt with worsening gait mechanics and increased balance deficits with continued forward ambulation. Required seated rest break and returned to room in chair.  -KG               User Key  (r) = Recorded By, (t) = Taken By, (c) = Cosigned By      Initials Name Provider Type    Onelia Jasso PT Physical Therapist                   Obj/Interventions       Row Name 11/15/24 4240           Balance    Balance Assessment sitting static balance;standing static balance;standing dynamic balance  -KG     Static Sitting Balance minimal assist  -KG     Position, Sitting Balance supported;sitting in chair  -KG     Static Standing Balance minimal assist;2-person assist  -KG     Dynamic Standing Balance moderate assist;2-person assist  -KG     Position/Device Used, Standing Balance supported  -KG               User Key  (r) = Recorded By, (t) = Taken By, (c) = Cosigned By      Initials Name Provider Type    KG Onelia Casper, PT Physical Therapist                   Goals/Plan    No documentation.                  Clinical Impression       Row Name 11/15/24 1437          Pain    Pretreatment Pain Rating 3/10  -KG     Posttreatment Pain Rating 6/10  -KG     Pain Location groin  -KG     Pain Management Interventions exercise or physical activity utilized  -KG     Response to Pain Interventions activity participation with tolerable pain  -KG       Row Name 11/15/24 1437          Plan of Care Review    Plan of Care Reviewed With patient  -KG     Progress improving  -KG     Outcome Evaluation Pt ambulated 25ft with modA x2 +1 and BUE support on tripod monitor. Chair follow for safety. Pt demonstrated slow sri with forward flexed posture and short steps. Required multiple standing rest breaks and returned to room in chair. Continue to recommend PT skilled care and D/C to IP rehab facility.  -KG       Row Name 11/15/24 1437          Vital Signs    Pre Systolic BP Rehab 92  -KG     Pre Treatment Diastolic BP 50  -KG     Post Systolic BP Rehab 111  -KG     Post Treatment Diastolic BP 47  -KG     Pretreatment Heart Rate (beats/min) 63  -KG     Posttreatment Heart Rate (beats/min) 67  -KG     Pre SpO2 (%) 96  -KG     O2 Delivery Pre Treatment hi-flow  -KG     Post SpO2 (%) 97  -KG     O2 Delivery Post Treatment hi-flow  -KG     Pre Patient Position Sitting  -KG     Intra Patient Position Standing  -KG     Post  Patient Position Sitting  -KG       Row Name 11/15/24 1437          Positioning and Restraints    Pre-Treatment Position sitting in chair/recliner  -KG     Post Treatment Position chair  -KG     In Chair notified nsg;reclined;call light within reach;encouraged to call for assist;with family/caregiver;RUE elevated;LUE elevated;waffle cushion;on mechanical lift sling;legs elevated  -KG               User Key  (r) = Recorded By, (t) = Taken By, (c) = Cosigned By      Initials Name Provider Type    Onelia Jasso, PT Physical Therapist                   Outcome Measures       Row Name 11/15/24 1439          How much help from another person do you currently need...    Turning from your back to your side while in flat bed without using bedrails? 2  -KG     Moving from lying on back to sitting on the side of a flat bed without bedrails? 2  -KG     Moving to and from a bed to a chair (including a wheelchair)? 2  -KG     Standing up from a chair using your arms (e.g., wheelchair, bedside chair)? 2  -KG     Climbing 3-5 steps with a railing? 1  -KG     To walk in hospital room? 2  -KG     AM-PAC 6 Clicks Score (PT) 11  -KG     Highest Level of Mobility Goal 4 --> Transfer to chair/commode  -KG       Row Name 11/15/24 1439          Functional Assessment    Outcome Measure Options AM-PAC 6 Clicks Basic Mobility (PT)  -KG               User Key  (r) = Recorded By, (t) = Taken By, (c) = Cosigned By      Initials Name Provider Type    Onelia Jasso PT Physical Therapist                                 Physical Therapy Education       Title: PT OT SLP Therapies (In Progress)       Topic: Physical Therapy (In Progress)       Point: Mobility training (In Progress)       Learning Progress Summary            Patient Acceptance, E, NR by KG at 11/15/2024 1316    Acceptance, E, NR by KG at 11/14/2024 1119    Acceptance, E, NR by KG at 11/13/2024 1430                      Point: Home exercise program (In Progress)        Learning Progress Summary            Patient Acceptance, E, NR by KG at 11/15/2024 1316    Acceptance, E, NR by KG at 11/14/2024 1119                      Point: Body mechanics (In Progress)       Learning Progress Summary            Patient Acceptance, E, NR by KG at 11/15/2024 1316    Acceptance, E, NR by KG at 11/14/2024 1119    Acceptance, E, NR by KG at 11/13/2024 1430                      Point: Precautions (In Progress)       Learning Progress Summary            Patient Acceptance, E, NR by KG at 11/15/2024 1316    Acceptance, E, NR by KG at 11/14/2024 1119    Acceptance, E, NR by KG at 11/13/2024 1430                                      User Key       Initials Effective Dates Name Provider Type Discipline    KG 05/22/20 -  Onelia Casper, PT Physical Therapist PT                  PT Recommendation and Plan  Planned Therapy Interventions (PT): balance training, bed mobility training, gait training, strengthening, transfer training  Progress: improving  Outcome Evaluation: Pt ambulated 25ft with modA x2 +1 and BUE support on tripod monitor. Chair follow for safety. Pt demonstrated slow sri with forward flexed posture and short steps. Required multiple standing rest breaks and returned to room in chair. Continue to recommend PT skilled care and D/C to IP rehab facility.     Time Calculation:   PT Evaluation Complexity  History, PT Evaluation Complexity: 3 or more personal factors and/or comorbidities  Examination of Body Systems (PT Eval Complexity): total of 3 or more elements  Clinical Presentation (PT Evaluation Complexity): evolving  Clinical Decision Making (PT Evaluation Complexity): moderate complexity  Overall Complexity (PT Evaluation Complexity): moderate complexity     PT Charges       Row Name 11/15/24 1316             Time Calculation    Start Time 1316  -KG      PT Received On 11/15/24  -KG      PT Goal Re-Cert Due Date 11/23/24  -KG         Time Calculation- PT    Total Timed  Code Minutes- PT 23 minute(s)  -KG         Timed Charges    09391 - PT Therapeutic Activity Minutes 23  -KG         Total Minutes    Timed Charges Total Minutes 23  -KG       Total Minutes 23  -KG                User Key  (r) = Recorded By, (t) = Taken By, (c) = Cosigned By      Initials Name Provider Type    Onelia Jasso, PT Physical Therapist                  Therapy Charges for Today       Code Description Service Date Service Provider Modifiers Qty    68203509474 HC PT THERAPEUTIC ACT EA 15 MIN 11/14/2024 Onelia Casper, PT GP 1    45965912945 HC PT THERAPEUTIC ACT EA 15 MIN 11/15/2024 Onelia Casper, PT GP 2            PT G-Codes  Outcome Measure Options: AM-PAC 6 Clicks Basic Mobility (PT)  AM-PAC 6 Clicks Score (PT): 11  PT Discharge Summary  Anticipated Discharge Disposition (PT): inpatient rehabilitation facility    Radha Casper, YULIANA  11/15/2024

## 2024-11-15 NOTE — PROGRESS NOTES
CTS Progress Note      POD # 4 s/p TAVR, POD # 3 Right groin exploration    Subjective  No acute events overnight. Patient resting comfortably in chair on IV heparin and amiodarone gtt. Complaining of some R groin incisional pain. Denies chest pain. Breathing improved, on high flow NC. Reports sleeping well.    Objective    Physical Exam:   Vital Signs   Temp:  [98.1 °F (36.7 °C)-98.8 °F (37.1 °C)] 98.1 °F (36.7 °C)  Heart Rate:  [] 62  Resp:  [20-35] 22  BP: ()/(42-81) 92/49   GEN: NAD   CV: Regular rate and rhythm, NSR on monitor, Normal S1,S2.    RESP: Respirations even and unlabored, clear to auscultation   ABD: Soft, non-distended, mild tenderness to lower quadrants, hypoactive bowel sounds   EXT: Bilateral upper extremities warm, pink with capillary refill bilaterally. Gross motor and sensory function intact. Mild edema of the L hand and forearm. No right upper extremity edema.    Bilateral lower extremities gross motor and sensory function intact. Mild right lower extremity edema. Calves soft. No tenderness to palpation. Toes pink, warm.   Incision: Right groin is soft. No palpable hematoma or surrounding erythema.  Aquacel bandage clean and intact. Left groin soft, no hematoma.     Results     Results from last 7 days   Lab Units 11/15/24  0346   WBC 10*3/mm3 14.99*   HEMOGLOBIN g/dL 9.1*   HEMATOCRIT % 28.7*   PLATELETS 10*3/mm3 76*     Results from last 7 days   Lab Units 11/15/24  0346   SODIUM mmol/L 140   POTASSIUM mmol/L 4.1   CHLORIDE mmol/L 101   CO2 mmol/L 28.0   BUN mg/dL 14   CREATININE mg/dL 0.46*   GLUCOSE mg/dL 108*   CALCIUM mg/dL 8.4*     Results from last 7 days   Lab Units 11/12/24  0450 11/11/24  1154   INR  1.25*  --    APTT seconds  --  34.5     Imaging:   XR Chest 1 View    Result Date: 11/15/2024  XR CHEST 1 VW Date of Exam: 11/15/2024 3:56 AM EST Indication: resp failure Comparison: Chest x-ray 11/14/2024, CT angiogram chest 11/13/2024 Findings: Centimeters catheter tip  terminates at brachiocephalic SVC junction. Position unchanged. There is cardiomegaly with postoperative change of the heart. There is groundglass opacity central predominant right lung greater than left. This is unchanged. Small  bilateral pleural effusions. No significant pneumothorax.     Impression: No change from previous exam. Electronically Signed: Viji Brewer MD  11/15/2024 7:01 AM EST  Workstation ID: [id number]    Duplex Venous Upper Extremity - Bilateral CAR    Result Date: 11/14/2024    Acute right upper extremity deep vein thrombosis noted in the radial.   Chronic right upper extremity superficial thrombophlebitis noted in the cephalic (forearm).   Acute left upper extremity superficial thrombophlebitis noted in the basilic (forearm) and cephalic (forearm).   All other vessels appear normal.     Duplex Venous Lower Extremity - Bilateral CAR    Result Date: 11/14/2024    Acute right lower extremity deep vein thrombosis noted in the proximal femoral, mid femoral, distal femoral and peroneal.   All other veins appeared normal bilaterally.     Assessment & Plan   POD # 4 s/p TAVR, POD # 3 s/p Right groin exploration      Prosthetic aortic valve stenosis s/p TAVR 11/11/24, Right groin exploration for hematoma 11/12/24    Paroxysmal atrial fibrillation    Type 2 diabetes mellitus    COPD (chronic obstructive pulmonary disease)    Acute on chronic respiratory failure with hypoxia and hypercapnia    Pulmonary infiltrate, likely right-sided pneumonia    Acute pulmonary embolism without acute cor pulmonale  Right lower extremity DVT  Right radial vein DVT  Left upper extremity superficial vein thrombosis      Plan   D/C central line  D/C melvin  H&H stable, Leukocytosis improving  Up to chair. Continue PT, ambulate  Wean supplemental O2 as tolerated  Diuresis  Pulmonary has started empiric antibiotics  SLP evaluation prior to resuming diet  Continue IV heparin drip for RML/RLL pulmonary emboli, RLE DVT  Avoid  dependent positioning of the lower extremities at rest    Ajay Cheatham MD  11/15/24  07:25 EST

## 2024-11-15 NOTE — PROGRESS NOTES
Nutrition Services    Patient Name:  Jill Miller  YOB: 1960  MRN: [id number]  Admit Date:  11/11/2024    Pt identified NPO/Clear Liquid Diet >72 hrs. Advance diet as medically appropriate. RD to monitor for diet advancement. Please consult RD if nutrition support indicated.        Electronically signed by:  Michaela Amaya MS,RD,LD  11/15/24 08:54 EST

## 2024-11-15 NOTE — PLAN OF CARE
Goal Outcome Evaluation:  Plan of Care Reviewed With: patient, spouse        Progress: improving          Remains on HFC 50% 50L. Has not bipap all shift. No resp problems noted. NSR VSS on rick at .5. Passed fees today and now on regular diet. Ambulated for 25 ft today and returned in chair. Remains on heparin drip as well as amio drip. All incisions D/I. Small amt of blood noted from melvin site. Cath care given. Difficult to see exactly where blood coming from. Appears to be from insertion site. No blood noted in urine.

## 2024-11-15 NOTE — CASE MANAGEMENT/SOCIAL WORK
Continued Stay Note  SMILEY Moody     Patient Name: Jill Miller  MRN: [id number]  Today's Date: 11/15/2024    Admit Date: 11/11/2024    Plan: Home   Discharge Plan       Row Name 11/15/24 1122       Plan    Plan Home    Patient/Family in Agreement with Plan yes    Plan Comments Spoke with patient and spouse at bedside about therapy recs. Pt is currently not agreeable to IRF. Pt states that she previously has done OPPT. States he is able to take her home and rehab her there. Pt choice list for SNF's left at bedside just in case she changes her mind. CM will cont to follow.    Final Discharge Disposition Code 01 - home or self-care                   Discharge Codes    No documentation.                       Jo-Ann Gomez RN

## 2024-11-15 NOTE — PROGRESS NOTES
Intensive Care Follow-up     Hospital:  LOS: 4 days   Ms. Jill Miller, 64 y.o. female is followed for:   Prosthetic aortic valve stenosis        Subjective     Jill Miller is a 64 year-old female that presents to [facility] ICU postoperatively from scheduled TAVR today.      Patient has a PMH of HFpEF, valvular disease s/p prosthetic MVR/AVR, pulmonary HTN, lung cancer s/p ALBINA lobectomy (2017), multiple cardiac valve replacements, HTN, hypothyroidism, hyperlipidemia, HFpEF, T2DM, COPD, tobacco use, and PAF s/p ANGELA/Maze (2018), cardioversion and PVA (2022).   Patient recently admitted to [facility] in September with hypoxic respiratory failure 2* rhinovirus and exacerbation of heart failure. 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 Cardiology/CTS consulted and patient underwent work-up for TAVR.      TIFFANIE showed LVEF 51-55%, moderate to severe AS, moderate MS, tricuspid ring noted, and bi-atrial cavity dilation. CTA TAVR negative for acute abnormality / noted emphysema.   Interval History:  The chart has been reviewed.  The patient has remained afebrile overnight.  Pressors have been successfully discontinued.  The patient states that she feels as though she is breathing easier.  She is currently on 50% FiO2 with high flow.  She has been taking sips of clear liquids without problems.  MRSA probe is negative.  She has had some rhythm changes through the night but is currently in sinus rhythm.  Chest x-ray continues to show right-sided infiltrates which may be marginally better compared with yesterday.    The patient's past medical, surgical and social history were reviewed and updated in Epic as appropriate.        Objective     Infusions:  amiodarone, 0.5 mg/min, Last Rate: 0.5 mg/min (11/15/24  "0812)  EPINEPHrine, 0.02-0.3 mcg/kg/min, Last Rate: Stopped (11/15/24 0147)  heparin, 20 Units/kg/hr, Last Rate: 20 Units/kg/hr (11/15/24 0621)  Pharmacy to Dose Heparin,   phenylephrine, 0.5-3 mcg/kg/min, Last Rate: Stopped (11/15/24 0447)  vasopressin, 0.03 Units/min, Last Rate: Stopped (11/14/24 1400)      Medications:  aspirin, 81 mg, Oral, Daily  docusate sodium, 100 mg, Oral, BID  furosemide, 60 mg, Intravenous, Q12H  hydrocortisone sodium succinate, 100 mg, Intravenous, Q8H  insulin glargine, 20 Units, Subcutaneous, Nightly  insulin lispro, 3-14 Units, Subcutaneous, 4x Daily AC & at Bedtime  levothyroxine, 200 mcg, Oral, Q AM  lidocaine, 5 mL, Infiltration, Once  mupirocin, 1 Application, Each Nare, BID  nystatin, 1 Application, Topical, BID  piperacillin-tazobactam, 3.375 g, Intravenous, Q8H  polyethylene glycol, 17 g, Oral, Daily  potassium chloride, 20 mEq, Oral, Once  potassium chloride, 10 mEq, Oral, BID  senna-docusate sodium, 2 tablet, Oral, Nightly  sodium bicarbonate, 50 mEq, Intravenous, Once  sodium chloride, 3 mL, Intravenous, Q12H        Vital Sign Min/Max for last 24 hours  Temp  Min: 98.1 °F (36.7 °C)  Max: 98.8 °F (37.1 °C)   BP  Min: 88/54  Max: 118/71   Pulse  Min: 55  Max: 122   Resp  Min: 20  Max: 32   SpO2  Min: 89 %  Max: 97 %   Flow (L/min) (Oxygen Therapy)  Min: 40  Max: 50       Input/Output for last 24 hour shift  11/14 0701 - 11/15 0700  In: 1842.6 [P.O.:240; I.V.:1302.6]  Out: 1625 [Urine:1625]      Objective:  General Appearance:  Uncomfortable, in no acute distress and not in pain.    Vital signs: (most recent): Blood pressure 95/51, pulse 68, temperature 98.2 °F (36.8 °C), temperature source Bladder, resp. rate 22, height 157.5 cm (62\"), weight 72.1 kg (159 lb), SpO2 95%.    HEENT: (High flow nasal cannula 50%)    Lungs:  Normal effort and normal respiratory rate.  She is not in respiratory distress.  There are rales, wheezes and rhonchi.  No decreased breath sounds.    Heart: " Normal rate.  Regular rhythm.  S1 normal and S2 normal.    Abdomen: Abdomen is soft.  Bowel sounds are normal.   There is generalized tenderness.  There is no rebound tenderness.     Extremities: Normal range of motion.  (Edema and mild coolness to the left hand.  No cyanosis.)  Neurological: Patient is alert and oriented to person, place and time.    Pupils:  Pupils are equal, round, and reactive to light.    Skin:  Warm.                Results from last 7 days   Lab Units 11/15/24  0346 11/14/24  1401 11/14/24  0109   WBC 10*3/mm3 14.99* 16.90* 20.44*   HEMOGLOBIN g/dL 9.1* 8.8* 8.7*   PLATELETS 10*3/mm3 76* 76* 85*     Results from last 7 days   Lab Units 11/15/24  0346 11/14/24  0109 11/13/24  0320 11/12/24  1659 11/12/24  0946 11/12/24  0450   SODIUM mmol/L 140 138 141 141   < > 141   POTASSIUM mmol/L 4.1 3.7 3.8 3.7   < > 4.0   CO2 mmol/L 28.0 26.0 29.0 27.0   < > 16.0*   BUN mg/dL 14 13 15 16   < > 17   CREATININE mg/dL 0.46* 0.60 0.63 0.89   < > 1.24*   MAGNESIUM mg/dL 2.4  --  3.2* 2.9*   < > 2.0   PHOSPHORUS mg/dL  --   --  3.0  --   --  4.9*   GLUCOSE mg/dL 108* 197* 226* 299*   < > 364*    < > = values in this interval not displayed.     Estimated Creatinine Clearance: 117.9 mL/min (A) (by C-G formula based on SCr of 0.46 mg/dL (L)).    Results from last 7 days   Lab Units 11/15/24  0443   PH, ARTERIAL pH units 7.357   PCO2, ARTERIAL mm Hg 50.7*   PO2 ART mm Hg 60.0*         I reviewed the patient's results and images.     Assessment & Plan   Impression        Prosthetic aortic valve stenosis s/p TAVR 11/11/24, Right groin exploration for hematoma 11/12/24    Paroxysmal atrial fibrillation    Type 2 diabetes mellitus    COPD (chronic obstructive pulmonary disease)    Acute on chronic respiratory failure with hypoxia and hypercapnia    Pulmonary infiltrate, likely right-sided pneumonia    Acute pulmonary embolism without acute cor pulmonale       Plan        We will continue with Zosyn for now to cover  probable right-sided pneumonia.  MRSA probe is negative so we will discontinue Zyvox for now.  Continue with anticoagulation as before for underlying pulmonary emboli.  Bronchodilator therapy as before.  Continue with current glucose control.  This may need to be adjusted when she is beginning to eat a bit more.  Nutritional support as before.  Wean oxygen as tolerated.  I agree with diuresis for now.  We will watch renal function closely.      Plan of care and goals reviewed with mulitdisciplinary/antibiotic stewardship team during rounds.   I discussed the patient's findings and my recommendations with patient, family, and nursing staff     High level of risk due to:  drug(s) requiring intensive monitoring for toxicity and parenteral controlled substances.        Omid León MD, [facility]  Pulmonology and Critical Care Medicine

## 2024-11-15 NOTE — PLAN OF CARE
Goal Outcome Evaluation:  Plan of Care Reviewed With: patient, spouse        Progress: no change  Outcome Evaluation: OT evaluation completed. The pt presents below her functional baseline with generalized weakness, decreased activity tolerance, and balance deficits warranting continued IP OT services. The pt ambulated <household distance with modA x2, BUE support on telemonitor with a close chair follow. The pt's O2 remained >95% on non-rebreather. Recommend a d/c to IRF for best outcome.    Anticipated Discharge Disposition (OT): inpatient rehabilitation facility

## 2024-11-15 NOTE — PLAN OF CARE
Goal Outcome Evaluation:  Plan of Care Reviewed With: patient        Progress: improving  Outcome Evaluation: Patient neuro intact. Up to chair this AM with lift. Wore bipap overnight, now on HFNC at 50%/50L, using flutter valve and IS independently. Rhythm between junctional, a fib, sinus mack, and NSR. Amio and heparin gtt continue. Pressors weaned off. Hypoactive bowel sounds, no BM. UOP 735mL, 60 lasix given. Groin sits CDI, soft. Pain managed with PRNs. Patient reports feeling better and has no concerns at this time.

## 2024-11-15 NOTE — THERAPY EVALUATION
Patient Name: Jill Miller  : 1960    MRN: [id number]                              Today's Date: 11/15/2024       Admit Date: 2024    Visit Dx:     ICD-10-CM ICD-9-CM   1. Aortic valve disorder  I35.9 424.1   2. Stenosis of prosthetic aortic valve, initial encounter  T82.857A 996.71     Patient Active Problem List   Diagnosis    Essential hypertension    Acquired hypothyroidism    Lung cancer; S/P Bronch/Thor w/ ALBINA lobectomy/mediastinal lymph node resec (17, Dr. Cheatham)    Paroxysmal atrial fibrillation    Hyperlipidemia LDL goal <70    Prosthetic aortic valve stenosis s/p TAVR 24, Right groin exploration for hematoma 24    Type 2 diabetes mellitus    COPD (chronic obstructive pulmonary disease)    Obesity (BMI 30-39.9)    Tobacco user    Pulmonary hypertension    Stenosis of prosthetic mitral valve    Acute on chronic respiratory failure with hypoxia and hypercapnia    Pulmonary infiltrate, likely right-sided pneumonia    Acute pulmonary embolism without acute cor pulmonale     Past Medical History:   Diagnosis Date    Acute pulmonary embolism without acute cor pulmonale 2024    Atrial fibrillation and flutter     Chronic diastolic congestive heart failure     Chronically Abnormal CXR (Left pleural disease post op) 2017    COPD exacerbation 2022    Elevated cholesterol     Essential hypertension 04/10/2017    Target blood pressure <130/80 mmHg    Graves disease     Hyperlipidemia LDL goal <70     Hypertension     Hyperthyroidism     Lung cancer     MRSA (methicillin resistant staph aureus) culture positive     under left breast, incision and debridement, treated with wound packing and po abx     Prolonged QTC interval on ECG 2022    Rheumatic mitral stenosis     Type 2 diabetes mellitus 2018    Valvular heart disease      Past Surgical History:   Procedure Laterality Date    ABLATION OF DYSRHYTHMIC FOCUS  2018    ABSCESS DRAINAGE       under left breast d/t mrsa     AORTIC VALVE REPAIR/REPLACEMENT N/A 07/16/2018    Procedure: MEDIAN STERNOTOMY, AORTIC VALVE REPLACEMENT WITH TIFFANIE AND MAZE, TRICUSPID RING, LEFT ATRIAL OCCLUSION;  Surgeon: Ajay Cheatham MD;  Location:  [facility];  Service: Cardiothoracic    AORTIC VALVE REPAIR/REPLACEMENT N/A 11/11/2024    Procedure: TRANSCATHETER AORTIC VALVE REPLACEMENT;  Surgeon: Ajay Cheatham MD;  Location:  [facility];  Service: Cardiothoracic;  Laterality: N/A;  FL- 6 min 36 sec  DOSE-  110 MGY  CONTRAST- 40 mL    AORTIC VALVE REPAIR/REPLACEMENT N/A 11/11/2024    Procedure: Transfemoral Transcatheter Aortic Valve Replacement;  Surgeon: Jeaneth Carreon MD;  Location:  [facility];  Service: Cardiovascular;  Laterality: N/A;    BRONCHOSCOPY Left 05/12/2017    Procedure: BRONCHOSCOPY;  Surgeon: Ajay Cheatham MD;  Location:  [facility];  Service:     BRONCHOSCOPY N/A 05/18/2017    Procedure: BRONCHOSCOPY BIOPSY AT BEDSIDE;  Surgeon: Ajay Cheatham MD;  Location:  [facility];  Service:     CARDIAC CATHETERIZATION N/A 09/28/2017    Procedure: Left Heart Cath;  Surgeon: Marco Lay MD;  Location:  [facility];  Service:     CARDIAC CATHETERIZATION N/A 09/28/2017    Procedure: Right Heart Cath;  Surgeon: Marco Lay MD;  Location:  [facility];  Service:     CARDIAC CATHETERIZATION N/A 10/11/2024    Procedure: Right and Left Heart Cath;  Surgeon: Jeffry Pickett MD;  Location:  [facility];  Service: Cardiology;  Laterality: N/A;    CARDIAC ELECTROPHYSIOLOGY PROCEDURE N/A 6/1/2022    Procedure: Ablation atrial fibrillation (atypical A flutter). Hold Rythmol x5 days;  Surgeon: Nigel Huber MD;  [facility]: [facility] LOC[facility];  Service: Cardiovascular;  Laterality: N/A;    FEMORAL THROMBECTOMY/EMBOLECTOMY Right 11/12/2024    Procedure: GROIN EXPLORATION, LIGATION OF INFERIOR EPIGASTRIC ARTERY RIGHT;  Surgeon:  Ajay Cheatham MD;  Location:  [facility];  Service: Cardiothoracic;  Laterality: Right;  FL: 6 SECONDS  DOSE: 191MGY  CONTRAST: 30ML VISIPAQUE    LUNG BIOPSY  04/2017    LYMPH NODE DISSECTION Left 05/12/2017    Procedure: MEDIASTINAL LYMPH NODE DISSECTION;  Surgeon: Ajay Cheatham MD;  Location:  [facility];  Service:     MAZE PROCEDURE      MITRAL VALVE REPAIR/REPLACEMENT N/A 07/16/2018    Procedure: MITRAL VALVE REPLACEMENT;  Surgeon: Ajay Cheatham MD;  Location:  [facility];  Service: Cardiothoracic    MITRAL VALVE REPLACEMENT  07/16/2018    TEETH EXTRACTION      THORACOSCOPY VIDEO ASSISTED WITH LOBECTOMY Left 05/12/2017    Procedure: THORACOSCOPY VIDEO ASSISTED WITH OPEN LOBECTOMY;  Surgeon: Ajay Cheatham MD;  Location:  [facility];  Service:     TOTAL THYROIDECTOMY  approx 2012    TRANSESOPHAGEAL ECHOCARDIOGRAM (TIFFANIE) N/A 11/11/2024    Procedure: TRANSESOPHAGEAL ECHOCARDIOGRAM WITH ANESTHESIA;  Surgeon: Ajay Cheatham MD;  Location:  [facility];  Service: Cardiothoracic;  Laterality: N/A;    TRICUSPID VALVE SURGERY  07/16/2018      General Information       Row Name 11/15/24 1458          OT Time and Intention    Document Type evaluation  -KF     Mode of Treatment occupational therapy  -KF       Row Name 11/15/24 1458          General Information    Patient Profile Reviewed yes  -KF     Prior Level of Function independent:;all household mobility;community mobility;bed mobility;ADL's;driving;work  Independent prior, no AD  -KF     Existing Precautions/Restrictions cardiac;sternal;fall;oxygen therapy device and L/min;other (see comments)  HFNC; non-rebreather for mobility; PE, RUE DVT, RLE DVT; melvin  -KF     Barriers to Rehab medically complex;previous functional deficit  -KF       Row Name 11/15/24 1458          Living Environment    People in Home spouse  -KF       Row Name 11/15/24 1458          Home Main Entrance    Number of Stairs, Main Entrance six  -KF     Stair Railings, Main Entrance railing on  left side (ascending)  -[facility] Name 11/15/24 1458          Stairs Within Home, Primary    Number of Stairs, Within Home, Primary none  -[facility] Name 11/15/24 1458          Cognition    Orientation Status (Cognition) oriented x 3  -[facility] Name 11/15/24 1458          Safety Issues/Impairments Affecting Functional Mobility    Safety Issues Affecting Function (Mobility) awareness of need for assistance;insight into deficits/self-awareness;judgment;problem-solving;safety precaution awareness;safety precautions follow-through/compliance;sequencing abilities  -     Impairments Affecting Function (Mobility) balance;coordination;endurance/activity tolerance;postural/trunk control;pain;shortness of breath;strength  -               User Key  (r) = Recorded By, (t) = Taken By, (c) = Cosigned By      Initials Name Provider Type    KF Haleigh Zabala OT Occupational Therapist                     Mobility/ADL's       [facility] Name 11/15/24 1500          Bed Mobility    Comment, (Bed Mobility) Pt found and left up in the chair.  -[facility] Name 11/15/24 1500          Transfers    Transfers sit-stand transfer;stand-sit transfer  -     Comment, (Transfers) Verbal cues for hand placement and sequencing  -KF       Row Name 11/15/24 1500          Sit-Stand Transfer    Sit-Stand [place] (Transfers) moderate assist (50% patient effort);2 person assist;verbal cues;nonverbal cues (demo/gesture)  -     Assistive Device (Sit-Stand Transfers) other (see comments)  BUE support  -[facility] Name 11/15/24 1500          Stand-Sit Transfer    Stand-Sit [place] (Transfers) moderate assist (50% patient effort);2 person assist;verbal cues;nonverbal cues (demo/gesture)  -     Assistive Device (Stand-Sit Transfers) other (see comments)  BUE support  -[facility] Name 11/15/24 1500          Functional Mobility    Functional Mobility- Ind. Level moderate assist (50% patient effort);2 person assist required;verbal  cues required;nonverbal cues required (demo/gesture)  -KF     Functional Mobility- Device other (see comments)  BUE support on telemonitor  -KF     Functional Mobility-Distance (Feet) --  <household distance  -KF     Functional Mobility- Comment Pt's O2 remained >95% on 15L non-rebreather.  -KF     Patient was able to Ambulate yes  -       Row Name 11/15/24 1500          Activities of Daily Living    BADL Assessment/Intervention upper body dressing;feeding  -[facility] Name 11/15/24 1500          Upper Body Dressing Assessment/Training    [place] Level (Upper Body Dressing) don;front opening garment;maximum assist (25% patient effort)  -     Position (Upper Body Dressing) unsupported sitting  -       Row Name 11/15/24 1500          Self-Feeding Assessment/Training    [place] Level (Feeding) liquids to mouth;maximum assist (25% patient effort)  -KF     Position (Feeding) supported sitting  -               User Key  (r) = Recorded By, (t) = Taken By, (c) = Cosigned By      Initials Name Provider Type    KF Haleigh Zabala OT Occupational Therapist                   Obj/Interventions       [facility] Name 11/15/24 1503          Sensory Assessment (Somatosensory)    Sensory Assessment (Somatosensory) UE sensation intact  -[facility] Name 11/15/24 1503          Range of Motion Comprehensive    General Range of Motion bilateral upper extremity ROM WFL  -[facility] Name 11/15/24 1503          Strength Comprehensive (MMT)    General Manual Muscle Testing (MMT) Assessment upper extremity strength deficits identified  -     Comment, General Manual Muscle Testing (MMT) Assessment BUE grossly 3+/5  -[facility] Name 11/15/24 1503          Motor Skills    Motor Skills functional endurance  -KF     Functional Endurance fair, below baseline  -[facility] Name 11/15/24 1503          Balance    Balance Assessment sitting static balance;sitting dynamic balance;sit to stand dynamic balance;standing static  balance;standing dynamic balance  -KF     Static Sitting Balance minimal assist  -KF     Dynamic Sitting Balance moderate assist  -KF     Position, Sitting Balance supported;sitting in chair  -KF     Sit to Stand Dynamic Balance moderate assist;2-person assist;verbal cues  -KF     Static Standing Balance minimal assist;2-person assist  -KF     Dynamic Standing Balance moderate assist;2-person assist  -KF     Position/Device Used, Standing Balance supported  -KF     Balance Interventions sitting;standing;sit to stand;supported;static;dynamic;occupation based/functional task  -KF               User Key  (r) = Recorded By, (t) = Taken By, (c) = Cosigned By      Initials Name Provider Type    KF Haleigh Zabala, OT Occupational Therapist                   Goals/Plan       Row Name 11/15/24 1506          Transfer Goal 1 (OT)    Activity/Assistive Device (Transfer Goal 1, OT) commode;bed-to-chair/chair-to-bed  -KF     [place] Level/Cues Needed (Transfer Goal 1, OT) supervision required  -KF     Time Frame (Transfer Goal 1, OT) long term goal (LTG);10 days  -KF     Progress/Outcome (Transfer Goal 1, OT) goal ongoing  -KF       Row Name 11/15/24 1506          Dressing Goal 1 (OT)    Activity/Device (Dressing Goal 1, OT) upper body dressing;lower body dressing  -KF     [place]/Cues Needed (Dressing Goal 1, OT) minimum assist (75% or more patient effort)  -KF     Time Frame (Dressing Goal 1, OT) short term goal (STG);5 days  -KF     Progress/Outcome (Dressing Goal 1, OT) goal ongoing  -KF       Row Name 11/15/24 1506          Grooming Goal 1 (OT)    Activity/Device (Grooming Goal 1, OT) hair care;oral care;wash face, hands  -KF     [place] (Grooming Goal 1, OT) set-up required  -KF     Time Frame (Grooming Goal 1, OT) long term goal (LTG);10 days  -KF     Progress/Outcome (Grooming Goal 1, OT) goal ongoing  -KF       Row Name 11/15/24 1506          Therapy Assessment/Plan (OT)    Planned Therapy Interventions  (OT) activity tolerance training;adaptive equipment training;BADL retraining;functional balance retraining;strengthening exercise;occupation/activity based interventions;patient/caregiver education/training;ROM/therapeutic exercise;transfer/mobility retraining  -KF               User Key  (r) = Recorded By, (t) = Taken By, (c) = Cosigned By      Initials Name Provider Type    KF Haleigh Zabala OT Occupational Therapist                   Clinical Impression       Row Name 11/15/24 1502          Pain Assessment    Pretreatment Pain Rating 3/10  -KF     Posttreatment Pain Rating 6/10  -KF     Pain Location groin  -KF     Pain Side/Orientation right  -KF     Pain Management Interventions activity modification encouraged;exercise or physical activity utilized;positioning techniques utilized  -KF     Response to Pain Interventions activity participation with tolerable pain  -KF       Row Name 11/15/24 5594          Plan of Care Review    Plan of Care Reviewed With patient;spouse  -KF     Progress no change  -KF     Outcome Evaluation OT evaluation completed. The pt presents below her functional baseline with generalized weakness, decreased activity tolerance, and balance deficits warranting continued IP OT services. The pt ambulated <household distance with modA x2, BUE support on telemonitor with a close chair follow. The pt's O2 remained >95% on non-rebreather. Recommend a d/c to IRF for best outcome.  -KF       Row Name 11/15/24 7007          Therapy Assessment/Plan (OT)    Patient/Family Therapy Goal Statement (OT) Restore PLOF  -KF     Rehab Potential (OT) good  -KF     Criteria for Skilled Therapeutic Interventions Met (OT) yes;skilled treatment is necessary  -KF     Therapy Frequency (OT) daily  -KF     Predicted Duration of Therapy Intervention (OT) 10 days  -KF       Row Name 11/15/24 2133          Therapy Plan Review/Discharge Plan (OT)    Anticipated Discharge Disposition (OT) inpatient rehabilitation  facility  -KF       Row Name 11/15/24 1504          Vital Signs    Pre Systolic BP Rehab 102  -KF     Pre Treatment Diastolic BP 50  -KF     Post Systolic BP Rehab 111  -KF     Post Treatment Diastolic BP 57  -KF     Pretreatment Heart Rate (beats/min) 61  -KF     Posttreatment Heart Rate (beats/min) 66  -KF     Pre SpO2 (%) 96  -KF     O2 Delivery Pre Treatment hi-flow  -KF     Intra SpO2 (%) 97  -KF     O2 Delivery Intra Treatment non-rebreather  -KF     Post SpO2 (%) 98  -KF     O2 Delivery Post Treatment hi-flow  -KF     Pre Patient Position Sitting  -KF     Intra Patient Position Standing  -KF     Post Patient Position Sitting  -KF       Row Name 11/15/24 1504          Positioning and Restraints    Pre-Treatment Position sitting in chair/recliner  -KF     Post Treatment Position chair  -KF     In Chair notified nsg;reclined;call light within reach;encouraged to call for assist;with family/caregiver;waffle cushion;on mechanical lift sling;legs elevated;RUE elevated;LUE elevated  -KF               User Key  (r) = Recorded By, (t) = Taken By, (c) = Cosigned By      Initials Name Provider Type    KF Haleigh Zabala, OT Occupational Therapist                   Outcome Measures       Row Name 11/15/24 1507          How much help from another is currently needed...    Putting on and taking off regular lower body clothing? 2  -KF     Bathing (including washing, rinsing, and drying) 2  -KF     Toileting (which includes using toilet bed pan or urinal) 2  -KF     Putting on and taking off regular upper body clothing 2  -KF     Taking care of personal grooming (such as brushing teeth) 3  -KF     Eating meals 2  -KF     AM-PAC 6 Clicks Score (OT) 13  -KF       Row Name 11/15/24 1439          How much help from another person do you currently need...    Turning from your back to your side while in flat bed without using bedrails? 2  -KG     Moving from lying on back to sitting on the side of a flat bed without bedrails? 2   -KG     Moving to and from a bed to a chair (including a wheelchair)? 2  -KG     Standing up from a chair using your arms (e.g., wheelchair, bedside chair)? 2  -KG     Climbing 3-5 steps with a railing? 1  -KG     To walk in hospital room? 2  -KG     AM-PAC 6 Clicks Score (PT) 11  -KG     Highest Level of Mobility Goal 4 --> Transfer to chair/commode  -KG       Row Name 11/15/24 1507 11/15/24 1439       Functional Assessment    Outcome Measure Options AM-PAC 6 Clicks Daily Activity (OT)  -KF AM-PAC 6 Clicks Basic Mobility (PT)  -KG              User Key  (r) = Recorded By, (t) = Taken By, (c) = Cosigned By      Initials Name Provider Type    KG Onelia Casper, PT Physical Therapist    KF Haleigh Zabala, OT Occupational Therapist                    Occupational Therapy Education       Title: PT OT SLP Therapies (In Progress)       Topic: Occupational Therapy (In Progress)       Point: ADL training (In Progress)       Description:   Instruct learner(s) on proper safety adaptation and remediation techniques during self care or transfers.   Instruct in proper use of assistive devices.                  Learning Progress Summary            Patient Acceptance, E,TB, NR by KF at 11/15/2024 1310                      Point: Home exercise program (Not Started)       Description:   Instruct learner(s) on appropriate technique for monitoring, assisting and/or progressing therapeutic exercises/activities.                  Learner Progress:  Not documented in this visit.              Point: Precautions (In Progress)       Description:   Instruct learner(s) on prescribed precautions during self-care and functional transfers.                  Learning Progress Summary            Patient Acceptance, E,TB, NR by KF at 11/15/2024 1310                      Point: Body mechanics (In Progress)       Description:   Instruct learner(s) on proper positioning and spine alignment during self-care, functional mobility activities and/or  exercises.                  Learning Progress Summary            Patient Acceptance, E,TB, NR by  at 11/15/2024 1310                                      User Key       Initials Effective Dates Name Provider Type Discipline     08/09/23 -  Haleigh Zabala, OT Occupational Therapist OT                  OT Recommendation and Plan  Planned Therapy Interventions (OT): activity tolerance training, adaptive equipment training, BADL retraining, functional balance retraining, strengthening exercise, occupation/activity based interventions, patient/caregiver education/training, ROM/therapeutic exercise, transfer/mobility retraining  Therapy Frequency (OT): daily  Plan of Care Review  Plan of Care Reviewed With: patient, spouse  Progress: no change  Outcome Evaluation: OT evaluation completed. The pt presents below her functional baseline with generalized weakness, decreased activity tolerance, and balance deficits warranting continued IP OT services. The pt ambulated <household distance with modA x2, BUE support on telemonitor with a close chair follow. The pt's O2 remained >95% on non-rebreather. Recommend a d/c to IRF for best outcome.     Time Calculation:   Evaluation Complexity (OT)  Review Occupational Profile/Medical/Therapy History Complexity: expanded/moderate complexity  Assessment, Occupational Performance/Identification of Deficit Complexity: 3-5 performance deficits  Clinical Decision Making Complexity (OT): detailed assessment/moderate complexity  Overall Complexity of Evaluation (OT): moderate complexity     Time Calculation- OT       Row Name 11/15/24 1509             Time Calculation- OT    OT Start Time 1310  -KF      OT Received On 11/15/24  -KF      OT Goal Re-Cert Due Date 11/25/24  -KF         Untimed Charges    OT Eval/Re-eval Minutes 50  -KF         Total Minutes    Untimed Charges Total Minutes 50  -KF       Total Minutes 50  -KF                User Key  (r) = Recorded By, (t) = Taken By, (c) =  Cosigned By      Initials Name Provider Type    KF Haleigh Zabala OT Occupational Therapist                  Therapy Charges for Today       Code Description Service Date Service Provider Modifiers Qty    [id number] HC OT EVAL MOD COMPLEXITY 4 11/15/2024 Haleigh Zabala OT GO 1                 Haleigh Zabala OT  11/15/2024

## 2024-11-16 ENCOUNTER — APPOINTMENT (OUTPATIENT)
Dept: GENERAL RADIOLOGY | Facility: HOSPITAL | Age: 64
End: 2024-11-16
Payer: COMMERCIAL

## 2024-11-16 LAB
ANION GAP SERPL CALCULATED.3IONS-SCNC: 8 MMOL/L (ref 5–15)
BASE EXCESS BLDA CALC-SCNC: 4 MMOL/L (ref -5–5)
BASOPHILS # BLD AUTO: 0.02 10*3/MM3 (ref 0–0.2)
BASOPHILS NFR BLD AUTO: 0.2 % (ref 0–1.5)
BUN SERPL-MCNC: 19 MG/DL (ref 8–23)
BUN/CREAT SERPL: 33.9 (ref 7–25)
CA-I BLDA-SCNC: 1.17 MMOL/L (ref 1.2–1.32)
CALCIUM SPEC-SCNC: 8.2 MG/DL (ref 8.6–10.5)
CHLORIDE SERPL-SCNC: 103 MMOL/L (ref 98–107)
CO2 BLDA-SCNC: 30 MMOL/L (ref 24–29)
CO2 SERPL-SCNC: 32 MMOL/L (ref 22–29)
CREAT SERPL-MCNC: 0.56 MG/DL (ref 0.57–1)
DEPRECATED RDW RBC AUTO: 77.8 FL (ref 37–54)
EGFRCR SERPLBLD CKD-EPI 2021: 103.3 ML/MIN/1.73
EOSINOPHIL # BLD AUTO: 0 10*3/MM3 (ref 0–0.4)
EOSINOPHIL NFR BLD AUTO: 0 % (ref 0.3–6.2)
ERYTHROCYTE [DISTWIDTH] IN BLOOD BY AUTOMATED COUNT: 20.7 % (ref 12.3–15.4)
GLUCOSE BLDC GLUCOMTR-MCNC: 117 MG/DL (ref 70–130)
GLUCOSE BLDC GLUCOMTR-MCNC: 132 MG/DL (ref 70–130)
GLUCOSE BLDC GLUCOMTR-MCNC: 132 MG/DL (ref 70–130)
GLUCOSE BLDC GLUCOMTR-MCNC: 157 MG/DL (ref 70–130)
GLUCOSE BLDC GLUCOMTR-MCNC: 174 MG/DL (ref 70–130)
GLUCOSE SERPL-MCNC: 115 MG/DL (ref 65–99)
HCO3 BLDA-SCNC: 28.3 MMOL/L (ref 22–26)
HCT VFR BLD AUTO: 25.8 % (ref 34–46.6)
HCT VFR BLDA CALC: 39 % (ref 38–51)
HGB BLD-MCNC: 8.4 G/DL (ref 12–15.9)
HGB BLDA-MCNC: 13.3 G/DL (ref 12–17)
IMM GRANULOCYTES # BLD AUTO: 0.18 10*3/MM3 (ref 0–0.05)
IMM GRANULOCYTES NFR BLD AUTO: 1.4 % (ref 0–0.5)
LYMPHOCYTES # BLD AUTO: 0.44 10*3/MM3 (ref 0.7–3.1)
LYMPHOCYTES NFR BLD AUTO: 3.5 % (ref 19.6–45.3)
MAGNESIUM SERPL-MCNC: 2.2 MG/DL (ref 1.6–2.4)
MCH RBC QN AUTO: 34 PG (ref 26.6–33)
MCHC RBC AUTO-ENTMCNC: 32.6 G/DL (ref 31.5–35.7)
MCV RBC AUTO: 104.5 FL (ref 79–97)
MONOCYTES # BLD AUTO: 0.77 10*3/MM3 (ref 0.1–0.9)
MONOCYTES NFR BLD AUTO: 6.2 % (ref 5–12)
NEUTROPHILS NFR BLD AUTO: 11.1 10*3/MM3 (ref 1.7–7)
NEUTROPHILS NFR BLD AUTO: 88.7 % (ref 42.7–76)
NRBC BLD AUTO-RTO: 3.1 /100 WBC (ref 0–0.2)
PCO2 BLDA: 41.2 MM HG (ref 35–45)
PH BLDA: 7.44 PH UNITS (ref 7.35–7.6)
PLATELET # BLD AUTO: 100 10*3/MM3 (ref 140–450)
PMV BLD AUTO: 11.5 FL (ref 6–12)
PO2 BLDA: 57 MMHG (ref 80–105)
POTASSIUM BLDA-SCNC: 4.2 MMOL/L (ref 3.5–4.9)
POTASSIUM SERPL-SCNC: 3.4 MMOL/L (ref 3.5–5.2)
POTASSIUM SERPL-SCNC: 3.8 MMOL/L (ref 3.5–5.2)
QT INTERVAL: 602 MS
QTC INTERVAL: 580 MS
RBC # BLD AUTO: 2.47 10*6/MM3 (ref 3.77–5.28)
SAO2 % BLDA: 91 % (ref 95–98)
SODIUM BLD-SCNC: 139 MMOL/L (ref 138–146)
SODIUM SERPL-SCNC: 143 MMOL/L (ref 136–145)
UFH PPP CHRO-ACNC: 0.34 IU/ML (ref 0.3–0.7)
UFH PPP CHRO-ACNC: 0.37 IU/ML (ref 0.3–0.7)
WBC NRBC COR # BLD AUTO: 12.51 10*3/MM3 (ref 3.4–10.8)

## 2024-11-16 PROCEDURE — 85025 COMPLETE CBC W/AUTO DIFF WBC: CPT | Performed by: INTERNAL MEDICINE

## 2024-11-16 PROCEDURE — 25010000002 HEPARIN (PORCINE) 25000-0.45 UT/250ML-% SOLUTION

## 2024-11-16 PROCEDURE — 83735 ASSAY OF MAGNESIUM: CPT | Performed by: INTERNAL MEDICINE

## 2024-11-16 PROCEDURE — 25010000002 PIPERACILLIN SOD-TAZOBACTAM PER 1 G: Performed by: INTERNAL MEDICINE

## 2024-11-16 PROCEDURE — 82948 REAGENT STRIP/BLOOD GLUCOSE: CPT

## 2024-11-16 PROCEDURE — 85520 HEPARIN ASSAY: CPT

## 2024-11-16 PROCEDURE — 25010000002 AMIODARONE IN DEXTROSE 5% 360-4.14 MG/200ML-% SOLUTION: Performed by: INTERNAL MEDICINE

## 2024-11-16 PROCEDURE — 97530 THERAPEUTIC ACTIVITIES: CPT

## 2024-11-16 PROCEDURE — 25010000002 HYDROCORTISONE SOD SUC (PF) 100 MG RECONSTITUTED SOLUTION: Performed by: INTERNAL MEDICINE

## 2024-11-16 PROCEDURE — 80048 BASIC METABOLIC PNL TOTAL CA: CPT | Performed by: INTERNAL MEDICINE

## 2024-11-16 PROCEDURE — 84132 ASSAY OF SERUM POTASSIUM: CPT | Performed by: THORACIC SURGERY (CARDIOTHORACIC VASCULAR SURGERY)

## 2024-11-16 PROCEDURE — 63710000001 INSULIN GLARGINE PER 5 UNITS: Performed by: INTERNAL MEDICINE

## 2024-11-16 PROCEDURE — 71045 X-RAY EXAM CHEST 1 VIEW: CPT

## 2024-11-16 PROCEDURE — 99232 SBSQ HOSP IP/OBS MODERATE 35: CPT | Performed by: INTERNAL MEDICINE

## 2024-11-16 PROCEDURE — 25010000002 FUROSEMIDE PER 20 MG: Performed by: INTERNAL MEDICINE

## 2024-11-16 PROCEDURE — 94799 UNLISTED PULMONARY SVC/PX: CPT

## 2024-11-16 PROCEDURE — 93005 ELECTROCARDIOGRAM TRACING: CPT | Performed by: INTERNAL MEDICINE

## 2024-11-16 PROCEDURE — 63710000001 INSULIN LISPRO (HUMAN) PER 5 UNITS: Performed by: NURSE PRACTITIONER

## 2024-11-16 RX ORDER — POTASSIUM CHLORIDE 1500 MG/1
20 TABLET, EXTENDED RELEASE ORAL ONCE
Status: COMPLETED | OUTPATIENT
Start: 2024-11-16 | End: 2024-11-16

## 2024-11-16 RX ORDER — ACETAMINOPHEN 325 MG/1
650 TABLET ORAL EVERY 6 HOURS PRN
Status: DISCONTINUED | OUTPATIENT
Start: 2024-11-16 | End: 2024-11-20 | Stop reason: HOSPADM

## 2024-11-16 RX ORDER — AMIODARONE HYDROCHLORIDE 200 MG/1
200 TABLET ORAL 2 TIMES DAILY WITH MEALS
Status: DISCONTINUED | OUTPATIENT
Start: 2024-11-16 | End: 2024-11-20 | Stop reason: HOSPADM

## 2024-11-16 RX ORDER — POTASSIUM CHLORIDE 1500 MG/1
40 TABLET, EXTENDED RELEASE ORAL EVERY 4 HOURS
Status: COMPLETED | OUTPATIENT
Start: 2024-11-16 | End: 2024-11-16

## 2024-11-16 RX ADMIN — MUPIROCIN 1 APPLICATION: 20 OINTMENT TOPICAL at 08:08

## 2024-11-16 RX ADMIN — HEPARIN SODIUM 20 UNITS/KG/HR: 10000 INJECTION, SOLUTION INTRAVENOUS at 18:22

## 2024-11-16 RX ADMIN — ASPIRIN 81 MG: 81 TABLET, COATED ORAL at 08:08

## 2024-11-16 RX ADMIN — POTASSIUM CHLORIDE 20 MEQ: 1500 TABLET, EXTENDED RELEASE ORAL at 19:39

## 2024-11-16 RX ADMIN — HYDROCORTISONE SODIUM SUCCINATE 100 MG: 100 INJECTION, POWDER, FOR SOLUTION INTRAMUSCULAR; INTRAVENOUS at 19:39

## 2024-11-16 RX ADMIN — OXYCODONE HYDROCHLORIDE AND ACETAMINOPHEN 1 TABLET: 5; 325 TABLET ORAL at 00:16

## 2024-11-16 RX ADMIN — NYSTATIN 1 APPLICATION: 100000 POWDER TOPICAL at 08:09

## 2024-11-16 RX ADMIN — AMIODARONE HYDROCHLORIDE 200 MG: 200 TABLET ORAL at 18:50

## 2024-11-16 RX ADMIN — INSULIN LISPRO 3 UNITS: 100 INJECTION, SOLUTION INTRAVENOUS; SUBCUTANEOUS at 18:20

## 2024-11-16 RX ADMIN — DOCUSATE SODIUM 100 MG: 100 CAPSULE, LIQUID FILLED ORAL at 20:29

## 2024-11-16 RX ADMIN — AMIODARONE HYDROCHLORIDE 0.5 MG/MIN: 1.8 INJECTION, SOLUTION INTRAVENOUS at 06:56

## 2024-11-16 RX ADMIN — AMIODARONE HYDROCHLORIDE 200 MG: 200 TABLET ORAL at 12:09

## 2024-11-16 RX ADMIN — POTASSIUM CHLORIDE 10 MEQ: 750 CAPSULE, EXTENDED RELEASE ORAL at 20:29

## 2024-11-16 RX ADMIN — POTASSIUM CHLORIDE 40 MEQ: 1500 TABLET, EXTENDED RELEASE ORAL at 06:08

## 2024-11-16 RX ADMIN — PIPERACILLIN AND TAZOBACTAM 3.38 G: 3; .375 INJECTION, POWDER, LYOPHILIZED, FOR SOLUTION INTRAVENOUS at 02:23

## 2024-11-16 RX ADMIN — HYDROCORTISONE SODIUM SUCCINATE 100 MG: 100 INJECTION, POWDER, FOR SOLUTION INTRAMUSCULAR; INTRAVENOUS at 12:09

## 2024-11-16 RX ADMIN — PIPERACILLIN AND TAZOBACTAM 3.38 G: 3; .375 INJECTION, POWDER, LYOPHILIZED, FOR SOLUTION INTRAVENOUS at 10:15

## 2024-11-16 RX ADMIN — HYDROCORTISONE SODIUM SUCCINATE 100 MG: 100 INJECTION, POWDER, FOR SOLUTION INTRAMUSCULAR; INTRAVENOUS at 05:04

## 2024-11-16 RX ADMIN — FUROSEMIDE 60 MG: 10 INJECTION, SOLUTION INTRAMUSCULAR; INTRAVENOUS at 06:08

## 2024-11-16 RX ADMIN — POTASSIUM CHLORIDE 40 MEQ: 1500 TABLET, EXTENDED RELEASE ORAL at 10:15

## 2024-11-16 RX ADMIN — PIPERACILLIN AND TAZOBACTAM 3.38 G: 3; .375 INJECTION, POWDER, LYOPHILIZED, FOR SOLUTION INTRAVENOUS at 18:21

## 2024-11-16 RX ADMIN — POTASSIUM CHLORIDE 10 MEQ: 750 CAPSULE, EXTENDED RELEASE ORAL at 08:08

## 2024-11-16 RX ADMIN — OXYCODONE HYDROCHLORIDE AND ACETAMINOPHEN 1 TABLET: 5; 325 TABLET ORAL at 07:54

## 2024-11-16 RX ADMIN — DOCUSATE SODIUM 100 MG: 100 CAPSULE, LIQUID FILLED ORAL at 08:08

## 2024-11-16 RX ADMIN — NYSTATIN 1 APPLICATION: 100000 POWDER TOPICAL at 20:29

## 2024-11-16 RX ADMIN — INSULIN GLARGINE 20 UNITS: 100 INJECTION, SOLUTION SUBCUTANEOUS at 20:29

## 2024-11-16 RX ADMIN — LEVOTHYROXINE SODIUM 200 MCG: 0.1 TABLET ORAL at 06:08

## 2024-11-16 RX ADMIN — HEPARIN SODIUM 20 UNITS/KG/HR: 10000 INJECTION, SOLUTION INTRAVENOUS at 00:17

## 2024-11-16 RX ADMIN — FUROSEMIDE 60 MG: 10 INJECTION, SOLUTION INTRAMUSCULAR; INTRAVENOUS at 18:21

## 2024-11-16 RX ADMIN — INSULIN LISPRO 3 UNITS: 100 INJECTION, SOLUTION INTRAVENOUS; SUBCUTANEOUS at 20:28

## 2024-11-16 NOTE — THERAPY TREATMENT NOTE
Patient Name: Jill Miller  : 1960    MRN: [id number]                              Today's Date: 2024       Admit Date: 2024    Visit Dx:     ICD-10-CM ICD-9-CM   1. Aortic valve disorder  I35.9 424.1   2. Stenosis of prosthetic aortic valve, initial encounter  T82.857A 996.71     Patient Active Problem List   Diagnosis    Essential hypertension    Acquired hypothyroidism    Lung cancer; S/P Bronch/Thor w/ ALBINA lobectomy/mediastinal lymph node resec (17, Dr. Cheatham)    Paroxysmal atrial fibrillation    Hyperlipidemia LDL goal <70    Prosthetic aortic valve stenosis s/p TAVR 24, Right groin exploration for hematoma 24    Type 2 diabetes mellitus    COPD (chronic obstructive pulmonary disease)    Obesity (BMI 30-39.9)    Tobacco user    Pulmonary hypertension    Stenosis of prosthetic mitral valve    Acute on chronic respiratory failure with hypoxia and hypercapnia    Pulmonary infiltrate, likely right-sided pneumonia    Acute pulmonary embolism without acute cor pulmonale     Past Medical History:   Diagnosis Date    Acute pulmonary embolism without acute cor pulmonale 2024    Atrial fibrillation and flutter     Chronic diastolic congestive heart failure     Chronically Abnormal CXR (Left pleural disease post op) 2017    COPD exacerbation 2022    Elevated cholesterol     Essential hypertension 04/10/2017    Target blood pressure <130/80 mmHg    Graves disease     Hyperlipidemia LDL goal <70     Hypertension     Hyperthyroidism     Lung cancer     MRSA (methicillin resistant staph aureus) culture positive     under left breast, incision and debridement, treated with wound packing and po abx     Prolonged QTC interval on ECG 2022    Rheumatic mitral stenosis     Type 2 diabetes mellitus 2018    Valvular heart disease      Past Surgical History:   Procedure Laterality Date    ABLATION OF DYSRHYTHMIC FOCUS  2018    ABSCESS DRAINAGE       under left breast d/t mrsa     AORTIC VALVE REPAIR/REPLACEMENT N/A 07/16/2018    Procedure: MEDIAN STERNOTOMY, AORTIC VALVE REPLACEMENT WITH TIFFANIE AND MAZE, TRICUSPID RING, LEFT ATRIAL OCCLUSION;  Surgeon: Ajay Cheatham MD;  Location:  [facility];  Service: Cardiothoracic    AORTIC VALVE REPAIR/REPLACEMENT N/A 11/11/2024    Procedure: TRANSCATHETER AORTIC VALVE REPLACEMENT;  Surgeon: Ajay Cheatham MD;  Location:  [facility];  Service: Cardiothoracic;  Laterality: N/A;  FL- 6 min 36 sec  DOSE-  110 MGY  CONTRAST- 40 mL    AORTIC VALVE REPAIR/REPLACEMENT N/A 11/11/2024    Procedure: Transfemoral Transcatheter Aortic Valve Replacement;  Surgeon: Jeaneth Carreon MD;  Location:  [facility];  Service: Cardiovascular;  Laterality: N/A;    BRONCHOSCOPY Left 05/12/2017    Procedure: BRONCHOSCOPY;  Surgeon: Ajay Cheatham MD;  Location:  [facility];  Service:     BRONCHOSCOPY N/A 05/18/2017    Procedure: BRONCHOSCOPY BIOPSY AT BEDSIDE;  Surgeon: Ajay Cheatham MD;  Location:  [facility];  Service:     CARDIAC CATHETERIZATION N/A 09/28/2017    Procedure: Left Heart Cath;  Surgeon: Marco Lay MD;  Location:  [facility];  Service:     CARDIAC CATHETERIZATION N/A 09/28/2017    Procedure: Right Heart Cath;  Surgeon: Marco Lay MD;  Location:  [facility];  Service:     CARDIAC CATHETERIZATION N/A 10/11/2024    Procedure: Right and Left Heart Cath;  Surgeon: Jeffry Pickett MD;  Location:  [facility];  Service: Cardiology;  Laterality: N/A;    CARDIAC ELECTROPHYSIOLOGY PROCEDURE N/A 6/1/2022    Procedure: Ablation atrial fibrillation (atypical A flutter). Hold Rythmol x5 days;  Surgeon: Nigel Huber MD;  Location: [facility];  Service: Cardiovascular;  Laterality: N/A;    FEMORAL THROMBECTOMY/EMBOLECTOMY Right 11/12/2024    Procedure: GROIN EXPLORATION, LIGATION OF INFERIOR EPIGASTRIC ARTERY RIGHT;  Surgeon:  Ajay Cheatham MD;  Location:  [facility];  Service: Cardiothoracic;  Laterality: Right;  FL: 6 SECONDS  DOSE: 191MGY  CONTRAST: 30ML VISIPAQUE    LUNG BIOPSY  04/2017    LYMPH NODE DISSECTION Left 05/12/2017    Procedure: MEDIASTINAL LYMPH NODE DISSECTION;  Surgeon: Ajay Cheatham MD;  Location:  [facility];  Service:     MAZE PROCEDURE      MITRAL VALVE REPAIR/REPLACEMENT N/A 07/16/2018    Procedure: MITRAL VALVE REPLACEMENT;  Surgeon: Ajay Cheatham MD;  Location:  [facility];  Service: Cardiothoracic    MITRAL VALVE REPLACEMENT  07/16/2018    TEETH EXTRACTION      THORACOSCOPY VIDEO ASSISTED WITH LOBECTOMY Left 05/12/2017    Procedure: THORACOSCOPY VIDEO ASSISTED WITH OPEN LOBECTOMY;  Surgeon: Ajay Cheatham MD;  Location:  [facility];  Service:     TOTAL THYROIDECTOMY  approx 2012    TRANSESOPHAGEAL ECHOCARDIOGRAM (TIFFANIE) N/A 11/11/2024    Procedure: TRANSESOPHAGEAL ECHOCARDIOGRAM WITH ANESTHESIA;  Surgeon: Ajay Cheatham MD;  Location:  [facility];  Service: Cardiothoracic;  Laterality: N/A;    TRICUSPID VALVE SURGERY  07/16/2018      General Information       Row Name 11/16/24 1320          Physical Therapy Time and Intention    Document Type therapy note (daily note)  -AE     Mode of Treatment physical therapy  -AE       Row Name 11/16/24 1320          General Information    Patient Profile Reviewed yes  -AE     Existing Precautions/Restrictions cardiac;fall;oxygen therapy device and L/min  -AE     Barriers to Rehab medically complex;previous functional deficit  -AE       Row Name 11/16/24 1320          Cognition    Orientation Status (Cognition) oriented x 3  -AE       Row Name 11/16/24 1320          Safety Issues/Impairments Affecting Functional Mobility    Safety Issues Affecting Function (Mobility) awareness of need for assistance;insight into deficits/self-awareness;safety precaution awareness;safety precautions follow-through/compliance;sequencing abilities  -AE     Impairments Affecting Function  (Mobility) balance;coordination;endurance/activity tolerance;postural/trunk control;pain;shortness of breath;strength  -AE               User Key  (r) = Recorded By, (t) = Taken By, (c) = Cosigned By      Initials Name Provider Type    AE Nic Flores PT Physical Therapist                   Mobility       Row Name 11/16/24 1323          Bed Mobility    Comment, (Bed Mobility) Pt received and left UIC.  -AE       Row Name 11/16/24 1323          Transfers    Comment, (Transfers) VCs for hand placement and sequencing.  -AE       Row Name 11/16/24 1323          Sit-Stand Transfer    Sit-Stand [place] (Transfers) minimum assist (75% patient effort);2 person assist;verbal cues  -AE     Assistive Device (Sit-Stand Transfers) other (see comments)  BUE support  -AE       Row Name 11/16/24 1323          Gait/Stairs (Locomotion)    [place] Level (Gait) minimum assist (75% patient effort);2 person assist;verbal cues  x2 for line management and chair follow  -AE     Assistive Device (Gait) other (see comments)  BUE support on tele monitor  -AE     Distance in Feet (Gait) 50  -AE     Deviations/Abnormal Patterns (Gait) bilateral deviations;sri decreased;stride length decreased  -AE     Bilateral Gait Deviations forward flexed posture;heel strike decreased  -AE     Comment, (Gait/Stairs) Pt demo step to gait pattern with short step length and forward flexed posture. Pt required increased cues to improve upright posture and sequencing of AD. Pt demo good effort this session. Further distance limited by fatigue, required seated rest break and returned to room in chair.  -AE               User Key  (r) = Recorded By, (t) = Taken By, (c) = Cosigned By      Initials Name Provider Type    Nic Gee PT Physical Therapist                   Obj/Interventions       Row Name 11/16/24 132          Balance    Balance Assessment sitting static balance;sitting dynamic balance;standing static balance;standing  dynamic balance  -AE     Static Sitting Balance contact guard  -AE     Dynamic Sitting Balance minimal assist;verbal cues  -AE     Position, Sitting Balance supported;sitting in chair  -AE     Static Standing Balance minimal assist;2-person assist  -AE     Dynamic Standing Balance minimal assist;moderate assist;2-person assist;verbal cues  -AE     Position/Device Used, Standing Balance supported  -AE               User Key  (r) = Recorded By, (t) = Taken By, (c) = Cosigned By      Initials Name Provider Type    AE Nic Flores, PT Physical Therapist                   Goals/Plan    No documentation.                  Clinical Impression       Row Name 11/16/24 132          Pain    Pain Location groin  -AE     Pain Side/Orientation right  -AE     Pain Management Interventions activity modification encouraged;positioning techniques utilized  -AE     Response to Pain Interventions activity level improved;activity participation with tolerable pain  -AE       Row Name 11/16/24 1329          Pain Scale: FACES Pre/Post-Treatment    Pain: FACES Scale, Pretreatment 2-->hurts little bit  -AE     Posttreatment Pain Rating 4-->hurts little more  -AE     Pre/Posttreatment Pain Comment tolerated  -AE       Row Name 11/16/24 1321          Plan of Care Review    Plan of Care Reviewed With patient  -AE     Progress improving  -AE     Outcome Evaluation Pt continues to present with decreased activity tolerance and decreased functional independence. Pt ambulated 50ft with min-mod A x2 +2 for equipment management, pt required increased assist with fatigue. Continue to progress per pt tolerance.  -AE       Row Name 11/16/24 1323          Vital Signs    Pre Systolic BP Rehab 109  -AE     Pre Treatment Diastolic BP 55  -AE     Post Systolic BP Rehab 115  -AE     Post Treatment Diastolic BP 62  -AE     Pretreatment Heart Rate (beats/min) 59  -AE     Posttreatment Heart Rate (beats/min) 64  -AE     Pre SpO2 (%) 96  -AE     O2 Delivery  Pre Treatment nasal cannula  -AE     O2 Delivery Intra Treatment nasal cannula  -AE     Post SpO2 (%) 92  -AE     O2 Delivery Post Treatment nasal cannula  -AE     Pre Patient Position Sitting  -AE     Intra Patient Position Standing  -AE     Post Patient Position Sitting  -AE       Row Name 11/16/24 1327          Positioning and Restraints    Pre-Treatment Position sitting in chair/recliner  -AE     Post Treatment Position chair  -AE     In Chair notified nsg;reclined;call light within reach;encouraged to call for assist;with nsg;RUE elevated;LUE elevated;legs elevated  -AE               User Key  (r) = Recorded By, (t) = Taken By, (c) = Cosigned By      Initials Name Provider Type    AE Nic Flores PT Physical Therapist                   Outcome Measures       Row Name 11/16/24 1330          How much help from another person do you currently need...    Turning from your back to your side while in flat bed without using bedrails? 2  -AE     Moving from lying on back to sitting on the side of a flat bed without bedrails? 2  -AE     Moving to and from a bed to a chair (including a wheelchair)? 3  -AE     Standing up from a chair using your arms (e.g., wheelchair, bedside chair)? 3  -AE     Climbing 3-5 steps with a railing? 2  -AE     To walk in hospital room? 2  -AE     AM-PAC 6 Clicks Score (PT) 14  -AE     Highest Level of Mobility Goal 4 --> Transfer to chair/commode  -AE       Row Name 11/16/24 1330          Functional Assessment    Outcome Measure Options AM-PAC 6 Clicks Basic Mobility (PT)  -AE               User Key  (r) = Recorded By, (t) = Taken By, (c) = Cosigned By      Initials Name Provider Type    AE Nic Flores PT Physical Therapist                                 Physical Therapy Education       Title: PT OT SLP Therapies (In Progress)       Topic: Physical Therapy (In Progress)       Point: Mobility training (In Progress)       Learning Progress Summary            Patient Acceptance, E,  NR by AE at 11/16/2024 0947    Acceptance, E, NR by KG at 11/15/2024 1316    Acceptance, E, NR by KG at 11/14/2024 1119    Acceptance, E, NR by KG at 11/13/2024 1430                      Point: Home exercise program (In Progress)       Learning Progress Summary            Patient Acceptance, E, NR by AE at 11/16/2024 0947    Acceptance, E, NR by KG at 11/15/2024 1316    Acceptance, E, NR by KG at 11/14/2024 1119                      Point: Body mechanics (In Progress)       Learning Progress Summary            Patient Acceptance, E, NR by AE at 11/16/2024 0947    Acceptance, E, NR by KG at 11/15/2024 1316    Acceptance, E, NR by KG at 11/14/2024 1119    Acceptance, E, NR by KG at 11/13/2024 1430                      Point: Precautions (In Progress)       Learning Progress Summary            Patient Acceptance, E, NR by AE at 11/16/2024 0947    Acceptance, E, NR by KG at 11/15/2024 1316    Acceptance, E, NR by KG at 11/14/2024 1119    Acceptance, E, NR by KG at 11/13/2024 1430                                      User Key       Initials Effective Dates Name Provider Type Discipline    KG 05/22/20 -  Onelia Casper, PT Physical Therapist PT    AE 09/21/21 -  Nic Flores PT Physical Therapist PT                  PT Recommendation and Plan     Progress: improving  Outcome Evaluation: Pt continues to present with decreased activity tolerance and decreased functional independence. Pt ambulated 50ft with min-mod A x2 +2 for equipment management, pt required increased assist with fatigue. Continue to progress per pt tolerance.     Time Calculation:         PT Charges       Row Name 11/16/24 1331             Time Calculation    Start Time 0947  -AE      PT Received On 11/16/24  -AE      PT Goal Re-Cert Due Date 11/23/24  -AE         Timed Charges    69196 - PT Therapeutic Activity Minutes 24  -AE         Total Minutes    Timed Charges Total Minutes 24  -AE       Total Minutes 24  -AE                User Key  (r)  = Recorded By, (t) = Taken By, (c) = Cosigned By      Initials Name Provider Type    AE Nic Flores, PT Physical Therapist                  Therapy Charges for Today       Code Description Service Date Service Provider Modifiers Qty    [id number] HC PT THERAPEUTIC ACT EA 15 MIN 11/16/2024 Nic Flores, YULIANA GP 2            PT G-Codes  Outcome Measure Options: AM-PAC 6 Clicks Basic Mobility (PT)  AM-PAC 6 Clicks Score (PT): 14  AM-PAC 6 Clicks Score (OT): 13  PT Discharge Summary  Anticipated Discharge Disposition (PT): inpatient rehabilitation facility    Nic Flores PT  11/16/2024

## 2024-11-16 NOTE — NURSING NOTE
Here to place PICC, reviewed doppler study with Dr Morris, PICC is now canceled, Staff RN notified

## 2024-11-16 NOTE — PROGRESS NOTES
Intensive Care Follow-up     Hospital:  LOS: 5 days   Ms. Jill Miller, 64 y.o. female is followed for:   Prosthetic aortic valve stenosis        Maycol Miller is a 64 year-old female that presents to [facility] ICU postoperatively from scheduled TAVR today.      Patient has a PMH of HFpEF, valvular disease s/p prosthetic MVR/AVR, pulmonary HTN, lung cancer s/p ALBINA lobectomy (2017), multiple cardiac valve replacements, HTN, hypothyroidism, hyperlipidemia, HFpEF, T2DM, COPD, tobacco use, and PAF s/p ANGELA/Maze (2018), cardioversion and PVA (2022).   Patient recently admitted to [facility] in September with hypoxic respiratory failure 2* rhinovirus and exacerbation of heart failure. 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 Cardiology/CTS consulted and patient underwent work-up for TAVR.      TIFFANIE showed LVEF 51-55%, moderate to severe AS, moderate MS, tricuspid ring noted, and bi-atrial cavity dilation. CTA TAVR negative for acute abnormality / noted emphysema.   Interval History:  The chart has been reviewed.  Pressors have remained off.  Patient is tolerating heparin infusion.  No sign of acute blood loss though hemoglobin has dropped a bit.  Patient was successful in getting a peripheral IV placed.  Culture data has been reviewed and remains negative.  Chest x-ray was reviewed and shows improved aeration of the right lung.    The patient's past medical, surgical and social history were reviewed and updated in Epic as appropriate.        Objective     Infusions:  EPINEPHrine, 0.02-0.3 mcg/kg/min, Last Rate: Stopped (11/15/24 0147)  heparin, 20 Units/kg/hr, Last Rate: 20 Units/kg/hr (11/16/24 0017)  Pharmacy to Dose Heparin,   phenylephrine, 0.5-3 mcg/kg/min, Last Rate: 0.5 mcg/kg/min  "(11/16/24 0845)  vasopressin, 0.03 Units/min, Last Rate: Stopped (11/14/24 1400)      Medications:  amiodarone, 200 mg, Oral, BID With Meals  aspirin, 81 mg, Oral, Daily  docusate sodium, 100 mg, Oral, BID  furosemide, 60 mg, Intravenous, Q12H  hydrocortisone sodium succinate, 100 mg, Intravenous, Q8H  insulin glargine, 20 Units, Subcutaneous, Nightly  insulin lispro, 3-14 Units, Subcutaneous, 4x Daily AC & at Bedtime  levothyroxine, 200 mcg, Oral, Q AM  mupirocin, 1 Application, Each Nare, BID  nystatin, 1 Application, Topical, BID  piperacillin-tazobactam, 3.375 g, Intravenous, Q8H  polyethylene glycol, 17 g, Oral, Daily  potassium chloride, 10 mEq, Oral, BID  senna-docusate sodium, 2 tablet, Oral, Nightly        Vital Sign Min/Max for last 24 hours  Temp  Min: 97.4 °F (36.3 °C)  Max: 98.8 °F (37.1 °C)   BP  Min: 81/41  Max: 118/55   Pulse  Min: 51  Max: 71   Resp  Min: 16  Max: 22   SpO2  Min: 90 %  Max: 99 %   Flow (L/min) (Oxygen Therapy)  Min: 4  Max: 50       Input/Output for last 24 hour shift  11/15 0701 - 11/16 0700  In: 1916 [P.O.:690; I.V.:1126]  Out: 2505 [Urine:2505]      Objective:  General Appearance:  Uncomfortable, in no acute distress and not in pain.    Vital signs: (most recent): Blood pressure 110/56, pulse 58, temperature 97.9 °F (36.6 °C), temperature source Axillary, resp. rate 18, height 157.5 cm (62\"), weight 72.1 kg (159 lb), SpO2 95%.    HEENT: (High flow nasal cannula 50%)    Lungs:  Normal effort and normal respiratory rate.  She is not in respiratory distress.  There are wheezes and rhonchi.  No rales or decreased breath sounds.  (Mild increased right-sided breath sounds with intermittent wheezing and rhonchi)  Heart: Bradycardia.  Regular rhythm.  S1 normal and S2 normal.    Abdomen: Abdomen is soft.  Bowel sounds are normal.   There is generalized tenderness.  There is no rebound tenderness.     Extremities: Normal range of motion.  (Edema and mild coolness to the left hand.  No " cyanosis.)  Neurological: Patient is alert and oriented to person, place and time.    Pupils:  Pupils are equal, round, and reactive to light.    Skin:  Warm.                Results from last 7 days   Lab Units 11/16/24  0501 11/15/24  0346 11/14/24  1401   WBC 10*3/mm3 12.51* 14.99* 16.90*   HEMOGLOBIN g/dL 8.4* 9.1* 8.8*   PLATELETS 10*3/mm3 100* 76* 76*     Results from last 7 days   Lab Units 11/16/24  0501 11/15/24  0346 11/14/24  0109 11/13/24  0320 11/12/24  0946 11/12/24  0450   SODIUM mmol/L 143 140 138 141   < > 141   POTASSIUM mmol/L 3.4* 4.1 3.7 3.8   < > 4.0   CO2 mmol/L 32.0* 28.0 26.0 29.0   < > 16.0*   BUN mg/dL 19 14 13 15   < > 17   CREATININE mg/dL 0.56* 0.46* 0.60 0.63   < > 1.24*   MAGNESIUM mg/dL 2.2 2.4  --  3.2*   < > 2.0   PHOSPHORUS mg/dL  --   --   --  3.0  --  4.9*   GLUCOSE mg/dL 115* 108* 197* 226*   < > 364*    < > = values in this interval not displayed.     Estimated Creatinine Clearance: 96.9 mL/min (A) (by C-G formula based on SCr of 0.56 mg/dL (L)).    Results from last 7 days   Lab Units 11/15/24  0443   PH, ARTERIAL pH units 7.357   PCO2, ARTERIAL mm Hg 50.7*   PO2 ART mm Hg 60.0*           I reviewed the patient's results and images.     Assessment & Plan   Impression        Prosthetic aortic valve stenosis s/p TAVR 11/11/24, Right groin exploration for hematoma 11/12/24    Paroxysmal atrial fibrillation    Type 2 diabetes mellitus    COPD (chronic obstructive pulmonary disease)    Acute on chronic respiratory failure with hypoxia and hypercapnia    Pulmonary infiltrate, likely right-sided pneumonia    Acute pulmonary embolism without acute cor pulmonale       Plan        I would plan to treat with a total of 7 days of antimicrobial therapy and then discontinue.  Tomorrow will be day 7.  Continue with current glucose control.  Anticoagulation for underlying small pulmonary emboli as before.  If she were to remain stable from a hemodynamic and hemoglobin standpoint, she likely  could be transitioned over to a direct oral anticoagulant.  Bronchodilator therapy as before.  Electrolyte replacement as before.  Continue with good pulmonary hygiene with flutter valve and incentive spirometry.  Continue with physical and Occupational Therapy.  Hopefully discontinue central line today if peripheral IV is patent.  Unfortunately they were unable to place a PICC secondary to the patient's vasculature.      Plan of care and goals reviewed with mulitdisciplinary/antibiotic stewardship team during rounds.   I discussed the patient's findings and my recommendations with patient and nursing staff         Omid León MD, [facility]  Pulmonology and Critical Care Medicine

## 2024-11-16 NOTE — PROGRESS NOTES
[facility] at [facility]  INPATIENT PROGRESS NOTE         [facility]    11/16/2024      PATIENT IDENTIFICATION:   Name:  Jill Miller      MRN:  [id number]     64 y.o.  female             Reason for visit: Advair, A-fib, CHF      SUBJECTIVE:   Maintaining sinus rhythm still on amiodarone drip.     OBJECTIVE:  Vitals:    11/16/24 1430 11/16/24 1445 11/16/24 1500 11/16/24 1515   BP: 104/55 114/54 103/55 114/62   BP Location:       Patient Position:       Pulse: 62 62 55 58   Resp:       Temp:       TempSrc:       SpO2: 97% 96% 95% 97%   Weight:       Height:               Body mass index is 29.08 kg/m².    Intake/Output Summary (Last 24 hours) at 11/16/2024 1519  Last data filed at 11/16/2024 1200  Gross per 24 hour   Intake 1966.01 ml   Output 1930 ml   Net 36.01 ml       Telemetry: Personally reviewed, normal sinus rhythm, no arrhythmia     Exam:  General Appearance:   well developed  well nourished  Neck:  thyroid not enlarged  supple  Respiratory:  no respiratory distress  normal breath sounds  no rales  Cardiovascular:  no jugular venous distention  regular rhythm  apical impulse normal  S1 normal, S2 normal  no S3, no S4   no murmur  no rub, no thrill  carotid pulses normal; no bruit  pedal pulses normal  lower extremity edema: none    Skin:   warm, dry      Allergies   Allergen Reactions    Lortab [Hydrocodone-Acetaminophen] Nausea And Vomiting, Dizziness and Headache    Morphine And Codeine Nausea Only, GI Intolerance and Headache     Scheduled meds:       amiodarone, 200 mg, Oral, BID With Meals  aspirin, 81 mg, Oral, Daily  docusate sodium, 100 mg, Oral, BID  furosemide, 60 mg, Intravenous, Q12H  hydrocortisone sodium succinate, 100 mg, Intravenous, Q8H  insulin glargine, 20 Units, Subcutaneous, Nightly  insulin lispro, 3-14 Units, Subcutaneous, 4x Daily AC & at Bedtime  levothyroxine, 200 mcg, Oral, Q AM  mupirocin, 1 Application, Each Nare, BID  nystatin, 1  "Application, Topical, BID  piperacillin-tazobactam, 3.375 g, Intravenous, Q8H  polyethylene glycol, 17 g, Oral, Daily  potassium chloride, 10 mEq, Oral, BID  senna-docusate sodium, 2 tablet, Oral, Nightly      IV meds:                      EPINEPHrine, 0.02-0.3 mcg/kg/min, Last Rate: Stopped (11/15/24 0147)  heparin, 20 Units/kg/hr, Last Rate: 20 Units/kg/hr (11/16/24 0017)  Pharmacy to Dose Heparin,   phenylephrine, 0.5-3 mcg/kg/min, Last Rate: 0.5 mcg/kg/min (11/16/24 0845)  vasopressin, 0.03 Units/min, Last Rate: Stopped (11/14/24 1400)      Data Review:  Results from last 7 days   Lab Units 11/16/24  0501 11/15/24  0346 11/14/24  0109 11/13/24  0320   SODIUM mmol/L 143 140 138 141   BUN mg/dL 19 14 13 15   CREATININE mg/dL 0.56* 0.46* 0.60 0.63   GLUCOSE mg/dL 115* 108* 197* 226*     Results from last 7 days   Lab Units 11/16/24  0501 11/15/24  0346 11/14/24  1401 11/14/24  0109 11/13/24  0320   WBC 10*3/mm3 12.51* 14.99* 16.90* 20.44* 18.27*   HEMOGLOBIN g/dL 8.4* 9.1* 8.8* 8.7* 9.1*     Results from last 7 days   Lab Units 11/12/24  0450   INR  1.25*     Results from last 7 days   Lab Units 11/12/24  0946 11/12/24  0450 11/11/24 2054   ALT (SGPT) U/L 18 20 14   AST (SGOT) U/L 42* 46* 32     No results found for: \"DIGOXIN\"   Lab Results   Component Value Date    TSH 11.770 (H) 01/11/2024           Invalid input(s): \"LDLCALC\"    Estimated Creatinine Clearance: 96.9 mL/min (A) (by C-G formula based on SCr of 0.56 mg/dL (L)).        Imaging (last 24 hr):   I personally reviewed the most recent chest x-ray and other pertinent imaging studies.  Results for orders placed during the hospital encounter of 11/11/24    XR Chest 1 View    Narrative  XR CHEST 1 VW    Date of Exam: 11/16/2024 4:48 AM EST    Indication: Respiratory failure, pneumonia    Comparison: 11/15/2024    Findings:  Left IJ catheter tip remains in the distal left brachiocephalic vein. Heart is normal in size. Pulmonary vasculature is mostly obscured. " Extensive right upper and mid lung pneumonia and milder diffuse interstitial changes elsewhere all appear improved.  No pneumothorax is seen. There is minimal if any left effusion.    Impression  Impression:  Improving pneumonia.      Electronically Signed: Brayden Dodd MD  11/16/2024 8:35 AM EST  Workstation ID: [id number]        Last ECHO:  Results for orders placed during the hospital encounter of 11/11/24    Adult Transthoracic Echo Limited W/ Cont if Necessary Per Protocol    Interpretation Summary    Left ventricular systolic function is normal. Calculated left ventricular EF = 60.5%    Aortic valve area is 1 cm2.    Aortic valve mean pressure gradient is 34 mmHg.    There is a TAVR valve present. The prosthetic aortic valve peak and mean gradients are elevated.    Elevated gradients noted, however, not present at time of invasive hemodynamics at time of procedure yesterday        PROBLEM LIST:     Prosthetic aortic valve stenosis s/p TAVR 11/11/24, Right groin exploration for hematoma 11/12/24    Paroxysmal atrial fibrillation    Type 2 diabetes mellitus    COPD (chronic obstructive pulmonary disease)    Acute on chronic respiratory failure with hypoxia and hypercapnia    Pulmonary infiltrate, likely right-sided pneumonia    Acute pulmonary embolism without acute cor pulmonale        ASSESSMENT/PLAN:  1.  Valve in valve TAVR:  Progressing well, continue current medical therapy  Postoperative TIFFANIE findings reviewed    2.  Paroxysmal atrial fibrillation:  De-escalate amiodarone, transition IV to 200 mg twice daily p.o. amiodarone  Maintaining sinus rhythm currently.    3.  Acute on chronic HFpEF:  Preop EF 50 to 55%  Continue Lasix at current dose  Monitor renal function blood pressure closely.    Discussed with patient's nurse      Adama Maza MD  11/16/2024    15:19 EST

## 2024-11-16 NOTE — PLAN OF CARE
Goal Outcome Evaluation:  Plan of Care Reviewed With: patient        Progress: improving  Outcome Evaluation: Patient neuro intact, reports sleeping well. Pain managed with PRNs. Now on 4L NC and tolerating well. SB to NSR, rick now off. +BM. Amio and heparin gtt continue. Attempted to place peripheral IV with ultrasound guidance but due to DVTs/phlebitis unable to obtain, PICC consult placed. Patient has no complaints at this time.

## 2024-11-16 NOTE — PLAN OF CARE
Goal Outcome Evaluation:  Plan of Care Reviewed With: patient        Progress: improving  Outcome Evaluation: Pt continues to present with decreased activity tolerance and decreased functional independence. Pt ambulated 50ft with min-mod A x2 +2 for equipment management, pt required increased assist with fatigue. Continue to progress per pt tolerance.    Anticipated Discharge Disposition (PT): inpatient rehabilitation facility

## 2024-11-16 NOTE — PROGRESS NOTES
Cardiothoracic Surgery Progress Note      POD # 5 s/p TAVR, POD # 4 Right groin exploration      LOS: 5 days      Subjective:  No complaints.  Feeling better.  Slid to the floor when up with nursing.  Remains on Heparin and Amio gtt.  Placed back on Harshil gtt at 0.5    Objective:  Vital Signs  Temp:  [97.4 °F (36.3 °C)-98.8 °F (37.1 °C)] 97.9 °F (36.6 °C)  Heart Rate:  [51-71] 58  Resp:  [16-22] 18  BP: ()/(41-84) 110/56    Physical Exam:   General Appearance: alert, appears stated age and cooperative   Lungs: clear to auscultation, respirations regular, respirations even, and respirations unlabored   Heart: regular rhythm & normal rate, normal S1, S2, and no murmur, no gallop, no rub   Skin: Incision c/d/i     Results:    Results from last 7 days   Lab Units 11/16/24  0501   WBC 10*3/mm3 12.51*   HEMOGLOBIN g/dL 8.4*   HEMATOCRIT % 25.8*   PLATELETS 10*3/mm3 100*     Results from last 7 days   Lab Units 11/16/24  0501   SODIUM mmol/L 143   POTASSIUM mmol/L 3.4*   CHLORIDE mmol/L 103   CO2 mmol/L 32.0*   BUN mg/dL 19   CREATININE mg/dL 0.56*   GLUCOSE mg/dL 115*   CALCIUM mg/dL 8.2*       Assessment:  POD # 5 s/p TAVR, POD # 4 Right groin exploration     Plan:  D/C central line today  Obtain alternative access  Wean Harshil gtt off  Ambulate  Pulmonary toilet    Ajay Cheatham MD  11/16/24  10:50 EST

## 2024-11-16 NOTE — PLAN OF CARE
Goal Outcome Evaluation:  Plan of Care Reviewed With: patient, spouse        Progress: improving        O2 L. Harshil and heparin drips in use. Amio off today. Ambulated 50 feet today. BM today. All incisions D/I. Pur wick in use. Deep line pulled today after IV obtained per ultrasound. Continue to monitor.

## 2024-11-17 LAB
ANION GAP SERPL CALCULATED.3IONS-SCNC: 11 MMOL/L (ref 5–15)
ANISOCYTOSIS BLD QL: NORMAL
BASOPHILS # BLD AUTO: 0.02 10*3/MM3 (ref 0–0.2)
BASOPHILS NFR BLD AUTO: 0.2 % (ref 0–1.5)
BUN SERPL-MCNC: 19 MG/DL (ref 8–23)
BUN/CREAT SERPL: 35.2 (ref 7–25)
CALCIUM SPEC-SCNC: 8.9 MG/DL (ref 8.6–10.5)
CHLORIDE SERPL-SCNC: 103 MMOL/L (ref 98–107)
CO2 SERPL-SCNC: 32 MMOL/L (ref 22–29)
CREAT SERPL-MCNC: 0.54 MG/DL (ref 0.57–1)
DEPRECATED RDW RBC AUTO: 77 FL (ref 37–54)
EGFRCR SERPLBLD CKD-EPI 2021: 104.2 ML/MIN/1.73
EOSINOPHIL # BLD AUTO: 0 10*3/MM3 (ref 0–0.4)
EOSINOPHIL NFR BLD AUTO: 0 % (ref 0.3–6.2)
ERYTHROCYTE [DISTWIDTH] IN BLOOD BY AUTOMATED COUNT: 20.6 % (ref 12.3–15.4)
GLUCOSE BLDC GLUCOMTR-MCNC: 140 MG/DL (ref 70–130)
GLUCOSE BLDC GLUCOMTR-MCNC: 141 MG/DL (ref 70–130)
GLUCOSE BLDC GLUCOMTR-MCNC: 79 MG/DL (ref 70–130)
GLUCOSE SERPL-MCNC: 89 MG/DL (ref 65–99)
HCT VFR BLD AUTO: 30.2 % (ref 34–46.6)
HGB BLD-MCNC: 9.5 G/DL (ref 12–15.9)
IMM GRANULOCYTES # BLD AUTO: 0.39 10*3/MM3 (ref 0–0.05)
IMM GRANULOCYTES NFR BLD AUTO: 3.9 % (ref 0–0.5)
LYMPHOCYTES # BLD AUTO: 0.59 10*3/MM3 (ref 0.7–3.1)
LYMPHOCYTES NFR BLD AUTO: 5.9 % (ref 19.6–45.3)
MCH RBC QN AUTO: 33 PG (ref 26.6–33)
MCHC RBC AUTO-ENTMCNC: 31.5 G/DL (ref 31.5–35.7)
MCV RBC AUTO: 104.9 FL (ref 79–97)
MONOCYTES # BLD AUTO: 0.57 10*3/MM3 (ref 0.1–0.9)
MONOCYTES NFR BLD AUTO: 5.7 % (ref 5–12)
NEUTROPHILS NFR BLD AUTO: 8.37 10*3/MM3 (ref 1.7–7)
NEUTROPHILS NFR BLD AUTO: 84.3 % (ref 42.7–76)
NRBC BLD AUTO-RTO: 3.5 /100 WBC (ref 0–0.2)
PLAT MORPH BLD: NORMAL
PLATELET # BLD AUTO: 126 10*3/MM3 (ref 140–450)
PMV BLD AUTO: 11.2 FL (ref 6–12)
POTASSIUM SERPL-SCNC: 3.4 MMOL/L (ref 3.5–5.2)
POTASSIUM SERPL-SCNC: 3.5 MMOL/L (ref 3.5–5.2)
POTASSIUM SERPL-SCNC: 3.5 MMOL/L (ref 3.5–5.2)
POTASSIUM SERPL-SCNC: 3.8 MMOL/L (ref 3.5–5.2)
RBC # BLD AUTO: 2.88 10*6/MM3 (ref 3.77–5.28)
SODIUM SERPL-SCNC: 146 MMOL/L (ref 136–145)
UFH PPP CHRO-ACNC: 0.32 IU/ML (ref 0.3–0.7)
UFH PPP CHRO-ACNC: 0.42 IU/ML (ref 0.3–0.7)
WBC MORPH BLD: NORMAL
WBC NRBC COR # BLD AUTO: 9.94 10*3/MM3 (ref 3.4–10.8)

## 2024-11-17 PROCEDURE — 85007 BL SMEAR W/DIFF WBC COUNT: CPT | Performed by: THORACIC SURGERY (CARDIOTHORACIC VASCULAR SURGERY)

## 2024-11-17 PROCEDURE — 85520 HEPARIN ASSAY: CPT

## 2024-11-17 PROCEDURE — 99233 SBSQ HOSP IP/OBS HIGH 50: CPT | Performed by: INTERNAL MEDICINE

## 2024-11-17 PROCEDURE — 84132 ASSAY OF SERUM POTASSIUM: CPT | Performed by: THORACIC SURGERY (CARDIOTHORACIC VASCULAR SURGERY)

## 2024-11-17 PROCEDURE — 25010000002 HEPARIN (PORCINE) 25000-0.45 UT/250ML-% SOLUTION

## 2024-11-17 PROCEDURE — 63710000001 INSULIN GLARGINE PER 5 UNITS: Performed by: INTERNAL MEDICINE

## 2024-11-17 PROCEDURE — 80048 BASIC METABOLIC PNL TOTAL CA: CPT | Performed by: THORACIC SURGERY (CARDIOTHORACIC VASCULAR SURGERY)

## 2024-11-17 PROCEDURE — 25010000002 FUROSEMIDE PER 20 MG: Performed by: INTERNAL MEDICINE

## 2024-11-17 PROCEDURE — 85025 COMPLETE CBC W/AUTO DIFF WBC: CPT | Performed by: THORACIC SURGERY (CARDIOTHORACIC VASCULAR SURGERY)

## 2024-11-17 PROCEDURE — 25010000002 HYDROCORTISONE SOD SUC (PF) 100 MG RECONSTITUTED SOLUTION: Performed by: INTERNAL MEDICINE

## 2024-11-17 PROCEDURE — 99232 SBSQ HOSP IP/OBS MODERATE 35: CPT | Performed by: INTERNAL MEDICINE

## 2024-11-17 PROCEDURE — 63710000001 INSULIN LISPRO (HUMAN) PER 5 UNITS: Performed by: NURSE PRACTITIONER

## 2024-11-17 PROCEDURE — 82948 REAGENT STRIP/BLOOD GLUCOSE: CPT

## 2024-11-17 PROCEDURE — 25010000002 PIPERACILLIN SOD-TAZOBACTAM PER 1 G: Performed by: INTERNAL MEDICINE

## 2024-11-17 RX ORDER — POTASSIUM CHLORIDE 1500 MG/1
40 TABLET, EXTENDED RELEASE ORAL EVERY 4 HOURS
Status: COMPLETED | OUTPATIENT
Start: 2024-11-17 | End: 2024-11-18

## 2024-11-17 RX ORDER — OXYCODONE AND ACETAMINOPHEN 5; 325 MG/1; MG/1
1 TABLET ORAL EVERY 4 HOURS PRN
Status: DISCONTINUED | OUTPATIENT
Start: 2024-11-17 | End: 2024-11-20 | Stop reason: HOSPADM

## 2024-11-17 RX ORDER — MIDODRINE HYDROCHLORIDE 5 MG/1
5 TABLET ORAL
Status: DISCONTINUED | OUTPATIENT
Start: 2024-11-17 | End: 2024-11-20

## 2024-11-17 RX ORDER — POTASSIUM CHLORIDE 1500 MG/1
40 TABLET, EXTENDED RELEASE ORAL EVERY 4 HOURS
Status: COMPLETED | OUTPATIENT
Start: 2024-11-17 | End: 2024-11-17

## 2024-11-17 RX ORDER — POTASSIUM CHLORIDE 1500 MG/1
20 TABLET, EXTENDED RELEASE ORAL ONCE
Status: COMPLETED | OUTPATIENT
Start: 2024-11-17 | End: 2024-11-17

## 2024-11-17 RX ADMIN — POTASSIUM CHLORIDE 40 MEQ: 1500 TABLET, EXTENDED RELEASE ORAL at 09:29

## 2024-11-17 RX ADMIN — DOCUSATE SODIUM 100 MG: 100 CAPSULE, LIQUID FILLED ORAL at 20:02

## 2024-11-17 RX ADMIN — POTASSIUM CHLORIDE 40 MEQ: 1500 TABLET, EXTENDED RELEASE ORAL at 21:43

## 2024-11-17 RX ADMIN — NYSTATIN 1 APPLICATION: 100000 POWDER TOPICAL at 08:31

## 2024-11-17 RX ADMIN — LEVOTHYROXINE SODIUM 200 MCG: 0.1 TABLET ORAL at 05:04

## 2024-11-17 RX ADMIN — POTASSIUM CHLORIDE 20 MEQ: 1500 TABLET, EXTENDED RELEASE ORAL at 16:19

## 2024-11-17 RX ADMIN — FUROSEMIDE 60 MG: 10 INJECTION, SOLUTION INTRAMUSCULAR; INTRAVENOUS at 17:53

## 2024-11-17 RX ADMIN — PIPERACILLIN AND TAZOBACTAM 3.38 G: 3; .375 INJECTION, POWDER, LYOPHILIZED, FOR SOLUTION INTRAVENOUS at 17:53

## 2024-11-17 RX ADMIN — MIDODRINE HYDROCHLORIDE 5 MG: 5 TABLET ORAL at 17:53

## 2024-11-17 RX ADMIN — NYSTATIN 1 APPLICATION: 100000 POWDER TOPICAL at 20:02

## 2024-11-17 RX ADMIN — POTASSIUM CHLORIDE 10 MEQ: 750 CAPSULE, EXTENDED RELEASE ORAL at 20:02

## 2024-11-17 RX ADMIN — MIDODRINE HYDROCHLORIDE 5 MG: 5 TABLET ORAL at 08:31

## 2024-11-17 RX ADMIN — POTASSIUM CHLORIDE 10 MEQ: 750 CAPSULE, EXTENDED RELEASE ORAL at 08:31

## 2024-11-17 RX ADMIN — AMIODARONE HYDROCHLORIDE 200 MG: 200 TABLET ORAL at 08:31

## 2024-11-17 RX ADMIN — OXYCODONE HYDROCHLORIDE AND ACETAMINOPHEN 1 TABLET: 5; 325 TABLET ORAL at 21:43

## 2024-11-17 RX ADMIN — ASPIRIN 81 MG: 81 TABLET, COATED ORAL at 08:31

## 2024-11-17 RX ADMIN — POTASSIUM CHLORIDE 40 MEQ: 1500 TABLET, EXTENDED RELEASE ORAL at 05:19

## 2024-11-17 RX ADMIN — FUROSEMIDE 60 MG: 10 INJECTION, SOLUTION INTRAMUSCULAR; INTRAVENOUS at 05:04

## 2024-11-17 RX ADMIN — AMIODARONE HYDROCHLORIDE 200 MG: 200 TABLET ORAL at 17:53

## 2024-11-17 RX ADMIN — HYDROCORTISONE SODIUM SUCCINATE 100 MG: 100 INJECTION, POWDER, FOR SOLUTION INTRAMUSCULAR; INTRAVENOUS at 05:04

## 2024-11-17 RX ADMIN — INSULIN GLARGINE 20 UNITS: 100 INJECTION, SOLUTION SUBCUTANEOUS at 20:12

## 2024-11-17 RX ADMIN — HEPARIN SODIUM 20 UNITS/KG/HR: 10000 INJECTION, SOLUTION INTRAVENOUS at 13:29

## 2024-11-17 RX ADMIN — SENNOSIDES AND DOCUSATE SODIUM 2 TABLET: 50; 8.6 TABLET ORAL at 20:02

## 2024-11-17 RX ADMIN — PIPERACILLIN AND TAZOBACTAM 3.38 G: 3; .375 INJECTION, POWDER, LYOPHILIZED, FOR SOLUTION INTRAVENOUS at 09:29

## 2024-11-17 RX ADMIN — PIPERACILLIN AND TAZOBACTAM 3.38 G: 3; .375 INJECTION, POWDER, LYOPHILIZED, FOR SOLUTION INTRAVENOUS at 01:41

## 2024-11-17 RX ADMIN — INSULIN LISPRO 3 UNITS: 100 INJECTION, SOLUTION INTRAVENOUS; SUBCUTANEOUS at 20:12

## 2024-11-17 NOTE — PLAN OF CARE
Goal Outcome Evaluation:  Plan of Care Reviewed With: patient        Progress: improving  Outcome Evaluation: Pt neuro intact. Ambulated 120ft, up to chair this AM with assistance. On 2L NC, using IS and flutter valve independently. NSR to SB, rick titrated off. Heparin gtt continues. 2 BMs, UOP >1L. Afebrile. No complaints of pain. Replacing potassium per protocol.

## 2024-11-17 NOTE — PLAN OF CARE
Goal Outcome Evaluation:  Plan of Care Reviewed With: patient, spouse        Progress: improving     Alert and oriented. Resp unlabored on 2-3 mL nasal O2. NSR VSS on no drips. Midodrine started today. Ambulated well today for 220 each time. Gaining more strength. All incisions D/I. Possible transfer in AM.

## 2024-11-17 NOTE — PROGRESS NOTES
[facility] at [facility]  INPATIENT PROGRESS NOTE         [facility]    11/17/2024      PATIENT IDENTIFICATION:   Name:  Jill Miller      MRN:  [id number]     64 y.o.  female             Reason for visit: Advair, A-fib, CHF      SUBJECTIVE:   improving today, family bedside, off Harshil-Synephrine, midodrine started     OBJECTIVE:  Vitals:    11/17/24 0800 11/17/24 0830 11/17/24 0900 11/17/24 1000   BP: 104/55 102/55 98/49 103/52   BP Location: Right arm      Patient Position: Sitting      Pulse: 65 90 80 66   Resp: 20   20   Temp: 97.7 °F (36.5 °C)      TempSrc: Axillary      SpO2: 92% 90% 95% 98%   Weight:       Height:               Body mass index is 29.08 kg/m².    Intake/Output Summary (Last 24 hours) at 11/17/2024 1136  Last data filed at 11/17/2024 1000  Gross per 24 hour   Intake 1474.63 ml   Output 3025 ml   Net -1550.37 ml       Telemetry: Personally reviewed, normal sinus rhythm, no arrhythmia     Exam:  General Appearance:   well developed  well nourished  Neck:  thyroid not enlarged  supple  Respiratory:  no respiratory distress  normal breath sounds  no rales  Cardiovascular:  no jugular venous distention  regular rhythm  apical impulse normal  S1 normal, S2 normal  no S3, no S4   no murmur  no rub, no thrill  carotid pulses normal; no bruit  pedal pulses normal  lower extremity edema: none    Skin:   warm, dry      Allergies   Allergen Reactions    Lortab [Hydrocodone-Acetaminophen] Nausea And Vomiting, Dizziness and Headache    Morphine And Codeine Nausea Only, GI Intolerance and Headache     Scheduled meds:       amiodarone, 200 mg, Oral, BID With Meals  aspirin, 81 mg, Oral, Daily  docusate sodium, 100 mg, Oral, BID  furosemide, 60 mg, Intravenous, Q12H  hydrocortisone sodium succinate, 100 mg, Intravenous, Q8H  insulin glargine, 20 Units, Subcutaneous, Nightly  insulin lispro, 3-14 Units, Subcutaneous, 4x Daily AC & at Bedtime  levothyroxine, 200  "mcg, Oral, Q AM  midodrine, 5 mg, Oral, BID AC  nystatin, 1 Application, Topical, BID  piperacillin-tazobactam, 3.375 g, Intravenous, Q8H  polyethylene glycol, 17 g, Oral, Daily  potassium chloride, 10 mEq, Oral, BID  senna-docusate sodium, 2 tablet, Oral, Nightly      IV meds:                      EPINEPHrine, 0.02-0.3 mcg/kg/min, Last Rate: Stopped (11/15/24 0147)  heparin, 20 Units/kg/hr, Last Rate: 20 Units/kg/hr (11/16/24 1822)  Pharmacy to Dose Heparin,   phenylephrine, 0.5-3 mcg/kg/min, Last Rate: Stopped (11/17/24 0113)  vasopressin, 0.03 Units/min, Last Rate: Stopped (11/14/24 1400)      Data Review:  Results from last 7 days   Lab Units 11/17/24  0352 11/16/24  0501 11/15/24  0346 11/14/24  0109   SODIUM mmol/L 146* 143 140 138   BUN mg/dL 19 19 14 13   CREATININE mg/dL 0.54* 0.56* 0.46* 0.60   GLUCOSE mg/dL 89 115* 108* 197*     Results from last 7 days   Lab Units 11/17/24  0352 11/16/24  0501 11/15/24  0346 11/14/24  1401 11/14/24  0109   WBC 10*3/mm3 9.94 12.51* 14.99* 16.90* 20.44*   HEMOGLOBIN g/dL 9.5* 8.4* 9.1* 8.8* 8.7*     Results from last 7 days   Lab Units 11/12/24  0450   INR  1.25*     Results from last 7 days   Lab Units 11/12/24  0946 11/12/24  0450 11/11/24 2054   ALT (SGPT) U/L 18 20 14   AST (SGOT) U/L 42* 46* 32     No results found for: \"DIGOXIN\"   Lab Results   Component Value Date    TSH 11.770 (H) 01/11/2024           Invalid input(s): \"LDLCALC\"    Estimated Creatinine Clearance: 100.4 mL/min (A) (by C-G formula based on SCr of 0.54 mg/dL (L)).        Imaging (last 24 hr):   I personally reviewed the most recent chest x-ray and other pertinent imaging studies.  Results for orders placed during the hospital encounter of 11/11/24    XR Chest 1 View    Narrative  XR CHEST 1 VW    Date of Exam: 11/16/2024 4:48 AM EST    Indication: Respiratory failure, pneumonia    Comparison: 11/15/2024    Findings:  Left IJ catheter tip remains in the distal left brachiocephalic vein. Heart is " normal in size. Pulmonary vasculature is mostly obscured. Extensive right upper and mid lung pneumonia and milder diffuse interstitial changes elsewhere all appear improved.  No pneumothorax is seen. There is minimal if any left effusion.    Impression  Impression:  Improving pneumonia.      Electronically Signed: Brayden Dodd MD  11/16/2024 8:35 AM EST  Workstation ID: [id number]        Last ECHO:  Results for orders placed during the hospital encounter of 11/11/24    Adult Transthoracic Echo Limited W/ Cont if Necessary Per Protocol    Interpretation Summary    Left ventricular systolic function is normal. Calculated left ventricular EF = 60.5%    Aortic valve area is 1 cm2.    Aortic valve mean pressure gradient is 34 mmHg.    There is a TAVR valve present. The prosthetic aortic valve peak and mean gradients are elevated.    Elevated gradients noted, however, not present at time of invasive hemodynamics at time of procedure yesterday        PROBLEM LIST:     Prosthetic aortic valve stenosis s/p TAVR 11/11/24, Right groin exploration for hematoma 11/12/24    Paroxysmal atrial fibrillation    Type 2 diabetes mellitus    COPD (chronic obstructive pulmonary disease)    Acute on chronic respiratory failure with hypoxia and hypercapnia    Pulmonary infiltrate, likely right-sided pneumonia    Acute pulmonary embolism without acute cor pulmonale        ASSESSMENT/PLAN:  1.  Valve in valve TAVR:  Progressing well, continue current medical therapy  Postoperative TIFFANIE findings reviewed  Continue diuresis another 24 hours May transition to p.o. diuretics Monday, 11/18/2024  Midodrine started for hypotension off vasopressors.    2.  Paroxysmal atrial fibrillation:  Continue p.o. amiodarone  Maintaining sinus rhythm currently.    3.  Acute on chronic HFpEF:  Preop EF 50 to 55%  Continue Lasix at current dose, transition to p.o. diuretics Monday if clinically stable  Monitor renal function blood pressure closely.    Discussed with  patient's nurse and family at bedside      Adama Maza MD  11/17/2024    11:36 EST

## 2024-11-17 NOTE — PROGRESS NOTES
Intensive Care Follow-up     Hospital:  LOS: 6 days   Ms. Jill Miller, 64 y.o. female is followed for:   Prosthetic aortic valve stenosis        Subjective     Jill Miller is a 64 year-old female that presents to [facility] ICU postoperatively from scheduled TAVR today.      Patient has a PMH of HFpEF, valvular disease s/p prosthetic MVR/AVR, pulmonary HTN, lung cancer s/p ALBINA lobectomy (2017), multiple cardiac valve replacements, HTN, hypothyroidism, hyperlipidemia, HFpEF, T2DM, COPD, tobacco use, and PAF s/p ANGELA/Maze (2018), cardioversion and PVA (2022).   Patient recently admitted to [facility] in September with hypoxic respiratory failure 2* rhinovirus and exacerbation of heart failure. 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 Cardiology/CTS consulted and patient underwent work-up for TAVR.      TIFFANIE showed LVEF 51-55%, moderate to severe AS, moderate MS, tricuspid ring noted, and bi-atrial cavity dilation. CTA TAVR negative for acute abnormality / noted emphysema.   Interval History:  The chart has been reviewed.  The patient has remained afebrile.  She was on Harshil-Synephrine up until early this morning but is currently off this.  She is resting comfortably.  Cultures remain negative.    The patient's past medical, surgical and social history were reviewed and updated in Epic as appropriate.        Objective     Infusions:  EPINEPHrine, 0.02-0.3 mcg/kg/min, Last Rate: Stopped (11/15/24 0147)  heparin, 20 Units/kg/hr, Last Rate: 20 Units/kg/hr (11/16/24 1822)  Pharmacy to Dose Heparin,   phenylephrine, 0.5-3 mcg/kg/min, Last Rate: Stopped (11/17/24 0113)  vasopressin, 0.03 Units/min, Last Rate: Stopped (11/14/24 1400)      Medications:  amiodarone, 200 mg, Oral, BID With Meals  aspirin, 81  "mg, Oral, Daily  docusate sodium, 100 mg, Oral, BID  furosemide, 60 mg, Intravenous, Q12H  [START ON 11/18/2024] hydrocortisone sodium succinate, 100 mg, Intravenous, Q24H  insulin glargine, 20 Units, Subcutaneous, Nightly  insulin lispro, 3-14 Units, Subcutaneous, 4x Daily AC & at Bedtime  levothyroxine, 200 mcg, Oral, Q AM  midodrine, 5 mg, Oral, BID AC  nystatin, 1 Application, Topical, BID  piperacillin-tazobactam, 3.375 g, Intravenous, Q8H  polyethylene glycol, 17 g, Oral, Daily  potassium chloride, 10 mEq, Oral, BID  senna-docusate sodium, 2 tablet, Oral, Nightly        Vital Sign Min/Max for last 24 hours  Temp  Min: 97.7 °F (36.5 °C)  Max: 98.1 °F (36.7 °C)   BP  Min: 86/45  Max: 120/57   Pulse  Min: 53  Max: 90   Resp  Min: 16  Max: 20   SpO2  Min: 88 %  Max: 100 %   Flow (L/min) (Oxygen Therapy)  Min: 2  Max: 4       Input/Output for last 24 hour shift  11/16 0701 - 11/17 0700  In: 1474.6 [P.O.:475; I.V.:761.9]  Out: 2400 [Urine:2400]      Objective:  General Appearance:  In no acute distress, not in pain and comfortable.    Vital signs: (most recent): Blood pressure 103/52, pulse 66, temperature 97.7 °F (36.5 °C), temperature source Axillary, resp. rate 20, height 157.5 cm (62\"), weight 72.1 kg (159 lb), SpO2 98%.    HEENT: (High flow nasal cannula 50%)    Lungs:  Normal effort and normal respiratory rate.  She is not in respiratory distress.  There are rhonchi.  No rales, decreased breath sounds or wheezes.  (Mild increased right-sided breath sounds with intermittent rhonchi)  Heart: Normal rate.  Regular rhythm.  S1 normal and S2 normal.    Abdomen: Abdomen is soft.  Bowel sounds are normal.   There is generalized tenderness.  There is no rebound tenderness.     Extremities: Normal range of motion.  (Edema and mild coolness to the left hand.  No cyanosis.)  Neurological: Patient is alert and oriented to person, place and time.    Pupils:  Pupils are equal, round, and reactive to light.    Skin:  Warm.  "               Results from last 7 days   Lab Units 24  0352 24  0501 11/15/24  0346   WBC 10*3/mm3 9.94 12.51* 14.99*   HEMOGLOBIN g/dL 9.5* 8.4* 9.1*   PLATELETS 10*3/mm3 126* 100* 76*     Results from last 7 days   Lab Units 24  0352 24  1505 24  0501 11/15/24  0346 24  0109 24  0320 24  0946 24  0450   SODIUM mmol/L 146*  --  143 140   < > 141   < > 141   POTASSIUM mmol/L 3.5  3.5 3.8 3.4* 4.1   < > 3.8   < > 4.0   CO2 mmol/L 32.0*  --  32.0* 28.0   < > 29.0   < > 16.0*   BUN mg/dL 19  --  19 14   < > 15   < > 17   CREATININE mg/dL 0.54*  --  0.56* 0.46*   < > 0.63   < > 1.24*   MAGNESIUM mg/dL  --   --  2.2 2.4  --  3.2*   < > 2.0   PHOSPHORUS mg/dL  --   --   --   --   --  3.0  --  4.9*   GLUCOSE mg/dL 89  --  115* 108*   < > 226*   < > 364*    < > = values in this interval not displayed.     Estimated Creatinine Clearance: 100.4 mL/min (A) (by C-G formula based on SCr of 0.54 mg/dL (L)).    Results from last 7 days   Lab Units 11/15/24  0443   PH, ARTERIAL pH units 7.357   PCO2, ARTERIAL mm Hg 50.7*   PO2 ART mm Hg 60.0*         I reviewed the patient's results and images.     Assessment & Plan   Impression        Prosthetic aortic valve stenosis s/p TAVR 24, Right groin exploration for hematoma 24    Paroxysmal atrial fibrillation    Type 2 diabetes mellitus    COPD (chronic obstructive pulmonary disease)    Acute on chronic respiratory failure with hypoxia and hypercapnia    Pulmonary infiltrate, likely right-sided pneumonia    Acute pulmonary embolism without acute cor pulmonale       Plan        Will go ahead and let Ruma  which has been treating right sided pneumonia, likely from aspiration.  Continue with bronchodilator therapy as before.  Glucose control as before.  We will go ahead and cut back the Solu-Cortef to 100 mg daily and continue to wean.  Anticoagulation as before for small pulmonary emboli.  Wean oxygen as  tolerated.    Plan of care and goals reviewed with mulitdisciplinary/antibiotic stewardship team during rounds.   I discussed the patient's findings and my recommendations with patient and nursing staff     High level of risk due to:  drug(s) requiring intensive monitoring for toxicity and parenteral controlled substances.        Omid León MD, [facility]  Pulmonology and Critical Care Medicine

## 2024-11-17 NOTE — PROGRESS NOTES
Cardiothoracic Surgery Progress Note      POD # 6 s/p TAVR, POD # 4 Right groin exploration      LOS: 6 days      Subjective:  No complaints.  Feeling better.  Remains on Heparin and Amio gtt.  Off Harshil gtt     Objective:  Vital Signs  Temp:  [97.8 °F (36.6 °C)-98.1 °F (36.7 °C)] 97.8 °F (36.6 °C)  Heart Rate:  [53-75] 61  Resp:  [16-20] 16  BP: ()/(41-89) 92/49    Physical Exam:   General Appearance: alert, appears stated age and cooperative   Lungs: clear to auscultation, respirations regular, respirations even, and respirations unlabored   Heart: regular rhythm & normal rate, normal S1, S2, and no murmur, no gallop, no rub   Skin: Incision c/d/i     Results:    Results from last 7 days   Lab Units 11/17/24  0352   WBC 10*3/mm3 9.94   HEMOGLOBIN g/dL 9.5*   HEMATOCRIT % 30.2*   PLATELETS 10*3/mm3 126*     Results from last 7 days   Lab Units 11/17/24  0352   SODIUM mmol/L 146*   POTASSIUM mmol/L 3.5  3.5   CHLORIDE mmol/L 103   CO2 mmol/L 32.0*   BUN mg/dL 19   CREATININE mg/dL 0.54*   GLUCOSE mg/dL 89   CALCIUM mg/dL 8.9       Assessment:  POD # 6 s/p TAVR, POD # 4 Right groin exploration     Plan:  Start Midodrine  Diuresis  Wean oxygen as tolerated  Ambulate  Pulmonary toilet  Transfer to telemetry    Ajay Cheatham MD  11/17/24  08:08 EST

## 2024-11-18 ENCOUNTER — APPOINTMENT (OUTPATIENT)
Dept: CARDIOLOGY | Facility: HOSPITAL | Age: 64
End: 2024-11-18
Payer: COMMERCIAL

## 2024-11-18 LAB
ANION GAP SERPL CALCULATED.3IONS-SCNC: 6 MMOL/L (ref 5–15)
AORTIC DIMENSIONLESS INDEX: 0.38 (DI)
BASOPHILS # BLD AUTO: 0.01 10*3/MM3 (ref 0–0.2)
BASOPHILS NFR BLD AUTO: 0.1 % (ref 0–1.5)
BH CV ECHO MEAS - AO MAX PG: 44.5 MMHG
BH CV ECHO MEAS - AO MEAN PG: 33 MMHG
BH CV ECHO MEAS - AO V2 MAX: 323.1 CM/SEC
BH CV ECHO MEAS - AVA(I,D): 1 CM2
BH CV ECHO MEAS - EF(MOD-BP): 69.3 %
BH CV ECHO MEAS - IVS/LVPW: 1 CM
BH CV ECHO MEAS - IVSD: 1 CM
BH CV ECHO MEAS - LA DIMENSION: 4.5 CM
BH CV ECHO MEAS - LV MAX PG: 8.9 MMHG
BH CV ECHO MEAS - LV MEAN PG: 6.3 MMHG
BH CV ECHO MEAS - LV V1 MAX: 149.3 CM/SEC
BH CV ECHO MEAS - LV V1 VTI: 34.9 CM
BH CV ECHO MEAS - LVIDD: 4.2 CM
BH CV ECHO MEAS - LVIDS: 2.9 CM
BH CV ECHO MEAS - LVOT DIAM: 1.8 CM
BH CV ECHO MEAS - LVPWD: 1 CM
BH CV ECHO MEAS - MV MAX PG: 30.9 MMHG
BH CV ECHO MEAS - MV MEAN PG: 13.34 MMHG
BH CV ECHO MEAS - MV P1/2T: 155 MSEC
BUN SERPL-MCNC: 18 MG/DL (ref 8–23)
BUN/CREAT SERPL: 32.7 (ref 7–25)
CALCIUM SPEC-SCNC: 8.2 MG/DL (ref 8.6–10.5)
CHLORIDE SERPL-SCNC: 104 MMOL/L (ref 98–107)
CO2 SERPL-SCNC: 34 MMOL/L (ref 22–29)
CREAT SERPL-MCNC: 0.55 MG/DL (ref 0.57–1)
DEPRECATED RDW RBC AUTO: 77.6 FL (ref 37–54)
DEPRECATED RDW RBC AUTO: 80.6 FL (ref 37–54)
EGFRCR SERPLBLD CKD-EPI 2021: 103.8 ML/MIN/1.73
EOSINOPHIL # BLD AUTO: 0.01 10*3/MM3 (ref 0–0.4)
EOSINOPHIL NFR BLD AUTO: 0.1 % (ref 0.3–6.2)
ERYTHROCYTE [DISTWIDTH] IN BLOOD BY AUTOMATED COUNT: 20.8 % (ref 12.3–15.4)
ERYTHROCYTE [DISTWIDTH] IN BLOOD BY AUTOMATED COUNT: 21.4 % (ref 12.3–15.4)
GLUCOSE BLDC GLUCOMTR-MCNC: 101 MG/DL (ref 70–130)
GLUCOSE BLDC GLUCOMTR-MCNC: 161 MG/DL (ref 70–130)
GLUCOSE BLDC GLUCOMTR-MCNC: 195 MG/DL (ref 70–130)
GLUCOSE BLDC GLUCOMTR-MCNC: 197 MG/DL (ref 70–130)
GLUCOSE BLDC GLUCOMTR-MCNC: 239 MG/DL (ref 70–130)
GLUCOSE SERPL-MCNC: 93 MG/DL (ref 65–99)
HCT VFR BLD AUTO: 26.1 % (ref 34–46.6)
HCT VFR BLD AUTO: 31.6 % (ref 34–46.6)
HGB BLD-MCNC: 8.3 G/DL (ref 12–15.9)
HGB BLD-MCNC: 9.9 G/DL (ref 12–15.9)
IMM GRANULOCYTES # BLD AUTO: 0.39 10*3/MM3 (ref 0–0.05)
IMM GRANULOCYTES NFR BLD AUTO: 4.7 % (ref 0–0.5)
LEFT ATRIUM VOLUME INDEX: 55.9 ML/M2
LYMPHOCYTES # BLD AUTO: 0.87 10*3/MM3 (ref 0.7–3.1)
LYMPHOCYTES NFR BLD AUTO: 10.5 % (ref 19.6–45.3)
MAGNESIUM SERPL-MCNC: 2 MG/DL (ref 1.6–2.4)
MCH RBC QN AUTO: 33.7 PG (ref 26.6–33)
MCH RBC QN AUTO: 34.6 PG (ref 26.6–33)
MCHC RBC AUTO-ENTMCNC: 31.3 G/DL (ref 31.5–35.7)
MCHC RBC AUTO-ENTMCNC: 31.8 G/DL (ref 31.5–35.7)
MCV RBC AUTO: 106.1 FL (ref 79–97)
MCV RBC AUTO: 110.5 FL (ref 79–97)
MONOCYTES # BLD AUTO: 0.68 10*3/MM3 (ref 0.1–0.9)
MONOCYTES NFR BLD AUTO: 8.2 % (ref 5–12)
NEUTROPHILS NFR BLD AUTO: 6.29 10*3/MM3 (ref 1.7–7)
NEUTROPHILS NFR BLD AUTO: 76.4 % (ref 42.7–76)
NRBC BLD AUTO-RTO: 3.6 /100 WBC (ref 0–0.2)
PLATELET # BLD AUTO: 111 10*3/MM3 (ref 140–450)
PLATELET # BLD AUTO: 113 10*3/MM3 (ref 140–450)
PMV BLD AUTO: 10.7 FL (ref 6–12)
PMV BLD AUTO: 10.8 FL (ref 6–12)
POTASSIUM SERPL-SCNC: 3.7 MMOL/L (ref 3.5–5.2)
RBC # BLD AUTO: 2.46 10*6/MM3 (ref 3.77–5.28)
RBC # BLD AUTO: 2.86 10*6/MM3 (ref 3.77–5.28)
SODIUM SERPL-SCNC: 144 MMOL/L (ref 136–145)
UFH PPP CHRO-ACNC: 0.33 IU/ML (ref 0.3–0.7)
WBC NRBC COR # BLD AUTO: 8.25 10*3/MM3 (ref 3.4–10.8)
WBC NRBC COR # BLD AUTO: 8.93 10*3/MM3 (ref 3.4–10.8)

## 2024-11-18 PROCEDURE — 85520 HEPARIN ASSAY: CPT

## 2024-11-18 PROCEDURE — 93325 DOPPLER ECHO COLOR FLOW MAPG: CPT | Performed by: INTERNAL MEDICINE

## 2024-11-18 PROCEDURE — 93321 DOPPLER ECHO F-UP/LMTD STD: CPT

## 2024-11-18 PROCEDURE — 93325 DOPPLER ECHO COLOR FLOW MAPG: CPT

## 2024-11-18 PROCEDURE — 80048 BASIC METABOLIC PNL TOTAL CA: CPT | Performed by: INTERNAL MEDICINE

## 2024-11-18 PROCEDURE — 92526 ORAL FUNCTION THERAPY: CPT

## 2024-11-18 PROCEDURE — 25010000002 FUROSEMIDE PER 20 MG: Performed by: INTERNAL MEDICINE

## 2024-11-18 PROCEDURE — 82948 REAGENT STRIP/BLOOD GLUCOSE: CPT

## 2024-11-18 PROCEDURE — 97530 THERAPEUTIC ACTIVITIES: CPT

## 2024-11-18 PROCEDURE — 25010000002 PIPERACILLIN SOD-TAZOBACTAM PER 1 G: Performed by: INTERNAL MEDICINE

## 2024-11-18 PROCEDURE — 99024 POSTOP FOLLOW-UP VISIT: CPT | Performed by: PHYSICIAN ASSISTANT

## 2024-11-18 PROCEDURE — 99232 SBSQ HOSP IP/OBS MODERATE 35: CPT | Performed by: INTERNAL MEDICINE

## 2024-11-18 PROCEDURE — 25010000002 HYDROCORTISONE SOD SUC (PF) 100 MG RECONSTITUTED SOLUTION: Performed by: INTERNAL MEDICINE

## 2024-11-18 PROCEDURE — 93308 TTE F-UP OR LMTD: CPT

## 2024-11-18 PROCEDURE — 93321 DOPPLER ECHO F-UP/LMTD STD: CPT | Performed by: INTERNAL MEDICINE

## 2024-11-18 PROCEDURE — 85027 COMPLETE CBC AUTOMATED: CPT | Performed by: INTERNAL MEDICINE

## 2024-11-18 PROCEDURE — 63710000001 INSULIN LISPRO (HUMAN) PER 5 UNITS: Performed by: NURSE PRACTITIONER

## 2024-11-18 PROCEDURE — 83735 ASSAY OF MAGNESIUM: CPT | Performed by: INTERNAL MEDICINE

## 2024-11-18 PROCEDURE — 25010000002 HEPARIN (PORCINE) 25000-0.45 UT/250ML-% SOLUTION

## 2024-11-18 PROCEDURE — 85025 COMPLETE CBC W/AUTO DIFF WBC: CPT | Performed by: INTERNAL MEDICINE

## 2024-11-18 PROCEDURE — 93308 TTE F-UP OR LMTD: CPT | Performed by: INTERNAL MEDICINE

## 2024-11-18 PROCEDURE — 63710000001 INSULIN GLARGINE PER 5 UNITS: Performed by: INTERNAL MEDICINE

## 2024-11-18 RX ORDER — POTASSIUM CHLORIDE 1500 MG/1
20 TABLET, EXTENDED RELEASE ORAL ONCE
Status: COMPLETED | OUTPATIENT
Start: 2024-11-18 | End: 2024-11-18

## 2024-11-18 RX ADMIN — DOCUSATE SODIUM 100 MG: 100 CAPSULE, LIQUID FILLED ORAL at 20:02

## 2024-11-18 RX ADMIN — LEVOTHYROXINE SODIUM 200 MCG: 0.1 TABLET ORAL at 06:16

## 2024-11-18 RX ADMIN — SENNOSIDES AND DOCUSATE SODIUM 2 TABLET: 50; 8.6 TABLET ORAL at 20:02

## 2024-11-18 RX ADMIN — POTASSIUM CHLORIDE 20 MEQ: 1500 TABLET, EXTENDED RELEASE ORAL at 08:13

## 2024-11-18 RX ADMIN — PIPERACILLIN AND TAZOBACTAM 3.38 G: 3; .375 INJECTION, POWDER, LYOPHILIZED, FOR SOLUTION INTRAVENOUS at 02:28

## 2024-11-18 RX ADMIN — AMIODARONE HYDROCHLORIDE 200 MG: 200 TABLET ORAL at 17:46

## 2024-11-18 RX ADMIN — APIXABAN 5 MG: 5 TABLET, FILM COATED ORAL at 10:20

## 2024-11-18 RX ADMIN — FUROSEMIDE 60 MG: 10 INJECTION, SOLUTION INTRAMUSCULAR; INTRAVENOUS at 17:46

## 2024-11-18 RX ADMIN — INSULIN LISPRO 5 UNITS: 100 INJECTION, SOLUTION INTRAVENOUS; SUBCUTANEOUS at 20:02

## 2024-11-18 RX ADMIN — ASPIRIN 81 MG: 81 TABLET, COATED ORAL at 08:13

## 2024-11-18 RX ADMIN — HEPARIN SODIUM 20 UNITS/KG/HR: 10000 INJECTION, SOLUTION INTRAVENOUS at 08:14

## 2024-11-18 RX ADMIN — NYSTATIN 1 APPLICATION: 100000 POWDER TOPICAL at 20:02

## 2024-11-18 RX ADMIN — APIXABAN 5 MG: 5 TABLET, FILM COATED ORAL at 20:02

## 2024-11-18 RX ADMIN — INSULIN LISPRO 3 UNITS: 100 INJECTION, SOLUTION INTRAVENOUS; SUBCUTANEOUS at 17:46

## 2024-11-18 RX ADMIN — FUROSEMIDE 60 MG: 10 INJECTION, SOLUTION INTRAMUSCULAR; INTRAVENOUS at 06:16

## 2024-11-18 RX ADMIN — MIDODRINE HYDROCHLORIDE 5 MG: 5 TABLET ORAL at 17:46

## 2024-11-18 RX ADMIN — INSULIN GLARGINE 20 UNITS: 100 INJECTION, SOLUTION SUBCUTANEOUS at 20:02

## 2024-11-18 RX ADMIN — PIPERACILLIN AND TAZOBACTAM 3.38 G: 3; .375 INJECTION, POWDER, LYOPHILIZED, FOR SOLUTION INTRAVENOUS at 10:20

## 2024-11-18 RX ADMIN — AMIODARONE HYDROCHLORIDE 200 MG: 200 TABLET ORAL at 08:13

## 2024-11-18 RX ADMIN — NYSTATIN 1 APPLICATION: 100000 POWDER TOPICAL at 08:17

## 2024-11-18 RX ADMIN — OXYCODONE HYDROCHLORIDE AND ACETAMINOPHEN 1 TABLET: 5; 325 TABLET ORAL at 20:02

## 2024-11-18 RX ADMIN — POTASSIUM CHLORIDE 10 MEQ: 750 CAPSULE, EXTENDED RELEASE ORAL at 20:02

## 2024-11-18 RX ADMIN — MIDODRINE HYDROCHLORIDE 5 MG: 5 TABLET ORAL at 08:13

## 2024-11-18 RX ADMIN — HYDROCORTISONE SODIUM SUCCINATE 100 MG: 100 INJECTION, POWDER, FOR SOLUTION INTRAMUSCULAR; INTRAVENOUS at 06:16

## 2024-11-18 RX ADMIN — POTASSIUM CHLORIDE 40 MEQ: 1500 TABLET, EXTENDED RELEASE ORAL at 02:28

## 2024-11-18 RX ADMIN — OXYCODONE HYDROCHLORIDE AND ACETAMINOPHEN 1 TABLET: 5; 325 TABLET ORAL at 09:22

## 2024-11-18 NOTE — PLAN OF CARE
Goal Outcome Evaluation:  Plan of Care Reviewed With: patient        Progress: improving  Outcome Evaluation: Pt increased ambulation distance to 150ft with Stefano x2 and B UE support on tripod monitor. Pt demonstrated slow sri with forward flexed posture. Improved balance and coordination this session. Limited by fatigue. Continue to recommend PT skilled care and D/C to IP rehab facility.    Anticipated Discharge Disposition (PT): inpatient rehabilitation facility

## 2024-11-18 NOTE — THERAPY DISCHARGE NOTE
Acute Care - Speech Language Pathology   Swallow Treatment Note/Discharge  [facility]     Patient Name: Jill Miller  : 1960  MRN: [id number]  Today's Date: 2024               Admit Date: 2024    Visit Dx:    ICD-10-CM ICD-9-CM   1. Aortic valve disorder  I35.9 424.1   2. Stenosis of prosthetic aortic valve, initial encounter  T82.857A 996.71     Patient Active Problem List   Diagnosis    Essential hypertension    Acquired hypothyroidism    Lung cancer; S/P Bronch/Thor w/ ALBINA lobectomy/mediastinal lymph node resec (17, Dr. Cheatham)    Paroxysmal atrial fibrillation    Hyperlipidemia LDL goal <70    Prosthetic aortic valve stenosis s/p TAVR 24, Right groin exploration for hematoma 24    Type 2 diabetes mellitus    COPD (chronic obstructive pulmonary disease)    Obesity (BMI 30-39.9)    Tobacco user    Pulmonary hypertension    Stenosis of prosthetic mitral valve    Acute on chronic respiratory failure with hypoxia and hypercapnia    Pulmonary infiltrate, likely right-sided pneumonia    Acute pulmonary embolism without acute cor pulmonale     Past Medical History:   Diagnosis Date    Acute pulmonary embolism without acute cor pulmonale 2024    Atrial fibrillation and flutter     Chronic diastolic congestive heart failure     Chronically Abnormal CXR (Left pleural disease post op) 2017    COPD exacerbation 2022    Elevated cholesterol     Essential hypertension 04/10/2017    Target blood pressure <130/80 mmHg    Graves disease     Hyperlipidemia LDL goal <70     Hypertension     Hyperthyroidism     Lung cancer     MRSA (methicillin resistant staph aureus) culture positive     under left breast, incision and debridement, treated with wound packing and po abx     Prolonged QTC interval on ECG 2022    Rheumatic mitral stenosis     Type 2 diabetes mellitus 2018    Valvular heart disease      Past Surgical History:   Procedure Laterality Date     ABLATION OF DYSRHYTHMIC FOCUS  07/16/2018    ABSCESS DRAINAGE      under left breast d/t mrsa     AORTIC VALVE REPAIR/REPLACEMENT N/A 07/16/2018    Procedure: MEDIAN STERNOTOMY, AORTIC VALVE REPLACEMENT WITH TIFFANIE AND MAZE, TRICUSPID RING, LEFT ATRIAL OCCLUSION;  Surgeon: Ajay Cheatham MD;  Location: [facility];  Service: Cardiothoracic    AORTIC VALVE REPAIR/REPLACEMENT N/A 11/11/2024    Procedure: TRANSCATHETER AORTIC VALVE REPLACEMENT;  Surgeon: Ajay Cheatham MD;  Location:  [facility];  Service: Cardiothoracic;  Laterality: N/A;  FL- 6 min 36 sec  DOSE-  110 MGY  CONTRAST- 40 mL    AORTIC VALVE REPAIR/REPLACEMENT N/A 11/11/2024    Procedure: Transfemoral Transcatheter Aortic Valve Replacement;  Surgeon: Jeaneth Carreon MD;  Location:  [facility];  Service: Cardiovascular;  Laterality: N/A;    BRONCHOSCOPY Left 05/12/2017    Procedure: BRONCHOSCOPY;  Surgeon: Ajay Cheatham MD;  Location:  [facility];  Service:     BRONCHOSCOPY N/A 05/18/2017    Procedure: BRONCHOSCOPY BIOPSY AT BEDSIDE;  Surgeon: Ajay Cheatham MD;  Location:  [facility];  Service:     CARDIAC CATHETERIZATION N/A 09/28/2017    Procedure: Left Heart Cath;  Surgeon: Marco Lay MD;  Location:  [facility];  Service:     CARDIAC CATHETERIZATION N/A 09/28/2017    Procedure: Right Heart Cath;  Surgeon: Marco Lay MD;  Location:  [facility];  Service:     CARDIAC CATHETERIZATION N/A 10/11/2024    Procedure: Right and Left Heart Cath;  Surgeon: Jeffry Pickett MD;  Location:  [facility];  Service: Cardiology;  Laterality: N/A;    CARDIAC ELECTROPHYSIOLOGY PROCEDURE N/A 6/1/2022    Procedure: Ablation atrial fibrillation (atypical A flutter). Hold Rythmol x5 days;  Surgeon: Nigel Huber MD;  Location: [facility] INVASIVE LOCATION;  Service: Cardiovascular;  Laterality: N/A;    FEMORAL THROMBECTOMY/EMBOLECTOMY Right 11/12/2024    Procedure: GROIN  EXPLORATION, LIGATION OF INFERIOR EPIGASTRIC ARTERY RIGHT;  Surgeon: Ajay Cheatham MD;  Location:  [facility];  Service: Cardiothoracic;  Laterality: Right;  FL: 6 SECONDS  DOSE: 191MGY  CONTRAST: 30ML VISIPAQUE    LUNG BIOPSY  04/2017    LYMPH NODE DISSECTION Left 05/12/2017    Procedure: MEDIASTINAL LYMPH NODE DISSECTION;  Surgeon: Ajay Cheatham MD;  Location:  [facility];  Service:     MAZE PROCEDURE      MITRAL VALVE REPAIR/REPLACEMENT N/A 07/16/2018    Procedure: MITRAL VALVE REPLACEMENT;  Surgeon: Ajay Cheatham MD;  Location:  [facility];  Service: Cardiothoracic    MITRAL VALVE REPLACEMENT  07/16/2018    TEETH EXTRACTION      THORACOSCOPY VIDEO ASSISTED WITH LOBECTOMY Left 05/12/2017    Procedure: THORACOSCOPY VIDEO ASSISTED WITH OPEN LOBECTOMY;  Surgeon: Ajay Cheatham MD;  Location:  [facility];  Service:     TOTAL THYROIDECTOMY  approx 2012    TRANSESOPHAGEAL ECHOCARDIOGRAM (TIFFANIE) N/A 11/11/2024    Procedure: TRANSESOPHAGEAL ECHOCARDIOGRAM WITH ANESTHESIA;  Surgeon: Ajay Cheatham MD;  Location:  [facility];  Service: Cardiothoracic;  Laterality: N/A;    TRICUSPID VALVE SURGERY  07/16/2018       SLP Recommendation and Plan  SLP Swallowing Diagnosis: mild, oral dysphagia, pharyngeal dysphagia (11/18/24 1100)  SLP Diet Recommendation: regular textures, thin liquids (11/18/24 1100)     Monitor for Signs of Aspiration: yes, notify SLP if any concerns (11/18/24 1100)     Swallow Criteria for Skilled Therapeutic Interventions Met: demonstrates skilled criteria (11/18/24 1100)  Anticipated Discharge Disposition (SLP): inpatient rehabilitation facility (11/18/24 1100)  Rehab Potential/Prognosis, Swallowing: adequate, monitor progress closely (11/18/24 1100)           Daily Summary of Progress (SLP): prepare for discharge (11/18/24 1100)     Anticipated Discharge Disposition (SLP): inpatient rehabilitation facility (11/18/24 1100)                 Treatment Assessment (SLP): continued, toleration of diet, no  clinical signs of, aspiration (11/18/24 1100)  Treatment Assessment Comments (SLP): No over clinical s/s of aspiration (11/18/24 1100)  Plan for Continued Treatment (SLP): treatment no longer indicated as all goals met (11/18/24 1100)         SWALLOW EVALUATION (Last 72 Hours)       SLP Adult Swallow Evaluation       Row Name 11/18/24 1100                   Rehab Evaluation    Document Type discharge treatment  -        Subjective Information no complaints  -        Patient Observations alert;cooperative;agree to therapy  -        Patient/Family/Caregiver Comments/Observations none present  -        Patient Effort good  -        Symptoms Noted During/After Treatment none  -        Oral Care lip/mouth moisturizer applied  -           General Information    Patient Profile Reviewed yes  -           Pain    Additional Documentation Pain Scale: FACES Pre/Post-Treatment (Group)  -           Pain Scale: FACES Pre/Post-Treatment    Pain: FACES Scale, Pretreatment 0-->no hurt  -        Posttreatment Pain Rating 0-->no hurt  -           SLP Evaluation Clinical Impression    SLP Swallowing Diagnosis mild;oral dysphagia;pharyngeal dysphagia  -        Functional Impact risk of aspiration/pneumonia  -        Rehab Potential/Prognosis, Swallowing adequate, monitor progress closely  -        Swallow Criteria for Skilled Therapeutic Interventions Met demonstrates skilled criteria  -           SLP Treatment Clinical Impressions    Treatment Assessment (SLP) continued;toleration of diet;no clinical signs of;aspiration  -        Treatment Assessment Comments (SLP) No over clinical s/s of aspiration  -        Daily Summary of Progress (SLP) prepare for discharge  -        Plan for Continued Treatment (SLP) treatment no longer indicated as all goals met  -        Care Plan Review evaluation/treatment results reviewed;care plan/treatment goals reviewed  -           Recommendations    SLP Diet  Recommendation regular textures;thin liquids  -        Recommended Precautions and Strategies upright posture during/after eating;small bites of food and sips of liquid;general aspiration precautions;fatigue precautions  -        Oral Care Recommendations Oral Care BID/PRN;Toothbrush  -        SLP Rec. for Method of Medication Administration meds whole;with thin liquids;with puree;as tolerated  -        Monitor for Signs of Aspiration yes;notify SLP if any concerns  -        Anticipated Discharge Disposition (SLP) inpatient rehabilitation facility  -                  User Key  (r) = Recorded By, (t) = Taken By, (c) = Cosigned By      Initials Name Effective Dates    Morena Morin MS CCC-SLP 06/16/21 -                     EDUCATION  The patient has been educated in the following areas:   Dysphagia (Swallowing Impairment) Oral Care/Hydration.         SLP GOALS       Row Name 11/18/24 1100             (LTG) Patient will demonstrate functional swallow for    Diet Texture (Demonstrate functional swallow) regular textures  -CH      Liquid viscosity (Demonstrate functional swallow) thin liquids  -      [place] (Demonstrate functional swallow) independently (over 90% accuracy)  -CH      Time Frame (Demonstrate functional swallow) 1 week  -CH      Progress/Outcomes (Demonstrate functional swallow) goal met  -         (STG) Patient will tolerate trials of    Consistencies Trialed (Tolerate trials) regular textures;thin liquids  -      Desired Outcome (Tolerate trials) without signs/symptoms of aspiration;without signs of distress;with adequate oral prep/transit/clearance  -      [place] (Tolerate trials) with minimal cues (75-90% accuracy)  -      Time Frame (Tolerate trials) 1 week  -CH      Progress/Outcomes (Tolerate trials) goal met  -                User Key  (r) = Recorded By, (t) = Taken By, (c) = Cosigned By      Initials Name Provider Type    Morena Morin MS CCC-SLP  Speech and Language Pathologist                           Time Calculation:    Time Calculation- SLP       Row Name 11/18/24 1331 11/18/24 1329          Time Calculation- SLP    SLP Start Time -- 1100  -CH     SLP Received On -- 11/18/24  -CH        Untimed Charges    40344-CH Treatment Swallow Minutes 38  -CH --        Total Minutes    Untimed Charges Total Minutes 38  -CH --      Total Minutes 38  -CH --               User Key  (r) = Recorded By, (t) = Taken By, (c) = Cosigned By      Initials Name Provider Type    Morena Morin MS CCC-SLP Speech and Language Pathologist                    Therapy Charges for Today       Code Description Service Date Service Provider Modifiers Qty    [id number]  ST TREATMENT SWALLOW 3 11/18/2024 Morena Brandon MS CCC-SLP GN 1                 SLP Discharge Summary  Anticipated Discharge Disposition (SLP): inpatient rehabilitation facility    MS TROY Thapa  11/18/2024

## 2024-11-18 NOTE — CASE MANAGEMENT/SOCIAL WORK
Continued Stay Note  [facility]     Patient Name: Jill Miller  MRN: [id number]  Today's Date: 11/18/2024    Admit Date: 11/11/2024    Plan: Home   Discharge Plan       Row Name 11/18/24 1000       Plan    Plan Home    Patient/Family in Agreement with Plan yes    Plan Comments Discussed in MDR. Pt sleeping during rounding with no family at bedside. Remains on O2 that is home requirement. CM will cont to follow.    Final Discharge Disposition Code 01 - home or self-care                   Discharge Codes    No documentation.                       Jo-Ann Gomez RN

## 2024-11-18 NOTE — PLAN OF CARE
Goal Outcome Evaluation:  Plan of Care Reviewed With: patient                Anticipated Discharge Disposition (SLP): inpatient rehabilitation facility          SLP Swallowing Diagnosis: mild, oral dysphagia, pharyngeal dysphagia (11/18/24 1100)  Treatment Assessment (SLP): continued, toleration of diet, no clinical signs of, aspiration (11/18/24 1100)  Treatment Assessment Comments (SLP): No over clinical s/s of aspiration (11/18/24 1100)  Plan for Continued Treatment (SLP): treatment no longer indicated as all goals met (11/18/24 1100)

## 2024-11-18 NOTE — PROGRESS NOTES
Cardiothoracic Surgery Progress Note      POD # 7 s/p TAVR, POD # 5 Right groin exploration      LOS: 7 days      Subjective:  No acute events overnight.  Denies dyspnea, abdominal, pelvic or back pain.    Objective:  Vital Signs  Temp:  [97.7 °F (36.5 °C)-98.5 °F (36.9 °C)] 98.5 °F (36.9 °C)  Heart Rate:  [61-90] 61  Resp:  [16-20] 18  BP: ()/(41-58) 105/58    Physical Exam:   General Appearance: Well-developed, well-nourished in no acute distress.  Sitting up in chair   Lungs: Respirations even and unlabored and clear to auscultation bilaterally no wheezes, rales or rhonchi   Heart: Regular rate and rhythm no murmurs, rubs or gallops   Skin: Incision c/d/i     Results:    Results from last 7 days   Lab Units 11/18/24  0347   WBC 10*3/mm3 8.25   HEMOGLOBIN g/dL 8.3*   HEMATOCRIT % 26.1*   PLATELETS 10*3/mm3 113*     Results from last 7 days   Lab Units 11/18/24  0347   SODIUM mmol/L 144   POTASSIUM mmol/L 3.7   CHLORIDE mmol/L 104   CO2 mmol/L 34.0*   BUN mg/dL 18   CREATININE mg/dL 0.55*   GLUCOSE mg/dL 93   CALCIUM mg/dL 8.2*       Assessment:  POD # 7 s/p TAVR, POD # 5 Right groin exploration     Plan:  Midodrine initiated yesterday  Transition to oral anticoagulation  Diuresis  Wean oxygen as tolerated  Ambulate  Pulmonary toilet  Transfer to telemetry    Ajay Cheatham MD  11/18/24  07:33 EST

## 2024-11-18 NOTE — PROGRESS NOTES
Patient has been initiated on Apixaban (Eliquis) during admission. Education provided on 11/18/2024 verbally and in writing. Information provided includes effects of medication, drug-drug and drug-food interactions, and signs/symptoms of bleeding and clotting. Patient/family verbalized understanding through teach back. All pertinent questions were answered by pharmacist.    Jimmie Ramirez RPH  11/18/2024  13:36 EST

## 2024-11-18 NOTE — PROGRESS NOTES
LOS: 7 days   Patient Care Team:  Marisa Lynch PA as PCP - General (Family Medicine)  Ajay Cheatham MD as Surgeon (Cardiothoracic Surgery)  Marco Lay IV, MD as Cardiologist (Interventional Cardiology)  Nigel Huber MD as Consulting Physician (Cardiology)  Marisa Suarez APRN as Nurse Practitioner (Interventional Cardiology)    Chief Complaint: Dyspnea     Subjective     Interval History:   Events/notes over weekend reviewed.  Patient sinus rhythm at this time.  Up and around yesterday multiple times.  Fall noted.    Review of Systems:    12 point review of systems reviewed pertinent for those mentioned HPI    Objective     Vital Signs  Temp:  [97.7 °F (36.5 °C)-98.2 °F (36.8 °C)] 98.2 °F (36.8 °C)  Heart Rate:  [61-90] 65  Resp:  [16-20] 18  BP: ()/(41-55) 94/52  Body mass index is 29.08 kg/m².    Intake/Output Summary (Last 24 hours) at 11/18/2024 0616  Last data filed at 11/18/2024 0300  Gross per 24 hour   Intake 1365.4 ml   Output 1975 ml   Net -609.6 ml     I/O this shift:  In: 455.4 [P.O.:240; I.V.:115.4; IV Piggyback:100]  Out: 250 [Urine:250]    Physical Exam:     General Appearance:  Alert, cooperative, in no acute distress   Head:  Normocephalic, without obvious abnormality, atraumatic   Eyes:  Lids and lashes normal, conjunctivae and sclerae normal, no icterus, no pallor, corneas clear, PERRLA   Ears:  Ears appear intact with no abnormalities noted   Throat:  No oral lesions, no thrush, oral mucosa moist   Neck:  No adenopathy, supple, trachea midline, no thyromegaly, no carotid bruit, no JVD   Back:  No kyphosis present, no scoliosis present, no skin lesions, erythema or scars, no tenderness to percussion, or palpation, range of motion normal   Lungs:  Clear to auscultation,respirations regular, even and unlabored    Heart:  Regular rhythm and normal rate, normal S1 and S2, no murmur, no gallop, no rub, no click   Breast Exam:  Deferred   Abdomen:  Normal bowel  "sounds, no masses, no organomegaly, soft non-tender, non-distended, no guarding, no rebound tenderness   Genitalia:  Deferred   Extremities:  Moves all extremities well, no edema, no cyanosis, no redness   Pulses:  Pulses palpable and equal bilaterally   Skin:  No bleeding, bruising or rash   Lymph nodes:  No palpable adenopathy   Neurologic:  Cranial nerves 2 - 12 grossly intact, sensation intact, DTR present and equal bilaterally        Results Review:     I reviewed the patient's new clinical results.  I reviewed the patient's new imaging results and agree with the interpretation.  I reviewed the patient's other test results and agree with the interpretation  I personally viewed and interpreted the patient's EKG/Telemetry data      WBC WBC   Date Value Ref Range Status   11/18/2024 8.25 3.40 - 10.80 10*3/mm3 Final   11/17/2024 9.94 3.40 - 10.80 10*3/mm3 Final   11/16/2024 12.51 (H) 3.40 - 10.80 10*3/mm3 Final      HGB Hemoglobin   Date Value Ref Range Status   11/18/2024 8.3 (L) 12.0 - 15.9 g/dL Final   11/17/2024 9.5 (L) 12.0 - 15.9 g/dL Final   11/16/2024 8.4 (L) 12.0 - 15.9 g/dL Final      HCT Hematocrit   Date Value Ref Range Status   11/18/2024 26.1 (L) 34.0 - 46.6 % Final   11/17/2024 30.2 (L) 34.0 - 46.6 % Final   11/16/2024 25.8 (L) 34.0 - 46.6 % Final      Platlets No results found for: \"LABPLAT\"     PT/INR:  No results found for: \"PROTIME\"/No results found for: \"INR\"    Sodium Sodium   Date Value Ref Range Status   11/18/2024 144 136 - 145 mmol/L Final   11/17/2024 146 (H) 136 - 145 mmol/L Final   11/16/2024 143 136 - 145 mmol/L Final      Potassium Potassium   Date Value Ref Range Status   11/18/2024 3.7 3.5 - 5.2 mmol/L Final   11/17/2024 3.4 (L) 3.5 - 5.2 mmol/L Final   11/17/2024 3.8 3.5 - 5.2 mmol/L Final     Comment:     Slight hemolysis detected by analyzer. Result may be falsely elevated.   11/17/2024 3.5 3.5 - 5.2 mmol/L Final     Comment:     Slight hemolysis detected by analyzer. Result may " "be falsely elevated.   11/17/2024 3.5 3.5 - 5.2 mmol/L Final     Comment:     Slight hemolysis detected by analyzer. Result may be falsely elevated.   11/16/2024 3.8 3.5 - 5.2 mmol/L Final   11/16/2024 3.4 (L) 3.5 - 5.2 mmol/L Final      Chloride Chloride   Date Value Ref Range Status   11/18/2024 104 98 - 107 mmol/L Final   11/17/2024 103 98 - 107 mmol/L Final   11/16/2024 103 98 - 107 mmol/L Final      Bicarbonate No results found for: \"PLASMABICARB\"   BUN BUN   Date Value Ref Range Status   11/18/2024 18 8 - 23 mg/dL Final   11/17/2024 19 8 - 23 mg/dL Final   11/16/2024 19 8 - 23 mg/dL Final      Creatinine Creatinine   Date Value Ref Range Status   11/18/2024 0.55 (L) 0.57 - 1.00 mg/dL Final   11/17/2024 0.54 (L) 0.57 - 1.00 mg/dL Final   11/16/2024 0.56 (L) 0.57 - 1.00 mg/dL Final      Calcium Calcium   Date Value Ref Range Status   11/18/2024 8.2 (L) 8.6 - 10.5 mg/dL Final   11/17/2024 8.9 8.6 - 10.5 mg/dL Final   11/16/2024 8.2 (L) 8.6 - 10.5 mg/dL Final      Magnesium Magnesium   Date Value Ref Range Status   11/18/2024 2.0 1.6 - 2.4 mg/dL Final   11/16/2024 2.2 1.6 - 2.4 mg/dL Final            pH No results found for: \"PHART\"   pO2 No results found for: \"PO2ART\"   pCO2 No results found for: \"VYY7ZRH\"   HCO3 No results found for: \"HCM7ITX\"     amiodarone, 200 mg, Oral, BID With Meals  aspirin, 81 mg, Oral, Daily  docusate sodium, 100 mg, Oral, BID  furosemide, 60 mg, Intravenous, Q12H  hydrocortisone sodium succinate, 100 mg, Intravenous, Q24H  insulin glargine, 20 Units, Subcutaneous, Nightly  insulin lispro, 3-14 Units, Subcutaneous, 4x Daily AC & at Bedtime  levothyroxine, 200 mcg, Oral, Q AM  midodrine, 5 mg, Oral, BID AC  nystatin, 1 Application, Topical, BID  piperacillin-tazobactam, 3.375 g, Intravenous, Q8H  polyethylene glycol, 17 g, Oral, Daily  potassium chloride, 10 mEq, Oral, BID  senna-docusate sodium, 2 tablet, Oral, Nightly      heparin, 20 Units/kg/hr, Last Rate: 20 Units/kg/hr (11/17/24 " 1412)  Pharmacy to Dose Heparin,   phenylephrine, 0.5-3 mcg/kg/min, Last Rate: Stopped (11/17/24 0113)        Medication Review: Reviewed    Assessment & Plan     Patient Active Problem List   Diagnosis Code    Essential hypertension I10    Acquired hypothyroidism E03.9    Lung cancer; S/P Bronch/Thor w/ ALBINA lobectomy/mediastinal lymph node resec (5/12/17, Dr. Cheatham) C34.90    Paroxysmal atrial fibrillation I48.0    Hyperlipidemia LDL goal <70 E78.5    Prosthetic aortic valve stenosis s/p TAVR 11/11/24, Right groin exploration for hematoma 11/12/24 T82.857A    Type 2 diabetes mellitus E11.9    COPD (chronic obstructive pulmonary disease) J44.9    Obesity (BMI 30-39.9) E66.9    Tobacco user Z72.0    Pulmonary hypertension I27.20    Stenosis of prosthetic mitral valve T82.857A    Acute on chronic respiratory failure with hypoxia and hypercapnia J96.21, J96.22    Pulmonary infiltrate, likely right-sided pneumonia R91.8    Acute pulmonary embolism without acute cor pulmonale I26.99       Shock  hypovolemic, improved  Pneumonia  Segmental pulmonary emboli  Severe aortic stenosis status post valve in valve TAVR  Degeneration of surgical mitral valve  Anemia, acute blood loss  Persistent atrial fibrillation  Multilevel DVTs    Plan for disposition: Continue IV Lasix and oral amiodarone for now.  Will repeat CBC given declined this morning.  Would like to change to oral anticoagulation.  Given bleeding would start at 5 mg of Eliquis twice daily and hold on 10 mg twice daily as has been on heparin several days and had significant bleeding is noted.  Can be transferred to floor from my perspective.  Repeat limited echocardiogram here to follow-up on valvular disease as previous echo done on multiple vasopressors   Jeffry Pickett MD  11/18/24  06:16 EST

## 2024-11-18 NOTE — THERAPY TREATMENT NOTE
Patient Name: Jill Miller  : 1960    MRN: [id number]                              Today's Date: 2024       Admit Date: 2024    Visit Dx:     ICD-10-CM ICD-9-CM   1. Aortic valve disorder  I35.9 424.1   2. Stenosis of prosthetic aortic valve, initial encounter  T82.857A 996.71     Patient Active Problem List   Diagnosis    Essential hypertension    Acquired hypothyroidism    Lung cancer; S/P Bronch/Thor w/ ALBINA lobectomy/mediastinal lymph node resec (17, Dr. Cheatham)    Paroxysmal atrial fibrillation    Hyperlipidemia LDL goal <70    Prosthetic aortic valve stenosis s/p TAVR 24, Right groin exploration for hematoma 24    Type 2 diabetes mellitus    COPD (chronic obstructive pulmonary disease)    Obesity (BMI 30-39.9)    Tobacco user    Pulmonary hypertension    Stenosis of prosthetic mitral valve    Acute on chronic respiratory failure with hypoxia and hypercapnia    Pulmonary infiltrate, likely right-sided pneumonia    Acute pulmonary embolism without acute cor pulmonale     Past Medical History:   Diagnosis Date    Acute pulmonary embolism without acute cor pulmonale 2024    Atrial fibrillation and flutter     Chronic diastolic congestive heart failure     Chronically Abnormal CXR (Left pleural disease post op) 2017    COPD exacerbation 2022    Elevated cholesterol     Essential hypertension 04/10/2017    Target blood pressure <130/80 mmHg    Graves disease     Hyperlipidemia LDL goal <70     Hypertension     Hyperthyroidism     Lung cancer     MRSA (methicillin resistant staph aureus) culture positive     under left breast, incision and debridement, treated with wound packing and po abx     Prolonged QTC interval on ECG 2022    Rheumatic mitral stenosis     Type 2 diabetes mellitus 2018    Valvular heart disease      Past Surgical History:   Procedure Laterality Date    ABLATION OF DYSRHYTHMIC FOCUS  2018    ABSCESS DRAINAGE       under left breast d/t mrsa     AORTIC VALVE REPAIR/REPLACEMENT N/A 07/16/2018    Procedure: MEDIAN STERNOTOMY, AORTIC VALVE REPLACEMENT WITH TIFFANIE AND MAZE, TRICUSPID RING, LEFT ATRIAL OCCLUSION;  Surgeon: Ajay Cheatham MD;  Location:  [facility];  Service: Cardiothoracic    AORTIC VALVE REPAIR/REPLACEMENT N/A 11/11/2024    Procedure: TRANSCATHETER AORTIC VALVE REPLACEMENT;  Surgeon: Ajay Cheatham MD;  Location:  [facility];  Service: Cardiothoracic;  Laterality: N/A;  FL- 6 min 36 sec  DOSE-  110 MGY  CONTRAST- 40 mL    AORTIC VALVE REPAIR/REPLACEMENT N/A 11/11/2024    Procedure: Transfemoral Transcatheter Aortic Valve Replacement;  Surgeon: Jeaneth Carreon MD;  Location:  [facility];  Service: Cardiovascular;  Laterality: N/A;    BRONCHOSCOPY Left 05/12/2017    Procedure: BRONCHOSCOPY;  Surgeon: Ajay Cheatham MD;  Location:  [facility];  Service:     BRONCHOSCOPY N/A 05/18/2017    Procedure: BRONCHOSCOPY BIOPSY AT BEDSIDE;  Surgeon: Ajay Cheatham MD;  Location:  [facility];  Service:     CARDIAC CATHETERIZATION N/A 09/28/2017    Procedure: Left Heart Cath;  Surgeon: Marco Lay MD;  Location:  [facility];  Service:     CARDIAC CATHETERIZATION N/A 09/28/2017    Procedure: Right Heart Cath;  Surgeon: Marco Lay MD;  Location:  [facility];  Service:     CARDIAC CATHETERIZATION N/A 10/11/2024    Procedure: Right and Left Heart Cath;  Surgeon: Jeffry Pickett MD;  Location:  [facility];  Service: Cardiology;  Laterality: N/A;    CARDIAC ELECTROPHYSIOLOGY PROCEDURE N/A 6/1/2022    Procedure: Ablation atrial fibrillation (atypical A flutter). Hold Rythmol x5 days;  Surgeon: Nigel Huber MD;  Location: [facility] INVASIVE LOCATION;  Service: Cardiovascular;  Laterality: N/A;    FEMORAL THROMBECTOMY/EMBOLECTOMY Right 11/12/2024    Procedure: GROIN EXPLORATION, LIGATION OF INFERIOR EPIGASTRIC ARTERY RIGHT;  Surgeon:  Ajay Cheatham MD;  Location:  [facility];  Service: Cardiothoracic;  Laterality: Right;  FL: 6 SECONDS  DOSE: 191MGY  CONTRAST: 30ML VISIPAQUE    LUNG BIOPSY  04/2017    LYMPH NODE DISSECTION Left 05/12/2017    Procedure: MEDIASTINAL LYMPH NODE DISSECTION;  Surgeon: Ajay Cheatham MD;  Location:  [facility];  Service:     MAZE PROCEDURE      MITRAL VALVE REPAIR/REPLACEMENT N/A 07/16/2018    Procedure: MITRAL VALVE REPLACEMENT;  Surgeon: Ajay Cheatham MD;  Location:  [facility];  Service: Cardiothoracic    MITRAL VALVE REPLACEMENT  07/16/2018    TEETH EXTRACTION      THORACOSCOPY VIDEO ASSISTED WITH LOBECTOMY Left 05/12/2017    Procedure: THORACOSCOPY VIDEO ASSISTED WITH OPEN LOBECTOMY;  Surgeon: Ajay Cheatham MD;  Location:  [facility];  Service:     TOTAL THYROIDECTOMY  approx 2012    TRANSESOPHAGEAL ECHOCARDIOGRAM (TIFFANIE) N/A 11/11/2024    Procedure: TRANSESOPHAGEAL ECHOCARDIOGRAM WITH ANESTHESIA;  Surgeon: Ajay Cheatham MD;  Location:  [facility];  Service: Cardiothoracic;  Laterality: N/A;    TRICUSPID VALVE SURGERY  07/16/2018      General Information       Row Name 11/18/24 1517          Physical Therapy Time and Intention    Document Type therapy note (daily note)  -KG     Mode of Treatment physical therapy  -KG       Row Name 11/18/24 1517          General Information    Existing Precautions/Restrictions cardiac;fall;oxygen therapy device and L/min  -KG     Barriers to Rehab medically complex;previous functional deficit  -KG       Row Name 11/18/24 1517          Cognition    Orientation Status (Cognition) oriented x 3  -KG       Row Name 11/18/24 1517          Safety Issues/Impairments Affecting Functional Mobility    Safety Issues Affecting Function (Mobility) awareness of need for assistance;insight into deficits/self-awareness;safety precaution awareness;safety precautions follow-through/compliance  -KG     Impairments Affecting Function (Mobility) balance;coordination;endurance/activity  tolerance;postural/trunk control;pain;shortness of breath;strength  -KG               User Key  (r) = Recorded By, (t) = Taken By, (c) = Cosigned By      Initials Name Provider Type    Onelia Jasso PT Physical Therapist                   Mobility       Row Name 11/18/24 1517          Bed Mobility    Bed Mobility supine-sit  -KG     Supine-Sit Ogle (Bed Mobility) moderate assist (50% patient effort);2 person assist;verbal cues  -KG     Assistive Device (Bed Mobility) head of bed elevated;repositioning sheet  -KG     Comment, (Bed Mobility) VC's for sequencing and technique. Pt required assistance at trunk and BLEs.  -KG       Row Name 11/18/24 1517          Transfers    Comment, (Transfers) VC's for sequencing and safe hand placement. Toilet transfer to [facility] with Stefano and HHA.  -KG       Row Name 11/18/24 1517          Sit-Stand Transfer    Sit-Stand Ogle (Transfers) minimum assist (75% patient effort);2 person assist;verbal cues  -KG       Row Name 11/18/24 1517          Gait/Stairs (Locomotion)    Ogle Level (Gait) minimum assist (75% patient effort);2 person assist;verbal cues  -KG     Assistive Device (Gait) other (see comments)  B UE support on tripod monitor  -KG     Distance in Feet (Gait) 150  -KG     Deviations/Abnormal Patterns (Gait) bilateral deviations;base of support, narrow;sri decreased;stride length decreased  -KG     Bilateral Gait Deviations forward flexed posture  -KG     Comment, (Gait/Stairs) Pt demonstrated step through gait pattern with very slow sri and forward flexed posture. VC's for upright posture with forward gaze and increased stride length. Pt demonstrated improved balanec and coordination this date. Limited by fatigue and toileting needs.  -KG               User Key  (r) = Recorded By, (t) = Taken By, (c) = Cosigned By      Initials Name Provider Type    Onelia Jasso PT Physical Therapist                   Obj/Interventions        Row Name 11/18/24 1519          Balance    Balance Assessment sitting static balance;standing static balance;standing dynamic balance  -KG     Static Sitting Balance standby assist  -KG     Position, Sitting Balance unsupported;sitting in chair  -KG     Static Standing Balance minimal assist  -KG     Dynamic Standing Balance minimal assist;2-person assist  -KG     Position/Device Used, Standing Balance supported  -KG               User Key  (r) = Recorded By, (t) = Taken By, (c) = Cosigned By      Initials Name Provider Type    KG Onelia Casepr, PT Physical Therapist                   Goals/Plan    No documentation.                  Clinical Impression       Row Name 11/18/24 1519          Pain    Additional Documentation Pain Scale: FACES Pre/Post-Treatment (Group)  -KG       Row Name 11/18/24 1518          Pain Scale: FACES Pre/Post-Treatment    Pain: FACES Scale, Pretreatment 2-->hurts little bit  -KG     Posttreatment Pain Rating 2-->hurts little bit  -KG       Row Name 11/18/24 1519          Plan of Care Review    Plan of Care Reviewed With patient  -KG     Progress improving  -KG     Outcome Evaluation Pt increased ambulation distance to 150ft with Stefano x2 and B UE support on tripod monitor. Pt demonstrated slow sri with forward flexed posture. Improved balance and coordination this session. Limited by fatigue. Continue to recommend PT skilled care and D/C to  rehab facility.  -KG       Row Name 11/18/24 1519          Vital Signs    Pre Systolic BP Rehab 92  -KG     Pre Treatment Diastolic BP 51  -KG     Post Systolic BP Rehab 87  -KG     Post Treatment Diastolic BP 54  -KG     Pretreatment Heart Rate (beats/min) 68  -KG     Posttreatment Heart Rate (beats/min) 74  -KG     Pre SpO2 (%) 97  -KG     O2 Delivery Pre Treatment supplemental O2  -KG     Post SpO2 (%) 92  -KG     O2 Delivery Post Treatment supplemental O2  -KG     Pre Patient Position Supine  -KG     Intra Patient Position Standing   -KG     Post Patient Position Sitting  -KG       Row Name 11/18/24 1519          Positioning and Restraints    Pre-Treatment Position in bed  -KG     Post Treatment Position chair  -KG     In Chair reclined;call light within reach;encouraged to call for assist;with nsg;RUE elevated;LUE elevated;legs elevated  -KG               User Key  (r) = Recorded By, (t) = Taken By, (c) = Cosigned By      Initials Name Provider Type    Onelia Jasso PT Physical Therapist                   Outcome Measures       Row Name 11/18/24 1520          How much help from another person do you currently need...    Turning from your back to your side while in flat bed without using bedrails? 2  -KG     Moving from lying on back to sitting on the side of a flat bed without bedrails? 2  -KG     Moving to and from a bed to a chair (including a wheelchair)? 3  -KG     Standing up from a chair using your arms (e.g., wheelchair, bedside chair)? 3  -KG     Climbing 3-5 steps with a railing? 2  -KG     To walk in hospital room? 3  -KG     AM-PAC 6 Clicks Score (PT) 15  -KG     Highest Level of Mobility Goal 4 --> Transfer to chair/commode  -KG       Row Name 11/18/24 1520          Functional Assessment    Outcome Measure Options AM-PAC 6 Clicks Basic Mobility (PT)  -KG               User Key  (r) = Recorded By, (t) = Taken By, (c) = Cosigned By      Initials Name Provider Type    Onelia Jasso, YULIANA Physical Therapist                                 Physical Therapy Education       Title: PT OT SLP Therapies (In Progress)       Topic: Physical Therapy (In Progress)       Point: Mobility training (In Progress)       Learning Progress Summary            Patient Acceptance, E, NR by KG at 11/18/2024 1048    Acceptance, E, NR by AE at 11/16/2024 0947    Acceptance, E, NR by KG at 11/15/2024 1316    Acceptance, E, NR by KG at 11/14/2024 1119    Acceptance, E, NR by KG at 11/13/2024 1430                      Point: Home exercise  program (In Progress)       Learning Progress Summary            Patient Acceptance, E, NR by KG at 11/18/2024 1048    Acceptance, E, NR by AE at 11/16/2024 0947    Acceptance, E, NR by KG at 11/15/2024 1316    Acceptance, E, NR by KG at 11/14/2024 1119                      Point: Body mechanics (In Progress)       Learning Progress Summary            Patient Acceptance, E, NR by KG at 11/18/2024 1048    Acceptance, E, NR by AE at 11/16/2024 0947    Acceptance, E, NR by KG at 11/15/2024 1316    Acceptance, E, NR by KG at 11/14/2024 1119    Acceptance, E, NR by KG at 11/13/2024 1430                      Point: Precautions (In Progress)       Learning Progress Summary            Patient Acceptance, E, NR by KG at 11/18/2024 1048    Acceptance, E, NR by AE at 11/16/2024 0947    Acceptance, E, NR by KG at 11/15/2024 1316    Acceptance, E, NR by KG at 11/14/2024 1119    Acceptance, E, NR by KG at 11/13/2024 1430                                      User Key       Initials Effective Dates Name Provider Type Discipline    KG 05/22/20 -  Onelia Casper PT Physical Therapist PT    AE 09/21/21 -  Nic Flores, PT Physical Therapist PT                  PT Recommendation and Plan  Planned Therapy Interventions (PT): balance training, bed mobility training, gait training, strengthening, transfer training  Progress: improving  Outcome Evaluation: Pt increased ambulation distance to 150ft with Stefano x2 and B UE support on tripod monitor. Pt demonstrated slow sri with forward flexed posture. Improved balance and coordination this session. Limited by fatigue. Continue to recommend PT skilled care and D/C to  rehab facility.     Time Calculation:   PT Evaluation Complexity  History, PT Evaluation Complexity: 3 or more personal factors and/or comorbidities  Examination of Body Systems (PT Eval Complexity): total of 3 or more elements  Clinical Presentation (PT Evaluation Complexity): evolving  Clinical Decision Making  (PT Evaluation Complexity): moderate complexity  Overall Complexity (PT Evaluation Complexity): moderate complexity     PT Charges       Row Name 11/18/24 1048             Time Calculation    Start Time 1048  -KG      PT Received On 11/18/24  -KG      PT Goal Re-Cert Due Date 11/23/24  -KG         Time Calculation- PT    Total Timed Code Minutes- PT 27 minute(s)  -KG         Timed Charges    58552 - PT Therapeutic Activity Minutes 27  -KG         Total Minutes    Timed Charges Total Minutes 27  -KG       Total Minutes 27  -KG                User Key  (r) = Recorded By, (t) = Taken By, (c) = Cosigned By      Initials Name Provider Type    KG Onelia Casper, PT Physical Therapist                  Therapy Charges for Today       Code Description Service Date Service Provider Modifiers Qty    59574407966 HC PT THERAPEUTIC ACT EA 15 MIN 11/18/2024 Onelia Casper, PT GP 2    63701909152 HC PT THER SUPP EA 15 MIN 11/18/2024 Onelia Casper, PT GP 2            PT G-Codes  Outcome Measure Options: AM-PAC 6 Clicks Basic Mobility (PT)  AM-PAC 6 Clicks Score (PT): 15  AM-PAC 6 Clicks Score (OT): 13  PT Discharge Summary  Anticipated Discharge Disposition (PT): inpatient rehabilitation facility    Radha Casper PT  11/18/2024

## 2024-11-18 NOTE — PROGRESS NOTES
Intensive Care Follow-up     Hospital:  LOS: 7 days   Ms. Jill Miller, 64 y.o. female is followed for:   Prosthetic aortic valve stenosis            History of present illness:   Jill Miller is a 64 year-old female that presents to [facility] ICU postoperatively from scheduled TAVR today.      Patient has a PMH of HFpEF, valvular disease s/p prosthetic MVR/AVR, pulmonary HTN, lung cancer s/p ALBINA lobectomy (2017), multiple cardiac valve replacements, HTN, hypothyroidism, hyperlipidemia, HFpEF, T2DM, COPD, tobacco use, and PAF s/p ANGELA/Maze (2018), cardioversion and PVA (2022).   Patient recently admitted to [facility] in September with hypoxic respiratory failure 2* rhinovirus and exacerbation of heart failure. 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 Cardiology/CTS consulted and patient underwent work-up for TAVR.      TIFFANIE showed LVEF 51-55%, moderate to severe AS, moderate MS, tricuspid ring noted, and bi-atrial cavity dilation. CTA TAVR negative for acute abnormality / noted emphysema.       Subjective   Interval History:  Patient currently doing well, on 2 L nasal cannula, which is her baseline.  Denies any chest pain, nausea, fever, or chills.  Off all pressors.             The patient's past medical, surgical and social history were reviewed and updated in Epic as appropriate.       Objective     Infusions:  heparin, 20 Units/kg/hr, Last Rate: 20 Units/kg/hr (11/18/24 2110)  Pharmacy to Dose Heparin,   phenylephrine, 0.5-3 mcg/kg/min, Last Rate: Stopped (11/17/24 0113)      Medications:  amiodarone, 200 mg, Oral, BID With Meals  aspirin, 81 mg, Oral, Daily  docusate sodium, 100 mg, Oral, BID  furosemide, 60 mg, Intravenous, Q12H  hydrocortisone sodium succinate, 100 mg, Intravenous,  Q24H  insulin glargine, 20 Units, Subcutaneous, Nightly  insulin lispro, 3-14 Units, Subcutaneous, 4x Daily AC & at Bedtime  levothyroxine, 200 mcg, Oral, Q AM  midodrine, 5 mg, Oral, BID AC  nystatin, 1 Application, Topical, BID  piperacillin-tazobactam, 3.375 g, Intravenous, Q8H  polyethylene glycol, 17 g, Oral, Daily  potassium chloride, 10 mEq, Oral, BID  senna-docusate sodium, 2 tablet, Oral, Nightly      I reviewed the patient's medications.    Vital Sign Min/Max for last 24 hours  Temp  Min: 98 °F (36.7 °C)  Max: 98.5 °F (36.9 °C)   BP  Min: 89/41  Max: 105/58   Pulse  Min: 61  Max: 80   Resp  Min: 16  Max: 20   SpO2  Min: 94 %  Max: 98 %   Flow (L/min) (Oxygen Therapy)  Min: 2  Max: 3       Input/Output for last 24 hour shift  11/17 0701 - 11/18 0700  In: 1365.4 [P.O.:790; I.V.:475.4]  Out: 2175 [Urine:2175]      GENERAL : NAD, conversant  RESPIRATORY/THORAX : normal respiratory effort and no intercostal retractions, CTAB  CARDIOVASCULAR : Normal S1/S2, RRR. no lower ext edema.  GASTROINTESTINAL : Soft, NT/ND. BS x 4 normoactive. No hepatosplenomegaly.  MUSCULOSKELETAL : No cyanosis, clubbing, or ischemia  NEUROLOGICAL: alert and oriented to person, place and time  PSYCHOLOGICAL : Appropriate affect    Results from last 7 days   Lab Units 11/18/24  0639 11/18/24  0347 11/17/24  0352   WBC 10*3/mm3 8.93 8.25 9.94   HEMOGLOBIN g/dL 9.9* 8.3* 9.5*   PLATELETS 10*3/mm3 111* 113* 126*     Results from last 7 days   Lab Units 11/18/24  0347 11/17/24 2007 11/17/24  1350 11/17/24  0352 11/16/24  1505 11/16/24  0501 11/15/24  0346 11/14/24  0109 11/13/24  0320 11/12/24  0946 11/12/24  0450   SODIUM mmol/L 144  --   --  146*  --  143 140   < > 141   < > 141   POTASSIUM mmol/L 3.7 3.4* 3.8 3.5  3.5   < > 3.4* 4.1   < > 3.8   < > 4.0   CO2 mmol/L 34.0*  --   --  32.0*  --  32.0* 28.0   < > 29.0   < > 16.0*   BUN mg/dL 18  --   --  19  --  19 14   < > 15   < > 17   CREATININE mg/dL 0.55*  --   --  0.54*  --  0.56*  0.46*   < > 0.63   < > 1.24*   MAGNESIUM mg/dL 2.0  --   --   --   --  2.2 2.4  --  3.2*   < > 2.0   PHOSPHORUS mg/dL  --   --   --   --   --   --   --   --  3.0  --  4.9*   GLUCOSE mg/dL 93  --   --  89  --  115* 108*   < > 226*   < > 364*    < > = values in this interval not displayed.     Estimated Creatinine Clearance: 98.6 mL/min (A) (by C-G formula based on SCr of 0.55 mg/dL (L)).    Results from last 7 days   Lab Units 11/15/24  0443   PH, ARTERIAL pH units 7.357   PCO2, ARTERIAL mm Hg 50.7*   PO2 ART mm Hg 60.0*       I reviewed the patient's new clinical results.  I reviewed the patient's new imaging results/reports including actual images and agree with reports.         Assessment & Plan   Impression        Prosthetic aortic valve stenosis s/p TAVR 11/11/24, Right groin exploration for hematoma 11/12/24    Paroxysmal atrial fibrillation    Type 2 diabetes mellitus    COPD (chronic obstructive pulmonary disease)    Acute on chronic respiratory failure with hypoxia and hypercapnia    Pulmonary infiltrate, likely right-sided pneumonia    Acute pulmonary embolism without acute cor pulmonale       Plan        64-year-old female with a past medical history significant for HFrEF, valvular heart disease status post prosthetic mitral valve replacement and aortic valve replacement, pulmonary hypertension, lung cancer status post lobectomy (2017), hypertension, hypothyroidism, hyperlipidemia, diabetes last type II, COPD, tobacco abuse, paroxysmal atrial fibrillation status post left atrial appendage ligation/maze (2018).  Who presented to the [facility] ICU status post TAVR complicated by pneumonia and acute pulmonary embolism.    -Patient has completed course of Zosyn for aspiration pneumonia, hold on further antibiotics  -Wean oxygen to saturation greater than 88%  -Aggressive pulmonary toilet with incentive spirometer and flutter valve  -Continue nebs as needed  -Continue scheduled  Lasix  -Levothyroxine for hypothyroidism  -A.m. labs  -Aggressive pulmonary toilet    Gifty Guido DO  Pulmonary, Critical care and Sleep Medicine

## 2024-11-19 ENCOUNTER — APPOINTMENT (OUTPATIENT)
Dept: CARDIOLOGY | Facility: HOSPITAL | Age: 64
End: 2024-11-19
Payer: COMMERCIAL

## 2024-11-19 LAB
ACT BLD: 134 SECONDS (ref 82–152)
ACT BLD: 391 SECONDS (ref 82–152)
ANION GAP SERPL CALCULATED.3IONS-SCNC: 8 MMOL/L (ref 5–15)
BH CV GROIN HEMATOMA RIGHT TRANS LENGTH: 1.1 CM
BH CV GROIN HEMATOMA RIGHT TRANS WIDTH: 1.3 CM
BH CV LOW VAS RIGHT PERONEAL VESSEL: 1
BH CV LOW VAS RIGHT POSTERIOR TIBIAL VESSEL: 1
BH CV LOWER VASCULAR LEFT COMMON FEMORAL PHASIC: NORMAL
BH CV LOWER VASCULAR LEFT COMMON FEMORAL SPONT: NORMAL
BH CV LOWER VASCULAR RIGHT COMMON FEMORAL COMPRESS: NORMAL
BH CV LOWER VASCULAR RIGHT COMMON FEMORAL PHASIC: NORMAL
BH CV LOWER VASCULAR RIGHT COMMON FEMORAL SPONT: NORMAL
BH CV LOWER VASCULAR RIGHT DISTAL FEMORAL COMPRESS: NORMAL
BH CV LOWER VASCULAR RIGHT DISTAL FEMORAL PHASIC: NORMAL
BH CV LOWER VASCULAR RIGHT DISTAL FEMORAL SPONT: NORMAL
BH CV LOWER VASCULAR RIGHT GASTRONEMIUS COMPRESS: NORMAL
BH CV LOWER VASCULAR RIGHT GREATER SAPH AK COMPRESS: NORMAL
BH CV LOWER VASCULAR RIGHT GREATER SAPH BK COMPRESS: NORMAL
BH CV LOWER VASCULAR RIGHT MID FEMORAL COMPRESS: NORMAL
BH CV LOWER VASCULAR RIGHT MID FEMORAL PHASIC: NORMAL
BH CV LOWER VASCULAR RIGHT MID FEMORAL SPONT: NORMAL
BH CV LOWER VASCULAR RIGHT PERONEAL COMPRESS: NORMAL
BH CV LOWER VASCULAR RIGHT PERONEAL SPONT: NORMAL
BH CV LOWER VASCULAR RIGHT POPLITEAL COMPRESS: NORMAL
BH CV LOWER VASCULAR RIGHT POPLITEAL PHASIC: NORMAL
BH CV LOWER VASCULAR RIGHT POPLITEAL SPONT: NORMAL
BH CV LOWER VASCULAR RIGHT POSTERIOR TIBIAL COMPRESS: NORMAL
BH CV LOWER VASCULAR RIGHT POSTERIOR TIBIAL SPONT: NORMAL
BH CV LOWER VASCULAR RIGHT PROFUNDA FEMORAL PHASIC: NORMAL
BH CV LOWER VASCULAR RIGHT PROFUNDA FEMORAL SPONT: NORMAL
BH CV LOWER VASCULAR RIGHT PROXIMAL FEMORAL COMPRESS: NORMAL
BH CV LOWER VASCULAR RIGHT PROXIMAL FEMORAL PHASIC: NORMAL
BH CV LOWER VASCULAR RIGHT PROXIMAL FEMORAL SPONT: NORMAL
BH CV LOWER VASCULAR RIGHT SAPHENOFEMORAL JUNCTION COMPRESS: NORMAL
BH CV LOWER VASCULAR RIGHT SAPHENOFEMORAL JUNCTION PHASIC: NORMAL
BH CV LOWER VASCULAR RIGHT SAPHENOFEMORAL JUNCTION SPONT: NORMAL
BH CV RIGHT GROIN PSA PROCEDURE SCRIPTING LRR: 1
BH CV XLRA MEAS EXT ILIAC A PSV RIGHT: 197.8 CM/SEC
BUN SERPL-MCNC: 16 MG/DL (ref 8–23)
BUN/CREAT SERPL: 35.6 (ref 7–25)
CALCIUM SPEC-SCNC: 8.1 MG/DL (ref 8.6–10.5)
CHLORIDE SERPL-SCNC: 100 MMOL/L (ref 98–107)
CO2 SERPL-SCNC: 33 MMOL/L (ref 22–29)
CREAT SERPL-MCNC: 0.45 MG/DL (ref 0.57–1)
DEPRECATED RDW RBC AUTO: 76.2 FL (ref 37–54)
EGFRCR SERPLBLD CKD-EPI 2021: 108.9 ML/MIN/1.73
ERYTHROCYTE [DISTWIDTH] IN BLOOD BY AUTOMATED COUNT: 20.9 % (ref 12.3–15.4)
GLUCOSE BLDC GLUCOMTR-MCNC: 160 MG/DL (ref 70–130)
GLUCOSE BLDC GLUCOMTR-MCNC: 201 MG/DL (ref 70–130)
GLUCOSE BLDC GLUCOMTR-MCNC: 245 MG/DL (ref 70–130)
GLUCOSE BLDC GLUCOMTR-MCNC: 275 MG/DL (ref 70–130)
GLUCOSE SERPL-MCNC: 132 MG/DL (ref 65–99)
HCT VFR BLD AUTO: 27.3 % (ref 34–46.6)
HGB BLD-MCNC: 8.9 G/DL (ref 12–15.9)
MAGNESIUM SERPL-MCNC: 1.8 MG/DL (ref 1.6–2.4)
MCH RBC QN AUTO: 35.3 PG (ref 26.6–33)
MCHC RBC AUTO-ENTMCNC: 32.6 G/DL (ref 31.5–35.7)
MCV RBC AUTO: 108.3 FL (ref 79–97)
PLATELET # BLD AUTO: 114 10*3/MM3 (ref 140–450)
PMV BLD AUTO: 10.5 FL (ref 6–12)
POTASSIUM SERPL-SCNC: 3.8 MMOL/L (ref 3.5–5.2)
POTASSIUM SERPL-SCNC: 4.1 MMOL/L (ref 3.5–5.2)
PROX PFA PSV RIGHT: 239.4 CM/SEC
PROX SFA PSV RIGHT: 71 CM/SEC
RBC # BLD AUTO: 2.52 10*6/MM3 (ref 3.77–5.28)
RIGHT GROIN CFA SYS: 171.1 CM/SEC
SODIUM SERPL-SCNC: 141 MMOL/L (ref 136–145)
WBC NRBC COR # BLD AUTO: 7.59 10*3/MM3 (ref 3.4–10.8)

## 2024-11-19 PROCEDURE — 83735 ASSAY OF MAGNESIUM: CPT | Performed by: INTERNAL MEDICINE

## 2024-11-19 PROCEDURE — 93971 EXTREMITY STUDY: CPT | Performed by: INTERNAL MEDICINE

## 2024-11-19 PROCEDURE — 93926 LOWER EXTREMITY STUDY: CPT

## 2024-11-19 PROCEDURE — 99232 SBSQ HOSP IP/OBS MODERATE 35: CPT | Performed by: INTERNAL MEDICINE

## 2024-11-19 PROCEDURE — 80048 BASIC METABOLIC PNL TOTAL CA: CPT | Performed by: INTERNAL MEDICINE

## 2024-11-19 PROCEDURE — 84132 ASSAY OF SERUM POTASSIUM: CPT | Performed by: THORACIC SURGERY (CARDIOTHORACIC VASCULAR SURGERY)

## 2024-11-19 PROCEDURE — 25010000002 MAGNESIUM SULFATE 4 GM/100ML SOLUTION: Performed by: THORACIC SURGERY (CARDIOTHORACIC VASCULAR SURGERY)

## 2024-11-19 PROCEDURE — 93926 LOWER EXTREMITY STUDY: CPT | Performed by: INTERNAL MEDICINE

## 2024-11-19 PROCEDURE — 93971 EXTREMITY STUDY: CPT

## 2024-11-19 PROCEDURE — 82948 REAGENT STRIP/BLOOD GLUCOSE: CPT

## 2024-11-19 PROCEDURE — 99024 POSTOP FOLLOW-UP VISIT: CPT

## 2024-11-19 PROCEDURE — 93005 ELECTROCARDIOGRAM TRACING: CPT | Performed by: THORACIC SURGERY (CARDIOTHORACIC VASCULAR SURGERY)

## 2024-11-19 PROCEDURE — 99232 SBSQ HOSP IP/OBS MODERATE 35: CPT | Performed by: STUDENT IN AN ORGANIZED HEALTH CARE EDUCATION/TRAINING PROGRAM

## 2024-11-19 PROCEDURE — 25010000002 FUROSEMIDE PER 20 MG: Performed by: PHYSICIAN ASSISTANT

## 2024-11-19 PROCEDURE — 63710000001 INSULIN LISPRO (HUMAN) PER 5 UNITS: Performed by: PHYSICIAN ASSISTANT

## 2024-11-19 PROCEDURE — 63710000001 INSULIN GLARGINE PER 5 UNITS: Performed by: PHYSICIAN ASSISTANT

## 2024-11-19 PROCEDURE — 85027 COMPLETE CBC AUTOMATED: CPT | Performed by: INTERNAL MEDICINE

## 2024-11-19 PROCEDURE — 25010000002 HYDROCORTISONE SOD SUC (PF) 100 MG RECONSTITUTED SOLUTION: Performed by: PHYSICIAN ASSISTANT

## 2024-11-19 RX ORDER — MAGNESIUM SULFATE HEPTAHYDRATE 40 MG/ML
4 INJECTION, SOLUTION INTRAVENOUS ONCE
Status: COMPLETED | OUTPATIENT
Start: 2024-11-19 | End: 2024-11-19

## 2024-11-19 RX ORDER — POTASSIUM CHLORIDE 1500 MG/1
20 TABLET, EXTENDED RELEASE ORAL ONCE
Status: COMPLETED | OUTPATIENT
Start: 2024-11-19 | End: 2024-11-19

## 2024-11-19 RX ADMIN — INSULIN GLARGINE 20 UNITS: 100 INJECTION, SOLUTION SUBCUTANEOUS at 20:39

## 2024-11-19 RX ADMIN — APIXABAN 5 MG: 5 TABLET, FILM COATED ORAL at 20:39

## 2024-11-19 RX ADMIN — POTASSIUM CHLORIDE 10 MEQ: 750 CAPSULE, EXTENDED RELEASE ORAL at 09:38

## 2024-11-19 RX ADMIN — MIDODRINE HYDROCHLORIDE 5 MG: 5 TABLET ORAL at 18:29

## 2024-11-19 RX ADMIN — MAGNESIUM SULFATE IN WATER FOR 4 G: 40 INJECTION INTRAVENOUS at 09:40

## 2024-11-19 RX ADMIN — FUROSEMIDE 60 MG: 10 INJECTION, SOLUTION INTRAMUSCULAR; INTRAVENOUS at 09:29

## 2024-11-19 RX ADMIN — NYSTATIN 1 APPLICATION: 100000 POWDER TOPICAL at 20:39

## 2024-11-19 RX ADMIN — OXYCODONE HYDROCHLORIDE AND ACETAMINOPHEN 1 TABLET: 5; 325 TABLET ORAL at 01:31

## 2024-11-19 RX ADMIN — AMIODARONE HYDROCHLORIDE 200 MG: 200 TABLET ORAL at 18:29

## 2024-11-19 RX ADMIN — AMIODARONE HYDROCHLORIDE 200 MG: 200 TABLET ORAL at 09:38

## 2024-11-19 RX ADMIN — POTASSIUM CHLORIDE 10 MEQ: 750 CAPSULE, EXTENDED RELEASE ORAL at 20:39

## 2024-11-19 RX ADMIN — HYDROCORTISONE SODIUM SUCCINATE 100 MG: 100 INJECTION, POWDER, FOR SOLUTION INTRAMUSCULAR; INTRAVENOUS at 05:07

## 2024-11-19 RX ADMIN — INSULIN LISPRO 5 UNITS: 100 INJECTION, SOLUTION INTRAVENOUS; SUBCUTANEOUS at 12:10

## 2024-11-19 RX ADMIN — MIDODRINE HYDROCHLORIDE 5 MG: 5 TABLET ORAL at 09:38

## 2024-11-19 RX ADMIN — FUROSEMIDE 60 MG: 10 INJECTION, SOLUTION INTRAMUSCULAR; INTRAVENOUS at 18:29

## 2024-11-19 RX ADMIN — INSULIN LISPRO 3 UNITS: 100 INJECTION, SOLUTION INTRAVENOUS; SUBCUTANEOUS at 09:27

## 2024-11-19 RX ADMIN — NYSTATIN 1 APPLICATION: 100000 POWDER TOPICAL at 09:38

## 2024-11-19 RX ADMIN — ASPIRIN 81 MG: 81 TABLET, COATED ORAL at 09:40

## 2024-11-19 RX ADMIN — OXYCODONE HYDROCHLORIDE AND ACETAMINOPHEN 1 TABLET: 5; 325 TABLET ORAL at 10:27

## 2024-11-19 RX ADMIN — INSULIN LISPRO 8 UNITS: 100 INJECTION, SOLUTION INTRAVENOUS; SUBCUTANEOUS at 18:29

## 2024-11-19 RX ADMIN — POTASSIUM CHLORIDE 20 MEQ: 1500 TABLET, EXTENDED RELEASE ORAL at 09:38

## 2024-11-19 RX ADMIN — APIXABAN 5 MG: 5 TABLET, FILM COATED ORAL at 09:38

## 2024-11-19 RX ADMIN — DOCUSATE SODIUM 100 MG: 100 CAPSULE, LIQUID FILLED ORAL at 09:37

## 2024-11-19 RX ADMIN — INSULIN LISPRO 5 UNITS: 100 INJECTION, SOLUTION INTRAVENOUS; SUBCUTANEOUS at 20:39

## 2024-11-19 RX ADMIN — LEVOTHYROXINE SODIUM 200 MCG: 0.1 TABLET ORAL at 05:07

## 2024-11-19 RX ADMIN — OXYCODONE HYDROCHLORIDE AND ACETAMINOPHEN 1 TABLET: 5; 325 TABLET ORAL at 19:41

## 2024-11-19 NOTE — PROGRESS NOTES
[facility] Medicine Services  PROGRESS NOTE    Patient Name: Jill Miller  : 1960  MRN: [id number]    Date of Admission: 2024  Primary Care Physician: Marisa Lynch PA    Subjective   Subjective     CC:  ICU Downgrade     HPI:  Patient states she feels well this morning.  Denies any shortness of breath.  States she wears oxygen intermittently at home.  Denies any chest pain.  Feels ready to go home and declines rehab.      Objective   Objective     Vital Signs:   Temp:  [97.5 °F (36.4 °C)-98.4 °F (36.9 °C)] 97.8 °F (36.6 °C)  Heart Rate:  [58-75] 71  Resp:  [16-18] 18  BP: ()/(39-59) 108/54  Flow (L/min) (Oxygen Therapy):  [2] 2     Physical Exam:  General appearance: alert, awake, oriented, no acute distress   Cardiovascular: RRR, systolic murmur, radial pulse full 2/4 BL   Respiratory: CTAB, no crackles or wheezes, oxygenating well on 2 L nasal cannula, no respiratory distress  MSK: Right lower extremity with 1+ edema, left lower extremity with no edema  Neuro/CNS: alert and oriented x3, normal speech  Skin: Right lower extremity warm to touch without erythema    Results Reviewed:  LAB RESULTS:      Lab 24  0548 24  0639 24  0430 24  0347 24  1622 24  0352 24  1505 24  0501 11/15/24  1558 11/15/24  0346 24  1022 24  0109 24  1815 24  0527 24  0320 24  1659   0000   WBC 7.59 8.93  --  8.25  --  9.94  --  12.51*  --  14.99*   < > 20.44*  --   --  18.27*  --    < >   HEMOGLOBIN 8.9* 9.9*  --  8.3*  --  9.5*  --  8.4*  --  9.1*   < > 8.7*  --   --  9.1*  --    < >   HEMATOCRIT 27.3* 31.6*  --  26.1*  --  30.2*  --  25.8*  --  28.7*   < > 26.6*  --   --  26.2*  --    < >   PLATELETS 114* 111*  --  113*  --  126*  --  100*  --  76*   < > 85*  --   --  98*  --    < >   NEUTROS ABS  --   --   --  6.29  --  8.37*  --  11.10*  --  13.76*  --  18.53*  --   --   --   --   --    IMMATURE  GRANS (ABS)  --   --   --  0.39*  --  0.39*  --  0.18*  --  0.12*  --  0.33*  --   --   --   --   --    LYMPHS ABS  --   --   --  0.87  --  0.59*  --  0.44*  --  0.34*  --  0.42*  --   --   --   --   --    MONOS ABS  --   --   --  0.68  --  0.57  --  0.77  --  0.76  --  1.14*  --   --   --   --   --    EOS ABS  --   --   --  0.01  --  0.00  --  0.00  --  0.00  --  0.00  --   --   --   --   --    .3* 110.5*  --  106.1*  --  104.9*  --  104.5*  --  104.7*   < > 102.7*  --   --  97.8*  --    < >   PROCALCITONIN  --   --   --   --   --   --   --   --   --   --   --   --   --   --  1.77*  --   --    LACTATE  --   --   --   --   --   --   --   --   --   --   --   --   --  1.4  --  6.0*  --    HEPARIN ANTI-XA  --   --  0.33  --  0.32 0.42 0.37 0.34   < > 0.36   < > 0.25*   < >  --   --   --   --     < > = values in this interval not displayed.         Lab 11/19/24  0548 11/18/24 0347 11/17/24 2007 11/17/24  1350 11/17/24  0352 11/16/24  1505 11/16/24  0501 11/15/24  0346 11/14/24  0109 11/13/24  0527 11/13/24  0320   SODIUM 141 144  --   --  146*  --  143 140   < >  --  141   POTASSIUM 3.8 3.7 3.4* 3.8 3.5  3.5   < > 3.4* 4.1   < >  --  3.8   CHLORIDE 100 104  --   --  103  --  103 101   < >  --  102   CO2 33.0* 34.0*  --   --  32.0*  --  32.0* 28.0   < >  --  29.0   ANION GAP 8.0 6.0  --   --  11.0  --  8.0 11.0   < >  --  10.0   BUN 16 18  --   --  19  --  19 14   < >  --  15   CREATININE 0.45* 0.55*  --   --  0.54*  --  0.56* 0.46*   < >  --  0.63   EGFR 108.9 103.8  --   --  104.2  --  103.3 108.4   < >  --  100.4   GLUCOSE 132* 93  --   --  89  --  115* 108*   < >  --  226*   CALCIUM 8.1* 8.2*  --   --  8.9  --  8.2* 8.4*   < >  --  7.5*   IONIZED CALCIUM  --   --   --   --   --   --   --   --   --  1.06*  --    MAGNESIUM 1.8 2.0  --   --   --   --  2.2 2.4  --   --  3.2*   PHOSPHORUS  --   --   --   --   --   --   --   --   --   --  3.0    < > = values in this interval not displayed.                          Lab 11/15/24  0443 11/13/24  0807 11/13/24  0316   PH, ARTERIAL 7.357 7.307* 7.329*   PCO2, ARTERIAL 50.7* 65.2* 59.8*   PO2 ART 60.0* 61.5* 66.4*   FIO2 50 65 60   HCO3 ART 28.4* 32.6* 31.4*   BASE EXCESS ART 2.4* 5.0* 4.4*   CARBOXYHEMOGLOBIN 1.2 1.1 1.3     Brief Urine Lab Results       None            Microbiology Results Abnormal       None            No radiology results from the last 24 hrs    Results for orders placed during the hospital encounter of 11/11/24    Adult Transthoracic Echo Limited W/ Cont if Necessary Per Protocol    Interpretation Summary    Left ventricular ejection fraction appears to be 66 - 70%.    The left atrial cavity is mildly dilated.    Left atrial volume is severely increased.    Peak velocity of the flow distal to the aortic valve is 323.1 cm/s. Aortic valve mean pressure gradient is 33 mmHg.    There is a TAVR valve present.    The mitral valve mean gradient is 13 mmHg.    Echo relatively unchanged from prior      Current medications:  Scheduled Meds:amiodarone, 200 mg, Oral, BID With Meals  apixaban, 5 mg, Oral, Q12H  aspirin, 81 mg, Oral, Daily  docusate sodium, 100 mg, Oral, BID  furosemide, 60 mg, Intravenous, Q12H  insulin glargine, 20 Units, Subcutaneous, Nightly  insulin lispro, 3-14 Units, Subcutaneous, 4x Daily AC & at Bedtime  levothyroxine, 200 mcg, Oral, Q AM  midodrine, 5 mg, Oral, BID AC  nystatin, 1 Application, Topical, BID  polyethylene glycol, 17 g, Oral, Daily  potassium chloride, 10 mEq, Oral, BID  senna-docusate sodium, 2 tablet, Oral, Nightly      Continuous Infusions:phenylephrine, 0.5-3 mcg/kg/min, Last Rate: Stopped (11/17/24 0113)      PRN Meds:.  acetaminophen    albuterol sulfate HFA    Calcium Replacement - Follow Nurse / BPA Driven Protocol    dextrose    dextrose    glucagon (human recombinant)    hydrALAZINE    Magnesium Cardiology Dose Replacement - Follow Nurse / BPA Driven Protocol    metoprolol tartrate    nitroglycerin    ondansetron ODT  **OR** ondansetron    oxyCODONE-acetaminophen    Phosphorus Replacement - Follow Nurse / BPA Driven Protocol    Potassium Replacement - Follow Nurse / BPA Driven Protocol    Assessment & Plan   Assessment & Plan     Active Hospital Problems    Diagnosis  POA    **Prosthetic aortic valve stenosis s/p TAVR 11/11/24, Right groin exploration for hematoma 11/12/24 [T82.857A]  Unknown    Pulmonary infiltrate, likely right-sided pneumonia [R91.8]  Unknown    Acute pulmonary embolism without acute cor pulmonale [I26.99]  Unknown    Acute on chronic respiratory failure with hypoxia and hypercapnia [J96.21, J96.22]  Unknown    COPD (chronic obstructive pulmonary disease) [J44.9]  Yes    Type 2 diabetes mellitus [E11.9]  Yes    Paroxysmal atrial fibrillation [I48.0]  Yes      Resolved Hospital Problems   No resolved problems to display.        Brief Hospital Course to date:  Jill Miller is a 64 y.o. female who was admitted to the ICU after undergoing a TAVR on 11/11 then found to have pneumonia complicated by pulmonary embolism.  She completed course of Zosyn for aspiration pneumonia with improvement.    Prosthetic aortic valve stenosis status post TAVR 11/11  Right groin exploration for hematoma 11/12  Aspiration pneumonia, resolved  Acute pulmonary embolism  Acute on chronic hypoxic respiratory failure secondary to pneumonia and pulmonary embolism  -Patient hemodynamically stable  -Currently appears to tolerate anticoagulation, no active signs of bleeding  -Declines rehab  -Managed by CT surgery and cardiology    Type 2 diabetes mellitus  -Relatively controlled on current regiment, continue  -Impacted by recent Solu-Cortef administration    Hypothyroidism  -Continue levothyroxine      VTE Prophylaxis:  Pharmacologic VTE prophylaxis orders are present.         AM-PAC 6 Clicks Score (PT): 20 (11/18/24 2000)    CODE STATUS:   There are no questions and answers to display.       Michoacano Izaguirre, DO  11/19/24

## 2024-11-19 NOTE — PROGRESS NOTES
LOS: 8 days   Patient Care Team:  Marisa Lynch PA as PCP - General (Family Medicine)  Ajay Cheatham MD as Surgeon (Cardiothoracic Surgery)  Marco Lay IV, MD as Cardiologist (Interventional Cardiology)  Nigel Huber MD as Consulting Physician (Cardiology)  Marisa Suarez APRN as Nurse Practitioner (Interventional Cardiology)    Chief Complaint: Dyspnea    Subjective     Interval History:     Patient up and around feeling well.  No chest pain.  Shortness of breath is at baseline.  Still with lower extremity swelling.  Patient maintaining sinus on monitor    Review of Systems:    12 point review of systems reviewed pertinent for those mentioned HPI    Objective     Vital Signs  Temp:  [97.5 °F (36.4 °C)-98.4 °F (36.9 °C)] 97.5 °F (36.4 °C)  Heart Rate:  [58-73] 60  Resp:  [14-18] 18  BP: ()/(39-55) 104/53  Body mass index is 29.07 kg/m².    Intake/Output Summary (Last 24 hours) at 11/19/2024 0710  Last data filed at 11/19/2024 0100  Gross per 24 hour   Intake 360 ml   Output 1150 ml   Net -790 ml     No intake/output data recorded.    Physical Exam:     General Appearance:  Alert, cooperative, in no acute distress   Head:  Normocephalic, without obvious abnormality, atraumatic   Eyes:  Lids and lashes normal, conjunctivae and sclerae normal, no icterus, no pallor, corneas clear, PERRLA   Ears:  Ears appear intact with no abnormalities noted   Throat:  No oral lesions, no thrush, oral mucosa moist   Neck:  No adenopathy, supple, trachea midline, no thyromegaly, no carotid bruit, no JVD   Back:  No kyphosis present, no scoliosis present, no skin lesions, erythema or scars, no tenderness to percussion, or palpation, range of motion normal   Lungs:  Clear to auscultation,respirations regular, even and unlabored    Heart:  Regular rhythm and normal rate, normal S1 and S2, no murmur, no gallop, no rub, no click   Breast Exam:  Deferred   Abdomen:  Normal bowel sounds, no masses, no  "organomegaly, soft non-tender, non-distended, no guarding, no rebound tenderness   Genitalia:  Deferred   Extremities:  Moves all extremities well, no edema, no cyanosis, no redness   Pulses:  Pulses palpable and equal bilaterally   Skin:  No bleeding, bruising or rash   Lymph nodes:  No palpable adenopathy   Neurologic:  Cranial nerves 2 - 12 grossly intact, sensation intact, DTR present and equal bilaterally        Results Review:     I reviewed the patient's new clinical results.  I reviewed the patient's new imaging results and agree with the interpretation.  I reviewed the patient's other test results and agree with the interpretation  I personally viewed and interpreted the patient's EKG/Telemetry data      WBC WBC   Date Value Ref Range Status   11/18/2024 8.93 3.40 - 10.80 10*3/mm3 Final   11/18/2024 8.25 3.40 - 10.80 10*3/mm3 Final   11/17/2024 9.94 3.40 - 10.80 10*3/mm3 Final      HGB Hemoglobin   Date Value Ref Range Status   11/18/2024 9.9 (L) 12.0 - 15.9 g/dL Final   11/18/2024 8.3 (L) 12.0 - 15.9 g/dL Final   11/17/2024 9.5 (L) 12.0 - 15.9 g/dL Final      HCT Hematocrit   Date Value Ref Range Status   11/18/2024 31.6 (L) 34.0 - 46.6 % Final   11/18/2024 26.1 (L) 34.0 - 46.6 % Final   11/17/2024 30.2 (L) 34.0 - 46.6 % Final      Platlets No results found for: \"LABPLAT\"     PT/INR:  No results found for: \"PROTIME\"/No results found for: \"INR\"    Sodium Sodium   Date Value Ref Range Status   11/19/2024 141 136 - 145 mmol/L Final   11/18/2024 144 136 - 145 mmol/L Final   11/17/2024 146 (H) 136 - 145 mmol/L Final      Potassium Potassium   Date Value Ref Range Status   11/19/2024 3.8 3.5 - 5.2 mmol/L Final   11/18/2024 3.7 3.5 - 5.2 mmol/L Final   11/17/2024 3.4 (L) 3.5 - 5.2 mmol/L Final   11/17/2024 3.8 3.5 - 5.2 mmol/L Final     Comment:     Slight hemolysis detected by analyzer. Result may be falsely elevated.   11/17/2024 3.5 3.5 - 5.2 mmol/L Final     Comment:     Slight hemolysis detected by analyzer. " "Result may be falsely elevated.   11/17/2024 3.5 3.5 - 5.2 mmol/L Final     Comment:     Slight hemolysis detected by analyzer. Result may be falsely elevated.   11/16/2024 3.8 3.5 - 5.2 mmol/L Final      Chloride Chloride   Date Value Ref Range Status   11/19/2024 100 98 - 107 mmol/L Final   11/18/2024 104 98 - 107 mmol/L Final   11/17/2024 103 98 - 107 mmol/L Final      Bicarbonate No results found for: \"PLASMABICARB\"   BUN BUN   Date Value Ref Range Status   11/19/2024 16 8 - 23 mg/dL Final   11/18/2024 18 8 - 23 mg/dL Final   11/17/2024 19 8 - 23 mg/dL Final      Creatinine Creatinine   Date Value Ref Range Status   11/19/2024 0.45 (L) 0.57 - 1.00 mg/dL Final   11/18/2024 0.55 (L) 0.57 - 1.00 mg/dL Final   11/17/2024 0.54 (L) 0.57 - 1.00 mg/dL Final      Calcium Calcium   Date Value Ref Range Status   11/19/2024 8.1 (L) 8.6 - 10.5 mg/dL Final   11/18/2024 8.2 (L) 8.6 - 10.5 mg/dL Final   11/17/2024 8.9 8.6 - 10.5 mg/dL Final      Magnesium Magnesium   Date Value Ref Range Status   11/19/2024 1.8 1.6 - 2.4 mg/dL Final   11/18/2024 2.0 1.6 - 2.4 mg/dL Final            pH No results found for: \"PHART\"   pO2 No results found for: \"PO2ART\"   pCO2 No results found for: \"AJH4JAL\"   HCO3 No results found for: \"MOX9QFI\"     amiodarone, 200 mg, Oral, BID With Meals  apixaban, 5 mg, Oral, Q12H  aspirin, 81 mg, Oral, Daily  docusate sodium, 100 mg, Oral, BID  furosemide, 60 mg, Intravenous, Q12H  insulin glargine, 20 Units, Subcutaneous, Nightly  insulin lispro, 3-14 Units, Subcutaneous, 4x Daily AC & at Bedtime  levothyroxine, 200 mcg, Oral, Q AM  midodrine, 5 mg, Oral, BID AC  nystatin, 1 Application, Topical, BID  polyethylene glycol, 17 g, Oral, Daily  potassium chloride, 10 mEq, Oral, BID  senna-docusate sodium, 2 tablet, Oral, Nightly      phenylephrine, 0.5-3 mcg/kg/min, Last Rate: Stopped (11/17/24 0113)        Medication Review: Reviewed    Assessment & Plan     Patient Active Problem List   Diagnosis Code    " Essential hypertension I10    Acquired hypothyroidism E03.9    Lung cancer; S/P Bronch/Thor w/ ALBINA lobectomy/mediastinal lymph node resec (5/12/17, Dr. Cheatham) C34.90    Paroxysmal atrial fibrillation I48.0    Hyperlipidemia LDL goal <70 E78.5    Prosthetic aortic valve stenosis s/p TAVR 11/11/24, Right groin exploration for hematoma 11/12/24 T82.857A    Type 2 diabetes mellitus E11.9    COPD (chronic obstructive pulmonary disease) J44.9    Obesity (BMI 30-39.9) E66.9    Tobacco user Z72.0    Pulmonary hypertension I27.20    Stenosis of prosthetic mitral valve T82.857A    Acute on chronic respiratory failure with hypoxia and hypercapnia J96.21, J96.22    Pulmonary infiltrate, likely right-sided pneumonia R91.8    Acute pulmonary embolism without acute cor pulmonale I26.99       Shock  hypovolemic, resolved  Pneumonia  Segmental pulmonary emboli  Severe aortic stenosis status post valve in valve TAVR  Degeneration of surgical mitral valve  Anemia, acute blood loss  Persistent atrial fibrillation  Multilevel DVTs    Plan for disposition: Can be discharged from my perspective I will continue diuretics given her lower extremity swelling to follow with me post procedure to determine need for transcatheter mitral valve replacement/valve in valve mitral.  Tinea supportive care otherwise at this time    Jeffry Pickett MD  11/19/24  07:10 EST

## 2024-11-19 NOTE — PROGRESS NOTES
Cardiothoracic Surgery Progress Note      POD # 8 s/p TAVR, POD # 6 Right groin exploration      LOS: 8 days      Subjective:  No complaints. No acute events.    Objective:  Vital Signs  Temp:  [97.5 °F (36.4 °C)-98.4 °F (36.9 °C)] 97.6 °F (36.4 °C)  Heart Rate:  [58-73] 64  Resp:  [14-18] 17  BP: ()/(39-59) 112/59    Physical Exam:   General Appearance: Well-developed, well-nourished in no acute distress.  Sitting up in chair   Lungs: Respirations even and unlabored and clear to auscultation bilaterally no wheezes, rales or rhonchi   Heart: Regular rate and rhythm no murmurs, rubs or gallops   Skin: Incision c/d/i     Results:    Results from last 7 days   Lab Units 11/19/24  0548   WBC 10*3/mm3 7.59   HEMOGLOBIN g/dL 8.9*   HEMATOCRIT % 27.3*   PLATELETS 10*3/mm3 114*     Results from last 7 days   Lab Units 11/19/24  0548   SODIUM mmol/L 141   POTASSIUM mmol/L 3.8   CHLORIDE mmol/L 100   CO2 mmol/L 33.0*   BUN mg/dL 16   CREATININE mg/dL 0.45*   GLUCOSE mg/dL 132*   CALCIUM mg/dL 8.1*       Assessment:  POD # 8 s/p TAVR, POD # 6 Right groin exploration     Plan:  D/C Aquacel dressing  Diuresis  Wean oxygen as tolerated  Ambulate  Pulmonary toilet  Case management to work on rehab/SNF placement-medically ready    Ajay Cheatham MD  11/19/24  08:01 EST

## 2024-11-19 NOTE — PROGRESS NOTES
Clinical Nutrition Assessment     Patient Name: Jill Miller  YOB: 1960  MRN: [id number]  Date of Encounter: 11/19/24 10:57 EST  Admission date: 11/11/2024  Reason for Visit: LOS    Assessment   Nutrition Assessment   Admission Diagnosis:  Stenosis of prosthetic aortic valve, initial encounter [T82.304A]  Prosthetic aortic valve stenosis [T82.966I]    Problem List:    Prosthetic aortic valve stenosis s/p TAVR 11/11/24, Right groin exploration for hematoma 11/12/24    Paroxysmal atrial fibrillation    Type 2 diabetes mellitus    COPD (chronic obstructive pulmonary disease)    Acute on chronic respiratory failure with hypoxia and hypercapnia    Pulmonary infiltrate, likely right-sided pneumonia    Acute pulmonary embolism without acute cor pulmonale      PMH:   She  has a past medical history of Acute pulmonary embolism without acute cor pulmonale (11/14/2024), Atrial fibrillation and flutter, Chronic diastolic congestive heart failure, Chronically Abnormal CXR (Left pleural disease post op) (09/25/2017), COPD exacerbation (04/17/2022), Elevated cholesterol, Essential hypertension (04/10/2017), Graves disease, Hyperlipidemia LDL goal <70, Hypertension, Hyperthyroidism, Lung cancer, MRSA (methicillin resistant staph aureus) culture positive (2014), Prolonged QTC interval on ECG (06/01/2022), Rheumatic mitral stenosis, Type 2 diabetes mellitus (07/17/2018), and Valvular heart disease.    PSH:  She  has a past surgical history that includes Total thyroidectomy (approx 2012); Abscess drainage; Lung biopsy (04/2017); Bronchoscopy (Left, 05/12/2017); thoracoscopy video assisted with lobectomy (Left, 05/12/2017); Lymph node dissection (Left, 05/12/2017); Bronchoscopy (N/A, 05/18/2017); Cardiac catheterization (N/A, 09/28/2017); Cardiac catheterization (N/A, 09/28/2017); Teeth Extraction; Aortic valve replacement (N/A, 07/16/2018); mitral valve repair/replacement (N/A, 07/16/2018); Maze  "Procedure; Ablation of dysrhythmic focus (07/16/2018); Tricuspid valve surgery (07/16/2018); Mitral valve replacement (07/16/2018); Cardiac electrophysiology procedure (N/A, 6/1/2022); Cardiac catheterization (N/A, 10/11/2024); Aortic valve replacement (N/A, 11/11/2024); transesophageal echocardiogram (ria) (N/A, 11/11/2024); Aortic valve replacement (N/A, 11/11/2024); and Femoral Thrombectomy/Embolectomy (Right, 11/12/2024).    Applicable Nutrition History:   (11/15) LP Swallowing Diagnosis: mild, oral dysphagia, pharyngeal dysphagia.  SLP Diet Recommendation: regular textures, thin liquids.    (11/18) SLP Diet Recommendation: regular textures, thin liquids (11/18/24 1100)     Anthropometrics     Height: Height: 157.5 cm (62.01\")  Last Filed Weight: Weight: 72.1 kg (158 lb 15.2 oz) (11/18/24 1142)  Method:    BMI: BMI (Calculated): 29.1    UBW:  160lb    Weight change: No significant changes    Nutrition Focused Physical Exam    Date: 11/19    Pt does not meet criteria for malnutrition diagnosis, at this time.      Subjective   Reported/Observed/Food/Nutrition Related History:   11/19  Patient reports appetite starting to improve, its not back to baseline (prior to surgery), getting close.  Declined ONS.  Has menu at bedside, kitchen staff is reviewing menu options. Denies any chewing or swallowing issues.  SLP ganesh completed twice with recommendation for regular diet with thin liquids, patient noted with order for soft texture, soft to chew, whole meat.  Discussed with patient, she would like to continue to current diet.      Current Nutrition Prescription   PO: Diet: Diabetic, Cardiac; Healthy Heart (2-3 Na+); Consistent Carbohydrate; Texture: Soft to Chew (NDD 3); Soft to Chew: Whole Meat; Fluid Consistency: Thin (IDDSI 0)  Oral Nutrition Supplement:   Intake: 4 Days: 27% x 11 meals     Assessment & Plan   Nutrition Diagnosis   Date:  11/19            Updated:    Problem Inadequate oral intake    Etiology Recent " surgery (TAVR)    Signs/Symptoms Average intake of 27% x 11 meals   Status: New patient reports appetite is improving     Goal / Objectives:   Nutrition to support treatment and Increase intake      Nutrition Intervention      Follow treatment progress, Care plan reviewed, Interview for preferences, Encourage intake      Monitoring/Evaluation:   Per protocol, I&O, PO intake, Pertinent labs    Marzena Simmons RD  Time Spent: 30min

## 2024-11-20 ENCOUNTER — READMISSION MANAGEMENT (OUTPATIENT)
Dept: CALL CENTER | Facility: HOSPITAL | Age: 64
End: 2024-11-20
Payer: COMMERCIAL

## 2024-11-20 VITALS
WEIGHT: 158.95 LBS | OXYGEN SATURATION: 95 % | SYSTOLIC BLOOD PRESSURE: 98 MMHG | HEART RATE: 69 BPM | HEIGHT: 62 IN | BODY MASS INDEX: 29.25 KG/M2 | RESPIRATION RATE: 18 BRPM | TEMPERATURE: 98 F | DIASTOLIC BLOOD PRESSURE: 53 MMHG

## 2024-11-20 LAB
ANION GAP SERPL CALCULATED.3IONS-SCNC: 7 MMOL/L (ref 5–15)
BUN SERPL-MCNC: 16 MG/DL (ref 8–23)
BUN/CREAT SERPL: 37.2 (ref 7–25)
CALCIUM SPEC-SCNC: 8.7 MG/DL (ref 8.6–10.5)
CHLORIDE SERPL-SCNC: 95 MMOL/L (ref 98–107)
CO2 SERPL-SCNC: 36 MMOL/L (ref 22–29)
CREAT SERPL-MCNC: 0.43 MG/DL (ref 0.57–1)
EGFRCR SERPLBLD CKD-EPI 2021: 110.1 ML/MIN/1.73
GLUCOSE BLDC GLUCOMTR-MCNC: 141 MG/DL (ref 70–130)
GLUCOSE SERPL-MCNC: 136 MG/DL (ref 65–99)
MAGNESIUM SERPL-MCNC: 2.1 MG/DL (ref 1.6–2.4)
POTASSIUM SERPL-SCNC: 3.5 MMOL/L (ref 3.5–5.2)
SODIUM SERPL-SCNC: 138 MMOL/L (ref 136–145)

## 2024-11-20 PROCEDURE — 25010000002 FUROSEMIDE PER 20 MG: Performed by: PHYSICIAN ASSISTANT

## 2024-11-20 PROCEDURE — 82948 REAGENT STRIP/BLOOD GLUCOSE: CPT

## 2024-11-20 PROCEDURE — 99231 SBSQ HOSP IP/OBS SF/LOW 25: CPT | Performed by: STUDENT IN AN ORGANIZED HEALTH CARE EDUCATION/TRAINING PROGRAM

## 2024-11-20 PROCEDURE — 99232 SBSQ HOSP IP/OBS MODERATE 35: CPT | Performed by: INTERNAL MEDICINE

## 2024-11-20 PROCEDURE — 83735 ASSAY OF MAGNESIUM: CPT | Performed by: THORACIC SURGERY (CARDIOTHORACIC VASCULAR SURGERY)

## 2024-11-20 PROCEDURE — 80048 BASIC METABOLIC PNL TOTAL CA: CPT | Performed by: INTERNAL MEDICINE

## 2024-11-20 PROCEDURE — 99024 POSTOP FOLLOW-UP VISIT: CPT

## 2024-11-20 RX ORDER — FUROSEMIDE 40 MG/1
60 TABLET ORAL 2 TIMES DAILY
Qty: 60 TABLET | Refills: 11 | Status: SHIPPED | OUTPATIENT
Start: 2024-11-20

## 2024-11-20 RX ORDER — AMIODARONE HYDROCHLORIDE 200 MG/1
TABLET ORAL
Qty: 58 TABLET | Refills: 2 | Status: SHIPPED | OUTPATIENT
Start: 2024-11-20 | End: 2025-01-03

## 2024-11-20 RX ORDER — POTASSIUM CHLORIDE 1500 MG/1
40 TABLET, EXTENDED RELEASE ORAL EVERY 4 HOURS
Status: DISCONTINUED | OUTPATIENT
Start: 2024-11-20 | End: 2024-11-20 | Stop reason: HOSPADM

## 2024-11-20 RX ADMIN — POLYETHYLENE GLYCOL 3350 17 G: 17 POWDER, FOR SOLUTION ORAL at 08:39

## 2024-11-20 RX ADMIN — FUROSEMIDE 60 MG: 10 INJECTION, SOLUTION INTRAMUSCULAR; INTRAVENOUS at 05:33

## 2024-11-20 RX ADMIN — DOCUSATE SODIUM 100 MG: 100 CAPSULE, LIQUID FILLED ORAL at 08:38

## 2024-11-20 RX ADMIN — NYSTATIN 1 APPLICATION: 100000 POWDER TOPICAL at 08:42

## 2024-11-20 RX ADMIN — OXYCODONE HYDROCHLORIDE AND ACETAMINOPHEN 1 TABLET: 5; 325 TABLET ORAL at 12:55

## 2024-11-20 RX ADMIN — ASPIRIN 81 MG: 81 TABLET, COATED ORAL at 08:39

## 2024-11-20 RX ADMIN — POTASSIUM CHLORIDE 40 MEQ: 1500 TABLET, EXTENDED RELEASE ORAL at 11:52

## 2024-11-20 RX ADMIN — AMIODARONE HYDROCHLORIDE 200 MG: 200 TABLET ORAL at 08:39

## 2024-11-20 RX ADMIN — LEVOTHYROXINE SODIUM 200 MCG: 0.1 TABLET ORAL at 05:32

## 2024-11-20 RX ADMIN — OXYCODONE HYDROCHLORIDE AND ACETAMINOPHEN 1 TABLET: 5; 325 TABLET ORAL at 03:47

## 2024-11-20 RX ADMIN — POTASSIUM CHLORIDE 10 MEQ: 750 CAPSULE, EXTENDED RELEASE ORAL at 08:38

## 2024-11-20 RX ADMIN — APIXABAN 5 MG: 5 TABLET, FILM COATED ORAL at 08:38

## 2024-11-20 NOTE — PROGRESS NOTES
[facility] Medicine Services  PROGRESS NOTE    Patient Name: Jill Miller  : 1960  MRN: [id number]    Date of Admission: 2024  Primary Care Physician: Marisa Lynch PA    Subjective   Subjective     CC:  ICU Downgrade     HPI:  Patient reports feeling well overall but still with some pain around her right groin.  Denies any chest pain or shortness of breath.  Wishes to go home if able.      Objective   Objective     Vital Signs:   Temp:  [97.4 °F (36.3 °C)-98 °F (36.7 °C)] 98 °F (36.7 °C)  Heart Rate:  [60-76] 69  Resp:  [16-18] 18  BP: ()/(48-59) 98/53  Flow (L/min) (Oxygen Therapy):  [2] 2     Physical Exam:  General appearance: alert, awake, oriented, no acute distress   Cardiovascular: RRR, systolic murmur, radial pulse full 2/4 BL   Respiratory: CTAB, no crackles or wheezes, oxygenating well on 2 L nasal cannula, no respiratory distress  MSK: Right lower extremity with 1+ edema, left lower extremity with no edema  Neuro/CNS: alert and oriented x3, normal speech  Skin: Right lower extremity warm to touch without erythema, right groin wound stapled with minimal surrounding erythema    Results Reviewed:  LAB RESULTS:      Lab 24  0548 24  0639 24  0430 24  0347 24  1622 24  0352 24  1505 24  0501 11/15/24  1558 11/15/24  0346 24  1022 24  0109   WBC 7.59 8.93  --  8.25  --  9.94  --  12.51*  --  14.99*   < > 20.44*   HEMOGLOBIN 8.9* 9.9*  --  8.3*  --  9.5*  --  8.4*  --  9.1*   < > 8.7*   HEMATOCRIT 27.3* 31.6*  --  26.1*  --  30.2*  --  25.8*  --  28.7*   < > 26.6*   PLATELETS 114* 111*  --  113*  --  126*  --  100*  --  76*   < > 85*   NEUTROS ABS  --   --   --  6.29  --  8.37*  --  11.10*  --  13.76*  --  18.53*   IMMATURE GRANS (ABS)  --   --   --  0.39*  --  0.39*  --  0.18*  --  0.12*  --  0.33*   LYMPHS ABS  --   --   --  0.87  --  0.59*  --  0.44*  --  0.34*  --  0.42*   MONOS ABS  --   --    --  0.68  --  0.57  --  0.77  --  0.76  --  1.14*   EOS ABS  --   --   --  0.01  --  0.00  --  0.00  --  0.00  --  0.00   .3* 110.5*  --  106.1*  --  104.9*  --  104.5*  --  104.7*   < > 102.7*   HEPARIN ANTI-XA  --   --  0.33  --  0.32 0.42 0.37 0.34   < > 0.36   < > 0.25*    < > = values in this interval not displayed.         Lab 11/20/24  0838 11/19/24  1534 11/19/24  0548 11/18/24  0347 11/17/24  2007 11/17/24  1350 11/17/24  0352 11/16/24  1505 11/16/24  0501 11/15/24  0346   SODIUM 138  --  141 144  --   --  146*  --  143 140   POTASSIUM 3.5 4.1 3.8 3.7 3.4*   < > 3.5  3.5   < > 3.4* 4.1   CHLORIDE 95*  --  100 104  --   --  103  --  103 101   CO2 36.0*  --  33.0* 34.0*  --   --  32.0*  --  32.0* 28.0   ANION GAP 7.0  --  8.0 6.0  --   --  11.0  --  8.0 11.0   BUN 16  --  16 18  --   --  19  --  19 14   CREATININE 0.43*  --  0.45* 0.55*  --   --  0.54*  --  0.56* 0.46*   EGFR 110.1  --  108.9 103.8  --   --  104.2  --  103.3 108.4   GLUCOSE 136*  --  132* 93  --   --  89  --  115* 108*   CALCIUM 8.7  --  8.1* 8.2*  --   --  8.9  --  8.2* 8.4*   MAGNESIUM 2.1  --  1.8 2.0  --   --   --   --  2.2 2.4    < > = values in this interval not displayed.                         Lab 11/15/24  0443   PH, ARTERIAL 7.357   PCO2, ARTERIAL 50.7*   PO2 ART 60.0*   FIO2 50   HCO3 ART 28.4*   BASE EXCESS ART 2.4*   CARBOXYHEMOGLOBIN 1.2     Brief Urine Lab Results       None            Microbiology Results Abnormal       None            Duplex Venous Lower Extremity - Right CAR    Result Date: 11/19/2024    Acute right lower extremity deep vein thrombosis noted in the peroneal.   Chronic right lower extremity deep vein thrombosis noted in the posterior tibial.   All other right sided veins appeared normal.     Duplex Pseudoaneurysm CAR    Result Date: 11/19/2024    No evidence of pseudoaneurysm or AV fistula in the right groin.   Anechoic area visualized lateral to CFA measuring 1.1 x 1.3 cm. No flow detected within,  a neck was not visualized.      Results for orders placed during the hospital encounter of 11/11/24    Adult Transthoracic Echo Limited W/ Cont if Necessary Per Protocol    Interpretation Summary    Left ventricular ejection fraction appears to be 66 - 70%.    The left atrial cavity is mildly dilated.    Left atrial volume is severely increased.    Peak velocity of the flow distal to the aortic valve is 323.1 cm/s. Aortic valve mean pressure gradient is 33 mmHg.    There is a TAVR valve present.    The mitral valve mean gradient is 13 mmHg.    Echo relatively unchanged from prior      Current medications:  Scheduled Meds:amiodarone, 200 mg, Oral, BID With Meals  apixaban, 5 mg, Oral, Q12H  aspirin, 81 mg, Oral, Daily  docusate sodium, 100 mg, Oral, BID  furosemide, 60 mg, Intravenous, Q12H  insulin glargine, 20 Units, Subcutaneous, Nightly  insulin lispro, 3-14 Units, Subcutaneous, 4x Daily AC & at Bedtime  levothyroxine, 200 mcg, Oral, Q AM  nystatin, 1 Application, Topical, BID  polyethylene glycol, 17 g, Oral, Daily  potassium chloride ER, 40 mEq, Oral, Q4H  potassium chloride, 10 mEq, Oral, BID  senna-docusate sodium, 2 tablet, Oral, Nightly      Continuous Infusions:   PRN Meds:.  acetaminophen    albuterol sulfate HFA    Calcium Replacement - Follow Nurse / BPA Driven Protocol    dextrose    dextrose    glucagon (human recombinant)    hydrALAZINE    Magnesium Cardiology Dose Replacement - Follow Nurse / BPA Driven Protocol    metoprolol tartrate    nitroglycerin    ondansetron ODT **OR** ondansetron    oxyCODONE-acetaminophen    Phosphorus Replacement - Follow Nurse / BPA Driven Protocol    Potassium Replacement - Follow Nurse / BPA Driven Protocol    Assessment & Plan   Assessment & Plan     Active Hospital Problems    Diagnosis  POA    **Prosthetic aortic valve stenosis s/p TAVR 11/11/24, Right groin exploration for hematoma 11/12/24 [D04.147V]  Unknown    Pulmonary infiltrate, likely right-sided pneumonia  [R91.8]  Unknown    Acute pulmonary embolism without acute cor pulmonale [I26.99]  Unknown    Acute on chronic respiratory failure with hypoxia and hypercapnia [J96.21, J96.22]  Unknown    COPD (chronic obstructive pulmonary disease) [J44.9]  Yes    Type 2 diabetes mellitus [E11.9]  Yes    Paroxysmal atrial fibrillation [I48.0]  Yes      Resolved Hospital Problems   No resolved problems to display.        Brief Hospital Course to date:  Jill Miller is a 64 y.o. female who was admitted to the ICU after undergoing a TAVR on 11/11 then found to have pneumonia complicated by pulmonary embolism.  She completed course of Zosyn for aspiration pneumonia with improvement.     Prosthetic aortic valve stenosis status post TAVR 11/11  Right groin exploration for hematoma 11/12  Aspiration pneumonia, resolved  Acute pulmonary embolism  Acute on chronic hypoxic respiratory failure secondary to pneumonia and pulmonary embolism  -Patient hemodynamically stable  -Currently appears to tolerate anticoagulation, no active signs of bleeding  -Declines rehab  -Managed by CT surgery and cardiology  -Discharged today     Type 2 diabetes mellitus  -Relatively controlled on current regiment, continue  -Impacted by recent Solu-Cortef administration  -Resume home regiment     Hypothyroidism  -Continue levothyroxine       VTE Prophylaxis:  Pharmacologic VTE prophylaxis orders are present.         AM-PAC 6 Clicks Score (PT): 18 (11/20/24 0800)    CODE STATUS:   There are no questions and answers to display.       Michoacano Izaguirre, DO  11/20/24    \

## 2024-11-20 NOTE — PLAN OF CARE
Goal Outcome Evaluation:   Patient alert and oriented x4. Normal Sinus rhythm. 2L nasal canula, cough present, pulmonary hygiene encouraged. Patient reported right groin pain up to 8/10 throbbing and going down leg - extremity warmer then left to touch with weak but palpable pulse and more edematous than left - CT surgery APRN notified, see orders. Dressing removed per order, groin site intact with staples present. BP running soft 100's over 40's-50's. Patient up in chair almost all shift, ambulating to bathroom x1 assist.

## 2024-11-20 NOTE — PROGRESS NOTES
LOS: 9 days   Patient Care Team:  Marisa Lynch PA as PCP - General (Family Medicine)  Ajay Cheatham MD as Surgeon (Cardiothoracic Surgery)  Marco Lay IV, MD as Cardiologist (Interventional Cardiology)  Nigel Huber MD as Consulting Physician (Cardiology)  Marisa Suarez APRN as Nurse Practitioner (Interventional Cardiology)    Chief Complaint: Dyspnea    Subjective     Interval History:     Up and around yesterday.  Patient wants to go home.  No chest pain.  Breathing at baseline asking about staples from groin coming out    Review of Systems:    12 point review of systems reviewed pertinent for those mentioned HPI    Objective     Vital Signs  Temp:  [97.4 °F (36.3 °C)-97.8 °F (36.6 °C)] 97.4 °F (36.3 °C)  Heart Rate:  [60-76] 62  Resp:  [16-18] 16  BP: ()/(49-59) 100/51  Body mass index is 29.07 kg/m².    Intake/Output Summary (Last 24 hours) at 11/20/2024 0722  Last data filed at 11/19/2024 2353  Gross per 24 hour   Intake 1360 ml   Output 2125 ml   Net -765 ml     No intake/output data recorded.    Physical Exam:     General Appearance:  Alert, cooperative, in no acute distress   Head:  Normocephalic, without obvious abnormality, atraumatic   Eyes:  Lids and lashes normal, conjunctivae and sclerae normal, no icterus, no pallor, corneas clear, PERRLA   Ears:  Ears appear intact with no abnormalities noted   Throat:  No oral lesions, no thrush, oral mucosa moist   Neck:  No adenopathy, supple, trachea midline, no thyromegaly, no carotid bruit, no JVD   Back:  No kyphosis present, no scoliosis present, no skin lesions, erythema or scars, no tenderness to percussion, or palpation, range of motion normal   Lungs:  Clear to auscultation,respirations regular, even and unlabored    Heart:  Regular rhythm and normal rate, normal S1 and S2, no murmur, no gallop, no rub, no click   Breast Exam:  Deferred   Abdomen:  Normal bowel sounds, no masses, no organomegaly, soft non-tender,  "non-distended, no guarding, no rebound tenderness   Genitalia:  Deferred   Extremities:  Moves all extremities well, no edema, no cyanosis, no redness   Pulses:  Pulses palpable and equal bilaterally   Skin:  No bleeding, bruising or rash   Lymph nodes:  No palpable adenopathy   Neurologic:  Cranial nerves 2 - 12 grossly intact, sensation intact, DTR present and equal bilaterally        Results Review:     I reviewed the patient's new clinical results.  I reviewed the patient's new imaging results and agree with the interpretation.  I reviewed the patient's other test results and agree with the interpretation  I personally viewed and interpreted the patient's EKG/Telemetry data      WBC WBC   Date Value Ref Range Status   11/19/2024 7.59 3.40 - 10.80 10*3/mm3 Final   11/18/2024 8.93 3.40 - 10.80 10*3/mm3 Final   11/18/2024 8.25 3.40 - 10.80 10*3/mm3 Final      HGB Hemoglobin   Date Value Ref Range Status   11/19/2024 8.9 (L) 12.0 - 15.9 g/dL Final   11/18/2024 9.9 (L) 12.0 - 15.9 g/dL Final   11/18/2024 8.3 (L) 12.0 - 15.9 g/dL Final      HCT Hematocrit   Date Value Ref Range Status   11/19/2024 27.3 (L) 34.0 - 46.6 % Final   11/18/2024 31.6 (L) 34.0 - 46.6 % Final   11/18/2024 26.1 (L) 34.0 - 46.6 % Final      Platlets No results found for: \"LABPLAT\"     PT/INR:  No results found for: \"PROTIME\"/No results found for: \"INR\"    Sodium Sodium   Date Value Ref Range Status   11/19/2024 141 136 - 145 mmol/L Final   11/18/2024 144 136 - 145 mmol/L Final      Potassium Potassium   Date Value Ref Range Status   11/19/2024 4.1 3.5 - 5.2 mmol/L Final   11/19/2024 3.8 3.5 - 5.2 mmol/L Final   11/18/2024 3.7 3.5 - 5.2 mmol/L Final   11/17/2024 3.4 (L) 3.5 - 5.2 mmol/L Final   11/17/2024 3.8 3.5 - 5.2 mmol/L Final     Comment:     Slight hemolysis detected by analyzer. Result may be falsely elevated.      Chloride Chloride   Date Value Ref Range Status   11/19/2024 100 98 - 107 mmol/L Final   11/18/2024 104 98 - 107 mmol/L Final " "     Bicarbonate No results found for: \"PLASMABICARB\"   BUN BUN   Date Value Ref Range Status   11/19/2024 16 8 - 23 mg/dL Final   11/18/2024 18 8 - 23 mg/dL Final      Creatinine Creatinine   Date Value Ref Range Status   11/19/2024 0.45 (L) 0.57 - 1.00 mg/dL Final   11/18/2024 0.55 (L) 0.57 - 1.00 mg/dL Final      Calcium Calcium   Date Value Ref Range Status   11/19/2024 8.1 (L) 8.6 - 10.5 mg/dL Final   11/18/2024 8.2 (L) 8.6 - 10.5 mg/dL Final      Magnesium Magnesium   Date Value Ref Range Status   11/19/2024 1.8 1.6 - 2.4 mg/dL Final   11/18/2024 2.0 1.6 - 2.4 mg/dL Final            pH No results found for: \"PHART\"   pO2 No results found for: \"PO2ART\"   pCO2 No results found for: \"DFA7YXJ\"   HCO3 No results found for: \"MUV2KBI\"     amiodarone, 200 mg, Oral, BID With Meals  apixaban, 5 mg, Oral, Q12H  aspirin, 81 mg, Oral, Daily  docusate sodium, 100 mg, Oral, BID  furosemide, 60 mg, Intravenous, Q12H  insulin glargine, 20 Units, Subcutaneous, Nightly  insulin lispro, 3-14 Units, Subcutaneous, 4x Daily AC & at Bedtime  levothyroxine, 200 mcg, Oral, Q AM  midodrine, 5 mg, Oral, BID AC  nystatin, 1 Application, Topical, BID  polyethylene glycol, 17 g, Oral, Daily  potassium chloride, 10 mEq, Oral, BID  senna-docusate sodium, 2 tablet, Oral, Nightly           Medication Review: Reviewed.    Assessment & Plan     Patient Active Problem List   Diagnosis Code    Essential hypertension I10    Acquired hypothyroidism E03.9    Lung cancer; S/P Bronch/Thor w/ ALBINA lobectomy/mediastinal lymph node resec (5/12/17, Dr. Cheatham) C34.90    Paroxysmal atrial fibrillation I48.0    Hyperlipidemia LDL goal <70 E78.5    Prosthetic aortic valve stenosis s/p TAVR 11/11/24, Right groin exploration for hematoma 11/12/24 T82.857A    Type 2 diabetes mellitus E11.9    COPD (chronic obstructive pulmonary disease) J44.9    Obesity (BMI 30-39.9) E66.9    Tobacco user Z72.0    Pulmonary hypertension I27.20    Stenosis of prosthetic mitral " valve T82.857A    Acute on chronic respiratory failure with hypoxia and hypercapnia J96.21, J96.22    Pulmonary infiltrate, likely right-sided pneumonia R91.8    Acute pulmonary embolism without acute cor pulmonale I26.99       Shock  hypovolemic, resolved  Pneumonia  Segmental pulmonary emboli  Severe aortic stenosis status post valve in valve TAVR  Degeneration of surgical mitral valve  Anemia, acute blood loss  Persistent atrial fibrillation  Multilevel DVTs     Plan: Ultrasound yesterday reviewed.  Diuresed well here however will go home on Lasix 60 mg twice daily orally and will arrange to see patient next week on Tuesday.  Will stop midodrine    Plan for disposition:    Jeffry Pickett MD  11/20/24  07:22 EST

## 2024-11-20 NOTE — CASE MANAGEMENT/SOCIAL WORK
Case Management Discharge Note      Final Note: I spoke with patient in regards to home plans as she had declined any inpatient rehab. She is agreeable for Home Health which is arranged with [facility]. She declined a rolling walker       Roxanne with [facility] informed me patient is declining home health. I will change disposition to home    Selected Continued Care - Admitted Since 11/11/2024       Destination    No services have been selected for the patient.                Durable Medical Equipment    No services have been selected for the patient.                Dialysis/Infusion    No services have been selected for the patient.                Home Medical Care Coordination complete.      Service Provider Services Address Phone Fax Patient Preferred    [facility] [address], [facility], located within [facility] [id number] [phone] --              Therapy    No services have been selected for the patient.                Community Resources    No services have been selected for the patient.                Community & DME    No services have been selected for the patient.                    Selected Continued Care - Prior Encounters Includes continued care and service providers with selected services from prior encounters from 8/13/2024 to 11/20/2024      Discharged on 9/27/2024 Admission date: 9/25/2024 - Discharge disposition: Home or Self Care      Durable Medical Equipment       Service Provider Services Address Phone Fax Patient Preferred    [facility] [address], [place] [id number] [phone] --                               Final Discharge Disposition Code: 06 - home with home health care

## 2024-11-20 NOTE — PROGRESS NOTES
Cardiothoracic Surgery Progress Note      POD # 9 s/p TAVR, POD # 8 Right groin exploration      LOS: 9 days      Subjective:  No complaints. Want to go home.     Objective:  Vital Signs  Temp:  [97.4 °F (36.3 °C)-97.8 °F (36.6 °C)] 97.4 °F (36.3 °C)  Heart Rate:  [60-76] 62  Resp:  [16-18] 16  BP: ()/(49-59) 100/51    Physical Exam:   General Appearance: Well-developed, well-nourished in no acute distress.  Sitting up in chair   Lungs: Respirations even and unlabored and clear to auscultation bilaterally no wheezes, rales or rhonchi   Heart: Regular rate and rhythm no murmurs, rubs or gallops   Skin: Incision c/d/i   Ext: Warm and viable  Results:    Results from last 7 days   Lab Units 11/19/24  0548   WBC 10*3/mm3 7.59   HEMOGLOBIN g/dL 8.9*   HEMATOCRIT % 27.3*   PLATELETS 10*3/mm3 114*     Results from last 7 days   Lab Units 11/19/24  1534 11/19/24  0548   SODIUM mmol/L  --  141   POTASSIUM mmol/L 4.1 3.8   CHLORIDE mmol/L  --  100   CO2 mmol/L  --  33.0*   BUN mg/dL  --  16   CREATININE mg/dL  --  0.45*   GLUCOSE mg/dL  --  132*   CALCIUM mg/dL  --  8.1*       Assessment:  POD # 9 s/p TAVR, POD # 8 Right groin exploration     Plan:  Diuresis  Wean oxygen as tolerated  Ambulate  Pulmonary toilet  Case management to work on home with . Patient does not want rehab    Ajay Cheatham MD  11/20/24  07:02 EST

## 2024-11-20 NOTE — CASE MANAGEMENT/SOCIAL WORK
Continued Stay Note   [place]     Patient Name: Jill Miller  MRN: [id number]  Today's Date: 11/20/2024    Admit Date: 11/11/2024    Plan: Home   Discharge Plan       Row Name 11/20/24 0832       Plan    Plan Home    Plan Comments I had spoken with patient yesterday about rehab referrals and she is not interested in this, tells me her spouse will offer plenty of assistnace. I also offered outpatient or home health rehab.    Final Discharge Disposition Code 01 - home or self-care                   Discharge Codes    No documentation.                       Mary Cherry RN

## 2024-11-21 NOTE — OUTREACH NOTE
Prep Survey      Flowsheet Row Responses   [facility] facility patient discharged from? [facility]   Is LACE score < 7 ? No   Eligibility Readm Mgmt   Discharge diagnosis Severe/symptomatic bioprosthetic valve aortic stenosis, Transfemoral Transcatheter Aortic Valve Replacement, GROIN EXPLORATION, LIGATION OF INFERIOR EPIGASTRIC ARTERY RIGHT   Does the patient have one of the following disease processes/diagnoses(primary or secondary)? Cardiothoracic surgery   Does the patient have Home health ordered? Yes   What is the Home health agency?  [facility]   Is there a DME ordered? No   Prep survey completed? Yes            Zandra CORNEJO - Registered Nurse

## 2024-11-22 NOTE — DISCHARGE SUMMARY
[facility] Cardiothoracic Surgery  DISCHARGE SUMMARY    Patient Name: Jill Miller  : 1960  MRN: [id number]    Date of Admission: 2024  7:36 AM  Date of Discharge:  2024  Primary Care Physician: Marisa Lynch PA  Referred By: Ajay Cheatham MD    IP Care Team:  Patient Care Team:  Marisa Lynch PA as PCP - General (Family Medicine)  Ajay Cheatham MD as Surgeon (Cardiothoracic Surgery)  Marco Lay IV, MD as Cardiologist (Interventional Cardiology)  Nigel Huber MD as Consulting Physician (Cardiology)  Marisa Suarez APRN as Nurse Practitioner (Interventional Cardiology)    Consults       No orders found from 10/13/2024 to 2024.            Hospital Course     Presenting Problem: Prosthetic aortic valve stenosis    Active Hospital Problems    Diagnosis  POA    **Prosthetic aortic valve stenosis s/p TAVR 24, Right groin exploration for hematoma 24 [T82.857A]  Unknown    Pulmonary infiltrate, likely right-sided pneumonia [R91.8]  Unknown    Acute pulmonary embolism without acute cor pulmonale [I26.99]  Unknown    Acute on chronic respiratory failure with hypoxia and hypercapnia [J96.21, J96.22]  Unknown    COPD (chronic obstructive pulmonary disease) [J44.9]  Yes    Type 2 diabetes mellitus [E11.9]  Yes    Paroxysmal atrial fibrillation [I48.0]  Yes      Resolved Hospital Problems   No resolved problems to display.        Procedures Performed:  24  1. Percutaneous right common femoral arterial sheath placement  2. Aortogram  3. Transcatheter aortic valve in valve replacement (23 mm Letha 3 Ultra tissue valve)  4. Completion aortogram      Procedure(s): 24  GROIN EXPLORATION, LIGATION OF INFERIOR EPIGASTRIC ARTERY RIGHT       Hospital Course:  Jill Miller is a 64 y.o. female who was admitted to [facility] 2024 for scheduled TAVR with number 23 mm LETHA 3 ultra tissue valve with   Chaparrita.  She was extubated and was sent to ICU in stable condition.  She was initially seen by PT/OT and was recommended for her to go to rehab/SNF.  However, patient requested to go home with home health.  She was discharged home with home health in stable condition on POD # 9.  Her hospital course is below.    Prosthetic valve aortic stenosis s/p TAVR:  11/16: Central line was removed  11/17: Transfer to telemetry orders were placed  11/19: Transferred to telemetry floor    Postop A-fib:  Placed on amiodarone protocol    Aspiration pneumonia (resolved)  S/p course of IV Zosyn    Acute PE/DVT:  CTA chest was done 11/13 which noted small pulmonary embolism within the lateral segment of the right middle lobe, Basil artery and a questionable tiny embolism within the right lower lobe  Lower extremity venous duplex showed acute right DVT present in the proximal femoral, mid femoral, distal femoral, and peroneal  Continue 5 mg Eliquis twice daily- hold on 10 mg due to recent bleeding per cardiology recommendations      Hemorrhagic shock/pelvic hematoma (resolved):  S/p right groin exploration, ligation of inferior epigastric artery, and aortogram with Dr. Cheatham on 11/12  11/17: Off of pressors  11/19: Aquacel dressing removed  Plan to follow-up in office in 10 to 12 days for incision check/staple removal  Instructed patient to avoid 90 degree angles and to keep her groin clean and dry     Discharge Follow Up Recommendations for outpatient labs/diagnostics:  Follow-up with heart valve center 1 week  Follow-up with CT surgery in 10 to 12 days for incision check/staple removal  Follow-up with Cardiology in 1 month      DO NOT REMOVE SUTURES AND/OR STAPLES THIS WILL BE ADDRESSED AT CT SURGERY FOLLOW-UP  If you have any questions or concerns please reach out to our office at [phone]    Day of Discharge     HPI:   Ms. Jill Miller is a 65 yo female well known to our service, having undergone left upper lobectomy on  5/12/17 with Dr. Cheatham for [id number] stage IA squamous cell carcinoma. In addition, she underwent MV replacement, AVR, and TV repair, MAZE, LAAL on 7/16/18 with Dr. Cheatham. She was last seen in our clinic on 3/29/22, where she was noted to still be smoking and not willing to quit. She was released to 1 year follow up.     She presented to the ED on 9/25 with complaints of shortness of air. She was found to have acute on chronic heart failure with elevated proBNP. Echo today showed EF =60%, severe stenosis of the prosthetic aortic valve (21 mm Magna Ease), severe stenosis of the prosthetic mitral valve (27 mm Magna Ease), and tricuspid regurgitation (tricuspid ring 32 mm MC3 annuloplasty ring). She reports dyspnea x 3 months, fatigue, and lightheadedness. Our service has been consulted for possible TAVR and TMVR.       Vital Signs:    Temp:  [97.4 °F (36.3 °C)-97.8 °F (36.6 °C)] 97.4 °F (36.3 °C)  Heart Rate:  [60-76] 62  Resp:  [16-18] 16  BP: ()/(49-59) 100/51      Physical Exam:  General Appearance: Well-developed, well-nourished in no acute distress.  Sitting up in chair              Lungs: Respirations even and unlabored and clear to auscultation bilaterally no wheezes, rales or rhonchi              Heart: Regular rate and rhythm no murmurs, rubs or gallops              Skin: Incision c/d/i              Ext: Warm and viable    Pertinent  and/or Most Recent Results     LAB RESULTS:          Lab 11/19/24  0548 11/18/24  0639 11/18/24  0430 11/18/24  0347 11/17/24  1622 11/17/24  0352 11/16/24  1505 11/16/24  0501   WBC 7.59 8.93  --  8.25  --  9.94  --  12.51*   HEMOGLOBIN 8.9* 9.9*  --  8.3*  --  9.5*  --  8.4*   HEMATOCRIT 27.3* 31.6*  --  26.1*  --  30.2*  --  25.8*   PLATELETS 114* 111*  --  113*  --  126*  --  100*   NEUTROS ABS  --   --   --  6.29  --  8.37*  --  11.10*   IMMATURE GRANS (ABS)  --   --   --  0.39*  --  0.39*  --  0.18*   LYMPHS ABS  --   --   --  0.87  --  0.59*  --  0.44*   MONOS ABS   --   --   --  0.68  --  0.57  --  0.77   EOS ABS  --   --   --  0.01  --  0.00  --  0.00   .3* 110.5*  --  106.1*  --  104.9*  --  104.5*   HEPARIN ANTI-XA  --   --  0.33  --  0.32 0.42 0.37 0.34         Lab 11/20/24  0838 11/19/24  1534 11/19/24  0548 11/18/24  0347 11/17/24 2007 11/17/24  1350 11/17/24  0352 11/16/24  1505 11/16/24  0501   SODIUM 138  --  141 144  --   --  146*  --  143   POTASSIUM 3.5 4.1 3.8 3.7 3.4*   < > 3.5  3.5   < > 3.4*   CHLORIDE 95*  --  100 104  --   --  103  --  103   CO2 36.0*  --  33.0* 34.0*  --   --  32.0*  --  32.0*   ANION GAP 7.0  --  8.0 6.0  --   --  11.0  --  8.0   BUN 16  --  16 18  --   --  19  --  19   CREATININE 0.43*  --  0.45* 0.55*  --   --  0.54*  --  0.56*   EGFR 110.1  --  108.9 103.8  --   --  104.2  --  103.3   GLUCOSE 136*  --  132* 93  --   --  89  --  115*   CALCIUM 8.7  --  8.1* 8.2*  --   --  8.9  --  8.2*   MAGNESIUM 2.1  --  1.8 2.0  --   --   --   --  2.2    < > = values in this interval not displayed.                         Brief Urine Lab Results       None          Microbiology Results (last 10 days)       Procedure Component Value - Date/Time    MRSA Screen, PCR (Inpatient) - Swab, Nares [[id number]]  (Normal) Collected: 11/14/24 1209    Lab Status: Final result Specimen: Swab from Nares Updated: 11/14/24 1341     MRSA PCR Negative    Narrative:      The negative predictive value of this diagnostic test is high and should only be used to consider de-escalating anti-MRSA therapy. A positive result may indicate colonization with MRSA and must be correlated clinically.  MRSA Negative            Duplex Venous Lower Extremity - Right CAR    Result Date: 11/19/2024    Acute right lower extremity deep vein thrombosis noted in the peroneal.   Chronic right lower extremity deep vein thrombosis noted in the posterior tibial.   All other right sided veins appeared normal.     Duplex Pseudoaneurysm CAR    Result Date: 11/19/2024    No evidence of  pseudoaneurysm or AV fistula in the right groin.   Anechoic area visualized lateral to CFA measuring 1.1 x 1.3 cm. No flow detected within, a neck was not visualized.     XR Chest 1 View    Result Date: 11/16/2024  XR CHEST 1 VW Date of Exam: 11/16/2024 4:48 AM EST Indication: Respiratory failure, pneumonia Comparison: 11/15/2024 Findings: Left IJ catheter tip remains in the distal left brachiocephalic vein. Heart is normal in size. Pulmonary vasculature is mostly obscured. Extensive right upper and mid lung pneumonia and milder diffuse interstitial changes elsewhere all appear improved. No pneumothorax is seen. There is minimal if any left effusion.     Impression: Improving pneumonia. Electronically Signed: Brayden Dodd MD  11/16/2024 8:35 AM EST  Workstation ID: [id number]    SLP FEES - Fiberoptic Endo Eval Swallow    Result Date: 11/15/2024  This procedure was auto-finalized with no dictation required.    XR Chest 1 View    Result Date: 11/15/2024  XR CHEST 1 VW Date of Exam: 11/15/2024 3:56 AM EST Indication: resp failure Comparison: Chest x-ray 11/14/2024, CT angiogram chest 11/13/2024 Findings: Centimeters catheter tip terminates at brachiocephalic SVC junction. Position unchanged. There is cardiomegaly with postoperative change of the heart. There is groundglass opacity central predominant right lung greater than left. This is unchanged. Small  bilateral pleural effusions. No significant pneumothorax.     Impression: No change from previous exam. Electronically Signed: Viji Brewer MD  11/15/2024 7:01 AM EST  Workstation ID: [id number]    Duplex Venous Upper Extremity - Bilateral CAR    Result Date: 11/14/2024    Acute right upper extremity deep vein thrombosis noted in the radial.   Chronic right upper extremity superficial thrombophlebitis noted in the cephalic (forearm).   Acute left upper extremity superficial thrombophlebitis noted in the basilic (forearm) and cephalic (forearm).   All other vessels appear  normal.     Duplex Venous Lower Extremity - Bilateral CAR    Result Date: 11/14/2024    Acute right lower extremity deep vein thrombosis noted in the proximal femoral, mid femoral, distal femoral and peroneal.   All other veins appeared normal bilaterally.     XR Chest 1 View    Result Date: 11/14/2024  XR CHEST 1 VW Date of Exam: 11/14/2024 3:38 AM EST Indication: resp failure Comparison: 11/13/2024 CT and radiograph Findings: Cardiac surgical changes with median sternotomy wires. The cardiomediastinal silhouette is partially obscured. There are persistent bilateral airspace opacities, greater in the right lung. Trace bilateral pleural effusions. No pneumothorax. Left approach  central venous catheter distal tip overlies the brachiocephalic confluence. Osseous structures are unchanged.     Impression: No significant interval change. Electronically Signed: Vinny Argueta MD  11/14/2024 8:55 AM EST  Workstation ID: [id number]    CT Angiogram Chest    Result Date: 11/13/2024  CT ANGIOGRAM CHEST Date of Exam: 11/13/2024 4:31 PM EST Indication: hypoxia, poor peripheral pulses. Comparison: Chest CTA dated 9/27/2024 Technique: CTA of the chest was performed before and after the uneventful intravenous administration of 95 mL Isovue-300. Reconstructed coronal and sagittal images were also obtained. In addition, a 3-D volume rendered image was created for interpretation. Automated exposure control and iterative reconstruction methods were used. FINDINGS: Thoracic inlet: Status post thyroidectomy. Pulmonary arteries: Relative hypoattenuation within the lateral segmental right middle lobe pulmonary artery (series 5, images 57 through 58). Small pulmonary embolism in this location cannot be excluded. Focal hypoattenuation within the medial basal segmental right lower lobe pulmonary artery (series 5, image 77), artifact or additional tiny pulmonary embolism. No other pulmonary emboli are seen. Great vessels: Mild atherosclerotic  plaque within the thoracic aorta and proximal arch vessels. Left IJ central venous catheter appears to terminate within the left brachiocephalic vein. Mediastinum/Jennifer: Multiple mildly enlarged mediastinal and right hilar lymph nodes, nonspecific, similar since 9/27/2024. Lung parenchyma: Extensive right-sided airspace disease, most confluent within the right lower lobe. Additional mild patchy left-sided airspace disease. Trachea and airways: The trachea and central airways appear unremarkable. Pleural space: Small bilateral pleural effusions. No pneumothorax is seen. Heart and pericardium: Cardiomegaly. Aortic and mitral valvular prostheses are seen. Left atrial appendage occlusion device noted. No significant pericardial effusion. Chest wall: No acute or suspicious osseous or soft tissue lesion is identified. Status post sternotomy. Upper abdomen: Mild perihepatic fluid. Several splenic granulomas. Small hypodensity within the anterior aspect of the superior spleen, incompletely characterized.     1.Findings concerning for small pulmonary embolism within the lateral segmental right middle lobe pulmonary artery (series 5, image 57). Questionable additional tiny embolism within the right lower lobe medial basal segmental artery (series 5, image 77). 2.Extensive airspace disease within the right lung, greatest within the right lower lobe. Additional patchy airspace disease within the left lung. Findings may represent multifocal pneumonia or edema. 3.Small bilateral pleural effusions. 4.Small hypodensity within the anterior aspect of the superior spleen, incompletely characterized. Small splenic infarct cannot be excluded. The above findings were discussed with Nuno Loyola via telephone on 11/13/2024 5:11 PM EST. Electronically Signed: Ernie Lai MD  11/13/2024 5:14 PM EST  Workstation ID: [id number]    XR Chest 1 View    Result Date: 11/13/2024  XR CHEST 1 VW Date of Exam: 11/13/2024 8:04 AM EST Indication:  follow up post op Comparison: 11/12/2024 chest radiograph Findings:    Left IJ catheter tip remains in the distal left brachiocephalic vein. Heart shadow is normal in size. There is now dense interstitial and airspace disease in the right lung, centered on the right hilum, and less extensive but new patchy airspace disease in the left mid and lower lung, whether an unusual pattern of asymmetric pulmonary edema or ARDS, or changes of pneumonia. Appearance is more typical for pneumonia. No pneumothorax or effusion is identified.     Impression: Interval development of extensive interstitial and airspace disease of the right lung, and milder left mid and lower lung disease concerning for pneumonia as noted above. No evidence of pneumothorax. Electronically Signed: Brayden Dodd MD  11/13/2024 8:59 AM EST  Workstation ID: [id number]    Duplex Pseudoaneurysm CAR    Result Date: 11/13/2024    No evidence of pseudoaneurysm and AV fistula in the left wrist.   Noncompressible cephalic/basilic veins     Arterial Line    Result Date: 11/12/2024  Dani Ricardo CRNA     11/12/2024  8:14 AM Arterial Line Patient reassessed immediately prior to procedure Patient location during procedure: OR Stop Time:11/12/2024 8:08 AM  Line placed for hemodynamic monitoring. Performed By ALEX/CAA: Dani Ricardo CRNA Preanesthetic Checklist Completed: patient identified, IV checked, site marked, risks and benefits discussed, surgical consent, monitors and equipment checked, pre-op evaluation and timeout performed Arterial Line Prep  Sterile Tech: cap, gloves and sterile barriers Prep: ChloraPrep Patient monitoring: blood pressure monitoring, continuous pulse oximetry and EKG Arterial Line Procedure Laterality:left Location:  ulnar artery Catheter size: 20 G Guidance: ultrasound guided PROCEDURE NOTE/ULTRASOUND INTERPRETATION.  Using ultrasound guidance the potential vascular sites for insertion of the catheter were visualized to determine the  patency of the vessel to be used for vascular access.  After selecting the appropriate site for insertion, the needle was visualized under ultrasound being inserted into the ulnar artery, followed by ultrasound confirmation of wire and catheter placement. There were no abnormalities seen on ultrasound; an image was taken; and the patient tolerated the procedure with no complications. Number of attempts: 1 Successful placement: yes Images: still images not obtained Post Assessment Dressing Type: line sutured, occlusive dressing applied, secured with tape and wrist guard applied. Complications no Circ/Move/Sens Assessment: normal and unchanged. Patient Tolerance: patient tolerated the procedure well with no apparent complications     CT Abdomen Pelvis With & Without Contrast    Result Date: 11/12/2024  CT ABDOMEN PELVIS W WO CONTRAST Date of Exam: 11/12/2024 6:24 AM EST Indication: Potential Bleed/Hematoma. Comparison: None available. Technique: Axial CT images were obtained of the abdomen and pelvis before and after the uneventful intravenous administration of 85 mL Isovue-300. Sagittal and coronal reconstructions were performed.  Automated exposure control and iterative reconstruction methods were used. Findings: Liver: The liver is unremarkable in morphology. No focal liver lesion is seen. No biliary dilation is seen. There is mild perihepatic fluid which may contain blood products. Fluid insinuates within a cleft of the posterior right hepatic lobe. No biliary dilation is seen. Gallbladder: Unremarkable. Pancreas: Unremarkable. Spleen: Several splenic granulomas. Mild perisplenic fluid. Adrenal glands: Unremarkable. Genitourinary tract: Small bilateral renal cysts and subcentimeter hypodensities which are too small to characterize. Kidneys are otherwise unremarkable. No hydronephrosis is seen. The visualized portions of the ureters are unremarkable. Urinary bladder is decompressed and compressed to the left with  indwelling Erwin catheter noted Gastrointestinal tract: Colonic diverticulosis is present. Hollow viscera appear otherwise unremarkable. There is no evidence of bowel obstruction. Appendix: No findings to suggest acute appendicitis. Other findings: Large right-sided extraperitoneal hematoma with dominant fluid component situated within the right pelvis measuring 14 x 10 cm, similar or slightly larger when compared with 11/11/2024. There appears to be a 10 x 6 mm arterial pseudoaneurysm within the right pelvis centered on series 3, image 123. Surrounding small vessels are not well opacified, and the vessel of origin is not entirely clear. This is suspected to arise medially from the right inferior epigastric artery. There  is luminal irregularity of the left external iliac artery suspicious for dissection.  Abdominal aorta is unremarkable. Celiac artery, SMA, ANALI, and bilateral renal arteries appear unremarkable. There is mild perihepatic and perisplenic fluid which may contain blood products. Bones and soft tissues: No acute or suspicious osseous or soft tissue lesion is identified. Lung bases: The visualized lung bases are clear.     Impression: 1.Large right-sided extraperitoneal hematoma with dominant fluid component situated within the right pelvis measuring 14 x 10 cm, similar or slightly larger when compared with 11/11/2024. There appears to be a 10 x 6 mm arterial pseudoaneurysm within the  right pelvis centered on series 3, image 123. Surrounding small vessels are not well opacified, and the vessel of origin is not entirely clear. This is suspected to arise medially from the right inferior epigastric artery. 2.Luminal irregularity of the left external iliac artery, suspicious for dissection. 3.Mild perihepatic and perisplenic fluid which may contain blood products. 4.Additional findings as detailed above. The above findings were discussed with  Dr. Cheatham  via telephone on 11/12/2024 7:45 AM EST.  Electronically Signed: Ernie Lai MD  11/12/2024 7:46 AM EST  Workstation ID: [id number]    XR Chest 1 View    Result Date: 11/12/2024  XR CHEST 1 VW Date of Exam: 11/12/2024 4:27 AM EST Indication: Line placement Comparison: 11/11/2024. Findings: There is a new left-sided internal jugular central venous catheter terminating in the brachiocephalic vein confluence at the upper SVC. Stable findings of prior median sternotomy. There are 2 prosthetic heart valves and the patient is status post TAVR. There is no pneumothorax, pleural effusion or focal airspace consolidation. No acute osseous abnormality. Heart size is stable. Pulmonary vasculature is within normal limits. There is volume loss in the left lung from prior lobectomy.     Impression: 1.New left-sided internal jugular central venous catheter terminates at the brachiocephalic vein confluence. 2.No acute cardiopulmonary abnormality. 3.Stable findings of prior TAVR, median sternotomy and left lobectomy. Electronically Signed: Vinny Wright MD  11/12/2024 5:36 AM EST  Workstation ID: [id number]    XR Chest 1 View    Result Date: 11/11/2024  XR CHEST 1 VW Date of Exam: 11/11/2024 6:50 PM EST Indication: central line attempt Comparison: AP chest x-ray 11/11/2024 at 1139 hours, CTA chest 9/27/2024,, AP chest x-ray 1/11/2024 Findings: There is a presumed skinfold over the left side of the chest. No apical pneumothorax is seen. The patient has history of left lobectomy, but there are airspace opacities in the left lung, stable compared to today's earlier chest x-ray, increased compared  to older chest x-rays. Right lung appears clear. Cardiomediastinal contours are unchanged.     Impression: 1.Presumed skinfold over the left side of the chest. No apical pneumothorax seen. 2.Stable hazy opacities in the left lung, which may be due to atelectasis and/or pneumonia. Electronically Signed: Shelia Mckeon  11/11/2024 9:32 PM EST  Workstation ID: [id number]    CT Angiogram  Abdomen Pelvis    Result Date: 11/11/2024  CT ANGIOGRAM ABDOMEN PELVIS Date of Exam: 11/11/2024 2:28 PM EST Indication: Hypotension, abdominal pain status post TAVR. Comparison: None available. Angiographic CT scan the abdomen pelvis 9/27/2012. Technique: CTA of the abdomen and pelvis was performed after the uneventful intravenous administration of 83 mL Isovue 370. Reconstructed coronal and sagittal images were also obtained. In addition, a 3-D volume rendered image was created for interpretation. Automated exposure control and iterative reconstruction methods were used. Findings: Unenhanced images show extensive apparently retroperitoneal hemorrhage in the pelvis, displacing the partially contrast-filled bladder and Erwin balloon catheter into the left pelvis. Hemorrhage appears to extend over a roughly 14 x 12 cm area. Arterial phase images appear to show the arterial catheter within the arterial lumen, and no evidence of extravasation of contrast here or elsewhere. Delayed images show relatively weak contrast opacification of the venous structures, but the right femoral vein catheter appears to be at least marginally intraluminal to the right external iliac vein, images 70 through 67. Delayed venous phase images do  appear to show some extravasation of contrast from near the margin of the venous sheath, axial images 74 through 69. Review of this area on the arterial phase images shows 2 small vessels extending into this area 1 from the anterior margin of the venous  sheath, one from the inferior epigastric artery but it is not clear if either of these contributes to the apparent bleed. In particular, please review arterial phase images 125 through 116 series 5 with comparable delayed images 75 through 69 series 7. Elsewhere, there is minimal scarring or atelectasis left lung base. There is diffuse fatty liver change. No significant abnormalities are appreciated of the gallbladder, spleen, pancreas, adrenal  glands, or kidneys. Bowel loops are normal in caliber and appearance.     Impression: Extensive retroperitoneal hematoma in the pelvis, with active extravasation on delayed images only, which appears to arise from the right pelvic sidewall, at or near the area of the arterial or venous sheath punctures. On these images it is not clear if this is arterial or venous and origin, or could be arising from a branch of the inferior epigastric artery. As discussed above, oozing from the area of the venous sheath is favored. Note: At time of dictation, preliminary findings have been discussed by phone with Carlos Hebert PA-C Electronically Signed: rBayden Dodd MD  11/11/2024 3:04 PM EST  Workstation ID: [id number]     Results for orders placed during the hospital encounter of 11/11/24    Duplex Pseudoaneurysm CAR    Interpretation Summary    No evidence of pseudoaneurysm or AV fistula in the right groin.    Anechoic area visualized lateral to CFA measuring 1.1 x 1.3 cm. No flow detected within, a neck was not visualized.      Results for orders placed during the hospital encounter of 11/11/24    Duplex Pseudoaneurysm CAR    Interpretation Summary    No evidence of pseudoaneurysm or AV fistula in the right groin.    Anechoic area visualized lateral to CFA measuring 1.1 x 1.3 cm. No flow detected within, a neck was not visualized.      Results for orders placed during the hospital encounter of 11/11/24    Adult Transthoracic Echo Limited W/ Cont if Necessary Per Protocol    Interpretation Summary    Left ventricular ejection fraction appears to be 66 - 70%.    The left atrial cavity is mildly dilated.    Left atrial volume is severely increased.    Peak velocity of the flow distal to the aortic valve is 323.1 cm/s. Aortic valve mean pressure gradient is 33 mmHg.    There is a TAVR valve present.    The mitral valve mean gradient is 13 mmHg.    Echo relatively unchanged from prior          Discharge Details        Discharge  Medications        New Medications        Instructions Start Date   amiodarone 200 MG tablet  Commonly known as: PACERONE   Take 1 tablet by mouth 2 (Two) Times a Day With Meals for 14 days, THEN 1 tablet Daily for 30 days.   Start Date: November 20, 2024     apixaban 5 MG tablet tablet  Commonly known as: ELIQUIS   5 mg, Oral, Every 12 Hours Scheduled             Changes to Medications        Instructions Start Date   furosemide 40 MG tablet  Commonly known as: Lasix  What changed: how much to take   60 mg, Oral, 2 Times Daily             Continue These Medications        Instructions Start Date   albuterol sulfate  (90 Base) MCG/ACT inhaler  Commonly known as: PROVENTIL HFA;VENTOLIN HFA;PROAIR HFA   2 puffs, Inhalation, Every 4 Hours PRN      aspirin 81 MG EC tablet   81 mg, Oral, Daily      Cholecalciferol 10 MCG (400 UNIT) capsule   5,000 Units      ibuprofen 800 MG tablet  Commonly known as: ADVIL,MOTRIN   800 mg, As Needed      Jardiance 10 MG tablet tablet  Generic drug: empagliflozin   10 mg, Oral, Daily      levothyroxine 200 MCG tablet  Commonly known as: SYNTHROID, LEVOTHROID   200 mcg, Every Early Morning      metoprolol tartrate 25 MG tablet  Commonly known as: LOPRESSOR   25 mg, Oral, Every 12 Hours      multivitamin tablet tablet   1 tablet, Daily      nystatin 027255 UNIT/GM powder  Commonly known as: MYCOSTATIN   1 Application, 2 Times Daily      potassium chloride 10 MEQ CR capsule  Commonly known as: MICRO-K   10 mEq, Oral, 2 Times Daily      pravastatin 20 MG tablet  Commonly known as: PRAVACHOL   20 mg, Nightly             Stop These Medications      propafenone  MG 12 hr capsule  Commonly known as: RYTHMOL SR              Allergies   Allergen Reactions    Lortab [Hydrocodone-Acetaminophen] Nausea And Vomiting, Dizziness and Headache    Morphine And Codeine Nausea Only, GI Intolerance and Headache         Discharge Disposition:  [place]Health [place] [facility] [place]    Diet:  Hospital:No active diet  order      Activity:  As tolerated        CODE STATUS:    There are no questions and answers to display.       Future Appointments   Date Time Provider Department Center   11/26/2024 10:15 AM Jeffry Pickett MD [facility]   11/26/2024  2:30 PM Giovanna Galindo APRN [facility]   12/10/2024 10:30 AM Nigel Huber MD [facility]   12/11/2024  3:00 PM [facility] ECHO CART RM 1 [facility]  [facility]       Additional Instructions for the Follow-ups that You Need to Schedule       Ambulatory Referral to Home Health   As directed      Face to Face Visit Date: 11/20/2024   Follow-up provider for Plan of Care?: I treated the patient in an acute care facility and will not continue treatment after discharge.   Follow-up provider: TERESA LYNCH [[id number]]   Reason/Clinical Findings: S/p TAVR   Describe mobility limitations that make leaving home difficult: use of rolling walker   Nursing/Therapeutic Services Requested: Skilled Nursing Physical Therapy   Skilled nursing orders: Medication education   PT orders: Strengthening   Frequency: Other (2-3 times a week)        Discharge Follow-up with PCP   As directed       Currently Documented PCP:    Teresa Lynch, PA    PCP Phone Number:    [phone]     Follow Up Details: Follow Up With PCP Within 7-14 Days        Discharge Follow-up with Specialty: Cardiology; 6 Weeks   As directed      Specialty: Cardiology   Follow Up: 6 Weeks   Follow Up Details: Follow-up in 21-45 days        Discharge Follow-up with Specialty: Cardiothoracic Surgery   As directed      Follow Up Details: Follow Up in 7-10 days for incision check   Specialty: Cardiothoracic Surgery        Discharge Follow-up with Specialty: [facility]; 1 Week   As directed      Specialty: [facility]   Follow Up: 1 Week   Follow Up Details: Follow Up With [facility] Within 7 Days of Discharge. If Discharged on a Weekend, Schedule Follow Up For The Following Friday at  0900.        Cardiac Rehab Evaluation and Enrollment   Nov 25, 2024      Reason for Consult: Education                Discharge Education:  Wound Care:  Patient educated regarding care of post-operative wounds.  Patient is to wash with plain soap and water and pat dry.  Patient is not to use salves on the incision sites.  Patient instructed regarding the signs and symptoms of infection and when to call the office with any concerns.  SBE Education/Valve Education: Symptoms of acute IE usually begin with fever (102°-104°), chills, fast heart rate, fatigue, night sweats, aching joints and muscles, persistent cough, or swelling in the feet, legs or abdomen. You can reduce the risk of IE by maintaining good oral health through regular professional dental care and the use of dental products such as manual, powered and ultrasonic toothbrushes; dental floss; and other plaque-removal devices. Inform your dentist, family doctor (PCP) or other health care professional prior to any surgeries and/or procedures that you have had heart valve surgery.  Prophylactic antibiotics are always recommended prior to any surgical procedures following heart valve replacement.        Kiana Smith, APRN  11/22/24  13:15 EST    Time: I spent 44 minutes on this discharge activity which included: face-to-face encounter with the patient, reviewing the data in the system, coordination of the care with the nursing staff as well as consultants, documentation, and entering orders.

## 2024-11-24 LAB
QT INTERVAL: 414 MS
QTC INTERVAL: 474 MS

## 2024-11-26 ENCOUNTER — DOCUMENTATION (OUTPATIENT)
Dept: CARDIOLOGY | Facility: HOSPITAL | Age: 64
End: 2024-11-26
Payer: COMMERCIAL

## 2024-11-26 ENCOUNTER — TELEPHONE (OUTPATIENT)
Dept: CARDIOLOGY | Facility: CLINIC | Age: 64
End: 2024-11-26

## 2024-11-26 ENCOUNTER — TELEPHONE (OUTPATIENT)
Dept: CARDIAC SURGERY | Facility: CLINIC | Age: 64
End: 2024-11-26

## 2024-11-26 NOTE — TELEPHONE ENCOUNTER
Called pt back about appointment today NA LVM to give a call back to get a new date and time for appointment.

## 2024-11-26 NOTE — TELEPHONE ENCOUNTER
Name: Jill Miller    Relationship: Self    Best Callback Number: [phone]    Incoming call to the Hub, requesting to  Reschedule their HFU appointment on 11/26/24.     Per [facility] workflow, further review is needed before the task can be completed.    Result of Call: Transferred to [facility] at the [facility]

## 2024-11-27 ENCOUNTER — OFFICE VISIT (OUTPATIENT)
Dept: CARDIAC SURGERY | Facility: CLINIC | Age: 64
End: 2024-11-27
Payer: COMMERCIAL

## 2024-11-27 VITALS
WEIGHT: 160.8 LBS | DIASTOLIC BLOOD PRESSURE: 64 MMHG | HEART RATE: 59 BPM | TEMPERATURE: 97.6 F | HEIGHT: 62 IN | SYSTOLIC BLOOD PRESSURE: 122 MMHG | OXYGEN SATURATION: 94 % | BODY MASS INDEX: 29.59 KG/M2

## 2024-11-27 DIAGNOSIS — T82.857D STENOSIS OF PROSTHETIC AORTIC VALVE, SUBSEQUENT ENCOUNTER: Primary | ICD-10-CM

## 2024-11-27 NOTE — PROGRESS NOTES
[facility] Cardiothoracic Surgery Office Follow Up Note     Date of Encounter: 2024     Name: Jill Miller  : 1960     Referred By: No ref. provider found  PCP: Marisa Lynch PA    Chief Complaint:    Chief Complaint   Patient presents with    Aortic Stenosis     Hospital follow up s/p right groin exploration ligation of right inferior epigastric artery on 24 and TAVR on 24       Subjective      History of Present Illness:    Jill Miller is a 64 y.o. female s/p TAVR per Dr. Cheatham and Dr. Carreon 2024 utilizing 23 mm NIKKI 3 ultra tissue valve complicated by pelvic hematoma with hemorrhagic shock requiring return to the OR for right groin exploration and ligation of the inferior epigastric artery.  PMH: Left upper lobectomy 2017 for T1b N0 M0 stage Ia squamous cell carcinoma, valvular heart disease with Hx mitral valve replacement (27 mm Magna Ease) with AVR (21 mm Magna Ease) and tricuspid valve repair (32 mm MC3 annuloplasty ring) + MAZE and LAAL 2018 per Dr. Cheatham, hypertension, hyperlipidemia, type 2 diabetes, and subsequent aortic stenosis of her previous bioprosthetic aortic valve via FROILAN TAVR.  Following return to OR, patient steadily recovered and CT ICU.  Cardiology managed postoperative A-fib.  Intensivist service managed aspiration pneumonia and acute PE/DVT.  Although SNF was recommended, patient discharged home POD #9.  She returns as requested to CT/Vascular Surgery for to the 12-day incision check/potential staple removal.    Review of Systems:  Review of Systems   Constitutional: Negative for chills, decreased appetite, diaphoresis, fever, malaise/fatigue, night sweats, weight gain and weight loss.   HENT:  Negative for hoarse voice.    Eyes:  Negative for blurred vision, double vision and visual disturbance.   Cardiovascular:  Positive for leg swelling (right foot). Negative for chest pain, claudication, dyspnea on exertion,  irregular heartbeat, near-syncope, orthopnea, palpitations, paroxysmal nocturnal dyspnea and syncope.   Respiratory:  Negative for cough, hemoptysis, shortness of breath, sputum production and wheezing.    Hematologic/Lymphatic: Negative for adenopathy and bleeding problem. Does not bruise/bleed easily.   Skin:  Negative for color change, nail changes, poor wound healing and rash.   Musculoskeletal:  Negative for back pain, falls and muscle cramps.   Gastrointestinal:  Negative for abdominal pain, dysphagia and heartburn.   Genitourinary:  Negative for flank pain.   Neurological:  Negative for brief paralysis, disturbances in coordination, dizziness, focal weakness, headaches, light-headedness, loss of balance, numbness, paresthesias, sensory change, vertigo and weakness.   Psychiatric/Behavioral:  Negative for depression and suicidal ideas.    Allergic/Immunologic: Negative for persistent infections.       I have reviewed the following portions of the patient's history: problem list, current medications, allergies, past surgical history, past medical history, past social history, past family history, and ROS and confirm it's accurate.    Allergies:  Allergies   Allergen Reactions    Lortab [Hydrocodone-Acetaminophen] Nausea And Vomiting, Dizziness and Headache    Morphine And Codeine Nausea Only, GI Intolerance and Headache       Medications:      Current Outpatient Medications:     albuterol sulfate  (90 Base) MCG/ACT inhaler, Inhale 2 puffs Every 4 (Four) Hours As Needed for Wheezing., Disp: , Rfl:     amiodarone (PACERONE) 200 MG tablet, Take 1 tablet by mouth 2 (Two) Times a Day With Meals for 14 days, THEN 1 tablet Daily for 30 days., Disp: 58 tablet, Rfl: 2    apixaban (ELIQUIS) 5 MG tablet tablet, Take 1 tablet by mouth Every 12 (Twelve) Hours. Indications: Atrial Fibrillation, Disp: 60 tablet, Rfl: 11    aspirin 81 MG EC tablet, Take 1 tablet by mouth Daily., Disp: , Rfl:     Cholecalciferol 10 MCG  (400 UNIT) capsule, Take 5,000 Units by mouth., Disp: , Rfl:     furosemide (Lasix) 40 MG tablet, Take 1.5 tablets by mouth 2 (Two) Times a Day., Disp: 60 tablet, Rfl: 11    ibuprofen (ADVIL,MOTRIN) 800 MG tablet, 1 tablet As Needed., Disp: , Rfl:     Jardiance 10 MG tablet tablet, TAKE 1 TABLET DAILY, Disp: 90 tablet, Rfl: 3    levothyroxine (SYNTHROID, LEVOTHROID) 200 MCG tablet, Take 1 tablet by mouth Every Morning., Disp: , Rfl:     metoprolol tartrate (LOPRESSOR) 25 MG tablet, TAKE 1 TABLET EVERY 12 HOURS, Disp: 180 tablet, Rfl: 3    multivitamin (THERAGRAN) tablet tablet, Take 1 tablet by mouth Daily., Disp: , Rfl:     nystatin (MYCOSTATIN) 552242 UNIT/GM powder, Apply 1 Application topically to the appropriate area as directed 2 (Two) Times a Day., Disp: , Rfl:     potassium chloride (MICRO-K) 10 MEQ CR capsule, Take 1 capsule by mouth 2 (Two) Times a Day., Disp: 60 capsule, Rfl: 11    pravastatin (PRAVACHOL) 20 MG tablet, Take 1 tablet by mouth Every Night., Disp: , Rfl:   No current facility-administered medications for this visit.    History:   Past Medical History:   Diagnosis Date    Acute pulmonary embolism without acute cor pulmonale 11/14/2024    Atrial fibrillation and flutter     Chronic diastolic congestive heart failure     Chronically Abnormal CXR (Left pleural disease post op) 09/25/2017    COPD exacerbation 04/17/2022    Elevated cholesterol     Essential hypertension 04/10/2017    Target blood pressure <130/80 mmHg    Graves disease     Hyperlipidemia LDL goal <70     Hypertension     Hyperthyroidism     Lung cancer     MRSA (methicillin resistant staph aureus) culture positive 2014    under left breast, incision and debridement, treated with wound packing and po abx     Prolonged QTC interval on ECG 06/01/2022    Rheumatic mitral stenosis     Type 2 diabetes mellitus 07/17/2018    Valvular heart disease        Past Surgical History:   Procedure Laterality Date    ABLATION OF DYSRHYTHMIC FOCUS   07/16/2018    ABSCESS DRAINAGE      under left breast d/t mrsa     AORTIC VALVE REPAIR/REPLACEMENT N/A 07/16/2018    Procedure: MEDIAN STERNOTOMY, AORTIC VALVE REPLACEMENT WITH TIFFANIE AND MAZE, TRICUSPID RING, LEFT ATRIAL OCCLUSION;  Surgeon: Ajay Cheatham MD;  Location: [facility] OR;  Service: Cardiothoracic    AORTIC VALVE REPAIR/REPLACEMENT N/A 11/11/2024    Procedure: TRANSCATHETER AORTIC VALVE REPLACEMENT;  Surgeon: Ajay Cheatham MD;  Location:  [facility] HYBRID OR;  Service: Cardiothoracic;  Laterality: N/A;  FL- 6 min 36 sec  DOSE-  110 MGY  CONTRAST- 40 mL    AORTIC VALVE REPAIR/REPLACEMENT N/A 11/11/2024    Procedure: Transfemoral Transcatheter Aortic Valve Replacement;  Surgeon: Jeaneth Carreon MD;  Location: [facility] HYBRID OR;  Service: Cardiovascular;  Laterality: N/A;    BRONCHOSCOPY Left 05/12/2017    Procedure: BRONCHOSCOPY;  Surgeon: Ajay Cheatham MD;  Location:  [facility] OR;  Service:     BRONCHOSCOPY N/A 05/18/2017    Procedure: BRONCHOSCOPY BIOPSY AT BEDSIDE;  Surgeon: Ajay Cheatham MD;  Location: [facility] ENDOSCOPY;  Service:     CARDIAC CATHETERIZATION N/A 09/28/2017    Procedure: Left Heart Cath;  Surgeon: Marco Lay MD;  Location:  [facility] CATH INVASIVE LOCATION;  Service:     CARDIAC CATHETERIZATION N/A 09/28/2017    Procedure: Right Heart Cath;  Surgeon: Marco Lay MD;  Location:  [facility] CATH INVASIVE [facility];  Service:     CARDIAC CATHETERIZATION N/A 10/11/2024    Procedure: Right and Left Heart Cath;  Surgeon: Jeffry Pickett MD;  Location:  [facility] CATH INVASIVE LOCATION;  Service: Cardiology;  Laterality: N/A;    CARDIAC ELECTROPHYSIOLOGY PROCEDURE N/A 6/1/2022    Procedure: Ablation atrial fibrillation (atypical A flutter). Hold Rythmol x5 days;  Surgeon: Nigel Huber MD;  Location: [facility] EP INVASIVE LOCATION;  Service: Cardiovascular;  Laterality: N/A;    FEMORAL THROMBECTOMY/EMBOLECTOMY Right 11/12/2024    Procedure: GROIN EXPLORATION, LIGATION OF INFERIOR  EPIGASTRIC ARTERY RIGHT;  Surgeon: Ajay Cheatham MD;  Location:  [facility] OR;  Service: Cardiothoracic;  Laterality: Right;  FL: 6 SECONDS  DOSE: 191MGY  CONTRAST: 30ML VISIPAQUE    LUNG BIOPSY  2017    LYMPH NODE DISSECTION Left 2017    Procedure: MEDIASTINAL LYMPH NODE DISSECTION;  Surgeon: Ajay Cheatham MD;  Location:  [facility] OR;  Service:     MAZE PROCEDURE      MITRAL VALVE REPAIR/REPLACEMENT N/A 2018    Procedure: MITRAL VALVE REPLACEMENT;  Surgeon: Ajay Cheatham MD;  Location:  [facility] OR;  Service: Cardiothoracic    MITRAL VALVE REPLACEMENT  2018    TEETH EXTRACTION      THORACOSCOPY VIDEO ASSISTED WITH LOBECTOMY Left 2017    Procedure: THORACOSCOPY VIDEO ASSISTED WITH OPEN LOBECTOMY;  Surgeon: Ajay Cheatham MD;  Location:  [facility] OR;  Service:     TOTAL THYROIDECTOMY  2012    TRANSESOPHAGEAL ECHOCARDIOGRAM (TIFFANIE) N/A 2024    Procedure: TRANSESOPHAGEAL ECHOCARDIOGRAM WITH ANESTHESIA;  Surgeon: Ajay Cheatham MD;  Location:  [facility] OR;  Service: Cardiothoracic;  Laterality: N/A;    TRICUSPID VALVE SURGERY  2018       Social History     Socioeconomic History    Marital status:     Number of children: 3   Tobacco Use    Smoking status: Some Days     Current packs/day: 0.00     Types: Cigarettes     Start date: 1978     Last attempt to quit: 2023     Years since quittin.5     Passive exposure: Current    Smokeless tobacco: Never    Tobacco comments:     Couldn't remember exact dates.   Vaping Use    Vaping status: Never Used   Substance and Sexual Activity    Alcohol use: No    Drug use: Not Currently     Types: Marijuana    Sexual activity: Defer        Family History   Problem Relation Age of Onset    Breast cancer Mother     Diabetes Father     Heart disease Son        Objective   Physical Exam:  Vitals:    24 0845   BP: 122/64   BP Location: Right arm   Patient Position: Sitting   Pulse: 59   Temp: 97.6 °F (36.4 °C)   SpO2: 94%  "  Weight: 72.9 kg (160 lb 12.8 oz)   Height: 157.5 cm (62\")      Body mass index is 29.41 kg/m².    Physical Exam  Vitals reviewed.   Constitutional:       General: She is not in acute distress.     Appearance: She is not toxic-appearing.   HENT:      Head: Normocephalic and atraumatic.   Eyes:      General: Lids are normal.      Conjunctiva/sclera: Conjunctivae normal.      Pupils: Pupils are equal, round, and reactive to light.   Cardiovascular:      Rate and Rhythm: Regular rhythm. Bradycardia present.      Pulses:           Radial pulses are 2+ on the right side and 2+ on the left side.        Femoral pulses are 1+ on the right side and 2+ on the left side.       Dorsalis pedis pulses are 1+ on the right side and 1+ on the left side.        Posterior tibial pulses are 1+ on the right side and 1+ on the left side.      Heart sounds: S1 normal and S2 normal. Murmur heard.      Systolic murmur is present with a grade of 2/6.      Comments: Right femoral cutdown incision well approximated without dehiscence, induration or exudate.  Resolving erythema without tenderness.  Staples removed without complication.  Left groin vascular access site free from tenderness or pulsatile mass  Pulmonary:      Effort: Pulmonary effort is normal. No respiratory distress.      Breath sounds: Examination of the right-lower field reveals decreased breath sounds. Examination of the left-lower field reveals decreased breath sounds. Decreased breath sounds present. No wheezing, rhonchi or rales.   Musculoskeletal:         General: Normal range of motion.      Cervical back: Normal range of motion and neck supple.      Right lower le+ Edema present.      Left lower leg: No edema.   Skin:     General: Skin is warm and dry.      Capillary Refill: Capillary refill takes less than 2 seconds.   Neurological:      General: No focal deficit present.      Mental Status: She is alert and oriented to person, place, and time.   Psychiatric:    "      Attention and Perception: Attention normal.         Mood and Affect: Mood normal.         Speech: Speech normal.         Behavior: Behavior normal. Behavior is cooperative.         Imaging/Labs:  CXR 11/27/2024: Personally reviewed  Continued improvement in right upper and right middle lobe pneumonia.  Trace left pleural effusion.  No PTX.  Radiology  review pending      XR Chest 1 View: 11/16/2024  Impression: Improving pneumonia. Electronically Signed: Brayden Dodd MD  11/16/2024 8:35 AM EST  Workstation ID: [id number]    CT Angiogram Chest: 11/13/2024  1.Findings concerning for small pulmonary embolism within the lateral segmental right middle lobe pulmonary artery (series 5, image 57). Questionable additional tiny embolism within the right lower lobe medial basal segmental artery (series 5, image 77). 2.Extensive airspace disease within the right lung, greatest within the right lower lobe. Additional patchy airspace disease within the left lung. Findings may represent multifocal pneumonia or edema. 3.Small bilateral pleural effusions.   4.Small hypodensity within the anterior aspect of the superior spleen, incompletely characterized. Small splenic infarct cannot be excluded. The above findings were discussed with Nuno Loyola via telephone on 11/13/2024 5:11 PM EST.   Electronically Signed: Ernie Lai MD  11/13/2024 5:14 PM     CT Abdomen Pelvis With & Without Contrast: 11/12/2024  Impression:   1.Large right-sided extraperitoneal hematoma with dominant fluid component situated within the right pelvis measuring 14 x 10 cm, similar or slightly larger when compared with 11/11/2024. There appears to be a 10 x 6 mm arterial pseudoaneurysm within the  right pelvis centered on series 3, image 123. Surrounding small vessels are not well opacified, and the vessel of origin is not entirely clear. This is suspected to arise medially from the right inferior epigastric artery.   2.Luminal irregularity of the left  external iliac artery, suspicious for dissection.   3.Mild perihepatic and perisplenic fluid which may contain blood products.   4.Additional findings as detailed above. The above findings were discussed with  Dr. Cheatham  via telephone on 11/12/2024 7:45 AM EST.   Electronically Signed: Ernie Lai MD  11/12/2024 7:46 AM     CT Angiogram Abdomen Pelvis: 11/11/2024  Impression: Extensive retroperitoneal hematoma in the pelvis, with active extravasation on delayed images only, which appears to arise from the right pelvic sidewall, at or near the area of the arterial or venous sheath punctures. On these images it is not clear if this is arterial or venous and origin, or could be arising from a branch of the inferior epigastric artery. As discussed above, oozing from the area of the venous sheath is favored. Note: At time of dictation, preliminary findings have been discussed by phone with Carlos Hebert PA-C   Electronically Signed: Brayden Dodd MD  11/11/2024 3:04 PM EST          Assessment / Plan      Assessment / Plan:  1. Stenosis of prosthetic aortic valve, subsequent encounter   - 64 y.o. female s/p TAVR per Dr. Cheatham and Dr. Carreon 11/11/2024 utilizing 23 mm NIKKI 3 ultra tissue valve complicated by pelvic hematoma with hemorrhagic shock requiring return to the OR for right groin exploration and ligation of the inferior epigastric artery.    - PMH: Left upper lobectomy 5/12/2017 for T1b N0 M0 stage Ia squamous cell carcinoma, valvular heart disease with Hx mitral valve replacement (27 mm Magna Ease) with AVR (21 mm Magna Ease) and tricuspid valve repair (32 mm MC 3 annuloplasty ring) + MAZE and LAAL 7/16/2018 per Dr. Cheatham, hypertension, hyperlipidemia, type 2 diabetes, and subsequent aortic stenosis of her previous bioprosthetic aortic valve via FROILAN TAVR.    - Following return to OR for groin exploration and arterial repair,  steadily recovered and CTICU.    - Cardiology managed postoperative A-fib.  -  Scheduled for Cardiology follow up 12/3/2024 w/ Dr. Pickett  - Post TAVR TTE scheduled 12/11/24. Follow up with Cardiology  - Intensivist service managed aspiration pneumonia and acute PE/DVT.  - Compliant w/ Eliquis.  Scheduled for follow up with PCP 12/2/2024  - CT/Vascular Surgery: right groin incision well approximated, staples removed without complication, no s/sx infection.  CXR with improved pneumonia.  Trace left pleural effusion (asymptomatic)  - Steady functional recovery.  Recommend continued incision care with daily washes w/ Dial soap/water/ keep dry.  Avoid 90 degree sitting except for meals and toileting.    - Continue to avoid lift/push/pull until return to work 12/23/24  - Promptly contact CT/Vascular Surgery PRN any concerns w/ right groin incision healing.        Follow Up:   Return PRN per Cardiology request or pateint concern for incision healing.   Or sooner for any further concerns or worsening sign and symptoms. If unable to reach us in the office please dial 911 or go to the nearest emergency department.      GUCCI Carrion  [facility] Cardiothoracic Surgery    Time Spent: I spent 39 minutes caring for Jill on this date of service. This time includes time spent by me in the following activities: preparing for the visit, reviewing tests, obtaining and/or reviewing a separately obtained history, performing a medically appropriate examination and/or evaluation, counseling and educating the patient/family/caregiver, documenting information in the medical record, independently interpreting results and communicating that information with the patient/family/caregiver, and care coordination.

## 2024-12-03 ENCOUNTER — READMISSION MANAGEMENT (OUTPATIENT)
Dept: CALL CENTER | Facility: HOSPITAL | Age: 64
End: 2024-12-03
Payer: COMMERCIAL

## 2024-12-03 ENCOUNTER — OFFICE VISIT (OUTPATIENT)
Dept: CARDIOLOGY | Facility: CLINIC | Age: 64
End: 2024-12-03
Payer: COMMERCIAL

## 2024-12-03 VITALS
DIASTOLIC BLOOD PRESSURE: 58 MMHG | HEART RATE: 52 BPM | SYSTOLIC BLOOD PRESSURE: 92 MMHG | BODY MASS INDEX: 29 KG/M2 | HEIGHT: 62 IN | OXYGEN SATURATION: 96 % | WEIGHT: 157.6 LBS

## 2024-12-03 DIAGNOSIS — T82.857D STENOSIS OF PROSTHETIC MITRAL VALVE, SUBSEQUENT ENCOUNTER: ICD-10-CM

## 2024-12-03 DIAGNOSIS — I27.20 PULMONARY HYPERTENSION: Chronic | ICD-10-CM

## 2024-12-03 DIAGNOSIS — E78.5 HYPERLIPIDEMIA LDL GOAL <70: Chronic | ICD-10-CM

## 2024-12-03 DIAGNOSIS — I48.0 PAROXYSMAL ATRIAL FIBRILLATION: ICD-10-CM

## 2024-12-03 DIAGNOSIS — I82.4Y3 ACUTE DEEP VEIN THROMBOSIS (DVT) OF PROXIMAL VEIN OF BOTH LOWER EXTREMITIES: ICD-10-CM

## 2024-12-03 DIAGNOSIS — I10 ESSENTIAL HYPERTENSION: Primary | Chronic | ICD-10-CM

## 2024-12-03 PROBLEM — I82.4Y9 ACUTE DEEP VEIN THROMBOSIS (DVT) OF PROXIMAL VEIN OF LOWER EXTREMITY: Status: ACTIVE | Noted: 2024-12-03

## 2024-12-03 NOTE — PROGRESS NOTES
[facility] Cardiology  Office Note  Jill Byrneoe  1960  [address]   [address]  [place], located within [facility] [address]     VISIT DATE:  12/03/24    PCP: Marisa Lynch PA  [address]  [facility] [id number]    CC: Dyspnea  Chief Complaint   Patient presents with    Chronic diastolic heart failure       PROBLEM LIST:    Valve in valve TAVR with a 23 mm NIKKI 3 valve  Unremarkable left and right heart catheterization October 2024  Aortic/mitral valve replacement 2017 with tricuspid valve repair  Degenerated surgical mitral valve   Allergies  Allergies   Allergen Reactions    Lortab [Hydrocodone-Acetaminophen] Nausea And Vomiting, Dizziness and Headache    Morphine And Codeine Nausea Only, GI Intolerance and Headache       Current Medications    Current Outpatient Medications:     albuterol sulfate  (90 Base) MCG/ACT inhaler, Inhale 2 puffs Every 4 (Four) Hours As Needed for Wheezing., Disp: , Rfl:     amiodarone (PACERONE) 200 MG tablet, Take 1 tablet by mouth 2 (Two) Times a Day With Meals for 14 days, THEN 1 tablet Daily for 30 days., Disp: 58 tablet, Rfl: 2    apixaban (ELIQUIS) 5 MG tablet tablet, Take 1 tablet by mouth Every 12 (Twelve) Hours. Indications: Atrial Fibrillation, Disp: 60 tablet, Rfl: 11    aspirin 81 MG EC tablet, Take 1 tablet by mouth Daily., Disp: , Rfl:     Cholecalciferol 10 MCG (400 UNIT) capsule, Take 5,000 Units by mouth., Disp: , Rfl:     furosemide (Lasix) 40 MG tablet, Take 1.5 tablets by mouth 2 (Two) Times a Day., Disp: 60 tablet, Rfl: 11    ibuprofen (ADVIL,MOTRIN) 800 MG tablet, 1 tablet As Needed., Disp: , Rfl:     Jardiance 10 MG tablet tablet, TAKE 1 TABLET DAILY, Disp: 90 tablet, Rfl: 3    levothyroxine (SYNTHROID, LEVOTHROID) 200 MCG tablet, Take 1 tablet by mouth Every Morning., Disp: , Rfl:     metoprolol tartrate (LOPRESSOR) 25 MG tablet, TAKE 1 TABLET EVERY 12 HOURS, Disp: 180 tablet, Rfl: 3    multivitamin (THERAGRAN) tablet tablet,  Take 1 tablet by mouth Daily., Disp: , Rfl:     nystatin (MYCOSTATIN) 465235 UNIT/GM powder, Apply 1 Application topically to the appropriate area as directed 2 (Two) Times a Day., Disp: , Rfl:     potassium chloride (MICRO-K) 10 MEQ CR capsule, Take 1 capsule by mouth 2 (Two) Times a Day., Disp: 60 capsule, Rfl: 11    pravastatin (PRAVACHOL) 20 MG tablet, Take 1 tablet by mouth Every Night., Disp: , Rfl:      History of Present Illness   Jill Miller is a 64 y.o. year old female who presents for consult from No ref. provider found for evaluation of valvular heart disease.  Patient feeling better at this time.  Has not been overly active.  No chest pain pressure discomfort.  No real shortness of breath.  No syncope lightheadedness or dizziness.  No palpitations.  No lower extremity swelling.  Patient is been compliant with her medical therapy.  Patient is taking her Lasix twice a day.  Patient's blood pressure has been low at home at times.  Patient energy level has been improving slowly.  Patient's bruising has most is mostly resolved.    Pt denies any chest pain, dyspnea at rest, dyspnea on exertion, orthopnea, PND, palpitations, lower extremity edema, or claudication. Pt denies history of CHF, DVT, PE, MI, CVA, TIA, or rheumatic fever.     SOCIAL HX  Social History     Socioeconomic History    Marital status:     Number of children: 3   Tobacco Use    Smoking status: Some Days     Current packs/day: 0.00     Types: Cigarettes     Start date: 1978     Last attempt to quit: 2023     Years since quittin.5     Passive exposure: Current    Smokeless tobacco: Never    Tobacco comments:     Couldn't remember exact dates.   Vaping Use    Vaping status: Never Used   Substance and Sexual Activity    Alcohol use: No    Drug use: Not Currently     Types: Marijuana    Sexual activity: Defer       FAMILY HX  Family History   Problem Relation Age of Onset    Breast cancer Mother     Diabetes Father      "Heart disease Son        Vitals:    12/03/24 1047   BP: 92/58   BP Location: Left arm   Patient Position: Sitting   Cuff Size: Adult   Pulse: 52   SpO2: 96%   Weight: 71.5 kg (157 lb 9.6 oz)   Height: 157.5 cm (62.01\")     Body mass index is 28.82 kg/m².     PHYSICAL EXAMINATION:  Vitals and nursing note reviewed.   Constitutional:       Appearance: Healthy appearance. Not in distress.   Eyes:      Conjunctiva/sclera: Conjunctivae normal.      Pupils: Pupils are equal, round, and reactive to light.   HENT:      Nose: Nose normal.    Mouth/Throat:      Pharynx: Oropharynx is clear.   Neck:      Vascular: JVD normal.   Pulmonary:      Effort: Pulmonary effort is normal.      Breath sounds: Normal breath sounds. No wheezing. No rhonchi. No rales.   Cardiovascular:      PMI at left midclavicular line. Normal rate. Regular rhythm. Normal S1. Normal S2.       Murmurs: There is no murmur.      No gallop.  No click. No rub.   Pulses:     Intact distal pulses.   Abdominal:      General: Bowel sounds are normal.      Palpations: Abdomen is soft.      Tenderness: There is no abdominal tenderness.   Musculoskeletal: Normal range of motion.         General: No tenderness.      Cervical back: Normal range of motion. Skin:     General: Skin is warm and dry.   Neurological:      General: No focal deficit present.      Mental Status: Alert and oriented to person, place and time.      Cranial Nerves: Cranial nerves 2-12 are intact.         Diagnostic Data:  Lab Results   Component Value Date    TRIG 135 10/11/2024    HDL 44 10/11/2024     Lab Results   Component Value Date    GLUCOSE 136 (H) 11/20/2024    BUN 16 11/20/2024    CREATININE 0.43 (L) 11/20/2024     11/20/2024    K 3.5 11/20/2024    CL 95 (L) 11/20/2024    CO2 36.0 (H) 11/20/2024     Lab Results   Component Value Date    HGBA1C 6.90 (H) 11/11/2024     Lab Results   Component Value Date    WBC 7.59 11/19/2024    HGB 8.9 (L) 11/19/2024    HCT 27.3 (L) 11/19/2024    PLT " 114 (L) 11/19/2024       Procedures    Advance Care Planning   ACP discussion was declined by the patient. Patient does not have an advance directive, declines further assistance.         ASSESSMENT:  Diagnoses and all orders for this visit:    1. Essential hypertension (Primary)    2. Hyperlipidemia LDL goal <70    3. Pulmonary hypertension    4. Stenosis of prosthetic mitral valve, subsequent encounter    5. Paroxysmal atrial fibrillation    6. Acute deep vein thrombosis (DVT) of proximal vein of both lower extremities        PLAN:    Will see patient back in 4 weeks  Patient improved significantly from original state when was in hospital from heart failure  Change Lasix to daily at this point  Will see patient back in 1 month.  Will further assess need for mitral valve therapy based on symptomology as has had low activity at this point  Allow more time to recover postsurgical course.  Continue anticoagulation    Return in about 4 weeks (around 12/31/2024).     Jeffry Pickett MD

## 2024-12-03 NOTE — OUTREACH NOTE
CT Surgery Week 1 Survey      Flowsheet Row Responses   [facility] patient discharged from? [facility]   Does the patient have one of the following disease processes/diagnoses(primary or secondary)? Cardiothoracic surgery   Week 1 attempt successful? Yes   Call start time 1342   Call end time 1344   Discharge diagnosis Severe/symptomatic bioprosthetic valve aortic stenosis, Transfemoral Transcatheter Aortic Valve Replacement, GROIN EXPLORATION, LIGATION OF INFERIOR EPIGASTRIC ARTERY RIGHT   Meds reviewed with patient/caregiver? Yes   Is the patient having any side effects they believe may be caused by any medication additions or changes? No   Does the patient have all medications related to this admission filled (includes all antibiotics, pain medications, cardiac medications, etc.) Yes   Is the patient taking all medications as directed (includes completed medication regime)? Yes   Does the patient have a primary care provider?  Yes   Does the patient have an appointment scheduled with their C/T surgeon? Yes   Has the patient kept scheduled appointments due by today? Yes   What is the Home health agency?  [facility]   Has home health visited the patient within 72 hours of discharge? N/A   Psychosocial issues? No   Did the patient receive a copy of their discharge instructions? Yes   Nursing interventions Reviewed instructions with patient   What is the patient's perception of their health status since discharge? Improving   Nursing interventions Nurse provided patient education   Is the patient/caregiver able to teach back signs and symptoms of incisional infection? Increased redness, swelling or pain at the incisonal site, Increased drainage or bleeding, Incisional warmth, Pus or odor from incision, Fever   Is the patient/caregiver able to teach back steps to recovery at home? Eat a well-balance diet, Set small, achievable goals for return to baseline health   If the patient is a current smoker, are they able to  teach back resources for cessation? --  [no longer smoking]   Is the patient/caregiver able to teach back the hierarchy of who to call/visit for symptoms/problems? PCP, Specialist, Home health nurse, Urgent Care, ED, 911 Yes   Week 1 call completed? Yes   Graduated Yes   Graduated/Revoked comments Pt reports improvement.. Had staples removed and has no issues   Call end time 1344              JACQUELINE DODSON - Registered Nurse

## 2024-12-10 NOTE — PROGRESS NOTES
"     [facility] Cardiothoracic Surgery Office Follow Up Note     Date of Encounter: 2024     Name: Jill Miller  : 1960     Referred By: No ref. provider found  PCP: Marisa Lynch PA    Chief Complaint:    Chief Complaint   Patient presents with    Follow-up     Eval of right groin redness/ drainage - HX of TAVR / right groin exploration - [facility]- AVR- Pt states that she is having pain in the right groin with drainage, pt states that she believes that the incision has \"opened up\".        Subjective      History of Present Illness:    Jill Miller is a 64 y.o. female s/p TAVR per Dr. Cheatham and Dr. Carreon 2024 utilizing 23 mm NIKKI 3 ultra tissue valve complicated by pelvic hematoma with hemorrhagic shock requiring return to the OR for right groin exploration and ligation of the inferior epigastric artery.  PMH: Left upper lobectomy 2017 for T1b N0 M0 stage Ia squamous cell carcinoma, valvular heart disease with Hx mitral valve replacement (27 mm Magna Ease) with AVR (21 mm Magna Ease) and tricuspid valve repair (32 mm MC3 annuloplasty ring) + MAZE and LAAL 2018 per Dr. Cheatham, hypertension, hyperlipidemia, type 2 diabetes, and subsequent aortic stenosis of her previous bioprosthetic aortic valve via FROILAN TAVR. Last seen in CT/Vascular Surgery 24 for 12-day incision check/ staple removal.  She was noted to be healing well.  Patient contacted Clinic earlier this week w/ concerns for right groin healing and presents back for wound check.      Review of Systems:  Review of Systems   Constitutional: Negative for chills, decreased appetite, diaphoresis, fever, malaise/fatigue, night sweats and weight loss.   HENT:  Negative for congestion, hoarse voice and sore throat.    Cardiovascular:  Negative for chest pain, dyspnea on exertion, irregular heartbeat, leg swelling, near-syncope, orthopnea, palpitations, paroxysmal nocturnal dyspnea and syncope. "   Respiratory:  Negative for cough, hemoptysis, shortness of breath, sleep disturbances due to breathing, snoring, sputum production and wheezing.    Hematologic/Lymphatic: Negative for adenopathy and bleeding problem. Does not bruise/bleed easily.   Skin:  Positive for poor wound healing. Negative for color change, dry skin, itching and rash.   Musculoskeletal:  Negative for falls and muscle weakness.   Gastrointestinal:  Negative for abdominal pain, anorexia, constipation, diarrhea, nausea and vomiting.   Genitourinary:  Positive for frequency and nocturia (fluid pill). Negative for dysuria.   Neurological:  Negative for difficulty with concentration, dizziness, headaches, numbness, paresthesias, seizures and weakness.   Psychiatric/Behavioral:  Negative for altered mental status, depression and memory loss. The patient has insomnia (off and on). The patient is not nervous/anxious.    Allergic/Immunologic: Negative for persistent infections.       I have reviewed the following portions of the patient's history: problem list, current medications, allergies, past surgical history, past medical history, past social history, past family history, and ROS and confirm it's accurate.    Allergies:  Allergies   Allergen Reactions    Lortab [Hydrocodone-Acetaminophen] Nausea And Vomiting, Dizziness and Headache    Morphine And Codeine Nausea Only, GI Intolerance and Headache       Medications:      Current Outpatient Medications:     albuterol sulfate  (90 Base) MCG/ACT inhaler, Inhale 2 puffs Every 4 (Four) Hours As Needed for Wheezing., Disp: , Rfl:     amiodarone (PACERONE) 200 MG tablet, Take 1 tablet by mouth 2 (Two) Times a Day With Meals for 14 days, THEN 1 tablet Daily for 30 days. (Patient taking differently: Take 1 tablet by mouth 2 (Two) Times a Day With Meals for 14 days, THEN 1 tablet Daily for 30 days.), Disp: 58 tablet, Rfl: 2    apixaban (ELIQUIS) 5 MG tablet tablet, Take 1 tablet by mouth Every 12  (Twelve) Hours. Indications: Atrial Fibrillation, Disp: 60 tablet, Rfl: 11    aspirin 81 MG EC tablet, Take 1 tablet by mouth Daily., Disp: , Rfl:     Cholecalciferol 10 MCG (400 UNIT) capsule, Take 5,000 Units by mouth., Disp: , Rfl:     furosemide (Lasix) 40 MG tablet, Take 1.5 tablets by mouth 2 (Two) Times a Day. (Patient taking differently: Take 1.5 tablets by mouth 2 (Two) Times a Day. 1 time daily), Disp: 60 tablet, Rfl: 11    ibuprofen (ADVIL,MOTRIN) 800 MG tablet, 1 tablet As Needed., Disp: , Rfl:     Jardiance 10 MG tablet tablet, TAKE 1 TABLET DAILY, Disp: 90 tablet, Rfl: 3    levothyroxine (SYNTHROID, LEVOTHROID) 200 MCG tablet, Take 1 tablet by mouth Every Morning., Disp: , Rfl:     metoprolol tartrate (LOPRESSOR) 25 MG tablet, TAKE 1 TABLET EVERY 12 HOURS, Disp: 180 tablet, Rfl: 3    multivitamin (THERAGRAN) tablet tablet, Take 1 tablet by mouth Daily., Disp: , Rfl:     nystatin (MYCOSTATIN) 008863 UNIT/GM powder, Apply 1 Application topically to the appropriate area as directed 2 (Two) Times a Day., Disp: , Rfl:     potassium chloride (MICRO-K) 10 MEQ CR capsule, Take 1 capsule by mouth 2 (Two) Times a Day., Disp: 60 capsule, Rfl: 11    pravastatin (PRAVACHOL) 20 MG tablet, Take 1 tablet by mouth Every Night., Disp: , Rfl:     doxycycline (VIBRAMYCIN) 100 MG capsule, Take 1 capsule by mouth 2 (Two) Times a Day., Disp: 20 capsule, Rfl: 0    sodium hypochlorite (DAKIN'S 1/4 STRENGTH) 0.125 % solution topical solution 0.125%, Apply 15 mL topically to the appropriate area as directed Daily. Apply to right groin incision daily.  Pat dry with gauze or cotton ball afterwards, Disp: 473 mL, Rfl: 0    History:   Past Medical History:   Diagnosis Date    Acute pulmonary embolism without acute cor pulmonale 11/14/2024    Atrial fibrillation and flutter     Chronic diastolic congestive heart failure     Chronically Abnormal CXR (Left pleural disease post op) 09/25/2017    COPD exacerbation 04/17/2022    Elevated  cholesterol     Essential hypertension 04/10/2017    Target blood pressure <130/80 mmHg    Graves disease     Hyperlipidemia LDL goal <70     Hypertension     Hyperthyroidism     Lung cancer     MRSA (methicillin resistant staph aureus) culture positive 2014    under left breast, incision and debridement, treated with wound packing and po abx     Prolonged QTC interval on ECG 06/01/2022    Rheumatic mitral stenosis     Type 2 diabetes mellitus 07/17/2018    Valvular heart disease        Past Surgical History:   Procedure Laterality Date    ABLATION OF DYSRHYTHMIC FOCUS  07/16/2018    ABSCESS DRAINAGE      under left breast d/t mrsa     AORTIC VALVE REPAIR/REPLACEMENT N/A 07/16/2018    Procedure: MEDIAN STERNOTOMY, AORTIC VALVE REPLACEMENT WITH TIFFANIE AND MAZE, TRICUSPID RING, LEFT ATRIAL OCCLUSION;  Surgeon: Ajay Cheatham MD;  Location:  [facility];  Service: Cardiothoracic    AORTIC VALVE REPAIR/REPLACEMENT N/A 11/11/2024    Procedure: TRANSCATHETER AORTIC VALVE REPLACEMENT;  Surgeon: Ajay Cheatham MD;  Location:  [facility];  Service: Cardiothoracic;  Laterality: N/A;  FL- 6 min 36 sec  DOSE-  110 MGY  CONTRAST- 40 mL    AORTIC VALVE REPAIR/REPLACEMENT N/A 11/11/2024    Procedure: Transfemoral Transcatheter Aortic Valve Replacement;  Surgeon: Jeaneth Carreon MD;  Location:  [facility];  Service: Cardiovascular;  Laterality: N/A;    BRONCHOSCOPY Left 05/12/2017    Procedure: BRONCHOSCOPY;  Surgeon: Ajay Cheatham MD;  Location:  [facility];  Service:     BRONCHOSCOPY N/A 05/18/2017    Procedure: BRONCHOSCOPY BIOPSY AT BEDSIDE;  Surgeon: Ajay Cheatham MD;  Location:  [facility];  Service:     CARDIAC CATHETERIZATION N/A 09/28/2017    Procedure: Left Heart Cath;  Surgeon: Marco Lay MD;  Location:  [facility];  Service:     CARDIAC CATHETERIZATION N/A 09/28/2017    Procedure: Right Heart Cath;  Surgeon: Marco Lay MD;  Location:  [facility];   Service:     CARDIAC CATHETERIZATION N/A 10/11/2024    Procedure: Right and Left Heart Cath;  Surgeon: Jeffry Pickett MD;  Location:  [facility];  Service: Cardiology;  Laterality: N/A;    CARDIAC ELECTROPHYSIOLOGY PROCEDURE N/A 2022    Procedure: Ablation atrial fibrillation (atypical A flutter). Hold Rythmol x5 days;  Surgeon: Nigel Huber MD;  Location:  [facility];  Service: Cardiovascular;  Laterality: N/A;    FEMORAL THROMBECTOMY/EMBOLECTOMY Right 2024    Procedure: GROIN EXPLORATION, LIGATION OF INFERIOR EPIGASTRIC ARTERY RIGHT;  Surgeon: Ajay Cheatham MD;  Location: [facility];  Service: Cardiothoracic;  Laterality: Right;  FL: 6 SECONDS  DOSE: 191MGY  CONTRAST: 30ML VISIPAQUE    LUNG BIOPSY  2017    LYMPH NODE DISSECTION Left 2017    Procedure: MEDIASTINAL LYMPH NODE DISSECTION;  Surgeon: Ajay Cheatham MD;  Location:  [facility];  Service:     MAZE PROCEDURE      MITRAL VALVE REPAIR/REPLACEMENT N/A 2018    Procedure: MITRAL VALVE REPLACEMENT;  Surgeon: Ajay Cheatham MD;  Location:  [facility];  Service: Cardiothoracic    MITRAL VALVE REPLACEMENT  2018    TEETH EXTRACTION      THORACOSCOPY VIDEO ASSISTED WITH LOBECTOMY Left 2017    Procedure: THORACOSCOPY VIDEO ASSISTED WITH OPEN LOBECTOMY;  Surgeon: Ajay Cheatham MD;  Location:  [facility];  Service:     TOTAL THYROIDECTOMY  2012    TRANSESOPHAGEAL ECHOCARDIOGRAM (TIFFANIE) N/A 2024    Procedure: TRANSESOPHAGEAL ECHOCARDIOGRAM WITH ANESTHESIA;  Surgeon: Ajay Cheatham MD;  Location:  [facility];  Service: Cardiothoracic;  Laterality: N/A;    TRICUSPID VALVE SURGERY  2018       Social History     Socioeconomic History    Marital status:     Number of children: 3   Tobacco Use    Smoking status: Some Days     Current packs/day: 0.00     Types: Cigarettes     Start date: 1978     Last attempt to quit: 2023     Years since quittin.6     " Passive exposure: Current    Smokeless tobacco: Never    Tobacco comments:     Pt states that she is smoking 1 or 2  a day trying her best to quit   Vaping Use    Vaping status: Never Used   Substance and Sexual Activity    Alcohol use: No    Drug use: Not Currently     Types: Marijuana    Sexual activity: Defer        Family History   Problem Relation Age of Onset    Breast cancer Mother     Diabetes Father     Heart disease Son        Objective   Physical Exam:  Vitals:    12/11/24 0903 12/11/24 0904   BP: 144/72 132/78   BP Location: Left arm Right arm   Pulse: 55    Temp: 98.4 °F (36.9 °C)    SpO2: 98%    Weight: 71.8 kg (158 lb 3.2 oz)    Height: 157.5 cm (62\")  Comment: per pt       Body mass index is 28.94 kg/m².    Physical Exam  Vitals reviewed.   Constitutional:       General: She is not in acute distress.     Appearance: She is not toxic-appearing.   HENT:      Head: Normocephalic and atraumatic.   Eyes:      General: Lids are normal.      Conjunctiva/sclera: Conjunctivae normal.      Pupils: Pupils are equal, round, and reactive to light.   Cardiovascular:      Rate and Rhythm: Normal rate and regular rhythm. Bradycardia present.      Pulses:           Femoral pulses are 2+ on the right side and 2+ on the left side.     Heart sounds: S1 normal and S2 normal. No murmur heard.     Comments: Right femoral cutdown as pictured below:  proximal dehiscence w/ fibrinous slough with additional shallow dehiscence distally without fibrinous slough.  General tenderness along entire incision with moderate induration.  +2 femoral pulses bilaterally  Pulmonary:      Effort: Pulmonary effort is normal. No respiratory distress.      Breath sounds: Normal breath sounds.   Musculoskeletal:         General: Normal range of motion.      Cervical back: Normal range of motion and neck supple.      Right lower leg: No edema.      Left lower leg: No edema.   Skin:     General: Skin is warm and dry.      Capillary Refill: " Capillary refill takes less than 2 seconds.   Neurological:      General: No focal deficit present.      Mental Status: She is alert and oriented to person, place, and time.   Psychiatric:         Attention and Perception: Attention normal.         Mood and Affect: Mood normal.         Speech: Speech normal.         Behavior: Behavior is cooperative.         Imaging/Labs:  XR Chest 2 View: 11/28/2024  Impression: Small left-sided pleural effusion, mildly improved as compared to the previous study.   Electronically Signed: Betty Cao MD  11/28/2024 12:50 PM EST    Assessment / Plan      Assessment / Plan:  1. Prosthetic aortic valve stenosis s/p TAVR 11/11/24, right groin exploration for hematoma 11/12/24  - 64 y.o. female s/p TAVR per Dr. Cheatham and Dr. Carreon 11/11/2024 utilizing 23 mm NIKKI 3 ultra tissue valve complicated by pelvic hematoma with hemorrhagic shock requiring return to the OR for right groin exploration and ligation of the inferior epigastric artery.    - PMH: Left upper lobectomy 5/12/2017 for T1b N0 M0 stage Ia squamous cell carcinoma, valvular heart disease with Hx mitral valve replacement (27 mm Magna Ease) with AVR (21 mm Magna Ease) and tricuspid valve repair (32 mm MC3 annuloplasty ring) + MAZE and LAAL 7/16/2018 per Dr. Cheatham, hypertension, hyperlipidemia, type 2 diabetes, and subsequent aortic stenosis of her previous bioprosthetic aortic valve via FROILAN TAVR.   - Last seen in CT/Vascular Surgery 11/27/24 for 12-day incision check/ staple removal.  She was noted to be healing well.  Staples removed without complication  - Patient contacted Clinic earlier this week w/ concerns for right groin healing and presents back for wound check.    - Right groin cutdown is tender/ indurated with proximal dehiscence and fibrinous slough + shallow distal dehiscence.    - Rx doxycycline 100 BID x 10 days  - Continue to wash daily with antibacterial soap/water/ pat dry/ apply 1/4 strength Dakins soln on  gauze pad then pat dry again with clean gauze pad  - Continue to avoid 90 degree sitting  - Return for wound check in 8 days  - Call sooner PRN worsening symptoms      Follow Up:   Return in about 8 days (around 12/19/2024) for Wound Check.   Or sooner for any further concerns or worsening sign and symptoms. If unable to reach us in the office please dial 911 or go to the nearest emergency department.      GUCCI Carrion  [facility] Cardiothoracic Surgery    Time Spent: I spent 30 minutes caring for Jill on this date of service. This time includes time spent by me in the following activities: preparing for the visit, reviewing tests, obtaining and/or reviewing a separately obtained history, performing a medically appropriate examination and/or evaluation, counseling and educating the patient/family/caregiver, documenting information in the medical record, and care coordination.

## 2024-12-11 ENCOUNTER — TELEPHONE (OUTPATIENT)
Dept: CARDIAC SURGERY | Facility: CLINIC | Age: 64
End: 2024-12-11

## 2024-12-11 ENCOUNTER — OFFICE VISIT (OUTPATIENT)
Dept: CARDIAC SURGERY | Facility: CLINIC | Age: 64
End: 2024-12-11
Payer: COMMERCIAL

## 2024-12-11 VITALS
OXYGEN SATURATION: 98 % | HEIGHT: 62 IN | DIASTOLIC BLOOD PRESSURE: 78 MMHG | BODY MASS INDEX: 29.11 KG/M2 | SYSTOLIC BLOOD PRESSURE: 132 MMHG | HEART RATE: 55 BPM | TEMPERATURE: 98.4 F | WEIGHT: 158.2 LBS

## 2024-12-11 DIAGNOSIS — T82.857D STENOSIS OF PROSTHETIC AORTIC VALVE, SUBSEQUENT ENCOUNTER: Primary | ICD-10-CM

## 2024-12-11 PROCEDURE — 99024 POSTOP FOLLOW-UP VISIT: CPT | Performed by: NURSE PRACTITIONER

## 2024-12-11 RX ORDER — SODIUM HYPOCHLORITE 1.25 MG/ML
15 SOLUTION TOPICAL DAILY
Qty: 473 ML | Refills: 0 | Status: SHIPPED | OUTPATIENT
Start: 2024-12-11

## 2024-12-11 RX ORDER — DOXYCYCLINE 100 MG/1
100 CAPSULE ORAL 2 TIMES DAILY
Qty: 20 CAPSULE | Refills: 0 | Status: SHIPPED | OUTPATIENT
Start: 2024-12-11

## 2024-12-11 NOTE — TELEPHONE ENCOUNTER
Provider: ROLAND    Caller: Jill Miller    Relationship to Patient: Self    Phone Number: [phone]    Reason for Call:PT IS NEEDING A  [phone] [facility] NOV 27 & DEC 11TH - PT6 ASKED IF WE WOULD ASK RON IF SHE COULD DO THIS FOR HER

## 2024-12-12 DIAGNOSIS — T82.857D STENOSIS OF PROSTHETIC AORTIC VALVE, SUBSEQUENT ENCOUNTER: Primary | ICD-10-CM

## 2024-12-17 ENCOUNTER — TELEPHONE (OUTPATIENT)
Dept: CARDIAC SURGERY | Facility: CLINIC | Age: 64
End: 2024-12-17
Payer: COMMERCIAL

## 2024-12-17 NOTE — TELEPHONE ENCOUNTER
SPOKE WITH PT AND RESCHEDULED HER PRIOR APPT TO 12/19. CANCELLATION WAS A MISTAKE DONE BY PT AS SHE SAID SHE THOUGHT IT WAS TODAY AND THAT SHE ISNT FEELING WELL.

## 2024-12-19 ENCOUNTER — OFFICE VISIT (OUTPATIENT)
Dept: CARDIAC SURGERY | Facility: CLINIC | Age: 64
End: 2024-12-19
Payer: COMMERCIAL

## 2024-12-19 VITALS
WEIGHT: 156.6 LBS | DIASTOLIC BLOOD PRESSURE: 68 MMHG | TEMPERATURE: 98.4 F | OXYGEN SATURATION: 95 % | HEIGHT: 62 IN | BODY MASS INDEX: 28.82 KG/M2 | SYSTOLIC BLOOD PRESSURE: 128 MMHG | HEART RATE: 56 BPM

## 2024-12-19 DIAGNOSIS — T14.8XXD DELAYED WOUND HEALING: Primary | ICD-10-CM

## 2024-12-19 DIAGNOSIS — Z95.2 S/P TAVR (TRANSCATHETER AORTIC VALVE REPLACEMENT): ICD-10-CM

## 2024-12-19 RX ORDER — SULFAMETHOXAZOLE AND TRIMETHOPRIM 800; 160 MG/1; MG/1
1 TABLET ORAL 2 TIMES DAILY
Qty: 20 TABLET | Refills: 0 | Status: SHIPPED | OUTPATIENT
Start: 2024-12-19

## 2024-12-19 NOTE — PROGRESS NOTES
"12/19/2024  Patient Information  Jill Miller                                                                                          [address] ([place])   [address]  [facility] [address]   1960  'PCP/Referring Physician'  Marisa Lynch PA  [phone]  No ref. provider found    Chief Complaint   Patient presents with   • Follow-up     Follow up for wound check right groin TAVR 11/11/24, right groin 11/12/24- [facility]- Pt states that her groin is still very tender with pain often. Pt states that she cleaning and changing the dressing 1 daily and \"it appears not to be any better\".        History of Present Illness:  ***      Patient Active Problem List   Diagnosis   • Essential hypertension   • Acquired hypothyroidism   • Lung cancer; S/P Bronch/Thor w/ ALBINA lobectomy/mediastinal lymph node resec (5/12/17, Dr. Cheatham)   • Paroxysmal atrial fibrillation   • Hyperlipidemia LDL goal <70   • Prosthetic aortic valve stenosis s/p TAVR 11/11/24, Right groin exploration for hematoma 11/12/24   • Type 2 diabetes mellitus   • COPD (chronic obstructive pulmonary disease)   • Obesity (BMI 30-39.9)   • Tobacco user   • Pulmonary hypertension   • Stenosis of prosthetic mitral valve   • Acute on chronic respiratory failure with hypoxia and hypercapnia   • Pulmonary infiltrate, likely right-sided pneumonia   • Acute pulmonary embolism without acute cor pulmonale   • Acute deep vein thrombosis (DVT) of proximal vein of lower extremity   • Paroxysmal atrial fibrillation     Past Medical History:   Diagnosis Date   • Acute pulmonary embolism without acute cor pulmonale 11/14/2024   • Atrial fibrillation and flutter    • Chronic diastolic congestive heart failure    • Chronically Abnormal CXR (Left pleural disease post op) 09/25/2017   • COPD exacerbation 04/17/2022   • Elevated cholesterol    • Essential hypertension 04/10/2017    Target blood pressure <130/80 mmHg   • Graves disease    • Hyperlipidemia LDL goal <70    • " Hypertension    • Hyperthyroidism    • Lung cancer    • MRSA (methicillin resistant staph aureus) culture positive 2014    under left breast, incision and debridement, treated with wound packing and po abx    • Prolonged QTC interval on ECG 06/01/2022   • Rheumatic mitral stenosis    • Type 2 diabetes mellitus 07/17/2018   • Valvular heart disease      Past Surgical History:   Procedure Laterality Date   • ABLATION OF DYSRHYTHMIC FOCUS  07/16/2018   • ABSCESS DRAINAGE      under left breast d/t mrsa    • AORTIC VALVE REPAIR/REPLACEMENT N/A 07/16/2018    Procedure: MEDIAN STERNOTOMY, AORTIC VALVE REPLACEMENT WITH TIFFANIE AND MAZE, TRICUSPID RING, LEFT ATRIAL OCCLUSION;  Surgeon: Ajay Chaetham MD;  Location:  [facility];  Service: Cardiothoracic   • AORTIC VALVE REPAIR/REPLACEMENT N/A 11/11/2024    Procedure: TRANSCATHETER AORTIC VALVE REPLACEMENT;  Surgeon: Ajay Cheatham MD;  Location:  [facility];  Service: Cardiothoracic;  Laterality: N/A;  FL- 6 min 36 sec  DOSE-  110 MGY  CONTRAST- 40 mL   • AORTIC VALVE REPAIR/REPLACEMENT N/A 11/11/2024    Procedure: Transfemoral Transcatheter Aortic Valve Replacement;  Surgeon: Jeaneth Carreon MD;  Location:  [facility];  Service: Cardiovascular;  Laterality: N/A;   • BRONCHOSCOPY Left 05/12/2017    Procedure: BRONCHOSCOPY;  Surgeon: Ajay Cheatham MD;  Location:  [facility];  Service:    • BRONCHOSCOPY N/A 05/18/2017    Procedure: BRONCHOSCOPY BIOPSY AT BEDSIDE;  Surgeon: Ajay Cheatham MD;  Location:  [facility];  Service:    • CARDIAC CATHETERIZATION N/A 09/28/2017    Procedure: Left Heart Cath;  Surgeon: Marco Lay MD;  Location:  [facility];  Service:    • CARDIAC CATHETERIZATION N/A 09/28/2017    Procedure: Right Heart Cath;  Surgeon: Marco Lay MD;  Location:  [facility];  Service:    • CARDIAC CATHETERIZATION N/A 10/11/2024    Procedure: Right and Left Heart Cath;  Surgeon: Jeffry Pickett  MD Sandor;  Location: [facility] CATH INVASIVE [facility];  Service: Cardiology;  Laterality: N/A;   • CARDIAC ELECTROPHYSIOLOGY PROCEDURE N/A 6/1/2022    Procedure: Ablation atrial fibrillation (atypical A flutter). Hold Rythmol x5 days;  Surgeon: Nigel Huber MD;  Location:  [facility];  Service: Cardiovascular;  Laterality: N/A;   • FEMORAL THROMBECTOMY/EMBOLECTOMY Right 11/12/2024    Procedure: GROIN EXPLORATION, LIGATION OF INFERIOR EPIGASTRIC ARTERY RIGHT;  Surgeon: Ajay Cheatham MD;  Location:  [facility];  Service: Cardiothoracic;  Laterality: Right;  FL: 6 SECONDS  DOSE: 191MGY  CONTRAST: 30ML VISIPAQUE   • LUNG BIOPSY  04/2017   • LYMPH NODE DISSECTION Left 05/12/2017    Procedure: MEDIASTINAL LYMPH NODE DISSECTION;  Surgeon: Ajay Cheatham MD;  Location:  [facility];  Service:    • MAZE PROCEDURE     • MITRAL VALVE REPAIR/REPLACEMENT N/A 07/16/2018    Procedure: MITRAL VALVE REPLACEMENT;  Surgeon: Ajay Cheatham MD;  Location:  [facility];  Service: Cardiothoracic   • MITRAL VALVE REPLACEMENT  07/16/2018   • TEETH EXTRACTION     • THORACOSCOPY VIDEO ASSISTED WITH LOBECTOMY Left 05/12/2017    Procedure: THORACOSCOPY VIDEO ASSISTED WITH OPEN LOBECTOMY;  Surgeon: Ajay Cheatham MD;  Location:  [facility];  Service:    • TOTAL THYROIDECTOMY  approx 2012   • TRANSESOPHAGEAL ECHOCARDIOGRAM (TIFFANIE) N/A 11/11/2024    Procedure: TRANSESOPHAGEAL ECHOCARDIOGRAM WITH ANESTHESIA;  Surgeon: Ajay Cheatham MD;  Location:  [facility];  Service: Cardiothoracic;  Laterality: N/A;   • TRICUSPID VALVE SURGERY  07/16/2018       Current Outpatient Medications:   •  albuterol sulfate  (90 Base) MCG/ACT inhaler, Inhale 2 puffs Every 4 (Four) Hours As Needed for Wheezing., Disp: , Rfl:   •  amiodarone (PACERONE) 200 MG tablet, Take 1 tablet by mouth 2 (Two) Times a Day With Meals for 14 days, THEN 1 tablet Daily for 30 days. (Patient taking differently: Take 1 tablet by mouth 2 (Two) Times a Day With Meals  for 14 days, THEN 1 tablet Daily for 30 days.), Disp: 58 tablet, Rfl: 2  •  apixaban (ELIQUIS) 5 MG tablet tablet, Take 1 tablet by mouth Every 12 (Twelve) Hours. Indications: Atrial Fibrillation, Disp: 60 tablet, Rfl: 11  •  aspirin 81 MG EC tablet, Take 1 tablet by mouth Daily., Disp: , Rfl:   •  Cholecalciferol 10 MCG (400 UNIT) capsule, Take 5,000 Units by mouth., Disp: , Rfl:   •  doxycycline (VIBRAMYCIN) 100 MG capsule, Take 1 capsule by mouth 2 (Two) Times a Day., Disp: 20 capsule, Rfl: 0  •  furosemide (Lasix) 40 MG tablet, Take 1.5 tablets by mouth 2 (Two) Times a Day. (Patient taking differently: Take 1.5 tablets by mouth 2 (Two) Times a Day. 1 time daily), Disp: 60 tablet, Rfl: 11  •  ibuprofen (ADVIL,MOTRIN) 800 MG tablet, 1 tablet As Needed., Disp: , Rfl:   •  Jardiance 10 MG tablet tablet, TAKE 1 TABLET DAILY, Disp: 90 tablet, Rfl: 3  •  levothyroxine (SYNTHROID, LEVOTHROID) 200 MCG tablet, Take 1 tablet by mouth Every Morning., Disp: , Rfl:   •  metoprolol tartrate (LOPRESSOR) 25 MG tablet, TAKE 1 TABLET EVERY 12 HOURS, Disp: 180 tablet, Rfl: 3  •  multivitamin (THERAGRAN) tablet tablet, Take 1 tablet by mouth Daily., Disp: , Rfl:   •  nystatin (MYCOSTATIN) 486403 UNIT/GM powder, Apply 1 Application topically to the appropriate area as directed 2 (Two) Times a Day., Disp: , Rfl:   •  potassium chloride (MICRO-K) 10 MEQ CR capsule, Take 1 capsule by mouth 2 (Two) Times a Day., Disp: 60 capsule, Rfl: 11  •  pravastatin (PRAVACHOL) 20 MG tablet, Take 1 tablet by mouth Every Night., Disp: , Rfl:   •  sodium hypochlorite (DAKIN'S 1/4 STRENGTH) 0.125 % solution topical solution 0.125%, Apply 15 mL topically to the appropriate area as directed Daily. Apply to right groin incision daily.  Pat dry with gauze or cotton ball afterwards, Disp: 473 mL, Rfl: 0  Allergies   Allergen Reactions   • Lortab [Hydrocodone-Acetaminophen] Nausea And Vomiting, Dizziness and Headache   • Morphine And Codeine Nausea Only, GI  Intolerance and Headache     Social History     Socioeconomic History   • Marital status:    • Number of children: 3   Tobacco Use   • Smoking status: Some Days     Current packs/day: 0.00     Types: Cigarettes     Start date: 1978     Last attempt to quit: 2023     Years since quittin.6     Passive exposure: Current   • Smokeless tobacco: Never   • Tobacco comments:     Pt states that she is smoking 1 or 2  a day trying her best to quit   Vaping Use   • Vaping status: Never Used   Substance and Sexual Activity   • Alcohol use: No   • Drug use: Not Currently     Types: Marijuana   • Sexual activity: Defer     Family History   Problem Relation Age of Onset   • Breast cancer Mother    • Diabetes Father    • Heart disease Son      Review of Systems   Constitutional: Positive for night sweats (often). Negative for chills, fever, malaise/fatigue and weight loss.   HENT:  Negative for congestion, hearing loss, nosebleeds and odynophagia.    Cardiovascular:  Negative for chest pain, claudication, dyspnea on exertion, leg swelling, orthopnea, palpitations and syncope.   Respiratory:  Negative for cough, hemoptysis, shortness of breath and wheezing.    Endocrine: Negative for cold intolerance, heat intolerance, polydipsia, polyphagia and polyuria.   Hematologic/Lymphatic: Bruises/bleeds easily.   Skin:  Positive for poor wound healing. Negative for itching and rash.   Musculoskeletal:  Positive for back pain (some lower back pain) and muscle cramps (lower legs). Negative for arthritis, joint pain, joint swelling and myalgias.   Gastrointestinal:  Negative for abdominal pain, constipation, diarrhea, hematemesis, melena, nausea and vomiting.   Genitourinary:  Negative for dysuria, frequency, hematuria, nocturia and urgency.   Neurological:  Negative for dizziness, light-headedness, loss of balance and numbness.   Psychiatric/Behavioral:  Negative for depression and suicidal ideas. The patient is not  "nervous/anxious.    Allergic/Immunologic: Negative for environmental allergies and HIV exposure.   Vitals:    12/19/24 1432 12/19/24 1433   BP: 120/74 128/68   BP Location: Left arm Right arm   Pulse: 56    Temp: 98.4 °F (36.9 °C)    SpO2: 95%    Weight: 71 kg (156 lb 9.6 oz)    Height: 157.5 cm (62\")       Physical Exam    The {CT Surgery Attest:01802::\"ROS\",\"past medical history\",\"surgical history\",\"family history\",\"social history\",\"vitals\"} were reviewed by myself and corrected as needed.      Labs/Imaging:  ***  Assessment/Plan:  ***    Patient Active Problem List   Diagnosis   • Essential hypertension   • Acquired hypothyroidism   • Lung cancer; S/P Bronch/Thor w/ ALBINA lobectomy/mediastinal lymph node resec (5/12/17, Dr. Cheatham)   • Paroxysmal atrial fibrillation   • Hyperlipidemia LDL goal <70   • Prosthetic aortic valve stenosis s/p TAVR 11/11/24, Right groin exploration for hematoma 11/12/24   • Type 2 diabetes mellitus   • COPD (chronic obstructive pulmonary disease)   • Obesity (BMI 30-39.9)   • Tobacco user   • Pulmonary hypertension   • Stenosis of prosthetic mitral valve   • Acute on chronic respiratory failure with hypoxia and hypercapnia   • Pulmonary infiltrate, likely right-sided pneumonia   • Acute pulmonary embolism without acute cor pulmonale   • Acute deep vein thrombosis (DVT) of proximal vein of lower extremity   • Paroxysmal atrial fibrillation                      "

## 2024-12-19 NOTE — PROGRESS NOTES
"     [facility] Cardiothoracic Surgery Office Follow Up Note     Date of Encounter: 2024     Name: Jill Miller  : 1960     Referred By: No ref. provider found  PCP: Marisa Lynch PA    Chief Complaint:    Chief Complaint   Patient presents with    Follow-up     Follow up for wound check right groin TAVR 24, right groin 24- [facility]- Pt states that her groin is still very tender with pain often. Pt states that she cleaning and changing the dressing 1 daily and \"it appears not to be any better\".        Subjective      History of Present Illness:    Jill Miller is a 64 y.o. female with a past medical history of ALBINA on 2017 for [id number] stage Ia squamous cell carcinoma, valvular heart disease s/p MVR (27 mm Magna Ease tissue valve) and AVR (21 mm Magna Ease tissue valve), and tricuspid valve repair (number 32 mm MC3 annuloplasty ring) and maze/LAAL on 2018 with Dr. Cheatham, hypertension, hyperlipidemia, and type 2 diabetes.  She underwent recent TAVR with Dr. Cheatham and Dr. Valverde on 2024 with number 23 mm NIKKI 3 valve due to bioprosthetic aortic valve stenosis.  Her postoperative course was complicated by a pelvic hematoma and hemorrhagic shock requiring return to the OR for right groin exploration and ligation of the inferior epigastric artery. She was seen for an early postoperative follow-up by GUCCI Lilly on 24 for right groin dehiscence.  She was prescribed oral doxycycline and was instructed to wash her wound daily with antibacterial soap and dampen 4x4 gauze with 1/4 Dakins solution and cover with dry dressing. She presents in office today for wound check.    Review of Systems:  Constitutional: Positive for night sweats (often). Negative for chills, fever, malaise/fatigue and weight loss.   HENT:  Negative for congestion, hearing loss, nosebleeds and odynophagia.    Cardiovascular:  Negative for chest pain, claudication, dyspnea on exertion, leg " swelling, orthopnea, palpitations and syncope.   Respiratory:  Negative for cough, hemoptysis, shortness of breath and wheezing.    Endocrine: Negative for cold intolerance, heat intolerance, polydipsia, polyphagia and polyuria.   Hematologic/Lymphatic: Bruises/bleeds easily.   Skin:  Positive for poor wound healing. Negative for itching and rash.   Musculoskeletal:  Positive for back pain (some lower back pain) and muscle cramps (lower legs). Negative for arthritis, joint pain, joint swelling and myalgias.   Gastrointestinal:  Negative for abdominal pain, constipation, diarrhea, hematemesis, melena, nausea and vomiting.   Genitourinary:  Negative for dysuria, frequency, hematuria, nocturia and urgency.   Neurological:  Negative for dizziness, light-headedness, loss of balance and numbness.   Psychiatric/Behavioral:  Negative for depression and suicidal ideas. The patient is not nervous/anxious.    Allergic/Immunologic: Negative for environmental allergies and HIV exposure    I have reviewed the following portions of the patient's history: problem list, current medications, allergies, past surgical history, past medical history, past social history, past family history, and ROS and confirm it's accurate.    Allergies:  Allergies   Allergen Reactions    Lortab [Hydrocodone-Acetaminophen] Nausea And Vomiting, Dizziness and Headache    Morphine And Codeine Nausea Only, GI Intolerance and Headache       Medications:      Current Outpatient Medications:     albuterol sulfate  (90 Base) MCG/ACT inhaler, Inhale 2 puffs Every 4 (Four) Hours As Needed for Wheezing., Disp: , Rfl:     amiodarone (PACERONE) 200 MG tablet, Take 1 tablet by mouth 2 (Two) Times a Day With Meals for 14 days, THEN 1 tablet Daily for 30 days. (Patient taking differently: Take 1 tablet by mouth 2 (Two) Times a Day With Meals for 14 days, THEN 1 tablet Daily for 30 days.), Disp: 58 tablet, Rfl: 2    apixaban (ELIQUIS) 5 MG tablet tablet, Take 1  tablet by mouth Every 12 (Twelve) Hours. Indications: Atrial Fibrillation, Disp: 60 tablet, Rfl: 11    aspirin 81 MG EC tablet, Take 1 tablet by mouth Daily., Disp: , Rfl:     Cholecalciferol 10 MCG (400 UNIT) capsule, Take 5,000 Units by mouth., Disp: , Rfl:     doxycycline (VIBRAMYCIN) 100 MG capsule, Take 1 capsule by mouth 2 (Two) Times a Day., Disp: 20 capsule, Rfl: 0    furosemide (Lasix) 40 MG tablet, Take 1.5 tablets by mouth 2 (Two) Times a Day. (Patient taking differently: Take 1.5 tablets by mouth 2 (Two) Times a Day. 1 time daily), Disp: 60 tablet, Rfl: 11    ibuprofen (ADVIL,MOTRIN) 800 MG tablet, 1 tablet As Needed., Disp: , Rfl:     Jardiance 10 MG tablet tablet, TAKE 1 TABLET DAILY, Disp: 90 tablet, Rfl: 3    levothyroxine (SYNTHROID, LEVOTHROID) 200 MCG tablet, Take 1 tablet by mouth Every Morning., Disp: , Rfl:     metoprolol tartrate (LOPRESSOR) 25 MG tablet, TAKE 1 TABLET EVERY 12 HOURS, Disp: 180 tablet, Rfl: 3    multivitamin (THERAGRAN) tablet tablet, Take 1 tablet by mouth Daily., Disp: , Rfl:     nystatin (MYCOSTATIN) 459433 UNIT/GM powder, Apply 1 Application topically to the appropriate area as directed 2 (Two) Times a Day., Disp: , Rfl:     potassium chloride (MICRO-K) 10 MEQ CR capsule, Take 1 capsule by mouth 2 (Two) Times a Day., Disp: 60 capsule, Rfl: 11    pravastatin (PRAVACHOL) 20 MG tablet, Take 1 tablet by mouth Every Night., Disp: , Rfl:     sodium hypochlorite (DAKIN'S 1/4 STRENGTH) 0.125 % solution topical solution 0.125%, Apply 15 mL topically to the appropriate area as directed Daily. Apply to right groin incision daily.  Pat dry with gauze or cotton ball afterwards, Disp: 473 mL, Rfl: 0    sulfamethoxazole-trimethoprim (Bactrim DS) 800-160 MG per tablet, Take 1 tablet by mouth 2 (Two) Times a Day., Disp: 20 tablet, Rfl: 0    History:   Past Medical History:   Diagnosis Date    Acute pulmonary embolism without acute cor pulmonale 11/14/2024    Atrial fibrillation and flutter      Chronic diastolic congestive heart failure     Chronically Abnormal CXR (Left pleural disease post op) 09/25/2017    COPD exacerbation 04/17/2022    Elevated cholesterol     Essential hypertension 04/10/2017    Target blood pressure <130/80 mmHg    Graves disease     Hyperlipidemia LDL goal <70     Hypertension     Hyperthyroidism     Lung cancer     MRSA (methicillin resistant staph aureus) culture positive 2014    under left breast, incision and debridement, treated with wound packing and po abx     Prolonged QTC interval on ECG 06/01/2022    Rheumatic mitral stenosis     Type 2 diabetes mellitus 07/17/2018    Valvular heart disease        Past Surgical History:   Procedure Laterality Date    ABLATION OF DYSRHYTHMIC FOCUS  07/16/2018    ABSCESS DRAINAGE      under left breast d/t mrsa     AORTIC VALVE REPAIR/REPLACEMENT N/A 07/16/2018    Procedure: MEDIAN STERNOTOMY, AORTIC VALVE REPLACEMENT WITH TIFFANIE AND MAZE, TRICUSPID RING, LEFT ATRIAL OCCLUSION;  Surgeon: Aajy Cheatham MD;  Location: [facility] OR;  Service: Cardiothoracic    AORTIC VALVE REPAIR/REPLACEMENT N/A 11/11/2024    Procedure: TRANSCATHETER AORTIC VALVE REPLACEMENT;  Surgeon: Ajay Cheatham MD;  Location:  [facility];  Service: Cardiothoracic;  Laterality: N/A;  FL- 6 min 36 sec  DOSE-  110 MGY  CONTRAST- 40 mL    AORTIC VALVE REPAIR/REPLACEMENT N/A 11/11/2024    Procedure: Transfemoral Transcatheter Aortic Valve Replacement;  Surgeon: Jeaneth Carreon MD;  Location:  [facility];  Service: Cardiovascular;  Laterality: N/A;    BRONCHOSCOPY Left 05/12/2017    Procedure: BRONCHOSCOPY;  Surgeon: Ajay Cheatham MD;  Location: [facility] OR;  Service:     BRONCHOSCOPY N/A 05/18/2017    Procedure: BRONCHOSCOPY BIOPSY AT BEDSIDE;  Surgeon: Ajay Cheatham MD;  Location: [facility] ENDOSCOPY;  Service:     CARDIAC CATHETERIZATION N/A 09/28/2017    Procedure: Left Heart Cath;  Surgeon: Marco Lay MD;  Location: [facility] CATH INVASIVE LOCATION;   Service:     CARDIAC CATHETERIZATION N/A 09/28/2017    Procedure: Right Heart Cath;  Surgeon: Marco Lay MD;  Location:  [facility] CATH INVASIVE LOCATION;  Service:     CARDIAC CATHETERIZATION N/A 10/11/2024    Procedure: Right and Left Heart Cath;  Surgeon: Jeffry Pickett MD;  Location:  [facility] CATH INVASIVE LOCATION;  Service: Cardiology;  Laterality: N/A;    CARDIAC ELECTROPHYSIOLOGY PROCEDURE N/A 6/1/2022    Procedure: Ablation atrial fibrillation (atypical A flutter). Hold Rythmol x5 days;  Surgeon: Nigel Huber MD;  Location:  [facility] EP INVASIVE LOCATION;  Service: Cardiovascular;  Laterality: N/A;    FEMORAL THROMBECTOMY/EMBOLECTOMY Right 11/12/2024    Procedure: GROIN EXPLORATION, LIGATION OF INFERIOR EPIGASTRIC ARTERY RIGHT;  Surgeon: Ajay Cheatham MD;  Location:  [facility];  Service: Cardiothoracic;  Laterality: Right;  FL: 6 SECONDS  DOSE: 191MGY  CONTRAST: 30ML VISIPAQUE    LUNG BIOPSY  04/2017    LYMPH NODE DISSECTION Left 05/12/2017    Procedure: MEDIASTINAL LYMPH NODE DISSECTION;  Surgeon: Ajay Cheatham MD;  Location:  [facility] OR;  Service:     MAZE PROCEDURE      MITRAL VALVE REPAIR/REPLACEMENT N/A 07/16/2018    Procedure: MITRAL VALVE REPLACEMENT;  Surgeon: Ajay Cheatham MD;  Location:  [facility] OR;  Service: Cardiothoracic    MITRAL VALVE REPLACEMENT  07/16/2018    TEETH EXTRACTION      THORACOSCOPY VIDEO ASSISTED WITH LOBECTOMY Left 05/12/2017    Procedure: THORACOSCOPY VIDEO ASSISTED WITH OPEN LOBECTOMY;  Surgeon: Ajay Cheatham MD;  Location:  [facility];  Service:     TOTAL THYROIDECTOMY  approx 2012    TRANSESOPHAGEAL ECHOCARDIOGRAM (TIFFANIE) N/A 11/11/2024    Procedure: TRANSESOPHAGEAL ECHOCARDIOGRAM WITH ANESTHESIA;  Surgeon: Ajay Cheatham MD;  Location:  [facility];  Service: Cardiothoracic;  Laterality: N/A;    TRICUSPID VALVE SURGERY  07/16/2018       Social History     Socioeconomic History    Marital status:     Number of children: 3   Tobacco Use     "Smoking status: Some Days     Current packs/day: 0.00     Types: Cigarettes     Start date: 1978     Last attempt to quit: 2023     Years since quittin.6     Passive exposure: Current    Smokeless tobacco: Never    Tobacco comments:     Pt states that she is smoking 1 or 2  a day trying her best to quit   Vaping Use    Vaping status: Never Used   Substance and Sexual Activity    Alcohol use: No    Drug use: Not Currently     Types: Marijuana    Sexual activity: Defer        Family History   Problem Relation Age of Onset    Breast cancer Mother     Diabetes Father     Heart disease Son        Objective     Physical Exam:  Vitals:    24 1432 24 1433   BP: 120/74 128/68   BP Location: Left arm Right arm   Pulse: 56    Temp: 98.4 °F (36.9 °C)    SpO2: 95%    Weight: 71 kg (156 lb 9.6 oz)    Height: 157.5 cm (62\")       Body mass index is 28.64 kg/m².    Physical Exam  Vitals reviewed.   HENT:      Head: Normocephalic.   Cardiovascular:      Rate and Rhythm: Normal rate.      Pulses: Normal pulses.   Pulmonary:      Effort: Pulmonary effort is normal.   Abdominal:      General: Abdomen is flat.   Skin:     Comments: See images below; small area (0.5 cm) of tunneling noted to medical aspect of incision    Neurological:      Mental Status: She is alert.         Imaging/Labs:    XR Chest 2 View    Result Date: 2024  Impression: Small left-sided pleural effusion, mildly improved as compared to the previous study. Electronically Signed: Betty Cao MD  2024 12:50 PM EST  Workstation ID: [id number]      Results for orders placed during the hospital encounter of 24    Duplex Pseudoaneurysm CAR    Interpretation Summary    No evidence of pseudoaneurysm or AV fistula in the right groin.    Anechoic area visualized lateral to CFA measuring 1.1 x 1.3 cm. No flow detected within, a neck was not visualized.      Assessment / Plan    Jill Miller is a 64 y.o. female with a past medical " history of ALBINA on 5/12/2017 for [id number] stage Ia squamous cell carcinoma, valvular heart disease s/p MVR (27 mm Magna Ease tissue valve) and AVR (21 mm Magna Ease tissue valve), and tricuspid valve repair (number 32 mm MC3 annuloplasty ring) and maze/LAAL on 7/16/2018 with Dr. Cheatham, hypertension, hyperlipidemia, and type 2 diabetes.  She underwent recent TAVR with Dr. Cheatham and Dr. Valverde on 11/11/2024 with number 23 mm NIKKI 3 valve due to bioprosthetic aortic valve stenosis.  Her postoperative course was complicated by a pelvic hematoma and hemorrhagic shock requiring return to the OR for right groin exploration and ligation of the inferior epigastric artery. She was seen for an early postoperative follow-up by GUCCI Lilly on 12/11/24 for right groin dehiscence.  She was prescribed oral doxycycline and was instructed to wash her wound daily with antibacterial soap and dampen 4x4 gauze with 1/4 Dakins solution and cover with dry dressing. She presents in office today for wound check. She denies any fevers, chills, malaise, or purulent drainage. Has been complaint with wound care regimen and 90 degree angles.     Assessment / Plan:    1. Delayed wound healing (Primary)  Prescribed sulfamethoxazole-trimethoprim (Bactrim DS) 800-160 MG per tablet; Take 1 tablet by mouth 2 (Two) Times a Day.  Dispense: 20 tablet; Refill: 0  Instructed to continue current wound care regimen with 1/4 Dakins solution change BID and cover with dry dressing  Do NOT sit at 90 degrees   F/u in 1-2 weeks in office for repeat wound check    2. S/P TAVR (transcatheter aortic valve replacement)  TAVR on 11/11/24 with # 23 NIKKI 3 valve with Dr. Cheatham  Postoperative course complicated by pelvic hematoma and hemorrhagic shock requiring transfer to the OR for right groin exploration and ligation of the inferior epigastric artery      Patient Education:  Continue to wash daily with antibacterial soap/water/ pat dry/ apply 1/4 strength Dakins  soln on gauze pad then pat dry again with clean gauze pad  SBE Education/Valve Education: Symptoms of acute IE usually begin with fever (102°-104°), chills, fast heart rate, fatigue, night sweats, aching joints and muscles, persistent cough, or swelling in the feet, legs or abdomen. You can reduce the risk of IE by maintaining good oral health through regular professional dental care and the use of dental products such as manual, powered and ultrasonic toothbrushes; dental floss; and other plaque-removal devices. Inform your dentist, family doctor (PCP) or other health care professional prior to any surgeries and/or procedures that you have had heart valve surgery.  Prophylactic antibiotics are always recommended prior to any surgical procedures following heart valve replacement. AHA wallet card was given to patient.    Follow Up:   1-2 weeks for incision check    Or sooner for any further concerns or worsening sign and symptoms. If unable to reach us in the office please dial 911 or go to the nearest emergency department.      Kiana Smith APRKAYLEE  [facility] Cardiothoracic Surgery    Time Spent: I spent 28 minutes caring for Jill on this date of service. This time includes time spent by me in the following activities: preparing for the visit, reviewing tests, obtaining and/or reviewing a separately obtained history, performing a medically appropriate examination and/or evaluation, counseling and educating the patient/family/caregiver, and ordering medications, tests, or procedures.

## 2024-12-20 ENCOUNTER — TELEPHONE (OUTPATIENT)
Dept: CARDIOLOGY | Facility: CLINIC | Age: 64
End: 2024-12-20
Payer: COMMERCIAL

## 2024-12-27 ENCOUNTER — HOSPITAL ENCOUNTER (OUTPATIENT)
Dept: CARDIOLOGY | Facility: HOSPITAL | Age: 64
Discharge: HOME | End: 2024-12-27
Payer: COMMERCIAL

## 2024-12-27 VITALS — BODY MASS INDEX: 28.8 KG/M2 | HEIGHT: 62 IN | WEIGHT: 156.53 LBS

## 2024-12-27 DIAGNOSIS — T82.857A STENOSIS OF PROSTHETIC AORTIC VALVE, INITIAL ENCOUNTER: ICD-10-CM

## 2024-12-27 DIAGNOSIS — Z95.3 S/P TAVR (TRANSCATHETER AORTIC VALVE REPLACEMENT), BIOPROSTHETIC: ICD-10-CM

## 2024-12-27 LAB
AV MEAN PRESS GRAD SYS DOP V1V2: 18 MMHG
AV VMAX SYS DOP: 322.2 CM/SEC
BH CV ECHO MEAS - AO MAX PG: 41.7 MMHG
BH CV ECHO MEAS - AO ROOT DIAM: 2 CM
BH CV ECHO MEAS - AO V2 VTI: 71.4 CM
BH CV ECHO MEAS - AVA(I,D): 1.65 CM2
BH CV ECHO MEAS - EDV(CUBED): 59.3 ML
BH CV ECHO MEAS - EDV(MOD-SP2): 41.6 ML
BH CV ECHO MEAS - EDV(MOD-SP4): 81.8 ML
BH CV ECHO MEAS - EF(MOD-SP2): 56.7 %
BH CV ECHO MEAS - EF(MOD-SP4): 66.9 %
BH CV ECHO MEAS - ESV(CUBED): 15.6 ML
BH CV ECHO MEAS - ESV(MOD-SP2): 18 ML
BH CV ECHO MEAS - ESV(MOD-SP4): 27.1 ML
BH CV ECHO MEAS - FS: 35.9 %
BH CV ECHO MEAS - IVS/LVPW: 1 CM
BH CV ECHO MEAS - IVSD: 1.1 CM
BH CV ECHO MEAS - LA DIMENSION: 4.1 CM
BH CV ECHO MEAS - LAT PEAK E' VEL: 7 CM/SEC
BH CV ECHO MEAS - LV DIASTOLIC VOL/BSA (35-75): 47.6 CM2
BH CV ECHO MEAS - LV MASS(C)D: 140.1 GRAMS
BH CV ECHO MEAS - LV MAX PG: 11.2 MMHG
BH CV ECHO MEAS - LV MEAN PG: 6 MMHG
BH CV ECHO MEAS - LV SYSTOLIC VOL/BSA (12-30): 15.8 CM2
BH CV ECHO MEAS - LV V1 MAX: 167 CM/SEC
BH CV ECHO MEAS - LV V1 VTI: 37.6 CM
BH CV ECHO MEAS - LVIDD: 3.9 CM
BH CV ECHO MEAS - LVIDS: 2.5 CM
BH CV ECHO MEAS - LVOT AREA: 3.1 CM2
BH CV ECHO MEAS - LVOT DIAM: 2 CM
BH CV ECHO MEAS - LVPWD: 1.1 CM
BH CV ECHO MEAS - MED PEAK E' VEL: 5 CM/SEC
BH CV ECHO MEAS - MV DEC SLOPE: 494.5 CM/SEC2
BH CV ECHO MEAS - MV DEC TIME: 0.58 SEC
BH CV ECHO MEAS - MV E MAX VEL: 238 CM/SEC
BH CV ECHO MEAS - MV MAX PG: 33.4 MMHG
BH CV ECHO MEAS - MV MEAN PG: 11 MMHG
BH CV ECHO MEAS - MV P1/2T: 179.5 MSEC
BH CV ECHO MEAS - MV V2 VTI: 94.2 CM
BH CV ECHO MEAS - MVA(P1/2T): 1.23 CM2
BH CV ECHO MEAS - MVA(VTI): 1.25 CM2
BH CV ECHO MEAS - PA ACC TIME: 0.1 SEC
BH CV ECHO MEAS - PI END-D VEL: 125 CM/SEC
BH CV ECHO MEAS - RAP SYSTOLE: 3 MMHG
BH CV ECHO MEAS - RVSP: 58 MMHG
BH CV ECHO MEAS - SV(LVOT): 118 ML
BH CV ECHO MEAS - SV(MOD-SP2): 23.6 ML
BH CV ECHO MEAS - SV(MOD-SP4): 54.7 ML
BH CV ECHO MEAS - SVI(LVOT): 68.6 ML/M2
BH CV ECHO MEAS - SVI(MOD-SP2): 13.7 ML/M2
BH CV ECHO MEAS - SVI(MOD-SP4): 31.8 ML/M2
BH CV ECHO MEAS - TAPSE (>1.6): 1.41 CM
BH CV ECHO MEAS - TR MAX PG: 54.5 MMHG
BH CV ECHO MEAS - TR MAX VEL: 368 CM/SEC
BH CV ECHO MEASUREMENTS AVERAGE E/E' RATIO: 39.67
BH CV XLRA - RV BASE: 4.7 CM
BH CV XLRA - RV LENGTH: 7.9 CM
BH CV XLRA - RV MID: 3.6 CM
BH CV XLRA - TDI S': 9.9 CM/SEC
LEFT ATRIUM VOLUME INDEX: 44.1 ML/M2
LV EF BIPLANE MOD: 63.8 %
TV MEAN GRADIENT: 3 MMHG
TV PEAK GRADIENT: 7 MMHG
TV VTI: 36 CM

## 2024-12-27 PROCEDURE — 93306 TTE W/DOPPLER COMPLETE: CPT

## 2024-12-30 ENCOUNTER — TELEPHONE (OUTPATIENT)
Dept: CARDIOLOGY | Facility: CLINIC | Age: 64
End: 2024-12-30
Payer: COMMERCIAL

## 2024-12-30 NOTE — TELEPHONE ENCOUNTER
Spoke to pt and informed her what Dr. Pickett said about results. She verbalized understanding and it will be discussed at 1/9/25 appt.

## 2024-12-30 NOTE — TELEPHONE ENCOUNTER
----- Message from Jeffry Pickett sent at 12/27/2024  5:38 PM EST -----  Similar to prior, depending on symptoms will decide on intervention to mitral

## 2025-01-01 NOTE — PROGRESS NOTES
[facility] Cardiothoracic Surgery Office Follow Up Note     Date of Encounter: 2025     Name: Jill Miller  : 1960     Referred By: No ref. provider found  PCP: Marisa Lynch PA    Chief Complaint:    Chief Complaint   Patient presents with    Wound Check     2 week follow up for groin incision. Pt states that she is still having pain in her incision TAVR 24. Pt states that she is also having pain in both of her feet as well since D/c from Sx. Denies any SOB or fatigue.        Subjective      History of Present Illness:    Jill Miller is a 64 y.o. female with a past medical history of ALBINA on 2017 for [id number] stage Ia squamous cell carcinoma, valvular heart disease s/p MVR (27 mm Magna Ease tissue valve) and AVR (21 mm Magna Ease tissue valve), and tricuspid valve repair (number 32 mm MC3 annuloplasty ring) and maze/LAAL on 2018 with Dr. Cheatham, hypertension, hyperlipidemia, and type 2 diabetes.  She underwent recent TAVR with Dr. Cheatham and Dr. Valverde on 2024 with number 23 mm NIKKI 3 valve due to bioprosthetic aortic valve stenosis.  Her postoperative course was complicated by a pelvic hematoma and hemorrhagic shock requiring return to the OR for right groin exploration and ligation of the inferior epigastric artery. She was seen for an early postoperative follow-up 24 for right groin dehiscence and prescribed oral doxycycline/ instructed to wash her wound daily with antibacterial soap and dampen 4x4 gauze with 1/4 Dakins solution and cover with dry dressing. Last seen in clinic 24 w/ continued poor incision healing.  Antibiotic Rx changed to Bactrim.  Patient instructed to increase daily wound care to BID with continued application Dakin's solution.  She presents for follow up incision check today.  Completed Bactrim yesterday.      Review of Systems:  Review of Systems   Constitutional: Negative for chills, fever, malaise/fatigue, night  sweats and weight loss.   HENT:  Positive for congestion (sinus/ allergies). Negative for hearing loss, nosebleeds and odynophagia.    Cardiovascular:  Negative for chest pain, claudication, dyspnea on exertion, leg swelling, orthopnea, palpitations and syncope.   Respiratory:  Negative for cough, hemoptysis, shortness of breath and wheezing.    Endocrine: Negative for cold intolerance, heat intolerance, polydipsia, polyphagia and polyuria.   Hematologic/Lymphatic: Bruises/bleeds easily.   Skin:  Positive for poor wound healing (slower to heal). Negative for itching and rash.   Musculoskeletal:  Positive for joint pain (bilateral feet both heels of the feet and top of the feet). Negative for arthritis, back pain, joint swelling and myalgias.   Gastrointestinal:  Negative for abdominal pain, constipation, diarrhea, hematemesis, melena, nausea and vomiting.   Genitourinary:  Negative for dysuria, frequency, hematuria, nocturia and urgency.   Neurological:  Negative for dizziness, light-headedness, loss of balance and numbness.   Psychiatric/Behavioral:  Negative for depression and suicidal ideas. The patient is not nervous/anxious.    Allergic/Immunologic: Positive for environmental allergies. Negative for HIV exposure.       I have reviewed the following portions of the patient's history: problem list, current medications, allergies, past surgical history, past medical history, past social history, past family history, and ROS and confirm it's accurate.    Allergies:  Allergies   Allergen Reactions    Lortab [Hydrocodone-Acetaminophen] Nausea And Vomiting, Dizziness and Headache    Morphine And Codeine Nausea Only, GI Intolerance and Headache       Medications:      Current Outpatient Medications:     albuterol sulfate  (90 Base) MCG/ACT inhaler, Inhale 2 puffs Every 4 (Four) Hours As Needed for Wheezing., Disp: , Rfl:     amiodarone (PACERONE) 200 MG tablet, Take 1 tablet by mouth 2 (Two) Times a Day With  Meals for 14 days, THEN 1 tablet Daily for 30 days. (Patient taking differently: Take 1 tablet by mouth 2 (Two) Times a Day With Meals for 14 days, THEN 1 tablet Daily for 30 days.), Disp: 58 tablet, Rfl: 2    apixaban (ELIQUIS) 5 MG tablet tablet, Take 1 tablet by mouth Every 12 (Twelve) Hours. Indications: Atrial Fibrillation, Disp: 60 tablet, Rfl: 11    aspirin 81 MG EC tablet, Take 1 tablet by mouth Daily., Disp: , Rfl:     Cholecalciferol 10 MCG (400 UNIT) capsule, Take 5,000 Units by mouth., Disp: , Rfl:     furosemide (Lasix) 40 MG tablet, Take 1.5 tablets by mouth 2 (Two) Times a Day. (Patient taking differently: Take 1.5 tablets by mouth 2 (Two) Times a Day. 1 time daily), Disp: 60 tablet, Rfl: 11    ibuprofen (ADVIL,MOTRIN) 800 MG tablet, 1 tablet As Needed., Disp: , Rfl:     Jardiance 10 MG tablet tablet, TAKE 1 TABLET DAILY, Disp: 90 tablet, Rfl: 3    levothyroxine (SYNTHROID, LEVOTHROID) 200 MCG tablet, Take 1 tablet by mouth Every Morning., Disp: , Rfl:     metoprolol tartrate (LOPRESSOR) 25 MG tablet, TAKE 1 TABLET EVERY 12 HOURS, Disp: 180 tablet, Rfl: 3    multivitamin (THERAGRAN) tablet tablet, Take 1 tablet by mouth Daily., Disp: , Rfl:     nystatin (MYCOSTATIN) 504713 UNIT/GM powder, Apply 1 Application topically to the appropriate area as directed 2 (Two) Times a Day., Disp: , Rfl:     potassium chloride (MICRO-K) 10 MEQ CR capsule, Take 1 capsule by mouth 2 (Two) Times a Day., Disp: 60 capsule, Rfl: 11    pravastatin (PRAVACHOL) 20 MG tablet, Take 1 tablet by mouth Every Night., Disp: , Rfl:     History:   Past Medical History:   Diagnosis Date    Acute pulmonary embolism without acute cor pulmonale 11/14/2024    Atrial fibrillation and flutter     Chronic diastolic congestive heart failure     Chronically Abnormal CXR (Left pleural disease post op) 09/25/2017    COPD exacerbation 04/17/2022    Elevated cholesterol     Essential hypertension 04/10/2017    Target blood pressure <130/80 mmHg     Graves disease     Hyperlipidemia LDL goal <70     Hypertension     Hyperthyroidism     Lung cancer     MRSA (methicillin resistant staph aureus) culture positive 2014    under left breast, incision and debridement, treated with wound packing and po abx     Prolonged QTC interval on ECG 06/01/2022    Rheumatic mitral stenosis     Type 2 diabetes mellitus 07/17/2018    Valvular heart disease        Past Surgical History:   Procedure Laterality Date    ABLATION OF DYSRHYTHMIC FOCUS  07/16/2018    ABSCESS DRAINAGE      under left breast d/t mrsa     AORTIC VALVE REPAIR/REPLACEMENT N/A 07/16/2018    Procedure: MEDIAN STERNOTOMY, AORTIC VALVE REPLACEMENT WITH TIFFANIE AND MAZE, TRICUSPID RING, LEFT ATRIAL OCCLUSION;  Surgeon: Ajay Cheatham MD;  Location:  [facility];  Service: Cardiothoracic    AORTIC VALVE REPAIR/REPLACEMENT N/A 11/11/2024    Procedure: TRANSCATHETER AORTIC VALVE REPLACEMENT;  Surgeon: Ajay Cheatham MD;  Location:  [facility];  Service: Cardiothoracic;  Laterality: N/A;  FL- 6 min 36 sec  DOSE-  110 MGY  CONTRAST- 40 mL    AORTIC VALVE REPAIR/REPLACEMENT N/A 11/11/2024    Procedure: Transfemoral Transcatheter Aortic Valve Replacement;  Surgeon: Jeaneth Carreon MD;  Location:  [facility];  Service: Cardiovascular;  Laterality: N/A;    BRONCHOSCOPY Left 05/12/2017    Procedure: BRONCHOSCOPY;  Surgeon: Ajay Cheatham MD;  Location:  [facility];  Service:     BRONCHOSCOPY N/A 05/18/2017    Procedure: BRONCHOSCOPY BIOPSY AT BEDSIDE;  Surgeon: Ajay Cheatham MD;  Location:  [facility];  Service:     CARDIAC CATHETERIZATION N/A 09/28/2017    Procedure: Left Heart Cath;  Surgeon: Marco Lay MD;  Location:  [facility];  Service:     CARDIAC CATHETERIZATION N/A 09/28/2017    Procedure: Right Heart Cath;  Surgeon: Marco Lay MD;  Location:  [facility];  Service:     CARDIAC CATHETERIZATION N/A 10/11/2024    Procedure: Right and Left Heart  Cath;  Surgeon: Jeffry Pickett MD;  Location:  [facility];  Service: Cardiology;  Laterality: N/A;    CARDIAC ELECTROPHYSIOLOGY PROCEDURE N/A 2022    Procedure: Ablation atrial fibrillation (atypical A flutter). Hold Rythmol x5 days;  Surgeon: Nigel Huber MD;  Location:  [facility];  Service: Cardiovascular;  Laterality: N/A;    FEMORAL THROMBECTOMY/EMBOLECTOMY Right 2024    Procedure: GROIN EXPLORATION, LIGATION OF INFERIOR EPIGASTRIC ARTERY RIGHT;  Surgeon: Ajay Cheatham MD;  Location:  [facility];  Service: Cardiothoracic;  Laterality: Right;  FL: 6 SECONDS  DOSE: 191MGY  CONTRAST: 30ML VISIPAQUE    LUNG BIOPSY  2017    LYMPH NODE DISSECTION Left 2017    Procedure: MEDIASTINAL LYMPH NODE DISSECTION;  Surgeon: Ajay Cheatham MD;  Location:  [facility];  Service:     MAZE PROCEDURE      MITRAL VALVE REPAIR/REPLACEMENT N/A 2018    Procedure: MITRAL VALVE REPLACEMENT;  Surgeon: Ajay Cheatham MD;  Location:  [facility];  Service: Cardiothoracic    MITRAL VALVE REPLACEMENT  2018    TEETH EXTRACTION      THORACOSCOPY VIDEO ASSISTED WITH LOBECTOMY Left 2017    Procedure: THORACOSCOPY VIDEO ASSISTED WITH OPEN LOBECTOMY;  Surgeon: Ajay Cheatham MD;  Location:  [facility];  Service:     TOTAL THYROIDECTOMY  2012    TRANSESOPHAGEAL ECHOCARDIOGRAM (TIFFANIE) N/A 2024    Procedure: TRANSESOPHAGEAL ECHOCARDIOGRAM WITH ANESTHESIA;  Surgeon: Ajay Cheatham MD;  Location:  [facility];  Service: Cardiothoracic;  Laterality: N/A;    TRICUSPID VALVE SURGERY  2018       Social History     Socioeconomic History    Marital status:     Number of children: 3   Tobacco Use    Smoking status: Some Days     Current packs/day: 0.00     Types: Cigarettes     Start date: 1978     Last attempt to quit: 2023     Years since quittin.6     Passive exposure: Current    Smokeless tobacco: Never    Tobacco comments:     Pt  "states that she is smoking 1 or 2  a day trying her best to quit- 12/19/24   Vaping Use    Vaping status: Never Used   Substance and Sexual Activity    Alcohol use: No    Drug use: Not Currently     Types: Marijuana    Sexual activity: Defer        Family History   Problem Relation Age of Onset    Breast cancer Mother     Diabetes Father     Heart disease Son        Objective   Physical Exam:  Vitals:    01/02/25 1438   BP: 114/66   BP Location: Left arm   Pulse: 66   Temp: 98.6 °F (37 °C)   SpO2: 94%   Weight: 69.9 kg (154 lb)   Height: 157.5 cm (62\")  Comment: per pt      Body mass index is 28.17 kg/m².    Physical Exam  Vitals reviewed.   Constitutional:       General: She is not in acute distress.  HENT:      Head: Normocephalic and atraumatic.   Eyes:      General: Lids are normal.      Conjunctiva/sclera: Conjunctivae normal.      Pupils: Pupils are equal, round, and reactive to light.   Cardiovascular:      Rate and Rhythm: Normal rate and regular rhythm.      Pulses:           Femoral pulses are 2+ on the right side and 2+ on the left side.     Heart sounds: S1 normal and S2 normal. No murmur heard.     Comments: Right femoral cutdown dehiscence limited to proximal portion.  No tunneling appreciated.  No exudate or foul odor.  No induration or fluctuance.  When incision gently cleaned w/ saline dampened 4x4: proximal fibrinous slough removed to reveal very shallow 0.25 cm wound bed with excellent granulation.    Pulmonary:      Effort: Pulmonary effort is normal. No respiratory distress.      Breath sounds: Normal breath sounds.   Musculoskeletal:         General: Normal range of motion.      Cervical back: Normal range of motion and neck supple.      Right lower leg: No edema.      Left lower leg: No edema.   Skin:     General: Skin is warm and dry.      Capillary Refill: Capillary refill takes less than 2 seconds.   Neurological:      General: No focal deficit present.      Mental Status: She is alert and " oriented to person, place, and time.   Psychiatric:         Attention and Perception: Attention normal.         Mood and Affect: Mood normal.         Speech: Speech normal.         Behavior: Behavior is cooperative.         Imaging/Labs: NA      Assessment / Plan      Assessment / Plan:  1. Stenosis of prosthetic aortic valve, subsequent encounter    - 64 y.o. female with a past medical history of ALBINA on 5/12/2017 for [id number] stage Ia squamous cell carcinoma, valvular heart disease s/p MVR (27 mm Magna Ease tissue valve) and AVR (21 mm Magna Ease tissue valve), and tricuspid valve repair (number 32 mm MC3 annuloplasty ring) and maze/LAAL on 7/16/2018 with Dr. Cheatham, hypertension, hyperlipidemia, and type 2 diabetes.  She underwent recent TAVR with Dr. Cheatham and Dr. Valverde on 11/11/2024 with 23 mm NIKKI 3 valve due to bioprosthetic aortic valve stenosis.    - Postoperative course was complicated by a pelvic hematoma and hemorrhagic shock requiring return to the OR for right groin exploration and ligation of the inferior epigastric artery.   - Seen for an early postoperative follow-up 12/11/24: right groin incision dehiscence, prescribed oral doxycycline/ instructed to wash her wound daily with antibacterial soap and dampen 4x4 gauze with 1/4 Dakins solution and cover with dry dressing.   - Seen in clinic 12/24/24 w/ continued poor incision healing.  Antibiotic Rx changed to Bactrim.  Patient instructed to increase daily wound care to BID with continued application Dakin's solution.   - Follow up 1/2/25:  no extension of incision dehiscence, tunneling resolved, very shallow 0.25 cm oval shallow wound bed @ proximal incision  - No further ABX  - DC Dakins soln  - Continue to wash daily with antibacterial soap/water, apply betadine swab and pat dry.  Leave open to air  - Return to CT/ Vascular Surgery PRN per Cardiology request  - Continue medical therapy with ASA, BB, statin  - See Cardiology as scheduled 1/9/25        Follow Up:   Return PRN per Cardiology request.   Or sooner for any further concerns or worsening sign and symptoms. If unable to reach us in the office please dial 911 or go to the nearest emergency department.      GUCCI Carrion  [facility] Cardiothoracic Surgery    Time Spent: I spent 20 minutes caring for Jill on this date of service. This time includes time spent by me in the following activities: preparing for the visit, obtaining and/or reviewing a separately obtained history, performing a medically appropriate examination and/or evaluation, documenting information in the medical record, and care coordination.

## 2025-01-02 ENCOUNTER — OFFICE VISIT (OUTPATIENT)
Dept: CARDIAC SURGERY | Facility: CLINIC | Age: 65
End: 2025-01-02
Payer: COMMERCIAL

## 2025-01-02 VITALS
HEART RATE: 66 BPM | WEIGHT: 154 LBS | SYSTOLIC BLOOD PRESSURE: 114 MMHG | TEMPERATURE: 98.6 F | BODY MASS INDEX: 28.34 KG/M2 | HEIGHT: 62 IN | DIASTOLIC BLOOD PRESSURE: 66 MMHG | OXYGEN SATURATION: 94 %

## 2025-01-02 DIAGNOSIS — T82.857D STENOSIS OF PROSTHETIC AORTIC VALVE, SUBSEQUENT ENCOUNTER: Primary | ICD-10-CM

## 2025-01-08 ENCOUNTER — TELEPHONE (OUTPATIENT)
Dept: CARDIOLOGY | Facility: HOSPITAL | Age: 65
End: 2025-01-08
Payer: COMMERCIAL

## 2025-01-09 ENCOUNTER — OFFICE VISIT (OUTPATIENT)
Dept: CARDIOLOGY | Facility: CLINIC | Age: 65
End: 2025-01-09
Payer: COMMERCIAL

## 2025-01-09 VITALS
WEIGHT: 158 LBS | BODY MASS INDEX: 29.08 KG/M2 | HEIGHT: 62 IN | HEART RATE: 56 BPM | DIASTOLIC BLOOD PRESSURE: 70 MMHG | OXYGEN SATURATION: 94 % | SYSTOLIC BLOOD PRESSURE: 102 MMHG

## 2025-01-09 DIAGNOSIS — I48.0 PAROXYSMAL ATRIAL FIBRILLATION: ICD-10-CM

## 2025-01-09 DIAGNOSIS — I10 ESSENTIAL HYPERTENSION: Primary | Chronic | ICD-10-CM

## 2025-01-09 DIAGNOSIS — E78.5 HYPERLIPIDEMIA LDL GOAL <70: Chronic | ICD-10-CM

## 2025-01-09 DIAGNOSIS — I27.20 PULMONARY HYPERTENSION: Chronic | ICD-10-CM

## 2025-01-09 NOTE — PROGRESS NOTES
[facility] Cardiology  Office Note  Jill Byrneoe  1960  [address]   [address]  [place] [id number]     VISIT DATE:  01/09/25    PCP: Marisa Lynch PA  [address]  [place] [address]    CC: Dyspnea  Chief Complaint   Patient presents with    Hypertension    MVS       PROBLEM LIST:    Valve in valve TAVR with a 23 mm NIKKI 3 valve with appropriate function on echocardiogram January 2025  Unremarkable left and right heart catheterization October 2024  Aortic/mitral valve replacement 2017 with tricuspid valve repair  Degenerated surgical mitral valve January 2025 echocardiogram    Allergies  Allergies   Allergen Reactions    Lortab [Hydrocodone-Acetaminophen] Nausea And Vomiting, Dizziness and Headache    Morphine And Codeine Nausea Only, GI Intolerance and Headache       Current Medications    Current Outpatient Medications:     albuterol sulfate  (90 Base) MCG/ACT inhaler, Inhale 2 puffs Every 4 (Four) Hours As Needed for Wheezing., Disp: , Rfl:     aspirin 81 MG EC tablet, Take 1 tablet by mouth Daily., Disp: , Rfl:     Cholecalciferol 10 MCG (400 UNIT) capsule, Take 5,000 Units by mouth., Disp: , Rfl:     furosemide (Lasix) 40 MG tablet, Take 1.5 tablets by mouth 2 (Two) Times a Day. (Patient taking differently: Take 1.5 tablets by mouth 2 (Two) Times a Day. 1 time daily), Disp: 60 tablet, Rfl: 11    ibuprofen (ADVIL,MOTRIN) 800 MG tablet, 1 tablet As Needed., Disp: , Rfl:     Jardiance 10 MG tablet tablet, TAKE 1 TABLET DAILY, Disp: 90 tablet, Rfl: 3    levothyroxine (SYNTHROID, LEVOTHROID) 200 MCG tablet, Take 1 tablet by mouth Every Morning., Disp: , Rfl:     metoprolol tartrate (LOPRESSOR) 25 MG tablet, TAKE 1 TABLET EVERY 12 HOURS, Disp: 180 tablet, Rfl: 3    multivitamin (THERAGRAN) tablet tablet, Take 1 tablet by mouth Daily., Disp: , Rfl:     nystatin (MYCOSTATIN) 655813 UNIT/GM powder, Apply 1 Application topically to the appropriate area as  directed 2 (Two) Times a Day., Disp: , Rfl:     potassium chloride (MICRO-K) 10 MEQ CR capsule, Take 1 capsule by mouth 2 (Two) Times a Day., Disp: 60 capsule, Rfl: 11    pravastatin (PRAVACHOL) 20 MG tablet, Take 1 tablet by mouth Every Night., Disp: , Rfl:     rivaroxaban (XARELTO) 20 MG tablet, Take 1 tablet by mouth Daily., Disp: 90 tablet, Rfl: 3     History of Present Illness   Jill Miller is a 64 y.o. year old female who presents for consult from No ref. provider found for evaluation of dyspnea.  Patient states still feeling better.  Remains not very active as did not go back to work as her brother unfortunately passed away.  Patient denies any chest pain pressure discomfort.  No real shortness of breath.  Still healing from surgical cutdown of first procedure.  Blood pressure heart rate have been controlled.  Patient denies any palpitations.  Blood pressure has been controlled.  Still smoking unfortunately.  Otherwise patient without complaints or concerns at this time.  States since being on Eliquis has had achiness in her legs.  Sensitive to touch.    Pt denies any chest pain, dyspnea at rest, dyspnea on exertion, orthopnea, PND, palpitations, lower extremity edema, or claudication. Pt denies history of CHF, DVT, PE, MI, CVA, TIA, or rheumatic fever.     SOCIAL HX  Social History     Socioeconomic History    Marital status:     Number of children: 3   Tobacco Use    Smoking status: Former     Current packs/day: 0.00     Types: Cigarettes     Start date: 1978     Quit date: 2023     Years since quittin.6     Passive exposure: Current    Smokeless tobacco: Never    Tobacco comments:     Couldn't remember exact dates.   Vaping Use    Vaping status: Never Used   Substance and Sexual Activity    Alcohol use: No    Drug use: Never     Types: Marijuana    Sexual activity: Yes     Partners: Male     Birth control/protection: Tubal ligation       FAMILY HX  Family History   Problem  "Relation Age of Onset    Breast cancer Mother     Diabetes Father     Heart disease Son        Vitals:    01/09/25 1123   BP: 102/70   BP Location: Left arm   Patient Position: Sitting   Cuff Size: Adult   Pulse: 56   SpO2: 94%   Weight: 71.7 kg (158 lb)   Height: 157.5 cm (62.01\")     Body mass index is 28.89 kg/m².     PHYSICAL EXAMINATION:  Vitals and nursing note reviewed.   Constitutional:       Appearance: Healthy appearance. Not in distress.   Eyes:      Conjunctiva/sclera: Conjunctivae normal.      Pupils: Pupils are equal, round, and reactive to light.   HENT:      Nose: Nose normal.    Mouth/Throat:      Pharynx: Oropharynx is clear.   Neck:      Vascular: JVD normal.   Pulmonary:      Effort: Pulmonary effort is normal.      Breath sounds: Normal breath sounds. No wheezing. No rhonchi. No rales.   Cardiovascular:      PMI at left midclavicular line. Normal rate. Regular rhythm. Normal S1. Normal S2.       Murmurs: There is a grade 3/6 systolic murmur.      No gallop.  No click. No rub.   Pulses:     Intact distal pulses.   Abdominal:      General: Bowel sounds are normal.      Palpations: Abdomen is soft.      Tenderness: There is no abdominal tenderness.   Musculoskeletal: Normal range of motion.         General: No tenderness.      Cervical back: Normal range of motion. Skin:     General: Skin is warm and dry.   Neurological:      General: No focal deficit present.      Mental Status: Alert and oriented to person, place and time.      Cranial Nerves: Cranial nerves 2-12 are intact.         Diagnostic Data:  Lab Results   Component Value Date    TRIG 135 10/11/2024    HDL 44 10/11/2024     Lab Results   Component Value Date    GLUCOSE 136 (H) 11/20/2024    BUN 16 11/20/2024    CREATININE 0.43 (L) 11/20/2024     11/20/2024    K 3.5 11/20/2024    CL 95 (L) 11/20/2024    CO2 36.0 (H) 11/20/2024     Lab Results   Component Value Date    HGBA1C 6.90 (H) 11/11/2024     Lab Results   Component Value Date "    WBC 7.59 11/19/2024    HGB 8.9 (L) 11/19/2024    HCT 27.3 (L) 11/19/2024     (L) 11/19/2024       Procedures    Advance Care Planning   ACP discussion was declined by the patient. Patient does not have an advance directive, declines further assistance.         ASSESSMENT:  Diagnoses and all orders for this visit:    1. Essential hypertension (Primary)    2. Hyperlipidemia LDL goal <70    3. Pulmonary hypertension    4. Paroxysmal atrial fibrillation    Other orders  -     rivaroxaban (XARELTO) 20 MG tablet; Take 1 tablet by mouth Daily.  Dispense: 90 tablet; Refill: 3        PLAN:    Will see patient back in 3 months as would like to increase activity to see if symptomatic from mitral valve disease discussed with her here again today  3 to 5 minutes spent smoking cessation discussion  Change from Eliquis to Xarelto given issues with such however not typical with this medication  Ultimately followed back up with Dr. Lay once valvular heart disease is further addressed    Return in about 3 months (around 4/9/2025).     Jeffry Pickett MD

## 2025-01-15 RX ORDER — FUROSEMIDE 40 MG/1
40 TABLET ORAL DAILY
Qty: 90 TABLET | Refills: 3 | Status: SHIPPED | OUTPATIENT
Start: 2025-01-15

## 2025-02-10 RX ORDER — EMPAGLIFLOZIN 10 MG/1
10 TABLET, FILM COATED ORAL DAILY
Qty: 90 TABLET | Refills: 3 | Status: SHIPPED | OUTPATIENT
Start: 2025-02-10

## 2025-02-10 NOTE — TELEPHONE ENCOUNTER
Lab Results   Component Value Date    GLUCOSE 136 (H) 11/20/2024    CALCIUM 8.7 11/20/2024     11/20/2024    K 3.5 11/20/2024    CO2 36.0 (H) 11/20/2024    CL 95 (L) 11/20/2024    BUN 16 11/20/2024    CREATININE 0.43 (L) 11/20/2024    EGFR 110.1 11/20/2024    BCR 37.2 (H) 11/20/2024    ANIONGAP 7.0 11/20/2024

## 2025-03-13 DIAGNOSIS — I48.0 PAROXYSMAL ATRIAL FIBRILLATION: ICD-10-CM

## 2025-03-14 RX ORDER — METOPROLOL TARTRATE 25 MG/1
25 TABLET, FILM COATED ORAL EVERY 12 HOURS
Qty: 180 TABLET | Refills: 3 | Status: SHIPPED | OUTPATIENT
Start: 2025-03-14

## 2025-04-25 NOTE — PROGRESS NOTES
"    Cardiology Outpatient Visit      Identification: Jill Miller is a 64 y.o. female who resides in [place], [place].     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  She underwent TAVR for bioprosthetic aortic valve stenosis in November.  Her procedure was complicated by access site bleeding that required groin exploration and vascular surgery.  She has been doing okay over the last several months.  She states that since TAVR she has had 2 episodes of palpitations with her heart rate in the 140 bpm range that last for about 20 minutes.    She is presently on rivaroxaban for DVT and PE following TAVR.    Review of Systems   Cardiovascular: Negative.  Positive for palpitations.   Respiratory: Negative.         Allergies   Allergen Reactions    Lortab [Hydrocodone-Acetaminophen] Nausea And Vomiting, Dizziness and Headache    Morphine And Codeine Nausea Only, GI Intolerance and Headache         Current Outpatient Medications   Medication Instructions    albuterol sulfate  (90 Base) MCG/ACT inhaler 2 puffs, Inhalation, Every 4 Hours PRN    aspirin 81 mg, Oral, Daily    Cholecalciferol 5,000 Units    empagliflozin (JARDIANCE) 10 mg, Oral, Daily    furosemide (LASIX) 40 mg, Oral, Daily    ibuprofen (ADVIL,MOTRIN) 800 mg, As Needed    levothyroxine (SYNTHROID, LEVOTHROID) 200 mcg, Every Early Morning    metoprolol tartrate (LOPRESSOR) 25 mg, Oral, Every 12 Hours    multivitamin (THERAGRAN) tablet tablet 1 tablet, Daily    nystatin (MYCOSTATIN) 241240 UNIT/GM powder 1 Application, 2 Times Daily    potassium chloride (MICRO-K) 10 MEQ CR capsule 20 mEq, Oral, Daily    pravastatin (PRAVACHOL) 20 mg, Oral, Nightly    rivaroxaban (XARELTO) 20 mg, Oral, Daily         Objective     /60   Pulse 54   Ht 157.5 cm (62\")   Wt 75 kg (165 lb 6.4 oz)  "  SpO2 95%   BMI 30.25 kg/m²       Constitutional:       Appearance: Healthy appearance.   Eyes:      General: No scleral icterus.  Neck:      Thyroid: No thyroid mass.      Vascular: No carotid bruit or JVD. JVD normal.   Pulmonary:      Effort: Pulmonary effort is normal.      Breath sounds: Normal breath sounds.   Cardiovascular:      Normal rate. Regular rhythm.      Murmurs: There is a harsh midsystolic murmur at the URSB, radiating to the neck.      No gallop.    Edema:     Peripheral edema absent.   Skin:     General: Skin is warm. There is no cyanosis.   Neurological:      General: No focal deficit present.      Mental Status: Alert.   Psychiatric:         Attention and Perception: Attention normal.         Result Review  (reviewed with patient):                    Assessment     Diagnoses and all orders for this visit:    1. Paroxysmal atrial fibrillation (Primary)  Overview:  Postoperative atrial fibrillation with RVR in the setting of left upper lobectomy, 5/15/2017  Echo (8/25/17): LVEF 60%.  Moderate to severe mitral stenosis (mean gradient 9 mmHg).  Moderate aortic insufficiency.  RVSP 50 mmHg  Chads Vasc = 2 (female, HTN)  Left atrial appendage ligation and MAZE procedure by Ajay Cehatham, 7/16/18  TIFFANIE (9/17/2018):  ANGELA completely occluded.  Successful external cardioversion for atypical atrial flutter after initiation of propafenone, 4/28/2022  PVA for atrial flutter by Nigel Huber, 6/1/2022    Assessment & Plan:  No stroke symptoms  Limited palpitation symptoms possibly due to A-fib/flutter  Continue metoprolol  On rivaroxaban for history of DVT.  Left atrial appendage successfully occluded    Orders:  -     metoprolol tartrate (LOPRESSOR) 25 MG tablet; Take 1 tablet by mouth Every 12 (Twelve) Hours.  Dispense: 180 tablet; Refill: 3  -     rivaroxaban (XARELTO) 20 MG tablet; Take 1 tablet by mouth Daily.  Dispense: 90 tablet; Refill: 3  -     CBC (No Diff); Future    2. Chronic diastolic congestive  heart failure  -     potassium chloride (MICRO-K) 10 MEQ CR capsule; Take 2 capsules by mouth Daily.  Dispense: 180 capsule; Refill: 3  -     furosemide (LASIX) 40 MG tablet; Take 1 tablet by mouth Daily.  Dispense: 90 tablet; Refill: 3  -     empagliflozin (Jardiance) 10 MG tablet tablet; Take 1 tablet by mouth Daily.  Dispense: 90 tablet; Refill: 3    3. Essential hypertension  Overview:  Target blood pressure <130/80 mmHg    Assessment & Plan:  Controlled  Continue metoprolol      4. Hyperlipidemia LDL goal <70  Overview:  High intensity statin indicated given the presence of diabetes and mild CAD    Assessment & Plan:  Obtain direct LDL and LP(a)    Orders:  -     pravastatin (PRAVACHOL) 20 MG tablet; Take 1 tablet by mouth Every Night.  Dispense: 90 tablet; Refill: 3  -     Comprehensive Metabolic Panel; Future  -     LDL Cholesterol, Direct; Future  -     Lipoprotein A (LPA); Future    5. Stenosis of prosthetic mitral valve, subsequent encounter  Overview:  Echo (11/18/2024): LVEF 68%.  Dilated left atrium.  TAVR valve in situ with mean gradient 33 mmHg.  Bioprosthetic mitral valve stenosis with mean gradient 13 mmHg.    Assessment & Plan:  No heart failure symptoms    Orders:  -     aspirin 81 MG EC tablet; Take 1 tablet by mouth Daily.    6. Type 2 diabetes mellitus without complication, without long-term current use of insulin  Assessment & Plan:  Obtain hemoglobin A1c  Continue empagliflozin    Orders:  -     Hemoglobin A1c; Future    7. Stenosis of prosthetic aortic valve, subsequent encounter  Overview:  Cardiac catheterization (9/20/17): Mild nonobstructive CAD. Moderate pulmonary hypertension.  Echo (4/3/18): LVEF 70%. Severe LA dilatation. Moderate AI. Severe MS and severe MR. RVSP 56 mmHg.  AVR/MVR/tricuspid annuloplasty (27 mm Magna Ease mitral, 21 mm Magna Ease aortic) and left atrial appendage ligation performed by Ajay Cheatham, 7/16/18  Echo (08/22/2018): LVEF 65%. The bioprosthetic aortic and  mitral valves are well seated and functioning normally. RVSP<35 mmHg.  TIFFANIE (9/17/2018): Normal functioning MVR.  ANGELA completely occluded.  Echo (4/20/2022): LVEF 50%.  Bioprosthetic aortic valve functions normally.  Bioprosthetic mitral valve with mild MR.  Mild to moderate TR.  Echo (6/2/2022): LVEF 65%.  Bioprosthetic aortic valve present with mean gradient 18 mmHg.  Bioprosthetic mitral valve present with mean gradient 13 mmHg. RVSP > 55 mmHg.  Echo (4/7/2023): LVEF 62%.  Mild LVH.  Mild bioprosthetic aortic valve stenosis.  Bioprosthetic mitral valve stenosis with mean gradient 13 mmHg).  Moderate TR.  RVSP 45-55 mmHg  Echo (9/26/2024): LVEF 60%.  LVH present.  Severe bioprosthetic aortic valve stenosis (mean gradient 52 mmHg).  Severe bioprosthetic mitral valve stenosis (mean gradient 13 mmHg).  Echo (11/18/2024): LVEF 68%.  Dilated left atrium.  TAVR valve in situ with mean gradient 33 mmHg.  Bioprosthetic mitral valve stenosis with mean gradient 13 mmHg.    Assessment & Plan:  No present heart failure symptoms  Echo performed in November showed moderate residual stenosis of TAVR valve    Orders:  -     aspirin 81 MG EC tablet; Take 1 tablet by mouth Daily.          Plan   Obtain lab work today including CBC, CMP, direct LDL, LP(a), hemoglobin A1c      Follow-up   Return in about 6 months (around 10/29/2025).        Nelson Lay MD, FACC, [facility]  4/29/2025

## 2025-04-29 ENCOUNTER — LAB (OUTPATIENT)
Dept: LAB | Facility: HOSPITAL | Age: 65
End: 2025-04-29
Payer: COMMERCIAL

## 2025-04-29 ENCOUNTER — OFFICE VISIT (OUTPATIENT)
Dept: CARDIOLOGY | Facility: CLINIC | Age: 65
End: 2025-04-29
Payer: COMMERCIAL

## 2025-04-29 ENCOUNTER — PATIENT ROUNDING (BHMG ONLY) (OUTPATIENT)
Dept: CARDIOLOGY | Facility: CLINIC | Age: 65
End: 2025-04-29
Payer: COMMERCIAL

## 2025-04-29 VITALS
SYSTOLIC BLOOD PRESSURE: 118 MMHG | WEIGHT: 165.4 LBS | BODY MASS INDEX: 30.44 KG/M2 | DIASTOLIC BLOOD PRESSURE: 60 MMHG | HEART RATE: 54 BPM | OXYGEN SATURATION: 95 % | HEIGHT: 62 IN

## 2025-04-29 DIAGNOSIS — E11.9 TYPE 2 DIABETES MELLITUS WITHOUT COMPLICATION, WITHOUT LONG-TERM CURRENT USE OF INSULIN: Chronic | ICD-10-CM

## 2025-04-29 DIAGNOSIS — I48.0 PAROXYSMAL ATRIAL FIBRILLATION: ICD-10-CM

## 2025-04-29 DIAGNOSIS — E78.5 HYPERLIPIDEMIA LDL GOAL <70: Chronic | ICD-10-CM

## 2025-04-29 DIAGNOSIS — I48.0 PAROXYSMAL ATRIAL FIBRILLATION: Primary | ICD-10-CM

## 2025-04-29 DIAGNOSIS — T82.857D STENOSIS OF PROSTHETIC MITRAL VALVE, SUBSEQUENT ENCOUNTER: ICD-10-CM

## 2025-04-29 DIAGNOSIS — I10 ESSENTIAL HYPERTENSION: Chronic | ICD-10-CM

## 2025-04-29 DIAGNOSIS — T82.857D STENOSIS OF PROSTHETIC AORTIC VALVE, SUBSEQUENT ENCOUNTER: ICD-10-CM

## 2025-04-29 DIAGNOSIS — I50.32 CHRONIC DIASTOLIC CONGESTIVE HEART FAILURE: ICD-10-CM

## 2025-04-29 PROBLEM — I26.99 ACUTE PULMONARY EMBOLISM WITHOUT ACUTE COR PULMONALE: Status: RESOLVED | Noted: 2024-11-14 | Resolved: 2025-04-29

## 2025-04-29 PROBLEM — Z72.0 TOBACCO USER: Status: RESOLVED | Noted: 2022-01-21 | Resolved: 2025-04-29

## 2025-04-29 PROBLEM — J96.21 ACUTE ON CHRONIC RESPIRATORY FAILURE WITH HYPOXIA AND HYPERCAPNIA: Status: RESOLVED | Noted: 2024-11-13 | Resolved: 2025-04-29

## 2025-04-29 PROBLEM — R91.8 PULMONARY INFILTRATE: Status: RESOLVED | Noted: 2024-11-14 | Resolved: 2025-04-29

## 2025-04-29 PROBLEM — J96.22 ACUTE ON CHRONIC RESPIRATORY FAILURE WITH HYPOXIA AND HYPERCAPNIA: Status: RESOLVED | Noted: 2024-11-13 | Resolved: 2025-04-29

## 2025-04-29 LAB
ALBUMIN SERPL-MCNC: 4.7 G/DL (ref 3.5–5.2)
ALBUMIN/GLOB SERPL: 1.6 G/DL
ALP SERPL-CCNC: 97 U/L (ref 39–117)
ALT SERPL W P-5'-P-CCNC: 17 U/L (ref 1–33)
ANION GAP SERPL CALCULATED.3IONS-SCNC: 10 MMOL/L (ref 5–15)
ARTICHOKE IGE QN: 93 MG/DL (ref 0–100)
AST SERPL-CCNC: 27 U/L (ref 1–32)
BILIRUB SERPL-MCNC: 0.4 MG/DL (ref 0–1.2)
BUN SERPL-MCNC: 12 MG/DL (ref 8–23)
BUN/CREAT SERPL: 14.3 (ref 7–25)
CALCIUM SPEC-SCNC: 10.1 MG/DL (ref 8.6–10.5)
CHLORIDE SERPL-SCNC: 99 MMOL/L (ref 98–107)
CO2 SERPL-SCNC: 28 MMOL/L (ref 22–29)
CREAT SERPL-MCNC: 0.84 MG/DL (ref 0.57–1)
DEPRECATED RDW RBC AUTO: 46.1 FL (ref 37–54)
EGFRCR SERPLBLD CKD-EPI 2021: 78.7 ML/MIN/1.73
ERYTHROCYTE [DISTWIDTH] IN BLOOD BY AUTOMATED COUNT: 11.7 % (ref 12.3–15.4)
GLOBULIN UR ELPH-MCNC: 3 GM/DL
GLUCOSE SERPL-MCNC: 165 MG/DL (ref 65–99)
HBA1C MFR BLD: 6.8 % (ref 4.8–5.6)
HCT VFR BLD AUTO: 46.7 % (ref 34–46.6)
HGB BLD-MCNC: 16.4 G/DL (ref 12–15.9)
MCH RBC QN AUTO: 37.3 PG (ref 26.6–33)
MCHC RBC AUTO-ENTMCNC: 35.1 G/DL (ref 31.5–35.7)
MCV RBC AUTO: 106.1 FL (ref 79–97)
PLATELET # BLD AUTO: 161 10*3/MM3 (ref 140–450)
PMV BLD AUTO: 10.8 FL (ref 6–12)
POTASSIUM SERPL-SCNC: 4.4 MMOL/L (ref 3.5–5.2)
PROT SERPL-MCNC: 7.7 G/DL (ref 6–8.5)
RBC # BLD AUTO: 4.4 10*6/MM3 (ref 3.77–5.28)
SODIUM SERPL-SCNC: 137 MMOL/L (ref 136–145)
WBC NRBC COR # BLD AUTO: 6.14 10*3/MM3 (ref 3.4–10.8)

## 2025-04-29 PROCEDURE — 80053 COMPREHEN METABOLIC PANEL: CPT

## 2025-04-29 PROCEDURE — 83036 HEMOGLOBIN GLYCOSYLATED A1C: CPT

## 2025-04-29 PROCEDURE — 36415 COLL VENOUS BLD VENIPUNCTURE: CPT

## 2025-04-29 PROCEDURE — 83695 ASSAY OF LIPOPROTEIN(A): CPT

## 2025-04-29 PROCEDURE — 85027 COMPLETE CBC AUTOMATED: CPT

## 2025-04-29 PROCEDURE — 83721 ASSAY OF BLOOD LIPOPROTEIN: CPT

## 2025-04-29 PROCEDURE — 99214 OFFICE O/P EST MOD 30 MIN: CPT | Performed by: INTERNAL MEDICINE

## 2025-04-29 RX ORDER — FUROSEMIDE 40 MG/1
40 TABLET ORAL DAILY
Qty: 90 TABLET | Refills: 3 | Status: SHIPPED | OUTPATIENT
Start: 2025-04-29

## 2025-04-29 RX ORDER — PRAVASTATIN SODIUM 20 MG
20 TABLET ORAL NIGHTLY
Qty: 90 TABLET | Refills: 3 | Status: SHIPPED | OUTPATIENT
Start: 2025-04-29

## 2025-04-29 RX ORDER — POTASSIUM CHLORIDE 750 MG/1
20 CAPSULE, EXTENDED RELEASE ORAL DAILY
Qty: 180 CAPSULE | Refills: 3 | Status: SHIPPED | OUTPATIENT
Start: 2025-04-29

## 2025-04-29 RX ORDER — ASPIRIN 81 MG/1
81 TABLET ORAL DAILY
Start: 2025-04-29

## 2025-04-29 RX ORDER — METOPROLOL TARTRATE 25 MG/1
25 TABLET, FILM COATED ORAL EVERY 12 HOURS
Qty: 180 TABLET | Refills: 3 | Status: SHIPPED | OUTPATIENT
Start: 2025-04-29

## 2025-04-29 NOTE — PROGRESS NOTES
April 29, 2025    Hello, may I speak with Jill Miller?    My name is NOEMÍ HUFF      I am  with [facility] CARDIOLOGY  [address]  [address]  [place] [facility] [address] [phone].    Before we get started may I verify your date of birth? 1960    I am calling to officially welcome you to our practice and ask about your recent visit. Is this a good time to talk? yes    Tell me about your visit with us. What things went well?  EVERYTHING WENT WELL TODAY.        We're always looking for ways to make our patients' experiences even better. Do you have recommendations on ways we may improve?  no    Overall were you satisfied with your first visit to our practice? yes       I appreciate you taking the time to speak with me today. Is there anything else I can do for you? no      Thank you, and have a great day.

## 2025-04-29 NOTE — LETTER
April 29, 2025     YANETH Olivera  [address]  [place] [address]    Patient: Jill Miller   YOB: 1960   Date of Visit: 4/29/2025     Dear YANETH Olivera:       Thank you for referring Jill Miller to me for evaluation. Below are the relevant portions of my assessment and plan of care.    If you have questions, please do not hesitate to call me. I look forward to following Jill along with you.         Sincerely,        Marco Lay IV, MD        CC: No Recipients    Marco Lay IV, MD  04/29/25 0928  Signed      Cardiology Outpatient Visit      Identification: Jill Miller is a 64 y.o. female who resides in [place], [place].     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  She underwent TAVR for bioprosthetic aortic valve stenosis in November.  Her procedure was complicated by access site bleeding that required groin exploration and vascular surgery.  She has been doing okay over the last several months.  She states that since TAVR she has had 2 episodes of palpitations with her heart rate in the 140 bpm range that last for about 20 minutes.    She is presently on rivaroxaban for DVT and PE following TAVR.    Review of Systems   Cardiovascular: Negative.  Positive for palpitations.   Respiratory: Negative.         Allergies   Allergen Reactions   • Lortab [Hydrocodone-Acetaminophen] Nausea And Vomiting, Dizziness and Headache   • Morphine And Codeine Nausea Only, GI Intolerance and Headache         Current Outpatient Medications   Medication Instructions   • albuterol sulfate  (90 Base) MCG/ACT inhaler 2 puffs, Inhalation, Every 4 Hours PRN   • aspirin 81 mg, Oral, Daily   • Cholecalciferol 5,000 Units   • empagliflozin (JARDIANCE) 10 mg, Oral, Daily   •  "furosemide (LASIX) 40 mg, Oral, Daily   • ibuprofen (ADVIL,MOTRIN) 800 mg, As Needed   • levothyroxine (SYNTHROID, LEVOTHROID) 200 mcg, Every Early Morning   • metoprolol tartrate (LOPRESSOR) 25 mg, Oral, Every 12 Hours   • multivitamin (THERAGRAN) tablet tablet 1 tablet, Daily   • nystatin (MYCOSTATIN) 756501 UNIT/GM powder 1 Application, 2 Times Daily   • potassium chloride (MICRO-K) 10 MEQ CR capsule 20 mEq, Oral, Daily   • pravastatin (PRAVACHOL) 20 mg, Oral, Nightly   • rivaroxaban (XARELTO) 20 mg, Oral, Daily         Objective    /60   Pulse 54   Ht 157.5 cm (62\")   Wt 75 kg (165 lb 6.4 oz)   SpO2 95%   BMI 30.25 kg/m²       Constitutional:       Appearance: Healthy appearance.   Eyes:      General: No scleral icterus.  Neck:      Thyroid: No thyroid mass.      Vascular: No carotid bruit or JVD. JVD normal.   Pulmonary:      Effort: Pulmonary effort is normal.      Breath sounds: Normal breath sounds.   Cardiovascular:      Normal rate. Regular rhythm.      Murmurs: There is a harsh midsystolic murmur at the URSB, radiating to the neck.      No gallop.    Edema:     Peripheral edema absent.   Skin:     General: Skin is warm. There is no cyanosis.   Neurological:      General: No focal deficit present.      Mental Status: Alert.   Psychiatric:         Attention and Perception: Attention normal.         Result Review (reviewed with patient):                    Assessment    Diagnoses and all orders for this visit:    1. Paroxysmal atrial fibrillation (Primary)  Overview:  Postoperative atrial fibrillation with RVR in the setting of left upper lobectomy, 5/15/2017  Echo (8/25/17): LVEF 60%.  Moderate to severe mitral stenosis (mean gradient 9 mmHg).  Moderate aortic insufficiency.  RVSP 50 mmHg  Chads Vasc = 2 (female, HTN)  Left atrial appendage ligation and MAZE procedure by Ajay Cheatham, 7/16/18  TIFFANIE (9/17/2018):  ANGELA completely occluded.  Successful external cardioversion for atypical atrial flutter " after initiation of propafenone, 4/28/2022  PVA for atrial flutter by Nigel Huber, 6/1/2022    Assessment & Plan:  No stroke symptoms  Limited palpitation symptoms possibly due to A-fib/flutter  Continue metoprolol  On rivaroxaban for history of DVT.  Left atrial appendage successfully occluded    Orders:  -     metoprolol tartrate (LOPRESSOR) 25 MG tablet; Take 1 tablet by mouth Every 12 (Twelve) Hours.  Dispense: 180 tablet; Refill: 3  -     rivaroxaban (XARELTO) 20 MG tablet; Take 1 tablet by mouth Daily.  Dispense: 90 tablet; Refill: 3  -     CBC (No Diff); Future    2. Chronic diastolic congestive heart failure  -     potassium chloride (MICRO-K) 10 MEQ CR capsule; Take 2 capsules by mouth Daily.  Dispense: 180 capsule; Refill: 3  -     furosemide (LASIX) 40 MG tablet; Take 1 tablet by mouth Daily.  Dispense: 90 tablet; Refill: 3  -     empagliflozin (Jardiance) 10 MG tablet tablet; Take 1 tablet by mouth Daily.  Dispense: 90 tablet; Refill: 3    3. Essential hypertension  Overview:  Target blood pressure <130/80 mmHg    Assessment & Plan:  Controlled  Continue metoprolol      4. Hyperlipidemia LDL goal <70  Overview:  High intensity statin indicated given the presence of diabetes and mild CAD    Assessment & Plan:  Obtain direct LDL and LP(a)    Orders:  -     pravastatin (PRAVACHOL) 20 MG tablet; Take 1 tablet by mouth Every Night.  Dispense: 90 tablet; Refill: 3  -     Comprehensive Metabolic Panel; Future  -     LDL Cholesterol, Direct; Future  -     Lipoprotein A (LPA); Future    5. Stenosis of prosthetic mitral valve, subsequent encounter  Overview:  Echo (11/18/2024): LVEF 68%.  Dilated left atrium.  TAVR valve in situ with mean gradient 33 mmHg.  Bioprosthetic mitral valve stenosis with mean gradient 13 mmHg.    Assessment & Plan:  No heart failure symptoms    Orders:  -     aspirin 81 MG EC tablet; Take 1 tablet by mouth Daily.    6. Type 2 diabetes mellitus without complication, without long-term  current use of insulin  Assessment & Plan:  Obtain hemoglobin A1c  Continue empagliflozin    Orders:  -     Hemoglobin A1c; Future    7. Stenosis of prosthetic aortic valve, subsequent encounter  Overview:  Cardiac catheterization (9/20/17): Mild nonobstructive CAD. Moderate pulmonary hypertension.  Echo (4/3/18): LVEF 70%. Severe LA dilatation. Moderate AI. Severe MS and severe MR. RVSP 56 mmHg.  AVR/MVR/tricuspid annuloplasty (27 mm Magna Ease mitral, 21 mm Magna Ease aortic) and left atrial appendage ligation performed by Ajay Cheatham, 7/16/18  Echo (08/22/2018): LVEF 65%. The bioprosthetic aortic and mitral valves are well seated and functioning normally. RVSP<35 mmHg.  TIFFANIE (9/17/2018): Normal functioning MVR.  ANGELA completely occluded.  Echo (4/20/2022): LVEF 50%.  Bioprosthetic aortic valve functions normally.  Bioprosthetic mitral valve with mild MR.  Mild to moderate TR.  Echo (6/2/2022): LVEF 65%.  Bioprosthetic aortic valve present with mean gradient 18 mmHg.  Bioprosthetic mitral valve present with mean gradient 13 mmHg. RVSP > 55 mmHg.  Echo (4/7/2023): LVEF 62%.  Mild LVH.  Mild bioprosthetic aortic valve stenosis.  Bioprosthetic mitral valve stenosis with mean gradient 13 mmHg).  Moderate TR.  RVSP 45-55 mmHg  Echo (9/26/2024): LVEF 60%.  LVH present.  Severe bioprosthetic aortic valve stenosis (mean gradient 52 mmHg).  Severe bioprosthetic mitral valve stenosis (mean gradient 13 mmHg).  Echo (11/18/2024): LVEF 68%.  Dilated left atrium.  TAVR valve in situ with mean gradient 33 mmHg.  Bioprosthetic mitral valve stenosis with mean gradient 13 mmHg.    Assessment & Plan:  No present heart failure symptoms  Echo performed in November showed moderate residual stenosis of TAVR valve    Orders:  -     aspirin 81 MG EC tablet; Take 1 tablet by mouth Daily.          Plan  Obtain lab work today including CBC, CMP, direct LDL, LP(a), hemoglobin A1c      Follow-up   Return in about 6 months (around  10/29/2025).        Nelson Lay MD, FACC, [facility]  4/29/2025

## 2025-04-30 LAB — LPA SERPL-SCNC: 17.2 NMOL/L

## 2025-08-01 DIAGNOSIS — Z95.3 S/P TAVR (TRANSCATHETER AORTIC VALVE REPLACEMENT), BIOPROSTHETIC: ICD-10-CM

## 2025-08-01 DIAGNOSIS — T82.857A STENOSIS OF PROSTHETIC AORTIC VALVE, INITIAL ENCOUNTER: Primary | ICD-10-CM

## (undated) DEVICE — BLAKE SILICONE DRAINS CARDIO CONNECTOR 2:1: Brand: BLAKE

## (undated) DEVICE — PK HEART OPN 10

## (undated) DEVICE — CATH PACE PACEL BIPOL 5F110CM

## (undated) DEVICE — PATIENT RETURN ELECTRODE, SINGLE-USE, CONTACT QUALITY MONITORING, ADULT, WITH 9FT CORD, FOR PATIENTS WEIGING OVER 33LBS. (15KG): Brand: MEGADYNE

## (undated) DEVICE — SINGLE USE SUCTION VALVE MAJ-209: Brand: SINGLE USE SUCTION VALVE (STERILE)

## (undated) DEVICE — SLV REPOSTNG CATH STRL 60CM

## (undated) DEVICE — SUT ETHIB 1 CT1 30IN  X425H

## (undated) DEVICE — INTRO SHEATH ART/FEM ENGAGE .035IN 9F 25CM

## (undated) DEVICE — LN INJ CONTRST FLXCIL HP F/M LL 1200PSI48

## (undated) DEVICE — SUT SILK 2/0 TIES 18IN A185H

## (undated) DEVICE — SUT SILK 2/0 CT1 CR8 18IN C022D

## (undated) DEVICE — TONSIL SPONGES: Brand: DEROYAL

## (undated) DEVICE — ST ACC MICROPUNCTURE .018 TRANSLSS/SS/TP 5F/10CM 21G/7CM

## (undated) DEVICE — FRCP BX RADJAW4 PULM WO NDL STD1.8X2 100

## (undated) DEVICE — TOTAL TRAY, 16FR 10ML SIL FOLEY, URN: Brand: MEDLINE

## (undated) DEVICE — SUT SILK 3/0 TIES 18IN A184H

## (undated) DEVICE — SUT SILK 2 SUTUPAK TIE 60IN SA8H 2STRAND

## (undated) DEVICE — SOL IRR NACL 0.9PCT BT 1000ML

## (undated) DEVICE — PK ATS CUST W CARDIOTOMY RESEVOIR

## (undated) DEVICE — LUBE JELLY SURGILUBE TB/FLIPCAP 4.5OZ

## (undated) DEVICE — PK CATH CARD 10

## (undated) DEVICE — MODEL AT P65, P/N 701554-001KIT CONTENTS: HAND CONTROLLER, 3-WAY HIGH-PRESSURE STOPCOCK WITH ROTATING END AND PREMIUM HIGH-PRESSURE TUBING: Brand: ANGIOTOUCH® KIT

## (undated) DEVICE — ADULT, W/LG. BACK PAD, RADIOTRANSPARENT ELEMENT AND LEAD WIRE: Brand: DEFIBRILLATION ELECTRODES

## (undated) DEVICE — INTENDED FOR TISSUE SEPARATION, AND OTHER PROCEDURES THAT REQUIRE A SHARP SURGICAL BLADE TO PUNCTURE OR CUT.: Brand: BARD-PARKER ® STAINLESS STEEL BLADES

## (undated) DEVICE — 12 FOOT DISPOSABLE EXTENSION CABLE WITH SAFE CONNECT / SCREW-DOWN

## (undated) DEVICE — ELECTRD RETRN/GRND MEGADYNE SGL/PLT W/CORD 9FT DISP

## (undated) DEVICE — PK THORACOTOMY 10

## (undated) DEVICE — ANTIBACTERIAL UNDYED BRAIDED (POLYGLACTIN 910), SYNTHETIC ABSORBABLE SURGICAL SUTURE: Brand: COATED VICRYL

## (undated) DEVICE — SUT PROLN 3/0 V7 D/A 36IN 8976H

## (undated) DEVICE — Device

## (undated) DEVICE — PINNACLE INTRODUCER SHEATH: Brand: PINNACLE

## (undated) DEVICE — CATH DIAG EXPO M/ PK 5F FL4/FR4 PIG

## (undated) DEVICE — PERCLOSE™ PROSTYLE™ SUTURE-MEDIATED CLOSURE AND REPAIR SYSTEM: Brand: PERCLOSE™ PROSTYLE™

## (undated) DEVICE — PROB S-CATH TEMP ESOPH 10F

## (undated) DEVICE — SUT STL 2/0 CV SH 24IN TPW32

## (undated) DEVICE — ANTIBACTERIAL UNDYED BRAIDED (POLYGLACTIN 910), SYNTHETIC ABSORBABLE SUTURE: Brand: COATED VICRYL

## (undated) DEVICE — CABL PACE ATRIAL PT BLU

## (undated) DEVICE — INTRO SHEATH ENGAGE W/50 GW .038 8F12

## (undated) DEVICE — SUCTION CANISTER, 2500CC, RIGID: Brand: DEROYAL

## (undated) DEVICE — TUBING, SUCTION, 1/4" X 10', STRAIGHT: Brand: MEDLINE

## (undated) DEVICE — TP CLTH ADH PORUS 3IN 10YD

## (undated) DEVICE — LHK- LEFT HEART KIT BAPTIST: Brand: MEDLINE INDUSTRIES, INC.

## (undated) DEVICE — SHROD PENCL MEGADYNE ATTACHAVAC W/CONN/22MM

## (undated) DEVICE — BLANKT WARM UNDER/BDY A/ 100X195CM DISP LF

## (undated) DEVICE — PEN ABLAT ISOL TRNSPOLAR 19CM

## (undated) DEVICE — SYR SLPTP 20CC

## (undated) DEVICE — SPNG GZ WOVN 4X4IN 12PLY 10/BX STRL

## (undated) DEVICE — APPL CHLORAPREP TINTED 26ML TEAL

## (undated) DEVICE — SUT SILK 2/0 SH 30IN K833H

## (undated) DEVICE — SOL LR 1000ML

## (undated) DEVICE — RADIFOCUS GLIDEWIRE ADVANTAGE GUIDEWIRE: Brand: GLIDEWIRE ADVANTAGE

## (undated) DEVICE — ST INF PRI SMRTSTE 20DRP 2VLV 24ML 117

## (undated) DEVICE — MEDI-VAC YANKAUER SUCTION HANDLE W/BULBOUS TIP: Brand: CARDINAL HEALTH

## (undated) DEVICE — TP UMB COTN 30IN U11T

## (undated) DEVICE — PK VASC 10

## (undated) DEVICE — NDL HYPO ECLPS SFTY 18G 1 1/2IN

## (undated) DEVICE — CATH FOL LUBRISIL IC 2WY 20F 5CC

## (undated) DEVICE — INTRO SHEATH ENGAGE W/50 GW .038 7F12

## (undated) DEVICE — GW J TP FIX CORE .035 150

## (undated) DEVICE — NEB SETUP SD STREAM W TBG

## (undated) DEVICE — SYR LUERLOK 30CC

## (undated) DEVICE — DEFOGGER!" ANTI FOG KIT: Brand: DEROYAL

## (undated) DEVICE — BALN CATH PRESS WEDGE 6F 110CM

## (undated) DEVICE — 3M™ STERI-DRAPE™ FLUOROSCOPE DRAPE, 10 PER CARTON / 4 CARTONS PER CASE, 1012: Brand: STERI-DRAPE™

## (undated) DEVICE — PRESSURE MONITORING SET: Brand: TRUWAVE, VAMP

## (undated) DEVICE — GW CERBRL FC PTFE STD/STR .035 260CM

## (undated) DEVICE — MODEL AT P54, P/N 700608-035KIT CONTENTS: HAND CONTROLLER, 3-WAY HIGH-PRESSURE STOPCOCK WITH ROTATING END AND PREMIUM HIGH-PRESSURE TUBING: Brand: ANGIOTOUCH® KIT

## (undated) DEVICE — Device: Brand: VIZIGO

## (undated) DEVICE — ANTIBACTERIAL VIOLET BRAIDED (POLYGLACTIN 910), SYNTHETIC ABSORBABLE SUTURE: Brand: COATED VICRYL

## (undated) DEVICE — DECANT BG O JET

## (undated) DEVICE — ADHS SKIN PREMIERPRO EXOFIN TOPICAL HI/VISC .5ML

## (undated) DEVICE — GOWN,NON-REINFORCED,SIRUS,SET IN SLV,XL: Brand: MEDLINE

## (undated) DEVICE — PENCL ROCKRSWCH MEGADYNE W/HOLSTR 10FT SS

## (undated) DEVICE — DRSNG SURESITE123 4X4.8IN

## (undated) DEVICE — SYR LUERLOK 50ML

## (undated) DEVICE — NDL PERC 1PART ECHOTIP WO/BASEPLT 18G 7CM

## (undated) DEVICE — NERVE BLOCK SUPPORT KIT/BLUE: Brand: MEDLINE INDUSTRIES, INC.

## (undated) DEVICE — 8 FOOT DISPOSABLE EXTENSION CABLE WITH SAFE CONNECT / ALLIGATOR CLIP

## (undated) DEVICE — SYR SLP TP 10ML DISP

## (undated) DEVICE — Device: Brand: SMARTABLATE

## (undated) DEVICE — Device: Brand: SOUNDSTAR

## (undated) DEVICE — 1 X VERSACROSS TRANSSEPTAL SHEATH (INCLUDING  1 X J-TIP GUIDEWIRE); 1 X VERSACROSS RF WIRE (INCLUDING 1 X CONNECTOR CABLE (SINGLE USE)); 1 X DISPERSIVE ELECTRODE: Brand: VERSACROSS ACCESS SOLUTION

## (undated) DEVICE — WIPE THERAWASH SLV SPEC CARE 2PK

## (undated) DEVICE — SCOPE WARMER THAT PRE-WARMS MULTIPLE LAPAROSCOPES.: Brand: JOSNOE MEDICAL INC

## (undated) DEVICE — MAGNETIC DRAPE: Brand: DEVON

## (undated) DEVICE — CONN STD FOR/O2 TBG

## (undated) DEVICE — GW PERIPH GUIDERIGHT STD/EXCHNG/J/TIP SS 0.035IN 5X260CM

## (undated) DEVICE — SUT SILK 0 SH 30IN K834H

## (undated) DEVICE — ELECTRODE,ECG,FOAM, 3/PK: Brand: MEDLINE

## (undated) DEVICE — ADULT, W/LG. BACK PAD, RADIOTRANSPARENT ELEMENT AND LEAD WIRE COMPATIBLE W/: Brand: DEFIBRILLATION ELECTRODES

## (undated) DEVICE — GLV SURG SENSICARE W/ALOE PF LF 7 STRL

## (undated) DEVICE — SI AVANTI+ 7F STD W/GW  NO OBT: Brand: AVANTI

## (undated) DEVICE — MODEL BT2000 P/N 700287-012KIT CONTENTS: MANIFOLD WITH SALINE AND CONTRAST PORTS, SALINE TUBING WITH SPIKE AND HAND SYRINGE, TRANSDUCER: Brand: BT2000 AUTOMATED MANIFOLD KIT

## (undated) DEVICE — SUT PROLN 4/0 V7 36IN 8975H

## (undated) DEVICE — GUIDE SELECT ATRICLIP

## (undated) DEVICE — Device: Brand: PENTARAY NAV

## (undated) DEVICE — ECHELON FLEX  POWERED VASCULAR STAPLER WITH ADVANCED PLACEMENT TIP, 35MM: Brand: ECHELON FLEX

## (undated) DEVICE — PROB TEMP MYOCARDIAL 18MM

## (undated) DEVICE — CVR HNDL LT SURG ACCSSRY BLU STRL

## (undated) DEVICE — TRAP,MUCUS SPECIMEN,40CC: Brand: MEDLINE

## (undated) DEVICE — HDRST POSTIN FM CRDL TRACH SLOT NONCOMRESS 9X8X4IN

## (undated) DEVICE — SPEC CONT WIDE MOUTH 3OZ 400/CS 20 CS/SK: Brand: MEDEGEN MEDICAL PRODUCTS, LLC

## (undated) DEVICE — CVR PROB ULTRASND/TRANSD W/GEL 7X11IN STRL

## (undated) DEVICE — SUT SILK 0/0 CT2 18IN C027D

## (undated) DEVICE — SET PRIMARY GRVTY 10DP MALE LL 104IN

## (undated) DEVICE — TRAP FLD MINIVAC MEGADYNE 100ML

## (undated) DEVICE — ST PRIM GRVTY NDLESS 3 INJ PORT 105IN

## (undated) DEVICE — DRSNG SURESITE WNDW 4X4.5

## (undated) DEVICE — PROB CRYO ABL ICE MALL LSS BEND 10CM

## (undated) DEVICE — SOL NS 500ML

## (undated) DEVICE — SYR SFTY ECLPS LL 5CC 22G 11/2IN

## (undated) DEVICE — CATH DIAG EXPO M/ PK 6FR FL4/FR4 PIG 3PK

## (undated) DEVICE — STERILE PVP: Brand: MEDLINE INDUSTRIES, INC.

## (undated) DEVICE — Device: Brand: WEBSTER CS

## (undated) DEVICE — SUT VIC 2 TP1 54IN J880T

## (undated) DEVICE — CATH IV INSYTE AUTOGARD 14G 1 1/2IN ORNG

## (undated) DEVICE — DRN WND CH RND FUL/FLUT NO/TROC 3/8IN 28F

## (undated) DEVICE — TR BAND RADIAL ARTERY COMPRESSION DEVICE: Brand: TR BAND

## (undated) DEVICE — Device: Brand: REFERENCE PATCH CARTO 3

## (undated) DEVICE — CATH GUIDE BERN 5F 65CM

## (undated) DEVICE — SUT PROLN CARDIO V5 3/0 36IN 8936H

## (undated) DEVICE — SAFESECURE,SECUREMENT,FOLEY CATH,STERILE: Brand: MEDLINE

## (undated) DEVICE — ANGIOGRAPHIC CATHETER: Brand: EXPO™

## (undated) DEVICE — PRESSURE MONITORING ACCESSORY: Brand: TRUWAVE

## (undated) DEVICE — SINGLE USE BIOPSY VALVE MAJ-210: Brand: SINGLE USE BIOPSY VALVE (STERILE)

## (undated) DEVICE — MEDI-VAC NON-CONDUCTIVE SUCTION TUBING: Brand: CARDINAL HEALTH

## (undated) DEVICE — ELECTRD BLD EXT EDGE/INSUL 6IN

## (undated) DEVICE — SUT MNCRYL PLS ANTIB UD 4/0 PS2 18IN

## (undated) DEVICE — ST EXT IV SMARTSITE 2VLV SP M LL 5ML IV1

## (undated) DEVICE — TBG PRESS/MONITR FIX M/F LL A/ 48IN STRL

## (undated) DEVICE — GLV SURG BIOGELULTRATOUCH POLYISPRN PF LF SZ7 STRL

## (undated) DEVICE — OASIS DRAIN, SINGLE, INLINE & ATS COMPATIBLE: Brand: OASIS

## (undated) DEVICE — TBG 02 CRUSH RESIST LF CLR 7FT

## (undated) DEVICE — ST INF 10DRP 4 PORT 4WY/STPCOCK 112IN

## (undated) DEVICE — 2000CC GUARDIAN II: Brand: GUARDIAN

## (undated) DEVICE — SAFETY SCALPEL: Brand: DEROYAL

## (undated) DEVICE — SUT SILK 4/0 RB1CR8 18IN C054D

## (undated) DEVICE — NDL HYPO ECLPS SFTY 22G 1 1/2IN

## (undated) DEVICE — Device: Brand: THERMOCOOL SMARTTOUCH SF

## (undated) DEVICE — EACH LANGSTON DUAL LUMEN CATHETER IS INDICATED FOR DELIVERY OF CONTRAST MEDIUM IN ANGIOGRAPHIC STUDIES AND FOR SIMULTANEOUS PRESSURE MEASUREMENT FROM TWO SITES. THIS TYPE OF PRESSURE MEASUREMENT IS USEFUL IN DETERMINING TRANSVALVULAR, INTRAVASCULAR AND INTRAVENTRICULAR PRESSURE GRADIENTS.: Brand: LANGSTON® DUAL LUMEN CATHETER

## (undated) DEVICE — ECHELON FLEX 60 ARTICULATING ENDOSCOPIC LINEAR CUTTER (NO CARTRIDGE): Brand: ECHELON FLEX ENDOPATH

## (undated) DEVICE — CATH DIAG EXPO .056 AL1 6F 100CM

## (undated) DEVICE — ELECTRD BLD EDGE/INSUL1P 2.4X5.1MM STRL

## (undated) DEVICE — SUT PROLN 5/0 V5 36IN 8934H

## (undated) DEVICE — ELECTRD DEFIB M/FUNC PROPADZ STRL 2PK

## (undated) DEVICE — LEX ELECTRO PHYSIOLOGY: Brand: MEDLINE INDUSTRIES, INC.

## (undated) DEVICE — INFLATION DEVICE: Brand: ENCORE 26

## (undated) DEVICE — GLV SURG SENSICARE MICRO PF LF 7 STRL

## (undated) DEVICE — AIRWY 90MM NO9

## (undated) DEVICE — ST BLOOD ADMIN YTP 80IN

## (undated) DEVICE — NDL PERC 1PRT THNWALL W/BASEPLT 18G 7CM

## (undated) DEVICE — INTENDED USE FOR SURGICAL MARKING ON INTACT SKIN, ALSO PROVIDES A PERMANENT METHOD OF IDENTIFYING OBJECTS IN THE OPERATING ROOM: Brand: WRITESITE® REGULAR TIP SKIN MARKER

## (undated) DEVICE — LAPAROSCOPIC TROCAR SLEEVE: Brand: DISPOSABLE CANNULAS AND SEALS

## (undated) DEVICE — SUT PROLN 4/0 V5 36IN 8935H

## (undated) DEVICE — PRESSURE MONITORING SET: Brand: TRUWAVE

## (undated) DEVICE — BNDR ABD PREMIUM/UNIV 10IN 27TO48IN

## (undated) DEVICE — Device: Brand: MEDEX

## (undated) DEVICE — DRSNG WND BORDR/ADHS NONADHR/GZ LF 2X2IN STRL

## (undated) DEVICE — SYR SLPTP 10CC

## (undated) DEVICE — SKIN AFFIX SURG ADHESIVE 72/CS 0.55ML: Brand: MEDLINE

## (undated) DEVICE — ATLAS® GOLD PTA DILATATION CATHETER 22 MM X 40 MM, 120 CM CATHETER: Brand: ATLAS® GOLD

## (undated) DEVICE — LEX TAVR: Brand: MEDLINE INDUSTRIES, INC.

## (undated) DEVICE — CLTH CLENS READYCLEANSE PERI CARE PK/5

## (undated) DEVICE — SUT ETHIB 2/0 CV V5 30IN 10X52

## (undated) DEVICE — SENSR O2 OXIMAX FNGR A/ 18IN NONSTR

## (undated) DEVICE — CLMP ABLAT ISOL TRNSPOLAR LNG XL

## (undated) DEVICE — SPNG GZ STRL 2S 4X4 12PLY

## (undated) DEVICE — ST EXT IV LG BORE NDLESS FLTR LL 17IN

## (undated) DEVICE — HI-TORQUE SUPRA CORE .035 PERIPHERAL GUIDE WIRE .035 X 190 CM: Brand: HI-TORQUE SUPRA CORE

## (undated) DEVICE — CATHETER,FOLEY,100%SILICONE,18FR,10ML,LF: Brand: MEDLINE

## (undated) DEVICE — GLIDESHEATH SLENDER STAINLESS STEEL KIT: Brand: GLIDESHEATH SLENDER

## (undated) DEVICE — SUT VIC 2/0 CT2 27IN J269H

## (undated) DEVICE — KT MANIFOLD CATHLAB CUST

## (undated) DEVICE — SYS CLS VASC/VENI VASCADE MVP 6TO12F

## (undated) DEVICE — SUT PROLN 6/0 C1 D/A 30IN 8706H

## (undated) DEVICE — DOME MONITORING W BONDED STPCK BIOTRANS2

## (undated) DEVICE — BOWL UTIL STRL 32OZ

## (undated) DEVICE — GW PERIPH VASC ADX J/TP SS .035 150CM 3MM

## (undated) DEVICE — SYR LL TP 10ML STRL

## (undated) DEVICE — TAPE,ELASTIC,FOAM,CURAD,3"X5.5YD,LF: Brand: CURAD

## (undated) DEVICE — ST NERV BLCK CONT CONTIPLEX ECHO CLSD 18G 4IN

## (undated) DEVICE — CONNECTOR PH 6-IN-1 Y ST: Brand: CARDINAL HEALTH

## (undated) DEVICE — PAD,ARMBOARD,CONV,FOAM,2X8X20",12PR/CS: Brand: MEDLINE

## (undated) DEVICE — GW SAFARI2 PRESH XSM CRV .035IN 3.2X2.9X275CM

## (undated) DEVICE — IMPLANTABLE DEVICE: Type: IMPLANTABLE DEVICE | Site: HEART | Status: NON-FUNCTIONAL

## (undated) DEVICE — PENCL SMOKE/EVAC MEGADYNE TELESCP 10FT

## (undated) DEVICE — SUCTION CANISTER 2500CC: Brand: DEROYAL

## (undated) DEVICE — DECANTER BAG 9": Brand: MEDLINE INDUSTRIES, INC.

## (undated) DEVICE — SWAN-GANZ THERMODILUTION CATHETER: Brand: SWAN-GANZ

## (undated) DEVICE — BLAKE CARDIO CONNECTOR 1:1: Brand: J-VAC

## (undated) DEVICE — SOL NACL 0.9PCT 1000ML

## (undated) DEVICE — 2963 MEDIPORE SOFT CLOTH TAPE 3 IN X 10 YD 12 RLS/CS: Brand: 3M™ MEDIPORE™

## (undated) DEVICE — SENSR CERBRL O2 PK/2

## (undated) DEVICE — ST INF 2NDARY 20DRP VNT/NOVNT 30IN

## (undated) DEVICE — SUT SILK 4/0 TIES 18IN A183H

## (undated) DEVICE — CANNULA,ADULT,SOFT-TOUCH,7TUBE,SC: Brand: MEDLINE

## (undated) DEVICE — ORGANIZER SUT GABBAY FRATER GFS10A PK/3

## (undated) DEVICE — STERILE (15.2 TAPERED TO 7.6 X 183CM) POLYETHYLENE ACCORDION-FOLDED COVER FOR USE WITH SIEMENS ACUNAV ULTRASOUND CATHETER FAMILY CONNECTOR: Brand: SWIFTLINK TRANSDUCER COVER